# Patient Record
Sex: MALE | Race: WHITE | NOT HISPANIC OR LATINO | Employment: OTHER | URBAN - METROPOLITAN AREA
[De-identification: names, ages, dates, MRNs, and addresses within clinical notes are randomized per-mention and may not be internally consistent; named-entity substitution may affect disease eponyms.]

---

## 2018-06-07 ENCOUNTER — HOSPITAL ENCOUNTER (INPATIENT)
Facility: CLINIC | Age: 68
LOS: 1 days | Discharge: HOME | DRG: 247 | End: 2018-06-09
Attending: EMERGENCY MEDICINE | Admitting: INTERNAL MEDICINE
Payer: MEDICARE

## 2018-06-07 ENCOUNTER — TRANSFERRED RECORDS (OUTPATIENT)
Dept: HEALTH INFORMATION MANAGEMENT | Facility: CLINIC | Age: 68
End: 2018-06-07

## 2018-06-07 ENCOUNTER — APPOINTMENT (OUTPATIENT)
Dept: GENERAL RADIOLOGY | Facility: CLINIC | Age: 68
DRG: 247 | End: 2018-06-07
Attending: EMERGENCY MEDICINE
Payer: MEDICARE

## 2018-06-07 DIAGNOSIS — R07.9 ACUTE CHEST PAIN: ICD-10-CM

## 2018-06-07 DIAGNOSIS — I10 BENIGN ESSENTIAL HYPERTENSION: ICD-10-CM

## 2018-06-07 DIAGNOSIS — I21.4 NSTEMI (NON-ST ELEVATED MYOCARDIAL INFARCTION) (H): Primary | ICD-10-CM

## 2018-06-07 LAB
GLUCOSE BLDC GLUCOMTR-MCNC: 124 MG/DL (ref 70–99)
GLUCOSE BLDC GLUCOMTR-MCNC: 155 MG/DL (ref 70–99)
GLUCOSE BLDC GLUCOMTR-MCNC: 36 MG/DL (ref 70–99)
TROPONIN I BLD-MCNC: 0.02 UG/L (ref 0–0.1)
TROPONIN I SERPL-MCNC: 0.04 UG/L (ref 0–0.04)
TROPONIN I SERPL-MCNC: 0.34 UG/L (ref 0–0.04)
TROPONIN I SERPL-MCNC: 0.56 UG/L (ref 0–0.04)

## 2018-06-07 PROCEDURE — 93005 ELECTROCARDIOGRAM TRACING: CPT

## 2018-06-07 PROCEDURE — 96372 THER/PROPH/DIAG INJ SC/IM: CPT

## 2018-06-07 PROCEDURE — 25800025 ZZH RX 258

## 2018-06-07 PROCEDURE — 85025 COMPLETE CBC W/AUTO DIFF WBC: CPT | Performed by: EMERGENCY MEDICINE

## 2018-06-07 PROCEDURE — 84484 ASSAY OF TROPONIN QUANT: CPT

## 2018-06-07 PROCEDURE — G0378 HOSPITAL OBSERVATION PER HR: HCPCS

## 2018-06-07 PROCEDURE — 00000146 ZZHCL STATISTIC GLUCOSE BY METER IP

## 2018-06-07 PROCEDURE — 84484 ASSAY OF TROPONIN QUANT: CPT | Performed by: EMERGENCY MEDICINE

## 2018-06-07 PROCEDURE — 25000128 H RX IP 250 OP 636: Performed by: PHYSICIAN ASSISTANT

## 2018-06-07 PROCEDURE — A9270 NON-COVERED ITEM OR SERVICE: HCPCS | Mod: GY | Performed by: PHYSICIAN ASSISTANT

## 2018-06-07 PROCEDURE — 99285 EMERGENCY DEPT VISIT HI MDM: CPT | Mod: 25

## 2018-06-07 PROCEDURE — 80048 BASIC METABOLIC PNL TOTAL CA: CPT | Performed by: EMERGENCY MEDICINE

## 2018-06-07 PROCEDURE — 96374 THER/PROPH/DIAG INJ IV PUSH: CPT

## 2018-06-07 PROCEDURE — 99220 ZZC INITIAL OBSERVATION CARE,LEVL III: CPT | Performed by: PHYSICIAN ASSISTANT

## 2018-06-07 PROCEDURE — 84484 ASSAY OF TROPONIN QUANT: CPT | Performed by: PHYSICIAN ASSISTANT

## 2018-06-07 PROCEDURE — 36415 COLL VENOUS BLD VENIPUNCTURE: CPT | Performed by: PHYSICIAN ASSISTANT

## 2018-06-07 PROCEDURE — 25000132 ZZH RX MED GY IP 250 OP 250 PS 637: Mod: GY | Performed by: PHYSICIAN ASSISTANT

## 2018-06-07 PROCEDURE — 71046 X-RAY EXAM CHEST 2 VIEWS: CPT

## 2018-06-07 RX ORDER — NALOXONE HYDROCHLORIDE 0.4 MG/ML
.1-.4 INJECTION, SOLUTION INTRAMUSCULAR; INTRAVENOUS; SUBCUTANEOUS
Status: DISCONTINUED | OUTPATIENT
Start: 2018-06-07 | End: 2018-06-09 | Stop reason: HOSPADM

## 2018-06-07 RX ORDER — DEXTROSE MONOHYDRATE 25 G/50ML
INJECTION, SOLUTION INTRAVENOUS
Status: COMPLETED
Start: 2018-06-07 | End: 2018-06-07

## 2018-06-07 RX ORDER — DEXTROSE MONOHYDRATE 25 G/50ML
25-50 INJECTION, SOLUTION INTRAVENOUS
Status: DISCONTINUED | OUTPATIENT
Start: 2018-06-07 | End: 2018-06-08

## 2018-06-07 RX ORDER — PREGABALIN 50 MG/1
50 CAPSULE ORAL 3 TIMES DAILY
COMMUNITY
End: 2018-09-20

## 2018-06-07 RX ORDER — NITROGLYCERIN 0.4 MG/1
0.4 TABLET SUBLINGUAL EVERY 5 MIN PRN
Status: ON HOLD | COMMUNITY
End: 2018-06-09

## 2018-06-07 RX ORDER — LIDOCAINE 40 MG/G
CREAM TOPICAL
Status: DISCONTINUED | OUTPATIENT
Start: 2018-06-07 | End: 2018-06-09 | Stop reason: HOSPADM

## 2018-06-07 RX ORDER — NICOTINE POLACRILEX 4 MG
15-30 LOZENGE BUCCAL
Status: DISCONTINUED | OUTPATIENT
Start: 2018-06-07 | End: 2018-06-09 | Stop reason: HOSPADM

## 2018-06-07 RX ORDER — ACETAMINOPHEN 325 MG/1
650 TABLET ORAL EVERY 4 HOURS PRN
Status: DISCONTINUED | OUTPATIENT
Start: 2018-06-07 | End: 2018-06-08

## 2018-06-07 RX ORDER — ASPIRIN 81 MG/1
81 TABLET ORAL DAILY
Status: DISCONTINUED | OUTPATIENT
Start: 2018-06-08 | End: 2018-06-08 | Stop reason: DRUGHIGH

## 2018-06-07 RX ORDER — DEXTROSE MONOHYDRATE 25 G/50ML
25-50 INJECTION, SOLUTION INTRAVENOUS
Status: DISCONTINUED | OUTPATIENT
Start: 2018-06-07 | End: 2018-06-09 | Stop reason: HOSPADM

## 2018-06-07 RX ORDER — ALUMINA, MAGNESIA, AND SIMETHICONE 2400; 2400; 240 MG/30ML; MG/30ML; MG/30ML
30 SUSPENSION ORAL EVERY 4 HOURS PRN
Status: DISCONTINUED | OUTPATIENT
Start: 2018-06-07 | End: 2018-06-09 | Stop reason: HOSPADM

## 2018-06-07 RX ORDER — DEXTROSE MONOHYDRATE 25 G/50ML
50 INJECTION, SOLUTION INTRAVENOUS ONCE
Status: COMPLETED | OUTPATIENT
Start: 2018-06-07 | End: 2018-06-07

## 2018-06-07 RX ORDER — NITROGLYCERIN 0.4 MG/1
0.4 TABLET SUBLINGUAL EVERY 5 MIN PRN
Status: DISCONTINUED | OUTPATIENT
Start: 2018-06-07 | End: 2018-06-08

## 2018-06-07 RX ORDER — ACETAMINOPHEN 650 MG/1
650 SUPPOSITORY RECTAL EVERY 4 HOURS PRN
Status: DISCONTINUED | OUTPATIENT
Start: 2018-06-07 | End: 2018-06-09 | Stop reason: HOSPADM

## 2018-06-07 RX ORDER — NICOTINE POLACRILEX 4 MG
15-30 LOZENGE BUCCAL
Status: DISCONTINUED | OUTPATIENT
Start: 2018-06-07 | End: 2018-06-08

## 2018-06-07 RX ADMIN — DEXTROSE MONOHYDRATE 50 ML: 25 INJECTION, SOLUTION INTRAVENOUS at 16:04

## 2018-06-07 RX ADMIN — ENOXAPARIN SODIUM 90 MG: 100 INJECTION SUBCUTANEOUS at 23:12

## 2018-06-07 RX ADMIN — METFORMIN HYDROCHLORIDE 1000 MG: 500 TABLET ORAL at 20:50

## 2018-06-07 ASSESSMENT — ENCOUNTER SYMPTOMS
DIAPHORESIS: 0
FEVER: 0
NAUSEA: 0
VOMITING: 0

## 2018-06-07 NOTE — ED NOTES
[facility]  ED Nurse Handoff Report    Matt Way is a 67 year old female   ED Chief complaint: Chest Pain  . ED Diagnosis:   Final diagnoses:   Acute chest pain     Allergies: No Known Allergies    Code Status: Full Code  Activity level - Baseline/Home:  Stand with Assist. Activity Level - Current:   Stand with Assist. Lift room needed: No. Bariatric: No   Needed: No   Isolation: No. Infection: Not Applicable.     Vital Signs:   Vitals:    06/07/18 1345 06/07/18 1400 06/07/18 1415 06/07/18 1445   BP: 124/77 116/74 122/76 129/71   Pulse:       Resp: 10 19 14 15   Temp:       SpO2: 97% 96% 97% 98%   Weight:       Height:           Cardiac Rhythm:  ,   Cardiac  Cardiac Rhythm: Other (Comment) (inferior infarct)  Pain level:    Patient confused: No. Patient Falls Risk: Yes.   Elimination Status: Pt has not voided in the ED   Patient Report - Initial Complaint: Acute chest pain. Focused Assessment: Pt walking at a job interview and suddenly had dull chest pain that radiated to left arm. Pt rested and reports some relief but sharp pain came back rated at 6/10. Pt then went to urgent care clinic and EKG performed. Clinic administered 324mg of aspirin, clinic then called EMS for pt to be transferred in ED. Administered nitro x2. 18G IV on left arm. Per EMS, pt DM with BG en route of 144. Pt reports 0/10 chest pain upon arrival. A&Ox3, ABCs intact. Somewhat [place].   Tests Performed: labs, EKG, CXR. Abnormal Results: troponin 0.042, EKG inferior infarct  Treatments provided: O2 for comfort  Family Comments: spouse present   OBS brochure/video discussed/provided to patient:  N/A  ED Medications: Medications - No data to display  Drips infusing:  No  For the majority of the shift, the patient's behavior Green. Interventions performed were NA.     Severe Sepsis OR Septic Shock Diagnosis Present: No      ED Nurse Name/Phone Number: Kael Walter,   2:51 PMRECEIVING UNIT ED HANDOFF REVIEW    Above ED  Nurse Handoff Report was reviewed: Yes  Reviewed by: Karen M. Lesch on June 7, 2018 at 3:47 PM

## 2018-06-07 NOTE — LETTER
[facility]   [address]  [facility] [address]  Phone: [phone]      June 9, 2018      RE: Matt [address]  [id number] [facility] [id number]        To whom it may concern:    Matt Way is under my professional care. The employee is ABLE to return to work on 6/11/18.    When the patient returns to work, the following restrictions apply until 6/15/18:  A) Bend: Not at all (0 hours)  B) Squat: Not at all (0 hours)  C) Walk/Stand: Not at all (0 hours)  D) Reach Above Shoulders: Not at all (0 hours)  E) Lift, carry, push, and pull no more than:  0-10 pounds     Sincerely,      Ruth Hardin PA-C

## 2018-06-07 NOTE — ED TRIAGE NOTES
Pt walking at a job interview and suddenly had dull chest pain that radiated to left arm. Pt rested and reports some relief but sharp pain came back rated at 6/10. Pt then went to urgent care clinic and EKG performed. Clinic administered 324mg of aspirin, clinic then called EMS for pt to be transferred in ED. Administered nitro x2. 18G IV on left arm. Per EMS, pt DM with BG en route of 144. Pt reports 0/10 chest pain upon arrival. A&Ox3, ABCs intact. Somewhat [place].

## 2018-06-07 NOTE — ED NOTES
Patient is awake and talking, box lunch and orange juice given to patient.  Will do Q30 minute  Blood glucose checks.

## 2018-06-07 NOTE — IP AVS SNAPSHOT
MRN:[id number]                      After Visit Summary   6/7/2018    Matt [address]    MRN: [id number]           Thank you!     Thank you for choosing [facility] for your care. Our goal is always to provide you with excellent care. Hearing back from our patients is one way we can continue to improve our services. Please take a few minutes to complete the written survey that you may receive in the mail after you visit. If you would like to speak to someone directly about your visit please contact Patient Relations at [phone]. Thank you!          Patient Information     Date Of Birth          1950        Designated Caregiver       Most Recent Value    Caregiver    Will someone help with your care after discharge? yes    Name of designated caregiver Salina    Phone number of caregiver same as patient    Caregiver address same as patient      About your hospital stay     You were admitted on:  June 7, 2018 You last received care in the:  [facility] Observation Department    You were discharged on:  June 9, 2018        Reason for your hospital stay       You were admitted to the observation unit for chest pain. Your heart were initially monitored overnight with no abnormal findings. Your cardiac enzymes were initially detectable and on serial checks became elevated. Due to your increased troponin level cardiology was consulted and felt with your symptoms and troponin elevation were due to a non-ST elevated MI (heart attack). It was recommended you undergo a coronary angiogram and with this you had a stent placed to your proximal LAD (coronary artery). Cardiology started you on multiple cardioprotective medications that will also improve your blood pressure (Metoprolol and Lisinopril) and cholesterol (Atorvastatin). It is recommended you continue Brilinta and Aspirin to keep you stent open. Cardiology recommended you follow up with one of their Nurse  Practitioners within 7-10 days then Dr. Strickland in 4-6 weeks.                  Who to Call     For medical emergencies, please call 911.  For non-urgent questions about your medical care, please call your primary care provider or clinic, None          Attending Provider     Provider Specialty    David Peña MD Emergency Medicine    Branden, Herbert Carcamo MD Internal Medicine    [facility], Shreya Barlow MD Internal Medicine       Primary Care Provider Fax #    Physician No Ref-Primary [phone]      After Care Instructions     Activity       Your activity upon discharge: activity as tolerated and you cannot lift >10 pounds for the next week            Diet       Follow this diet upon discharge: Orders Placed This Encounter      Combination Diet [id number] Calories: High Consistent CHO (4-7 CHO units/meal); Low Saturated Fat Na <2400mg Diet (Mediterranian diet)                  Follow-up Appointments     Follow-up and recommended labs and tests        Follow up with primary care provider within 7 days for hospital follow-up.  No follow up labs or test are needed.   Follow up with cardiology Nurse Practitioner within 7-10 days and Dr. Strickland in 4-6 weeks.                  Additional Services     CARDIAC REHAB REFERRAL       Patient may choose their preference of the site for Cardiac Rehab.            CARDIOLOGY EVAL ADULT REFERRAL       Preferred location:  [facility] [phone]   [email or web address]    Please be aware that coverage of these services is subject to the terms and limitations of your health insurance plan.  Call member services at your health plan with any benefit or coverage questions.      Please bring the following to your appointment:  Any x-rays, CTs or MRIs which have been performed. Contact the facility where they were done to arrange for  prior to your scheduled appointment.    List of current  medications  This referral request   Any documents/labs given to you for this referral                             Further instructions from your care team       Going Home After an Angiogram    For the first 24 hours after your procedure:    Have an adult stay with you.    Relax and take it easy    Drink plenty of fluids.    Do NOT smoke.    Do NOT make any important or legal decisions.    Do NOT drive or operate machines at home or at work.    Do NOT drink alcohol.      Do NOT have sex or do any heavy exercise for 2 days.    Do NOT take a bath, or use a hot tub or pool for at least 3 days. You may shower.      Remove the Band-Aid after 24 hours. If there is minor oozing, apply another Band-Aid and remove it after 12 hours.    Care of the wrist site:  It is normal to have soreness at the puncture site and mild tingling in your hand for up to 3 days.    For 2 days, do not use your hand or arm to support your weight (such as rising from a chair) or bend your wrist (such as lifting a garage door).    For 2 days, do not lift more than 5 pounds or exercise your arm (tennis, golf, bowling etc.)    If you start bleeding from the site in your wrist:    Sit down and press firmly on the site with your thumb against the puncture site and finger against the back of the wrist. Call 911 for immediate help.    If the bleeding stops, sit still and keep your wrist straight for 2 hours.    Medicines:    If you have starting taking Plavix or Effient, DO NOT STOP TAKING IT until you talk to your heart doctor (Cardiologist).    If you are on Metformin (Glucophage), DO NOT RESTART it until you have blood tests (within 2-3 days after discharge). When your doctor tells you it's safe, you may restart the Metformin.    If you have stopped any other medications, check with your nurse or provider about when to restart them.    Call 911 right away if you have bleeding that is heavy or does not stop.    Call your doctor if:    You have a large  "or growing hard lump around the site.    The site is red, swollen, hot or tender.    Blood or fluid is draining from the site.    You have chills or a fever greater than 101 F (38 C)    Your arm feels numb or cool.    You have hives, a rash or unusual itching.    [facility] [place]:  [phone] (7 days a week)      Pending Results     No orders found from 2018 to 2018.            Statement of Approval     Ordered          18 1231  I have reviewed and agree with all the recommendations and orders detailed in this document.  EFFECTIVE NOW     Approved and electronically signed by:  Ruth Hardin PA-C             Admission Information     Date & Time Provider Department Dept. Phone    2018 Shreya Garcia MD [facility] Observation Department [phone]      Your Vitals Were     Blood Pressure Pulse Temperature Respirations Height Weight    120/70 (BP Location: Left arm) 61 96.2  F (35.7  C) (Oral) 18 1.854 m (6' 1\") 91 kg (200 lb 9.6 oz)    Pulse Oximetry BMI (Body Mass Index)                97% 26.47 kg/m2          MyChart Information     [employer] lets you send messages to your doctor, view your test results, renew your prescriptions, schedule appointments and more. To sign up, go to [email or web address] . Click on \"Log in\" on the left side of the screen, which will take you to the Welcome page. Then click on \"Sign up Now\" on the right side of the page.     You will be asked to enter the access code listed below, as well as some personal information. Please follow the directions to create your username and password.     Your access code is: [email or web address]  Expires: 2018  5:36 PM     Your access code will  in 90 days. If you need help or a new code, please call your [place] clinic or [phone].        Care EveryWhere ID     This is your Care EveryWhere ID. This could be used by other organizations to access your " [facility] medical records  [id number]        Equal Access to Services     PAT CORY : Hadii aad ku hadnoasergio Soosman, wahowardda lunievesadaha, qacarsonta kaalmachris bean, quinten strongrociosophia cortes. So [facility] [phone].    ATENCIÓN: Si habla español, tiene a carcamo disposición servicios gratuitos de asistencia lingüística. Llame al [phone].    We comply with applicable federal civil rights laws and [place] laws. We do not discriminate on the basis of race, color, national origin, age, disability, sex, sexual orientation, or gender identity.               Review of your medicines      START taking        Dose / Directions    atorvastatin 40 MG tablet   Commonly known as:  LIPITOR        Dose:  40 mg   Start taking on:  6/10/2018   Take 1 tablet (40 mg) by mouth daily   Quantity:  30 tablet   Refills:  1       lisinopril 2.5 MG tablet   Commonly known as:  PRINIVIL/Zestril   Used for:  Benign essential hypertension        Dose:  2.5 mg   Start taking on:  6/10/2018   Take 1 tablet (2.5 mg) by mouth daily   Quantity:  30 tablet   Refills:  1       metoprolol tartrate 25 MG tablet   Commonly known as:  LOPRESSOR   Used for:  Benign essential hypertension        Dose:  25 mg   Take 1 tablet (25 mg) by mouth 2 times daily   Quantity:  60 tablet   Refills:  1       ticagrelor 90 MG tablet   Commonly known as:  BRILINTA        Dose:  90 mg   Take 1 tablet (90 mg) by mouth every 12 hours   Quantity:  30 tablet   Refills:  1         CONTINUE these medicines which may have CHANGED, or have new prescriptions. If we are uncertain of the size of tablets/capsules you have at home, strength may be listed as something that might have changed.        Dose / Directions    aspirin 81 MG EC tablet   This may have changed:    - how much to take  - when to take this        Dose:  81 mg   Start taking on:  6/10/2018   Take 1 tablet (81 mg) by mouth daily   Refills:  0         CONTINUE these medicines which have NOT CHANGED         Dose / Directions    insulin detemir 100 UNIT/ML injection   Commonly known as:  LEVEMIR        Dose:  22 Units   Inject 22 Units Subcutaneous 2 times daily   Refills:  0       insulin pen needle 29G X 12.7MM        Refills:  0       LYRICA 50 MG capsule   Generic drug:  pregabalin        Dose:  50 mg   Take 50 mg by mouth 3 times daily   Refills:  0       metFORMIN 1000 MG tablet   Commonly known as:  GLUCOPHAGE        Dose:  1000 mg   Take 1,000 mg by mouth 2 times daily (with meals)   Refills:  0       nitroGLYcerin 0.4 MG sublingual tablet   Commonly known as:  NITROSTAT        Dose:  0.4 mg   Place 0.4 mg under the tongue every 5 minutes as needed for chest pain For chest pain place 1 tablet under the tongue every 5 minutes for 3 doses. If symptoms persist 5 minutes after 1st dose call 911.   Refills:  0       * NovoLOG FLEXPEN 100 UNIT/ML injection   Generic drug:  insulin aspart        Dose:  17 Units   Inject 17 Units Subcutaneous 2 times daily (with meals) Breakfast and Dinner   Refills:  0       * NovoLOG FLEXPEN 100 UNIT/ML injection   Generic drug:  insulin aspart        Dose:  15 Units   Inject 15 Units Subcutaneous daily (with lunch)   Refills:  0       * Notice:  This list has 2 medication(s) that are the same as other medications prescribed for you. Read the directions carefully, and ask your doctor or other care provider to review them with you.         Where to get your medicines      These medications were sent to [facility] [address] [place] [id number]     Phone:  [phone]     atorvastatin 40 MG tablet    lisinopril 2.5 MG tablet    metoprolol tartrate 25 MG tablet    ticagrelor 90 MG tablet         Some of these will need a paper prescription and others can be bought over the counter. Ask your nurse if you have questions.     You don't need a prescription for these medications     aspirin  81 MG EC tablet                Protect others around you: Learn how to safely use, store and throw away your medicines at www.disposemymeds.org.             Medication List: This is a list of all your medications and when to take them. Check marks below indicate your daily home schedule. Keep this list as a reference.      Medications           Morning Afternoon Evening Bedtime As Needed    aspirin 81 MG EC tablet   Take 1 tablet (81 mg) by mouth daily   Start taking on:  6/10/2018   Last time this was given:  81 mg on 6/9/2018  8:03 AM                                atorvastatin 40 MG tablet   Commonly known as:  LIPITOR   Take 1 tablet (40 mg) by mouth daily   Start taking on:  6/10/2018   Last time this was given:  40 mg on 6/9/2018  8:03 AM                                insulin detemir 100 UNIT/ML injection   Commonly known as:  LEVEMIR   Inject 22 Units Subcutaneous 2 times daily   Last time this was given:  20 Units on 6/9/2018  8:04 AM                                insulin pen needle 29G X 12.7MM                                lisinopril 2.5 MG tablet   Commonly known as:  PRINIVIL/Zestril   Take 1 tablet (2.5 mg) by mouth daily   Start taking on:  6/10/2018   Last time this was given:  2.5 mg on 6/9/2018  8:03 AM                                LYRICA 50 MG capsule   Take 50 mg by mouth 3 times daily   Generic drug:  pregabalin                                metFORMIN 1000 MG tablet   Commonly known as:  GLUCOPHAGE   Take 1,000 mg by mouth 2 times daily (with meals)   Last time this was given:  1,000 mg on 6/7/2018  8:50 PM                                metoprolol tartrate 25 MG tablet   Commonly known as:  LOPRESSOR   Take 1 tablet (25 mg) by mouth 2 times daily   Last time this was given:  25 mg on 6/9/2018  8:03 AM                                nitroGLYcerin 0.4 MG sublingual tablet   Commonly known as:  NITROSTAT   Place 0.4 mg under the tongue every 5 minutes as needed for chest pain For chest pain  place 1 tablet under the tongue every 5 minutes for 3 doses. If symptoms persist 5 minutes after 1st dose call 911.                                * NovoLOG FLEXPEN 100 UNIT/ML injection   Inject 17 Units Subcutaneous 2 times daily (with meals) Breakfast and Dinner   Last time this was given:  1 Units on 6/8/2018  9:49 PM   Generic drug:  insulin aspart                                * NovoLOG FLEXPEN 100 UNIT/ML injection   Inject 15 Units Subcutaneous daily (with lunch)   Last time this was given:  1 Units on 6/8/2018  9:49 PM   Generic drug:  insulin aspart                                ticagrelor 90 MG tablet   Commonly known as:  BRILINTA   Take 1 tablet (90 mg) by mouth every 12 hours   Last time this was given:  90 mg on 6/9/2018  5:21 AM                                * Notice:  This list has 2 medication(s) that are the same as other medications prescribed for you. Read the directions carefully, and ask your doctor or other care provider to review them with you.              More Information        Ticagrelor Oral tablet  What is this medicine?  TICAGRELOR (ALEJANDRA ka GREL or) helps to prevent blood clots. This medicine is used to prevent heart attack, stroke, or other vascular events in people who have had a recent heart attack or who have severe chest pain.  This medicine may be used for other purposes; ask your health care provider or pharmacist if you have questions.  What should I tell my health care provider before I take this medicine?  They need to know if you have any of these conditions:    bleeding disorder    bleeding in the brain    liver disease    planned surgery    stomach or intestinal ulcers    stroke or transient ischemic attack    an unusual or allergic reaction to ticagrelor, other medicines, foods, dyes, or preservatives    pregnant or trying to get pregnant    breast-feeding  How should I use this medicine?  Take this medicine by mouth with a glass of water. Follow the directions on the  prescription label. You can take it with or without food. If it upsets your stomach, take it with food. Take your medicine at regular intervals. Do not take it more often than directed. Do not stop taking except on your doctor's advice.  Talk to you pediatrician regarding the use of this medicine in children. Special care may be needed.  Overdosage: If you think you've taken too much of this medicine contact a poison control center or emergency room at once.  NOTE: This medicine is only for you. Do not share this medicine with others.  What if I miss a dose?  If you miss a dose, take it as soon as you can. If it is almost time for your next dose, take only that dose. Do not take double or extra doses.  What may interact with this medicine?    certain antibiotics like clarithromycin and telithromycin    certain medicines for fungal infections like itraconazole, ketoconazole, and voriconazole    certain medicines for HIV infection like atazanavir, indinavir, nelfinavir, ritonavir, and saquinavir    certain medicines for seizures like carbamazepine, phenobarbital, and phenytoin    certain medicines that treat or prevent blood clots like warfarin    dexamethasone    digoxin    lovastatin    nefazodone    rifampin    simvastatin  This list may not describe all possible interactions. Give your health care provider a list of all the medicines, herbs, non-prescription drugs, or dietary supplements you use. Also tell them if you smoke, drink alcohol, or use illegal drugs. Some items may interact with your medicine.  What should I watch for while using this medicine?  Visit your doctor or health care professional for regular check ups. Do not stop taking you medicine unless your doctor tells you to.  Notify your doctor or health care professional and seek emergency treatment if you develop breathing problems; changes in vision; chest pain; severe, sudden headache; pain, swelling, warmth in the leg; trouble speaking; sudden  numbness or weakness of the face, arm, or leg. These can be signs that your condition has gotten worse.  If you are going to have surgery or dental work, tell your doctor or health care professional that you are taking this medicine.  You should take aspirin every day with this medicine. Do not take more than 100 mg each day. Talk to your doctor if you have questions.  What side effects may I notice from receiving this medicine?  Side effects that you should report to your doctor or health care professional as soon as possible:    allergic reactions like skin rash, itching or hives, swelling of the face, lips, or tongue    breathing problems    fast or irregular heartbeat    feeling faint or light-headed, falls    signs and symptoms of bleeding such as bloody or black, tarry stools; red or dark-brown urine; spitting up blood or brown material that looks like coffee grounds; red spots on the skin; unusual bruising or bleeding from the eye, gums, or nose  Side effects that usually do not require medical attention (Report these to your doctor or health care professional if they continue or are bothersome.):    breast enlargement in both males and females    diarrhea    dizziness    headache    tiredness    upset stomach  This list may not describe all possible side effects. Call your doctor for medical advice about side effects. You may report side effects to FDA at 0-384-FDA-4126.  Where should I keep my medicine?  Keep out of the reach of children.  Store at room temperature of 59 to 86 degrees F (15 to 30 degrees C). Throw away any unused medicine after the expiration date.  NOTE: This sheet is a summary. It may not cover all possible information. If you have questions about this medicine, talk to your doctor, pharmacist, or health care provider.  NOTE:This sheet is a summary. It may not cover all possible information. If you have questions about this medicine, talk to your doctor, pharmacist, or health care  provider. Copyright  2016 Gold Standard                Lisinopril Oral tablet  What is this medicine?  LISINOPRIL (lyse IN oh pril) is an ACE inhibitor. This medicine is used to treat high blood pressure and heart failure. It is also used to protect the heart immediately after a heart attack.  This medicine may be used for other purposes; ask your health care provider or pharmacist if you have questions.  What should I tell my health care provider before I take this medicine?  They need to know if you have any of these conditions:    diabetes    heart or blood vessel disease    immune system disease like lupus or scleroderma    kidney disease    low blood pressure    previous swelling of the tongue, face, or lips with difficulty breathing, difficulty swallowing, hoarseness, or tightening of the throat    an unusual or allergic reaction to lisinopril, other ACE inhibitors, insect venom, foods, dyes, or preservatives    pregnant or trying to get pregnant    breast-feeding  How should I use this medicine?  Take this medicine by mouth with a glass of water. Follow the directions on your prescription label. You may take this medicine with or without food. Take your medicine at regular intervals. Do not stop taking this medicine except on the advice of your doctor or health care professional.  Talk to your pediatrician regarding the use of this medicine in children. Special care may be needed. While this drug may be prescribed for children as young as 6 years of age for selected conditions, precautions do apply.  Overdosage: If you think you have taken too much of this medicine contact a poison control center or emergency room at once.  NOTE: This medicine is only for you. Do not share this medicine with others.  What if I miss a dose?  If you miss a dose, take it as soon as you can. If it is almost time for your next dose, take only that dose. Do not take double or extra doses.  What may interact with this  medicine?    diuretics    lithium    NSAIDs, medicines for pain and inflammation, like ibuprofen or naproxen    over-the-counter herbal supplements like hawthorn    potassium salts or potassium supplements    salt substitutes  This list may not describe all possible interactions. Give your health care provider a list of all the medicines, herbs, non-prescription drugs, or dietary supplements you use. Also tell them if you smoke, drink alcohol, or use illegal drugs. Some items may interact with your medicine.  What should I watch for while using this medicine?  Visit your doctor or health care professional for regular check ups. Check your blood pressure as directed. Ask your doctor what your blood pressure should be, and when you should contact him or her. Call your doctor or health care professional if you notice an irregular or fast heart beat.  Women should inform their doctor if they wish to become pregnant or think they might be pregnant. There is a potential for serious side effects to an unborn child. Talk to your health care professional or pharmacist for more information.  Check with your doctor or health care professional if you get an attack of severe diarrhea, nausea and vomiting, or if you sweat a lot. The loss of too much body fluid can make it dangerous for you to take this medicine.  You may get drowsy or dizzy. Do not drive, use machinery, or do anything that needs mental alertness until you know how this drug affects you. Do not stand or sit up quickly, especially if you are an older patient. This reduces the risk of dizzy or fainting spells. Alcohol can make you more drowsy and dizzy. Avoid alcoholic drinks.  Avoid salt substitutes unless you are told otherwise by your doctor or health care professional.  Do not treat yourself for coughs, colds, or pain while you are taking this medicine without asking your doctor or health care professional for advice. Some ingredients may increase your blood  pressure.  What side effects may I notice from receiving this medicine?  Side effects that you should report to your doctor or health care professional as soon as possible:    abdominal pain with or without nausea or vomiting    allergic reactions like skin rash or hives, swelling of the hands, feet, face, lips, throat, or tongue    dark urine    difficulty breathing    dizzy, lightheaded or fainting spell    fever or sore throat    irregular heart beat, chest pain    pain or difficulty passing urine    redness, blistering, peeling or loosening of the skin, including inside the mouth    unusually weak    yellowing of the eyes or skin  Side effects that usually do not require medical attention (report to your doctor or health care professional if they continue or are bothersome):    change in taste    cough    decreased sexual function or desire    headache    sun sensitivity    tiredness  This list may not describe all possible side effects. Call your doctor for medical advice about side effects. You may report side effects to FDA at 2-329-FDA-9444.  Where should I keep my medicine?  Keep out of the reach of children.  Store at room temperature between 15 and 30 degrees C (59 and 86 degrees F). Protect from moisture. Keep container tightly closed. Throw away any unused medicine after the expiration date.  NOTE:This sheet is a summary. It may not cover all possible information. If you have questions about this medicine, talk to your doctor, pharmacist, or health care provider. Copyright  2016 Gold Standard                Metoprolol tablets  Brand Name: Lopressor  What is this medicine?  METOPROLOL (me TOE proe lole) is a beta-blocker. Beta-blockers reduce the workload on the heart and help it to beat more regularly. This medicine is used to treat high blood pressure and to prevent chest pain. It is also used to after a heart attack and to prevent an additional heart attack from occurring.  How should I use this  medicine?  Take this medicine by mouth with a drink of water. Follow the directions on the prescription label. Take this medicine immediately after meals. Take your doses at regular intervals. Do not take more medicine than directed. Do not stop taking this medicine suddenly. This could lead to serious heart-related effects.  Talk to your pediatrician regarding the use of this medicine in children. Special care may be needed.  What side effects may I notice from receiving this medicine?  Side effects that you should report to your doctor or health care professional as soon as possible:    allergic reactions like skin rash, itching or hives    cold or numb hands or feet    depression    difficulty breathing    faint    fever with sore throat    irregular heartbeat, chest pain    rapid weight gain    swollen legs or ankles  Side effects that usually do not require medical attention (report to your doctor or health care professional if they continue or are bothersome):    anxiety or nervousness    change in sex drive or performance    dry skin    headache    nightmares or trouble sleeping    short term memory loss    stomach upset or diarrhea    unusually tired  What may interact with this medicine?  This medicine may interact with the following medications:    certain medicines for blood pressure, heart disease, irregular heart beat    certain medicines for depression like monoamine oxidase (MAO) inhibitors, fluoxetine, or paroxetine    clonidine    dobutamine    epinephrine    isoproterenol    reserpine  What if I miss a dose?  If you miss a dose, take it as soon as you can. If it is almost time for your next dose, take only that dose. Do not take double or extra doses.  Where should I keep my medicine?  Keep out of the reach of children.  Store at room temperature between 15 and 30 degrees C (59 and 86 degrees F). Throw away any unused medicine after the expiration date.  What should I tell my health care provider  before I take this medicine?  They need to know if you have any of these conditions:    diabetes    heart or vessel disease like slow heart rate, worsening heart failure, heart block, sick sinus syndrome or Raynaud's disease    kidney disease    liver disease    lung or breathing disease, like asthma or emphysema    pheochromocytoma    thyroid disease    an unusual or allergic reaction to metoprolol, other beta-blockers, medicines, foods, dyes, or preservatives    pregnant or trying to get pregnant    breast-feeding  What should I watch for while using this medicine?  Visit your doctor or health care professional for regular check ups. Contact your doctor right away if your symptoms worsen. Check your blood pressure and pulse rate regularly. Ask your health care professional what your blood pressure and pulse rate should be, and when you should contact them.  You may get drowsy or dizzy. Do not drive, use machinery, or do anything that needs mental alertness until you know how this medicine affects you. Do not sit or stand up quickly, especially if you are an older patient. This reduces the risk of dizzy or fainting spells. Contact your doctor if these symptoms continue. Alcohol may interfere with the effect of this medicine. Avoid alcoholic drinks.  NOTE:This sheet is a summary. It may not cover all possible information. If you have questions about this medicine, talk to your doctor, pharmacist, or health care provider. Copyright  2018 [employer]vier                Atorvastatin tablets  Brand Name: Lipitor  What is this medicine?  ATORVASTATIN (a TORE va sta tin) is known as a HMG-CoA reductase inhibitor or 'statin'. It lowers the level of cholesterol and triglycerides in the blood. This drug may also reduce the risk of heart attack, stroke, or other health problems in patients with risk factors for heart disease. Diet and lifestyle changes are often used with this drug.  How should I use this medicine?  Take this  medicine by mouth with a glass of water. Follow the directions on the prescription label. You can take this medicine with or without food. Take your doses at regular intervals. Do not take your medicine more often than directed.  Talk to your pediatrician regarding the use of this medicine in children. While this drug may be prescribed for children as young as 10 years old for selected conditions, precautions do apply.  What side effects may I notice from receiving this medicine?  Side effects that you should report to your doctor or health care professional as soon as possible:    allergic reactions like skin rash, itching or hives, swelling of the face, lips, or tongue    dark urine    fever    joint pain    muscle cramps, pain    redness, blistering, peeling or loosening of the skin, including inside the mouth    trouble passing urine or change in the amount of urine    unusually weak or tired    yellowing of eyes or skin  Side effects that usually do not require medical attention (report to your doctor or health care professional if they continue or are bothersome):    constipation    heartburn    stomach gas, pain, upset  What may interact with this medicine?  Do not take this medicine with any of the following medications:    red yeast rice    telaprevir    telithromycin    voriconazole  This medicine may also interact with the following medications:    alcohol    antiviral medicines for HIV or AIDS    boceprevir    certain antibiotics like clarithromycin, erythromycin, troleandomycin    certain medicines for cholesterol like fenofibrate or gemfibrozil    cimetidine    clarithromycin    colchicine    cyclosporine    digoxin    female hormones, like estrogens or progestins and birth control pills    grapefruit juice    medicines for fungal infections like fluconazole, itraconazole, ketoconazole    niacin    rifampin    spironolactone  What if I miss a dose?  If you miss a dose, take it as soon as you can. If it  is almost time for your next dose, take only that dose. Do not take double or extra doses.  Where should I keep my medicine?  Keep out of the reach of children.  Store at room temperature between 20 to 25 degrees C (68 to 77 degrees F). Throw away any unused medicine after the expiration date.  What should I tell my health care provider before I take this medicine?  They need to know if you have any of these conditions:    frequently drink alcoholic beverages    history of stroke, TIA    kidney disease    liver disease    muscle aches or weakness    other medical condition    an unusual or allergic reaction to atorvastatin, other medicines, foods, dyes, or preservatives    pregnant or trying to get pregnant    breast-feeding  What should I watch for while using this medicine?  Visit your doctor or health care professional for regular check-ups. You may need regular tests to make sure your liver is working properly.  Tell your doctor or health care professional right away if you get any unexplained muscle pain, tenderness, or weakness, especially if you also have a fever and tiredness. Your doctor or health care professional may tell you to stop taking this medicine if you develop muscle problems. If your muscle problems do not go away after stopping this medicine, contact your health care professional.  This drug is only part of a total heart-health program. Your doctor or a dietician can suggest a low-cholesterol and low-fat diet to help. Avoid alcohol and smoking, and keep a proper exercise schedule.  Do not use this drug if you are pregnant or breast-feeding. Serious side effects to an unborn child or to an infant are possible. Talk to your doctor or pharmacist for more information.  This medicine may affect blood sugar levels. If you have diabetes, check with your doctor or health care professional before you change your diet or the dose of your diabetic medicine.  If you are going to have surgery tell your  health care professional that you are taking this drug.  NOTE:This sheet is a summary. It may not cover all possible information. If you have questions about this medicine, talk to your doctor, pharmacist, or health care provider. Copyright  2018 Elsevier

## 2018-06-07 NOTE — ED PROVIDER NOTES
"  History     Chief Complaint:  Chest Pain    HPI   Matt Way is a 67 year old male with diabetes and a family history of heart problems who presents to the emergency department today for evaluation of chest pain. At around 1100 today while walking, the patient started having a sharp 6/10 in intensity chest pain that extends to his shoulder and back. He called EMS once he got home and was given 324 mg Aspirin PO and 2 nitroglycerin tabs en route. Here in the Emergency Department the patient has no remaining symptoms and feels good. He denies nausea, vomiting, diaphoresis, leg swelling, current pain, and prior history of similar symptoms.     Allergies:  No Known Drug Allergies    Medications:    Levemir  insulin aspart (NOVOLOG FLEXPEN) 100 UNIT/ML injection  insulin detemir (LEVEMIR) 100 UNIT/ML injection  METFORMIN HCL 1000 MG OR TABS  GLIMEPIRIDE 1 MG OR TABS  Insulin Pen Needle 29G X 12.7MM MISC    Past Medical History:    Diabetes  HLD    Past Surgical History:    History reviewed. No pertinent surgical history.    Family History:    Heart history 1st degree relatives, <55 y.o.    Social History:  The patient was accompanied to the ED by his family.  Smoking Status: Never Smoker  Alcohol Use: Negative   Marital Status:       Review of Systems   Constitutional: Negative for diaphoresis and fever.   Cardiovascular: Positive for chest pain. Negative for leg swelling.   Gastrointestinal: Negative for nausea and vomiting.   All other systems reviewed and are negative.    Physical Exam     Patient Vitals for the past 24 hrs:   BP Temp Pulse Heart Rate Resp SpO2 Height Weight   06/07/18 1445 129/71 - - 70 15 98 % - -   06/07/18 1415 122/76 - - 65 14 97 % - -   06/07/18 1400 116/74 - - 68 19 96 % - -   06/07/18 1345 124/77 - - 69 10 97 % - -   06/07/18 1303 132/76 98  F (36.7  C) 87 87 18 93 % 1.854 m (6' 1\") 94.3 kg (208 lb)      Physical Exam  Nursing note and vitals reviewed.  Constitutional: " Cooperative.   HENT:   Mouth/Throat: Moist mucous membranes.   Eyes: EOMI, nonicteric sclera  Cardiovascular: Normal rate, regular rhythm, no murmurs, rubs, or gallops  Pulmonary/Chest: Effort normal and breath sounds normal. No respiratory distress. No wheezes. No rales.   Abdominal: Soft. Nontender, nondistended, no guarding or rigidity. BS present.   Musculoskeletal: Normal range of motion.   Neurological: Alert. Moves all extremities spontaneously.   Skin: Skin is warm and dry. No rash noted.   Psychiatric: Normal mood and affect.       Emergency Department Course     ECG:  ECG taken at 1301, ECG read at 1421  Normal sinus rhythm  Left axis deviation  Inferior infarct, age undetermined  Abnormal ECG  Rate 80 bpm. MN interval 192 ms. QRS duration 76 ms. QT/QTc 382/440 ms. P-R-T axes 53 -35 55.    Imaging:  Radiology findings were communicated with the patient who voiced understanding of the findings.    Chest XR,  PA & LAT  Cardiac silhouette and pulmonary vasculature are within  normal limits. No focal airspace disease, pleural effusion or  Pneumothorax.  Reading per radiology    Laboratory:  Laboratory findings were communicated with the petiant who voiced understanding of the findings.    Troponin POCT: 0.02  CBC: WBC 6.4, HGB 15.5,   BMP: Glucose 112, o/w WNL (Creatinine 0.88)  Troponin I: 0.042    Emergency Department Course:    1303 Nursing notes and vitals reviewed.    1320 I performed an exam of the patient as documented above.     1348 The patient was sent for a XR while in the emergency department, results above.      1401 IV was inserted and blood was drawn for laboratory testing, results above.     1455 I spoke with Dr. Drake of the Hospitalist service regarding patient's presentation, findings, and plan of care.     1500 I spoke with Jessica Velazquez P.A. Of the Hospitalist service regarding patient's presentation, findings, and plan of care. She will be accepting for Dr. Otero.    1500 I  personally reviewed the lab and imaging results with the patient and answered all related questions prior to admission.    Impression & Plan      Medical Decision Making:  Matt Way is a 67 year old female who presents for evaluation of acute onset chest pain. Imaging tests have come back normal, but laboratory tests came back with a detectable troponin. There is no clinical, laboratory, or radiographic evidence of pulmonary embolism, aortic dissection, pneumonia, pneumothorax or cardiac ischemia.  Other etiologies of chest pain considered in this patient included chest wall source, esophageal spasm or GI source, pleuritis, referred pain, etc.  My suspicion of unstable angina at this point is very low and given risk/benefit ratio would not start lovenox or heparin. Given the nature and timing of the patient's symptoms with a detectable troponin, I will admit the patient  to the Observation Unit for further cardiac monitoring, follow up troponin markers, and stress echocardiography should the enzymes remain in the normal range.  Dr. Otero is accepting.    Pt had hypoglycemic episode before admission which was treated with juice, food, and D50. He's doing better now.     Diagnosis:    ICD-10-CM    1. Acute chest pain R07.9      Disposition:   Admission    Scribe Disclosure:  I, Armando James, am serving as a scribe at 1:30 PM on 6/7/2018 to document services personally performed by David Peña MD based on my observations and the provider's statements to me.      [facility] EMERGENCY DEPARTMENT       David Peña MD  06/07/18 9832

## 2018-06-07 NOTE — PHARMACY-ADMISSION MEDICATION HISTORY
Admission medication history interview status for this patient is complete. See [facility] admission navigator for allergy information, prior to admission medications and immunization status.     Medication history interview source(s):Patient and Family  Medication history resources (including written lists, pill bottles, clinic record):None  [facility]    Changes made to PTA medication list:  Added: doses, aspirin once, nitro  Deleted: -  Changed: -    Actions taken by pharmacist (provider contacted, etc):None     Additional medication history information:None    Medication reconciliation/reorder completed by provider prior to medication history? No    Do you take OTC medications (eg tylenol, ibuprofen, fish oil, eye/ear drops, etc)? no(Y/N)    For patients on insulin therapy: yes (Y/N)  Lantus/levemir/NPH/Mix 70/30 dose:   (Y/N) (see Med list for doses) Levemir  Sliding scale Novolog Y/N no  If Yes, do you have a baseline novolog pre-meal dose:  units with meals yes  Patients eat three meals a day:   Y/N  sometimes  How many episodes of hypoglycemia do you have per week: _______unknown  How many missed doses do you have per week: ______unknown  How many times do you check your blood glucose per day: _______unknown   Any Barriers to therapy - Be specific :  cost of medications, comfortable with giving injections (if applicable), comfortable and confident with current diabetes regimen: Y/N ______________Insurance issues      Prior to Admission medications    Medication Sig Last Dose Taking? Auth Provider   ASPIRIN EC PO Take 325 mg by mouth once 6/7/2018 at Unknown time Yes Unknown, Entered By History   insulin aspart (NOVOLOG FLEXPEN) 100 UNIT/ML injection Inject 17 Units Subcutaneous 2 times daily (with meals) Breakfast and Dinner 6/7/2018 at x1 Yes Reported, Patient   insulin aspart (NOVOLOG FLEXPEN) 100 UNIT/ML injection Inject 15 Units Subcutaneous daily (with lunch) 6/6/2018 at Unknown time Yes  Unknown, Entered By History   insulin detemir (LEVEMIR) 100 UNIT/ML injection Inject 22 Units Subcutaneous 2 times daily  6/7/2018 at x1 Yes Reported, Patient   metFORMIN (GLUCOPHAGE) 1000 MG tablet Take 1,000 mg by mouth 2 times daily (with meals) 6/7/2018 at x1 Yes Unknown, Entered By History   nitroGLYcerin (NITROSTAT) 0.4 MG sublingual tablet Place 0.4 mg under the tongue every 5 minutes as needed for chest pain For chest pain place 1 tablet under the tongue every 5 minutes for 3 doses. If symptoms persist 5 minutes after 1st dose call 911. 6/7/2018 at 2x Yes Unknown, Entered By History   Insulin Pen Needle 29G X 12.7MM MISC    Reported, Patient

## 2018-06-07 NOTE — PROGRESS NOTES
ROOM # 208 2    Living Situation Patient lives independently in the community   Facility name:[facility]  : [facility], [phone]    Activity level at baseline: independent  Activity level on admit: independent      Patient registered to observation; given Patient Bill of Rights; given the opportunity to ask questions about observation status and their plan of care.  Patient has been oriented to the observation room, bathroom and call light is in place.    Discussed discharge goals and expectations with patient/family.

## 2018-06-07 NOTE — H&P
[facility] Outpatient / Observation Unit  History and Physical Exam     Matt Way MRN# [id number]   YOB: 1950 Age: 67 year old      Date of Admission:  6/7/2018    Primary care provider: No Ref-Primary, Physician          Assessment:   Matt Way is a 67 year old female with a PMH significant for DM, HTN, GERD, FLAQUITA, and HLP, who presents with chest pain.   Work up in ED reveals: VSS. BMP and CBC are unremarkable. Troponin is detectable but negative at 0.042. CXR clear. EKG SR, possible inferior infarct. He received 324 mg PO ASA and nitro x2 via EMS.  Patient is being registered to observation for further evaluation and to rule out possible ACS.     1. Acute Chest pain: occurred with exertion with radiation to his back, denies SOB, nausea or diaphoresis. He did note reproducible pain as well. Never had chest pain like this before, denies personal hx of CAD but fam hx in brothers. EKG is non ischemic. Tele, serial trops and stress echo in AM  2. Hypoglycemic event - occurred while in ED. Took regular insulin at home this morning but has not eaten like normal today. He was give 1 amp of D50 and glucose improved. Will resume home meds and diet.   3. HTN - not currently on antihypertensives, -130s here. monitor  4. HLP - not on statin  5. GERD    6. FLAQUITA    Of note, pt is starting a new job, has to have some paperwork turned in tomorrow. This is very important to him as he has been laid off for a year. He would like to discharge as soon as possible.          Plan:     1. [place] to Observation  2. Continue telemetry  3. Follow serial troponins  4. Stress testing with Stress Echo  5. Cont Aspirin EC 81 mg po daily  6. Blood pressure control  7. Morphine and nitroglycerine PRN for pain    8. regular diet, No caffeine if Nuclear testing selected  9. DVT prophylaxis: pt at low risk, encourage ambulation  10. Code Status: full code  11. Dispo: anticipate discharge home tomorrow following stress  test                  Chief Complaint:   Chest Pain         History of Present Illness:   Matt Way is a 67 year old female with a PMH significant for DM, HTN, GERD, FLAQUITA, an d HLP, who presents with chest pain. Pt states he was walking home from work around 1100 today (3 blocks) when he developed sharp left sided chest pain with radiation to his left shoulder and back. He does note a reproducible nature to his pain. He went home and laid down and his pain started to improve. He got up to answer the phone and his pain worsened so he called EMS. He was given 324 mg PO ASA and 2 nitro enroute with resolution of his symptoms. He denies fever, chills, SOB, nausea, vomiting, diaphoresis, abd pain, diarrhea or dysuria. He denies hx of tobacco use or asthma. He does note seasonal allergies managed by OTC medications.               Past Medical History:     Past Medical History:   Diagnosis Date     Diabetes (H)                Past Surgical History:     Past Surgical History:   Procedure Laterality Date     SHOULDER SURGERY                 Social History:     Social History     Social History     Marital status:      Spouse name: N/A     Number of children: N/A     Years of education: N/A     Occupational History     Not on file.     Social History Main Topics     Smoking status: Never Smoker     Smokeless tobacco: Not on file     Alcohol use No     Drug use: Not on file     Sexual activity: Not on file     Other Topics Concern     Not on file     Social History Narrative     Denies tobacco use          Family History:   Brother(s) - DM, CAD, CVA  Father - colon ca  Mother - DM         Allergies:    No Known Allergies            Medications:     Prior to Admission medications    Medication Sig Last Dose Taking? Auth Provider   insulin aspart (NOVOLOG FLEXPEN) 100 UNIT/ML injection Inject Subcutaneous 3 times daily (with meals) 6/7/2018 at Unknown time Yes Reported, Patient   insulin detemir (LEVEMIR) 100 UNIT/ML  "injection Inject Subcutaneous At Bedtime 6/7/2018 at Unknown time Yes Reported, Patient   METFORMIN HCL 1000 MG OR TABS pt not sure of dose 6/7/2018 at Unknown time Yes    GLIMEPIRIDE 1 MG OR TABS pt not sure of dose More than a month at Unknown time     Insulin Pen Needle 29G X 12.7MM MISC    Reported, Patient   UNKNOWN MED DOSAGE Pt does not known Insulin on.   Reported, Patient              Review of Systems:   A Comprehensive greater than 10 system review of systems was carried out.  Pertinent positives and negatives are noted above.  Otherwise negative for contributory information.     Constitutional, neuro, ENT, endocrine, pulmonary, cardiac, gastrointestinal, genitourinary, musculoskeletal, integument and psychiatric systems are negative, except as otherwise noted.           Physical Exam:   Blood pressure 129/71, pulse 87, temperature 98  F (36.7  C), resp. rate 15, height 1.854 m (6' 1\"), weight 94.3 kg (208 lb), SpO2 98 %.    GENERAL:  Comfortable.  PSYCH: pleasant, oriented, No acute distress.  HEENT:  Atraumatic, normocephalic. Normal conjunctiva, normal hearing, and oropharynx is normal.  NECK:  Supple, no neck vein distention  HEART:  Normal S1, S2 with no murmur, no pericardial rub, gallops or S3 or S4.  LUNGS:  Diffuse very mild expiratory wheeze, otherwise clear to auscultation, normal Respiratory effort. No rales or ronchi.  GI:  Soft, normal bowel sounds. Non-tender, non distended.   EXTREMITIES:  No pedal edema, +2 pulses bilateral and equal.  SKIN:  Dry to touch, No rash, wound or ulcerations.  NEUROLOGIC:  CN 2-12 intact, BL 5/5 symmetric upper and lower extremity strength, sensation is intact with no focal deficits.              Data:     EKG demonstrates:  Sinus Rhythm, possible inferior infarct      Recent Labs  Lab 06/07/18  1301   WBC 6.4   HGB 15.5   HCT 47.2*   MCV 85          Recent Labs  Lab 06/07/18  1301      POTASSIUM 3.9   CHLORIDE 106   CO2 24   ANIONGAP 9   * "   BUN 17   CR 0.88   GFRESTIMATED 64   GFRESTBLACK 78   CHRISTIE 8.7       Recent Labs  Lab 06/07/18  1319 06/07/18  1301   TROPONIN 0.02  --    TROPI  --  0.042       Recent Results (from the past 48 hour(s))   Chest XR,  PA & LAT    Narrative    XR CHEST 2 VW 6/7/2018 2:30 PM    COMPARISON: None.    HISTORY: Chest pain.      Impression    IMPRESSION: Cardiac silhouette and pulmonary vasculature are within  normal limits. No focal airspace disease, pleural effusion or  pneumothorax.    MD Jessica FIERRO PA-C

## 2018-06-07 NOTE — IP AVS SNAPSHOT
[facility] Observation Department    [address]    [facility] [address]    Phone:  [phone]                                       After Visit Summary   6/7/2018    Matt Way    MRN: [id number]           After Visit Summary Signature Page     I have received my discharge instructions, and my questions have been answered. I have discussed any challenges I see with this plan with the nurse or doctor.    ..........................................................................................................................................  Patient/Patient Representative Signature      ..........................................................................................................................................  Patient Representative Print Name and Relationship to Patient    ..................................................               ................................................  Date                                            Time    ..........................................................................................................................................  Reviewed by Signature/Title    ...................................................              ..............................................  Date                                                            Time

## 2018-06-07 NOTE — LETTER
June 9, 2018      Matt Way  [id number] [facility] [address]      To Whom It May Concern:    Matt Way  was seen on 6/7-6/9/18. Please excuse him until 6/11/18 due to illness.      Sincerely,        Ruth Hardin PA-C

## 2018-06-07 NOTE — ED NOTES
Bed: ED11  Expected date: 6/7/18  Expected time: 12:44 PM  Means of arrival:   Comments:  Meena Pretty

## 2018-06-07 NOTE — ED NOTES
Patient is sleeping, opens eyes to voice and grunts.  Wife stated patient is a diabetic and took his insulin this morning, but has only had oatmeal to eat.  Blood sugar is 36. MD notified and D50W given.

## 2018-06-08 ENCOUNTER — APPOINTMENT (OUTPATIENT)
Dept: CARDIOLOGY | Facility: CLINIC | Age: 68
DRG: 247 | End: 2018-06-08
Attending: INTERNAL MEDICINE
Payer: MEDICARE

## 2018-06-08 PROBLEM — I21.4 NSTEMI (NON-ST ELEVATED MYOCARDIAL INFARCTION) (H): Status: ACTIVE | Noted: 2018-06-08

## 2018-06-08 LAB
ANION GAP SERPL CALCULATED.3IONS-SCNC: 9 MMOL/L (ref 3–14)
BASOPHILS # BLD AUTO: 0.1 10E9/L (ref 0–0.2)
BASOPHILS NFR BLD AUTO: 0.8 %
BUN SERPL-MCNC: 17 MG/DL (ref 7–30)
CALCIUM SERPL-MCNC: 8.7 MG/DL (ref 8.5–10.1)
CHLORIDE SERPL-SCNC: 106 MMOL/L (ref 94–109)
CHOLEST SERPL-MCNC: 176 MG/DL
CHOLEST SERPL-MCNC: 199 MG/DL
CO2 SERPL-SCNC: 24 MMOL/L (ref 20–32)
CREAT SERPL-MCNC: 0.88 MG/DL (ref 0.66–1.25)
DIFFERENTIAL METHOD BLD: NORMAL
EOSINOPHIL # BLD AUTO: 0.1 10E9/L (ref 0–0.7)
EOSINOPHIL NFR BLD AUTO: 2 %
ERYTHROCYTE [DISTWIDTH] IN BLOOD BY AUTOMATED COUNT: 14.3 % (ref 10–15)
ERYTHROCYTE [DISTWIDTH] IN BLOOD BY AUTOMATED COUNT: 14.5 % (ref 10–15)
GFR SERPL CREATININE-BSD FRML MDRD: 64 ML/MIN/1.7M2
GLUCOSE BLDC GLUCOMTR-MCNC: 137 MG/DL (ref 70–99)
GLUCOSE BLDC GLUCOMTR-MCNC: 160 MG/DL (ref 70–99)
GLUCOSE BLDC GLUCOMTR-MCNC: 167 MG/DL (ref 70–99)
GLUCOSE BLDC GLUCOMTR-MCNC: 200 MG/DL (ref 70–99)
GLUCOSE BLDC GLUCOMTR-MCNC: 77 MG/DL (ref 70–99)
GLUCOSE SERPL-MCNC: 112 MG/DL (ref 70–99)
HBA1C MFR BLD: 7.7 % (ref 0–5.6)
HCT VFR BLD AUTO: 47.2 % (ref 40–53)
HCT VFR BLD AUTO: 48.5 % (ref 40–53)
HDLC SERPL-MCNC: 34 MG/DL
HDLC SERPL-MCNC: 34 MG/DL
HGB BLD-MCNC: 15.5 G/DL (ref 13.3–17.7)
HGB BLD-MCNC: 15.8 G/DL (ref 13.3–17.7)
IMM GRANULOCYTES # BLD: 0 10E9/L (ref 0–0.4)
IMM GRANULOCYTES NFR BLD: 0.2 %
INTERPRETATION ECG - MUSE: NORMAL
KCT BLD-ACNC: 306 SEC (ref 75–150)
LDLC SERPL CALC-MCNC: 108 MG/DL
LDLC SERPL CALC-MCNC: 114 MG/DL
LMWH PPP CHRO-ACNC: 1.31 IU/ML
LYMPHOCYTES # BLD AUTO: 2.1 10E9/L (ref 0.8–5.3)
LYMPHOCYTES NFR BLD AUTO: 32.8 %
MCH RBC QN AUTO: 28 PG (ref 26.5–33)
MCH RBC QN AUTO: 28.2 PG (ref 26.5–33)
MCHC RBC AUTO-ENTMCNC: 32.6 G/DL (ref 31.5–36.5)
MCHC RBC AUTO-ENTMCNC: 32.8 G/DL (ref 31.5–36.5)
MCV RBC AUTO: 85 FL (ref 78–100)
MCV RBC AUTO: 87 FL (ref 78–100)
MONOCYTES # BLD AUTO: 0.5 10E9/L (ref 0–1.3)
MONOCYTES NFR BLD AUTO: 7.2 %
NEUTROPHILS # BLD AUTO: 3.7 10E9/L (ref 1.6–8.3)
NEUTROPHILS NFR BLD AUTO: 57 %
NONHDLC SERPL-MCNC: 142 MG/DL
NONHDLC SERPL-MCNC: 165 MG/DL
NRBC # BLD AUTO: 0 10*3/UL
NRBC BLD AUTO-RTO: 0 /100
PLATELET # BLD AUTO: 203 10E9/L (ref 150–450)
PLATELET # BLD AUTO: 228 10E9/L (ref 150–450)
POTASSIUM SERPL-SCNC: 3.9 MMOL/L (ref 3.4–5.3)
RBC # BLD AUTO: 5.54 10E12/L (ref 4.4–5.9)
RBC # BLD AUTO: 5.61 10E12/L (ref 4.4–5.9)
SODIUM SERPL-SCNC: 139 MMOL/L (ref 133–144)
TRIGL SERPL-MCNC: 139 MG/DL
TRIGL SERPL-MCNC: 287 MG/DL
TROPONIN I SERPL-MCNC: 0.83 UG/L (ref 0–0.04)
WBC # BLD AUTO: 6.4 10E9/L (ref 4–11)
WBC # BLD AUTO: 6.4 10E9/L (ref 4–11)

## 2018-06-08 PROCEDURE — 80061 LIPID PANEL: CPT | Performed by: INTERNAL MEDICINE

## 2018-06-08 PROCEDURE — 25000128 H RX IP 250 OP 636: Performed by: INTERNAL MEDICINE

## 2018-06-08 PROCEDURE — 27210946 ZZH KIT HC TOTES DISP CR8

## 2018-06-08 PROCEDURE — 36415 COLL VENOUS BLD VENIPUNCTURE: CPT | Performed by: INTERNAL MEDICINE

## 2018-06-08 PROCEDURE — 25000132 ZZH RX MED GY IP 250 OP 250 PS 637: Mod: GY | Performed by: PHYSICIAN ASSISTANT

## 2018-06-08 PROCEDURE — 93454 CORONARY ARTERY ANGIO S&I: CPT

## 2018-06-08 PROCEDURE — 25000131 ZZH RX MED GY IP 250 OP 636 PS 637: Mod: GY | Performed by: PHYSICIAN ASSISTANT

## 2018-06-08 PROCEDURE — 99223 1ST HOSP IP/OBS HIGH 75: CPT | Performed by: INTERNAL MEDICINE

## 2018-06-08 PROCEDURE — A9270 NON-COVERED ITEM OR SERVICE: HCPCS | Mod: GY | Performed by: INTERNAL MEDICINE

## 2018-06-08 PROCEDURE — 27210742 ZZH CATH CR1

## 2018-06-08 PROCEDURE — C1894 INTRO/SHEATH, NON-LASER: HCPCS

## 2018-06-08 PROCEDURE — A9270 NON-COVERED ITEM OR SERVICE: HCPCS | Mod: GY | Performed by: HOSPITALIST

## 2018-06-08 PROCEDURE — C1769 GUIDE WIRE: HCPCS

## 2018-06-08 PROCEDURE — 92928 PRQ TCAT PLMT NTRAC ST 1 LES: CPT | Mod: LD | Performed by: INTERNAL MEDICINE

## 2018-06-08 PROCEDURE — 85027 COMPLETE CBC AUTOMATED: CPT | Performed by: INTERNAL MEDICINE

## 2018-06-08 PROCEDURE — G0378 HOSPITAL OBSERVATION PER HR: HCPCS

## 2018-06-08 PROCEDURE — C1887 CATHETER, GUIDING: HCPCS

## 2018-06-08 PROCEDURE — 99152 MOD SED SAME PHYS/QHP 5/>YRS: CPT | Performed by: INTERNAL MEDICINE

## 2018-06-08 PROCEDURE — 00000146 ZZHCL STATISTIC GLUCOSE BY METER IP

## 2018-06-08 PROCEDURE — 84484 ASSAY OF TROPONIN QUANT: CPT | Performed by: INTERNAL MEDICINE

## 2018-06-08 PROCEDURE — 83036 HEMOGLOBIN GLYCOSYLATED A1C: CPT | Performed by: INTERNAL MEDICINE

## 2018-06-08 PROCEDURE — C9600 PERC DRUG-EL COR STENT SING: HCPCS

## 2018-06-08 PROCEDURE — 25000125 ZZHC RX 250: Performed by: INTERNAL MEDICINE

## 2018-06-08 PROCEDURE — 27210759 ZZH DEVICE INFLATION CR6

## 2018-06-08 PROCEDURE — B2111ZZ FLUOROSCOPY OF MULTIPLE CORONARY ARTERIES USING LOW OSMOLAR CONTRAST: ICD-10-PCS | Performed by: INTERNAL MEDICINE

## 2018-06-08 PROCEDURE — 85520 HEPARIN ASSAY: CPT | Performed by: INTERNAL MEDICINE

## 2018-06-08 PROCEDURE — C1874 STENT, COATED/COV W/DEL SYS: HCPCS

## 2018-06-08 PROCEDURE — 99152 MOD SED SAME PHYS/QHP 5/>YRS: CPT

## 2018-06-08 PROCEDURE — 25000131 ZZH RX MED GY IP 250 OP 636 PS 637: Performed by: PHYSICIAN ASSISTANT

## 2018-06-08 PROCEDURE — 25000132 ZZH RX MED GY IP 250 OP 250 PS 637: Performed by: INTERNAL MEDICINE

## 2018-06-08 PROCEDURE — 25000132 ZZH RX MED GY IP 250 OP 250 PS 637: Mod: GY | Performed by: INTERNAL MEDICINE

## 2018-06-08 PROCEDURE — 85347 COAGULATION TIME ACTIVATED: CPT

## 2018-06-08 PROCEDURE — 40000264 ECHO COMPLETE WITH OPTISON

## 2018-06-08 PROCEDURE — 25000132 ZZH RX MED GY IP 250 OP 250 PS 637: Mod: GY | Performed by: HOSPITALIST

## 2018-06-08 PROCEDURE — C1725 CATH, TRANSLUMIN NON-LASER: HCPCS

## 2018-06-08 PROCEDURE — 93454 CORONARY ARTERY ANGIO S&I: CPT | Mod: 26 | Performed by: INTERNAL MEDICINE

## 2018-06-08 PROCEDURE — 93306 TTE W/DOPPLER COMPLETE: CPT | Mod: 26 | Performed by: INTERNAL MEDICINE

## 2018-06-08 PROCEDURE — 99232 SBSQ HOSP IP/OBS MODERATE 35: CPT | Performed by: PHYSICIAN ASSISTANT

## 2018-06-08 PROCEDURE — 27210998 ZZH ACCESS HEART CATH CR6

## 2018-06-08 PROCEDURE — 12000000 ZZH R&B MED SURG/OB

## 2018-06-08 PROCEDURE — 99153 MOD SED SAME PHYS/QHP EA: CPT

## 2018-06-08 PROCEDURE — 83036 HEMOGLOBIN GLYCOSYLATED A1C: CPT | Performed by: PHYSICIAN ASSISTANT

## 2018-06-08 PROCEDURE — 27210856 ZZH ACCESS HEART CATH CR2

## 2018-06-08 PROCEDURE — 027034Z DILATION OF CORONARY ARTERY, ONE ARTERY WITH DRUG-ELUTING INTRALUMINAL DEVICE, PERCUTANEOUS APPROACH: ICD-10-PCS | Performed by: INTERNAL MEDICINE

## 2018-06-08 PROCEDURE — 27210827 ZZH KIT ACIST INJECTOR CR6

## 2018-06-08 PROCEDURE — 25500064 ZZH RX 255 OP 636: Performed by: INTERNAL MEDICINE

## 2018-06-08 PROCEDURE — A9270 NON-COVERED ITEM OR SERVICE: HCPCS | Mod: GY | Performed by: PHYSICIAN ASSISTANT

## 2018-06-08 RX ORDER — IOPAMIDOL 755 MG/ML
100 INJECTION, SOLUTION INTRAVASCULAR ONCE
Status: COMPLETED | OUTPATIENT
Start: 2018-06-08 | End: 2018-06-08

## 2018-06-08 RX ORDER — NALOXONE HYDROCHLORIDE 0.4 MG/ML
0.4 INJECTION, SOLUTION INTRAMUSCULAR; INTRAVENOUS; SUBCUTANEOUS EVERY 5 MIN PRN
Status: DISCONTINUED | OUTPATIENT
Start: 2018-06-08 | End: 2018-06-08

## 2018-06-08 RX ORDER — MORPHINE SULFATE 4 MG/ML
1-2 INJECTION, SOLUTION INTRAMUSCULAR; INTRAVENOUS EVERY 5 MIN PRN
Status: DISCONTINUED | OUTPATIENT
Start: 2018-06-08 | End: 2018-06-08

## 2018-06-08 RX ORDER — CLOPIDOGREL BISULFATE 75 MG/1
300-600 TABLET ORAL
Status: DISCONTINUED | OUTPATIENT
Start: 2018-06-08 | End: 2018-06-08

## 2018-06-08 RX ORDER — METOPROLOL TARTRATE 25 MG/1
25 TABLET, FILM COATED ORAL 2 TIMES DAILY
Status: DISCONTINUED | OUTPATIENT
Start: 2018-06-08 | End: 2018-06-09 | Stop reason: HOSPADM

## 2018-06-08 RX ORDER — ASPIRIN 81 MG/1
243 TABLET, CHEWABLE ORAL ONCE
Status: COMPLETED | OUTPATIENT
Start: 2018-06-08 | End: 2018-06-08

## 2018-06-08 RX ORDER — ATROPINE SULFATE 0.1 MG/ML
.5-1 INJECTION INTRAVENOUS
Status: DISCONTINUED | OUTPATIENT
Start: 2018-06-08 | End: 2018-06-08

## 2018-06-08 RX ORDER — ATORVASTATIN CALCIUM 40 MG/1
40 TABLET, FILM COATED ORAL DAILY
Status: DISCONTINUED | OUTPATIENT
Start: 2018-06-08 | End: 2018-06-09 | Stop reason: HOSPADM

## 2018-06-08 RX ORDER — EPINEPHRINE 1 MG/ML
0.3 INJECTION, SOLUTION, CONCENTRATE INTRAVENOUS
Status: DISCONTINUED | OUTPATIENT
Start: 2018-06-08 | End: 2018-06-08

## 2018-06-08 RX ORDER — NALOXONE HYDROCHLORIDE 0.4 MG/ML
.2-.4 INJECTION, SOLUTION INTRAMUSCULAR; INTRAVENOUS; SUBCUTANEOUS
Status: DISCONTINUED | OUTPATIENT
Start: 2018-06-08 | End: 2018-06-08

## 2018-06-08 RX ORDER — FENTANYL CITRATE 50 UG/ML
INJECTION, SOLUTION INTRAMUSCULAR; INTRAVENOUS
Status: DISCONTINUED
Start: 2018-06-08 | End: 2018-06-08 | Stop reason: HOSPADM

## 2018-06-08 RX ORDER — DOBUTAMINE HYDROCHLORIDE 200 MG/100ML
2-20 INJECTION INTRAVENOUS CONTINUOUS PRN
Status: DISCONTINUED | OUTPATIENT
Start: 2018-06-08 | End: 2018-06-08

## 2018-06-08 RX ORDER — DEXTROSE MONOHYDRATE 25 G/50ML
25-50 INJECTION, SOLUTION INTRAVENOUS
Status: DISCONTINUED | OUTPATIENT
Start: 2018-06-08 | End: 2018-06-08

## 2018-06-08 RX ORDER — HEPARIN SODIUM 1000 [USP'U]/ML
INJECTION, SOLUTION INTRAVENOUS; SUBCUTANEOUS
Status: DISCONTINUED
Start: 2018-06-08 | End: 2018-06-08 | Stop reason: HOSPADM

## 2018-06-08 RX ORDER — ASPIRIN 81 MG/1
81 TABLET ORAL DAILY
Status: DISCONTINUED | OUTPATIENT
Start: 2018-06-09 | End: 2018-06-09 | Stop reason: HOSPADM

## 2018-06-08 RX ORDER — HYDROCODONE BITARTRATE AND ACETAMINOPHEN 5; 325 MG/1; MG/1
1-2 TABLET ORAL EVERY 4 HOURS PRN
Status: DISCONTINUED | OUTPATIENT
Start: 2018-06-08 | End: 2018-06-09 | Stop reason: HOSPADM

## 2018-06-08 RX ORDER — NICARDIPINE HYDROCHLORIDE 2.5 MG/ML
100 INJECTION INTRAVENOUS
Status: DISCONTINUED | OUTPATIENT
Start: 2018-06-08 | End: 2018-06-08

## 2018-06-08 RX ORDER — LORAZEPAM 2 MG/ML
.5-2 INJECTION INTRAMUSCULAR EVERY 4 HOURS PRN
Status: DISCONTINUED | OUTPATIENT
Start: 2018-06-08 | End: 2018-06-08

## 2018-06-08 RX ORDER — DOPAMINE HYDROCHLORIDE 160 MG/100ML
2-20 INJECTION, SOLUTION INTRAVENOUS CONTINUOUS PRN
Status: DISCONTINUED | OUTPATIENT
Start: 2018-06-08 | End: 2018-06-08

## 2018-06-08 RX ORDER — SODIUM CHLORIDE 9 MG/ML
INJECTION, SOLUTION INTRAVENOUS CONTINUOUS
Status: ACTIVE | OUTPATIENT
Start: 2018-06-08 | End: 2018-06-08

## 2018-06-08 RX ORDER — PHENYLEPHRINE HCL IN 0.9% NACL 1 MG/10 ML
20-100 SYRINGE (ML) INTRAVENOUS
Status: DISCONTINUED | OUTPATIENT
Start: 2018-06-08 | End: 2018-06-08

## 2018-06-08 RX ORDER — POTASSIUM CHLORIDE 1500 MG/1
20 TABLET, EXTENDED RELEASE ORAL
Status: COMPLETED | OUTPATIENT
Start: 2018-06-08 | End: 2018-06-08

## 2018-06-08 RX ORDER — NIFEDIPINE 10 MG/1
10 CAPSULE ORAL
Status: DISCONTINUED | OUTPATIENT
Start: 2018-06-08 | End: 2018-06-08

## 2018-06-08 RX ORDER — FUROSEMIDE 10 MG/ML
20-100 INJECTION INTRAMUSCULAR; INTRAVENOUS
Status: DISCONTINUED | OUTPATIENT
Start: 2018-06-08 | End: 2018-06-08

## 2018-06-08 RX ORDER — ADENOSINE 3 MG/ML
12-12000 INJECTION, SOLUTION INTRAVENOUS
Status: DISCONTINUED | OUTPATIENT
Start: 2018-06-08 | End: 2018-06-08

## 2018-06-08 RX ORDER — MORPHINE SULFATE 4 MG/ML
1 INJECTION, SOLUTION INTRAMUSCULAR; INTRAVENOUS EVERY 4 HOURS PRN
Status: DISCONTINUED | OUTPATIENT
Start: 2018-06-08 | End: 2018-06-09 | Stop reason: HOSPADM

## 2018-06-08 RX ORDER — METOPROLOL TARTRATE 1 MG/ML
5 INJECTION, SOLUTION INTRAVENOUS EVERY 5 MIN PRN
Status: DISCONTINUED | OUTPATIENT
Start: 2018-06-08 | End: 2018-06-08

## 2018-06-08 RX ORDER — NITROGLYCERIN 5 MG/ML
100-500 VIAL (ML) INTRAVENOUS
Status: COMPLETED | OUTPATIENT
Start: 2018-06-08 | End: 2018-06-08

## 2018-06-08 RX ORDER — FLUMAZENIL 0.1 MG/ML
0.2 INJECTION, SOLUTION INTRAVENOUS
Status: DISCONTINUED | OUTPATIENT
Start: 2018-06-08 | End: 2018-06-08

## 2018-06-08 RX ORDER — LORAZEPAM 0.5 MG/1
0.5 TABLET ORAL
Status: DISCONTINUED | OUTPATIENT
Start: 2018-06-08 | End: 2018-06-08

## 2018-06-08 RX ORDER — LORAZEPAM 2 MG/ML
0.5 INJECTION INTRAMUSCULAR
Status: DISCONTINUED | OUTPATIENT
Start: 2018-06-08 | End: 2018-06-08

## 2018-06-08 RX ORDER — LISINOPRIL 2.5 MG/1
2.5 TABLET ORAL DAILY
Status: DISCONTINUED | OUTPATIENT
Start: 2018-06-08 | End: 2018-06-09 | Stop reason: HOSPADM

## 2018-06-08 RX ORDER — ASPIRIN 81 MG/1
81-324 TABLET, CHEWABLE ORAL
Status: DISCONTINUED | OUTPATIENT
Start: 2018-06-08 | End: 2018-06-08

## 2018-06-08 RX ORDER — NITROGLYCERIN 5 MG/ML
VIAL (ML) INTRAVENOUS
Status: DISCONTINUED
Start: 2018-06-08 | End: 2018-06-08 | Stop reason: HOSPADM

## 2018-06-08 RX ORDER — PRASUGREL 10 MG/1
10-60 TABLET, FILM COATED ORAL
Status: DISCONTINUED | OUTPATIENT
Start: 2018-06-08 | End: 2018-06-08

## 2018-06-08 RX ORDER — DIPHENHYDRAMINE HYDROCHLORIDE 50 MG/ML
25-50 INJECTION INTRAMUSCULAR; INTRAVENOUS
Status: DISCONTINUED | OUTPATIENT
Start: 2018-06-08 | End: 2018-06-08

## 2018-06-08 RX ORDER — BUPIVACAINE HYDROCHLORIDE 2.5 MG/ML
1-10 INJECTION, SOLUTION EPIDURAL; INFILTRATION; INTRACAUDAL
Status: DISCONTINUED | OUTPATIENT
Start: 2018-06-08 | End: 2018-06-08

## 2018-06-08 RX ORDER — FENTANYL CITRATE 50 UG/ML
25-50 INJECTION, SOLUTION INTRAMUSCULAR; INTRAVENOUS
Status: ACTIVE | OUTPATIENT
Start: 2018-06-08 | End: 2018-06-09

## 2018-06-08 RX ORDER — NITROGLYCERIN 20 MG/100ML
.07-2 INJECTION INTRAVENOUS CONTINUOUS PRN
Status: DISCONTINUED | OUTPATIENT
Start: 2018-06-08 | End: 2018-06-08

## 2018-06-08 RX ORDER — FLUMAZENIL 0.1 MG/ML
0.2 INJECTION, SOLUTION INTRAVENOUS
Status: ACTIVE | OUTPATIENT
Start: 2018-06-08 | End: 2018-06-09

## 2018-06-08 RX ORDER — LIDOCAINE HYDROCHLORIDE 10 MG/ML
1-10 INJECTION, SOLUTION EPIDURAL; INFILTRATION; INTRACAUDAL; PERINEURAL
Status: DISCONTINUED | OUTPATIENT
Start: 2018-06-08 | End: 2018-06-08

## 2018-06-08 RX ORDER — METHYLPREDNISOLONE SODIUM SUCCINATE 125 MG/2ML
125 INJECTION, POWDER, LYOPHILIZED, FOR SOLUTION INTRAMUSCULAR; INTRAVENOUS
Status: DISCONTINUED | OUTPATIENT
Start: 2018-06-08 | End: 2018-06-08

## 2018-06-08 RX ORDER — CLOPIDOGREL BISULFATE 75 MG/1
75 TABLET ORAL
Status: DISCONTINUED | OUTPATIENT
Start: 2018-06-08 | End: 2018-06-08

## 2018-06-08 RX ORDER — NITROGLYCERIN 0.4 MG/1
0.4 TABLET SUBLINGUAL EVERY 5 MIN PRN
Status: DISCONTINUED | OUTPATIENT
Start: 2018-06-08 | End: 2018-06-08

## 2018-06-08 RX ORDER — POTASSIUM CHLORIDE 7.45 MG/ML
10 INJECTION INTRAVENOUS
Status: DISCONTINUED | OUTPATIENT
Start: 2018-06-08 | End: 2018-06-08

## 2018-06-08 RX ORDER — HEPARIN SODIUM 1000 [USP'U]/ML
1000-10000 INJECTION, SOLUTION INTRAVENOUS; SUBCUTANEOUS EVERY 5 MIN PRN
Status: DISCONTINUED | OUTPATIENT
Start: 2018-06-08 | End: 2018-06-08

## 2018-06-08 RX ORDER — VERAPAMIL HYDROCHLORIDE 2.5 MG/ML
1-2.5 INJECTION, SOLUTION INTRAVENOUS
Status: COMPLETED | OUTPATIENT
Start: 2018-06-08 | End: 2018-06-08

## 2018-06-08 RX ORDER — HYDRALAZINE HYDROCHLORIDE 20 MG/ML
10-20 INJECTION INTRAMUSCULAR; INTRAVENOUS
Status: DISCONTINUED | OUTPATIENT
Start: 2018-06-08 | End: 2018-06-08

## 2018-06-08 RX ORDER — PROTAMINE SULFATE 10 MG/ML
25-100 INJECTION, SOLUTION INTRAVENOUS EVERY 5 MIN PRN
Status: DISCONTINUED | OUTPATIENT
Start: 2018-06-08 | End: 2018-06-08

## 2018-06-08 RX ORDER — NITROGLYCERIN 0.4 MG/1
0.4 TABLET SUBLINGUAL EVERY 5 MIN PRN
Status: DISCONTINUED | OUTPATIENT
Start: 2018-06-08 | End: 2018-06-09 | Stop reason: HOSPADM

## 2018-06-08 RX ORDER — PROTAMINE SULFATE 10 MG/ML
1-5 INJECTION, SOLUTION INTRAVENOUS
Status: DISCONTINUED | OUTPATIENT
Start: 2018-06-08 | End: 2018-06-08

## 2018-06-08 RX ORDER — NICOTINE POLACRILEX 4 MG
15-30 LOZENGE BUCCAL
Status: DISCONTINUED | OUTPATIENT
Start: 2018-06-08 | End: 2018-06-08

## 2018-06-08 RX ORDER — LIDOCAINE HYDROCHLORIDE 10 MG/ML
INJECTION, SOLUTION EPIDURAL; INFILTRATION; INTRACAUDAL; PERINEURAL
Status: DISCONTINUED
Start: 2018-06-08 | End: 2018-06-08 | Stop reason: HOSPADM

## 2018-06-08 RX ORDER — NALOXONE HYDROCHLORIDE 0.4 MG/ML
.1-.4 INJECTION, SOLUTION INTRAMUSCULAR; INTRAVENOUS; SUBCUTANEOUS
Status: DISCONTINUED | OUTPATIENT
Start: 2018-06-08 | End: 2018-06-08

## 2018-06-08 RX ORDER — ASPIRIN 325 MG
325 TABLET ORAL
Status: DISCONTINUED | OUTPATIENT
Start: 2018-06-08 | End: 2018-06-08

## 2018-06-08 RX ORDER — ACETAMINOPHEN 325 MG/1
325-650 TABLET ORAL EVERY 4 HOURS PRN
Status: DISCONTINUED | OUTPATIENT
Start: 2018-06-08 | End: 2018-06-09 | Stop reason: HOSPADM

## 2018-06-08 RX ORDER — SODIUM CHLORIDE 9 MG/ML
INJECTION, SOLUTION INTRAVENOUS CONTINUOUS
Status: DISCONTINUED | OUTPATIENT
Start: 2018-06-08 | End: 2018-06-08

## 2018-06-08 RX ORDER — NITROGLYCERIN 5 MG/ML
100-200 VIAL (ML) INTRAVENOUS
Status: DISCONTINUED | OUTPATIENT
Start: 2018-06-08 | End: 2018-06-08

## 2018-06-08 RX ORDER — ATROPINE SULFATE 0.1 MG/ML
0.5 INJECTION INTRAVENOUS EVERY 5 MIN PRN
Status: ACTIVE | OUTPATIENT
Start: 2018-06-08 | End: 2018-06-09

## 2018-06-08 RX ORDER — ENALAPRILAT 1.25 MG/ML
1.25-2.5 INJECTION INTRAVENOUS
Status: DISCONTINUED | OUTPATIENT
Start: 2018-06-08 | End: 2018-06-08

## 2018-06-08 RX ORDER — ASPIRIN 81 MG/1
81 TABLET ORAL DAILY
Status: DISCONTINUED | OUTPATIENT
Start: 2018-06-08 | End: 2018-06-08 | Stop reason: CLARIF

## 2018-06-08 RX ORDER — FENTANYL CITRATE 50 UG/ML
25-50 INJECTION, SOLUTION INTRAMUSCULAR; INTRAVENOUS
Status: DISCONTINUED | OUTPATIENT
Start: 2018-06-08 | End: 2018-06-08

## 2018-06-08 RX ORDER — SODIUM NITROPRUSSIDE 25 MG/ML
100-200 INJECTION INTRAVENOUS
Status: DISCONTINUED | OUTPATIENT
Start: 2018-06-08 | End: 2018-06-08

## 2018-06-08 RX ORDER — VERAPAMIL HYDROCHLORIDE 2.5 MG/ML
INJECTION, SOLUTION INTRAVENOUS
Status: DISCONTINUED
Start: 2018-06-08 | End: 2018-06-08 | Stop reason: HOSPADM

## 2018-06-08 RX ORDER — LIDOCAINE 40 MG/G
CREAM TOPICAL
Status: DISCONTINUED | OUTPATIENT
Start: 2018-06-08 | End: 2018-06-08

## 2018-06-08 RX ORDER — DEXTROSE MONOHYDRATE 25 G/50ML
12.5-5 INJECTION, SOLUTION INTRAVENOUS EVERY 30 MIN PRN
Status: DISCONTINUED | OUTPATIENT
Start: 2018-06-08 | End: 2018-06-08

## 2018-06-08 RX ORDER — ONDANSETRON 2 MG/ML
4 INJECTION INTRAMUSCULAR; INTRAVENOUS EVERY 4 HOURS PRN
Status: DISCONTINUED | OUTPATIENT
Start: 2018-06-08 | End: 2018-06-08

## 2018-06-08 RX ORDER — LIDOCAINE HYDROCHLORIDE 10 MG/ML
30 INJECTION, SOLUTION EPIDURAL; INFILTRATION; INTRACAUDAL; PERINEURAL
Status: DISCONTINUED | OUTPATIENT
Start: 2018-06-08 | End: 2018-06-08

## 2018-06-08 RX ORDER — POTASSIUM CHLORIDE 29.8 MG/ML
20 INJECTION INTRAVENOUS
Status: DISCONTINUED | OUTPATIENT
Start: 2018-06-08 | End: 2018-06-08

## 2018-06-08 RX ADMIN — METOPROLOL TARTRATE 25 MG: 25 TABLET ORAL at 19:20

## 2018-06-08 RX ADMIN — NITROGLYCERIN 200 MCG: 5 INJECTION, SOLUTION INTRAVENOUS at 16:40

## 2018-06-08 RX ADMIN — LISINOPRIL 2.5 MG: 2.5 TABLET ORAL at 17:45

## 2018-06-08 RX ADMIN — ASPIRIN 81 MG: 81 TABLET, COATED ORAL at 07:47

## 2018-06-08 RX ADMIN — INSULIN DETEMIR 10 UNITS: 100 INJECTION, SOLUTION SUBCUTANEOUS at 11:13

## 2018-06-08 RX ADMIN — INSULIN ASPART 1 UNITS: 100 INJECTION, SOLUTION INTRAVENOUS; SUBCUTANEOUS at 21:49

## 2018-06-08 RX ADMIN — INSULIN DETEMIR 20 UNITS: 100 INJECTION, SOLUTION SUBCUTANEOUS at 21:48

## 2018-06-08 RX ADMIN — MIDAZOLAM 1 MG: 1 INJECTION INTRAMUSCULAR; INTRAVENOUS at 16:16

## 2018-06-08 RX ADMIN — VERAPAMIL HYDROCHLORIDE 2.5 MG: 2.5 INJECTION, SOLUTION INTRAVENOUS at 16:16

## 2018-06-08 RX ADMIN — NITROGLYCERIN 200 MCG: 5 INJECTION, SOLUTION INTRAVENOUS at 16:16

## 2018-06-08 RX ADMIN — ATORVASTATIN CALCIUM 40 MG: 40 TABLET, FILM COATED ORAL at 11:11

## 2018-06-08 RX ADMIN — NITROGLYCERIN 200 MCG: 5 INJECTION, SOLUTION INTRAVENOUS at 16:26

## 2018-06-08 RX ADMIN — POTASSIUM CHLORIDE 20 MEQ: 1500 TABLET, EXTENDED RELEASE ORAL at 11:11

## 2018-06-08 RX ADMIN — FENTANYL CITRATE 50 MCG: 50 INJECTION INTRAMUSCULAR; INTRAVENOUS at 16:15

## 2018-06-08 RX ADMIN — HEPARIN SODIUM 5000 UNITS: 1000 INJECTION, SOLUTION INTRAVENOUS; SUBCUTANEOUS at 16:17

## 2018-06-08 RX ADMIN — HUMAN ALBUMIN MICROSPHERES AND PERFLUTREN 3 ML: 10; .22 INJECTION, SOLUTION INTRAVENOUS at 10:54

## 2018-06-08 RX ADMIN — METOPROLOL TARTRATE 25 MG: 25 TABLET ORAL at 11:11

## 2018-06-08 RX ADMIN — ASPIRIN 81 MG 243 MG: 81 TABLET ORAL at 11:12

## 2018-06-08 RX ADMIN — SODIUM CHLORIDE: 9 INJECTION, SOLUTION INTRAVENOUS at 11:12

## 2018-06-08 RX ADMIN — IOPAMIDOL 160 ML: 755 INJECTION, SOLUTION INTRAVASCULAR at 15:44

## 2018-06-08 RX ADMIN — HEPARIN SODIUM 950 UNITS/HR: 10000 INJECTION, SOLUTION INTRAVENOUS at 11:14

## 2018-06-08 RX ADMIN — SODIUM CHLORIDE: 9 INJECTION, SOLUTION INTRAVENOUS at 17:11

## 2018-06-08 RX ADMIN — TICAGRELOR 180 MG: 90 TABLET ORAL at 16:23

## 2018-06-08 NOTE — PLAN OF CARE
Problem: Patient Care Overview  Goal: Plan of Care/Patient Progress Review  PRIMARY DIAGNOSIS: TR BAND RECOVERY   OUTPATIENT/OBSERVATION GOALS TO BE MET BEFORE DISCHARGE:  1. TR band status: in place  2. Radial pulse and CMS (circulation, motion, sensation) are WDL: Yes  3. Bleeding or hematoma present at site: No      4. Activity restriction education reviewed with patient: Yes. Pink wrist band applied  5. Stable vital signs:  Yes  6. ADLs back to baseline:  No  7. Activity and level of assistance: has not been out of bed  8. Interpretation of rhythm per telemetry tech: Normal sinus  and Sinus bradycardia HR 54     Discharge Planner Nurse   Safe discharge environment identified: Yes  Barriers to discharge: Yes, not medically cleared.       Entered by: Anna Hobson 06/08/2018 5:50 PM     Please review provider order for any additional goals.   Nurse to notify provider when observation goals have been met and patient is ready for discharge.

## 2018-06-08 NOTE — DISCHARGE INSTRUCTIONS
Going Home After an Angiogram    For the first 24 hours after your procedure:    Have an adult stay with you.    Relax and take it easy    Drink plenty of fluids.    Do NOT smoke.    Do NOT make any important or legal decisions.    Do NOT drive or operate machines at home or at work.    Do NOT drink alcohol.      Do NOT have sex or do any heavy exercise for 2 days.    Do NOT take a bath, or use a hot tub or pool for at least 3 days. You may shower.      Remove the Band-Aid after 24 hours. If there is minor oozing, apply another Band-Aid and remove it after 12 hours.    Care of the wrist site:  It is normal to have soreness at the puncture site and mild tingling in your hand for up to 3 days.    For 2 days, do not use your hand or arm to support your weight (such as rising from a chair) or bend your wrist (such as lifting a garage door).    For 2 days, do not lift more than 5 pounds or exercise your arm (tennis, golf, bowling etc.)    If you start bleeding from the site in your wrist:    Sit down and press firmly on the site with your thumb against the puncture site and finger against the back of the wrist. Call 911 for immediate help.    If the bleeding stops, sit still and keep your wrist straight for 2 hours.    Medicines:    If you have starting taking Plavix or Effient, DO NOT STOP TAKING IT until you talk to your heart doctor (Cardiologist).    If you are on Metformin (Glucophage), DO NOT RESTART it until you have blood tests (within 2-3 days after discharge). When your doctor tells you it's safe, you may restart the Metformin.    If you have stopped any other medications, check with your nurse or provider about when to restart them.    Call 911 right away if you have bleeding that is heavy or does not stop.    Call your doctor if:    You have a large or growing hard lump around the site.    The site is red, swollen, hot or tender.    Blood or fluid is draining from the site.    You have chills or a fever  greater than 101 F (38 C)    Your arm feels numb or cool.    You have hives, a rash or unusual itching.    [facility] [place]:  [phone] (7 days a week)

## 2018-06-08 NOTE — PLAN OF CARE
Problem: Patient Care Overview  Goal: Plan of Care/Patient Progress Review  Problem: Patient Care Overview  Goal: Plan of Care/Patient Progress Review  PRIMARY DIAGNOSIS: CHEST PAIN  OUTPATIENT/OBSERVATION GOALS TO BE MET BEFORE DISCHARGE:  1. Ruled out acute coronary syndrome (negative or stable Troponin): No, continue to trend up, 0.835 at 0600  2. Pain Status: Pain free.  3. Appropriate provocative testing performed: N/A  - Stress Test Procedure: N/A  - Interpretation of cardiac rhythm per telemetry tech: SR HR 65     4. Cleared by Consultants (if applicable):No  5. Return to near baseline physical activity: Yes  Pt A&Ox4, VSS except /87. Pt denies chest pain, nausea, or SOB. Pt reports baseline tingling and soreness in bilateral hands & tingling in feet. Cardiology consulted, awaiting coronary angiogram for this afternoon. Heparin and IVF infusing. Spouse at bedside. Will continue to monitor.  Discharge Planner Nurse   Safe discharge environment identified: Yes  Barriers to discharge: Yes       Entered by: Jessie Carney 06/08/2018 8:04 AM  Please review provider order for any additional goals.   Nurse to notify provider when observation goals have been met and patient is ready for discharge

## 2018-06-08 NOTE — PLAN OF CARE
Problem: Patient Care Overview  Goal: Plan of Care/Patient Progress Review  Outcome: Improving  PRIMARY DIAGNOSIS: CHEST PAIN  OUTPATIENT/OBSERVATION GOALS TO BE MET BEFORE DISCHARGE:  1. Ruled out acute coronary syndrome (negative or stable Troponin):  No, 2nd trop 0.338, 3rd trop 0.556  2. Pain Status: Pain free.  3. Appropriate provocative testing performed: N/A  - Stress Test Procedure: + trop, stress test cancelled, cardiology consulted  - Interpretation of cardiac rhythm per telemetry tech: NSR HR 76      4. Cleared by Consultants (if applicable):No, cardiology consult scheduled for tomorrow  5. Return to near baseline physical activity: Yes  Discharge Planner Nurse   Safe discharge environment identified: Yes  Barriers to discharge: Yes, trops, cardiology consult      Please review provider order for any additional goals.   Nurse to notify provider when observation goals have been met and patient is ready for discharge.  A&O x4. VSS. Independent in room. Denies cp, SOB, diaphoresis, n/t. LS clear. BS audible/normoactive x4, denies N/V, tolerating PO. 2nd trop came back + at 0.338, 3rd trop 0.556, PA updated, stress test cancelled, cardiology consulted. Lovenox given tonight. Family at bedside, supportive. Resting comfortably. Will continue to monitor.

## 2018-06-08 NOTE — PLAN OF CARE
Problem: Patient Care Overview  Goal: Plan of Care/Patient Progress Review  PRIMARY DIAGNOSIS: CHEST PAIN  OUTPATIENT/OBSERVATION GOALS TO BE MET BEFORE DISCHARGE:  1. Ruled out acute coronary syndrome (negative or stable Troponin): No, continue to trend up, 0.835 at 0600  2. Pain Status: Pain free.  3. Appropriate provocative testing performed: N/A  - Stress Test Procedure: N/A  - Interpretation of cardiac rhythm per telemetry tech: SR HR 65    4. Cleared by Consultants (if applicable):No  5. Return to near baseline physical activity: Yes  Pt A&Ox4, VSS except /86. Pt denies pain, nausea, or SOB. Pt reports baseline tingling in bilateral hands & feet. Awaiting cardiology consult and recommendations. Will continue to monitor.  Discharge Planner Nurse   Safe discharge environment identified: Yes  Barriers to discharge: Yes       Entered by: Jessie Carney 06/08/2018 8:04 AM     Please review provider order for any additional goals.   Nurse to notify provider when observation goals have been met and patient is ready for discharge.

## 2018-06-08 NOTE — PLAN OF CARE
Problem: Patient Care Overview  Goal: Plan of Care/Patient Progress Review  Outcome: Improving  PRIMARY DIAGNOSIS: CHEST PAIN  OUTPATIENT/OBSERVATION GOALS TO BE MET BEFORE DISCHARGE:  1. Ruled out acute coronary syndrome (negative or stable Troponin):  No, 2nd trop 0.338  2. Pain Status: Pain free.  3. Appropriate provocative testing performed: N/A  - Stress Test Procedure: + trop, stress test cancelled, cardiology consulted  - Interpretation of cardiac rhythm per telemetry tech: NSR HR 74    4. Cleared by Consultants (if applicable):No, cardiology consult scheduled for tomorrow  5. Return to near baseline physical activity: Yes  Discharge Planner Nurse   Safe discharge environment identified: Yes  Barriers to discharge: Yes, trops, cardiology consult           Please review provider order for any additional goals.   Nurse to notify provider when observation goals have been met and patient is ready for discharge.  A&O x4. VSS. Independent in room. Denies cp, SOB, diaphoresis, n/t. LS clear. BS audible/normoactive x4, denies N/V, tolerating PO. 2nd trop came back + at 0.338, PA updated, stress test cancelled, cardiology consulted, next trop @ 2145. Family at bedside, supportive. Will continue to monitor.

## 2018-06-08 NOTE — PROGRESS NOTES
Second troponin returned elevated at 0.338. Asymptomatic since arrival to ED, EKG is non ischemic. Pt is adamant about leaving tomorrow. Will cover with sq Lovenox for tonight and consult cardiology in AM for further recommendations.     Jessica Velazquez PA-C

## 2018-06-08 NOTE — PROGRESS NOTES
Lab called indicating critical troponin level of 0.338. Pt asymptomatic, VSS, PA upated. Will continue to monitor.

## 2018-06-08 NOTE — CONSULTS
Consult Date:  06/08/2018      CARDIOLOGY CONSULTATION      REQUESTING PHYSICIAN:  KIARRA Flores      REASON FOR CONSULTATION:  Cardiology consult requested for chest pain, elevated troponin.      CHIEF COMPLAINT:  Chest pain.       HISTORY OF PRESENT ILLNESS:  Mr. Way is a very pleasant 67-year-old gentleman with history of diabetes, hypertension, GERD, obstructive sleep apnea, and dyslipidemia who is admitted with acute onset of chest discomfort.  The patient tells me that he was walking and was about 3 blocks away from his home.  He started noticing achy severe-intensity chest pain on the left upper precordium and radiating to the left arm.  He kept walking and this made the pain worse, then he sat down and the pain started subsiding.  He again got up at home, started talking on the phone and walking, and the pain again came back.  Eventually he presented to the ER.  On the way to the ER, he was given nitroglycerin with resolution of pain.  Fortunately, since that time he has not had any recurrence of pain.  He denies any nausea, vomiting or sweating.  He never had this kind of pain before.  He has a strong family history of premature coronary artery disease with 2 brothers having coronary artery disease in their 40s.  He does not use any tobacco.  The patient takes care of his quadriplegic wife.  He is also unfortunately unemployed for last 1 year and is in the process of securing a job.  Upon presentation, EKG was done that showed possible inferior Q-waves, otherwise no acute ST-T changes.  His initial troponin was 0.042 then it jewel up to 0.835.  The patient denies any bleeding issues or any anticipated surgery.      REVIEW OF SYSTEMS:  A complete review of systems was performed and was negative except as in the HPI.      PAST MEDICAL HISTORY:  Diabetes, hypertension, dyslipidemia, obstructive sleep apnea, GERD.      SOCIAL HISTORY:  No tobacco abuse.      FAMILY HISTORY:  Brothers have coronary  artery disease in their 40s.      ALLERGIES:  NO KNOWN DRUG ALLERGIES.       CURRENT MEDICATIONS:   1.  Enoxaparin 90 mg q. 12 subcutaneous.  The last dose was around 10:30 p.m. yesterday.   2.  Aspirin 81 mg q. daily.   3.  Metformin.   4.  Levemir.      PHYSICAL EXAMINATION:   VITAL SIGNS:  Blood pressure 159/86, heart rate 86 and regular, respiratory rate 16, 97% on room air.   GENERAL:  The patient appears pleasant, comfortable.   NECK:  Normal JVP, no bruit.   CARDIOVASCULAR SYSTEM:  S1, S2 normal, no murmur, rub or gallop.   RESPIRATORY SYSTEM:  Clear to auscultation bilaterally.   GASTROINTESTINAL SYSTEM:  Abdomen is soft, nontender.   EXTREMITIES:  No pitting pedal edema.   NEUROLOGIC:  Alert, oriented x3.   PSYCHIATRIC:  Normal affect.   SKIN:  No obvious rash.    HEENT:  No pallor or icterus.      LABORATORY:  EKG as noted above showed possible inferior Q-waves, otherwise no acute ST-T changes.  Serial troponin as noted above, otherwise CBC and BMP normal.  Chest x-ray:  No mediastinal widening.      ASSESSMENT AND PLAN:  A pleasant 67-year-old gentleman with coronary artery disease risk factors of hypertension, dyslipidemia, diabetes, and strong family history of premature coronary artery disease in younger siblings who is now admitted with chest discomfort radiated to the arm responding to nitroglycerin with a rise in troponin consistent with non-ST elevation myocardial infarction.  I had a long discussion with the patient regarding the nature of his symptoms and concern for myocardial infarction.  Fortunately, he is asymptomatic and hemodynamically stable at this time.  We discussed the option of medical management versus medical management plus coronary angiogram.  The risks and benefits of coronary angiogram with the risks including but not limited to risk of stroke, MI, death, need for PRBC transfusion, emergent bypass surgery were discussed with the patient.  The patient understands the rationale of  coronary angiogram, the risks involved and the alternative of only medical therapy and wishes to proceed with it.  If needed, he should be a reasonable candidate for long-term dual antiplatelet therapy.  I also recommend adding Lipitor 40 mg daily, metoprolol tartrate 25 mg b.i.d., and lisinopril 2.5 mg  daily.  For convenience, I recommend replacing enoxaparin with a heparin drip.  I also recommend echocardiogram.  I also offered the patient any assistance he needs or letter he needs in terms of his job application as he was supposed to do a background check today and will be missing that appointment because of his serious illness at this time.   1.  Non-ST elevation myocardial infarction.   2.  Coronary artery disease risk factors of diabetes, hypertension, dyslipidemia and strong family history of premature coronary artery disease.      RECOMMENDATIONS:   1.  Coronary angiogram with possible revascularization.   2.  Echocardiogram.   3.  Continue aspirin.  Recommend replacing enoxaparin with heparin drip.   4.  Lipitor 40 mg daily.   5.  Metoprolol tartrate 25 mg b.i.d.   6.  Lisinopril 2.5 mg daily.   7.  I recommend checking a fasting lipid panel.      Thank you for involving me in the care of Mr. Way.  Forty minutes of total time was spent with more than 50% in counseling and coordination of care.         LEO WEEMS MD             D: 2018   T: 2018   MT: JOSEPHINE      Name:     JUSTYN WAY   MRN:      [id number]        Account:       [id number]   :      1950           Consult Date:  2018      Document: [id number]

## 2018-06-09 ENCOUNTER — APPOINTMENT (OUTPATIENT)
Dept: OCCUPATIONAL THERAPY | Facility: CLINIC | Age: 68
DRG: 247 | End: 2018-06-09
Attending: INTERNAL MEDICINE
Payer: MEDICARE

## 2018-06-09 VITALS
SYSTOLIC BLOOD PRESSURE: 120 MMHG | HEIGHT: 73 IN | DIASTOLIC BLOOD PRESSURE: 70 MMHG | RESPIRATION RATE: 18 BRPM | HEART RATE: 61 BPM | OXYGEN SATURATION: 97 % | BODY MASS INDEX: 26.59 KG/M2 | WEIGHT: 200.6 LBS | TEMPERATURE: 96.2 F

## 2018-06-09 LAB
ANION GAP SERPL CALCULATED.3IONS-SCNC: 7 MMOL/L (ref 3–14)
BUN SERPL-MCNC: 14 MG/DL (ref 7–30)
CALCIUM SERPL-MCNC: 9.1 MG/DL (ref 8.5–10.1)
CHLORIDE SERPL-SCNC: 107 MMOL/L (ref 94–109)
CO2 SERPL-SCNC: 24 MMOL/L (ref 20–32)
CREAT SERPL-MCNC: 0.86 MG/DL (ref 0.66–1.25)
ERYTHROCYTE [DISTWIDTH] IN BLOOD BY AUTOMATED COUNT: 14.6 % (ref 10–15)
GFR SERPL CREATININE-BSD FRML MDRD: 89 ML/MIN/1.7M2
GLUCOSE BLDC GLUCOMTR-MCNC: 108 MG/DL (ref 70–99)
GLUCOSE BLDC GLUCOMTR-MCNC: 111 MG/DL (ref 70–99)
GLUCOSE BLDC GLUCOMTR-MCNC: 165 MG/DL (ref 70–99)
GLUCOSE BLDC GLUCOMTR-MCNC: 81 MG/DL (ref 70–99)
GLUCOSE SERPL-MCNC: 110 MG/DL (ref 70–99)
HCT VFR BLD AUTO: 50.8 % (ref 40–53)
HGB BLD-MCNC: 16.4 G/DL (ref 13.3–17.7)
MCH RBC QN AUTO: 27.8 PG (ref 26.5–33)
MCHC RBC AUTO-ENTMCNC: 32.3 G/DL (ref 31.5–36.5)
MCV RBC AUTO: 86 FL (ref 78–100)
PLATELET # BLD AUTO: 231 10E9/L (ref 150–450)
POTASSIUM SERPL-SCNC: 4.3 MMOL/L (ref 3.4–5.3)
RBC # BLD AUTO: 5.9 10E12/L (ref 4.4–5.9)
SODIUM SERPL-SCNC: 138 MMOL/L (ref 133–144)
WBC # BLD AUTO: 8.1 10E9/L (ref 4–11)

## 2018-06-09 PROCEDURE — 99239 HOSP IP/OBS DSCHRG MGMT >30: CPT | Performed by: PHYSICIAN ASSISTANT

## 2018-06-09 PROCEDURE — 97530 THERAPEUTIC ACTIVITIES: CPT | Mod: GO | Performed by: STUDENT IN AN ORGANIZED HEALTH CARE EDUCATION/TRAINING PROGRAM

## 2018-06-09 PROCEDURE — 80048 BASIC METABOLIC PNL TOTAL CA: CPT | Performed by: INTERNAL MEDICINE

## 2018-06-09 PROCEDURE — A9270 NON-COVERED ITEM OR SERVICE: HCPCS | Mod: GY | Performed by: INTERNAL MEDICINE

## 2018-06-09 PROCEDURE — A9270 NON-COVERED ITEM OR SERVICE: HCPCS | Mod: GY

## 2018-06-09 PROCEDURE — 36415 COLL VENOUS BLD VENIPUNCTURE: CPT | Performed by: INTERNAL MEDICINE

## 2018-06-09 PROCEDURE — 00000146 ZZHCL STATISTIC GLUCOSE BY METER IP

## 2018-06-09 PROCEDURE — 97535 SELF CARE MNGMENT TRAINING: CPT | Mod: GO | Performed by: STUDENT IN AN ORGANIZED HEALTH CARE EDUCATION/TRAINING PROGRAM

## 2018-06-09 PROCEDURE — 85027 COMPLETE CBC AUTOMATED: CPT | Performed by: INTERNAL MEDICINE

## 2018-06-09 PROCEDURE — 25000132 ZZH RX MED GY IP 250 OP 250 PS 637: Mod: GY | Performed by: INTERNAL MEDICINE

## 2018-06-09 PROCEDURE — 97165 OT EVAL LOW COMPLEX 30 MIN: CPT | Mod: GO | Performed by: STUDENT IN AN ORGANIZED HEALTH CARE EDUCATION/TRAINING PROGRAM

## 2018-06-09 PROCEDURE — 40000133 ZZH STATISTIC OT WARD VISIT: Performed by: STUDENT IN AN ORGANIZED HEALTH CARE EDUCATION/TRAINING PROGRAM

## 2018-06-09 PROCEDURE — 25000132 ZZH RX MED GY IP 250 OP 250 PS 637: Mod: GY

## 2018-06-09 RX ORDER — NITROGLYCERIN 0.4 MG/1
0.4 TABLET SUBLINGUAL EVERY 5 MIN PRN
Qty: 10 TABLET | Refills: 0 | Status: SHIPPED | OUTPATIENT
Start: 2018-06-09 | End: 2020-09-11

## 2018-06-09 RX ORDER — LISINOPRIL 2.5 MG/1
2.5 TABLET ORAL DAILY
Qty: 30 TABLET | Refills: 1 | Status: SHIPPED | OUTPATIENT
Start: 2018-06-10 | End: 2018-06-11

## 2018-06-09 RX ORDER — METOPROLOL TARTRATE 25 MG/1
25 TABLET, FILM COATED ORAL 2 TIMES DAILY
Qty: 60 TABLET | Refills: 1 | Status: SHIPPED | OUTPATIENT
Start: 2018-06-09 | End: 2018-10-18

## 2018-06-09 RX ORDER — LORAZEPAM 0.5 MG/1
0.5 TABLET ORAL EVERY 4 HOURS PRN
Status: DISCONTINUED | OUTPATIENT
Start: 2018-06-09 | End: 2018-06-09 | Stop reason: HOSPADM

## 2018-06-09 RX ORDER — ATORVASTATIN CALCIUM 40 MG/1
40 TABLET, FILM COATED ORAL DAILY
Qty: 30 TABLET | Refills: 1 | Status: SHIPPED | OUTPATIENT
Start: 2018-06-10 | End: 2019-07-26

## 2018-06-09 RX ADMIN — ATORVASTATIN CALCIUM 40 MG: 40 TABLET, FILM COATED ORAL at 08:03

## 2018-06-09 RX ADMIN — METOPROLOL TARTRATE 25 MG: 25 TABLET ORAL at 08:03

## 2018-06-09 RX ADMIN — INSULIN DETEMIR 20 UNITS: 100 INJECTION, SOLUTION SUBCUTANEOUS at 08:04

## 2018-06-09 RX ADMIN — LISINOPRIL 2.5 MG: 2.5 TABLET ORAL at 08:03

## 2018-06-09 RX ADMIN — ASPIRIN 81 MG: 81 TABLET, COATED ORAL at 08:03

## 2018-06-09 RX ADMIN — TICAGRELOR 90 MG: 90 TABLET ORAL at 05:21

## 2018-06-09 ASSESSMENT — ACTIVITIES OF DAILY LIVING (ADL): PREVIOUS_RESPONSIBILITIES: MEDICATION MANAGEMENT;FINANCES;DRIVING

## 2018-06-09 NOTE — CONSULTS
"CLINICAL NUTRITION SERVICES  -  ASSESSMENT NOTE      Malnutrition: Patient does not meet malnutrition criteria at this time        REASON FOR ASSESSMENT  Matt Way is a 67 year old male seen by Registered Dietitian for Provider Order - Nutrition Education - heart healthy diet.      NUTRITION HISTORY  - Information obtained from patient.    - Patient with a h/o DMII (on oral agent and insulin PTA), HTN.    - 3-5 meals/day is baseline.   - Foods consumed vary greatly.  Hard to obtain diet recall as many family members/children in room.  - SO describes as a \"meat/potatoes/pasta/dessert nancy\".  Eludes to low fruit/vegetable intake.  - Knows that a heart healthy diet limits \"salt\".  No previous diet education relating to heart health.    - Denies an unintentional decrease in oral intakes PTA.  Does report decrease in portion sizes to induce wt loss.   - NKFA.      CURRENT NUTRITION ORDERS  Diet Order:     Mod CHO    Current Intake/Tolerance:  Good appetite/intake since admission.        PHYSICAL FINDINGS  Observed  No fat or muscle loss appreciated  Obtained from Chart/Interdisciplinary Team  No nutritionally pertinent     ANTHROPOMETRICS  Height: 6' 1\"  Weight: 90.9 kg (200#)  Body mass index is 26.47 kg/(m^2).  Weight Status:  Overweight BMI 25-29.9  IBW: 83.6 kg (184#)  % IBW: 109%  Weight History:  Wt Readings from Last 10 Encounters:   06/08/18 91 kg (200 lb 9.6 oz)   03/30/11 92.5 kg (203 lb 14.4 oz)   01/29/09 97.5 kg (215 lb)   07/09/08 94.8 kg (209 lb)   - Reports intentional wt loss of 240# w/in 1 year.     LABS  Labs reviewed  Lab Results   Component Value Date    A1C 7.7 06/08/2018       MEDICATIONS  Medications reviewed including insulin regimen      ASSESSED NUTRITION NEEDS PER APPROVED PRACTICE GUIDELINES:    Dosing Weight 90.9 kg  Estimated Energy Needs: 8893-9882 kcals (20-25 Kcal/Kg)  Justification: overweight  Estimated Protein Needs:  grams protein (1-1.2 g pro/Kg)  Justification: " "maintenance  Estimated Fluid Needs: 7132-0843  mL (1 mL/Kcal)  Justification: maintenance and per provider pending fluid status    MALNUTRITION:  % Weight Loss:  Weight loss does not meet criteria for malnutrition --> intentional  % Intake:  Decreased intake does not meet criteria for malnutrition --> intentional  Subcutaneous Fat Loss:  None observed  Muscle Loss:  None observed  Fluid Retention:  None noted    Malnutrition Diagnosis: Patient does not meet two of the above criteria necessary for diagnosing malnutrition    NUTRITION DIAGNOSIS:  Food and nutrition-related knowledge deficit related to lack of exposure to nutrition education as evidenced by limited baseline knowledge of heart healthy diet per assessment, denial of previous heart healthy diet education.       NUTRITION INTERVENTIONS  Recommendations / Nutrition Prescription  Change to combination high CHO/heart healthy diet.       Implementation  Nutrition education: Provided education on heart healthy diet:    Assessed learning needs, learning preferences, and willingness to learn    Nutrition Education (Content):  a) Provided handout \"heart healthy nutrition therapy\"  b) Discussed recommendations for increasing fiber intake via fruits, vegetables, and whole grains  c) Discussed common foods which contain saturated fat, appropriate substitutes  d) Discussed common foods which contain trans fat, appropriate substitutes  e) Discussed common foods which are high in sodium, appropriate substitutes  f) Discussed how to read nutrition facts label    Nutrition Education (Application):  a) Discussed eating habits and recommended alternative food choices    Patient verbalizes understanding of diet by stating need to \"eat more fruits and veggies\"    Anticipate good compliance    Diet Education - refer to Education Flowsheet      Nutrition Goals  Patient to verbalize 2 changes he will make to current eating habits.       MONITORING AND EVALUATION:  Progress " towards goals will be monitored and evaluated per protocol and Practice Guidelines        Clau Marcial RD, LD  Clinical Dietitian  3rd floor/ICU: [phone]  All other floors: [phone]  Weekend/holiday: [phone]

## 2018-06-09 NOTE — PLAN OF CARE
Problem: Patient Care Overview  Goal: Plan of Care/Patient Progress Review  PRIMARY DIAGNOSIS: TR BAND RECOVERY   OUTPATIENT/OBSERVATION GOALS TO BE MET BEFORE DISCHARGE:  1. TR band status: removed  2. Radial pulse and CMS (circulation, motion, sensation) are WDL: Yes  3. Bleeding or hematoma present at site: No      4. Activity restriction education reviewed with patient: Yes  5. ADLs back to baseline:  No  6. Activity and level of assistance: Up with standby assistance.  7. Interpretation of rhythm per telemetry tech: Sinus Rhythm with slight ST elevation     Discharge Planner Nurse   Safe discharge environment identified: Yes  Barriers to discharge: Yes, multiple falls recently.        Entered by: Pham Collins 06/09/2018 2:26 AM     Please review provider order for any additional goals.   Nurse to notify provider when observation goals have been met and patient is ready for discharge.

## 2018-06-09 NOTE — PLAN OF CARE
Problem: Patient Care Overview  Goal: Plan of Care/Patient Progress Review  PRIMARY DIAGNOSIS: TR BAND RECOVERY   OUTPATIENT/OBSERVATION GOALS TO BE MET BEFORE DISCHARGE:  1. TR band status: removed  2. Radial pulse and CMS (circulation, motion, sensation) are WDL: Yes  3. Bleeding or hematoma present at site: No      4. Activity restriction education reviewed with patient: Yes  5. ADLs back to baseline:  No  6. Activity and level of assistance: Up with standby assistance.  7. Interpretation of rhythm per telemetry tech: Sinus Rhythm with slight ST elevation     Discharge Planner Nurse   Safe discharge environment identified: Yes  Barriers to discharge: Yes, multiple falls recently.        Entered by: Pham Collins 06/09/2018 5:58 AM     Please review provider order for any additional goals.   Nurse to notify provider when observation goals have been met and patient is ready for discharge.    VSS, BS @ 2am 111, and @ 5:19am 81. Gave orange juice, recheck @ 5:40am 110 . Denies pain. States is anxious about his situation, was mildly confused when woke up, able to answer all orientation questions appropriately, redirected. Social work consult requested. Multiple falls at home.

## 2018-06-09 NOTE — PLAN OF CARE
Problem: Patient Care Overview  Goal: Plan of Care/Patient Progress Review  OT/CR: Order received for cardiac rehab, pt with NSTEMI,  underwent angio with stent placement yesterday; Met with pt, wife, niece, and phone conversation with pt's dtr Danielle.  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 Supportive family in the area  Current status: Education provided for activity post-stent, pt able to tolerate activity of 10 minute mobility in hallway, steady and independent without AD, vitals monitored.  Pre activity: BP: 97/56   O2: 98% Post activity: BP: 120/70    O2: 97%   Discussion re: pt's baseline activity as wife is in a wheelchair, pt states she is independent with her transfers and he assists only with cath'ing pt and IADLs around the house including grocery shopping and laundry.  Barriers to return to prior living situation: none anticipated  Recommendations for discharge: return home with family assist as needed for IADLs (pt typically rides bike to grocery store, wife and niece report not going to let pt drive at this time); further OP CR per cardiologist  Rationale for recommendations: no further IP cardiac rehab needs, pt able to demonstrate ability to tolerate activity and demonstrated understanding of education post-stent       Entered by: Alicia Pace 06/09/2018 12:24 PM

## 2018-06-09 NOTE — PROGRESS NOTES
"Paged/text hospitalist \"Patient is very anxious, at times confused, can we get order for oral Ativan?\"    MD order Ativan PRN, patient refused at this time, states he is feeling more calm after talking to wife on phone.  "

## 2018-06-09 NOTE — CONSULTS
SW notified CM that patient needed resources for new PCP in the [place] area. Also, pt wanted information on what his Medicare plan covers.     CM met with patient, wife & daughter at bedside. He would like information on all 3 clinics in [place] area: [facility], [facility], [facility] & [facility]. Printed info on all 3 clinics.     Also, CM went to www.medicare.gov to get information on what each plan covers. Printed basic information from site. Explained to patient that each plan has additional information available. Pt needs to visit web site to do further research as desired. Pt stated he would look at info on clinics and let CM know his decision. He was provided a business card, per his request.     CM will continue to follow patient until discharge for any additional needs.     Bebe Borden RN, BSN, CTS  [facility]  [phone]

## 2018-06-09 NOTE — PROGRESS NOTES
"[facility] HEALTH SERVICES  SPIRITUAL ASSESSMENT Progress Note  [facility] RH ops    PRIMARY FOCUS:     Goals of care    Emotional/spiritual/Bahai distress    Support for coping    ILLNESS CIRCUMSTANCES:   Reviewed documentation. Reflective conversation shared with pt Matt, his wife Salina and granddaughter Jessica which integrated elements of illness and family narratives. His medical team and  stopped in momentarily during the visit.    Context of Serious Illness/Symptom(s) - Matt reported that he had a heart attack and a stint was put in.    Resources for Support - Matt lives with his wife and has many family members in close proximity; his daughter \"who is a doctor\" lives out of state, but calls him.    DISTRESS:     Emotional/Existential/Relational Distress - None expressed.    Spiritual/Cheondoism Distress - None expressed.      Social/Cultural/Economic Distress - None expressed.  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  He begins a new job, very close to his home, when he is able to go back to work.    SPIRITUAL/Christian COPING:     Confucianism/Ava - Matt is a member of the [employer]. His new job will allow him to attend regularly.    Spiritual Practice(s) - He prays regularly and  is reading the \"Bible in One Year\"..  Pt welcomed reading from scripture and a prayer     Emotional/Existential/Relational Connections - Family, a friend, and his Buddhist.    GOALS OF CARE:    Goals of Care - Discharge later today; returning home with his wife. Informed Matt how he could request a  visit if needed in the future.     Meaning/Sense-Making - Matt stated, \"God loves me; I have Brad in my heart.\"    PLAN: No further plans; pt discharged today.  Sylvia Ayala   Intern  Pager [phone]    "

## 2018-06-09 NOTE — DISCHARGE SUMMARY
[facility]    Discharge Summary  Hospitalist    Date of Admission:  6/7/2018  Date of Discharge:  6/9/2018  Provider:  Jenelle Hardin PA-C  Date of Service (when I last saw the patient): 06/09/18    Discharge Diagnoses   NSTEMI  HTN  HLD  DM2    Other medical issues:  Past Medical History:   Diagnosis Date     Diabetes (H)      History of Present Illness   Matt Way is a 67 year old male with PMH significant for DM2, HTN, GERD, FLAQUITA, and HLP who was admitted on 6/7/2018 for evaluation of chest pain. Please see the admission history and physical for full details.    Hospital Course   Matt Way was admitted on 6/7/2018.  The following problems were addressed during his hospitalization:    Matt Way is a 67 year old male with PMH significant for DM2, HTN, GERD, FLAQUITA, and HLP who was admitted on 6/7/2018 for evaluation of chest pain.      1. NSTEMI: admitted with chest discomfort radiating to the arm responding to nitroglycerin with serial troponins rising up to 0.835 this morning. Findings consistent with NSTEMI. Cardiology consulted and saw the patient with recommendation for coronary angiogram today with possible revascularization. S/p successful PCI with CECILY in proximal LAD. No chest pain the day of discharge. Patient evaluated by cardiac rehab who recommended slowing restarting exercise regimen and if needed should be given a referral by Cardiology at f/u for ongoing cardiac rehab as outpatient.   - Continue ASA and Brilinta (12 months due to stent placement)  - Start PO Lisinopril 2.5 mg daily, Metoprolol 25 mg BID, and Atorvastatin 40 mg   - Prescription for sublingual nitroglycerin provided for recurrence of chest pain   - Lifting restriction of no >10 pounds for the next week   - Outpatient follow with cardiology NP in 7-10 days then Dr. Strickland in 4-6 weeks      2. HTN: stable initiated on Lisinopril 2.5 mg daily and Metoprolol 25 mg BID per cardiology's recommendations which patient is  "tolerating      3. Hypoglycemic event in setting of DM2: occurred in the ED after not eating throughout the day and took his regular insulin dosing in AM. Resolved with 1 amp of D50 in the ED. Home regimen includes Levemir 20 units BID, Novolog 14 units BID, and Metformin 1,000 mg BID. Last HgbA1C of 7.9 on 5/24/18. Resume home regimen upon discharge.      4. HLP: lipid panel this admission shows total of 199, HDL of 134, LDL of 108, triglycerides of 287  - Atorvastatin 40 mg daily      5. FLAQUITA: intolerant to CPAP  - PRN supplemental oxygen while sleeping     Pain plan: # Discharge Pain Plan:  - Patient currently has NO PAIN and is not being prescribed pain medications on discharge.    Pending Results   None    Code Status   Full Code       Primary Care Physician   Physician No Ref-Primary    Exam:    /70 (BP Location: Left arm)  Pulse 61  Temp 96.2  F (35.7  C) (Oral)  Resp 18  Ht 1.854 m (6' 1\")  Wt 91 kg (200 lb 9.6 oz)  SpO2 97%  BMI 26.47 kg/m2  GEN:  Alert, oriented x 3, appears comfortable, NAD.  HEENT:  Normocephalic/atraumatic, no scleral icterus, no nasal discharge, mouth moist.  CV:  Regular rate and rhythm, no murmur or JVD.  S1 + S2 noted, no S3 or S4.  LUNGS:  Clear to auscultation bilaterally without rales/rhonchi/wheezing/retractions. Symmetric chest rise on inhalation noted.  ABD:  Active bowel sounds, soft, non-tender/non-distended.  No rebound/guarding/rigidity.  EXT:  No edema.  No cyanosis.  No acute joint synovitis noted.  SKIN:  Dry to touch, no exanthems noted in the visualized areas.    Discharge Disposition   Discharged to home    Consultations This Hospital Stay   CARDIOLOGY IP CONSULT  PHARMACY TO DOSE HEPARIN  SPIRITUAL HEALTH SERVICES IP CONSULT  CARDIAC REHAB IP CONSULT  NUTRITION SERVICES ADULT IP CONSULT  SOCIAL WORK IP CONSULT  PHARMACY DISCHARGE EDUCATION BY PHARMACIST  CARE COORDINATOR IP CONSULT  SMOKING CESSATION PROGRAM IP CONSULT    Time Spent on this Encounter "   I, uRth Hardin, personally saw the patient today and spent greater than 30 minutes discharging this patient.    Discharge Orders     CARDIAC REHAB REFERRAL     CARDIOLOGY EVAL ADULT REFERRAL     Reason for your hospital stay   You were admitted to the observation unit for chest pain. Your heart were initially monitored overnight with no abnormal findings. Your cardiac enzymes were initially detectable and on serial checks became elevated. Due to your increased troponin level cardiology was consulted and felt with your symptoms and troponin elevation were due to a non-ST elevated MI (heart attack). It was recommended you undergo a coronary angiogram and with this you had a stent placed to your proximal LAD (coronary artery). Cardiology started you on multiple cardioprotective medications that will also improve your blood pressure (Metoprolol and Lisinopril) and cholesterol (Atorvastatin). It is recommended you continue Brilinta and Aspirin to keep you stent open. Cardiology recommended you follow up with one of their Nurse Practitioners within 7-10 days then Dr. Strickland in 4-6 weeks.     Follow-up and recommended labs and tests    Follow up with primary care provider within 7 days for hospital follow-up.  No follow up labs or test are needed.   Follow up with cardiology Nurse Practitioner within 7-10 days and Dr. Strickland in 4-6 weeks.     Activity   Your activity upon discharge: activity as tolerated and you cannot lift >10 pounds for the next week     Full Code     Diet   Follow this diet upon discharge: Orders Placed This Encounter     Combination Diet 0438-8596 Calories: High Consistent CHO (4-7 CHO units/meal); Low Saturated Fat Na <2400mg Diet (Mediterranian diet)       Discharge Medications   Discharge Medication List as of 6/9/2018 12:39 PM      START taking these medications    Details   atorvastatin (LIPITOR) 40 MG tablet Take 1 tablet (40 mg) by mouth daily, Disp-30 tablet, R-1, E-Prescribe       lisinopril (PRINIVIL/ZESTRIL) 2.5 MG tablet Take 1 tablet (2.5 mg) by mouth daily, Disp-30 tablet, R-1, E-Prescribe      metoprolol tartrate (LOPRESSOR) 25 MG tablet Take 1 tablet (25 mg) by mouth 2 times daily, Disp-60 tablet, R-1, E-Prescribe      ticagrelor (BRILINTA) 90 MG tablet Take 1 tablet (90 mg) by mouth every 12 hours, Disp-30 tablet, R-1, E-Prescribe         CONTINUE these medications which have CHANGED    Details   aspirin 81 MG EC tablet Take 1 tablet (81 mg) by mouth daily, R-0, OTC         CONTINUE these medications which have NOT CHANGED    Details   !! insulin aspart (NOVOLOG FLEXPEN) 100 UNIT/ML injection Inject 17 Units Subcutaneous 2 times daily (with meals) Breakfast and Dinner, Historical      !! insulin aspart (NOVOLOG FLEXPEN) 100 UNIT/ML injection Inject 15 Units Subcutaneous daily (with lunch), Historical      insulin detemir (LEVEMIR) 100 UNIT/ML injection Inject 22 Units Subcutaneous 2 times daily , Historical      Insulin Pen Needle 29G X 12.7MM MISC Does not apply, Until Discontinued, Historical      metFORMIN (GLUCOPHAGE) 1000 MG tablet Take 1,000 mg by mouth 2 times daily (with meals), Historical      pregabalin (LYRICA) 50 MG capsule Take 50 mg by mouth 3 times daily, Historical      nitroGLYcerin (NITROSTAT) 0.4 MG sublingual tablet Place 0.4 mg under the tongue every 5 minutes as needed for chest pain For chest pain place 1 tablet under the tongue every 5 minutes for 3 doses. If symptoms persist 5 minutes after 1st dose call 911., Historical       !! - Potential duplicate medications found. Please discuss with provider.        Allergies   No Known Allergies     Data   Results for orders placed or performed during the hospital encounter of 06/07/18   Chest XR,  PA & LAT    Narrative    XR CHEST 2 VW 6/7/2018 2:30 PM    COMPARISON: None.    HISTORY: Chest pain.      Impression    IMPRESSION: Cardiac silhouette and pulmonary vasculature are within  normal limits. No focal airspace  disease, pleural effusion or  pneumothorax.    CLIFF SUE MD   CBC with platelets differential   Result Value Ref Range    WBC 6.4 4.0 - 11.0 10e9/L    RBC Count 5.54 4.4 - 5.9 10e12/L    Hemoglobin 15.5 13.3 - 17.7 g/dL    Hematocrit 47.2 40.0 - 53.0 %    MCV 85 78 - 100 fl    MCH 28.0 26.5 - 33.0 pg    MCHC 32.8 31.5 - 36.5 g/dL    RDW 14.3 10.0 - 15.0 %    Platelet Count 228 150 - 450 10e9/L    Diff Method Automated Method     % Neutrophils 57.0 %    % Lymphocytes 32.8 %    % Monocytes 7.2 %    % Eosinophils 2.0 %    % Basophils 0.8 %    % Immature Granulocytes 0.2 %    Nucleated RBCs 0 0 /100    Absolute Neutrophil 3.7 1.6 - 8.3 10e9/L    Absolute Lymphocytes 2.1 0.8 - 5.3 10e9/L    Absolute Monocytes 0.5 0.0 - 1.3 10e9/L    Absolute Eosinophils 0.1 0.0 - 0.7 10e9/L    Absolute Basophils 0.1 0.0 - 0.2 10e9/L    Abs Immature Granulocytes 0.0 0 - 0.4 10e9/L    Absolute Nucleated RBC 0.0    Basic metabolic panel   Result Value Ref Range    Sodium 139 133 - 144 mmol/L    Potassium 3.9 3.4 - 5.3 mmol/L    Chloride 106 94 - 109 mmol/L    Carbon Dioxide 24 20 - 32 mmol/L    Anion Gap 9 3 - 14 mmol/L    Glucose 112 (H) 70 - 99 mg/dL    Urea Nitrogen 17 7 - 30 mg/dL    Creatinine 0.88 0.66 - 1.25 mg/dL    GFR Estimate 64 >60 mL/min/1.7m2    GFR Estimate If Black 78 >60 mL/min/1.7m2    Calcium 8.7 8.5 - 10.1 mg/dL   Troponin I   Result Value Ref Range    Troponin I ES 0.042 0.000 - 0.045 ug/L   Glucose by meter   Result Value Ref Range    Glucose 36 (LL) 70 - 99 mg/dL   Glucose by meter   Result Value Ref Range    Glucose 124 (H) 70 - 99 mg/dL   Glucose by meter   Result Value Ref Range    Glucose 155 (H) 70 - 99 mg/dL   Troponin I - Now then in 4 hours x 2    Result Value Ref Range    Troponin I ES 0.338 () 0.000 - 0.045 ug/L   Troponin I - Now then in 4 hours x 2    Result Value Ref Range    Troponin I ES 0.556 () 0.000 - 0.045 ug/L   Troponin I   Result Value Ref Range    Troponin I ES 0.835 () 0.000 - 0.045  ug/L   Glucose by meter   Result Value Ref Range    Glucose 160 (H) 70 - 99 mg/dL   CBC with platelets   Result Value Ref Range    WBC 6.4 4.0 - 11.0 10e9/L    RBC Count 5.61 4.4 - 5.9 10e12/L    Hemoglobin 15.8 13.3 - 17.7 g/dL    Hematocrit 48.5 40.0 - 53.0 %    MCV 87 78 - 100 fl    MCH 28.2 26.5 - 33.0 pg    MCHC 32.6 31.5 - 36.5 g/dL    RDW 14.5 10.0 - 15.0 %    Platelet Count 203 150 - 450 10e9/L   Lipid panel reflex to direct LDL   Result Value Ref Range    Cholesterol 199 <200 mg/dL    Triglycerides 287 (H) <150 mg/dL    HDL Cholesterol 34 (L) >39 mg/dL    LDL Cholesterol Calculated 108 (H) <100 mg/dL    Non HDL Cholesterol 165 (H) <130 mg/dL   Glucose by meter   Result Value Ref Range    Glucose 167 (H) 70 - 99 mg/dL   Glucose by meter   Result Value Ref Range    Glucose 137 (H) 70 - 99 mg/dL   Heparin 10a Level   Result Value Ref Range    Heparin 10A Level 1.31 (HH) IU/mL   Lipid Profile   Result Value Ref Range    Cholesterol 176 <200 mg/dL    Triglycerides 139 <150 mg/dL    HDL Cholesterol 34 (L) >39 mg/dL    LDL Cholesterol Calculated 114 (H) <100 mg/dL    Non HDL Cholesterol 142 (H) <130 mg/dL   Glucose by meter   Result Value Ref Range    Glucose 77 70 - 99 mg/dL   Glucose by meter   Result Value Ref Range    Glucose 200 (H) 70 - 99 mg/dL   Hemoglobin A1c   Result Value Ref Range    Hemoglobin A1C 7.7 (H) 0 - 5.6 %   Basic metabolic panel   Result Value Ref Range    Sodium 138 133 - 144 mmol/L    Potassium 4.3 3.4 - 5.3 mmol/L    Chloride 107 94 - 109 mmol/L    Carbon Dioxide 24 20 - 32 mmol/L    Anion Gap 7 3 - 14 mmol/L    Glucose 110 (H) 70 - 99 mg/dL    Urea Nitrogen 14 7 - 30 mg/dL    Creatinine 0.86 0.66 - 1.25 mg/dL    GFR Estimate 89 >60 mL/min/1.7m2    GFR Estimate If Black >90 >60 mL/min/1.7m2    Calcium 9.1 8.5 - 10.1 mg/dL   CBC with platelets   Result Value Ref Range    WBC 8.1 4.0 - 11.0 10e9/L    RBC Count 5.90 4.4 - 5.9 10e12/L    Hemoglobin 16.4 13.3 - 17.7 g/dL    Hematocrit 50.8  "40.0 - 53.0 %    MCV 86 78 - 100 fl    MCH 27.8 26.5 - 33.0 pg    MCHC 32.3 31.5 - 36.5 g/dL    RDW 14.6 10.0 - 15.0 %    Platelet Count 231 150 - 450 10e9/L   Glucose by meter   Result Value Ref Range    Glucose 111 (H) 70 - 99 mg/dL   Glucose by meter   Result Value Ref Range    Glucose 81 70 - 99 mg/dL   Glucose by meter   Result Value Ref Range    Glucose 108 (H) 70 - 99 mg/dL   Glucose by meter   Result Value Ref Range    Glucose 165 (H) 70 - 99 mg/dL   EKG 12 lead   Result Value Ref Range    Interpretation ECG Click View Image link to view waveform and result    Cardiology IP Consult: Patient to be seen: Routine - within 24 hours; chest pain, elevated trop; Consultant may enter orders: Yes    Narrative    Wilber Strickland MD     6/8/2018  8:59 AM  Cardiology consult dictated  (#[id number])   Nutrition Services Adult IP Consult    Narrative    Clau Marcial RD, LD     6/9/2018 12:30 PM  CLINICAL NUTRITION SERVICES  -  ASSESSMENT NOTE      Malnutrition: Patient does not meet malnutrition criteria at this   time        REASON FOR ASSESSMENT  Matt Way is a 67 year old male seen by Registered Dietitian   for Provider Order - Nutrition Education - heart healthy diet.      NUTRITION HISTORY  - Information obtained from patient.    - Patient with a h/o DMII (on oral agent and insulin PTA), HTN.    - 3-5 meals/day is baseline.   - Foods consumed vary greatly.  Hard to obtain diet recall as   many family members/children in room.  - SO describes as a \"meat/potatoes/pasta/dessert nancy\".  Eludes to   low fruit/vegetable intake.  - Knows that a heart healthy diet limits \"salt\".  No previous   diet education relating to heart health.    - Denies an unintentional decrease in oral intakes PTA.  Does   report decrease in portion sizes to induce wt loss.   - NKFA.      CURRENT NUTRITION ORDERS  Diet Order:     Mod CHO    Current Intake/Tolerance:  Good appetite/intake since admission.        PHYSICAL " "FINDINGS  Observed  No fat or muscle loss appreciated  Obtained from Chart/Interdisciplinary Team  No nutritionally pertinent     ANTHROPOMETRICS  Height: 6' 1\"  Weight: 90.9 kg (200#)  Body mass index is 26.47 kg/(m^2).  Weight Status:  Overweight BMI 25-29.9  IBW: 83.6 kg (184#)  % IBW: 109%  Weight History:  Wt Readings from Last 10 Encounters:   06/08/18 91 kg (200 lb 9.6 oz)   03/30/11 92.5 kg (203 lb 14.4 oz)   01/29/09 97.5 kg (215 lb)   07/09/08 94.8 kg (209 lb)   - Reports intentional wt loss of 240# w/in 1 year.     LABS  Labs reviewed  Lab Results   Component Value Date    A1C 7.7 06/08/2018       MEDICATIONS  Medications reviewed including insulin regimen      ASSESSED NUTRITION NEEDS PER APPROVED PRACTICE GUIDELINES:    Dosing Weight 90.9 kg  Estimated Energy Needs: 4847-8971 kcals (20-25 Kcal/Kg)  Justification: overweight  Estimated Protein Needs:  grams protein (1-1.2 g pro/Kg)  Justification: maintenance  Estimated Fluid Needs: 0618-0616  mL (1 mL/Kcal)  Justification: maintenance and per provider pending fluid status    MALNUTRITION:  % Weight Loss:  Weight loss does not meet criteria for   malnutrition --> intentional  % Intake:  Decreased intake does not meet criteria for   malnutrition --> intentional  Subcutaneous Fat Loss:  None observed  Muscle Loss:  None observed  Fluid Retention:  None noted    Malnutrition Diagnosis: Patient does not meet two of the above   criteria necessary for diagnosing malnutrition    NUTRITION DIAGNOSIS:  Food and nutrition-related knowledge deficit related to lack of   exposure to nutrition education as evidenced by limited baseline   knowledge of heart healthy diet per assessment, denial of   previous heart healthy diet education.       NUTRITION INTERVENTIONS  Recommendations / Nutrition Prescription  Change to combination high CHO/heart healthy diet.       Implementation  Nutrition education: Provided education on heart healthy diet:  ? Assessed learning " "needs, learning preferences, and willingness   to learn  ? Nutrition Education (Content):  a) Provided handout \"heart healthy nutrition therapy\"  b) Discussed recommendations for increasing fiber intake via   fruits, vegetables, and whole grains  c) Discussed common foods which contain saturated fat,   appropriate substitutes  d) Discussed common foods which contain trans fat, appropriate   substitutes  e) Discussed common foods which are high in sodium, appropriate   substitutes  f) Discussed how to read nutrition facts label  ? Nutrition Education (Application):  a) Discussed eating habits and recommended alternative food   choices  ? Patient verbalizes understanding of diet by stating need to   \"eat more fruits and veggies\"  ? Anticipate good compliance  ? Diet Education - refer to Education Flowsheet      Nutrition Goals  Patient to verbalize 2 changes he will make to current eating   habits.       MONITORING AND EVALUATION:  Progress towards goals will be monitored and evaluated per   protocol and Practice Guidelines        Clau Marcial RD, LD  Clinical Dietitian  3rd floor/ICU: [phone]  All other floors: [phone]  Weekend/holiday: [phone]   Social Work IP Consult    Narrative    Helen Owen LSW     6/9/2018 10:04 AM  Care Transition Initial Assessment -   Reason For Consult: discharge planning, insurance concerns  Met with: PATIENT,FAMILY  Active Problems:    Acute chest pain    NSTEMI (non-ST elevated myocardial infarction) (H)       DATA  Lives With: spouse  Living Arrangements: house  Description of Support System: Supportive, Involved  Who is your support system?: Children, Other (specify) (maurisio Lopez)  Support Assessment: Adequate family and caregiver support.  Identified issues/concerns regarding health management: Pt and   family have questions about insurance and the coverage for the   hospital stay.        Quality Of Family Relationships: supportive, helpful, " involved  Transportation Available: car, family or friend will provide    ASSESSMENT  Cognitive Status: Pt is a poor historian and defers to his wife   for information about his health care.   Concerns to be addressed: Pt and family have questions about   insurance and the coverage for the hospital stay. Pt's niece   Jessica and wife Deb were present. They insisted that the   pt's daughters were on the phone. SW had to separate   conversations with each daughter Kristen and Danielle.  The   daughters were wondering if any financial paperwork had to be   completed prior to discharge, SW informed them that no additional   paperwork needed to be done.  SW informed the pt and his family   of the inpatient status. Deb thought pt had humana but   didn't have any documentation of that with her. SW explained that   the cost of the hospital stay wouldn't be able to be determined   until after the discharge and  Pt's insurance was billed. SW gave   them the number to the financial counselor to contact in Monday.   Pt does not have primary care and has been going to a clinic in   [facility], they cant remember the name of the clinic at   this time. SW made a referral to care coordination to help   establish primary care.      PLAN  Patient anticipates discharging to:  Home today.    FIGUEROA Valentine  Casual TIBURCIO [phone]           Care Coordinator IP Consult    Narrative    Razia Borden CM     6/9/2018 10:57 AM  TIBURCIO notified CM that patient needed resources for new PCP in the   [facility]. Also, pt wanted information on what his   Medicare plan covers.     CM met with patient, wife & daughter at bedside. He would like   information on all 3 clinics in  area: [facility], [facility]. Printed info on all 3 clinics.     Also, CM went to www.medicare.gov to get information on what each   plan covers. Printed basic information from site. Explained to   patient that each plan has  additional information available. Pt   needs to visit web site to do further research as desired. Pt   stated he would look at info on clinics and let CM know his   decision. He was provided a business card, per his request.     CM will continue to follow patient until discharge for any   additional needs.     Bebe Borden RN, BSN, CTS  [facility]  [phone]      Troponin POCT   Result Value Ref Range    Troponin I 0.02 0.00 - 0.10 ug/L   Activated clotting time POCT   Result Value Ref Range    Activated Clot Time 306 (H) 75 - 150 sec   ECHO COMPLETE WITH OPTISON    Narrative    [id number]  [id number]  [id number]  [id number]^DANK^LEO^           [facility]  Echocardiography Laboratory  [address] [place] [address]        Name: JUSTYN TYLER  MRN: [id number]  : 1950  Study Date: 2018 10:50 AM  Age: 67 yrs  Gender: Male  Patient Location: [facility]  Reason For Study: MI  Ordering Physician: LEO WEEMS  Referring Physician: Val Primary  Performed By: Vinicio Jamil RDCS     BSA: 2.2 m2  Height: 73 in  Weight: 208 lb  HR: 66  BP: 159/86 mmHg  _____________________________________________________________________________  __        Procedure  Complete Portable Echo Adult. Contrast Optison.  _____________________________________________________________________________  __        Interpretation Summary     The visual ejection fraction is estimated at 55-60%.  Normal left ventricular wall motion  Contrast was used without apparent complications.  _____________________________________________________________________________  __        Left Ventricle  The left ventricle is normal in structure, function and size. The visual  ejection fraction is estimated at 55-60%. Left ventricular diastolic function  is normal. Normal left ventricular wall motion.     Right Ventricle  The right ventricle is normal in structure, function and size.     Atria  Normal left atrial size. Right  atrial size is normal.     Mitral Valve  The mitral valve is normal in structure and function.        Tricuspid Valve  Normal tricuspid valve.     Aortic Valve  The aortic valve is normal in structure and function.     Pulmonic Valve  Normal pulmonic valve.     Vessels  The aortic root is normal size.     Pericardium  There is no pericardial effusion.        Rhythm  Sinus rhythm was noted.  _____________________________________________________________________________  __  MMode/2D Measurements & Calculations  IVSd: 1.2 cm     LVIDd: 4.3 cm  LVIDs: 2.5 cm  LVPWd: 1.1 cm  FS: 41.4 %  LV mass(C)d: 164.9 grams  LV mass(C)dI: 75.4 grams/m2  Ao root diam: 3.7 cm  LA dimension: 3.7 cm  asc Aorta Diam: 3.6 cm  LA/Ao: 1.0  LVOT diam: 2.1 cm  LVOT area: 3.5 cm2  LA Volume (BP): 53.0 ml  LA Volume Index (BP): 24.2 ml/m2  RWT: 0.50           Doppler Measurements & Calculations  MV E max yudith: 95.3 cm/sec  MV A max yudith: 109.0 cm/sec  MV E/A: 0.87  MV max P.8 mmHg  MV mean PG: 3.0 mmHg  MV V2 VTI: 34.5 cm  MVA(VTI): 2.3 cm2  MV dec time: 0.28 sec  LV V1 max P.9 mmHg  LV V1 max: 84.6 cm/sec  LV V1 VTI: 22.6 cm  CO(LVOT): 5.0 l/min  CI(LVOT): 2.3 l/min/m2  SV(LVOT): 78.3 ml  SI(LVOT): 35.8 ml/m2  TR max yudith: 210.0 cm/sec  TR max P.6 mmHg  E/E' av.2  Lateral E/e': 12.1  Medial E/e': 16.3              _____________________________________________________________________________  __        Report approved by: Esteban Mayen 2018 01:10 PM      Inpatient Coronary Angiography Adult Order    Narrative    INDICATION: Non-ST elevation MI    PROCEDURES PERFORMED:   1. Coronary angiogram  2. Successful PCI with drug-eluting stent placement in the proximal  LAD    PROCEDURE: After discussing risks, benefits, goals, alternatives and  complications of the procedure, the patient was brought to the  catheterization lab.  I determined this patient to be an appropriate  candidate for the planned sedation and procedure and have  reaccessed  the patient immediately prior to sedation and procedure. Right wrist  was prepped and draped in sterile fashion. Under 1% Lidocaine a 6  Irish sheath was placed in the right radial artery using a through  and through technique. Coronary angiography was performed with a 6  Irish 4 and EBU 3.5 diagnostic catheters. Standard angle and views  were used for the right and left coronaries.     FINDINGS:  LEFT MAIN CORONARY ARTERY: Normal  LEFT ANTERIOR DESCENDING ARTERY: The proximal LAD has eccentric 90%  stenosis. The mid LAD has multiple discrete 20% stenosis. The distal  LAD has 40% stenosis. The first diagonal branch small vessel with 50%  ostial stenosis. The second and third diagonal branches are small  vessels with diffuse disease.  CIRCUMFLEX ARTERY: The proximal circumflex has diffuse 10% stenosis.  Distal circumflex small with mild irregularities. The first obtuse  marginal branch is a moderate size vessel with mild irregularities.  RIGHT CORONARY ARTERY: The RCA is a large dominant vessel. The  proximal RCA has 20% stenosis. The mid and distal RCA segments of mild  irregularities. The right PDA and posterolateral segments of mild  luminal.    PERCUTANEOUS CORONARY INTERVENTION: After reviewing the coronary  angiography findings with the patient, we elected to treat the  proximal LAD lesion. A 6 Irish EBU 3.5 guiding catheter was used to  engage the ostium of the left main. This provided an adequate support  for the procedure. A run through wire was used to successfully cross  the proximal LAD lesion. The lesion was pre-dilated with a 2.5 x 8  compliant balloon. After adequate angioplasty result, a 2.75 x 12  drug-eluting stent was deployed inside the lesion using 11 atmosphere  pressure. The stent was post-dilated with 2.75 non-compliant balloon.  This produced an excellent angiographic result.  The patient received heparin and Aspirin prior to intervention. The  was loaded with Ticagrelor during  the procedure. The radial sheath was  removed and a radial band placed prior to transfer to recovery. There  were no complications.    SEDATION: The patient received conscious sedation by the nursing staff  under my supervision. He was given 1 g of Versed and 50 mcg of  fentanyl. Total sedation time was 43 minutes.      Impression    IMPRESSION:   1. Non-ST elevation MI  2. Successful PCI with drug-eluting stent placement in the proximal  LAD    PLAN: Dual antiplatelet therapy for 12 months. Aggressive risk factor  control    MD Ruth DOAN PA-C

## 2018-06-09 NOTE — PROGRESS NOTES
06/09/18 1106   Quick Adds   Type of Visit (CR)   Living Environment   Lives With spouse   Living Arrangements house  (one level [facility])   Number of Stairs to Enter Home 0   Number of Stairs Within Home 0   Transportation Available car  (handicap van)   Living Environment Comment pt and wife live together   Self-Care   Dominant Hand right   Usual Activity Tolerance excellent   Current Activity Tolerance good   Regular Exercise yes   Activity/Exercise Type biking   Equipment Currently Used at Home none   Activity/Exercise/Self-Care Comment pt is active, normally bikes outside 3x day for 30-45 minutes   Functional Level Prior   Ambulation 0-->independent   Transferring 0-->independent   Toileting 0-->independent   Bathing 0-->independent   Dressing 0-->independent   Fall history within last six months yes  (disoriented in the home at night, trip and falls)   Number of times patient has fallen within last six months 5   Prior Functional Level Comment independent at baseline   General Information   Referring Physician Placido MOYA   Patient/Family Goals Statement return home   Cognitive Status Examination   Orientation orientation to person, place and time   Level of Consciousness alert   Able to Follow Commands WNL/WFL   Visual Perception   Visual Perception Wears glasses   Sensory Examination   Sensory Comments baseline neuropathy in hands and feet   Pain Assessment   Patient Currently in Pain No   Instrumental Activities of Daily Living (IADL)   Previous Responsibilities medication management;finances;driving   Activities of Daily Living Analysis   Impairments Contributing to Impaired Activities of Daily Living post surgical precautions   General Therapy Interventions   Planned Therapy Interventions home program guidelines;progressive activity/exercise;risk factor education   Clinical Impression   Criteria for Skilled Therapeutic Interventions Met yes, treatment indicated   OT Diagnosis post-stent placement  "  Assessment of Occupational Performance 1-3 Performance Deficits   Identified Performance Deficits impaired ability to manage IADLs   Clinical Decision Making (Complexity) Low complexity   Therapy Frequency daily   Predicted Duration of Therapy Intervention (days/wks) one time eval/treat   Anticipated Discharge Disposition Home with Assist   Risks and Benefits of Treatment have been explained. Yes   Patient, Family & other staff in agreement with plan of care Yes   [facility] TM \"6 Clicks\"   2016, Trustees of [facility], under license to [employer].  All rights reserved.   6 Clicks Short Forms Daily Activity Inpatient Short Form   [facility]  \"6 Clicks\" Daily Activity Inpatient Short Form   1. Putting on and taking off regular lower body clothing? 4 - None   2. Bathing (including washing, rinsing, drying)? 4 - None   3. Toileting, which includes using toilet, bedpan or urinal? 4 - None   4. Putting on and taking off regular upper body clothing? 4 - None   5. Taking care of personal grooming such as brushing teeth? 4 - None   6. Eating meals? 4 - None   Daily Activity Raw Score (Score out of 24.Lower scores equate to lower levels of function) 24   Total Evaluation Time   Total Evaluation Time (Minutes) 10     "

## 2018-06-09 NOTE — PLAN OF CARE
"Problem: Patient Care Overview  Goal: Plan of Care/Patient Progress Review  Outcome: Improving  PRIMARY DIAGNOSIS: TR BAND RECOVERY   OUTPATIENT/OBSERVATION GOALS TO BE MET BEFORE DISCHARGE:  1. TR band status: removed  2. Radial pulse and CMS (circulation, motion, sensation) are WDL: Yes  3. Bleeding or hematoma present at site: No      4. Activity restriction education reviewed with patient: Yes. Pink wrist band applied, arm board applied  5. Stable vital signs:  Yes  6. ADLs back to baseline:  Yes  7. Activity and level of assistance: Ambulating independently.  8. Interpretation of rhythm per telemetry tech: Normal sinus HR 62     Discharge Planner Nurse   Safe discharge environment identified: Yes  Barriers to discharge: Yes, not medically cleared.        Entered by: Anna Hobson 06/08/2018 9:35 PM     Please review provider order for any additional goals.   Nurse to notify provider when observation goals have been met and patient is ready for discharge.    Patient is alert and oriented x4. VS WNL and documented. Lung sounds clear in all lobes and patient is on RA. Denies SOB. Active bowel sounds in all 4 quadrants. Denies any pain, urgency, and frequency when voiding. Denies pain. (R) CWM WNL. Patient has some chronic numbness and tingling in UE's. Capillary refill <3 seconds in URE. Right arm has a board on and is wrapped in kerlix. Patient educated on the importance not to use that wrist. IV fluids stopped. Patient tolerating fluids and foods.  tonight and PA added insulins. Patient independent in room and family at bedside.     /77 (BP Location: Left arm)  Pulse 61  Temp 96.4  F (35.8  C) (Oral)  Resp 18  Ht 1.854 m (6' 1\")  Wt 91 kg (200 lb 9.6 oz)  SpO2 98%  BMI 26.47 kg/m2        "

## 2018-06-09 NOTE — PROGRESS NOTES
Internal Medicine Cross Cover:  Notified by nursing patient is a diabetic.  He was previously npo for coronary angiogram today.  His diet has been resumed.  He tolerated a full meal.  Blood sugar is now 200.  Will resume home Levemir 20 units bid per home schedule.  ISS protocol ordered    Tricia Nava MS, PA-C  Hospitalist Service  Pager [phone]

## 2018-06-09 NOTE — PLAN OF CARE
"Problem: Patient Care Overview  Goal: Plan of Care/Patient Progress Review  Outcome: Adequate for Discharge Date Met: 06/09/18  A&Ox4 forgetful.  Anxious when first awakened this morning, states he was having a dream and was a little confused as to where he was, requested to be \"left alone\".  He called his family and was calm and cooperative after speaking with daughter.  VSS.  Denies pain. Did report \" a couple of twinges\" right side of chest while at rest. /68 at that time.  Right radial site c/d/i, patient requested to keep on nono board to remind him to not bend, site assessed and redressed.  CMS intact.  Cardiac rehab consult completed.  Discharge instructions reviewed with patient, spouse and daughter, including new medication information handouts and verbal education, follow up appointments.  Stated good understanding of all information.  PIV removed.  Discharged to home in c/o family.      "

## 2018-06-09 NOTE — CONSULTS
Care Transition Initial Assessment - TIBURCIO  Reason For Consult: discharge planning, insurance concerns  Met with: PATIENT,FAMILY  Active Problems:    Acute chest pain    NSTEMI (non-ST elevated myocardial infarction) (H)       DATA  Lives With: spouse  Living Arrangements: house  Description of Support System: Supportive, Involved  Who is your support system?: Children, Other (specify) (niece Jessica)  Support Assessment: Adequate family and caregiver support.  Identified issues/concerns regarding health management: Pt and family have questions about insurance and the coverage for the hospital stay.        Quality Of Family Relationships: supportive, helpful, involved  Transportation Available: car, family or friend will provide    ASSESSMENT  Cognitive Status: Pt is a poor historian and defers to his wife for information about his health care.   Concerns to be addressed: Pt and family have questions about insurance and the coverage for the hospital stay. Pt's niece Jessica and wife Deb were present. They insisted that the pt's daughters were on the phone. SW had to separate conversations with each daughter Kristen and Danielle.  The daughters were wondering if any financial paperwork had to be completed prior to discharge, SW informed them that no additional paperwork needed to be done.  SW informed the pt and his family of the inpatient status. Deb thought pt had humana but didn't have any documentation of that with her. SW explained that the cost of the hospital stay wouldn't be able to be determined until after the discharge and  Pt's insurance was billed. SW gave them the number to the financial counselor to contact in Monday. Pt does not have primary care and has been going to a clinic in [facility], they cant remember the name of the clinic at this time. SW made a referral to care coordination to help establish primary care.      PLAN  Patient anticipates discharging to:  Home today.    Helen  FIGUEROA Owen  Casual  [phone]

## 2018-06-11 ENCOUNTER — OFFICE VISIT (OUTPATIENT)
Dept: CARDIOLOGY | Facility: CLINIC | Age: 68
End: 2018-06-11
Payer: MEDICARE

## 2018-06-11 ENCOUNTER — TELEPHONE (OUTPATIENT)
Dept: CARDIOLOGY | Facility: CLINIC | Age: 68
End: 2018-06-11

## 2018-06-11 VITALS
HEIGHT: 73 IN | BODY MASS INDEX: 27.54 KG/M2 | WEIGHT: 207.8 LBS | SYSTOLIC BLOOD PRESSURE: 110 MMHG | DIASTOLIC BLOOD PRESSURE: 60 MMHG | HEART RATE: 68 BPM

## 2018-06-11 DIAGNOSIS — E78.5 HYPERLIPIDEMIA LDL GOAL <70: ICD-10-CM

## 2018-06-11 DIAGNOSIS — I21.4 NSTEMI (NON-ST ELEVATED MYOCARDIAL INFARCTION) (H): Primary | ICD-10-CM

## 2018-06-11 DIAGNOSIS — I10 BENIGN ESSENTIAL HYPERTENSION: ICD-10-CM

## 2018-06-11 DIAGNOSIS — R07.9 ACUTE CHEST PAIN: ICD-10-CM

## 2018-06-11 DIAGNOSIS — R21 RASH: ICD-10-CM

## 2018-06-11 PROCEDURE — 99214 OFFICE O/P EST MOD 30 MIN: CPT | Performed by: NURSE PRACTITIONER

## 2018-06-11 NOTE — TELEPHONE ENCOUNTER
Patient's wife called and advised that patient needed a letter stating that patient was seen today in clinic d/t an allergic reaction from medication and needs to be faxed to the number below:    [phone]  ATTN: human resources       RN drafted letter, letter was signed by YO Marcos, and was faxed to [phone]

## 2018-06-11 NOTE — PATIENT INSTRUCTIONS
Today's Recommendations    1. HOLD Lisinopril for 1-2 weeks to determine if rash improves  2. Follow up in [place] for further evaluation of symptoms  3. Continue all other medications without changes.  4. DO NOT STOP aspirin or Brilinta without approval  5. Follow up with Dr. Strickland in 3 months    Please send a [employer]Guard message or call [phone] with questions or concerns.     Scheduling number [phone].

## 2018-06-11 NOTE — MR AVS SNAPSHOT
After Visit Summary   6/11/2018    Matt Way    MRN: [id number]           Patient Information     Date Of Birth          1950        Visit Information        Provider Department      6/11/2018 2:00 PM Rosa Marcos APRN CNP [facility]   [place]        Today's Diagnoses     NSTEMI (non-ST elevated myocardial infarction) (H)    -  1    Acute chest pain          Care Instructions    Today's Recommendations    1. HOLD Lisinopril for 1-2 weeks to determine if rash improves  2. Follow up in [place] for further evaluation of symptoms  3. Continue all other medications without changes.  4. DO NOT STOP aspirin or Brilinta without approval  5. Follow up with Dr. Strickland in 3 months    Please send a [employer] message or call [phone] with questions or concerns.     Scheduling number [phone].            Follow-ups after your visit        Additional Services     Follow-Up with Cardiac Advanced Practice Provider           Follow-Up with Cardiologist                 Future tests that were ordered for you today     Open Future Orders        Priority Expected Expires Ordered    Follow-Up with Cardiologist Routine 9/9/2018 6/11/2019 6/11/2018    Follow-Up with Cardiac Advanced Practice Provider Routine 6/25/2018 6/11/2019 6/11/2018            Who to contact     If you have questions or need follow up information about today's clinic visit or your schedule please contact [facility] directly at [phone].  Normal or non-critical lab and imaging results will be communicated to you by beneSolhart, letter or phone within 4 business days after the clinic has received the results. If you do not hear from us within 7 days, please contact the clinic through [employer] or phone. If you have a critical or abnormal lab result, we will notify you by phone as soon as possible.  Submit refill requests through [employer] or call your pharmacy and they  "will forward the refill request to us. Please allow 3 business days for your refill to be completed.          Additional Information About Your Visit        "[employer]tv Information     FastDue lets you send messages to your doctor, view your test results, renew your prescriptions, schedule appointments and more. To sign up, go to [email or web address] . Click on \"Log in\" on the left side of the screen, which will take you to the Welcome page. Then click on \"Sign up Now\" on the right side of the page.     You will be asked to enter the access code listed below, as well as some personal information. Please follow the directions to create your username and password.     Your access code is: [email or web address]  Expires: 2018  5:36 PM     Your access code will  in 90 days. If you need help or a new code, please call your [place] clinic or [phone].        Care EveryWhere ID     This is your Care EveryWhere ID. This could be used by other organizations to access your [place] medical records  [id number]        Your Vitals Were     Pulse Height BMI (Body Mass Index)             68 1.854 m (6' 1\") 27.42 kg/m2          Blood Pressure from Last 3 Encounters:   18 110/60   18 120/70   11 128/66    Weight from Last 3 Encounters:   18 94.3 kg (207 lb 12.8 oz)   18 91 kg (200 lb 9.6 oz)   11 92.5 kg (203 lb 14.4 oz)                 Today's Medication Changes          These changes are accurate as of 18  2:32 PM.  If you have any questions, ask your nurse or doctor.               Stop taking these medicines if you haven't already. Please contact your care team if you have questions.     lisinopril 2.5 MG tablet   Commonly known as:  PRINIVIL/Zestril   Stopped by:  Rosa Marcos APRN CNP                    Primary Care Provider Fax #    Physician No Ref-Primary [phone]       No address on file        Equal Access to Services     PAT RAY AH: Carmelina Mejia, " wahowardda charitoadaha, qaybta kaalmada vikas, quinten romo taemalik riveraaamarkie ah. So [facility] [place] [phone].    ATENCIÓN: Si lindsay kent, tiene a carcamo disposición servicios gratuitos de asistencia lingüística. Mere al [phone].    We comply with applicable federal civil rights laws and [place] laws. We do not discriminate on the basis of race, color, national origin, age, disability, sex, sexual orientation, or gender identity.            Thank you!     Thank you for choosing [facility]  for your care. Our goal is always to provide you with excellent care. Hearing back from our patients is one way we can continue to improve our services. Please take a few minutes to complete the written survey that you may receive in the mail after your visit with us. Thank you!             Your Updated Medication List - Protect others around you: Learn how to safely use, store and throw away your medicines at www.disposemymeds.org.          This list is accurate as of 6/11/18  2:32 PM.  Always use your most recent med list.                   Brand Name Dispense Instructions for use Diagnosis    aspirin 81 MG EC tablet      Take 1 tablet (81 mg) by mouth daily    NSTEMI (non-ST elevated myocardial infarction) (H)       atorvastatin 40 MG tablet    LIPITOR    30 tablet    Take 1 tablet (40 mg) by mouth daily    NSTEMI (non-ST elevated myocardial infarction) (H)       insulin detemir 100 UNIT/ML injection    LEVEMIR     Inject 22 Units Subcutaneous 2 times daily        insulin pen needle 29G X 12.7MM           LYRICA 50 MG capsule   Generic drug:  pregabalin      Take 50 mg by mouth 3 times daily        metFORMIN 1000 MG tablet    GLUCOPHAGE     Take 1,000 mg by mouth 2 times daily (with meals)        metoprolol tartrate 25 MG tablet    LOPRESSOR    60 tablet    Take 1 tablet (25 mg) by mouth 2 times daily    Benign essential hypertension, NSTEMI (non-ST elevated myocardial infarction) (H)        nitroGLYcerin 0.4 MG sublingual tablet    NITROSTAT    10 tablet    Place 1 tablet (0.4 mg) under the tongue every 5 minutes as needed for chest pain If symptoms persist 5 minutes after 1st dose call 911.    NSTEMI (non-ST elevated myocardial infarction) (H), Acute chest pain       * NovoLOG FLEXPEN 100 UNIT/ML injection   Generic drug:  insulin aspart      Inject 17 Units Subcutaneous 2 times daily (with meals) Breakfast and Dinner        * NovoLOG FLEXPEN 100 UNIT/ML injection   Generic drug:  insulin aspart      Inject 15 Units Subcutaneous daily (with lunch)        ticagrelor 90 MG tablet    BRILINTA    30 tablet    Take 1 tablet (90 mg) by mouth every 12 hours    NSTEMI (non-ST elevated myocardial infarction) (H)       * Notice:  This list has 2 medication(s) that are the same as other medications prescribed for you. Read the directions carefully, and ask your doctor or other care provider to review them with you.

## 2018-06-11 NOTE — LETTER
[facility]  [address]  [place] [address]  Phone: [phone]  Fax: [phone]         To Whom It May Concern:    Matt Way was evaluated by myself today (06/11/218) in our cardiology clinic due to an allergic reaction to medication.    -Sincerely        PHILLIP Mcnulty, CNP    Please call with any questions [phone]

## 2018-06-11 NOTE — LETTER
6/11/2018    Physician No Ref-Primary  No address on file    RE: Matt Way       Dear Colleague,    I had the pleasure of seeing Matt Way in the [facility].    Cardiology Clinic Progress Note  Matt Way MRN# [id number]   YOB: 1950 Age: 67 year old     Reason for visit: Post discharge follow up and rash            Assessment and Plan:     1. Pruritic rash on trunk and arms    He was discharged home on multiple new medications (lisinopril, atorvastatin, metoprolol and Brilinta)    Trial discontinuation of Lisinopril to determine if rash resolves    If does not resolve trial of holding other new medications    Follow up in 1-2 weeks in [place] (per patient request)    2. NSTEM/CAD    Status post drug-eluting stent to the proximal LAD.    Continue aspirin and Brilinta for 1 year uninterrupted and aspirin lifelong.      Consider transition to Plavix in the future if Brilinta is determined to be contributing to the rash.    3. Hypertension    Discontinue Lisinopril as above    4. Hyperlipidemia        Started on Atorvastatin 40 mg         History of Presenting Illness:    Matt Way is a very pleasant 67 year old patient of Dr. Strickland who presents today for a three day history of a pruritic rash on his trunk and arms.  He has a past medical history significant for type 2 diabetes, hypertension, GERD, FLAQUITA (intolerant to CPAP) and hyperlipidemia.    He was recently discharged from [facility] after he was admitted post non-ST elevation MI.  He presented with chest discomfort radiating to his arms responded to nitroglycerin.  His troponin peaked at 0.835.  He underwent a coronary angiogram with a successful PCI and drug-eluting stent to the proximal LAD.  He had an echocardiogram that showed intact LV function with an estimated EF at 55-60% without any wall motion or valvular abnormalities. He was discharged home on dual antiplatelet  "therapy with aspirin and Brilinta.  He was also started on lisinopril 2.5 mg daily, metoprolol 25 mg twice daily and atorvastatin 40 mg daily (total cholesterol 129, , LDL of 108 and triglycerides 287).    Today he presents to clinic with his daughter and his grandson. When he was called for a discharge follow-up, he noted that he had a pruritic rash on his trunk and arms that started the day after discharge.  He is in the process of starting a new job at [employer] and requested to be seen prior to starting his new employment. He is very excited to have this position and they have financial concerns and he cares for his wife who is a quadriplegic.  He denies recurrent anginal symptoms, shortness of breath, PND, orthopnea or lower extremity edema.          This note was completed in part using Dragon voice recognition software. Although reviewed after completion, some word and grammatical errors may occur       Review of Systems:   Review of Systems:  Skin:  Positive for rash (possibly due to medications)   Eyes:  Positive for glasses  ENT:  Negative    Respiratory:  Negative    Cardiovascular:  Negative    Gastroenterology: Negative    Genitourinary:  Negative    Musculoskeletal:  Positive for  (neuropathy in feet and hands)  Neurologic:  Negative    Psychiatric:  Negative    Heme/Lymph/Imm:  Negative    Endocrine:  Positive for diabetes              Physical Exam:     Vitals: /60  Pulse 68  Ht 1.854 m (6' 1\")  Wt 94.3 kg (207 lb 12.8 oz)  BMI 27.42 kg/m2  Constitutional:  cooperative        Skin:  warm and dry to the touch   Diffuse pruritic rash on trunk and arms     Head:  normocephalic        Eyes:  pupils equal and round;conjunctivae and lids unremarkable        ENT:  no pallor or cyanosis        Chest:  clear to auscultation;normal symmetry        Cardiac: regular rhythm;normal S1 and S2;no murmurs, gallops or rubs detected                  Abdomen:  abdomen soft        Vascular:                  "                  right radial site CDI without hematoma or bruit    Extremities and Back:  no edema        Neurological:  affect appropriate               Medications:     Current Outpatient Prescriptions   Medication Sig Dispense Refill     aspirin 81 MG EC tablet Take 1 tablet (81 mg) by mouth daily  0     atorvastatin (LIPITOR) 40 MG tablet Take 1 tablet (40 mg) by mouth daily 30 tablet 1     insulin aspart (NOVOLOG FLEXPEN) 100 UNIT/ML injection Inject 17 Units Subcutaneous 2 times daily (with meals) Breakfast and Dinner       insulin aspart (NOVOLOG FLEXPEN) 100 UNIT/ML injection Inject 15 Units Subcutaneous daily (with lunch)       insulin detemir (LEVEMIR) 100 UNIT/ML injection Inject 22 Units Subcutaneous 2 times daily        Insulin Pen Needle 29G X 12.7MM MISC        metFORMIN (GLUCOPHAGE) 1000 MG tablet Take 1,000 mg by mouth 2 times daily (with meals)       metoprolol tartrate (LOPRESSOR) 25 MG tablet Take 1 tablet (25 mg) by mouth 2 times daily 60 tablet 1     nitroGLYcerin (NITROSTAT) 0.4 MG sublingual tablet Place 1 tablet (0.4 mg) under the tongue every 5 minutes as needed for chest pain If symptoms persist 5 minutes after 1st dose call 911. 10 tablet 0     pregabalin (LYRICA) 50 MG capsule Take 50 mg by mouth 3 times daily       ticagrelor (BRILINTA) 90 MG tablet Take 1 tablet (90 mg) by mouth every 12 hours 30 tablet 1           No family history on file.    Social History     Social History     Marital status:      Spouse name: N/A     Number of children: N/A     Years of education: N/A     Occupational History     Not on file.     Social History Main Topics     Smoking status: Never Smoker     Smokeless tobacco: Never Used     Alcohol use No     Drug use: Not on file     Sexual activity: Not on file     Other Topics Concern     Not on file     Social History Narrative            Past Medical History:     Past Medical History:   Diagnosis Date     Diabetes (H)               Past Surgical  History:     Past Surgical History:   Procedure Laterality Date     SHOULDER SURGERY                Allergies:   Review of patient's allergies indicates no known allergies.       Data:   All laboratory data reviewed:    LAST CHOLESTEROL:  Lab Results   Component Value Date    CHOL 176 06/08/2018     Lab Results   Component Value Date    HDL 34 06/08/2018     Lab Results   Component Value Date     06/08/2018     Lab Results   Component Value Date    TRIG 139 06/08/2018     No results found for: CHOLHDLRATIO    LAST BMP:  Lab Results   Component Value Date     06/09/2018      Lab Results   Component Value Date    POTASSIUM 4.3 06/09/2018     Lab Results   Component Value Date    CHLORIDE 107 06/09/2018     Lab Results   Component Value Date    CHRISTIE 9.1 06/09/2018     Lab Results   Component Value Date    CO2 24 06/09/2018     Lab Results   Component Value Date    BUN 14 06/09/2018     Lab Results   Component Value Date    CR 0.86 06/09/2018     Lab Results   Component Value Date     06/09/2018       LAST CBC:  Lab Results   Component Value Date    WBC 8.1 06/09/2018     Lab Results   Component Value Date    RBC 5.90 06/09/2018     Lab Results   Component Value Date    HGB 16.4 06/09/2018     Lab Results   Component Value Date    HCT 50.8 06/09/2018     Lab Results   Component Value Date    MCV 86 06/09/2018     Lab Results   Component Value Date    MCH 27.8 06/09/2018     Lab Results   Component Value Date    MCHC 32.3 06/09/2018     Lab Results   Component Value Date    RDW 14.6 06/09/2018     Lab Results   Component Value Date     06/09/2018         PHILLIP HARDIN, CNP    Thank you for allowing me to participate in the care of your patient.      Sincerely,     PHILLIP HARDIN CNP     [facility]    cc:   No referring provider defined for this encounter.

## 2018-06-11 NOTE — LETTER
[address]  [facility] [address]  Phone: [phone]  Fax: [phone]               To Whom it May Concern:      Mr. Matt Way,  1950, may start employment with [employer] without medical restriction as of predetermined start date 18.         Sincerely-          PHILLIP Mcnulty, CNP

## 2018-06-11 NOTE — TELEPHONE ENCOUNTER
"Patient was evaluated by cardiology while inpatient for chest pain, elevated troponin. Called patient to discuss any post hospital d/c questions he may have, review medication changes, and confirm f/u appts. RN confirmed with patient that they were d/c with their antiplatelet Brilinta. Patient reports having blotchy and itchy areas under arms and across chest. States he did not take his medications. Denies wheezing or difficulty breathing. Patient denied any SOB or chest pain. Reports some lightheadedness upon getting out of chair. Groin cardiac cath site is without bleeding, swelling, redness or tenderness.    RN spoke with Dr. Strickland regarding \"allergic reaction\"  Per Dr Strickland pt to be seen today. He advised pt to take his Brilinta. Appt with Rosa Marcos scheduled. RN called and spoke with wife regarding Dr. Strickland recommendation. Wife stated pt took all of his medications. Pt is somewhat hesitant to come in due to another appt. RN advised that he needs to be seen today either here or with his PMD. Wife and patient verbalized understanding.     "

## 2018-06-11 NOTE — PROGRESS NOTES
Cardiology Clinic Progress Note  Matt Way MRN# [id number]   YOB: 1950 Age: 67 year old     Reason for visit: Post discharge follow up and rash            Assessment and Plan:     1. Pruritic rash on trunk and arms    He was discharged home on multiple new medications (lisinopril, atorvastatin, metoprolol and Brilinta)    Trial discontinuation of Lisinopril to determine if rash resolves    If does not resolve trial of holding other new medications    Follow up in 1-2 weeks in [place] (per patient request)    2. NSTEM/CAD    Status post drug-eluting stent to the proximal LAD.    Continue aspirin and Brilinta for 1 year uninterrupted and aspirin lifelong.      Consider transition to Plavix in the future if Brilinta is determined to be contributing to the rash.    3. Hypertension    Discontinue Lisinopril as above    4. Hyperlipidemia        Started on Atorvastatin 40 mg         History of Presenting Illness:    Matt Way is a very pleasant 67 year old patient of Dr. Strickland who presents today for a three day history of a pruritic rash on his trunk and arms.  He has a past medical history significant for type 2 diabetes, hypertension, GERD, FLAQUITA (intolerant to CPAP) and hyperlipidemia.    He was recently discharged from [facility] after he was admitted post non-ST elevation MI.  He presented with chest discomfort radiating to his arms responded to nitroglycerin.  His troponin peaked at 0.835.  He underwent a coronary angiogram with a successful PCI and drug-eluting stent to the proximal LAD.  He had an echocardiogram that showed intact LV function with an estimated EF at 55-60% without any wall motion or valvular abnormalities. He was discharged home on dual antiplatelet therapy with aspirin and Brilinta.  He was also started on lisinopril 2.5 mg daily, metoprolol 25 mg twice daily and atorvastatin 40 mg daily (total cholesterol 129, , LDL of 108 and triglycerides  "287).    Today he presents to clinic with his daughter and his grandson. When he was called for a discharge follow-up, he noted that he had a pruritic rash on his trunk and arms that started the day after discharge.  He is in the process of starting a new job at [employer] and requested to be seen prior to starting his new employment. He is very excited to have this position and they have financial concerns and he cares for his wife who is a quadriplegic.  He denies recurrent anginal symptoms, shortness of breath, PND, orthopnea or lower extremity edema.          This note was completed in part using Dragon voice recognition software. Although reviewed after completion, some word and grammatical errors may occur       Review of Systems:   Review of Systems:  Skin:  Positive for rash (possibly due to medications)   Eyes:  Positive for glasses  ENT:  Negative    Respiratory:  Negative    Cardiovascular:  Negative    Gastroenterology: Negative    Genitourinary:  Negative    Musculoskeletal:  Positive for  (neuropathy in feet and hands)  Neurologic:  Negative    Psychiatric:  Negative    Heme/Lymph/Imm:  Negative    Endocrine:  Positive for diabetes              Physical Exam:     Vitals: /60  Pulse 68  Ht 1.854 m (6' 1\")  Wt 94.3 kg (207 lb 12.8 oz)  BMI 27.42 kg/m2  Constitutional:  cooperative        Skin:  warm and dry to the touch   Diffuse pruritic rash on trunk and arms     Head:  normocephalic        Eyes:  pupils equal and round;conjunctivae and lids unremarkable        ENT:  no pallor or cyanosis        Chest:  clear to auscultation;normal symmetry        Cardiac: regular rhythm;normal S1 and S2;no murmurs, gallops or rubs detected                  Abdomen:  abdomen soft        Vascular:                                   right radial site CDI without hematoma or bruit    Extremities and Back:  no edema        Neurological:  affect appropriate               Medications:     Current Outpatient " Prescriptions   Medication Sig Dispense Refill     aspirin 81 MG EC tablet Take 1 tablet (81 mg) by mouth daily  0     atorvastatin (LIPITOR) 40 MG tablet Take 1 tablet (40 mg) by mouth daily 30 tablet 1     insulin aspart (NOVOLOG FLEXPEN) 100 UNIT/ML injection Inject 17 Units Subcutaneous 2 times daily (with meals) Breakfast and Dinner       insulin aspart (NOVOLOG FLEXPEN) 100 UNIT/ML injection Inject 15 Units Subcutaneous daily (with lunch)       insulin detemir (LEVEMIR) 100 UNIT/ML injection Inject 22 Units Subcutaneous 2 times daily        Insulin Pen Needle 29G X 12.7MM MISC        metFORMIN (GLUCOPHAGE) 1000 MG tablet Take 1,000 mg by mouth 2 times daily (with meals)       metoprolol tartrate (LOPRESSOR) 25 MG tablet Take 1 tablet (25 mg) by mouth 2 times daily 60 tablet 1     nitroGLYcerin (NITROSTAT) 0.4 MG sublingual tablet Place 1 tablet (0.4 mg) under the tongue every 5 minutes as needed for chest pain If symptoms persist 5 minutes after 1st dose call 911. 10 tablet 0     pregabalin (LYRICA) 50 MG capsule Take 50 mg by mouth 3 times daily       ticagrelor (BRILINTA) 90 MG tablet Take 1 tablet (90 mg) by mouth every 12 hours 30 tablet 1           No family history on file.    Social History     Social History     Marital status:      Spouse name: N/A     Number of children: N/A     Years of education: N/A     Occupational History     Not on file.     Social History Main Topics     Smoking status: Never Smoker     Smokeless tobacco: Never Used     Alcohol use No     Drug use: Not on file     Sexual activity: Not on file     Other Topics Concern     Not on file     Social History Narrative            Past Medical History:     Past Medical History:   Diagnosis Date     Diabetes (H)               Past Surgical History:     Past Surgical History:   Procedure Laterality Date     SHOULDER SURGERY                Allergies:   Review of patient's allergies indicates no known allergies.       Data:   All  laboratory data reviewed:    LAST CHOLESTEROL:  Lab Results   Component Value Date    CHOL 176 06/08/2018     Lab Results   Component Value Date    HDL 34 06/08/2018     Lab Results   Component Value Date     06/08/2018     Lab Results   Component Value Date    TRIG 139 06/08/2018     No results found for: CHOLHDLRATIO    LAST BMP:  Lab Results   Component Value Date     06/09/2018      Lab Results   Component Value Date    POTASSIUM 4.3 06/09/2018     Lab Results   Component Value Date    CHLORIDE 107 06/09/2018     Lab Results   Component Value Date    CHRISTIE 9.1 06/09/2018     Lab Results   Component Value Date    CO2 24 06/09/2018     Lab Results   Component Value Date    BUN 14 06/09/2018     Lab Results   Component Value Date    CR 0.86 06/09/2018     Lab Results   Component Value Date     06/09/2018       LAST CBC:  Lab Results   Component Value Date    WBC 8.1 06/09/2018     Lab Results   Component Value Date    RBC 5.90 06/09/2018     Lab Results   Component Value Date    HGB 16.4 06/09/2018     Lab Results   Component Value Date    HCT 50.8 06/09/2018     Lab Results   Component Value Date    MCV 86 06/09/2018     Lab Results   Component Value Date    MCH 27.8 06/09/2018     Lab Results   Component Value Date    MCHC 32.3 06/09/2018     Lab Results   Component Value Date    RDW 14.6 06/09/2018     Lab Results   Component Value Date     06/09/2018         FABIO ALFARO, APRN, CNP

## 2018-06-15 ENCOUNTER — TELEPHONE (OUTPATIENT)
Dept: CARDIOLOGY | Facility: CLINIC | Age: 68
End: 2018-06-15

## 2018-06-15 NOTE — TELEPHONE ENCOUNTER
Patient called to report that his rash and itching symptoms are improving, but have not completely resolved. RN advised patient to call us on Monday to update if there are still residual symptoms or if it has completely resolved. Patient acknowledged understanding and agreed with plan.

## 2018-06-26 ENCOUNTER — TELEPHONE (OUTPATIENT)
Dept: CARDIOLOGY | Facility: CLINIC | Age: 68
End: 2018-06-26

## 2018-08-28 ENCOUNTER — TELEPHONE (OUTPATIENT)
Dept: CARDIOLOGY | Facility: CLINIC | Age: 68
End: 2018-08-28

## 2018-08-28 NOTE — TELEPHONE ENCOUNTER
Received VM from Tiffanie at [facility] wanting to review patients medication list. Reviewed medications. Tiffanie has same medication and doses on her list. No further questions or concerns.

## 2018-09-20 ENCOUNTER — OFFICE VISIT (OUTPATIENT)
Dept: CARDIOLOGY | Facility: CLINIC | Age: 68
End: 2018-09-20
Attending: NURSE PRACTITIONER
Payer: MEDICARE

## 2018-09-20 VITALS
SYSTOLIC BLOOD PRESSURE: 111 MMHG | HEART RATE: 66 BPM | HEIGHT: 73 IN | WEIGHT: 215 LBS | BODY MASS INDEX: 28.49 KG/M2 | DIASTOLIC BLOOD PRESSURE: 71 MMHG

## 2018-09-20 DIAGNOSIS — R07.9 ACUTE CHEST PAIN: ICD-10-CM

## 2018-09-20 DIAGNOSIS — I21.4 NSTEMI (NON-ST ELEVATED MYOCARDIAL INFARCTION) (H): ICD-10-CM

## 2018-09-20 PROCEDURE — 99214 OFFICE O/P EST MOD 30 MIN: CPT | Performed by: INTERNAL MEDICINE

## 2018-09-20 RX ORDER — CLOPIDOGREL 300 MG/1
300 TABLET, FILM COATED ORAL ONCE
Qty: 1 TABLET | Refills: 0 | Status: SHIPPED | OUTPATIENT
Start: 2018-09-21 | End: 2019-03-18

## 2018-09-20 RX ORDER — CLOPIDOGREL BISULFATE 75 MG/1
75 TABLET ORAL DAILY
Qty: 90 TABLET | Refills: 3 | Status: SHIPPED | OUTPATIENT
Start: 2018-09-22 | End: 2020-02-17

## 2018-09-20 RX ORDER — LOSARTAN POTASSIUM 50 MG/1
50 TABLET ORAL DAILY
Refills: 2 | COMMUNITY
Start: 2018-08-22 | End: 2018-10-18

## 2018-09-20 RX ORDER — LANOLIN ALCOHOL/MO/W.PET/CERES
12 CREAM (GRAM) TOPICAL
Refills: 3 | COMMUNITY
Start: 2018-08-14 | End: 2019-07-26

## 2018-09-20 NOTE — PATIENT INSTRUCTIONS
Take the remaining dose of brilnta today and then discontinue brilinta.  Take plavix 300 mg tomorrow (09/21/2018)  Start plavix 75 mg every day from day after tomorrow(09/22/2018)

## 2018-09-20 NOTE — LETTER
9/20/2018      [facility]  [address] [place] [id number]      RE: Matt Gileselodia       Dear Colleague,    I had the pleasure of seeing Matt Way in the [facility].    Service Date: 09/20/2018      REASON FOR CARDIOLOGY VISIT:  Followup MI.      HISTORY OF PRESENT ILLNESS:  Mr. Way is a pleasant 67-year-old gentleman who I saw in early 06/2018 when he presented with chest pain, had troponin elevation and was diagnosed with non-ST elevation myocardial infarction.  He underwent coronary angiogram and was found to have a proximal LAD eccentric 90% stenosis which was successfully intervened with 2.75 x 12 mm drug-eluting stent.  He had 40% distal LAD stenosis, 50% diagonal first ostial stenosis, 10% proximal circumflex stenosis and 20% proximal RCA stenosis.  Echocardiogram showed normal LV function.  He has other comorbidities of diabetes.  Today he is coming in routine followup.  He is accompanied by his wife and daughter.  Initially after discharge the patient had some rash, which has resolved now.  He is presently on:   1.  Baby aspirin.   2.  Brilinta 90 mg b.i.d.   3.  Lipitor 40 mg daily.   4.  Losartan 50 mg daily.   5.  Metoprolol tartrate 25 mg b.i.d.        The patient tells me that ever since his MI he has been struggling with shortness of breath.  He can feel as if he is not able to catch his breath, although he is able to exercise biking 5 miles every day.  No chest discomfort.  No dizziness, presyncope or syncope.  His LDL was 114 at the time of MI.  It has not been checked since then.  He is not fasting today.      PHYSICAL EXAMINATION:   VITAL SIGNS:  Blood pressure 111/71, heart rate 66 regular, weight 215 pounds, BMI 28.43.   GENERAL:  External patient appears pleasant, comfortable.   NECK:  Normal JVP, no bruit.   CARDIOVASCULAR SYSTEM:  S1, S2 normal, no murmur, rub or gallop.   RESPIRATORY SYSTEM:  Clear to auscultation  bilaterally.     GASTROINTESTINAL SYSTEM:  Abdomen soft, nontender.   EXTREMITIES:  No pitting pedal edema.   NEUROLOGICAL:  Alert, oriented x3.   PSYCH:  Normal affect.   SKIN:  No obvious rash.   HEENT:  No pallor or icterus.      IMPRESSION AND PLAN:  A pleasant 67-year-old gentleman with recent non-STEMI with proximal LAD PCI with mild coronary disease elsewhere, normal LV function, dyslipidemia, diabetes.  He is now complaining of shortness of breath which I feel clinically is most likely from Brilinta use.  I do not see any evidence of bronchospasm or volume overload.  I recommended the patient's switch Brilinta to Plavix.  He will get loaded with Plavix 300 mg tomorrow and then start 75 mg daily from day after tomorrow.  I am recommending a followup in about 2 weeks' time with a fasting lipid panel.  I am hoping that if this is from Brilinta, he should feel resolution of his symptoms within a week or so.  I also recommend cardiac rehab, and I have given him a referral for same at our [facility].         LEO WEEMS MD             D: 2018   T: 2018   MT: ENE      Name:     JUSTYN TYLER   MRN:      [id number]        Account:      [id number]   :      1950           Service Date: 2018      Document: [id number]         Outpatient Encounter Prescriptions as of 2018   Medication Sig Dispense Refill     aspirin 81 MG EC tablet Take 1 tablet (81 mg) by mouth daily  0     atorvastatin (LIPITOR) 40 MG tablet Take 1 tablet (40 mg) by mouth daily 30 tablet 1     Ca Carbonate-Mag Hydroxide (ROLAIDS PO) Take by mouth as needed       clopidogrel (PLAVIX) 300 MG TABS tablet Take 1 tablet (300 mg) by mouth once for 1 dose 1 tablet 0     clopidogrel (PLAVIX) 75 MG tablet Take 1 tablet (75 mg) by mouth daily 90 tablet 3     insulin aspart (NOVOLOG FLEXPEN) 100 UNIT/ML injection Inject 16 Units Subcutaneous 2 times daily (with meals) Breakfast and Dinner       insulin detemir  (LEVEMIR) 100 UNIT/ML injection Inject 20 Units Subcutaneous 2 times daily        losartan (COZAAR) 50 MG tablet daily  2     melatonin 3 MG tablet as needed  3     metFORMIN (GLUCOPHAGE) 1000 MG tablet Take 1,000 mg by mouth 2 times daily (with meals)       metoprolol tartrate (LOPRESSOR) 25 MG tablet Take 1 tablet (25 mg) by mouth 2 times daily 60 tablet 1     nitroGLYcerin (NITROSTAT) 0.4 MG sublingual tablet Place 1 tablet (0.4 mg) under the tongue every 5 minutes as needed for chest pain If symptoms persist 5 minutes after 1st dose call 911. 10 tablet 0     Insulin Pen Needle 29G X 12.7MM MISC        [DISCONTINUED] insulin aspart (NOVOLOG FLEXPEN) 100 UNIT/ML injection Inject 15 Units Subcutaneous daily (with lunch)       [DISCONTINUED] pregabalin (LYRICA) 50 MG capsule Take 50 mg by mouth 3 times daily       [DISCONTINUED] ticagrelor (BRILINTA) 90 MG tablet Take 1 tablet (90 mg) by mouth every 12 hours 30 tablet 1     No facility-administered encounter medications on file as of 9/20/2018.        Again, thank you for allowing me to participate in the care of your patient.      Sincerely,    Wilber Strickland MD     [facility]

## 2018-09-20 NOTE — PROGRESS NOTES
Service Date: 09/20/2018      REASON FOR CARDIOLOGY VISIT:  Followup MI.      HISTORY OF PRESENT ILLNESS:  Mr. Way is a pleasant 67-year-old gentleman who I saw in early 06/2018 when he presented with chest pain, had troponin elevation and was diagnosed with non-ST elevation myocardial infarction.  He underwent coronary angiogram and was found to have a proximal LAD eccentric 90% stenosis which was successfully intervened with 2.75 x 12 mm drug-eluting stent.  He had 40% distal LAD stenosis, 50% diagonal first ostial stenosis, 10% proximal circumflex stenosis and 20% proximal RCA stenosis.  Echocardiogram showed normal LV function.  He has other comorbidities of diabetes.  Today he is coming in routine followup.  He is accompanied by his wife and daughter.  Initially after discharge the patient had some rash, which has resolved now.  He is presently on:   1.  Baby aspirin.   2.  Brilinta 90 mg b.i.d.   3.  Lipitor 40 mg daily.   4.  Losartan 50 mg daily.   5.  Metoprolol tartrate 25 mg b.i.d.        The patient tells me that ever since his MI he has been struggling with shortness of breath.  He can feel as if he is not able to catch his breath, although he is able to exercise biking 5 miles every day.  No chest discomfort.  No dizziness, presyncope or syncope.  His LDL was 114 at the time of MI.  It has not been checked since then.  He is not fasting today.      PHYSICAL EXAMINATION:   VITAL SIGNS:  Blood pressure 111/71, heart rate 66 regular, weight 215 pounds, BMI 28.43.   GENERAL:  External patient appears pleasant, comfortable.   NECK:  Normal JVP, no bruit.   CARDIOVASCULAR SYSTEM:  S1, S2 normal, no murmur, rub or gallop.   RESPIRATORY SYSTEM:  Clear to auscultation bilaterally.     GASTROINTESTINAL SYSTEM:  Abdomen soft, nontender.   EXTREMITIES:  No pitting pedal edema.   NEUROLOGICAL:  Alert, oriented x3.   PSYCH:  Normal affect.   SKIN:  No obvious rash.   HEENT:  No pallor or icterus.      IMPRESSION  AND PLAN:  A pleasant 67-year-old gentleman with recent non-STEMI with proximal LAD PCI with mild coronary disease elsewhere, normal LV function, dyslipidemia, diabetes.  He is now complaining of shortness of breath which I feel clinically is most likely from Brilinta use.  I do not see any evidence of bronchospasm or volume overload.  I recommended the patient's switch Brilinta to Plavix.  He will get loaded with Plavix 300 mg tomorrow and then start 75 mg daily from day after tomorrow.  I am recommending a followup in about 2 weeks' time with a fasting lipid panel.  I am hoping that if this is from Brilinta, he should feel resolution of his symptoms within a week or so.  I also recommend cardiac rehab, and I have given him a referral for same at our [place] [facility] re[facility].     Also discussed with patient's daughter  Pa on phone.        LEO WEEMS MD             D: 2018   T: 2018   MT: ENE      Name:     JUSTYN TYLER   MRN:      [id number]        Account:      [id number]   :      1950           Service Date: 2018      Document: [id number]

## 2018-09-20 NOTE — MR AVS SNAPSHOT
After Visit Summary   9/20/2018    Matt Way    MRN: [id number]           Patient Information     Date Of Birth          1950        Visit Information        Provider Department      9/20/2018 11:15 AM Wilber Strickland MD [facility]        Today's Diagnoses     NSTEMI (non-ST elevated myocardial infarction) (H)        Acute chest pain          Care Instructions    Take the remaining dose of brilnta today and then discontinue brilinta.  Take plavix 300 mg tomorrow (09/21/2018)  Start plavix 75 mg every day from day after tomorrow(09/22/2018)            Follow-ups after your visit        Additional Services     Follow-Up with Cardiac Advanced Practice Provider                 Future tests that were ordered for you today     Open Future Orders        Priority Expected Expires Ordered    Lipid Profile Routine 10/4/2018 9/20/2019 9/20/2018    ALT Routine 9/27/2018 9/20/2019 9/20/2018    Follow-Up with Cardiac Advanced Practice Provider Routine 10/4/2018 9/20/2019 9/20/2018            Who to contact     If you have questions or need follow up information about today's clinic visit or your schedule please contact [facility] directly at [phone].  Normal or non-critical lab and imaging results will be communicated to you by MyChart, letter or phone within 4 business days after the clinic has received the results. If you do not hear from us within 7 days, please contact the clinic through [facility] or phone. If you have a critical or abnormal lab result, we will notify you by phone as soon as possible.  Submit refill requests through [employer] or call your pharmacy and they will forward the refill request to us. Please allow 3 business days for your refill to be completed.          Additional Information About Your Visit        MyChart Information     [employer] lets you send messages to your doctor, view your test results,  "renew your prescriptions, schedule appointments and more. To sign up, go to [email or web address] . Click on \"Log in\" on the left side of the screen, which will take you to the Welcome page. Then click on \"Sign up Now\" on the right side of the page.     You will be asked to enter the access code listed below, as well as some personal information. Please follow the directions to create your username and password.     Your access code is: [email or web address]  Expires: 2018 11:46 AM     Your access code will  in 90 days. If you need help or a new code, please call your [place] clinic or [phone].        Care EveryWhere ID     This is your Care EveryWhere ID. This could be used by other organizations to access your [place] medical records  [id number]        Your Vitals Were     Pulse Height BMI (Body Mass Index)             66 1.854 m (6' 1\") 28.37 kg/m2          Blood Pressure from Last 3 Encounters:   18 111/71   18 110/60   18 120/70    Weight from Last 3 Encounters:   18 97.5 kg (215 lb)   18 94.3 kg (207 lb 12.8 oz)   18 91 kg (200 lb 9.6 oz)              We Performed the Following     Follow-Up with Cardiologist          Today's Medication Changes          These changes are accurate as of 18 11:46 AM.  If you have any questions, ask your nurse or doctor.               Start taking these medicines.        Dose/Directions    * clopidogrel 300 MG Tabs tablet   Commonly known as:  PLAVIX   Used for:  NSTEMI (non-ST elevated myocardial infarction) (H)   Started by:  Wilber Strickland MD        Dose:  300 mg   Start taking on:  2018   Take 1 tablet (300 mg) by mouth once for 1 dose   Quantity:  1 tablet   Refills:  0       * clopidogrel 75 MG tablet   Commonly known as:  PLAVIX   Used for:  NSTEMI (non-ST elevated myocardial infarction) (H)   Started by:  Wilber Strickland MD        Dose:  75 mg   Start taking on:  2018   Take 1 tablet (75 mg) by mouth daily "   Quantity:  90 tablet   Refills:  3       * Notice:  This list has 2 medication(s) that are the same as other medications prescribed for you. Read the directions carefully, and ask your doctor or other care provider to review them with you.      These medicines have changed or have updated prescriptions.        Dose/Directions    NovoLOG FLEXPEN 100 UNIT/ML injection   This may have changed:  Another medication with the same name was removed. Continue taking this medication, and follow the directions you see here.   Generic drug:  insulin aspart   Changed by:  Wilber Strickland MD        Dose:  16 Units   Inject 16 Units Subcutaneous 2 times daily (with meals) Breakfast and Dinner   Refills:  0         Stop taking these medicines if you haven't already. Please contact your care team if you have questions.     ticagrelor 90 MG tablet   Commonly known as:  BRILINTA   Stopped by:  Wilber Strickland MD                Where to get your medicines      These medications were sent to [facility] [address] [place] [facility] [address]  [address] [facility] [address]     Phone:  [phone]     clopidogrel 300 MG Tabs tablet    clopidogrel 75 MG tablet                Primary Care Provider Office Phone # Fax #    [facility] [phone]       [address] [place] [address]        Equal Access to Services     PAT RAY AH: Hadii thomas herediao Soosman, waaxda luqadaha, qaybta kaalmada adeegyada, quinten cortes. So [facility] [phone].    ATENCIÓN: Si habla español, tiene a carcamo disposición servicios gratuitos de asistencia lingüística. Llame al [phone].    We comply with applicable federal civil rights laws and [place] laws. We do not discriminate on the basis of race, color, national origin, age, disability, sex, sexual orientation, or gender identity.            Thank you!     Thank you for choosing [facility]  HEART [facility]   [place]  for your care. Our goal is always to provide you with excellent care. Hearing back from our patients is one way we can continue to improve our services. Please take a few minutes to complete the written survey that you may receive in the mail after your visit with us. Thank you!             Your Updated Medication List - Protect others around you: Learn how to safely use, store and throw away your medicines at www.disposemymeds.org.          This list is accurate as of 9/20/18 11:46 AM.  Always use your most recent med list.                   Brand Name Dispense Instructions for use Diagnosis    aspirin 81 MG EC tablet      Take 1 tablet (81 mg) by mouth daily    NSTEMI (non-ST elevated myocardial infarction) (H)       atorvastatin 40 MG tablet    LIPITOR    30 tablet    Take 1 tablet (40 mg) by mouth daily    NSTEMI (non-ST elevated myocardial infarction) (H)       * clopidogrel 300 MG Tabs tablet   Start taking on:  9/21/2018    PLAVIX    1 tablet    Take 1 tablet (300 mg) by mouth once for 1 dose    NSTEMI (non-ST elevated myocardial infarction) (H)       * clopidogrel 75 MG tablet   Start taking on:  9/22/2018    PLAVIX    90 tablet    Take 1 tablet (75 mg) by mouth daily    NSTEMI (non-ST elevated myocardial infarction) (H)       insulin detemir 100 UNIT/ML injection    LEVEMIR     Inject 20 Units Subcutaneous 2 times daily        insulin pen needle 29G X 12.7MM           losartan 50 MG tablet    COZAAR     daily        melatonin 3 MG tablet      as needed        metFORMIN 1000 MG tablet    GLUCOPHAGE     Take 1,000 mg by mouth 2 times daily (with meals)        metoprolol tartrate 25 MG tablet    LOPRESSOR    60 tablet    Take 1 tablet (25 mg) by mouth 2 times daily    Benign essential hypertension, NSTEMI (non-ST elevated myocardial infarction) (H)       nitroGLYcerin 0.4 MG sublingual tablet    NITROSTAT    10 tablet    Place 1 tablet (0.4 mg) under the tongue every 5 minutes as needed for  chest pain If symptoms persist 5 minutes after 1st dose call 911.    NSTEMI (non-ST elevated myocardial infarction) (H), Acute chest pain       NovoLOG FLEXPEN 100 UNIT/ML injection   Generic drug:  insulin aspart      Inject 16 Units Subcutaneous 2 times daily (with meals) Breakfast and Dinner        ROLAIDS PO      Take by mouth as needed        * Notice:  This list has 2 medication(s) that are the same as other medications prescribed for you. Read the directions carefully, and ask your doctor or other care provider to review them with you.

## 2018-09-20 NOTE — PROGRESS NOTES
HPI and Plan:   See dictation(#[id number])    Orders Placed This Encounter   Procedures     Lipid Profile     ALT     Follow-Up with Cardiac Advanced Practice Provider     CARDIAC REHAB REFERRAL       Orders Placed This Encounter   Medications     losartan (COZAAR) 50 MG tablet     Sig: daily     Refill:  2     melatonin 3 MG tablet     Sig: as needed     Refill:  3     Ca Carbonate-Mag Hydroxide (ROLAIDS PO)     Sig: Take by mouth as needed     clopidogrel (PLAVIX) 300 MG TABS tablet     Sig: Take 1 tablet (300 mg) by mouth once for 1 dose     Dispense:  1 tablet     Refill:  0     clopidogrel (PLAVIX) 75 MG tablet     Sig: Take 1 tablet (75 mg) by mouth daily     Dispense:  90 tablet     Refill:  3       Medications Discontinued During This Encounter   Medication Reason     insulin aspart (NOVOLOG FLEXPEN) 100 UNIT/ML injection Dose adjustment     pregabalin (LYRICA) 50 MG capsule Discontinued by another Health Care Provider     ticagrelor (BRILINTA) 90 MG tablet          Encounter Diagnoses   Name Primary?     NSTEMI (non-ST elevated myocardial infarction) (H)      Acute chest pain        CURRENT MEDICATIONS:  Current Outpatient Prescriptions   Medication Sig Dispense Refill     aspirin 81 MG EC tablet Take 1 tablet (81 mg) by mouth daily  0     atorvastatin (LIPITOR) 40 MG tablet Take 1 tablet (40 mg) by mouth daily 30 tablet 1     Ca Carbonate-Mag Hydroxide (ROLAIDS PO) Take by mouth as needed       [START ON 9/21/2018] clopidogrel (PLAVIX) 300 MG TABS tablet Take 1 tablet (300 mg) by mouth once for 1 dose 1 tablet 0     [START ON 9/22/2018] clopidogrel (PLAVIX) 75 MG tablet Take 1 tablet (75 mg) by mouth daily 90 tablet 3     insulin aspart (NOVOLOG FLEXPEN) 100 UNIT/ML injection Inject 16 Units Subcutaneous 2 times daily (with meals) Breakfast and Dinner       insulin detemir (LEVEMIR) 100 UNIT/ML injection Inject 20 Units Subcutaneous 2 times daily        losartan (COZAAR) 50 MG tablet daily  2     melatonin 3  "MG tablet as needed  3     metFORMIN (GLUCOPHAGE) 1000 MG tablet Take 1,000 mg by mouth 2 times daily (with meals)       metoprolol tartrate (LOPRESSOR) 25 MG tablet Take 1 tablet (25 mg) by mouth 2 times daily 60 tablet 1     nitroGLYcerin (NITROSTAT) 0.4 MG sublingual tablet Place 1 tablet (0.4 mg) under the tongue every 5 minutes as needed for chest pain If symptoms persist 5 minutes after 1st dose call 911. 10 tablet 0     Insulin Pen Needle 29G X 12.7MM MISC        [DISCONTINUED] insulin aspart (NOVOLOG FLEXPEN) 100 UNIT/ML injection Inject 15 Units Subcutaneous daily (with lunch)         ALLERGIES     Allergies   Allergen Reactions     Lisinopril Rash       PAST MEDICAL HISTORY:  Past Medical History:   Diagnosis Date     Diabetes (H)        PAST SURGICAL HISTORY:  Past Surgical History:   Procedure Laterality Date     SHOULDER SURGERY         FAMILY HISTORY:  No family history on file.    SOCIAL HISTORY:  Social History     Social History     Marital status:      Spouse name: N/A     Number of children: N/A     Years of education: N/A     Social History Main Topics     Smoking status: Never Smoker     Smokeless tobacco: Never Used     Alcohol use No     Drug use: None     Sexual activity: Not Asked     Other Topics Concern     None     Social History Narrative       Review of Systems:  Skin:  Negative       Eyes:  Positive for glasses    ENT:  Negative      Respiratory:  Positive for dyspnea on exertion     Cardiovascular:    Positive for;lightheadedness (occasional chest tightness )    Gastroenterology: Positive for heartburn    Genitourinary:  not assessed      Musculoskeletal:  Negative      Neurologic:  Positive for  (off balance)    Psychiatric:  Negative      Heme/Lymph/Imm:  Negative      Endocrine:  Positive for diabetes      Physical Exam:  Vitals: /71  Pulse 66  Ht 1.854 m (6' 1\")  Wt 97.5 kg (215 lb)  BMI 28.37 kg/m2    Constitutional:           Skin:             Head:       "     Eyes:           Lymph:      ENT:           Neck:           Respiratory:            Cardiac:                                                           GI:           Extremities and Muscular Skeletal:                 Neurological:           Psych:             CC  Rosa Marcos, APRN CNP  [facility]  [address]  [place] [id number]

## 2018-09-21 ENCOUNTER — TELEPHONE (OUTPATIENT)
Dept: CARDIOLOGY | Facility: CLINIC | Age: 68
End: 2018-09-21

## 2018-09-21 NOTE — TELEPHONE ENCOUNTER
Patient's daughter Danielle Mtz is a physician and is requesting a call from Dr. Strickland to discuss the OV on 9-20-18

## 2018-09-21 NOTE — TELEPHONE ENCOUNTER
Called Dr Winn, patient's daughter and updated her about the recent clinic visit. Several questions answered.

## 2018-10-03 ENCOUNTER — HOSPITAL ENCOUNTER (EMERGENCY)
Facility: CLINIC | Age: 68
Discharge: HOME | End: 2018-10-03
Attending: EMERGENCY MEDICINE | Admitting: EMERGENCY MEDICINE
Payer: MEDICARE

## 2018-10-03 ENCOUNTER — HOSPITAL ENCOUNTER (OUTPATIENT)
Dept: CARDIAC REHAB | Facility: CLINIC | Age: 68
End: 2018-10-03
Attending: INTERNAL MEDICINE
Payer: MEDICARE

## 2018-10-03 VITALS
OXYGEN SATURATION: 96 % | DIASTOLIC BLOOD PRESSURE: 91 MMHG | TEMPERATURE: 98.1 F | RESPIRATION RATE: 18 BRPM | SYSTOLIC BLOOD PRESSURE: 156 MMHG | HEART RATE: 73 BPM

## 2018-10-03 DIAGNOSIS — Z91.199 NON COMPLIANCE WITH MEDICAL TREATMENT: ICD-10-CM

## 2018-10-03 DIAGNOSIS — I21.4 NSTEMI (NON-ST ELEVATED MYOCARDIAL INFARCTION) (H): ICD-10-CM

## 2018-10-03 DIAGNOSIS — Z79.4 TYPE 2 DIABETES MELLITUS WITH HYPERGLYCEMIA, WITH LONG-TERM CURRENT USE OF INSULIN (H): ICD-10-CM

## 2018-10-03 DIAGNOSIS — E11.65 TYPE 2 DIABETES MELLITUS WITH HYPERGLYCEMIA, WITH LONG-TERM CURRENT USE OF INSULIN (H): ICD-10-CM

## 2018-10-03 LAB
ANION GAP SERPL CALCULATED.3IONS-SCNC: 8 MMOL/L (ref 3–14)
BASOPHILS # BLD AUTO: 0.1 10E9/L (ref 0–0.2)
BASOPHILS NFR BLD AUTO: 0.9 %
BUN SERPL-MCNC: 24 MG/DL (ref 7–30)
CALCIUM SERPL-MCNC: 8.8 MG/DL (ref 8.5–10.1)
CHLORIDE SERPL-SCNC: 97 MMOL/L (ref 94–109)
CO2 SERPL-SCNC: 24 MMOL/L (ref 20–32)
CREAT SERPL-MCNC: 0.98 MG/DL (ref 0.66–1.25)
DIFFERENTIAL METHOD BLD: NORMAL
EOSINOPHIL # BLD AUTO: 0.1 10E9/L (ref 0–0.7)
EOSINOPHIL NFR BLD AUTO: 2.2 %
ERYTHROCYTE [DISTWIDTH] IN BLOOD BY AUTOMATED COUNT: 14.6 % (ref 10–15)
GFR SERPL CREATININE-BSD FRML MDRD: 77 ML/MIN/1.7M2
GLUCOSE BLDC GLUCOMTR-MCNC: 398 MG/DL (ref 70–99)
GLUCOSE BLDC GLUCOMTR-MCNC: 507 MG/DL (ref 70–99)
GLUCOSE BLDC GLUCOMTR-MCNC: >600 MG/DL (ref 70–99)
GLUCOSE SERPL-MCNC: 711 MG/DL (ref 70–99)
HBA1C MFR BLD: 9.9 % (ref 0–5.6)
HCT VFR BLD AUTO: 46.9 % (ref 40–53)
HGB BLD-MCNC: 15.7 G/DL (ref 13.3–17.7)
IMM GRANULOCYTES # BLD: 0 10E9/L (ref 0–0.4)
IMM GRANULOCYTES NFR BLD: 0.3 %
KETONES BLD-SCNC: 0.1 MMOL/L (ref 0–0.6)
LYMPHOCYTES # BLD AUTO: 2 10E9/L (ref 0.8–5.3)
LYMPHOCYTES NFR BLD AUTO: 31.4 %
MAGNESIUM SERPL-MCNC: 2.2 MG/DL (ref 1.6–2.3)
MCH RBC QN AUTO: 29.1 PG (ref 26.5–33)
MCHC RBC AUTO-ENTMCNC: 33.5 G/DL (ref 31.5–36.5)
MCV RBC AUTO: 87 FL (ref 78–100)
MONOCYTES # BLD AUTO: 0.4 10E9/L (ref 0–1.3)
MONOCYTES NFR BLD AUTO: 6.3 %
NEUTROPHILS # BLD AUTO: 3.8 10E9/L (ref 1.6–8.3)
NEUTROPHILS NFR BLD AUTO: 58.9 %
NRBC # BLD AUTO: 0 10*3/UL
NRBC BLD AUTO-RTO: 0 /100
OSMOLALITY SERPL: 317 MMOL/KG (ref 280–301)
PHOSPHATE SERPL-MCNC: 3.4 MG/DL (ref 2.5–4.5)
PLATELET # BLD AUTO: 175 10E9/L (ref 150–450)
POTASSIUM SERPL-SCNC: 4.4 MMOL/L (ref 3.4–5.3)
RBC # BLD AUTO: 5.39 10E12/L (ref 4.4–5.9)
SODIUM SERPL-SCNC: 129 MMOL/L (ref 133–144)
WBC # BLD AUTO: 6.4 10E9/L (ref 4–11)

## 2018-10-03 PROCEDURE — 83036 HEMOGLOBIN GLYCOSYLATED A1C: CPT | Performed by: EMERGENCY MEDICINE

## 2018-10-03 PROCEDURE — 85025 COMPLETE CBC W/AUTO DIFF WBC: CPT | Performed by: EMERGENCY MEDICINE

## 2018-10-03 PROCEDURE — 40000116 ZZH STATISTIC OP CR VISIT

## 2018-10-03 PROCEDURE — 25000131 ZZH RX MED GY IP 250 OP 636 PS 637: Mod: GY | Performed by: EMERGENCY MEDICINE

## 2018-10-03 PROCEDURE — 80048 BASIC METABOLIC PNL TOTAL CA: CPT | Performed by: EMERGENCY MEDICINE

## 2018-10-03 PROCEDURE — 25000128 H RX IP 250 OP 636: Performed by: EMERGENCY MEDICINE

## 2018-10-03 PROCEDURE — 00000146 ZZHCL STATISTIC GLUCOSE BY METER IP

## 2018-10-03 PROCEDURE — 82010 KETONE BODYS QUAN: CPT | Performed by: EMERGENCY MEDICINE

## 2018-10-03 PROCEDURE — 83735 ASSAY OF MAGNESIUM: CPT | Performed by: EMERGENCY MEDICINE

## 2018-10-03 PROCEDURE — 83930 ASSAY OF BLOOD OSMOLALITY: CPT | Performed by: EMERGENCY MEDICINE

## 2018-10-03 PROCEDURE — G0463 HOSPITAL OUTPT CLINIC VISIT: HCPCS | Mod: 25,27

## 2018-10-03 PROCEDURE — 96361 HYDRATE IV INFUSION ADD-ON: CPT

## 2018-10-03 PROCEDURE — 84100 ASSAY OF PHOSPHORUS: CPT | Performed by: EMERGENCY MEDICINE

## 2018-10-03 PROCEDURE — 96365 THER/PROPH/DIAG IV INF INIT: CPT

## 2018-10-03 PROCEDURE — 99284 EMERGENCY DEPT VISIT MOD MDM: CPT | Mod: 25

## 2018-10-03 RX ORDER — SODIUM CHLORIDE 450 MG/100ML
1000 INJECTION, SOLUTION INTRAVENOUS CONTINUOUS
Status: DISCONTINUED | OUTPATIENT
Start: 2018-10-03 | End: 2018-10-03 | Stop reason: HOSPADM

## 2018-10-03 RX ORDER — DEXTROSE MONOHYDRATE 25 G/50ML
25-50 INJECTION, SOLUTION INTRAVENOUS
Status: DISCONTINUED | OUTPATIENT
Start: 2018-10-03 | End: 2018-10-03 | Stop reason: HOSPADM

## 2018-10-03 RX ADMIN — SODIUM CHLORIDE 1000 ML: 9 INJECTION, SOLUTION INTRAVENOUS at 11:25

## 2018-10-03 RX ADMIN — SODIUM CHLORIDE 5.5 UNITS/HR: 9 INJECTION, SOLUTION INTRAVENOUS at 12:13

## 2018-10-03 ASSESSMENT — ENCOUNTER SYMPTOMS
FEVER: 0
VOMITING: 0
CONFUSION: 1
MYALGIAS: 1
NAUSEA: 0
POLYDIPSIA: 1

## 2018-10-03 NOTE — ED NOTES
"[facility]  ED Nurse Handoff Report    Matt Way is a 67 year old male   ED Chief complaint: Altered Mental Status and Hyperglycemia  . ED Diagnosis:   Final diagnoses:   Type 2 diabetes mellitus with hyperglycemia, with long-term current use of insulin (H)   Non compliance with medical treatment     Allergies:   Allergies   Allergen Reactions     Brilinta [Ticagrelor]      Lisinopril Rash       Code Status: Full Code  Activity level - Baseline/Home:  Independent. Activity Level - Current:   Stand with Assist. Lift room needed: No. Bariatric: No   Needed: No   Isolation: No. Infection: Not Applicable.     Vital Signs:   Vitals:    10/03/18 1230 10/03/18 1245 10/03/18 1300 10/03/18 1311   BP: 149/85 150/87 134/82    Pulse:   72    Resp:       Temp:       TempSrc:       SpO2:    96%       Cardiac Rhythm:  ,      Pain level:    Patient confused: No. Patient Falls Risk: Yes.   Elimination Status: Has voided   Patient Report - Initial Complaint: Confusion. Focused Assessment: A&O, per cardiac rehab, pt was \"forgetting things that he had been doing during appt and were concerned that he wasn't tracking correctly.\" Pt reports due to financial concerns he has not been taking his prescribed medications. BG at rehab >600. Pt reports increased urination and thirst and overall feeling \"bad.\"   Tests Performed: labs. Abnormal Results:   No orders to display     Labs Ordered and Resulted from Time of ED Arrival Up to the Time of Departure from the ED   HEMOGLOBIN A1C - Abnormal; Notable for the following:        Result Value    Hemoglobin A1C 9.9 (*)     All other components within normal limits   BASIC METABOLIC PANEL - Abnormal; Notable for the following:     Sodium 129 (*)     Glucose 711 (*)     All other components within normal limits   GLUCOSE BY METER - Abnormal; Notable for the following:     Glucose >600 (*)     All other components within normal limits   GLUCOSE MONITOR NURSING POCT   CBC " WITH PLATELETS DIFFERENTIAL   PHOSPHORUS   MAGNESIUM   OSMOLALITY   KETONE BETA-HYDROXYBUTYRATE QUANTITATIVE   CARDIAC CONTINUOUS MONITORING   NOTIFY PHYSICIAN   PERIPHERAL IV CATHETER   GLUCOSE BY METER POCT   GLUCOSE BY METER POCT   GLUCOSE BY METER POCT     .   Treatments provided: Insulin drip, IVF,   Family Comments: Wife and daughter at bedside.   OBS brochure/video discussed/provided to patient:  N/A  ED Medications:   Medications   dextrose 5% and 0.45% NaCl infusion (not administered)   dextrose 50 % injection 25-50 mL (not administered)   0.9% sodium chloride BOLUS (1,000 mLs Intravenous New Bag 10/3/18 1125)     Followed by   0.45% sodium chloride infusion (not administered)   insulin 1 unit/1 mL in NS (NovoLIN, HumuLIN Regular) drip -ED DKA algorithm (5.5 Units/hr Intravenous New Bag 10/3/18 1213)     Drips infusing:  No  For the majority of the shift, the patient's behavior Green. Interventions performed were NA.     Severe Sepsis OR Septic Shock Diagnosis Present: No      ED Nurse Name/Phone Number: Bertha Ramirez,   1:19 PM

## 2018-10-03 NOTE — ED PROVIDER NOTES
History     Chief Complaint:  Altered Mental Status and Hyperglycemia      HPI   Matt Way is a 67 year old male with a history of diabetes who presents with altered mental status and hyperglycemia. The patient drove himself to cardiac rehab today and there was noted to be confused. They checked his blood sugar which was in the 600s and he was sent here to the ED for further evaluation. He notes that he has not been taking any of his medications or insulin for the last two weeks. The patient does note increased thirst and urination. He also reports diffuse myalgias. The patient denies any vomiting, diarrhea, or fevers.         Allergies:  Lisinopril     Medications:    Aspirin   Lipitor  Rolaids  Plavix  Novolog Flexpen  Levemir  Cozaar  Melatonin   Metformin   Lopressor  Nitrostat      Past Medical History:    Diabetes  NSTEMI    Past Surgical History:    Shoulder Surgery     Family History:    History reviewed. No pertinent family history.     Social History:  Marital Status:   Presents to the ED with wife  Tobacco Use: Never Used  Alcohol Use: No  PCP: [facility]     Review of Systems   Constitutional: Negative for fever.   Gastrointestinal: Negative for nausea and vomiting.   Endocrine: Positive for polydipsia and polyuria.   Musculoskeletal: Positive for myalgias.   Psychiatric/Behavioral: Positive for confusion.   All other systems reviewed and are negative.      Physical Exam   First Vitals:  BP: 160/85  Pulse: 82  Heart Rate: 82  Temp: 98.1  F (36.7  C)  Resp: 18  SpO2: 98 %      Physical Exam  General: Patient is alert and cooperative.  HENT:  Normal nose, oropharynx. Moist oral mucosa.  Eyes: EOMI. Normal conjunctiva.  Neck:  Normal range of motion and appearance.   Cardiovascular:  Normal rate.  Pulmonary/Chest:  Effort normal.  Abdominal: Soft. No distension or tenderness.     Musculoskeletal: Normal range of motion. No edema or tenderness.   Neurological: oriented,  normal strength, sensation, and coordination.   Skin: Warm and dry. No rash or bruising.   Psychiatric: Normal mood and affect. Normal behavior and judgement.      Emergency Department Course   Laboratory:  (1103) Glucose by meter: >600 (HH),   Ketones: 0.1  Magnesium; 2.2  Phosphorus; 3.4  BMP:  (L), Glucose 711 (HH), otherwise WNL (Creatinine 0.98)   CBC:  WBC 6.4, HGB 15.7, , otherwise WNL   Hemoglobin A1c: 9.9 (H)   (1219) Glucose by meter: 507 (HH)  (1322) Glucose by meter: 398 (H)     Interventions:  (1125) Normal Saline, 1 liter, IV bolus   (1213) Insulin drip started     Emergency Department Course:  Nursing notes and vitals reviewed.  (1102) I performed an exam of the patient as documented above.    A peripheral IV was established. Blood was drawn from the patient. This was sent for laboratory testing, findings above.    (1312) I consulted with Rajni Castellanos of the hospitalist service regarding the patient. She agreed to accept the patient.   (1337) I rechecked on the patient and he would like to go home.   Findings and plan explained to the patient. Patient discharged home with instructions regarding supportive care, medications, and reasons to return. The importance of close follow-up was reviewed.   I personally reviewed the laboratory results with the patient and answered all related questions prior to discharge.      Impression & Plan    Medical Decision Making:  This is a 67-year-old male with a history of insulin requiring type 2 diabetes who has been referred to the emergency department by cardiac rehab after he presented there for an exercise appointment and noted to be mildly confused.  It was learned that he has been noncompliant with his medications and a blood sugar was performed revealing severe hyperglycemia with a blood sugar over 600.  He concedes to medical noncompliance for over 2 weeks now citing financial reasons.  He admits to polydipsia and polyuria but has not been ill  with a fever, vomiting or diarrhea, and denies chest or abdominal pain or any additional specific symptoms.  He has a normal physical examination.  Emergency department testing is notable only for severe hyperglycemia.  His serum glucose is 711.  He has no associated significant electrolyte abnormalities.  He has a normal serum ketone level.  These findings were discussed with Matt and his wife.  I recommended admission for medical optimization.  An insulin drip and IV fluids were initiated and arrangements made.  Actually spoke with the admitting service before he declined admission.  He was quite resistant from the beginning citing concerns for cost and need to care for his wife.  As it turns out he has prescriptions for his insulin and metformin waiting for him at a pharmacy and in light of his emergency department visit and findings plans to pick them up pronto.  I recommended that he restart them as directed and follow-up with his clinic for recheck of his symptoms and labs next available.  Clinically he is presented with severe hyperglycemia secondary to medical noncompliance and there is no evidence of an infectious trigger or for a complication such as diabetic ketoacidosis or hyperglycemic hyperosmolar coma.      Diagnosis:    ICD-10-CM    1. Type 2 diabetes mellitus with hyperglycemia, with long-term current use of insulin (H) E11.65 Hemoglobin A1c    Z79.4 Glucose by meter     Glucose by meter     Glucose by meter     Glucose by meter   2. Non compliance with medical treatment Z91.19        Disposition:  discharged to home    Discharge Medications:  Discharge Medication List as of 10/3/2018  2:04 PM            Emmanuelle GUZMAN, am serving as a scribe on 10/3/2018 at 11:02 AM to personally document services performed by Dr. Avendano based on my observations and the provider's statements to me.    10/3/2018   [facility] EMERGENCY DEPARTMENT       Antony Avendano MD  10/03/18 2017

## 2018-10-03 NOTE — PHARMACY-ADMISSION MEDICATION HISTORY
Admission medication history interview status for this patient is complete. See [facility] admission navigator for allergy information, prior to admission medications and immunization status.     Medication history interview source(s):Patient and Family  Medication history resources (including written lists, pill bottles, clinic record):None  Primary pharmacy:[facility] (transferred from [facility] in [place])    Changes made to PTA medication list:  Added: none  Deleted: none  Changed: melatonin from 3 mg prn to 12 mg at bedtime prn    Actions taken by pharmacist (provider contacted, etc):Called [facility] and [facility] for med strengths     Additional medication history information:Pt has not taken medications for a couple of weeks due to insurance issues and the cost of the insulin.     Medication reconciliation/reorder completed by provider prior to medication history? No    Do you take OTC medications (eg tylenol, ibuprofen, fish oil, eye/ear drops, etc)? Y(Y/N)    For patients on insulin therapy: Y (Y/N)  Lantus/levemir/NPH/Mix 70/30 dose:   (Y) (see Med list for doses)   Sliding scale Novolog N  If Yes, do you have a baseline novolog pre-meal dose:  16 units with meals  How many episodes of hypoglycemia do you have per week: __unknown_____  How many missed doses do you have per week: __multiple____  How many times do you check your blood glucose per day: ___none____   Any Barriers to therapy - Be specific :  cost of medications, comfortable with giving injections (if applicable), comfortable and confident with current diabetes regimen: Y/N ________Cost of insulin______    Time spent in this activity: 15 min    Prior to Admission medications    Medication Sig Last Dose Taking? Auth Provider   aspirin 81 MG EC tablet Take 1 tablet (81 mg) by mouth daily Past Month at Unknown time Yes Ruth Hardin PA-C   atorvastatin (LIPITOR) 40 MG tablet Take 1 tablet (40 mg) by mouth daily Past Month at Unknown  time Yes Ruth Hardin PA-C   clopidogrel (PLAVIX) 75 MG tablet Take 1 tablet (75 mg) by mouth daily Past Month at Unknown time Yes Wilber Strickland MD   insulin aspart (NOVOLOG FLEXPEN) 100 UNIT/ML injection Inject 16 Units Subcutaneous 2 times daily (with meals) Breakfast and Dinner Past Month at Unknown time Yes Reported, Patient   insulin detemir (LEVEMIR) 100 UNIT/ML injection Inject 20 Units Subcutaneous 2 times daily  Past Month at Unknown time Yes Reported, Patient   losartan (COZAAR) 50 MG tablet Take 50 mg by mouth daily  Past Month at Unknown time Yes Reported, Patient   melatonin 3 MG tablet Take 12 mg by mouth nightly as needed  Past Month at Unknown time Yes Reported, Patient   metFORMIN (GLUCOPHAGE) 1000 MG tablet Take 1,000 mg by mouth 2 times daily (with meals) Past Month at Unknown time Yes Unknown, Entered By History   metoprolol tartrate (LOPRESSOR) 25 MG tablet Take 1 tablet (25 mg) by mouth 2 times daily Past Month at Unknown time Yes Ruth Hardin PA-C   nitroGLYcerin (NITROSTAT) 0.4 MG sublingual tablet Place 1 tablet (0.4 mg) under the tongue every 5 minutes as needed for chest pain If symptoms persist 5 minutes after 1st dose call 911.  at prn Yes Ruth Hardin PA-C   Insulin Pen Needle 29G X 12.7MM MISC    Reported, Patient

## 2018-10-03 NOTE — Clinical Note
Admitting Physician: [facility] [[id number]]   [id number]   Bed Type: Step Down ([facility]) [8]   Special needs: Fall Risk [8]   Bed request comments: PT ON INSULIN DRIP, REQUIRES 1 PERSON TF. SPOKE WITH RON MARSH. BED REQUESTED @ 1052.

## 2018-10-03 NOTE — ED AVS SNAPSHOT
" [facility]    [address]    [facility] [address]    Phone:  [phone]    Fax:  [phone]                                       Matt [address]   MRN: [id number]    Department:  [facility]   Date of Visit:  10/3/2018           Patient Information     Date Of Birth          1950        Your diagnoses for this visit were:     Type 2 diabetes mellitus with hyperglycemia, with long-term current use of insulin (H)     Non compliance with medical treatment        You were seen by Antony Avendano MD.      Follow-up Information     Follow up with Clinic, [facility].    Why:  for re-evaluation of your symptoms next available    Contact information:    [address]  [facility] [address] [phone]          Discharge Instructions         Diabetes with High Blood Sugar  You have been treated for high blood sugar (hyperglycemia). This may be because of an infection or other illness, eating too many sweets or starches, or not taking enough insulin.  Home care  Monitor and write down your blood sugar level at least twice a day. Do this before breakfast and before dinner. If you take insulin, record your routine insulin dose as well. Also record any additional doses required based on your sliding scale. Do this for the next 3 to 5 days.  High blood sugar may cause symptoms that you can learn to recognize, such as:    Frequent urination    Thirst    Dizziness    Headache    Shortness of breath    Breath that smells fruity    Nausea or vomiting    Abdominal pain    Drowsiness or loss of consciousness  If you have high-blood-sugar symptoms, use a blood or urine test to find out what your blood sugar level is. If it is above your usual range, use the \"sliding scale\" regular insulin dose prescribed by your healthcare provider. If you were not given a range for your insulin dose, contact your healthcare provider for " more advice.  Follow-up care  Follow up with your healthcare provider, or as advised. You may need to meet with your healthcare provider in the next week. You will likely review your blood sugar records together. You may also talk to your provider about adjusting your dose of insulin or other medicine for blood sugar.  When to seek medical advice  Call your healthcare provider right away if these occur:    High blood sugar symptoms (Symptoms are described above.)    Blood sugar over 300 mg/dl If you can t reach your healthcare provider, go to a hospital emergency room or urgent care center  Date Last Reviewed: 6/1/2016 2000-2017 [employer]. [address] [place] [address]. All rights reserved. This information is not intended as a substitute for professional medical care. Always follow your healthcare professional's instructions.        High Blood Sugar (Hyperglycemia)     When you have hyperglycemia, drink plenty of water or other sugar-free, caffeine-free liquids.   Too much glucose (sugar) in your blood is called hyperglycemia or high blood sugar. High blood sugar can lead to a dangerous condition called ketoacidosis. In severe cases, it can lead to dehydration and coma.  Possible causes of hyperglycemia    Inadequate treatment plan for diabetes     Being sick    Being under stress    Taking certain medicines, such as steroids    Eating too much food, especially carbohydrates    Being less active than usual    Not taking enough diabetes medicine  Symptoms of hyperglycemia  Hyperglycemia may not cause symptoms. If you do have symptoms, they may include:    Thirst    Frequent need to urinate    Feeling tired    Nausea and vomiting    Itchy, dry skin    Blurry vision    Fast breathing and breath that smells fruity     Weakness    Dizziness    Wounds or skin infections that don t heal    Unexplained weight loss if hyperglycemia lasts for more than a few days   What you should do  Make sure  you do the following:    Check your blood sugar.    Drink plenty of sugar-free, caffeine-free liquids such as water. Don t drink fruit juice.    Check your blood sugar again every 4 hours. If you take insulin or diabetes medicines, follow your sick-day plan for taking medicine. Call your healthcare provider if you are not able to eat.    Check your blood or urine for ketones as directed.    Call your healthcare provider if your blood sugar and ketones do not return to your target range.  Preventing high blood sugar  To help keep your blood sugar from getting too high:    Control stress.    When you're ill, follow your sick-day plan.     Follow your meal plan. Eat only the amount of food on your meal plan    Follow your exercise plan.    Take your insulin or diabetes medicines as directed by your healthcare team. Also test your blood sugar as directed. If the plan is not working for you, discuss it with your healthcare provider.  Other things to do    Carry a medical ID card, a compact USB drive, or wear a medical alert bracelet or necklace. It should say that you have diabetes. It should also say what to do in case you pass out or go into a coma.    Make sure family, friends, and coworkers know the signs of high blood sugar. Tell them what to do if your blood sugar gets very high and you can t help yourself.    Talk to your healthcare team about other things you can do to prevent high blood sugar.   Special note: Drink plenty of sugar-free and caffeine-free liquids when you feel symptoms of hyperglycemia. Call your healthcare provider if you keep having episodes of hyperglycemia.   Date Last Reviewed: 5/1/2016 2000-2017 The [employer]. [address] [place] [address], [place] [address]. All rights reserved. This information is not intended as a substitute for professional medical care. Always follow your healthcare professional's instructions.          Your next 10 appointments already scheduled     Oct 11,  2018  9:30 AM CDT   LAB with RU LAB   [facility] [place] ([facility])    [address]  [facility] [id number] [phone]           Please do not eat 10-12 hours before your appointment if you are coming in fasting for labs on lipids, cholesterol, or glucose (sugar). This does not apply to pregnant women. Water, hot tea and black coffee (with nothing added) are okay. Do not drink other fluids, diet soda or chew gum.            Oct 11, 2018 10:30 AM CDT   Return Visit with Chante Givens PA-C   [facility] ([facility])    [address]  [facility] [address] [phone]              24 Hour Appointment Hotline       To make an appointment at any [facility], call [phone] ([phone]). If you don't have a family doctor or clinic, we will help you find one. [facility] are conveniently located to serve the needs of you and your family.             Review of your medicines      Our records show that you are taking the medicines listed below. If these are incorrect, please call your family doctor or clinic.        Dose / Directions Last dose taken    aspirin 81 MG EC tablet   Dose:  81 mg        Take 1 tablet (81 mg) by mouth daily   Refills:  0        atorvastatin 40 MG tablet   Commonly known as:  LIPITOR   Dose:  40 mg   Quantity:  30 tablet        Take 1 tablet (40 mg) by mouth daily   Refills:  1        clopidogrel 75 MG tablet   Commonly known as:  PLAVIX   Dose:  75 mg   Quantity:  90 tablet        Take 1 tablet (75 mg) by mouth daily   Refills:  3        insulin detemir 100 UNIT/ML injection   Commonly known as:  LEVEMIR   Dose:  20 Units        Inject 20 Units Subcutaneous 2 times daily   Refills:  0        insulin pen needle 29G X 12.7MM        Refills:  0        losartan 50 MG tablet   Commonly known as:  COZAAR   Dose:  50 mg        Take 50 mg by mouth daily    Refills:  2        melatonin 3 MG tablet   Dose:  12 mg        Take 12 mg by mouth nightly as needed   Refills:  3        metFORMIN 1000 MG tablet   Commonly known as:  GLUCOPHAGE   Dose:  1000 mg        Take 1,000 mg by mouth 2 times daily (with meals)   Refills:  0        metoprolol tartrate 25 MG tablet   Commonly known as:  LOPRESSOR   Dose:  25 mg   Quantity:  60 tablet        Take 1 tablet (25 mg) by mouth 2 times daily   Refills:  1        nitroGLYcerin 0.4 MG sublingual tablet   Commonly known as:  NITROSTAT   Dose:  0.4 mg   Quantity:  10 tablet        Place 1 tablet (0.4 mg) under the tongue every 5 minutes as needed for chest pain If symptoms persist 5 minutes after 1st dose call 911.   Refills:  0        NovoLOG FLEXPEN 100 UNIT/ML injection   Dose:  16 Units   Generic drug:  insulin aspart        Inject 16 Units Subcutaneous 2 times daily (with meals) Breakfast and Dinner   Refills:  0                Procedures and tests performed during your visit     Procedure/Test Number of Times Performed    Basic metabolic panel 1    CBC with platelets differential 1    Cardiac Continuous Monitoring  1    Glucose by meter 3    Glucose monitor nursing POCT 1    Hemoglobin A1c 1    Ketone Beta-Hydroxybutyrate Quantitative 1    Magnesium 1    Notify Provider 1    Osmolality 1    Peripheral IV: Standard 1    Phosphorus 1      Orders Needing Specimen Collection     None      Pending Results     Date and Time Order Name Status Description    10/3/2018 1113 Osmolality In process             Pending Culture Results     No orders found from 10/1/2018 to 10/4/2018.            Pending Results Instructions     If you had any lab results that were not finalized at the time of your Discharge, you can call the ED Lab Result RN at [phone]. You will be contacted by this team for any positive Lab results or changes in treatment. The nurses are available 7 days a week from 10A to 6:30P.  You can leave a message 24 hours per  day and they will return your call.        Test Results From Your Hospital Stay        10/3/2018 12:22 PM      Component Results     Component Value Ref Range & Units Status    Hemoglobin A1C 9.9 (H) 0 - 5.6 % Final    Normal <5.7% Prediabetes 5.7-6.4%  Diabetes 6.5% or higher - adopted from ADA   consensus guidelines.           10/3/2018 11:30 AM      Component Results     Component Value Ref Range & Units Status    WBC 6.4 4.0 - 11.0 10e9/L Final    RBC Count 5.39 4.4 - 5.9 10e12/L Final    Hemoglobin 15.7 13.3 - 17.7 g/dL Final    Hematocrit 46.9 40.0 - 53.0 % Final    MCV 87 78 - 100 fl Final    MCH 29.1 26.5 - 33.0 pg Final    MCHC 33.5 31.5 - 36.5 g/dL Final    RDW 14.6 10.0 - 15.0 % Final    Platelet Count 175 150 - 450 10e9/L Final    Diff Method Automated Method  Final    % Neutrophils 58.9 % Final    % Lymphocytes 31.4 % Final    % Monocytes 6.3 % Final    % Eosinophils 2.2 % Final    % Basophils 0.9 % Final    % Immature Granulocytes 0.3 % Final    Nucleated RBCs 0 0 /100 Final    Absolute Neutrophil 3.8 1.6 - 8.3 10e9/L Final    Absolute Lymphocytes 2.0 0.8 - 5.3 10e9/L Final    Absolute Monocytes 0.4 0.0 - 1.3 10e9/L Final    Absolute Eosinophils 0.1 0.0 - 0.7 10e9/L Final    Absolute Basophils 0.1 0.0 - 0.2 10e9/L Final    Abs Immature Granulocytes 0.0 0 - 0.4 10e9/L Final    Absolute Nucleated RBC 0.0  Final         10/3/2018 12:04 PM      Component Results     Component Value Ref Range & Units Status    Sodium 129 (L) 133 - 144 mmol/L Final    Potassium 4.4 3.4 - 5.3 mmol/L Final    Chloride 97 94 - 109 mmol/L Final    Carbon Dioxide 24 20 - 32 mmol/L Final    Anion Gap 8 3 - 14 mmol/L Final    Glucose 711 (HH) 70 - 99 mg/dL Final    Critical Value called to and read back by  LOUIS FLORES IN ERA ON 10/3 AT 1159 BY JGLORIA      Urea Nitrogen 24 7 - 30 mg/dL Final    Creatinine 0.98 0.66 - 1.25 mg/dL Final    GFR Estimate 77 >60 mL/min/1.7m2 Final    Non  GFR Calc    GFR Estimate If Black >90  >60 mL/min/1.7m2 Final    African American GFR Calc    Calcium 8.8 8.5 - 10.1 mg/dL Final         10/3/2018 12:04 PM      Component Results     Component Value Ref Range & Units Status    Phosphorus 3.4 2.5 - 4.5 mg/dL Final         10/3/2018 12:04 PM      Component Results     Component Value Ref Range & Units Status    Magnesium 2.2 1.6 - 2.3 mg/dL Final         10/3/2018 11:24 AM         10/3/2018 11:30 AM      Component Results     Component Value Ref Range & Units Status    Ketone Quantitative 0.1 0.0 - 0.6 mmol/L Final         10/3/2018 11:18 AM      Component Results     Component Value Ref Range & Units Status    Glucose >600 (HH) 70 - 99 mg/dL Final    Dr/RN Notified         10/3/2018  1:35 PM      Component Results     Component Value Ref Range & Units Status    Glucose 507 (HH) 70 - 99 mg/dL Final    Dr/RN Notified         10/3/2018  1:35 PM      Component Results     Component Value Ref Range & Units Status    Glucose 398 (H) 70 - 99 mg/dL Final                Clinical Quality Measure: Blood Pressure Screening     Your blood pressure was checked while you were in the emergency department today. The last reading we obtained was  BP: 134/82 . Please read the guidelines below about what these numbers mean and what you should do about them.  If your systolic blood pressure (the top number) is less than 120 and your diastolic blood pressure (the bottom number) is less than 80, then your blood pressure is normal. There is nothing more that you need to do about it.  If your systolic blood pressure (the top number) is 120-139 or your diastolic blood pressure (the bottom number) is 80-89, your blood pressure may be higher than it should be. You should have your blood pressure rechecked within a year by a primary care provider.  If your systolic blood pressure (the top number) is 140 or greater or your diastolic blood pressure (the bottom number) is 90 or greater, you may have high blood pressure. High blood  "pressure is treatable, but if left untreated over time it can put you at risk for heart attack, stroke, or kidney failure. You should have your blood pressure rechecked by a primary care provider within the next 4 weeks.  If your provider in the emergency department today gave you specific instructions to follow-up with your doctor or provider even sooner than that, you should follow that instruction and not wait for up to 4 weeks for your follow-up visit.        Thank you for choosing [facility]       Thank you for choosing [facility] for your care. Our goal is always to provide you with excellent care. Hearing back from our patients is one way we can continue to improve our services. Please take a few minutes to complete the written survey that you may receive in the mail after you visit with us. Thank you!        [facility] Auditing Information     [facility] lets you send messages to your doctor, view your test results, renew your prescriptions, schedule appointments and more. To sign up, go to [email or web address] . Click on \"Log in\" on the left side of the screen, which will take you to the Welcome page. Then click on \"Sign up Now\" on the right side of the page.     You will be asked to enter the access code listed below, as well as some personal information. Please follow the directions to create your username and password.     Your access code is: [email or web address]  Expires: 2018 11:46 AM     Your access code will  in 90 days. If you need help or a new code, please call your [facility] clinic or [phone].        Care EveryWhere ID     This is your Care EveryWhere ID. This could be used by other organizations to access your [facility] medical records  [id number]        Equal Access to Services     PAT RAY : Carmelina Mejia, apple escamilla, quinten lyn. So [facility] [phone].    ATENCIÓN: Si habla español, tiene a carcamo disposición servicios " bethel de asistencia lingüística. Mere collins [phone].    We comply with applicable federal civil rights laws and [place] laws. We do not discriminate on the basis of race, color, national origin, age, disability, sex, sexual orientation, or gender identity.            After Visit Summary       This is your record. Keep this with you and show to your community pharmacist(s) and doctor(s) at your next visit.

## 2018-10-03 NOTE — DISCHARGE INSTRUCTIONS
"  Diabetes with High Blood Sugar  You have been treated for high blood sugar (hyperglycemia). This may be because of an infection or other illness, eating too many sweets or starches, or not taking enough insulin.  Home care  Monitor and write down your blood sugar level at least twice a day. Do this before breakfast and before dinner. If you take insulin, record your routine insulin dose as well. Also record any additional doses required based on your sliding scale. Do this for the next 3 to 5 days.  High blood sugar may cause symptoms that you can learn to recognize, such as:    Frequent urination    Thirst    Dizziness    Headache    Shortness of breath    Breath that smells fruity    Nausea or vomiting    Abdominal pain    Drowsiness or loss of consciousness  If you have high-blood-sugar symptoms, use a blood or urine test to find out what your blood sugar level is. If it is above your usual range, use the \"sliding scale\" regular insulin dose prescribed by your healthcare provider. If you were not given a range for your insulin dose, contact your healthcare provider for more advice.  Follow-up care  Follow up with your healthcare provider, or as advised. You may need to meet with your healthcare provider in the next week. You will likely review your blood sugar records together. You may also talk to your provider about adjusting your dose of insulin or other medicine for blood sugar.  When to seek medical advice  Call your healthcare provider right away if these occur:    High blood sugar symptoms (Symptoms are described above.)    Blood sugar over 300 mg/dl If you can t reach your healthcare provider, go to a hospital emergency room or urgent care center  Date Last Reviewed: 6/1/2016 2000-2017 [employer]. [address] [place] [address], [place] [address]. All rights reserved. This information is not intended as a substitute for professional medical care. Always follow your healthcare professional's " instructions.        High Blood Sugar (Hyperglycemia)     When you have hyperglycemia, drink plenty of water or other sugar-free, caffeine-free liquids.   Too much glucose (sugar) in your blood is called hyperglycemia or high blood sugar. High blood sugar can lead to a dangerous condition called ketoacidosis. In severe cases, it can lead to dehydration and coma.  Possible causes of hyperglycemia    Inadequate treatment plan for diabetes     Being sick    Being under stress    Taking certain medicines, such as steroids    Eating too much food, especially carbohydrates    Being less active than usual    Not taking enough diabetes medicine  Symptoms of hyperglycemia  Hyperglycemia may not cause symptoms. If you do have symptoms, they may include:    Thirst    Frequent need to urinate    Feeling tired    Nausea and vomiting    Itchy, dry skin    Blurry vision    Fast breathing and breath that smells fruity     Weakness    Dizziness    Wounds or skin infections that don t heal    Unexplained weight loss if hyperglycemia lasts for more than a few days   What you should do  Make sure you do the following:    Check your blood sugar.    Drink plenty of sugar-free, caffeine-free liquids such as water. Don t drink fruit juice.    Check your blood sugar again every 4 hours. If you take insulin or diabetes medicines, follow your sick-day plan for taking medicine. Call your healthcare provider if you are not able to eat.    Check your blood or urine for ketones as directed.    Call your healthcare provider if your blood sugar and ketones do not return to your target range.  Preventing high blood sugar  To help keep your blood sugar from getting too high:    Control stress.    When you're ill, follow your sick-day plan.     Follow your meal plan. Eat only the amount of food on your meal plan    Follow your exercise plan.    Take your insulin or diabetes medicines as directed by your healthcare team. Also test your blood sugar as  directed. If the plan is not working for you, discuss it with your healthcare provider.  Other things to do    Carry a medical ID card, a compact USB drive, or wear a medical alert bracelet or necklace. It should say that you have diabetes. It should also say what to do in case you pass out or go into a coma.    Make sure family, friends, and coworkers know the signs of high blood sugar. Tell them what to do if your blood sugar gets very high and you can t help yourself.    Talk to your healthcare team about other things you can do to prevent high blood sugar.   Special note: Drink plenty of sugar-free and caffeine-free liquids when you feel symptoms of hyperglycemia. Call your healthcare provider if you keep having episodes of hyperglycemia.   Date Last Reviewed: 5/1/2016 2000-2017 [employer]. [address] [place] [address], [place] [address]. All rights reserved. This information is not intended as a substitute for professional medical care. Always follow your healthcare professional's instructions.

## 2018-10-03 NOTE — ED TRIAGE NOTES
Pt presents from Cardiac rehab with concerns for confusion and high blood sugar. Pt admits to not taking his normal medications due to inability to pay.

## 2018-10-03 NOTE — ED AVS SNAPSHOT
[facility] Emergency Department    [address]    [facility] [address]    Phone:  [phone]    Fax:  [phone]                                       [address] Way   MRN: [id number]    Department:  [facility] Emergency Department   Date of Visit:  10/3/2018           After Visit Summary Signature Page     I have received my discharge instructions, and my questions have been answered. I have discussed any challenges I see with this plan with the nurse or doctor.    ..........................................................................................................................................  Patient/Patient Representative Signature      ..........................................................................................................................................  Patient Representative Print Name and Relationship to Patient    ..................................................               ................................................  Date                                   Time    ..........................................................................................................................................  Reviewed by Signature/Title    ...................................................              ..............................................  Date                                               Time          22EPIC Rev 08/18

## 2018-10-18 ENCOUNTER — TELEPHONE (OUTPATIENT)
Dept: CARDIOLOGY | Facility: CLINIC | Age: 68
End: 2018-10-18

## 2018-10-18 ENCOUNTER — OFFICE VISIT (OUTPATIENT)
Dept: CARDIOLOGY | Facility: CLINIC | Age: 68
End: 2018-10-18
Attending: INTERNAL MEDICINE
Payer: MEDICARE

## 2018-10-18 VITALS
DIASTOLIC BLOOD PRESSURE: 50 MMHG | BODY MASS INDEX: 28.15 KG/M2 | HEIGHT: 73 IN | SYSTOLIC BLOOD PRESSURE: 98 MMHG | WEIGHT: 212.4 LBS | HEART RATE: 72 BPM

## 2018-10-18 DIAGNOSIS — I10 BENIGN ESSENTIAL HYPERTENSION: ICD-10-CM

## 2018-10-18 DIAGNOSIS — I21.4 NSTEMI (NON-ST ELEVATED MYOCARDIAL INFARCTION) (H): ICD-10-CM

## 2018-10-18 LAB
ALT SERPL W P-5'-P-CCNC: 48 U/L (ref 0–70)
CHOLEST SERPL-MCNC: 167 MG/DL
HDLC SERPL-MCNC: 39 MG/DL
LDLC SERPL CALC-MCNC: 85 MG/DL
NONHDLC SERPL-MCNC: 128 MG/DL
TRIGL SERPL-MCNC: 215 MG/DL

## 2018-10-18 PROCEDURE — 36415 COLL VENOUS BLD VENIPUNCTURE: CPT | Performed by: INTERNAL MEDICINE

## 2018-10-18 PROCEDURE — 99214 OFFICE O/P EST MOD 30 MIN: CPT | Performed by: PHYSICIAN ASSISTANT

## 2018-10-18 PROCEDURE — 84460 ALANINE AMINO (ALT) (SGPT): CPT | Performed by: INTERNAL MEDICINE

## 2018-10-18 PROCEDURE — 80061 LIPID PANEL: CPT | Performed by: INTERNAL MEDICINE

## 2018-10-18 RX ORDER — LOSARTAN POTASSIUM 25 MG/1
25 TABLET ORAL DAILY
Qty: 30 TABLET | Refills: 11 | Status: SHIPPED | OUTPATIENT
Start: 2018-10-18 | End: 2020-09-16

## 2018-10-18 RX ORDER — METOPROLOL TARTRATE 25 MG/1
12.5 TABLET, FILM COATED ORAL 2 TIMES DAILY
Qty: 30 TABLET | Refills: 11 | Status: SHIPPED | OUTPATIENT
Start: 2018-10-18 | End: 2019-07-26

## 2018-10-18 NOTE — MR AVS SNAPSHOT
After Visit Summary   10/18/2018    Matt Way    MRN: [id number]           Patient Information     Date Of Birth          1950        Visit Information        Provider Department      10/18/2018 1:50 PM Chante Givens PA-C [facility]        Today's Diagnoses     NSTEMI (non-ST elevated myocardial infarction) (H)        Benign essential hypertension          Care Instructions    Decrease the metoprolol to 12.5 mg (1/2 tab) twice a day.   Decrease losartan to 25 mg once daily.     Hopefully these changes can give you some more energy.     You should also talk to your primary doctor about the fatigue and poor sleep.     I will discuss the chest pains with Dr. Strickland - see if he wants any further testing.     I recommend going back to cardiac rehab.      See me in 1 month to follow up.           Follow-ups after your visit        Additional Services     Follow-Up with Cardiac Advanced Practice Provider           Follow-Up with Cardiologist                 Your next 10 appointments already scheduled     Nov 15, 2018  1:50 PM CST   Return Visit with Chante Givens PA-C   [facility] ([facility])    [address] [place] [id number] [phone]              Future tests that were ordered for you today     Open Future Orders        Priority Expected Expires Ordered    Follow-Up with Cardiac Advanced Practice Provider Routine 11/17/2018 10/18/2019 10/18/2018    Follow-Up with Cardiologist Routine 6/15/2019 10/18/2019 10/18/2018            Who to contact     If you have questions or need follow up information about today's clinic visit or your schedule please contact [facility] directly at [phone].  Normal or non-critical lab and imaging results will be communicated to you by MyChart, letter or phone within 4 business days after the  "clinic has received the results. If you do not hear from us within 7 days, please contact the clinic through [employer] or phone. If you have a critical or abnormal lab result, we will notify you by phone as soon as possible.  Submit refill requests through [employer] or call your pharmacy and they will forward the refill request to us. Please allow 3 business days for your refill to be completed.          Additional Information About Your Visit        [employer] Information     [employer] lets you send messages to your doctor, view your test results, renew your prescriptions, schedule appointments and more. To sign up, go to [email or web address] . Click on \"Log in\" on the left side of the screen, which will take you to the Welcome page. Then click on \"Sign up Now\" on the right side of the page.     You will be asked to enter the access code listed below, as well as some personal information. Please follow the directions to create your username and password.     Your access code is: [email or web address]  Expires: 2018 11:46 AM     Your access code will  in 90 days. If you need help or a new code, please call your [facility] clinic or [phone].        Care EveryWhere ID     This is your Care EveryWhere ID. This could be used by other organizations to access your [facility] medical records  [id number]        Your Vitals Were     Pulse Height BMI (Body Mass Index)             72 1.854 m (6' 1\") 28.02 kg/m2          Blood Pressure from Last 3 Encounters:   10/18/18 98/50   10/03/18 (!) 156/91   18 111/71    Weight from Last 3 Encounters:   10/18/18 96.3 kg (212 lb 6.4 oz)   18 97.5 kg (215 lb)   18 94.3 kg (207 lb 12.8 oz)              We Performed the Following     Follow-Up with Cardiac Advanced Practice Provider          Today's Medication Changes          These changes are accurate as of 10/18/18  2:50 PM.  If you have any questions, ask your nurse or doctor.               These medicines have changed " or have updated prescriptions.        Dose/Directions    losartan 25 MG tablet   Commonly known as:  COZAAR   This may have changed:    - medication strength  - how much to take   Used for:  NSTEMI (non-ST elevated myocardial infarction) (H), Benign essential hypertension   Changed by:  Chante iGvens PA-C        Dose:  25 mg   Take 1 tablet (25 mg) by mouth daily   Quantity:  30 tablet   Refills:  11       metoprolol tartrate 25 MG tablet   Commonly known as:  LOPRESSOR   This may have changed:  how much to take   Used for:  Benign essential hypertension, NSTEMI (non-ST elevated myocardial infarction) (H)   Changed by:  Chante Givens PA-C        Dose:  12.5 mg   Take 0.5 tablets (12.5 mg) by mouth 2 times daily   Quantity:  30 tablet   Refills:  11            Where to get your medicines      These medications were sent to [facility] [address] [facility] - [facility] [address]  [address], [facility] [address]     Phone:  [phone]     losartan 25 MG tablet    metoprolol tartrate 25 MG tablet                Primary Care Provider Office Phone # Fax #    [facility] [phone]       [address] [place] [id number]        Equal Access to Services     PAT RAY AH: Carmelina cuenca Soosman, wahowardda luharmony, qaybta kaalmada adeling, quinten cortes. So [facility] [phone].    ATENCIÓN: Si habla español, tiene a carcamo disposición servicios gratuitos de asistencia lingüística. Mere al [phone].    We comply with applicable federal civil rights laws and [place] laws. We do not discriminate on the basis of race, color, national origin, age, disability, sex, sexual orientation, or gender identity.            Thank you!     Thank you for choosing [facility]  for your care. Our goal is always to provide you with excellent care. Hearing back from our patients is one way we can  continue to improve our services. Please take a few minutes to complete the written survey that you may receive in the mail after your visit with us. Thank you!             Your Updated Medication List - Protect others around you: Learn how to safely use, store and throw away your medicines at www.disposemymeds.org.          This list is accurate as of 10/18/18  2:50 PM.  Always use your most recent med list.                   Brand Name Dispense Instructions for use Diagnosis    aspirin 81 MG EC tablet      Take 1 tablet (81 mg) by mouth daily    NSTEMI (non-ST elevated myocardial infarction) (H)       atorvastatin 40 MG tablet    LIPITOR    30 tablet    Take 1 tablet (40 mg) by mouth daily    NSTEMI (non-ST elevated myocardial infarction) (H)       clopidogrel 75 MG tablet    PLAVIX    90 tablet    Take 1 tablet (75 mg) by mouth daily    NSTEMI (non-ST elevated myocardial infarction) (H)       insulin detemir 100 UNIT/ML injection    LEVEMIR     Inject 20 Units Subcutaneous 2 times daily        insulin pen needle 29G X 12.7MM           losartan 25 MG tablet    COZAAR    30 tablet    Take 1 tablet (25 mg) by mouth daily    NSTEMI (non-ST elevated myocardial infarction) (H), Benign essential hypertension       melatonin 3 MG tablet      Take 12 mg by mouth nightly as needed        metFORMIN 1000 MG tablet    GLUCOPHAGE     Take 1,000 mg by mouth 2 times daily (with meals)        metoprolol tartrate 25 MG tablet    LOPRESSOR    30 tablet    Take 0.5 tablets (12.5 mg) by mouth 2 times daily    Benign essential hypertension, NSTEMI (non-ST elevated myocardial infarction) (H)       nitroGLYcerin 0.4 MG sublingual tablet    NITROSTAT    10 tablet    Place 1 tablet (0.4 mg) under the tongue every 5 minutes as needed for chest pain If symptoms persist 5 minutes after 1st dose call 911.    NSTEMI (non-ST elevated myocardial infarction) (H), Acute chest pain       NovoLOG FLEXPEN 100 UNIT/ML injection   Generic drug:   insulin aspart      Inject 16 Units Subcutaneous 2 times daily (with meals) Breakfast and Dinner

## 2018-10-18 NOTE — PATIENT INSTRUCTIONS
Decrease the metoprolol to 12.5 mg (1/2 tab) twice a day.   Decrease losartan to 25 mg once daily.     Hopefully these changes can give you some more energy.     You should also talk to your primary doctor about the fatigue and poor sleep.     I will discuss the chest pains with Dr. Strickland - see if he wants any further testing.     I recommend going back to cardiac rehab.      See me in 1 month to follow up.

## 2018-10-18 NOTE — LETTER
"10/18/2018    [facility]  [address] [facility] [address]    RE: Matt Way       Dear Colleague,    I had the pleasure of seeing Matt Way in the [facility].    CARDIOLOGY CLINIC PROGRESS NOTE    DOS: 10/18/2018      Matt Way  : 1950, 67 year old  MRN: [id number]      History: I met Matt Way today in the cardiology clinic for follow up.  He was accompanied by his wife, Salina.  He recently was seen by Dr. Strickland.    Matt Way is a 67-year-old gentleman who presented in early 2018 with chest pain, had troponin elevation and was diagnosed with non-ST elevation myocardial infarction.  He underwent coronary angiogram and was found to have a proximal LAD eccentric 90% stenosis which was successfully intervened with 2.75 x 12 mm drug-eluting stent.  He had 40% distal LAD stenosis, 50% diagonal first ostial stenosis, 10% proximal circumflex stenosis and 20% proximal RCA stenosis.  Echocardiogram showed normal LV function.  He has other comorbidities of diabetes.      He saw Dr. Strickland 18. At that time, he was having shortness of breath.  Dr. Strickland switched Brilinta to Plavix.     He was at cardiac rehab 10/3/18. He was having altered mental status changes. Glucose level was 600s. He was sent to the ED. There he noted he had stopped ALL his medications 2 1/2 weeks prior, in part due to not liking them, but mostly because they were expensive and he has financial concerns.     Interval History:   He presents today for follow up.    He is back on all his medications.   He notes poor sleep, ongoing for at least 5 years per his wife.  Also fatigue.  Generally, he seems like he has not \"perked\" up after his heart attack.   Breathing better after change to Plavix.  But still some mild KO.   He has occasional chest pain the past couple days - he describes them as sharp, very quick.  Before his heart attack he had chest " pressure and left arm pain.  He does not have these sxs.   No LH, no dizziness. No syncope.    No bleeding issues.       ROS:  Skin:  Negative     Eyes:  Positive for glasses  ENT:  Negative    Respiratory:  Positive for dyspnea on exertion  Cardiovascular:    palpitations;Positive for;chest pain;fatigue  Gastroenterology: Negative    Genitourinary:       Musculoskeletal:  Positive for neck pain  Neurologic:  Negative    Psychiatric:  Positive for sleep disturbances  Heme/Lymph/Imm:  Negative    Endocrine:  Positive for diabetes    PAST MEDICAL HISTORY:  Past Medical History:   Diagnosis Date     Coronary artery disease 06/2018    s/p CECILY to LAD      Diabetes (H)      NSTEMI (non-ST elevated myocardial infarction) (H) 06/2018    s/p CECILY to LAD        PAST SURGICAL HISTORY:  Past Surgical History:   Procedure Laterality Date     SHOULDER SURGERY         SOCIAL HISTORY:  Social History     Social History     Marital status:      Spouse name: N/A     Number of children: N/A     Years of education: N/A     Social History Main Topics     Smoking status: Never Smoker     Smokeless tobacco: Never Used     Alcohol use No     Drug use: No     Sexual activity: Not Asked     Other Topics Concern     None     Social History Narrative       FAMILY HISTORY:  History reviewed. No pertinent family history.    MEDS:   Current Outpatient Prescriptions on File Prior to Visit:  aspirin 81 MG EC tablet Take 1 tablet (81 mg) by mouth daily   atorvastatin (LIPITOR) 40 MG tablet Take 1 tablet (40 mg) by mouth daily   clopidogrel (PLAVIX) 75 MG tablet Take 1 tablet (75 mg) by mouth daily   insulin aspart (NOVOLOG FLEXPEN) 100 UNIT/ML injection Inject 16 Units Subcutaneous 2 times daily (with meals) Breakfast and Dinner   insulin detemir (LEVEMIR) 100 UNIT/ML injection Inject 20 Units Subcutaneous 2 times daily    Insulin Pen Needle 29G X 12.7MM MISC    melatonin 3 MG tablet Take 12 mg by mouth nightly as needed    metFORMIN  "(GLUCOPHAGE) 1000 MG tablet Take 1,000 mg by mouth 2 times daily (with meals)   nitroGLYcerin (NITROSTAT) 0.4 MG sublingual tablet Place 1 tablet (0.4 mg) under the tongue every 5 minutes as needed for chest pain If symptoms persist 5 minutes after 1st dose call 911.   [DISCONTINUED] losartan (COZAAR) 50 MG tablet Take 50 mg by mouth daily    [DISCONTINUED] metoprolol tartrate (LOPRESSOR) 25 MG tablet Take 1 tablet (25 mg) by mouth 2 times daily     No current facility-administered medications on file prior to visit.     ALLERGIES:   Allergies   Allergen Reactions     Brilinta [Ticagrelor]      Duloxetine      Other reaction(s): Confusion  Per daughter who is MD     Gabapentin      Other reaction(s): Edema     Lisinopril Rash       PHYSICAL EXAM:  Vitals: BP 98/50 (BP Location: Right arm, Patient Position: Sitting, Cuff Size: Adult Large)  Pulse 72  Ht 1.854 m (6' 1\")  Wt 96.3 kg (212 lb 6.4 oz)  BMI 28.02 kg/m2  Constitutional:  cooperative;well developed;well nourished;in no acute distress        Skin:  warm and dry to the touch        Head:  normocephalic;no masses or lesions        Eyes:  pupils equal and round;conjunctivae and lids unremarkable        ENT:  no pallor or cyanosis        Neck:  JVP normal        Respiratory:  clear to auscultation;normal symmetry        Cardiac: regular rhythm;normal S1 and S2;no murmurs, gallops or rubs detected                  GI:  abdomen soft;BS normoactive        Vascular:                                        Extremities and Musculoskeletal:  no edema;no deformities, clubbing, cyanosis, erythema observed;no spinal abnormalities noted        Neurological:  no gross motor deficits          LABS/DATA:  I reviewed the following:  Component      Latest Ref Rng & Units 10/18/2018   Cholesterol      <200 mg/dL 167   Triglycerides      <150 mg/dL 215 (H)   HDL Cholesterol      >39 mg/dL 39 (L)   LDL Cholesterol Calculated      <100 mg/dL 85   Non HDL Cholesterol      <130 " mg/dL 128   ALT      0 - 70 U/L 48         ASSESSMENT/PLAN:  CAD  - NSTEMI 6/2018 s/p CECILY to proximal LAD, mild CAD elsewhere.  - Preserved LVEF  - ASA 81 mg lifelong, Plavix x 1 year min, BB, ARB, statin  - LDL is improved today after restarting statin 10/4/18  - He has some lower BPs and fatigue. I will decrease metoprolol to 12.5 mg BID and decrease losartan to 25 mg daily.  In addition, I asked that he discuss possible depression with his PCP  - He has some atypical chest pains. I will review this with Dr. Strickland. Typically I would not pursue any further evauation given how atypical his sxs are, but given he was off Plavix x 2.5 weeks and has an LAD stent, I will discuss further with Dr. Strickland  - Mediterranean style diet  - I did recommend that he resume cardiac rehab. He has bad feelings toward it because last time he was there, he ended up in the ER. I explained, that it had nothing to do with cardiac rehab, and was due to being off his DM meds    Dyslipidemia  -  LDL is improved today (114-> 85) after restarting atorvastatin 40 mg 10/4/18    DM  - was recently off DM meds  - Last A1C 10/3/18 was 9.9 indicating poor control  - Follow up with PCP      Follow up:  Follow up with me in about 1 month to evaluate any response to decrease in meds  Cardiac rehab  PCP  Dr. Strickland 6/2019      Thank you for allowing me to participate in the care of your patient.    Sincerely,     Chante Givens PA-C     [facility]

## 2018-10-18 NOTE — LETTER
"10/18/2018    [facility]  [address] [facility] [address]    RE: Matt Way       Dear Colleague,    I had the pleasure of seeing Matt Way in the [facility].    CARDIOLOGY CLINIC PROGRESS NOTE    DOS: 10/18/2018      Matt Way  : 1950, 67 year old  MRN: [id number]      History: I met Matt Way today in the cardiology clinic for follow up.  He was accompanied by his wife, Salina.  He recently was seen by Dr. Strickland.    Matt Way is a 67-year-old gentleman who presented in early 2018 with chest pain, had troponin elevation and was diagnosed with non-ST elevation myocardial infarction.  He underwent coronary angiogram and was found to have a proximal LAD eccentric 90% stenosis which was successfully intervened with 2.75 x 12 mm drug-eluting stent.  He had 40% distal LAD stenosis, 50% diagonal first ostial stenosis, 10% proximal circumflex stenosis and 20% proximal RCA stenosis.  Echocardiogram showed normal LV function.  He has other comorbidities of diabetes.      He saw Dr. Strickland 18. At that time, he was having shortness of breath.  Dr. Strickland switched Brilinta to Plavix.     He was at cardiac rehab 10/3/18. He was having altered mental status changes. Glucose level was 600s. He was sent to the ED. There he noted he had stopped ALL his medications 2 1/2 weeks prior, in part due to not liking them, but mostly because they were expensive and he has financial concerns.     Interval History:   He presents today for follow up.    He is back on all his medications.   He notes poor sleep, ongoing for at least 5 years per his wife.  Also fatigue.  Generally, he seems like he has not \"perked\" up after his heart attack.   Breathing better after change to Plavix.  But still some mild KO.   He has occasional chest pain the past couple days - he describes them as sharp, very quick.  Before his heart attack he had chest " pressure and left arm pain.  He does not have these sxs.   No LH, no dizziness. No syncope.    No bleeding issues.       ROS:  Skin:  Negative     Eyes:  Positive for glasses  ENT:  Negative    Respiratory:  Positive for dyspnea on exertion  Cardiovascular:    palpitations;Positive for;chest pain;fatigue  Gastroenterology: Negative    Genitourinary:       Musculoskeletal:  Positive for neck pain  Neurologic:  Negative    Psychiatric:  Positive for sleep disturbances  Heme/Lymph/Imm:  Negative    Endocrine:  Positive for diabetes    PAST MEDICAL HISTORY:  Past Medical History:   Diagnosis Date     Coronary artery disease 06/2018    s/p CECILY to LAD      Diabetes (H)      NSTEMI (non-ST elevated myocardial infarction) (H) 06/2018    s/p CECILY to LAD        PAST SURGICAL HISTORY:  Past Surgical History:   Procedure Laterality Date     SHOULDER SURGERY         SOCIAL HISTORY:  Social History     Social History     Marital status:      Spouse name: N/A     Number of children: N/A     Years of education: N/A     Social History Main Topics     Smoking status: Never Smoker     Smokeless tobacco: Never Used     Alcohol use No     Drug use: No     Sexual activity: Not Asked     Other Topics Concern     None     Social History Narrative       FAMILY HISTORY:  History reviewed. No pertinent family history.    MEDS:   Current Outpatient Prescriptions on File Prior to Visit:  aspirin 81 MG EC tablet Take 1 tablet (81 mg) by mouth daily   atorvastatin (LIPITOR) 40 MG tablet Take 1 tablet (40 mg) by mouth daily   clopidogrel (PLAVIX) 75 MG tablet Take 1 tablet (75 mg) by mouth daily   insulin aspart (NOVOLOG FLEXPEN) 100 UNIT/ML injection Inject 16 Units Subcutaneous 2 times daily (with meals) Breakfast and Dinner   insulin detemir (LEVEMIR) 100 UNIT/ML injection Inject 20 Units Subcutaneous 2 times daily    Insulin Pen Needle 29G X 12.7MM MISC    melatonin 3 MG tablet Take 12 mg by mouth nightly as needed    metFORMIN  "(GLUCOPHAGE) 1000 MG tablet Take 1,000 mg by mouth 2 times daily (with meals)   nitroGLYcerin (NITROSTAT) 0.4 MG sublingual tablet Place 1 tablet (0.4 mg) under the tongue every 5 minutes as needed for chest pain If symptoms persist 5 minutes after 1st dose call 911.   [DISCONTINUED] losartan (COZAAR) 50 MG tablet Take 50 mg by mouth daily    [DISCONTINUED] metoprolol tartrate (LOPRESSOR) 25 MG tablet Take 1 tablet (25 mg) by mouth 2 times daily     No current facility-administered medications on file prior to visit.     ALLERGIES:   Allergies   Allergen Reactions     Brilinta [Ticagrelor]      Duloxetine      Other reaction(s): Confusion  Per daughter who is MD     Gabapentin      Other reaction(s): Edema     Lisinopril Rash       PHYSICAL EXAM:  Vitals: BP 98/50 (BP Location: Right arm, Patient Position: Sitting, Cuff Size: Adult Large)  Pulse 72  Ht 1.854 m (6' 1\")  Wt 96.3 kg (212 lb 6.4 oz)  BMI 28.02 kg/m2  Constitutional:  cooperative;well developed;well nourished;in no acute distress        Skin:  warm and dry to the touch        Head:  normocephalic;no masses or lesions        Eyes:  pupils equal and round;conjunctivae and lids unremarkable        ENT:  no pallor or cyanosis        Neck:  JVP normal        Respiratory:  clear to auscultation;normal symmetry        Cardiac: regular rhythm;normal S1 and S2;no murmurs, gallops or rubs detected                  GI:  abdomen soft;BS normoactive        Vascular:                                        Extremities and Musculoskeletal:  no edema;no deformities, clubbing, cyanosis, erythema observed;no spinal abnormalities noted        Neurological:  no gross motor deficits          LABS/DATA:  I reviewed the following:  Component      Latest Ref Rng & Units 10/18/2018   Cholesterol      <200 mg/dL 167   Triglycerides      <150 mg/dL 215 (H)   HDL Cholesterol      >39 mg/dL 39 (L)   LDL Cholesterol Calculated      <100 mg/dL 85   Non HDL Cholesterol      <130 " mg/dL 128   ALT      0 - 70 U/L 48         ASSESSMENT/PLAN:  CAD  - NSTEMI 6/2018 s/p CECILY to proximal LAD, mild CAD elsewhere.  - Preserved LVEF  - ASA 81 mg lifelong, Plavix x 1 year min, BB, ARB, statin  - LDL is improved today after restarting statin 10/4/18  - He has some lower BPs and fatigue. I will decrease metoprolol to 12.5 mg BID and decrease losartan to 25 mg daily.  In addition, I asked that he discuss possible depression with his PCP  - He has some atypical chest pains. I will review this with Dr. Strickland. Typically I would not pursue any further evauation given how atypical his sxs are, but given he was off Plavix x 2.5 weeks and has an LAD stent, I will discuss further with Dr. Strickland  - Mediterranean style diet  - I did recommend that he resume cardiac rehab. He has bad feelings toward it because last time he was there, he ended up in the ER. I explained, that it had nothing to do with cardiac rehab, and was due to being off his DM meds    Dyslipidemia  -  LDL is improved today (114-> 85) after restarting atorvastatin 40 mg 10/4/18    DM  - was recently off DM meds  - Last A1C 10/3/18 was 9.9 indicating poor control  - Follow up with PCP      Follow up:  Follow up with me in about 1 month to evaluate any response to decrease in meds  Cardiac rehab  PCP  Dr. Strickland 6/2019    Chante Givens PA-C    Thank you for allowing me to participate in the care of your patient.      Sincerely,     Chante Givens PA-C     [facility]    cc:   Wilber Strickland MD  [address]  [place] [address]

## 2018-10-18 NOTE — PROGRESS NOTES
"CARDIOLOGY CLINIC PROGRESS NOTE    DOS: 10/18/2018      Matt Way  : 1950, 67 year old  MRN: [id number]      History: I met Matt Way today in the cardiology clinic for follow up.  He was accompanied by his wife, Salina.  He recently was seen by Dr. Strickland.    Matt Way is a 67-year-old gentleman who presented in early 2018 with chest pain, had troponin elevation and was diagnosed with non-ST elevation myocardial infarction.  He underwent coronary angiogram and was found to have a proximal LAD eccentric 90% stenosis which was successfully intervened with 2.75 x 12 mm drug-eluting stent.  He had 40% distal LAD stenosis, 50% diagonal first ostial stenosis, 10% proximal circumflex stenosis and 20% proximal RCA stenosis.  Echocardiogram showed normal LV function.  He has other comorbidities of diabetes.      He saw Dr. Strickland 18. At that time, he was having shortness of breath.  Dr. Strickland switched Brilinta to Plavix.     He was at cardiac rehab 10/3/18. He was having altered mental status changes. Glucose level was 600s. He was sent to the ED. There he noted he had stopped ALL his medications 2 1/2 weeks prior, in part due to not liking them, but mostly because they were expensive and he has financial concerns.     Interval History:   He presents today for follow up.    He is back on all his medications.   He notes poor sleep, ongoing for at least 5 years per his wife.  Also fatigue.  Generally, he seems like he has not \"perked\" up after his heart attack.   Breathing better after change to Plavix.  But still some mild KO.   He has occasional chest pain the past couple days - he describes them as sharp, very quick.  Before his heart attack he had chest pressure and left arm pain.  He does not have these sxs.   No LH, no dizziness. No syncope.    No bleeding issues.       ROS:  Skin:  Negative     Eyes:  Positive for glasses  ENT:  Negative    Respiratory:  Positive for dyspnea on " exertion  Cardiovascular:    palpitations;Positive for;chest pain;fatigue  Gastroenterology: Negative    Genitourinary:       Musculoskeletal:  Positive for neck pain  Neurologic:  Negative    Psychiatric:  Positive for sleep disturbances  Heme/Lymph/Imm:  Negative    Endocrine:  Positive for diabetes    PAST MEDICAL HISTORY:  Past Medical History:   Diagnosis Date     Coronary artery disease 06/2018    s/p CECILY to LAD      Diabetes (H)      NSTEMI (non-ST elevated myocardial infarction) (H) 06/2018    s/p CECILY to LAD        PAST SURGICAL HISTORY:  Past Surgical History:   Procedure Laterality Date     SHOULDER SURGERY         SOCIAL HISTORY:  Social History     Social History     Marital status:      Spouse name: N/A     Number of children: N/A     Years of education: N/A     Social History Main Topics     Smoking status: Never Smoker     Smokeless tobacco: Never Used     Alcohol use No     Drug use: No     Sexual activity: Not Asked     Other Topics Concern     None     Social History Narrative       FAMILY HISTORY:  History reviewed. No pertinent family history.    MEDS:   Current Outpatient Prescriptions on File Prior to Visit:  aspirin 81 MG EC tablet Take 1 tablet (81 mg) by mouth daily   atorvastatin (LIPITOR) 40 MG tablet Take 1 tablet (40 mg) by mouth daily   clopidogrel (PLAVIX) 75 MG tablet Take 1 tablet (75 mg) by mouth daily   insulin aspart (NOVOLOG FLEXPEN) 100 UNIT/ML injection Inject 16 Units Subcutaneous 2 times daily (with meals) Breakfast and Dinner   insulin detemir (LEVEMIR) 100 UNIT/ML injection Inject 20 Units Subcutaneous 2 times daily    Insulin Pen Needle 29G X 12.7MM MISC    melatonin 3 MG tablet Take 12 mg by mouth nightly as needed    metFORMIN (GLUCOPHAGE) 1000 MG tablet Take 1,000 mg by mouth 2 times daily (with meals)   nitroGLYcerin (NITROSTAT) 0.4 MG sublingual tablet Place 1 tablet (0.4 mg) under the tongue every 5 minutes as needed for chest pain If symptoms persist 5  "minutes after 1st dose call 911.   [DISCONTINUED] losartan (COZAAR) 50 MG tablet Take 50 mg by mouth daily    [DISCONTINUED] metoprolol tartrate (LOPRESSOR) 25 MG tablet Take 1 tablet (25 mg) by mouth 2 times daily     No current facility-administered medications on file prior to visit.     ALLERGIES:   Allergies   Allergen Reactions     Brilinta [Ticagrelor]      Duloxetine      Other reaction(s): Confusion  Per daughter who is MD     Gabapentin      Other reaction(s): Edema     Lisinopril Rash       PHYSICAL EXAM:  Vitals: BP 98/50 (BP Location: Right arm, Patient Position: Sitting, Cuff Size: Adult Large)  Pulse 72  Ht 1.854 m (6' 1\")  Wt 96.3 kg (212 lb 6.4 oz)  BMI 28.02 kg/m2  Constitutional:  cooperative;well developed;well nourished;in no acute distress        Skin:  warm and dry to the touch        Head:  normocephalic;no masses or lesions        Eyes:  pupils equal and round;conjunctivae and lids unremarkable        ENT:  no pallor or cyanosis        Neck:  JVP normal        Respiratory:  clear to auscultation;normal symmetry        Cardiac: regular rhythm;normal S1 and S2;no murmurs, gallops or rubs detected                  GI:  abdomen soft;BS normoactive        Vascular:                                        Extremities and Musculoskeletal:  no edema;no deformities, clubbing, cyanosis, erythema observed;no spinal abnormalities noted        Neurological:  no gross motor deficits          LABS/DATA:  I reviewed the following:  Component      Latest Ref Rng & Units 10/18/2018   Cholesterol      <200 mg/dL 167   Triglycerides      <150 mg/dL 215 (H)   HDL Cholesterol      >39 mg/dL 39 (L)   LDL Cholesterol Calculated      <100 mg/dL 85   Non HDL Cholesterol      <130 mg/dL 128   ALT      0 - 70 U/L 48         ASSESSMENT/PLAN:  CAD  - NSTEMI 6/2018 s/p CECILY to proximal LAD, mild CAD elsewhere.  - Preserved LVEF  - ASA 81 mg lifelong, Plavix x 1 year min, BB, ARB, statin  - LDL is improved today after " restarting statin 10/4/18  - He has some lower BPs and fatigue. I will decrease metoprolol to 12.5 mg BID and decrease losartan to 25 mg daily.  In addition, I asked that he discuss possible depression with his PCP  - He has some atypical chest pains. I will review this with Dr. Strickland. Typically I would not pursue any further evauation given how atypical his sxs are, but given he was off Plavix x 2.5 weeks and has an LAD stent, I will discuss further with Dr. Strickland  - Mediterranean style diet  - I did recommend that he resume cardiac rehab. He has bad feelings toward it because last time he was there, he ended up in the ER. I explained, that it had nothing to do with cardiac rehab, and was due to being off his DM meds    Dyslipidemia  -  LDL is improved today (114-> 85) after restarting atorvastatin 40 mg 10/4/18    DM  - was recently off DM meds  - Last A1C 10/3/18 was 9.9 indicating poor control  - Follow up with PCP      Follow up:  Follow up with me in about 1 month to evaluate any response to decrease in meds  Cardiac rehab  PCP  Dr. Strickland 6/2019    Chante Givens PA-C

## 2018-10-18 NOTE — TELEPHONE ENCOUNTER
Dr. Strickland,     I met Matt today.   Breathing is better after changing Brilinta to Plavix.   After he saw you, he stopped all hsi medications for about 2.5 weeks.   He is seeing me today. He reports that the past couple days he had some epsiodes of sharp, very short duration chest pains. These are NOT similar to his prior anginal pain which was chest pressure and left arm pain.   I just wanted to run by you since he had stopped his DAPT for the 2.5 weeks...    As FYI, I lowered BB and ARB for some fatigue and lower BPs.     Thanks,   Chante Givens PA-C 10/18/2018 3:50 PM

## 2018-10-22 NOTE — TELEPHONE ENCOUNTER
Premature discontinuation of DAPT will increase risk of stent thrombosis. He should be on uninterrupted DAPT for one year at least since PCI. In case he has exertional related symptoms like chest pain I would then recommend stress test.    Thanks  Wilber

## 2018-10-24 NOTE — TELEPHONE ENCOUNTER
Angel,     I did discuss the importance of staying on ASA and Plavix, but please reiterate this again to him.     His chest discomfort did not sound like typical angina.  No further evaluation at this time (see Dr. Strickland' note).  However, he needs to let us know if he develops pain like he had before, or exertional discomfort.     Thanks,   Chante Givens PA-C 10/24/2018 5:09 PM

## 2018-11-15 ENCOUNTER — APPOINTMENT (OUTPATIENT)
Dept: CT IMAGING | Facility: CLINIC | Age: 68
End: 2018-11-15
Attending: EMERGENCY MEDICINE
Payer: MEDICARE

## 2018-11-15 ENCOUNTER — APPOINTMENT (OUTPATIENT)
Dept: ULTRASOUND IMAGING | Facility: CLINIC | Age: 68
End: 2018-11-15
Attending: INTERNAL MEDICINE
Payer: MEDICARE

## 2018-11-15 ENCOUNTER — HOSPITAL ENCOUNTER (OUTPATIENT)
Facility: CLINIC | Age: 68
Setting detail: OBSERVATION
Discharge: HOME | End: 2018-11-15
Attending: EMERGENCY MEDICINE | Admitting: INTERNAL MEDICINE
Payer: MEDICARE

## 2018-11-15 VITALS
OXYGEN SATURATION: 97 % | RESPIRATION RATE: 16 BRPM | HEART RATE: 75 BPM | BODY MASS INDEX: 28.1 KG/M2 | SYSTOLIC BLOOD PRESSURE: 131 MMHG | DIASTOLIC BLOOD PRESSURE: 81 MMHG | TEMPERATURE: 97.1 F | HEIGHT: 73 IN | WEIGHT: 212 LBS

## 2018-11-15 DIAGNOSIS — I26.99 OTHER ACUTE PULMONARY EMBOLISM WITHOUT ACUTE COR PULMONALE (H): ICD-10-CM

## 2018-11-15 DIAGNOSIS — R07.9 ACUTE CHEST PAIN: ICD-10-CM

## 2018-11-15 LAB
ANION GAP SERPL CALCULATED.3IONS-SCNC: 6 MMOL/L (ref 3–14)
BASOPHILS # BLD AUTO: 0.1 10E9/L (ref 0–0.2)
BASOPHILS NFR BLD AUTO: 1 %
BUN SERPL-MCNC: 26 MG/DL (ref 7–30)
CALCIUM SERPL-MCNC: 9 MG/DL (ref 8.5–10.1)
CHLORIDE SERPL-SCNC: 103 MMOL/L (ref 94–109)
CO2 SERPL-SCNC: 26 MMOL/L (ref 20–32)
CREAT SERPL-MCNC: 0.97 MG/DL (ref 0.66–1.25)
D DIMER PPP FEU-MCNC: 1.2 UG/ML FEU (ref 0–0.5)
DIFFERENTIAL METHOD BLD: NORMAL
EOSINOPHIL # BLD AUTO: 0.3 10E9/L (ref 0–0.7)
EOSINOPHIL NFR BLD AUTO: 3.6 %
ERYTHROCYTE [DISTWIDTH] IN BLOOD BY AUTOMATED COUNT: 13.5 % (ref 10–15)
GFR SERPL CREATININE-BSD FRML MDRD: 78 ML/MIN/1.7M2
GLUCOSE BLDC GLUCOMTR-MCNC: 275 MG/DL (ref 70–99)
GLUCOSE SERPL-MCNC: 103 MG/DL (ref 70–99)
HCT VFR BLD AUTO: 47.9 % (ref 40–53)
HGB BLD-MCNC: 15.7 G/DL (ref 13.3–17.7)
IMM GRANULOCYTES # BLD: 0 10E9/L (ref 0–0.4)
IMM GRANULOCYTES NFR BLD: 0.3 %
INTERPRETATION ECG - MUSE: NORMAL
LYMPHOCYTES # BLD AUTO: 3 10E9/L (ref 0.8–5.3)
LYMPHOCYTES NFR BLD AUTO: 38.3 %
MCH RBC QN AUTO: 29.3 PG (ref 26.5–33)
MCHC RBC AUTO-ENTMCNC: 32.8 G/DL (ref 31.5–36.5)
MCV RBC AUTO: 90 FL (ref 78–100)
MONOCYTES # BLD AUTO: 0.6 10E9/L (ref 0–1.3)
MONOCYTES NFR BLD AUTO: 7.4 %
NEUTROPHILS # BLD AUTO: 3.9 10E9/L (ref 1.6–8.3)
NEUTROPHILS NFR BLD AUTO: 49.4 %
NRBC # BLD AUTO: 0 10*3/UL
NRBC BLD AUTO-RTO: 0 /100
NT-PROBNP SERPL-MCNC: 84 PG/ML (ref 0–900)
PLATELET # BLD AUTO: 236 10E9/L (ref 150–450)
POTASSIUM SERPL-SCNC: 3.9 MMOL/L (ref 3.4–5.3)
RADIOLOGIST FLAGS: ABNORMAL
RBC # BLD AUTO: 5.35 10E12/L (ref 4.4–5.9)
SODIUM SERPL-SCNC: 135 MMOL/L (ref 133–144)
TROPONIN I BLD-MCNC: 0 UG/L (ref 0–0.08)
TROPONIN I SERPL-MCNC: <0.015 UG/L (ref 0–0.04)
WBC # BLD AUTO: 7.8 10E9/L (ref 4–11)

## 2018-11-15 PROCEDURE — 96372 THER/PROPH/DIAG INJ SC/IM: CPT | Mod: 59

## 2018-11-15 PROCEDURE — 83880 ASSAY OF NATRIURETIC PEPTIDE: CPT | Performed by: EMERGENCY MEDICINE

## 2018-11-15 PROCEDURE — 99207 ZZC CDG-CODE CATEGORY CHANGED: CPT | Performed by: INTERNAL MEDICINE

## 2018-11-15 PROCEDURE — 93970 EXTREMITY STUDY: CPT

## 2018-11-15 PROCEDURE — A9270 NON-COVERED ITEM OR SERVICE: HCPCS | Mod: GY | Performed by: INTERNAL MEDICINE

## 2018-11-15 PROCEDURE — G0378 HOSPITAL OBSERVATION PER HR: HCPCS

## 2018-11-15 PROCEDURE — 99207 ZZC APP CREDIT; MD BILLING SHARED VISIT: CPT | Performed by: PHYSICIAN ASSISTANT

## 2018-11-15 PROCEDURE — 99214 OFFICE O/P EST MOD 30 MIN: CPT | Performed by: INTERNAL MEDICINE

## 2018-11-15 PROCEDURE — 00000146 ZZHCL STATISTIC GLUCOSE BY METER IP

## 2018-11-15 PROCEDURE — 71260 CT THORAX DX C+: CPT

## 2018-11-15 PROCEDURE — 25000132 ZZH RX MED GY IP 250 OP 250 PS 637: Performed by: INTERNAL MEDICINE

## 2018-11-15 PROCEDURE — 25000132 ZZH RX MED GY IP 250 OP 250 PS 637: Mod: GY | Performed by: PHYSICIAN ASSISTANT

## 2018-11-15 PROCEDURE — 25000132 ZZH RX MED GY IP 250 OP 250 PS 637: Mod: GY | Performed by: EMERGENCY MEDICINE

## 2018-11-15 PROCEDURE — 84484 ASSAY OF TROPONIN QUANT: CPT | Mod: 91 | Performed by: EMERGENCY MEDICINE

## 2018-11-15 PROCEDURE — A9270 NON-COVERED ITEM OR SERVICE: HCPCS | Mod: GY | Performed by: EMERGENCY MEDICINE

## 2018-11-15 PROCEDURE — 85379 FIBRIN DEGRADATION QUANT: CPT | Performed by: EMERGENCY MEDICINE

## 2018-11-15 PROCEDURE — 99235 HOSP IP/OBS SAME DATE MOD 70: CPT | Performed by: INTERNAL MEDICINE

## 2018-11-15 PROCEDURE — 25000128 H RX IP 250 OP 636: Performed by: EMERGENCY MEDICINE

## 2018-11-15 PROCEDURE — A9270 NON-COVERED ITEM OR SERVICE: HCPCS | Mod: GY | Performed by: PHYSICIAN ASSISTANT

## 2018-11-15 PROCEDURE — 93005 ELECTROCARDIOGRAM TRACING: CPT

## 2018-11-15 PROCEDURE — 99285 EMERGENCY DEPT VISIT HI MDM: CPT | Mod: 25

## 2018-11-15 PROCEDURE — 85025 COMPLETE CBC W/AUTO DIFF WBC: CPT | Performed by: EMERGENCY MEDICINE

## 2018-11-15 PROCEDURE — 84484 ASSAY OF TROPONIN QUANT: CPT

## 2018-11-15 PROCEDURE — 80048 BASIC METABOLIC PNL TOTAL CA: CPT | Performed by: EMERGENCY MEDICINE

## 2018-11-15 RX ORDER — IOPAMIDOL 755 MG/ML
500 INJECTION, SOLUTION INTRAVASCULAR ONCE
Status: COMPLETED | OUTPATIENT
Start: 2018-11-15 | End: 2018-11-15

## 2018-11-15 RX ORDER — LANOLIN ALCOHOL/MO/W.PET/CERES
12 CREAM (GRAM) TOPICAL
Status: DISCONTINUED | OUTPATIENT
Start: 2018-11-15 | End: 2018-11-15 | Stop reason: HOSPADM

## 2018-11-15 RX ORDER — ASPIRIN 81 MG/1
324 TABLET, CHEWABLE ORAL ONCE
Status: COMPLETED | OUTPATIENT
Start: 2018-11-15 | End: 2018-11-15

## 2018-11-15 RX ORDER — ACETAMINOPHEN 650 MG/1
650 SUPPOSITORY RECTAL EVERY 4 HOURS PRN
Status: DISCONTINUED | OUTPATIENT
Start: 2018-11-15 | End: 2018-11-15 | Stop reason: HOSPADM

## 2018-11-15 RX ORDER — CALCIUM CARBONATE 500 MG/1
1000 TABLET, CHEWABLE ORAL 3 TIMES DAILY PRN
Status: DISCONTINUED | OUTPATIENT
Start: 2018-11-15 | End: 2018-11-15 | Stop reason: HOSPADM

## 2018-11-15 RX ORDER — LOSARTAN POTASSIUM 25 MG/1
25 TABLET ORAL DAILY
Status: DISCONTINUED | OUTPATIENT
Start: 2018-11-15 | End: 2018-11-15 | Stop reason: HOSPADM

## 2018-11-15 RX ORDER — ACETAMINOPHEN 325 MG/1
650 TABLET ORAL EVERY 6 HOURS PRN
Status: DISCONTINUED | OUTPATIENT
Start: 2018-11-15 | End: 2018-11-15 | Stop reason: HOSPADM

## 2018-11-15 RX ORDER — NALOXONE HYDROCHLORIDE 0.4 MG/ML
.1-.4 INJECTION, SOLUTION INTRAMUSCULAR; INTRAVENOUS; SUBCUTANEOUS
Status: DISCONTINUED | OUTPATIENT
Start: 2018-11-15 | End: 2018-11-15 | Stop reason: HOSPADM

## 2018-11-15 RX ORDER — ONDANSETRON 4 MG/1
4 TABLET, ORALLY DISINTEGRATING ORAL EVERY 6 HOURS PRN
Status: DISCONTINUED | OUTPATIENT
Start: 2018-11-15 | End: 2018-11-15 | Stop reason: HOSPADM

## 2018-11-15 RX ORDER — ONDANSETRON 2 MG/ML
4 INJECTION INTRAMUSCULAR; INTRAVENOUS EVERY 6 HOURS PRN
Status: DISCONTINUED | OUTPATIENT
Start: 2018-11-15 | End: 2018-11-15 | Stop reason: HOSPADM

## 2018-11-15 RX ORDER — LIDOCAINE 40 MG/G
CREAM TOPICAL
Status: DISCONTINUED | OUTPATIENT
Start: 2018-11-15 | End: 2018-11-15 | Stop reason: HOSPADM

## 2018-11-15 RX ORDER — ASPIRIN 81 MG/1
81 TABLET ORAL DAILY
Status: DISCONTINUED | OUTPATIENT
Start: 2018-11-15 | End: 2018-11-15 | Stop reason: HOSPADM

## 2018-11-15 RX ORDER — ATORVASTATIN CALCIUM 40 MG/1
40 TABLET, FILM COATED ORAL DAILY
Status: DISCONTINUED | OUTPATIENT
Start: 2018-11-15 | End: 2018-11-15 | Stop reason: HOSPADM

## 2018-11-15 RX ORDER — CLOPIDOGREL BISULFATE 75 MG/1
75 TABLET ORAL DAILY
Status: DISCONTINUED | OUTPATIENT
Start: 2018-11-15 | End: 2018-11-15 | Stop reason: HOSPADM

## 2018-11-15 RX ADMIN — ASPIRIN 81 MG: 81 TABLET, COATED ORAL at 09:34

## 2018-11-15 RX ADMIN — ENOXAPARIN SODIUM 100 MG: 100 INJECTION SUBCUTANEOUS at 02:27

## 2018-11-15 RX ADMIN — CALCIUM CARBONATE (ANTACID) CHEW TAB 500 MG 1000 MG: 500 CHEW TAB at 04:31

## 2018-11-15 RX ADMIN — CLOPIDOGREL 75 MG: 75 TABLET, FILM COATED ORAL at 09:33

## 2018-11-15 RX ADMIN — METFORMIN HYDROCHLORIDE 1000 MG: 500 TABLET ORAL at 09:33

## 2018-11-15 RX ADMIN — RIVAROXABAN 15 MG: 15 TABLET, FILM COATED ORAL at 16:20

## 2018-11-15 RX ADMIN — ATORVASTATIN CALCIUM 40 MG: 40 TABLET, FILM COATED ORAL at 09:34

## 2018-11-15 RX ADMIN — IOPAMIDOL 75 ML: 755 INJECTION, SOLUTION INTRAVENOUS at 01:44

## 2018-11-15 RX ADMIN — SODIUM CHLORIDE 12.5 MG: 9 INJECTION, SOLUTION INTRAVENOUS at 09:34

## 2018-11-15 RX ADMIN — SODIUM CHLORIDE 88 ML: 9 INJECTION, SOLUTION INTRAVENOUS at 01:44

## 2018-11-15 RX ADMIN — LOSARTAN POTASSIUM 25 MG: 25 TABLET ORAL at 09:34

## 2018-11-15 RX ADMIN — ASPIRIN 81 MG 324 MG: 81 TABLET ORAL at 01:03

## 2018-11-15 ASSESSMENT — ENCOUNTER SYMPTOMS
COUGH: 0
BLOOD IN STOOL: 0
MYALGIAS: 1
DIAPHORESIS: 0
NUMBNESS: 0
ABDOMINAL PAIN: 0
FEVER: 0
NAUSEA: 0
CONSTIPATION: 1
SHORTNESS OF BREATH: 0

## 2018-11-15 NOTE — IP AVS SNAPSHOT
MRN:[id number]                      After Visit Summary   11/15/2018    Matt Way    MRN: [id number]           Thank you!     Thank you for choosing [facility] for your care. Our goal is always to provide you with excellent care. Hearing back from our patients is one way we can continue to improve our services. Please take a few minutes to complete the written survey that you may receive in the mail after you visit. If you would like to speak to someone directly about your visit please contact Patient Relations at [phone]. Thank you!          Patient Information     Date Of Birth          1950        About your hospital stay     You were admitted on:  November 15, 2018 You last received care in the:  [facility] Observation Department    You were discharged on:  November 15, 2018        Reason for your hospital stay       You were admitted for concerns of chest pain and we found a small pulmonary embolism. It is unclear if this is an incidental finding or the cause of your symptoms. We had you see Cardiology who does not believe your chest pain is related to your known coronary artery disease. They recommend stopping your Aspirin, continuing Plavix and starting Xarelto for the blood clot. Xarelto will make you more at risk for bleeding so you will need to be seen if you have a traumatic fall or car accident.     You will need to take Xarelto 15 mg twice daily for 21 days which I have provided a prescription for. You will need to continue Xarelto after this but your primary doctor can provide you with that prescription.     We are unsure what caused the blood clot. We recommend you have a colonoscopy and also see a Hematologist. We have provided you with a referral. You should also schedule an appointment with your Cardiologist in 1-2 months.                  Who to Call     For medical emergencies, please call 911.  For non-urgent questions about your  medical care, please call your primary care provider or clinic, [phone]          Attending Provider     Provider Specialty    Yessenia Choi MD Emergency Medicine    [facility], John FU MD Internal Medicine       Primary Care Provider Office Phone # Fax #    [facility] [phone]      After Care Instructions     Activity       Your activity upon discharge: activity as tolerated            Diet       Follow this diet upon discharge: Regular                  Follow-up Appointments     Follow-up and recommended labs and tests        Follow up with primary care provide within 7-14 days to evaluate medication change and for hospital follow- up.  No follow up labs or test are needed.    Hematology referral placed                  Additional Services     ONC/HEME ADULT REFERRAL       Your provider has referred you to: [facility]: Cancer Care/Hematology (All Cancer Related Services) [facility] [phone]   [email or web address]    Please be aware that coverage of these services is subject to the terms and limitations of your health insurance plan.  Call member services at your health plan with any benefit or coverage questions.      Please bring the following with you to your appointment:    (1) Any X-Rays, CTs or MRIs which have been performed.  Contact the facility where they were done to arrange for  prior to your scheduled appointment.   (2) List of current medications  (3) This referral request   (4) Any documents/labs given to you for this referral                             Pending Results     Date and Time Order Name Status Description    11/15/2018 0406 US Lower Extremity Venous Duplex Bilateral Preliminary             Statement of Approval     Ordered          11/15/18 1519  I have reviewed and agree with all the recommendations and orders detailed in this document.  EFFECTIVE NOW     Approved and electronically  "signed by:  Su Ramirez PA-C             Admission Information     Date & Time Provider Department Dept. Phone    11/15/2018 John Franklin MD [facility] Observation Department [phone]      Your Vitals Were     Blood Pressure Pulse Temperature Respirations Height Weight    131/81 (BP Location: Left arm) 75 97.1  F (36.2  C) (Oral) 16 1.854 m (6' 1\") 96.2 kg (212 lb)    Pulse Oximetry BMI (Body Mass Index)                97% 27.97 kg/m2          [employer] Information     [employer] lets you send messages to your doctor, view your test results, renew your prescriptions, schedule appointments and more. To sign up, go to [email or web address] . Click on \"Log in\" on the left side of the screen, which will take you to the Welcome page. Then click on \"Sign up Now\" on the right side of the page.     You will be asked to enter the access code listed below, as well as some personal information. Please follow the directions to create your username and password.     Your access code is: [email or web address]  Expires: 2018 10:46 AM     Your access code will  in 90 days. If you need help or a new code, please call your [facility] clinic or [phone].        Care EveryWhere ID     This is your Care EveryWhere ID. This could be used by other organizations to access your [facility] medical records  [id number]        Equal Access to Services     [facility] CORY : Hadii thomas vazquez hadasho Socarlosali, waaxda luqadaha, qaybta kaalmada clarkegyachris, quinten frazier . So [facility] [phone].    ATENCIÓN: Si habla español, tiene a carcamo disposición servicios gratuitos de asistencia lingüística. Llame al [phone].    We comply with applicable federal civil rights laws and [place] laws. We do not discriminate on the basis of race, color, national origin, age, disability, sex, sexual orientation, or gender identity.               Review of your medicines      START taking        Dose / " Directions    rivaroxaban ANTICOAGULANT 15 MG Tabs tablet   Commonly known as:  XARELTO   Used for:  Other acute pulmonary embolism without acute cor pulmonale (H)        Dose:  15 mg   Start taking on:  11/16/2018   Take 1 tablet (15 mg) by mouth 2 times daily (with meals) for 21 days   Quantity:  42 tablet   Refills:  0         CONTINUE these medicines which have NOT CHANGED        Dose / Directions    atorvastatin 40 MG tablet   Commonly known as:  LIPITOR   Used for:  NSTEMI (non-ST elevated myocardial infarction) (H)        Dose:  40 mg   Take 1 tablet (40 mg) by mouth daily   Quantity:  30 tablet   Refills:  1       clopidogrel 75 MG tablet   Commonly known as:  PLAVIX   Used for:  NSTEMI (non-ST elevated myocardial infarction) (H)        Dose:  75 mg   Take 1 tablet (75 mg) by mouth daily   Quantity:  90 tablet   Refills:  3       insulin detemir 100 UNIT/ML injection   Commonly known as:  LEVEMIR        Dose:  20 Units   Inject 20 Units Subcutaneous 2 times daily   Refills:  0       insulin pen needle 29G X 12.7MM        Refills:  0       losartan 25 MG tablet   Commonly known as:  COZAAR   Used for:  NSTEMI (non-ST elevated myocardial infarction) (H), Benign essential hypertension        Dose:  25 mg   Take 1 tablet (25 mg) by mouth daily   Quantity:  30 tablet   Refills:  11       melatonin 3 MG tablet        Dose:  12 mg   Take 12 mg by mouth nightly as needed   Refills:  3       metFORMIN 1000 MG tablet   Commonly known as:  GLUCOPHAGE        Dose:  1000 mg   Take 1,000 mg by mouth 2 times daily (with meals)   Refills:  0       metoprolol tartrate 25 MG tablet   Commonly known as:  LOPRESSOR   Used for:  Benign essential hypertension, NSTEMI (non-ST elevated myocardial infarction) (H)        Dose:  12.5 mg   Take 0.5 tablets (12.5 mg) by mouth 2 times daily   Quantity:  30 tablet   Refills:  11       nitroGLYcerin 0.4 MG sublingual tablet   Commonly known as:  NITROSTAT   Used for:  NSTEMI (non-ST  elevated myocardial infarction) (H), Acute chest pain        Dose:  0.4 mg   Place 1 tablet (0.4 mg) under the tongue every 5 minutes as needed for chest pain If symptoms persist 5 minutes after 1st dose call 911.   Quantity:  10 tablet   Refills:  0       NovoLOG FLEXPEN 100 UNIT/ML injection   Generic drug:  insulin aspart        Dose:  16 Units   Inject 16 Units Subcutaneous 2 times daily (with meals) Breakfast and Dinner   Refills:  0         STOP taking     aspirin 81 MG EC tablet                Where to get your medicines      These medications were sent to [place] [address] [place] [facility] [address]  [address] [facility] [address]     Phone:  [phone]     rivaroxaban ANTICOAGULANT 15 MG Tabs tablet                Protect others around you: Learn how to safely use, store and throw away your medicines at www.disposemymeds.org.             Medication List: This is a list of all your medications and when to take them. Check marks below indicate your daily home schedule. Keep this list as a reference.      Medications           Morning Afternoon Evening Bedtime As Needed    atorvastatin 40 MG tablet   Commonly known as:  LIPITOR   Take 1 tablet (40 mg) by mouth daily   Last time this was given:  40 mg on 11/15/2018  9:34 AM                                clopidogrel 75 MG tablet   Commonly known as:  PLAVIX   Take 1 tablet (75 mg) by mouth daily   Last time this was given:  75 mg on 11/15/2018  9:33 AM                                insulin detemir 100 UNIT/ML injection   Commonly known as:  LEVEMIR   Inject 20 Units Subcutaneous 2 times daily                                insulin pen needle 29G X 12.7MM                                losartan 25 MG tablet   Commonly known as:  COZAAR   Take 1 tablet (25 mg) by mouth daily   Last time this was given:  25 mg on 11/15/2018  9:34 AM                                melatonin 3 MG tablet   Take 12 mg by mouth nightly as needed                                 metFORMIN 1000 MG tablet   Commonly known as:  GLUCOPHAGE   Take 1,000 mg by mouth 2 times daily (with meals)   Last time this was given:  1,000 mg on 11/15/2018  9:33 AM                                metoprolol tartrate 25 MG tablet   Commonly known as:  LOPRESSOR   Take 0.5 tablets (12.5 mg) by mouth 2 times daily   Last time this was given:  12.5 mg on 11/15/2018  9:34 AM                                nitroGLYcerin 0.4 MG sublingual tablet   Commonly known as:  NITROSTAT   Place 1 tablet (0.4 mg) under the tongue every 5 minutes as needed for chest pain If symptoms persist 5 minutes after 1st dose call 911.                                NovoLOG FLEXPEN 100 UNIT/ML injection   Inject 16 Units Subcutaneous 2 times daily (with meals) Breakfast and Dinner   Generic drug:  insulin aspart                                rivaroxaban ANTICOAGULANT 15 MG Tabs tablet   Commonly known as:  XARELTO   Take 1 tablet (15 mg) by mouth 2 times daily (with meals) for 21 days   Start taking on:  11/16/2018

## 2018-11-15 NOTE — DISCHARGE SUMMARY
Discharge Summary  Hospitalist Service    Matt Way MRN# [id number]   YOB: 1950 Age: 67 year old     Date of Admission:  11/15/2018  Date of Discharge:  11/15/2018  Admitting Physician: John Franklin MD  Discharge Physician: Su Ramirez PA-C  Discharging Service: Hospitalist Service     Primary Provider: [facility]  Primary Care Physician Phone Number: [phone]         Discharge Diagnoses/Problem Oriented Hospital Course (Providers):    Matt Way was admitted on 11/15/2018 by John Franklin MD and I would refer you to their history and physical.  The following problems were addressed during his hospitalization:      1. PE- Patient presented with day long history of left sided chest pain and arm pain. ECG was non ischemic and troponin was negative. D dimer was elevated at 1.2.  CT of the chest with PE protocol was performed showing a small left upper lobe PE.  He was placed on Lovenox and discussion was had with cardiology about appropriate anticoagulation given his concurrent use of aspirin and Plavix for recent MI.  Cardiology agreed that the aspirin could be stopped and he could continue on Plavix with Xarelto.  He was discharged with Xarelto prescription and hematology referral for coagulopathy workup.    2.  Chest discomfort-patient presented with left-sided chest discomfort with radiation into his left arm.  Clinically I did not think that a small pulmonary embolism would likely cause this.  He does have a history of coronary disease with stent placement in June of this year.  Therefore cardiology was consulted to assess him.  They do not believe his chest discomfort is cardiac given a normal ECG and troponin and I recommended he discontinue his medications as prescribed.  He should follow-up with cardiology.    3.  Lewy body dementia  Patient is known to have Lewy body dementia and does have memory issues.  He does take care of his wife who is  quadriplegic so he was very anxious to return home.  His wife does help him with his medications.    Prior to discharge his daughter was updated on the plan as well as his wife.             Code Status:      Full Code        Brief Hospital Stay Summary Sent Home With Patient in AVS:        Reason for your hospital stay       You were admitted for concerns of chest pain and we found a small   pulmonary embolism. It is unclear if this is an incidental finding or the   cause of your symptoms. We had you see Cardiology who does not believe   your chest pain is related to your known coronary artery disease. They   recommend stopping your Aspirin, continuing Plavix and starting Xarelto   for the blood clot. Xarelto will make you more at risk for bleeding so you   will need to be seen if you have a traumatic fall or car accident.     You will need to take Xarelto 15 mg twice daily for 21 days which I have   provided a prescription for. You will need to continue Xarelto after this   but your primary doctor can provide you with that prescription.     We are unsure what caused the blood clot. We recommend you have a   colonoscopy and also see a Hematologist. We have provided you with a   referral. You should also schedule an appointment with your Cardiologist   in 1-2 months.                                  Pending Results:        Unresulted Labs Ordered in the Past 30 Days of this Admission     No orders found from 9/16/2018 to 11/16/2018.            Discharge Instructions and Follow-Up:      Follow-up Appointments     Follow-up and recommended labs and tests        Follow up with primary care provide within 7-14 days to evaluate   medication change and for hospital follow- up.  No follow up labs or test   are needed.    Hematology referral placed                      Discharge Disposition:      Discharged to home         Discharge Medications:        Current Discharge Medication List      START taking these medications     Details   rivaroxaban ANTICOAGULANT (XARELTO) 15 MG TABS tablet Take 1 tablet (15 mg) by mouth 2 times daily (with meals) for 21 days  Qty: 42 tablet, Refills: 0    Associated Diagnoses: Other acute pulmonary embolism without acute cor pulmonale (H)         CONTINUE these medications which have NOT CHANGED    Details   atorvastatin (LIPITOR) 40 MG tablet Take 1 tablet (40 mg) by mouth daily  Qty: 30 tablet, Refills: 1    Associated Diagnoses: NSTEMI (non-ST elevated myocardial infarction) (H)      clopidogrel (PLAVIX) 75 MG tablet Take 1 tablet (75 mg) by mouth daily  Qty: 90 tablet, Refills: 3    Associated Diagnoses: NSTEMI (non-ST elevated myocardial infarction) (H)      insulin aspart (NOVOLOG FLEXPEN) 100 UNIT/ML injection Inject 16 Units Subcutaneous 2 times daily (with meals) Breakfast and Dinner      insulin detemir (LEVEMIR) 100 UNIT/ML injection Inject 20 Units Subcutaneous 2 times daily       losartan (COZAAR) 25 MG tablet Take 1 tablet (25 mg) by mouth daily  Qty: 30 tablet, Refills: 11    Associated Diagnoses: NSTEMI (non-ST elevated myocardial infarction) (H); Benign essential hypertension      melatonin 3 MG tablet Take 12 mg by mouth nightly as needed   Refills: 3      metFORMIN (GLUCOPHAGE) 1000 MG tablet Take 1,000 mg by mouth 2 times daily (with meals)      metoprolol tartrate (LOPRESSOR) 25 MG tablet Take 0.5 tablets (12.5 mg) by mouth 2 times daily  Qty: 30 tablet, Refills: 11    Associated Diagnoses: Benign essential hypertension; NSTEMI (non-ST elevated myocardial infarction) (H)      nitroGLYcerin (NITROSTAT) 0.4 MG sublingual tablet Place 1 tablet (0.4 mg) under the tongue every 5 minutes as needed for chest pain If symptoms persist 5 minutes after 1st dose call 911.  Qty: 10 tablet, Refills: 0    Associated Diagnoses: NSTEMI (non-ST elevated myocardial infarction) (H); Acute chest pain      Insulin Pen Needle 29G X 12.7MM MISC          STOP taking these medications       aspirin 81 MG EC  "tablet Comments:   Reason for Stopping:                 Allergies:         Allergies   Allergen Reactions     Brilinta [Ticagrelor]      Duloxetine      Other reaction(s): Confusion  Per daughter who is MD     Gabapentin      Other reaction(s): Edema     Lisinopril Rash           Consultations This Hospital Stay:      Consultation during this admission received from cardiology         Condition and Physical on Discharge:      Discharge condition: Stable   Vitals: Blood pressure 131/81, pulse 75, temperature 97.1  F (36.2  C), temperature source Oral, resp. rate 16, height 1.854 m (6' 1\"), weight 96.2 kg (212 lb), SpO2 97 %.     Constitutional:  Alert and oriented to person place and time but does ask questions repeatedly and is anxious about returning home to be with his wife.   Lungs:  Clear to auscultation bilaterally   Cardiovascular:  Regular rate and rhythm with no murmurs heard on auscultation   Abdomen:  Bowel sounds are present with no tenderness to palpation   Skin:  No rash or open sores are noted   Other:          Discharge Time:      Less than 30 minutes.        Image Results From This Hospital Stay (For Non-EPIC Providers):        Results for orders placed or performed during the hospital encounter of 11/15/18   Chest CT, IV contrast only - PE protocol     Value    Radiologist flags Pulmonary embolism (AA)    Narrative    CT CHEST PULMONARY EMBOLISM W CONTRAST  11/15/2018 1:50 AM    HISTORY: Chest pain.    TECHNIQUE: Scans obtained from the apices through the diaphragm with  IV contrast. 75 mL Isovue-370 injected. Radiation dose for this scan  was reduced using automated exposure control, adjustment of the mA  and/or kV according to patient size, or iterative reconstruction  technique.    COMPARISON: None.    FINDINGS: Evaluation of the pulmonary arterial system is remarkable  for a small embolus in a branch of the left upper lobe pulmonary  artery. No large central pulmonary embolus. No evidence of " right heart  strain. The heart is at the upper limits of normal in size. There is  no aortic aneurysm or dissection. No mediastinal, hilar or axillary  lymph node enlargement. There is mild dependent atelectasis  bilaterally. No pneumothorax or pleural effusion. Images through the  upper abdomen show no acute abnormality. There is mild degenerative  disease in the spine.      Impression    IMPRESSION: Small left upper lobe pulmonary embolus.     [Critical Result: Pulmonary embolism]    Finding was identified on 11/15/2018 1:51 AM.     Dr. Choi was contacted by me on 11/15/2018 1:54 AM and verbalized  understanding of the critical result.    CARIN CASSIDY MD   US Lower Extremity Venous Duplex Bilateral    Narrative    ULTRASOUND BILATERAL LOWER EXTREMITY VENOUS DUPLEX  11/15/2018 5:38 AM      HISTORY:  Left lower extremity swelling. PE noted on chest CT.      FINDINGS: The deep veins in the right and left lower extremity are  compressible throughout. The deep veins demonstrate normal venous  augmentation, waveforms and color Doppler flow. No evidence of  superficial thrombophlebitis.      Impression    IMPRESSION: No evidence of DVT.             Most Recent Lab Results In EPIC (For Non-EPIC Providers):    Most Recent 3 CBC's:  Recent Labs   Lab Test  11/15/18   0102  10/03/18   1113  06/09/18   0540   WBC  7.8  6.4  8.1   HGB  15.7  15.7  16.4   MCV  90  87  86   PLT  236  175  231      Most Recent 3 BMP's:  Recent Labs   Lab Test  11/15/18   0102  10/03/18   1113  06/09/18   0540   NA  135  129*  138   POTASSIUM  3.9  4.4  4.3   CHLORIDE  103  97  107   CO2  26  24  24   BUN  26  24  14   CR  0.97  0.98  0.86   ANIONGAP  6  8  7   CHRISTIE  9.0  8.8  9.1   GLC  103*  711*  110*     Most Recent 3 Troponin's:No lab results found.  Most Recent 3 INR's:No lab results found.  Most Recent 2 LFT's:  Recent Labs   Lab Test  10/18/18   1255   ALT  48     Most Recent Cholesterol Panel:  Recent Labs   Lab Test  10/18/18   1255    CHOL  167   LDL  85   HDL  39*   TRIG  215*     Most Recent 6 Bacteria Isolates From Any Culture (See EPIC Reports for Culture Details):No lab results found.  Most Recent TSH, T4 and HgbA1c:   Recent Labs   Lab Test  10/03/18   1113   A1C  9.9*

## 2018-11-15 NOTE — PLAN OF CARE
Problem: Patient Care Overview  Goal: Plan of Care/Patient Progress Review  Outcome: Adequate for Discharge Date Met: 11/15/18  Patient's After Visit Summary was reviewed with patient.   Patient verbalized understanding of After Visit Summary, recommended follow up and was given an opportunity to ask questions.   Discharge medications sent home with patient/family: No, Prescription sent to [facility] in [place]   Discharged with other:Family         OBSERVATION patient END time: 4:28 PM

## 2018-11-15 NOTE — ED NOTES
"[facility]  ED Nurse Handoff Report    Matt Way is a 67 year old male   ED Chief complaint: Chest Pain  . ED Diagnosis:   Final diagnoses:   Other acute pulmonary embolism without acute cor pulmonale (H)   Acute chest pain     Allergies:   Allergies   Allergen Reactions     Brilinta [Ticagrelor]      Duloxetine      Other reaction(s): Confusion  Per daughter who is MD     Gabapentin      Other reaction(s): Edema     Lisinopril Rash       Code Status: Full Code  Activity level - Baseline/Home:  Independent. Activity Level - Current:   Independent. Lift room needed: No. Bariatric: No   Needed: No   Isolation: No. Infection: Not Applicable.     Vital Signs:   Vitals:    11/15/18 0055 11/15/18 0130 11/15/18 0214 11/15/18 0230   BP: (!) 153/101 137/78 148/75 127/80   Pulse: 83      Resp: 20      Temp: 97.9  F (36.6  C)      TempSrc: Oral      SpO2: 98% 95% 97% 95%   Weight: 96.2 kg (212 lb)      Height: 1.854 m (6' 1\")          Cardiac Rhythm:  ,   Cardiac  Cardiac Rhythm: Normal sinus rhythm  Pain level: 0-10 Pain Scale: 0  Patient confused: No. Patient Falls Risk: Yes.   Elimination Status: Has voided   Patient Report - Initial Complaint: Chest pain. Focused Assessment: Pt A&Ox4 C/O epigastric pain and L arm pain onset yesterday Pt in NSR, denies SOB ABCD's intact. Pt dx with PE and treated with Lovenox.  Tests Performed:Labs, CT, EKG. Abnormal Results:   Labs Ordered and Resulted from Time of ED Arrival Up to the Time of Departure from the ED   BASIC METABOLIC PANEL - Abnormal; Notable for the following:        Result Value    Glucose 103 (*)     All other components within normal limits   D DIMER QUANTITATIVE - Abnormal; Notable for the following:     D Dimer 1.2 (*)     All other components within normal limits   CBC WITH PLATELETS DIFFERENTIAL   TROPONIN I   NT PROBNP INPATIENT   PULSE OXIMETRY NURSING   CARDIAC CONTINUOUS MONITORING   PERIPHERAL IV CATHETER   ISTAT TROPONIN NURSING " POCT   PATIENT CARE ORDER   TROPONIN POCT     Chest CT, IV contrast only - PE protocol   Preliminary Result   Abnormal   IMPRESSION: Small left upper lobe pulmonary embolus.       [Critical Result: Pulmonary embolism]      Finding was identified on 11/15/2018 1:51 AM.       Dr. Choi was contacted by me on 11/15/2018 1:54 AM and verbalized   understanding of the critical result.        Treatments provided: Lovenox, ASA Family Comments: n/a   OBS brochure/video discussed/provided to patient:  N/A  ED Medications:   Medications   aspirin chewable tablet 324 mg (324 mg Oral Given 11/15/18 0103)   0.9% sodium chloride BOLUS (0 mLs Intravenous Stopped 11/15/18 0146)   iopamidol (ISOVUE-370) solution 500 mL (75 mLs Intravenous Given 11/15/18 0144)   enoxaparin (LOVENOX) injection 100 mg (100 mg Subcutaneous Given 11/15/18 0227)     Drips infusing:  No  For the majority of the shift, the patient's behavior Green. Interventions performed were n/a.     Severe Sepsis OR Septic Shock Diagnosis Present: No      ED Nurse Name/Phone Number: Elmo Cook,   3:06 AM      RECEIVING UNIT ED HANDOFF REVIEW    Above ED Nurse Handoff Report was reviewed: Yes  Reviewed by: Kayce Wylie on November 15, 2018 at 3:36 AM

## 2018-11-15 NOTE — PLAN OF CARE
Problem: Patient Care Overview  Goal: Plan of Care/Patient Progress Review  Outcome: No Change  PRIMARY DIAGNOSIS: CHEST PAIN  OUTPATIENT/OBSERVATION GOALS TO BE MET BEFORE DISCHARGE:  1. Ruled out acute coronary syndrome (negative or stable Troponin):  Yes  2. Pain Status: Pain free.  3. Appropriate provocative testing performed: Yes  - Stress Test Procedure: N/A  - Interpretation of cardiac rhythm per telemetry tech: SR HR in 70's    4. Cleared by Consultants (if applicable):No  5. Return to near baseline physical activity: Yes  Discharge Planner Nurse   Safe discharge environment identified: Yes  Barriers to discharge: No       Entered by: Olivia Ricks 11/15/2018 2:59 PM      Patient alert and oriented  Peripheral IV saline locked  Up assist standby/independent  Tolerating diet  Not reporting chest pain  Possible discharge later today, continue to monitor  Please review provider order for any additional goals.   Nurse to notify provider when observation goals have been met and patient is ready for discharge.

## 2018-11-15 NOTE — IP AVS SNAPSHOT
[facility] Observation Department    [address]    [facility] [address]    Phone:  [phone]                                       After Visit Summary   11/15/2018    Matt Way    MRN: [id number]           After Visit Summary Signature Page     I have received my discharge instructions, and my questions have been answered. I have discussed any challenges I see with this plan with the nurse or doctor.    ..........................................................................................................................................  Patient/Patient Representative Signature      ..........................................................................................................................................  Patient Representative Print Name and Relationship to Patient    ..................................................               ................................................  Date                                   Time    ..........................................................................................................................................  Reviewed by Signature/Title    ...................................................              ..............................................  Date                                               Time          22EPIC Rev 08/18

## 2018-11-15 NOTE — ED TRIAGE NOTES
Pt to ER with c/o left arm pain which began yest a.m. Pt also states that he has had bad heatrburn, pt has hx of end stemi back in [place]

## 2018-11-15 NOTE — PROGRESS NOTES
Discharge Planner   Discharge Plans in progress: Yes  Barriers to discharge plan: Patient admitted from home, where he takes care of wife. Patient states no concerns discharging home.   Follow up plan: CM will continue to follow for discharge needs and planning.        Entered by: Alicia Renee 11/15/2018 1:33 PM

## 2018-11-15 NOTE — PHARMACY-ADMISSION MEDICATION HISTORY
Admission medication history interview status for this patient is complete. See [facility] admission navigator for allergy information, prior to admission medications and immunization status.     Medication history interview source(s):Patient  Medication history resources (including written lists, pill bottles, clinic record):None  Primary pharmacy: [facility] in [facility]    Changes made to PTA medication list:  Added: None  Deleted: None  Changed: None    Actions taken by pharmacist (provider contacted, etc):None     Additional medication history information: Pt reports he has trouble with his memory (has been prescribed donepezil for dementia, however he does not remember this medication), however he takes care of his medications on his own, as his wife is quadriplegic. He was able to recall his insulin types and number of units/injection without prompting, and the remaining medications he knew doses and names with prompting.    Medication reconciliation/reorder completed by provider prior to medication history? Yes    Do you take OTC medications (eg tylenol, ibuprofen, fish oil, eye/ear drops, etc)? Y (Y/N)    For patients on insulin therapy: Y (Y/N)  Levemirdose:   Y (see Med list for doses)   Sliding scale Novolog N  If Yes, do you have a baseline novolog pre-meal dose:  units with meals  Patients eat three meals a day:  N, pt eats about 5 equal sized meals per day    How many episodes of hypoglycemia do you have per week: 0-1/wk  How many missed doses do you have per week: 0-1/wk, possibly more  How many times do you check your blood glucose per day: 2/day  Do you have a Continuous glucose monitor (CGM)  N (remind pt that not approved for hospital use)   Any Barriers to therapy - Be specific :  cost of medications, comfortable with giving injections (if applicable), comfortable and confident with current diabetes regimen: Pt states he has issues remembering even though he has had a routine for many  years.      Prior to Admission medications    Medication Sig Last Dose Taking? Auth Provider   aspirin 81 MG EC tablet Take 1 tablet (81 mg) by mouth daily 11/14/2018 at AM Yes Ruth Hardin PA-C   atorvastatin (LIPITOR) 40 MG tablet Take 1 tablet (40 mg) by mouth daily 11/14/2018 at AM Yes Ruth Hardin PA-C   clopidogrel (PLAVIX) 75 MG tablet Take 1 tablet (75 mg) by mouth daily 11/14/2018 at AM Yes Wilber Strickland MD   insulin aspart (NOVOLOG FLEXPEN) 100 UNIT/ML injection Inject 16 Units Subcutaneous 2 times daily (with meals) Breakfast and Dinner 11/14/2018 at AM Yes Reported, Patient   insulin detemir (LEVEMIR) 100 UNIT/ML injection Inject 20 Units Subcutaneous 2 times daily  11/14/2018 at AM Yes Reported, Patient   losartan (COZAAR) 25 MG tablet Take 1 tablet (25 mg) by mouth daily 11/14/2018 at AM Yes Chante Givens PA-C   melatonin 3 MG tablet Take 12 mg by mouth nightly as needed  Past Week at Unknown time Yes Reported, Patient   metFORMIN (GLUCOPHAGE) 1000 MG tablet Take 1,000 mg by mouth 2 times daily (with meals) 11/14/2018 at AM Yes Unknown, Entered By History   metoprolol tartrate (LOPRESSOR) 25 MG tablet Take 0.5 tablets (12.5 mg) by mouth 2 times daily 11/14/2018 at AM Yes Chante Givens PA-C   nitroGLYcerin (NITROSTAT) 0.4 MG sublingual tablet Place 1 tablet (0.4 mg) under the tongue every 5 minutes as needed for chest pain If symptoms persist 5 minutes after 1st dose call 911. 11/14/2018 at Unknown time Yes Ruth Hardin PA-C   Insulin Pen Needle 29G X 12.7MM MISC    Reported, Patient

## 2018-11-15 NOTE — PHARMACY
Anticoagulation coverage check.  Patient has Medicare D through [employer], as well as Medical Assistance.    Xarelto/Eliquis  $3.70/mo    Enoxaparin mg x 10 syringes:  $1.25/mo    Warfarin  $1.25/mo      -TROY Pantoja, Pharmacy Technician/Liaison, Discharge Pharmacy [phone]

## 2018-11-15 NOTE — CONSULTS
[facility]    Cardiology Consultation     Date of Admission:  11/15/2018    Primary Care Physician   [facility]  Consult Date:  11/15/2018         REASON FOR CONSULTATION:  Chest pain.      REFERRING PHYSICIAN:  Hospitalist Service.      IMPRESSION:   1.  Noncardiac chest pains, likely musculoskeletal.   2.  Recent acute coronary syndrome with drug-eluting stent placement in the proximal left anterior descending artery.  Normal left ventricular systolic function.   3.  Small left upper lobe pulmonary embolism, hemodynamically insignificant.   4.  Lewy body dementia.   5.  Diabetes mellitus of greater than 15 years' duration.   6.  Hypertension.   7.  History of medication noncompliance.      I am happy to see that this gentleman's troponin is negative and he had an unremarkable EKG.  In addition to that, his presenting symptom of recurrent squeezing left arm pain may be more likely due to osteoarthritis of the cervical spine.  He does describe 2 seconds worth of twinges in his left side of the chest which is clearly not cardiac in origin.      I think the small PE is likely an incidental finding.  Nonetheless, we should probably treat it with oral anticoagulation.  It is almost 6 months since his acute coronary syndrome event and I think we can put him on a combination of Plavix NOAC such as Xarelto.  I think this would offer the best balance between the risk of bleeding and risk of recurrent thromboembolic disease as well as risk of stent thrombosis.      I think the patient is stable enough for discharge.  I spoke with the Hospitalist Service as well as the patient's daughter,  Smith.      HISTORY OF PRESENT ILLNESS:  A very pleasant 67-year-old gentleman with Lewy body dementia who also has to look after his wife who is a quadriplegic.  He is a longstanding diabetic with hypertension.  He had acute coronary syndrome in June of this year.  He presented with atypical chest  pains.  He had significant proximal LAD disease and this was revascularized with insertion of a drug-eluting stent.  There was mild nonobstructive disease elsewhere.  Left ventricular systolic function is normal.  Since that event, he had a brief period during which he stopped all his medications, but he has been taking all his medications for the past few weeks, including his dual antiplatelet therapy.      Yesterday he presented with recurrent squeezing sensation in his left upper arm which was not associated with exertion and not relieved by rest.  In addition, he has had several twinges of chest discomfort in the left side of his chest lasting at most 2 seconds.  His wife was concerned that he may have a recurrent coronary artery disease and he came to the emergency room from where he was admitted for further evaluation.  Review of his medical records do also show that he has had chest pain since the revascularization.      Since admission, he has been completely asymptomatic.  A chest CT does show the presence of a small pulmonary embolism on the left side.        Past Medical History   I have reviewed this patient's medical history and updated it with pertinent information if needed.   Past Medical History:   Diagnosis Date     Coronary artery disease 06/2018    s/p CECILY to LAD      Diabetes (H)      NSTEMI (non-ST elevated myocardial infarction) (H) 06/2018    s/p CECILY to LAD        Past Surgical History   I have reviewed this patient's surgical history and updated it with pertinent information if needed.  Past Surgical History:   Procedure Laterality Date     SHOULDER SURGERY         Prior to Admission Medications   Prior to Admission Medications   Prescriptions Last Dose Informant Patient Reported? Taking?   Insulin Pen Needle 29G X 12.7MM MISC   Yes No   aspirin 81 MG EC tablet 11/14/2018 at AM  No No   Sig: Take 1 tablet (81 mg) by mouth daily   atorvastatin (LIPITOR) 40 MG tablet 11/14/2018 at AM  No Yes    Sig: Take 1 tablet (40 mg) by mouth daily   clopidogrel (PLAVIX) 75 MG tablet 11/14/2018 at AM  No Yes   Sig: Take 1 tablet (75 mg) by mouth daily   insulin aspart (NOVOLOG FLEXPEN) 100 UNIT/ML injection 11/14/2018 at AM  Yes Yes   Sig: Inject 16 Units Subcutaneous 2 times daily (with meals) Breakfast and Dinner   insulin detemir (LEVEMIR) 100 UNIT/ML injection 11/14/2018 at AM  Yes Yes   Sig: Inject 20 Units Subcutaneous 2 times daily    losartan (COZAAR) 25 MG tablet 11/14/2018 at AM  No Yes   Sig: Take 1 tablet (25 mg) by mouth daily   melatonin 3 MG tablet Past Week at Unknown time  Yes Yes   Sig: Take 12 mg by mouth nightly as needed    metFORMIN (GLUCOPHAGE) 1000 MG tablet 11/14/2018 at AM  Yes Yes   Sig: Take 1,000 mg by mouth 2 times daily (with meals)   metoprolol tartrate (LOPRESSOR) 25 MG tablet 11/14/2018 at AM  No Yes   Sig: Take 0.5 tablets (12.5 mg) by mouth 2 times daily   nitroGLYcerin (NITROSTAT) 0.4 MG sublingual tablet 11/14/2018 at Unknown time  No Yes   Sig: Place 1 tablet (0.4 mg) under the tongue every 5 minutes as needed for chest pain If symptoms persist 5 minutes after 1st dose call 911.      Facility-Administered Medications: None     Allergies   Allergies   Allergen Reactions     Brilinta [Ticagrelor]      Duloxetine      Other reaction(s): Confusion  Per daughter who is MD     Gabapentin      Other reaction(s): Edema     Lisinopril Rash       Social History   I have reviewed this patient's social history and updated it with pertinent information if needed. Matt Way  reports that he has never smoked. He has never used smokeless tobacco. He reports that he does not drink alcohol or use illicit drugs.    Family History   I have reviewed this patient's family history and updated it with pertinent information if needed.   No family history on file.    Review of Systems   The 10 point Review of Systems is negative other than noted in the HPI or here.     Physical Exam                       Vital Signs with Ranges     212 lbs 0 oz    Data   No results found for this or any previous visit (from the past 24 hour(s)).        PHYSICAL EXAMINATION:   GENERAL:  A very pleasant gentleman who is in no acute distress.   VITAL SIGNS:  Blood pressure 131/81.  He is afebrile.  Heart rate is in the 70s, normal sinus rhythm.   HEENT:  Examination is unremarkable.  Mucous membranes appear normal.  He has no clubbing, no peripheral or central cyanosis.   NECK:  No raised JVP and no thyromegaly.   CARDIAC:  Cardiac apex is not palpable.  Heart sounds are normal.   CHEST:  Symmetrical expansion without the use of accessory muscles.  Breath sounds are normal.   ABDOMEN:  Soft and nontender.  No hepatosplenomegaly.  The abdominal aorta is not palpable.   EXTREMITIES:  Foot pulses are diminished.  No significant peripheral edema.   CENTRAL NERVOUS SYSTEM:  Appears alert.      LABORATORY INVESTIGATIONS:  Troponins negative.  EKG has showed no acute changes.  CBC and basic metabolic panel within normal limits.         THANH WILSON MD, FACC             D: 11/15/2018   T: 11/15/2018   MT: ASHER      Name:     JUSTYN TYLER   MRN:      [id number]        Account:       [id number]   :      1950           Consult Date:  11/15/2018      Document: [id number]

## 2018-11-15 NOTE — H&P
[facility]    History and Physical  Hospitalist       Date of Admission:  11/15/2018    Assessment & Plan   Matt Way is a 67 year old male who presents with chest pain. He was found to have a small PE and although he is hemodynamically stable and not markedly hypoxic or in pain, a period of observation is considered appropriate due to his baseline CAD. It is also bothersome that he has no apparent trigger for abnormal clot formation.    Other issues with Mr. Way are social, including the recent move to [facility] from [place] after loss of a job (and medical insurance) along with the fact that he is the main caregiver for his wife who is paraplegic. He is diabetic with recent dx CAD.     Dx:  1.  Pulmonary embolism.  This is a first clot, without obvious trigger or source.   2.  IDDM with peripheral neuropathy. Pt does not have anyone who can bring his insulin to the hospital and he is being admitted to Observation. At this point, in order to avoid the bill for insulin, he opts not to have his glucoses checked while he is here.   3.  CAD with NSTEMI diagnosed in 6/2018 and treated here with CECILY to the LAD. He is now on ASA along with Clopidogrel.   4.  Social issues with lack of insurance, recent job loss and wife who is paraplegic.     DVT Prophylaxis: Enoxaparin (Lovenox) SQ  Code Status: Full Code  Expected discharge: Today, recommended to prior living arrangement once anticoagulant plan is established.    John Franklin MD    Primary Care Physician   [facility]    Chief Complaint   Chest pain    History is obtained from the patient, EMR and ED physician.     History of Present Illness   Matt Way came to attention today with chest discomfort that had begun early in the day on the day of admission.  He is active caring for his wife who is fully dependent.  He found that when he bent over for example he was more uncomfortable.  Some of the discomfort he  had felt like terrible heartburn but was not similar to pain he experienced when he had heart attack about 6 months ago.    The patient tells me that he moved from [place] earlier this year because his job was terminated.  With that, he lost his insurance coverage.  He therefore does not have a primary provider.  Apparently he has been keeping up with his medications however.  We had a fairly extensive conversation about observation status however and he would prefer not to incur costs related to receiving insulin in the hospital.    As noted, in the emergency department the patient was evaluated with CT scan of the chest, PE protocol and was found to have a small left-sided pulmonary embolism which is felt to account for his chest pain.  He was given Lovenox 1 mg/kg and I been asked to admit him.    Patient denies fevers, sweats and chills.  He has had a mild cough with this discomfort.  Otherwise though not remarkably short of breath.  Denies nausea, vomiting.  No diarrhea noting that he has some underlying constipation that is chronic.  He is unable to tell me that he has lower extremity swelling but indicates that he was told by 1 of the emergency room providers that he may have more swelling in the left than the right lower extremity.  I do not appreciate that difference.  Patient denies urinary change.    Past Medical History    I have reviewed this patient's medical history and updated it with pertinent information if needed.   Past Medical History:   Diagnosis Date     Coronary artery disease 06/2018    s/p CECILY to LAD      Diabetes (H)      NSTEMI (non-ST elevated myocardial infarction) (H) 06/2018    s/p CECILY to LAD        Past Surgical History   I have reviewed this patient's surgical history and updated it with pertinent information if needed.  Past Surgical History:   Procedure Laterality Date     SHOULDER SURGERY         Prior to Admission Medications   Prior to Admission Medications   Prescriptions  Last Dose Informant Patient Reported? Taking?   Insulin Pen Needle 29G X 12.7MM MISC   Yes No   aspirin 81 MG EC tablet   No No   Sig: Take 1 tablet (81 mg) by mouth daily   atorvastatin (LIPITOR) 40 MG tablet   No No   Sig: Take 1 tablet (40 mg) by mouth daily   clopidogrel (PLAVIX) 75 MG tablet   No No   Sig: Take 1 tablet (75 mg) by mouth daily   insulin aspart (NOVOLOG FLEXPEN) 100 UNIT/ML injection   Yes No   Sig: Inject 16 Units Subcutaneous 2 times daily (with meals) Breakfast and Dinner   insulin detemir (LEVEMIR) 100 UNIT/ML injection   Yes No   Sig: Inject 20 Units Subcutaneous 2 times daily    losartan (COZAAR) 25 MG tablet   No No   Sig: Take 1 tablet (25 mg) by mouth daily   melatonin 3 MG tablet   Yes No   Sig: Take 12 mg by mouth nightly as needed    metFORMIN (GLUCOPHAGE) 1000 MG tablet   Yes No   Sig: Take 1,000 mg by mouth 2 times daily (with meals)   metoprolol tartrate (LOPRESSOR) 25 MG tablet   No No   Sig: Take 0.5 tablets (12.5 mg) by mouth 2 times daily   nitroGLYcerin (NITROSTAT) 0.4 MG sublingual tablet   No No   Sig: Place 1 tablet (0.4 mg) under the tongue every 5 minutes as needed for chest pain If symptoms persist 5 minutes after 1st dose call 911.      Facility-Administered Medications: None     Allergies   Allergies   Allergen Reactions     Brilinta [Ticagrelor]      Duloxetine      Other reaction(s): Confusion  Per daughter who is MD     Gabapentin      Other reaction(s): Edema     Lisinopril Rash       Social History   I have reviewed this patient's social history and updated it with pertinent information if needed. Matt Way  reports that he has never smoked. He has never used smokeless tobacco. He reports that he does not drink alcohol or use illicit drugs.    Family History   No family history of coagulation problems    Review of Systems   The 10 point Review of Systems is negative other than noted in the HPI or here.     Physical Exam   Temp: 97.2  F (36.2  C) Temp src:  Oral BP: 148/79 Pulse: 83 Heart Rate: 82 Resp: 16 SpO2: 96 % O2 Device: None (Room air)    Vital Signs with Ranges  Temp:  [97.2  F (36.2  C)-97.9  F (36.6  C)] 97.2  F (36.2  C)  Pulse:  [83] 83  Heart Rate:  [76-82] 82  Resp:  [16-20] 16  BP: (127-153)/() 148/79  SpO2:  [95 %-98 %] 96 %  212 lbs 0 oz    Constitutional: Alert, coherent and in no apparent distress.  Eyes: Pupils equal round reactive.  Extraocular motions conjugate.  No scleral icterus.  No conjunctival injection.  HEENT: Oropharynx free of obvious lesions.  Posterior crowding noted.  Respiratory: Chest is clear to auscultation without rales wheezes or egophony.  Minimal if any pleuritic discomfort noted.  Cardiovascular: Regular rate and rhythm without rubs, murmurs or gallops.  GI: Abdomen is soft, nontender distended.  Bowel sounds are normal and active.  No hepatosplenomegaly or other masses are noted.  Lymph/Hematologic: Neck is without remarkable cervical or supraclavicular lymphadenopathy with family.  Genitourinary: Deferred  Skin: No remarkable lesions noted over the exposed areas of the distal extremities abdomen back head and neck.  Musculoskeletal: No remarkable pitting edema noted of the ankles bilaterally.  No obviously swollen joints.  Neurologic: Alert, coherent and appropriate.  Alert and oriented x3.  No obvious facial muscular asymmetry.  Cranial nerves II through XII are grossly intact.  No lateralizing weakness.  Psychiatric: Fully appropriate.  Calm.    Data   Data reviewed today:  I personally reviewed the EKG tracing showing Normal sinus rhythm, normal axis and intervals.  No remarkable ischemic change. and the chest CT image(s) showing Small left upper lobe pulmonary embolism.    Recent Labs  Lab 11/15/18  0102 11/15/18  0059   WBC 7.8  --    HGB 15.7  --    MCV 90  --      --      --    POTASSIUM 3.9  --    CHLORIDE 103  --    CO2 26  --    BUN 26  --    CR 0.97  --    ANIONGAP 6  --    CHRISTIE 9.0  --    GLC  103*  --    TROPI <0.015  --    TROPONIN  --  0.00       Recent Results (from the past 24 hour(s))   Chest CT, IV contrast only - PE protocol   Result Value    Radiologist flags Pulmonary embolism (AA)    Narrative    CT CHEST PULMONARY EMBOLISM W CONTRAST  11/15/2018 1:50 AM    HISTORY: Chest pain.    TECHNIQUE: Scans obtained from the apices through the diaphragm with  IV contrast. 75 mL Isovue-370 injected. Radiation dose for this scan  was reduced using automated exposure control, adjustment of the mA  and/or kV according to patient size, or iterative reconstruction  technique.    COMPARISON: None.    FINDINGS: Evaluation of the pulmonary arterial system is remarkable  for a small embolus in a branch of the left upper lobe pulmonary  artery. No large central pulmonary embolus. No evidence of right heart  strain. The heart is at the upper limits of normal in size. There is  no aortic aneurysm or dissection. No mediastinal, hilar or axillary  lymph node enlargement. There is mild dependent atelectasis  bilaterally. No pneumothorax or pleural effusion. Images through the  upper abdomen show no acute abnormality. There is mild degenerative  disease in the spine.      Impression    IMPRESSION: Small left upper lobe pulmonary embolus.     [Critical Result: Pulmonary embolism]    Finding was identified on 11/15/2018 1:51 AM.     Dr. Choi was contacted by me on 11/15/2018 1:54 AM and verbalized  understanding of the critical result.    CARIN CASSIDY MD

## 2018-11-15 NOTE — PROGRESS NOTES
Patient's demographics does show insurance. Per rounds and chart review patient reports recent job loss and loss of insurance. CM left  for Financial Counselor's office [phone]) to request follow up with patient re: insurance.     Alicia Renee MA-RN  Care Transition Services  [phone]

## 2018-11-15 NOTE — ED NOTES
Observation Brochure and Video   Patient informed of observation status based on provider's order. Observation Brochure was given and video watched.  Elmo Cook RN

## 2018-11-15 NOTE — PROGRESS NOTES
ROOM # 213-2    Living Situation (if not independent, order SW consult): with wife at home (is his wife's primary caregiver)  Facility name:  : [facility]    Activity level at baseline: Ind  Activity level on admit: SBA      Patient registered to observation; given Patient Bill of Rights; given the opportunity to ask questions about observation status and their plan of care.  Patient has been oriented to the observation room, bathroom and call light is in place.    Discussed discharge goals and expectations with patient/family.

## 2018-11-15 NOTE — ED PROVIDER NOTES
"  History     Chief Complaint:  Chest Pain    The history is provided by the patient.      Matt Way is a 67 year old male nonsmoker, on Plavix and Aspirin 81 mg, with a history of NSTEMI in June of 2018 who presents for evaluation of chest pain and left arm pain. The patient reports onset of both of these symptoms when he woke up yesterday (11/15/2018) and states that they have been constantly increasing in severity since onset. Patient notes that his left arm pain is exacerbated by bending over and that his chest pain is described as the \"worst heartburn he has ever had\". On presentation, the patient states that this pain has resolved. He has not had his pain since he entered the emergency department. Patient adds that he has chronic constipation and chronic mild leg swelling. Patient denies shortness of breath, nausea, diaphoresis, abnormal leg swelling, abdominal pain, black/bloody stools, fever, recent respiratory infection, recent strain or abnormal movement of the left arm, numbness, personal history of blood clot or clotting disorder, cough, or other complaint.      CARDIAC RISK FACTORS:  Sex:    Male  Tobacco:   Never smoker   Hypertension:   Negative   Hyperlipidemia:  Negative   Diabetes:   Negative  Personal History:   Family History:  Negative    PE/DVT RISK FACTORS:  Sex:    Male  Hormones:   Negative   Tobacco:   Never smoker   Cancer:   Negative   Travel:   Negative   Surgery:   Negative   Other immobilization: Negative   Personal history:  Negative   Family history:  Negative      Allergies:  Brilinta [Ticagrelor]  Duloxetine  Gabapentin  Lisinopril     Medications:    Aspirin 81 mg  Lipitor  Plavix  Novolog flexpen  Levemir  Cozaar  Melatonin  Metformin  Metoprolol  Nitroglycerin    Past Medical History:    NSTEMI  Type II diabetes   CAD    Past Surgical History:    Shoulder surgery    Family History:    History reviewed. No pertinent family history.      Social History:  Presents with " "caregiver   Tobacco use: Never smoker   Alcohol use: No  PCP: [facility]    Marital Status:        Review of Systems   Constitutional: Negative for diaphoresis and fever.   Respiratory: Negative for cough and shortness of breath.    Cardiovascular: Positive for chest pain and leg swelling (chronic).   Gastrointestinal: Positive for constipation (chronic). Negative for abdominal pain, blood in stool and nausea.   Musculoskeletal: Positive for myalgias.   Neurological: Negative for numbness.   All other systems reviewed and are negative.    Physical Exam     Patient Vitals for the past 24 hrs:   BP Temp Temp src Pulse Heart Rate Resp SpO2 Height Weight   11/15/18 0407 148/79 97.2  F (36.2  C) Oral - 82 16 96 % - -   11/15/18 0230 127/80 - - - 79 - 95 % - -   11/15/18 0214 148/75 - - - 76 - 97 % - -   11/15/18 0130 137/78 - - - 77 - 95 % - -   11/15/18 0055 (!) 153/101 97.9  F (36.6  C) Oral 83 - 20 98 % 1.854 m (6' 1\") 96.2 kg (212 lb)        Physical Exam  General: Adult male sitting upright  Eyes: PERRL, Conjunctive within normal limits  ENT: Moist mucous membranes, oropharynx clear.   CV: Normal S1S2, no murmur, rub or gallop. Regular rate and rhythm. Radial pulses equal bilaterally.  Resp: Clear to auscultation bilaterally, no wheezes, rales or rhonchi. Normal respiratory effort.  GI: Abdomen is soft, nontender and nondistended. No palpable masses. No rebound or guarding.  MSK: Bilateral lower leg nonpitting edema. Nontender. No arm tenderness to palpation. Normal active range of motion.  Skin: Warm and dry. No rashes or lesions or ecchymoses on visible skin.  Neuro: Alert and oriented. Responds appropriately to all questions and commands. No focal findings appreciated. Normal muscle tone.  Psych: Normal mood and affect. Pleasant.    Emergency Department Course   ECG (00:56:46):  Rate 81 bpm. MI interval 180. QRS duration 78. QT/QTc 368/427. P-R-T axes 51 -17 35. Normal sinus rhythm. " "Inferior infarct, age undetermined. Abnormal ECG. Agree with computer interpretation. No significant change when compared to EKG dated 06/07/2018.  Interpreted at 0059 by Yessenia Choi MD.     Imaging:  Radiographic findings were communicated with the patient and family who voiced understanding of the findings.    CT Chest, IV contrast only:  IMPRESSION: Small left upper lobe pulmonary embolus.     Imaging independently reviewed and agree with radiologist interpretation.      Laboratory:  CBC: AWNL (WBC 7.8, HGB 15.7, )   BMP:  (H) o/w WNL (Creatinine 0.97)   0130: Troponin: <0.015   D-dimer: 1.2 (H)  Nt probnp inpatient (BNP): 84  0112: Troponin POCT: 0.00      Interventions:  0103: Aspirin 324 mg PO    0227: Lovenox injection 100 mg subcutaneous    Emergency Department Course:  Past medical records, nursing notes, and vitals reviewed.  0050: I performed an exam of the patient and obtained history, as documented above. EKG was taken here in the ED, results as above.     Above interventions provided.  Blood drawn. This was sent to the lab for further testing, results above.  The patient was sent for a chest CT while in the emergency department, findings above.     0154: I discussed the patient with radiology regarding the patient's PE on chest CT.    Findings and plan explained to the Patient who consents to admission.     0215: Discussed the patient with Dr. Franklin, who will admit the patient to an observation bed for further monitoring, evaluation, and treatment.      Impression & Plan      Medical Decision Making:  Matt Way is a 67 year old male who presents for evaluation of chest pain described as the \"worst heartburn he's ever had\" and associated left arm pain that seemed worse with movement. The workup in the Emergency Department indicates his chest pain is likely due to pulmonary embolism based on CT. His chest pain was resolved on arrival to the ED and he was asymptomatic at " rest while in the ED. He had no indication on ECG or by labs of ACS or cardiac strain. The findings and plan were discussed with the patient.  Lovenox was initiated after discussion with the patient after clarification of any contraindications to this therapy.  There are none but patient understands the risk/benefit ratio to this therapy and the possibility of serious and/or life-threatening hemorrhage.  I will admit the patient to Dr. Franklin for further workup and evaluation.  There is no indication at this point for thrombolysis. All questions answered prior to admission.    Diagnosis:    ICD-10-CM   1. Other acute pulmonary embolism without acute cor pulmonale (H) I26.99   2. Acute chest pain R07.9       Disposition:  Admitted to hospitalist service.    Scribe Disclosure:  I, Janak Lieberman, am serving as a scribe at 12:50 AM on 11/15/2018 to document services personally performed by Yessenia Choi MD based on my observations and the provider's statements to me.   11/15/2018   [facility] EMERGENCY DEPARTMENT     Yessenia Choi MD  11/15/18 0612

## 2018-11-15 NOTE — CONSULTS
Care Transition Initial Assessment - RN    Reason For Consult: care coordination/care conference, discharge planning   Met with: Patient and Family.  DATA   Active Problems:    Pulmonary embolism (H)       Cognitive Status: awake and alert.  [facility]     Contact information and PCP information verified: No, patient unsure of information  Lives With: spouse  Living Arrangements: house  Quality Of Family Relationships: supportive, helpful, involved  Description of Support System: Supportive, Involved   Who is your support system?: Wife, Children   Support Assessment: Adequate family and caregiver support   Insurance concerns: No Insurance issues identified  ASSESSMENT  Patient currently receives the following services:  None        Identified issues/concerns regarding health management: Patient admitted with PE. Patient lives with wife in a townhouse. Patient cares for wife who has quadriplegia for 31 years. Patient and wife have been  for 40+ years and have 4 children, with 3 children living in [place]. Daughter often drives patient to appointments.   Patient states that wife assists with managing schedules and finances. Patient states getting let go from his job earlier this year and have been struggling with loss of income.   PLAN  Financial costs for the patient not discussed .  Patient given options and choices for discharge Yes .  Patient/family is agreeable to the plan?  Yes.   Patient anticipates discharging to Home .        Patient anticipates needs for home equipment: No  Plan/Disposition: Home   Appointments: None made at this time. Patient will follow up with wife and schedule own appointment.       Care  (CTS) will continue to follow as needed.    Alicia LO  Care Transition Services  [phone]

## 2018-11-15 NOTE — PLAN OF CARE
"Problem: Patient Care Overview  Goal: Plan of Care/Patient Progress Review  PRIMARY DIAGNOSIS: CHEST PAIN  OUTPATIENT/OBSERVATION GOALS TO BE MET BEFORE DISCHARGE:  1. Ruled out acute coronary syndrome (negative or stable Troponin):  Yes, trop neg x 1  2. Pain Status: Pain free.  3. Appropriate provocative testing performed: Yes  - Stress Test Procedure: None ordered  - Interpretation of cardiac rhythm per telemetry tech: NSR HR 67    4. Cleared by Consultants (if applicable):No  5. Return to near baseline physical activity: Yes, SBA for activity  Discharge Planner Nurse   Safe discharge environment identified: Yes  Barriers to discharge: Yes     Please review provider order for any additional goals.   Nurse to notify provider when observation goals have been met and patient is ready for discharge.    /79  Pulse 83  Temp 97.2  F (36.2  C) (Oral)  Resp 16  Ht 1.854 m (6' 1\")  Wt 96.2 kg (212 lb)  SpO2 96%  BMI 27.97 kg/m2   Pt A&Ox4, VSS, denies pain. Pt reported mild epigastric \"heartburn\" discomfort, requesting for an antacid, writer laura MOYA and received TUMS order. Denies numbness/tingling, radiating pain, chest pain, or headache. Plan: PIV SL, tele monitoring, continue to monitor for pain.      "

## 2018-11-15 NOTE — PLAN OF CARE
Problem: Patient Care Overview  Goal: Plan of Care/Patient Progress Review  Outcome: No Change  PRIMARY DIAGNOSIS: CHEST PAIN  OUTPATIENT/OBSERVATION GOALS TO BE MET BEFORE DISCHARGE:  1. Ruled out acute coronary syndrome (negative or stable Troponin):  Yes  2. Pain Status: Pain free.  3. Appropriate provocative testing performed: Chest CT for PE, Bilat LE Ultrasound  - Stress Test Procedure: N/A  - Interpretation of cardiac rhythm per telemetry tech: Sinus Rhythm with HR in 70s, per tele tech    4. Cleared by Consultants (if applicable): N/A  5. Return to near baseline physical activity: Yes  Discharge Planner Nurse   Safe discharge environment identified: Yes  Barriers to discharge: No       Entered by: Olivia Ricks 11/15/2018 11:19 AM     Please review provider order for any additional goals.   Nurse to notify provider when observation goals have been met and patient is ready for discharge.  Patient alert and oriented  Peripheral IV saline locked  VSS, on room air  Tolerating normal diet  Up assist standby  Possible discharge later today, continue with plan of care

## 2018-11-21 NOTE — TELEPHONE ENCOUNTER
RECORDS STATUS - ALL OTHER DIAGNOSIS      RECORDS RECEIVED FROM: in [facility]   DATE RECEIVED: 11/21/18   NOTES STATUS DETAILS   OFFICE NOTE from referring provider Complete In [facility]   OFFICE NOTE from medical oncologist     DISCHARGE SUMMARY from hospital     DISCHARGE REPORT from the ER     OPERATIVE REPORT     MEDICATION LIST     CLINICAL TRIAL TREATMENTS TO DATE     LABS     PATHOLOGY REPORTS     ANYTHING RELATED TO DIAGNOSIS     GENONOMIC TESTING     TYPE:     IMAGING (NEED IMAGES & REPORT)     CT SCANS Complete In Epic   MRI     MAMMO     ULTRASOUND     PET

## 2018-11-26 ENCOUNTER — ONCOLOGY VISIT (OUTPATIENT)
Dept: ONCOLOGY | Facility: CLINIC | Age: 68
End: 2018-11-26
Attending: INTERNAL MEDICINE
Payer: MEDICARE

## 2018-11-26 ENCOUNTER — PRE VISIT (OUTPATIENT)
Dept: ONCOLOGY | Facility: CLINIC | Age: 68
End: 2018-11-26

## 2018-11-26 ENCOUNTER — TELEPHONE (OUTPATIENT)
Dept: ONCOLOGY | Facility: CLINIC | Age: 68
End: 2018-11-26

## 2018-11-26 VITALS
HEART RATE: 94 BPM | TEMPERATURE: 97 F | DIASTOLIC BLOOD PRESSURE: 76 MMHG | SYSTOLIC BLOOD PRESSURE: 138 MMHG | RESPIRATION RATE: 16 BRPM | OXYGEN SATURATION: 97 %

## 2018-11-26 DIAGNOSIS — I26.99 OTHER ACUTE PULMONARY EMBOLISM WITHOUT ACUTE COR PULMONALE (H): Primary | ICD-10-CM

## 2018-11-26 PROCEDURE — G0463 HOSPITAL OUTPT CLINIC VISIT: HCPCS

## 2018-11-26 ASSESSMENT — PAIN SCALES - GENERAL: PAINLEVEL: MODERATE PAIN (5)

## 2018-11-26 NOTE — TELEPHONE ENCOUNTER
Called and spoke with patient ~ Xarelto dosing. Patient will complete his first 3 weeks and then needs to initiate his dose that should be taken indefinitely. Rx sent to pharmacy. Patient verbalized understanding of instructions.  Will contact writer with any additional questions or concerns.  Roel Owen RN, BSN, OCN  [facility]  Patient Care Coordinator

## 2018-11-26 NOTE — NURSING NOTE
"Oncology Rooming Note    November 26, 2018 11:44 AM   Matt Way is a 68 year old male who presents for:    Chief Complaint   Patient presents with     Oncology Clinic Visit     New Patient consult     Initial Vitals: /76  Pulse 94  Temp 97  F (36.1  C) (Tympanic)  Resp 16  SpO2 97% Estimated body mass index is 27.97 kg/(m^2) as calculated from the following:    Height as of 11/15/18: 1.854 m (6' 1\").    Weight as of 11/15/18: 96.2 kg (212 lb). There is no height or weight on file to calculate BSA.  Moderate Pain (5) Comment: shoulders   No LMP for male patient.  Allergies reviewed: Yes  Medications reviewed: Yes    Medications: Medication refills not needed today.  Pharmacy name entered into [employer]: [facility] [id number] [facility] [id number] [address]    Clinical concerns: follow up     8 minutes for nursing intake (face to face time)     Ava Neumann CMA     DISCHARGE PLAN:  Next appointments: See patient instruction section  Departure Mode: Ambulatory  Accompanied by: self  0 minutes for nursing discharge (face to face time)   Ava Neumann CMA                  "

## 2018-11-26 NOTE — MR AVS SNAPSHOT
"              After Visit Summary   11/26/2018    Matt Way    MRN: [id number]           Patient Information     Date Of Birth          1950        Visit Information        Provider Department      11/26/2018 11:30 AM Karina Tovar MD [facility]        Care Instructions    I have no reason to think you have cancer now but I recommend getting a screening colonoscopy, probably want to wait until 3 months after blood clot (February) after that I want you to come back and see one of my colleagues (a hematologist, or blood clotting specialist) to see if you need additional clotting tests and how long you should be on blood thinners.     You have an \"unprovoked blood clot\" so the question will be how long to be on treatment. Continue Xarelto as you are taking it now.     Needs follow up in 4-6 months with hematologist.-Scheduled for 3/18/19. Yessica SIMMONS  AVS printed and mailed to pt home address. Yessica SIMMONS          Follow-ups after your visit        Your next 10 appointments already scheduled     Mar 18, 2019  2:00 PM CDT   New Visit with Bianca Jorge MD   [facility] ([facility])    [facility]  [address]  [facility] [address] [phone]              Who to contact     If you have questions or need follow up information about today's clinic visit or your schedule please contact [facility] directly at [phone].  Normal or non-critical lab and imaging results will be communicated to you by MyChart, letter or phone within 4 business days after the clinic has received the results. If you do not hear from us within 7 days, please contact the clinic through MyChart or phone. If you have a critical or abnormal lab result, we will notify you by phone as soon as possible.  Submit refill requests through [employer] or call your pharmacy and they will forward the refill request to us. Please " "allow 3 business days for your refill to be completed.          Additional Information About Your Visit        MyChart Information     [employer] lets you send messages to your doctor, view your test results, renew your prescriptions, schedule appointments and more. To sign up, go to [email or web address] . Click on \"Log in\" on the left side of the screen, which will take you to the Welcome page. Then click on \"Sign up Now\" on the right side of the page.     You will be asked to enter the access code listed below, as well as some personal information. Please follow the directions to create your username and password.     Your access code is: [email or web address]  Expires: 2018 10:46 AM     Your access code will  in 90 days. If you need help or a new code, please call your [facility] clinic or [phone].        Care EveryWhere ID     This is your Care EveryWhere ID. This could be used by other organizations to access your [facility] medical records  [id number]        Your Vitals Were     Pulse Temperature Respirations Pulse Oximetry          94 97  F (36.1  C) (Tympanic) 16 97%         Blood Pressure from Last 3 Encounters:   18 138/76   11/15/18 131/81   10/18/18 98/50    Weight from Last 3 Encounters:   11/15/18 96.2 kg (212 lb)   10/18/18 96.3 kg (212 lb 6.4 oz)   18 97.5 kg (215 lb)              Today, you had the following     No orders found for display       Primary Care Provider Office Phone # Fax #    [facility] [phone]       [address] [place] [id number]        Equal Access to Services     [facility] : Hadii thomas Mejia, apple escamilla, quinten lyn . So [facility] [phone].    ATENCIÓN: Si habla español, tiene a carcamo disposición servicios gratuitos de asistencia lingüística. Llame al [phone].    We comply with applicable federal civil rights laws and [place] laws. " We do not discriminate on the basis of race, color, national origin, age, disability, sex, sexual orientation, or gender identity.            Thank you!     Thank you for choosing [facility]  for your care. Our goal is always to provide you with excellent care. Hearing back from our patients is one way we can continue to improve our services. Please take a few minutes to complete the written survey that you may receive in the mail after your visit with us. Thank you!             Your Updated Medication List - Protect others around you: Learn how to safely use, store and throw away your medicines at www.disposemymeds.org.          This list is accurate as of 11/26/18  2:18 PM.  Always use your most recent med list.                   Brand Name Dispense Instructions for use Diagnosis    atorvastatin 40 MG tablet    LIPITOR    30 tablet    Take 1 tablet (40 mg) by mouth daily    NSTEMI (non-ST elevated myocardial infarction) (H)       clopidogrel 75 MG tablet    PLAVIX    90 tablet    Take 1 tablet (75 mg) by mouth daily    NSTEMI (non-ST elevated myocardial infarction) (H)       insulin detemir 100 UNIT/ML vial    LEVEMIR VIAL     Inject 20 Units Subcutaneous 2 times daily        insulin pen needle 29G X 12.7MM miscellaneous    29G X 12.7MM          losartan 25 MG tablet    COZAAR    30 tablet    Take 1 tablet (25 mg) by mouth daily    NSTEMI (non-ST elevated myocardial infarction) (H), Benign essential hypertension       melatonin 3 MG tablet      Take 12 mg by mouth nightly as needed        metFORMIN 1000 MG tablet    GLUCOPHAGE     Take 1,000 mg by mouth 2 times daily (with meals)        metoprolol tartrate 25 MG tablet    LOPRESSOR    30 tablet    Take 0.5 tablets (12.5 mg) by mouth 2 times daily    Benign essential hypertension, NSTEMI (non-ST elevated myocardial infarction) (H)       nitroGLYcerin 0.4 MG sublingual tablet    NITROSTAT    10 tablet    Place 1 tablet (0.4 mg) under the  tongue every 5 minutes as needed for chest pain If symptoms persist 5 minutes after 1st dose call 911.    NSTEMI (non-ST elevated myocardial infarction) (H), Acute chest pain       NovoLOG FLEXPEN 100 UNIT/ML pen   Generic drug:  insulin aspart      Inject 16 Units Subcutaneous 2 times daily (with meals) Breakfast and Dinner        rivaroxaban ANTICOAGULANT 15 MG Tabs tablet    XARELTO    42 tablet    Take 1 tablet (15 mg) by mouth 2 times daily (with meals) for 21 days    Other acute pulmonary embolism without acute cor pulmonale (H)

## 2018-11-26 NOTE — LETTER
11/26/2018         RE: Matt Way  [id number] [facility] [address]        Dear Colleague,    Thank you for referring your patient, Matt Way, to the [facility] CANCER [facility]. Please see a copy of my visit note below.    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  He presented to the emergency department in November with chest pain that radiated to his left arm, was diagnosed with an acute pulmonary embolism.  He has had no periods of inactivity, he is the primary caregiver for his wife who is quadriplegic.  Never had a blood clots before this.  His mom had clots, Lupus, phlebitis, she was a smoker.   Had an MI in June, on Plavix had PCI.   He had bleeding from cutting toenails since starting Xarelto. No other bleeding.     ROS Pulmonary  Dyspnea: No, Cough: No, Chest pain: No, Wheezing: No, Sputum Production: No, Hemoptysis: No  A complete ROS was otherwise negative except as noted in the HPI.  The patient was seen and examined by Karina Tovar MD   Current Outpatient Prescriptions   Medication     atorvastatin (LIPITOR) 40 MG tablet     clopidogrel (PLAVIX) 75 MG tablet     insulin aspart (NOVOLOG FLEXPEN) 100 UNIT/ML injection     insulin detemir (LEVEMIR) 100 UNIT/ML injection     Insulin Pen Needle 29G X 12.7MM MISC     losartan (COZAAR) 25 MG tablet     melatonin 3 MG tablet     metFORMIN (GLUCOPHAGE) 1000 MG tablet     nitroGLYcerin (NITROSTAT) 0.4 MG sublingual tablet     rivaroxaban ANTICOAGULANT (XARELTO) 15 MG TABS tablet     metoprolol tartrate (LOPRESSOR) 25 MG tablet     No current facility-administered medications for this visit.      Allergies   Allergen Reactions     Brilinta [Ticagrelor]      Duloxetine       Other reaction(s): Confusion  Per daughter who is MD     Gabapentin      Other reaction(s): Edema     Lisinopril Rash     Social History     Social History     Marital status:      Spouse name: N/A     Number of children: N/A     Years of education: N/A     Occupational History     Not on file.     Social History Main Topics     Smoking status: Never Smoker     Smokeless tobacco: Never Used     Alcohol use No     Drug use: No     Sexual activity: Not on file     Other Topics Concern     Not on file     Social History Narrative     Past Medical History:   Diagnosis Date     Coronary artery disease 06/2018    s/p CECILY to LAD      Diabetes (H)      NSTEMI (non-ST elevated myocardial infarction) (H) 06/2018    s/p CECILY to LAD      Past Surgical History:   Procedure Laterality Date     SHOULDER SURGERY       Family History   Problem Relation Age of Onset     Lupus Mother      Exam:   /76  Pulse 94  Temp 97  F (36.1  C) (Tympanic)  Resp 16  SpO2 97%  GENERAL APPEARANCE: Well developed, well nourished, alert, and in no apparent distress.  EYES: PERRL, EOMI  HENT: Nasal mucosa with no edema and no hyperemia. No nasal polyps.  EARS: Canals clear, TMs normal  MOUTH: Oral mucosa is moist, without any lesions, no tonsillar enlargement, no oropharyngeal exudate.  NECK: supple, no masses, no thyromegaly.  LYMPHATICS: No significant axillary, cervical, or supraclavicular nodes.  RESP: normal percussion, good air flow throughout.  No crackles. No rhonchi. No wheezes.  CV: Normal S1, S2, regular rhythm, normal rate. No murmur.  No rub. No gallop. No LE edema.   ABDOMEN:  Bowel sounds normal, soft, nontender, no HSM or masses.   MS: extremities normal. No clubbing. No cyanosis.  SKIN: no rash on limited exam  NEURO: Mentation intact, speech normal, normal strength and tone, normal gait and stance  PSYCH: mentation appears normal. and affect normal/bright  Results:  - My interpretation of the images relevant for  this visit includes: CT chest 11/15/18 shows small ALEJANDRO PE, no lung abnormalities or suspicious findings for malignancy    Assessment and plan: Matt is a 68 year old male with unprovoked pulmonary embolism in November 2018.  He has no known history of cancer, but is not up-to-date on his colonoscopy.  His mother also had lupus and some kind of clotting disorder he believes.  He is currently on rivaroxaban 15 mg BID  Plan:  Continue Rivaroxaban 15mg BID for 21 days total, then increase to 20mg BID.   Arrange screening colonoscopy in February, okay to hold for 48 hours prior to colonoscopy. (resume when indicated by GI depending on whether biopsies done)  Follow up in this clinic with hematologist for unprovoked PE after colonoscopy (~4 months from now)    Again, thank you for allowing me to participate in the care of your patient.        Sincerely,        Karina Tovar MD

## 2018-11-26 NOTE — PATIENT INSTRUCTIONS
"I have no reason to think you have cancer now but I recommend getting a screening colonoscopy, probably want to wait until 3 months after blood clot (February) after that I want you to come back and see one of my colleagues (a hematologist, or blood clotting specialist) to see if you need additional clotting tests and how long you should be on blood thinners.     You have an \"unprovoked blood clot\" so the question will be how long to be on treatment. Continue Xarelto znd when first 3 weeks are completed, please begin new dose. Rx sent to pharmacy Called patient with Xaralto instructions   Needs follow up in 4-6 months with hematologist.-Scheduled for 3/18/19. Yessica HESTER printed and mailed to pt home address. Yessica Owen, RN, BSN, OCN  [facility]  Patient Care Coordinator    "

## 2018-11-26 NOTE — PROGRESS NOTES
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  He presented to the emergency department in November with chest pain that radiated to his left arm, was diagnosed with an acute pulmonary embolism.  He has had no periods of inactivity, he is the primary caregiver for his wife who is quadriplegic.  Never had a blood clots before this.  His mom had clots, Lupus, phlebitis, she was a smoker.   Had an MI in June, on Plavix had PCI.   He had bleeding from cutting toenails since starting Xarelto. No other bleeding.     ROS Pulmonary  Dyspnea: No, Cough: No, Chest pain: No, Wheezing: No, Sputum Production: No, Hemoptysis: No  A complete ROS was otherwise negative except as noted in the HPI.  The patient was seen and examined by Karina Tovar MD   Current Outpatient Prescriptions   Medication     atorvastatin (LIPITOR) 40 MG tablet     clopidogrel (PLAVIX) 75 MG tablet     insulin aspart (NOVOLOG FLEXPEN) 100 UNIT/ML injection     insulin detemir (LEVEMIR) 100 UNIT/ML injection     Insulin Pen Needle 29G X 12.7MM MISC     losartan (COZAAR) 25 MG tablet     melatonin 3 MG tablet     metFORMIN (GLUCOPHAGE) 1000 MG tablet     nitroGLYcerin (NITROSTAT) 0.4 MG sublingual tablet     rivaroxaban ANTICOAGULANT (XARELTO) 15 MG TABS tablet     metoprolol tartrate (LOPRESSOR) 25 MG tablet     No current facility-administered medications for this visit.      Allergies   Allergen Reactions     Brilinta [Ticagrelor]      Duloxetine      Other reaction(s): Confusion  Per daughter who is MD     Gabapentin      Other reaction(s): Edema     Lisinopril Rash     Social History     Social History     Marital status:      Spouse name: N/A     Number of children: N/A     Years of  education: N/A     Occupational History     Not on file.     Social History Main Topics     Smoking status: Never Smoker     Smokeless tobacco: Never Used     Alcohol use No     Drug use: No     Sexual activity: Not on file     Other Topics Concern     Not on file     Social History Narrative     Past Medical History:   Diagnosis Date     Coronary artery disease 06/2018    s/p CECILY to LAD      Diabetes (H)      NSTEMI (non-ST elevated myocardial infarction) (H) 06/2018    s/p CECILY to LAD      Past Surgical History:   Procedure Laterality Date     SHOULDER SURGERY       Family History   Problem Relation Age of Onset     Lupus Mother      Exam:   /76  Pulse 94  Temp 97  F (36.1  C) (Tympanic)  Resp 16  SpO2 97%  GENERAL APPEARANCE: Well developed, well nourished, alert, and in no apparent distress.  EYES: PERRL, EOMI  HENT: Nasal mucosa with no edema and no hyperemia. No nasal polyps.  EARS: Canals clear, TMs normal  MOUTH: Oral mucosa is moist, without any lesions, no tonsillar enlargement, no oropharyngeal exudate.  NECK: supple, no masses, no thyromegaly.  LYMPHATICS: No significant axillary, cervical, or supraclavicular nodes.  RESP: normal percussion, good air flow throughout.  No crackles. No rhonchi. No wheezes.  CV: Normal S1, S2, regular rhythm, normal rate. No murmur.  No rub. No gallop. No LE edema.   ABDOMEN:  Bowel sounds normal, soft, nontender, no HSM or masses.   MS: extremities normal. No clubbing. No cyanosis.  SKIN: no rash on limited exam  NEURO: Mentation intact, speech normal, normal strength and tone, normal gait and stance  PSYCH: mentation appears normal. and affect normal/bright  Results:  - My interpretation of the images relevant for this visit includes: CT chest 11/15/18 shows small ALEJANDRO PE, no lung abnormalities or suspicious findings for malignancy    Assessment and plan: Matt is a 68 year old male with unprovoked pulmonary embolism in November 2018.  He has no known history of  cancer, but is not up-to-date on his colonoscopy.  His mother also had lupus and some kind of clotting disorder he believes.  He is currently on rivaroxaban 15 mg BID  Plan:  Continue Rivaroxaban 15mg BID for 21 days total, then increase to 20mg BID.   Arrange screening colonoscopy in February, okay to hold for 48 hours prior to colonoscopy. (resume when indicated by GI depending on whether biopsies done)  Follow up in this clinic with hematologist for unprovoked PE after colonoscopy (~4 months from now)

## 2018-12-17 DIAGNOSIS — I26.99 OTHER ACUTE PULMONARY EMBOLISM WITHOUT ACUTE COR PULMONALE (H): ICD-10-CM

## 2018-12-17 NOTE — PROGRESS NOTES
Faxed refill for Xaralto 20mg BID to [facility] pharmacy at [phone].  Roel Owen, RN, BSN, OCN  [facility] [place]  Patient Care Coordinator

## 2019-03-15 ENCOUNTER — TELEPHONE (OUTPATIENT)
Dept: CARDIOLOGY | Facility: CLINIC | Age: 69
End: 2019-03-15

## 2019-03-15 NOTE — TELEPHONE ENCOUNTER
Received a call from Mr. Way's wife on his behalf. Consent to communicate on file. She says that Matt is planning to have a dental filling next week so she would like to know if he could hold his plavix prior to the procedure. Reviewed that dual antiplatelet therapy with plavix is typically recommended for 1 year uninterrupted after having a stent placed. He had PCI with CECILY placement to the proximal LAD on 6/8/18. He's been having a lot of issues with this tooth and they are concerned that it could lead to an abscess if it is not addressed. Will route to Dr. Strickland for review. MERA Cooper RN - 03/15/19, 11:48 AM

## 2019-03-15 NOTE — TELEPHONE ENCOUNTER
I would prefer if the dental filling can be done on plavix. I would be happy to discuss with his dentist. If they cannot do on plavix is it possible that they can do on low dose of aspirin 81 mg q daily. The least preferred option is to discontinue antiplatelet therapy completely.  There is risk of stent thrombosis with interruption of antiplatelet therapy within one year after drug eluting stent PCI after MI, though risk goes down significantly after 6 months kendrick with latest gen stents like he had.    They should call the dental clinic and see if they can do the procedure while he is on plavix, 2nd option aspirin?.    Thanks  Wilber

## 2019-03-15 NOTE — TELEPHONE ENCOUNTER
Called and spoke with Mrs. Way with the update from Dr. Strickland below that it would be preferred to have the filling done with plavix uninterrupted. She planned to check in with the Dentist office to check in on this and she will call back with any further questions or concerns. MERA Cooper RN - 03/15/19, 3:23 PM

## 2019-03-18 ENCOUNTER — HOSPITAL ENCOUNTER (OUTPATIENT)
Facility: CLINIC | Age: 69
Setting detail: SPECIMEN
Discharge: HOME | End: 2019-03-18
Attending: INTERNAL MEDICINE | Admitting: INTERNAL MEDICINE
Payer: MEDICARE

## 2019-03-18 ENCOUNTER — HOSPITAL ENCOUNTER (OUTPATIENT)
Facility: CLINIC | Age: 69
End: 2019-03-18
Attending: INTERNAL MEDICINE | Admitting: INTERNAL MEDICINE
Payer: MEDICARE

## 2019-03-18 ENCOUNTER — ONCOLOGY VISIT (OUTPATIENT)
Dept: ONCOLOGY | Facility: CLINIC | Age: 69
End: 2019-03-18
Attending: INTERNAL MEDICINE
Payer: MEDICARE

## 2019-03-18 VITALS
HEART RATE: 77 BPM | BODY MASS INDEX: 28.18 KG/M2 | DIASTOLIC BLOOD PRESSURE: 63 MMHG | HEIGHT: 73 IN | RESPIRATION RATE: 18 BRPM | TEMPERATURE: 98.1 F | SYSTOLIC BLOOD PRESSURE: 105 MMHG | OXYGEN SATURATION: 96 % | WEIGHT: 212.6 LBS

## 2019-03-18 DIAGNOSIS — R35.0 FREQUENCY OF MICTURITION: ICD-10-CM

## 2019-03-18 DIAGNOSIS — Z13.29 SCREENING FOR HYPOTHYROIDISM: ICD-10-CM

## 2019-03-18 DIAGNOSIS — Z12.5 SCREENING FOR PROSTATE CANCER: ICD-10-CM

## 2019-03-18 DIAGNOSIS — Z12.11 SPECIAL SCREENING FOR MALIGNANT NEOPLASMS, COLON: ICD-10-CM

## 2019-03-18 DIAGNOSIS — I26.99 OTHER ACUTE PULMONARY EMBOLISM WITHOUT ACUTE COR PULMONALE (H): Primary | ICD-10-CM

## 2019-03-18 LAB
ALBUMIN SERPL-MCNC: 3.6 G/DL (ref 3.4–5)
ALP SERPL-CCNC: 125 U/L (ref 40–150)
ALT SERPL W P-5'-P-CCNC: 53 U/L (ref 0–70)
ANION GAP SERPL CALCULATED.3IONS-SCNC: 5 MMOL/L (ref 3–14)
AST SERPL W P-5'-P-CCNC: 25 U/L (ref 0–45)
BASOPHILS # BLD AUTO: 0.1 10E9/L (ref 0–0.2)
BASOPHILS NFR BLD AUTO: 1.4 %
BILIRUB SERPL-MCNC: 0.4 MG/DL (ref 0.2–1.3)
BUN SERPL-MCNC: 27 MG/DL (ref 7–30)
CALCIUM SERPL-MCNC: 8.8 MG/DL (ref 8.5–10.1)
CHLORIDE SERPL-SCNC: 106 MMOL/L (ref 94–109)
CO2 SERPL-SCNC: 25 MMOL/L (ref 20–32)
CREAT SERPL-MCNC: 1.11 MG/DL (ref 0.66–1.25)
D DIMER PPP FEU-MCNC: 1.3 UG/ML FEU (ref 0–0.5)
DIFFERENTIAL METHOD BLD: NORMAL
EOSINOPHIL # BLD AUTO: 0.2 10E9/L (ref 0–0.7)
EOSINOPHIL NFR BLD AUTO: 3.1 %
ERYTHROCYTE [DISTWIDTH] IN BLOOD BY AUTOMATED COUNT: 14.5 % (ref 10–15)
GFR SERPL CREATININE-BSD FRML MDRD: 69 ML/MIN/{1.73_M2}
GLUCOSE SERPL-MCNC: 205 MG/DL (ref 70–99)
HCT VFR BLD AUTO: 46.6 % (ref 40–53)
HGB BLD-MCNC: 15.5 G/DL (ref 13.3–17.7)
IMM GRANULOCYTES # BLD: 0 10E9/L (ref 0–0.4)
IMM GRANULOCYTES NFR BLD: 0.3 %
LYMPHOCYTES # BLD AUTO: 2.5 10E9/L (ref 0.8–5.3)
LYMPHOCYTES NFR BLD AUTO: 38.3 %
MCH RBC QN AUTO: 28.8 PG (ref 26.5–33)
MCHC RBC AUTO-ENTMCNC: 33.3 G/DL (ref 31.5–36.5)
MCV RBC AUTO: 87 FL (ref 78–100)
MONOCYTES # BLD AUTO: 0.4 10E9/L (ref 0–1.3)
MONOCYTES NFR BLD AUTO: 6.4 %
NEUTROPHILS # BLD AUTO: 3.3 10E9/L (ref 1.6–8.3)
NEUTROPHILS NFR BLD AUTO: 50.5 %
NRBC # BLD AUTO: 0 10*3/UL
NRBC BLD AUTO-RTO: 0 /100
PLATELET # BLD AUTO: 211 10E9/L (ref 150–450)
POTASSIUM SERPL-SCNC: 4.4 MMOL/L (ref 3.4–5.3)
PROT SERPL-MCNC: 7.4 G/DL (ref 6.8–8.8)
PSA SERPL-MCNC: 2.16 UG/L (ref 0–4)
RBC # BLD AUTO: 5.38 10E12/L (ref 4.4–5.9)
SODIUM SERPL-SCNC: 136 MMOL/L (ref 133–144)
TSH SERPL DL<=0.005 MIU/L-ACNC: 1.56 MU/L (ref 0.4–4)
WBC # BLD AUTO: 6.5 10E9/L (ref 4–11)

## 2019-03-18 PROCEDURE — G0463 HOSPITAL OUTPT CLINIC VISIT: HCPCS

## 2019-03-18 PROCEDURE — 84443 ASSAY THYROID STIM HORMONE: CPT | Performed by: INTERNAL MEDICINE

## 2019-03-18 PROCEDURE — 80053 COMPREHEN METABOLIC PANEL: CPT | Performed by: INTERNAL MEDICINE

## 2019-03-18 PROCEDURE — 84153 ASSAY OF PSA TOTAL: CPT | Performed by: INTERNAL MEDICINE

## 2019-03-18 PROCEDURE — 85379 FIBRIN DEGRADATION QUANT: CPT | Performed by: INTERNAL MEDICINE

## 2019-03-18 PROCEDURE — 85025 COMPLETE CBC W/AUTO DIFF WBC: CPT | Performed by: INTERNAL MEDICINE

## 2019-03-18 PROCEDURE — 99204 OFFICE O/P NEW MOD 45 MIN: CPT | Performed by: INTERNAL MEDICINE

## 2019-03-18 ASSESSMENT — MIFFLIN-ST. JEOR: SCORE: 1798.23

## 2019-03-18 ASSESSMENT — PAIN SCALES - GENERAL: PAINLEVEL: MODERATE PAIN (5)

## 2019-03-18 NOTE — LETTER
3/18/2019         RE: Matt Way  [id number] [facility] [address]        Dear Colleague,    Thank you for referring your patient, Matt Way, to the [facility] CANCER CARE. Please see a copy of my visit note below.    This clinic visit note has been dictated [id number]                [facility] PHYSICIANS  HEMATOLOGY ONCOLOGY    Visit Date:   03/18/2019      REASON FOR CONSULTATION:  Unprovoked pulmonary embolism.      REFERRING PROVIDER:  Karina Tovar MD.      HISTORY OF PRESENT ILLNESS:  The patient is a 68-year-old gentleman who had an MI in 06/2018.  On 11/15/2018, he was seen in the Emergency Room with chest pain.  At that time, he was thinking that he had other heart attack.  A CT with PE protocol 11/15/2018 showed a small embolus in the branch of the left upper lobe pulmonary artery.  He was started on anticoagulation and continues to be on Xarelto.  He is tolerating anticoagulation well without any obvious bleeding or bruising, except for bleeding from his gums when he brushes his teeth.  He states that he does not have any previous history of blood clots.      PAST MEDICAL HISTORY:  High cholesterol, coronary artery disease, diabetes.      MEDICATIONS:  Reviewed.      ALLERGIES:  REVIEWED.      FAMILY HISTORY:  Father had colon cancer in his 60s.      SOCIAL HISTORY:  Nonsmoker, retired, used to work in a warehouse.      REVIEW OF SYSTEMS:  A complete review of systems is performed and found to be negative, other than pertinent positives mentioned in the history of present illness.      PHYSICAL EXAMINATION:   VITAL SIGNS:  Blood pressure is 105/63, pulse 77, respiration 18, temperature 98.1.    CONSTITUTIONAL: Sitting comfortably.   HEENT: Pupils are equal. Oropharynx is clear.   NECK: No cervical or supraclavicular lymphadenopathy.   RESPIRATORY: Clear bilaterally.   CARD/VASC: S1, S2, regular.   GI: Soft, nontender, nondistended, no hepatosplenomegaly.    MUSKULOSKELETAL: Warm, well perfused.   NEUROLOGIC: Alert, awake.   INTEGUMENT: No rash.   LYMPHATICS: No edema.   PSYCH: He is quite anxious.      LABORATORY DATA AND IMAGING REVIEWED DURING THIS VISIT:  Recent Labs   Lab Test 03/18/19  1444 11/15/18  0102 10/03/18  1113    135 129*   POTASSIUM 4.4 3.9 4.4   CHLORIDE 106 103 97   CO2 25 26 24   ANIONGAP 5 6 8   BUN 27 26 24   CR 1.11 0.97 0.98   * 103* 711*   CHRISTIE 8.8 9.0 8.8   MAG  --   --  2.2   PHOS  --   --  3.4     Recent Labs   Lab Test 03/18/19  1444 11/15/18  0102 10/03/18  1113   WBC 6.5 7.8 6.4   HGB 15.5 15.7 15.7    236 175   MCV 87 90 87   NEUTROPHIL 50.5 49.4 58.9     Recent Labs   Lab Test 03/18/19  1444 10/18/18  1255   BILITOTAL 0.4  --    ALKPHOS 125  --    ALT 53 48   AST 25  --    ALBUMIN 3.6  --          Results for orders placed or performed during the hospital encounter of 11/15/18   Chest CT, IV contrast only - PE protocol     Value    Radiologist flags Pulmonary embolism (AA)    Narrative    CT CHEST PULMONARY EMBOLISM W CONTRAST  11/15/2018 1:50 AM    HISTORY: Chest pain.    TECHNIQUE: Scans obtained from the apices through the diaphragm with  IV contrast. 75 mL Isovue-370 injected. Radiation dose for this scan  was reduced using automated exposure control, adjustment of the mA  and/or kV according to patient size, or iterative reconstruction  technique.    COMPARISON: None.    FINDINGS: Evaluation of the pulmonary arterial system is remarkable  for a small embolus in a branch of the left upper lobe pulmonary  artery. No large central pulmonary embolus. No evidence of right heart  strain. The heart is at the upper limits of normal in size. There is  no aortic aneurysm or dissection. No mediastinal, hilar or axillary  lymph node enlargement. There is mild dependent atelectasis  bilaterally. No pneumothorax or pleural effusion. Images through the  upper abdomen show no acute abnormality. There is mild  degenerative  disease in the spine.      Impression    IMPRESSION: Small left upper lobe pulmonary embolus.     [Critical Result: Pulmonary embolism]    Finding was identified on 11/15/2018 1:51 AM.     Dr. Choi was contacted by me on 11/15/2018 1:54 AM and verbalized  understanding of the critical result.    CARIN CASSIDY MD   US Lower Extremity Venous Duplex Bilateral    Narrative    ULTRASOUND BILATERAL LOWER EXTREMITY VENOUS DUPLEX  11/15/2018 5:38 AM      HISTORY:  Left lower extremity swelling. PE noted on chest CT.      FINDINGS: The deep veins in the right and left lower extremity are  compressible throughout. The deep veins demonstrate normal venous  augmentation, waveforms and color Doppler flow. No evidence of  superficial thrombophlebitis.      Impression    IMPRESSION: No evidence of DVT.    ANISA STALEY MD        ASSESSMENT AND RECOMMENDATIONS:  This is a 68-year-old gentleman with unprovoked pulmonary embolism last year.  For that, he is on anticoagulation with Xarelto.  This was an unprovoked episode and long-term anticoagulation is recommended as long as he is able to tolerate it.  He needs age-appropriate cancer screening, especially a colonoscopy given a family history of colon cancer in his father when he was in his 60s.  He is currently on Plavix.  He will talk to his cardiologist to get a colonoscopy in the next few months.  I have placed an order for a colonoscopy.  We will also proceed with a PSA for prostate cancer screening.  Other than age-appropriate screening for cancer, I think that additional thrombophilia testing is not indicated.  He is quite fatigued and I will also order TSH levels.  I also advised him to take Xarelto 20 mg once daily and call us if there are any bleeding or bruising issues.         ALEXANDER GANNON MD             D: 2019   T: 2019   MT: YEE      Name:     JUSTYN TYLER   MRN:      [id number]        Account:      [id number]   :      1950            Visit Date:   03/18/2019      Document: [id number]       cc: Karina Tovar MD       Again, thank you for allowing me to participate in the care of your patient.        Sincerely,        Bianca Jorge MD

## 2019-03-18 NOTE — PATIENT INSTRUCTIONS
1- Change Xarelto 20 mg daily today  2- Colonoscopy in next few months-Scheduled for 4/15/19. Yessica SIMMONS  3- Labs today  4- RTC MD 3-4 months with labs before- CBC diff, CMP, d-dimer 7/15/19 and labs are in EMR  Roel Owen, RN, BSN, OCN  [facility]  Patient Care Coordinator

## 2019-03-18 NOTE — NURSING NOTE
"Oncology Rooming Note    March 18, 2019 2:15 PM   Matt Way is a 68 year old male who presents for:    Chief Complaint   Patient presents with     Oncology Clinic Visit     Other acute pulmonary embolism without acute cor pulmonale     Initial Vitals: /63   Pulse 77   Temp 98.1  F (36.7  C) (Oral)   Resp 18   Ht 1.854 m (6' 1\")   Wt 96.4 kg (212 lb 9.6 oz)   SpO2 96%   BMI 28.05 kg/m   Estimated body mass index is 28.05 kg/m  as calculated from the following:    Height as of this encounter: 1.854 m (6' 1\").    Weight as of this encounter: 96.4 kg (212 lb 9.6 oz). Body surface area is 2.23 meters squared.  Moderate Pain (5) Comment: Data Unavailable   No LMP for male patient.  Allergies reviewed: Yes  Medications reviewed: Yes    Medications: Medication refills not needed today.  Pharmacy name entered into [employer]: [facility] [id number] [facility] [id number] [address]    Clinical concerns: new patient consult       Sandra Romo CMA              "

## 2019-03-19 NOTE — PROGRESS NOTES
[facility] PHYSICIANS  HEMATOLOGY ONCOLOGY    Visit Date:   03/18/2019      REASON FOR CONSULTATION:  Unprovoked pulmonary embolism.      REFERRING PROVIDER:  Karina Tovar MD.      HISTORY OF PRESENT ILLNESS:  The patient is a 68-year-old gentleman who had an MI in 06/2018.  On 11/15/2018, he was seen in the Emergency Room with chest pain.  At that time, he was thinking that he had other heart attack.  A CT with PE protocol 11/15/2018 showed a small embolus in the branch of the left upper lobe pulmonary artery.  He was started on anticoagulation and continues to be on Xarelto.  He is tolerating anticoagulation well without any obvious bleeding or bruising, except for bleeding from his gums when he brushes his teeth.  He states that he does not have any previous history of blood clots.      PAST MEDICAL HISTORY:  High cholesterol, coronary artery disease, diabetes.      MEDICATIONS:  Reviewed.      ALLERGIES:  REVIEWED.      FAMILY HISTORY:  Father had colon cancer in his 60s.      SOCIAL HISTORY:  Nonsmoker, retired, used to work in a warehouse.      REVIEW OF SYSTEMS:  A complete review of systems is performed and found to be negative, other than pertinent positives mentioned in the history of present illness.      PHYSICAL EXAMINATION:   VITAL SIGNS:  Blood pressure is 105/63, pulse 77, respiration 18, temperature 98.1.    CONSTITUTIONAL: Sitting comfortably.   HEENT: Pupils are equal. Oropharynx is clear.   NECK: No cervical or supraclavicular lymphadenopathy.   RESPIRATORY: Clear bilaterally.   CARD/VASC: S1, S2, regular.   GI: Soft, nontender, nondistended, no hepatosplenomegaly.   MUSKULOSKELETAL: Warm, well perfused.   NEUROLOGIC: Alert, awake.   INTEGUMENT: No rash.   LYMPHATICS: No edema.   PSYCH: He is quite anxious.      LABORATORY DATA AND IMAGING REVIEWED DURING THIS VISIT:  Recent Labs   Lab Test 03/18/19  1444 11/15/18  0102 10/03/18  1113    135 129*   POTASSIUM 4.4 3.9 4.4    CHLORIDE 106 103 97   CO2 25 26 24   ANIONGAP 5 6 8   BUN 27 26 24   CR 1.11 0.97 0.98   * 103* 711*   CHRISTIE 8.8 9.0 8.8   MAG  --   --  2.2   PHOS  --   --  3.4     Recent Labs   Lab Test 03/18/19  1444 11/15/18  0102 10/03/18  1113   WBC 6.5 7.8 6.4   HGB 15.5 15.7 15.7    236 175   MCV 87 90 87   NEUTROPHIL 50.5 49.4 58.9     Recent Labs   Lab Test 03/18/19  1444 10/18/18  1255   BILITOTAL 0.4  --    ALKPHOS 125  --    ALT 53 48   AST 25  --    ALBUMIN 3.6  --          Results for orders placed or performed during the hospital encounter of 11/15/18   Chest CT, IV contrast only - PE protocol     Value    Radiologist flags Pulmonary embolism (AA)    Narrative    CT CHEST PULMONARY EMBOLISM W CONTRAST  11/15/2018 1:50 AM    HISTORY: Chest pain.    TECHNIQUE: Scans obtained from the apices through the diaphragm with  IV contrast. 75 mL Isovue-370 injected. Radiation dose for this scan  was reduced using automated exposure control, adjustment of the mA  and/or kV according to patient size, or iterative reconstruction  technique.    COMPARISON: None.    FINDINGS: Evaluation of the pulmonary arterial system is remarkable  for a small embolus in a branch of the left upper lobe pulmonary  artery. No large central pulmonary embolus. No evidence of right heart  strain. The heart is at the upper limits of normal in size. There is  no aortic aneurysm or dissection. No mediastinal, hilar or axillary  lymph node enlargement. There is mild dependent atelectasis  bilaterally. No pneumothorax or pleural effusion. Images through the  upper abdomen show no acute abnormality. There is mild degenerative  disease in the spine.      Impression    IMPRESSION: Small left upper lobe pulmonary embolus.     [Critical Result: Pulmonary embolism]    Finding was identified on 11/15/2018 1:51 AM.     Dr. Choi was contacted by me on 11/15/2018 1:54 AM and verbalized  understanding of the critical result.    CARIN CASSIDY MD    US Lower Extremity Venous Duplex Bilateral    Narrative    ULTRASOUND BILATERAL LOWER EXTREMITY VENOUS DUPLEX  11/15/2018 5:38 AM      HISTORY:  Left lower extremity swelling. PE noted on chest CT.      FINDINGS: The deep veins in the right and left lower extremity are  compressible throughout. The deep veins demonstrate normal venous  augmentation, waveforms and color Doppler flow. No evidence of  superficial thrombophlebitis.      Impression    IMPRESSION: No evidence of DVT.    ANISA STALEY MD        ASSESSMENT AND RECOMMENDATIONS:  This is a 68-year-old gentleman with unprovoked pulmonary embolism last year.  For that, he is on anticoagulation with Xarelto.  This was an unprovoked episode and long-term anticoagulation is recommended as long as he is able to tolerate it.  He needs age-appropriate cancer screening, especially a colonoscopy given a family history of colon cancer in his father when he was in his 60s.  He is currently on Plavix.  He will talk to his cardiologist to get a colonoscopy in the next few months.  I have placed an order for a colonoscopy.  We will also proceed with a PSA for prostate cancer screening.  Other than age-appropriate screening for cancer, I think that additional thrombophilia testing is not indicated.  He is quite fatigued and I will also order TSH levels.  I also advised him to take Xarelto 20 mg once daily and call us if there are any bleeding or bruising issues.         ALEXANDER GANNON MD             D: 2019   T: 2019   MT: VH      Name:     JUSTYN TYLER   MRN:      [id number]        Account:      [id number]   :      1950           Visit Date:   2019      Document: [id number]       cc: Karina Tovar MD

## 2019-04-08 ENCOUNTER — HOSPITAL ENCOUNTER (EMERGENCY)
Facility: CLINIC | Age: 69
Discharge: HOME | End: 2019-04-08
Attending: NURSE PRACTITIONER | Admitting: NURSE PRACTITIONER
Payer: MEDICARE

## 2019-04-08 ENCOUNTER — APPOINTMENT (OUTPATIENT)
Dept: GENERAL RADIOLOGY | Facility: CLINIC | Age: 69
End: 2019-04-08
Attending: NURSE PRACTITIONER
Payer: MEDICARE

## 2019-04-08 VITALS
RESPIRATION RATE: 20 BRPM | DIASTOLIC BLOOD PRESSURE: 76 MMHG | SYSTOLIC BLOOD PRESSURE: 117 MMHG | TEMPERATURE: 98.5 F | OXYGEN SATURATION: 96 %

## 2019-04-08 DIAGNOSIS — R07.9 ACUTE CHEST PAIN: ICD-10-CM

## 2019-04-08 LAB
ANION GAP SERPL CALCULATED.3IONS-SCNC: 7 MMOL/L (ref 3–14)
BUN SERPL-MCNC: 25 MG/DL (ref 7–30)
CALCIUM SERPL-MCNC: 8.6 MG/DL (ref 8.5–10.1)
CHLORIDE SERPL-SCNC: 106 MMOL/L (ref 94–109)
CO2 SERPL-SCNC: 24 MMOL/L (ref 20–32)
CREAT SERPL-MCNC: 0.94 MG/DL (ref 0.66–1.25)
ERYTHROCYTE [DISTWIDTH] IN BLOOD BY AUTOMATED COUNT: 14.4 % (ref 10–15)
GFR SERPL CREATININE-BSD FRML MDRD: 84 ML/MIN/{1.73_M2}
GLUCOSE SERPL-MCNC: 242 MG/DL (ref 70–99)
HCT VFR BLD AUTO: 42.8 % (ref 40–53)
HGB BLD-MCNC: 14.2 G/DL (ref 13.3–17.7)
MCH RBC QN AUTO: 28.5 PG (ref 26.5–33)
MCHC RBC AUTO-ENTMCNC: 33.2 G/DL (ref 31.5–36.5)
MCV RBC AUTO: 86 FL (ref 78–100)
PLATELET # BLD AUTO: 197 10E9/L (ref 150–450)
POTASSIUM SERPL-SCNC: 4.2 MMOL/L (ref 3.4–5.3)
RBC # BLD AUTO: 4.98 10E12/L (ref 4.4–5.9)
SODIUM SERPL-SCNC: 137 MMOL/L (ref 133–144)
TROPONIN I SERPL-MCNC: <0.015 UG/L (ref 0–0.04)
TROPONIN I SERPL-MCNC: <0.015 UG/L (ref 0–0.04)
WBC # BLD AUTO: 6.3 10E9/L (ref 4–11)

## 2019-04-08 PROCEDURE — 99285 EMERGENCY DEPT VISIT HI MDM: CPT | Mod: 25

## 2019-04-08 PROCEDURE — 85027 COMPLETE CBC AUTOMATED: CPT | Performed by: NURSE PRACTITIONER

## 2019-04-08 PROCEDURE — 36415 COLL VENOUS BLD VENIPUNCTURE: CPT | Performed by: NURSE PRACTITIONER

## 2019-04-08 PROCEDURE — 71046 X-RAY EXAM CHEST 2 VIEWS: CPT

## 2019-04-08 PROCEDURE — 93005 ELECTROCARDIOGRAM TRACING: CPT

## 2019-04-08 PROCEDURE — 84484 ASSAY OF TROPONIN QUANT: CPT | Performed by: NURSE PRACTITIONER

## 2019-04-08 PROCEDURE — 80048 BASIC METABOLIC PNL TOTAL CA: CPT | Performed by: NURSE PRACTITIONER

## 2019-04-08 ASSESSMENT — ENCOUNTER SYMPTOMS
DIARRHEA: 0
FEVER: 0
NAUSEA: 0
DIAPHORESIS: 1
VOMITING: 0
HEADACHES: 0
DIZZINESS: 0
SHORTNESS OF BREATH: 1
LIGHT-HEADEDNESS: 0
COUGH: 0

## 2019-04-08 NOTE — ED NOTES
Bed: ED31  Expected date: 4/8/19  Expected time: 5:45 PM  Means of arrival: Ambulance  Comments:  Menea Chadwick
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
## 2019-04-08 NOTE — ED AVS SNAPSHOT
[facility] Emergency Department  [address]  [facility] [address]  Phone:  [phone]  Fax:  [phone]                                    Matt Way   MRN: [id number]    Department:  [facility] Emergency Department   Date of Visit:  4/8/2019           After Visit Summary Signature Page    I have received my discharge instructions, and my questions have been answered. I have discussed any challenges I see with this plan with the nurse or doctor.    ..........................................................................................................................................  Patient/Patient Representative Signature      ..........................................................................................................................................  Patient Representative Print Name and Relationship to Patient    ..................................................               ................................................  Date                                   Time    ..........................................................................................................................................  Reviewed by Signature/Title    ...................................................              ..............................................  Date                                               Time          22EPIC Rev 08/18

## 2019-04-09 LAB — INTERPRETATION ECG - MUSE: NORMAL

## 2019-04-12 ENCOUNTER — HOSPITAL ENCOUNTER (OUTPATIENT)
Dept: CARDIOLOGY | Facility: CLINIC | Age: 69
Discharge: HOME | End: 2019-04-12
Attending: NURSE PRACTITIONER | Admitting: NURSE PRACTITIONER
Payer: MEDICARE

## 2019-04-12 PROCEDURE — 93350 STRESS TTE ONLY: CPT | Mod: 26 | Performed by: INTERNAL MEDICINE

## 2019-04-12 PROCEDURE — 40000264 ECHO STRESS ECHOCARDIOGRAM

## 2019-04-12 PROCEDURE — 93325 DOPPLER ECHO COLOR FLOW MAPG: CPT | Mod: 26 | Performed by: INTERNAL MEDICINE

## 2019-04-12 PROCEDURE — 25500064 ZZH RX 255 OP 636: Performed by: NURSE PRACTITIONER

## 2019-04-12 PROCEDURE — 93321 DOPPLER ECHO F-UP/LMTD STD: CPT | Mod: 26 | Performed by: INTERNAL MEDICINE

## 2019-04-12 PROCEDURE — 93018 CV STRESS TEST I&R ONLY: CPT | Performed by: INTERNAL MEDICINE

## 2019-04-12 PROCEDURE — 93016 CV STRESS TEST SUPVJ ONLY: CPT | Performed by: INTERNAL MEDICINE

## 2019-04-12 RX ADMIN — HUMAN ALBUMIN MICROSPHERES AND PERFLUTREN 3 ML: 10; .22 INJECTION, SOLUTION INTRAVENOUS at 10:41

## 2019-04-15 ENCOUNTER — TELEPHONE (OUTPATIENT)
Dept: CARDIOLOGY | Facility: CLINIC | Age: 69
End: 2019-04-15

## 2019-04-15 NOTE — TELEPHONE ENCOUNTER
Patient's wife Salina called and was calling if it was ok for patient to have a colonoscopy he is currently  on Plavix and Xarelto.  It is a routine colonoscopy and patient is not having any problems.  Angiogram CECILY to the proximal LAD on 6-7-18 and would prefer he continue dual antiplatelet until follow up in June 2019. Patient's wife agreed with plan of care and will have a F/U in June for further recommendations of antiplatelet therapy Hx of a PE. On Xarelto.

## 2019-05-07 ENCOUNTER — TELEPHONE (OUTPATIENT)
Dept: CARDIOLOGY | Facility: CLINIC | Age: 69
End: 2019-05-07

## 2019-05-07 NOTE — TELEPHONE ENCOUNTER
I am ok with withholding the plavix as it is 11 months now since PCI-almost one year and looks like dental issues cannot wait, I would recommend that he instead goes on asa 81 mg q daily if it is ok with dental surgeon for the time he is off plavix. Of course if even asa is not acceptable to surgeon then we do not have a choice- but as I noted above I would prefer if he can be on asa 81 mg q daily alen operatively.    Thanks  Wilber

## 2019-05-07 NOTE — TELEPHONE ENCOUNTER
Requesting hold of plavix for dental extraction of 3 teeth that have been painful and causing issues for several months. Dental contact is Lia at [phone], [facility].   Had PCI with CECILY to proximal LAD on 6/8/19. Is on Plavix and Xarelto. States has gotten the okay from PMD to hold the Xarelto for 3 days. Not on ASA.     States did have some chest pain on 4/8/19. Was seen in the ED with negative troponin. Had Echo stress test done on 4/12/19 which was negative for inducible ischemia, with normal resting wall motion and no stress induced wall motion abnormality.     Will message Dr. Strickland for recommendations.

## 2019-05-08 NOTE — TELEPHONE ENCOUNTER
Update with Dr. Strickland recommendations of nataly to hold Plavixfor upcoming dental extraction. Should take ASA 81 mg while off Plavix and to restart Plavix after dental procedure, when recommended by Surgeon.   Verbalized understanding.   No further questions.

## 2019-05-08 NOTE — TELEPHONE ENCOUNTER
Called to Dental contact is Lia at [phone], [facility], left voice message with recommendations of Dr. Strickland. Call back phone number left.    Called patient. Verbalized understanding after reviewal of recommendations of Dr. Strickland concerning hold of plavix for dental extraction needed. After explaining the recommendations, patient asked that I call back and update his wife. She handles all my affairs, as I have early dementia.     Will call back.

## 2019-06-12 ENCOUNTER — HOSPITAL ENCOUNTER (OUTPATIENT)
Facility: CLINIC | Age: 69
End: 2019-06-12
Attending: INTERNAL MEDICINE | Admitting: INTERNAL MEDICINE
Payer: MEDICARE

## 2019-06-17 ENCOUNTER — TELEPHONE (OUTPATIENT)
Dept: CARDIOLOGY | Facility: CLINIC | Age: 69
End: 2019-06-17

## 2019-06-17 NOTE — TELEPHONE ENCOUNTER
Received fax from Racheal Lewis's dental office of [facility], [facility] for patient. States patient needs multiple extractions and fillings. Wants OK from cardiology to proceed with treatment. Called to inquire about recommendations/questions. No callback received as of 6/17/19.    Shelia Juarez, [facility] Care Coordinator    Addendum: Team 3 never received a callback from [place]. Received another identical fax on 7/9/19. Called at that time, left VM, still no callback. Called and spoke with patient and wife, informed them of the communication issues we are experiencing. Patient's wife states she will call [place] and advise them to call Team 3 nurses.   Shelia Juarez RN on 7/12/2019 at 3:48 PM

## 2019-07-11 ENCOUNTER — HOSPITAL ENCOUNTER (OUTPATIENT)
Facility: CLINIC | Age: 69
Setting detail: SPECIMEN
Discharge: HOME | End: 2019-07-11
Attending: INTERNAL MEDICINE | Admitting: INTERNAL MEDICINE
Payer: MEDICARE

## 2019-07-11 ENCOUNTER — ONCOLOGY VISIT (OUTPATIENT)
Dept: ONCOLOGY | Facility: CLINIC | Age: 69
End: 2019-07-11
Attending: INTERNAL MEDICINE
Payer: MEDICARE

## 2019-07-11 DIAGNOSIS — I26.99 OTHER ACUTE PULMONARY EMBOLISM WITHOUT ACUTE COR PULMONALE (H): ICD-10-CM

## 2019-07-11 LAB
ALBUMIN SERPL-MCNC: 3.4 G/DL (ref 3.4–5)
ALP SERPL-CCNC: 134 U/L (ref 40–150)
ALT SERPL W P-5'-P-CCNC: 43 U/L (ref 0–70)
ANION GAP SERPL CALCULATED.3IONS-SCNC: 8 MMOL/L (ref 3–14)
AST SERPL W P-5'-P-CCNC: 18 U/L (ref 0–45)
BASOPHILS # BLD AUTO: 0.1 10E9/L (ref 0–0.2)
BASOPHILS NFR BLD AUTO: 1.5 %
BILIRUB SERPL-MCNC: 0.3 MG/DL (ref 0.2–1.3)
BUN SERPL-MCNC: 31 MG/DL (ref 7–30)
CALCIUM SERPL-MCNC: 8.8 MG/DL (ref 8.5–10.1)
CHLORIDE SERPL-SCNC: 106 MMOL/L (ref 94–109)
CO2 SERPL-SCNC: 23 MMOL/L (ref 20–32)
CREAT SERPL-MCNC: 1.07 MG/DL (ref 0.66–1.25)
D DIMER PPP FEU-MCNC: 1.4 UG/ML FEU (ref 0–0.5)
DIFFERENTIAL METHOD BLD: NORMAL
EOSINOPHIL # BLD AUTO: 0.2 10E9/L (ref 0–0.7)
EOSINOPHIL NFR BLD AUTO: 3.1 %
ERYTHROCYTE [DISTWIDTH] IN BLOOD BY AUTOMATED COUNT: 13.3 % (ref 10–15)
GFR SERPL CREATININE-BSD FRML MDRD: 72 ML/MIN/{1.73_M2}
GLUCOSE SERPL-MCNC: 292 MG/DL (ref 70–99)
HCT VFR BLD AUTO: 43.9 % (ref 40–53)
HGB BLD-MCNC: 14.1 G/DL (ref 13.3–17.7)
IMM GRANULOCYTES # BLD: 0 10E9/L (ref 0–0.4)
IMM GRANULOCYTES NFR BLD: 0.5 %
LYMPHOCYTES # BLD AUTO: 2.2 10E9/L (ref 0.8–5.3)
LYMPHOCYTES NFR BLD AUTO: 35.7 %
MCH RBC QN AUTO: 28.6 PG (ref 26.5–33)
MCHC RBC AUTO-ENTMCNC: 32.1 G/DL (ref 31.5–36.5)
MCV RBC AUTO: 89 FL (ref 78–100)
MONOCYTES # BLD AUTO: 0.5 10E9/L (ref 0–1.3)
MONOCYTES NFR BLD AUTO: 8.3 %
NEUTROPHILS # BLD AUTO: 3.1 10E9/L (ref 1.6–8.3)
NEUTROPHILS NFR BLD AUTO: 50.9 %
NRBC # BLD AUTO: 0 10*3/UL
NRBC BLD AUTO-RTO: 0 /100
PLATELET # BLD AUTO: 186 10E9/L (ref 150–450)
POTASSIUM SERPL-SCNC: 4.3 MMOL/L (ref 3.4–5.3)
PROT SERPL-MCNC: 7.1 G/DL (ref 6.8–8.8)
RBC # BLD AUTO: 4.93 10E12/L (ref 4.4–5.9)
SODIUM SERPL-SCNC: 137 MMOL/L (ref 133–144)
WBC # BLD AUTO: 6.1 10E9/L (ref 4–11)

## 2019-07-11 PROCEDURE — 85025 COMPLETE CBC W/AUTO DIFF WBC: CPT | Performed by: INTERNAL MEDICINE

## 2019-07-11 PROCEDURE — 85379 FIBRIN DEGRADATION QUANT: CPT | Performed by: INTERNAL MEDICINE

## 2019-07-11 PROCEDURE — 36415 COLL VENOUS BLD VENIPUNCTURE: CPT

## 2019-07-11 PROCEDURE — 80053 COMPREHEN METABOLIC PANEL: CPT | Performed by: INTERNAL MEDICINE

## 2019-07-11 NOTE — LETTER
7/11/2019         RE: Matt Way  [address] [facility] [address]        Dear Colleague,    Thank you for referring your patient, Matt Way, to the [facility] CANCER [facility]ARE. Please see a copy of my visit note below.    Medical Assistant Note:  Matt Way presents today for blood draw.    Patient seen by provider today: No.   present during visit today: Not Applicable.    Concerns: No Concerns.    Procedure:  Lab draw site: right antecub, Needle type: butterfly, Gauge: 23.    Post Assessment:  Labs drawn without difficulty: Yes.    Discharge Plan:  Departure Mode: Ambulatory.    Face to Face Time: 10.    Sandra Romo CMA              Again, thank you for allowing me to participate in the care of your patient.        Sincerely,        [facility] Oncology Nurse

## 2019-07-11 NOTE — PROGRESS NOTES
Medical Assistant Note:  Matt Way presents today for blood draw.    Patient seen by provider today: No.   present during visit today: Not Applicable.    Concerns: No Concerns.    Procedure:  Lab draw site: right antecub, Needle type: butterfly, Gauge: 23.    Post Assessment:  Labs drawn without difficulty: Yes.    Discharge Plan:  Departure Mode: Ambulatory.    Face to Face Time: 10.    Sandra Romo, CMA

## 2019-07-15 ENCOUNTER — ONCOLOGY VISIT (OUTPATIENT)
Dept: ONCOLOGY | Facility: CLINIC | Age: 69
End: 2019-07-15
Attending: INTERNAL MEDICINE
Payer: MEDICARE

## 2019-07-15 VITALS
WEIGHT: 206.4 LBS | TEMPERATURE: 97.4 F | HEIGHT: 73 IN | DIASTOLIC BLOOD PRESSURE: 63 MMHG | BODY MASS INDEX: 27.35 KG/M2 | OXYGEN SATURATION: 96 % | RESPIRATION RATE: 16 BRPM | HEART RATE: 75 BPM | SYSTOLIC BLOOD PRESSURE: 101 MMHG

## 2019-07-15 DIAGNOSIS — I26.99 OTHER ACUTE PULMONARY EMBOLISM WITHOUT ACUTE COR PULMONALE (H): Primary | ICD-10-CM

## 2019-07-15 PROCEDURE — G0463 HOSPITAL OUTPT CLINIC VISIT: HCPCS

## 2019-07-15 PROCEDURE — 99213 OFFICE O/P EST LOW 20 MIN: CPT | Performed by: INTERNAL MEDICINE

## 2019-07-15 ASSESSMENT — PAIN SCALES - GENERAL: PAINLEVEL: NO PAIN (0)

## 2019-07-15 ASSESSMENT — MIFFLIN-ST. JEOR: SCORE: 1770.1

## 2019-07-15 NOTE — PROGRESS NOTES
[facility] PHYSICIANS  HEMATOLOGY ONCOLOGY    Visit Date:   07/15/2019      HEMATOLOGY FOLLOWUP NOTE      DIAGNOSIS:  Unprovoked pulmonary embolism, which was diagnosed on a CT scan 11/15/2018.      TREATMENT:  Xarelto 20 mg daily.      SUBJECTIVE:  The patient was seen as a followup today.  He has been taking his Xarelto regularly.  On his last visit, he was advised to get a colonoscopy, which he canceled.  He continues to tolerate anticoagulation well without any significant issues with bleeding or bruising.     REVIEW OF SYSTEMS:  A complete review of systems was performed and found to be negative other than pertinent positives mentioned in history of present illness.      Past medical, social histories reviewed.     Meds- Reviewed.     PHYSICAL EXAMINATION:   VITAL SIGNS:  Blood pressure is 101/63, pulse 75, respirations 16, temperature 97.4.   CONSTITUTIONAL:  Sitting comfortably.   NEUROLOGIC:  Alert, awake.   PSYCHIATRIC:  Appears anxious.      LABORATORY DATA AND IMAGING REVIEWED DURING THIS VISIT:  Recent Labs   Lab Test 07/11/19 0959 04/08/19  1823  10/03/18  1113    137   < > 129*   POTASSIUM 4.3 4.2   < > 4.4   CHLORIDE 106 106   < > 97   CO2 23 24   < > 24   ANIONGAP 8 7   < > 8   BUN 31* 25   < > 24   CR 1.07 0.94   < > 0.98   * 242*   < > 711*   CHRISTIE 8.8 8.6   < > 8.8   MAG  --   --   --  2.2   PHOS  --   --   --  3.4    < > = values in this interval not displayed.     Recent Labs   Lab Test 07/11/19 0959 04/08/19  1823 03/18/19  1444 11/15/18  0102   WBC 6.1 6.3 6.5 7.8   HGB 14.1 14.2 15.5 15.7    197 211 236   MCV 89 86 87 90   NEUTROPHIL 50.9  --  50.5 49.4     Recent Labs   Lab Test 07/11/19  0959 03/18/19  1444 10/18/18  1255   BILITOTAL 0.3 0.4  --    ALKPHOS 134 125  --    ALT 43 53 48   AST 18 25  --    ALBUMIN 3.4 3.6  --      ASSESSMENT:  This is a 68-year-old gentleman who was initially seen by me 03/18/2019 for pulmonary embolism.  He was on Xarelto.   Long-term anticoagulation is recommended due to unprovoked nature of pulmonary embolism.  He was recommended to get age-appropriate cancer screening, especially given colonoscopy with a family history of colon cancer in his father when he was 60.  He was asked to schedule it, which he scheduled initially, but later canceled.      I had a detailed discussion with this gentleman about the importance of getting colonoscopy.  He agrees to reschedule it.        He should continue long-term anticoagulation and follow up with primary care:     PLAN:  1.  Schedule a colonoscopy.   2.  Continue Xarelto 20 mg daily.   3.  Follow up with primary care.         ALEXANDER GANNON MD             D: 07/15/2019   T: 07/15/2019   MT: SHASHANK      Name:     JUSTYN TYLER   MRN:      [id number]        Account:      [id number]   :      1950           Visit Date:   07/15/2019      Document: [id number]

## 2019-07-15 NOTE — LETTER
7/15/2019         RE: Matt [address]  [address] [facility] [place] [address]        Dear Colleague,    Thank you for referring your patient, Matt Way, to the [facility] CANCER CARE. Please see a copy of my visit note below.    This clinic visit note has been dictated [id number]      [facility] PHYSICIANS  HEMATOLOGY ONCOLOGY    Visit Date:   07/15/2019      HEMATOLOGY FOLLOWUP NOTE      DIAGNOSIS:  Unprovoked pulmonary embolism, which was diagnosed on a CT scan 11/15/2018.      TREATMENT:  Xarelto 20 mg daily.      SUBJECTIVE:  The patient was seen as a followup today.  He has been taking his Xarelto regularly.  On his last visit, he was advised to get a colonoscopy, which he canceled.  He continues to tolerate anticoagulation well without any significant issues with bleeding or bruising.     REVIEW OF SYSTEMS:  A complete review of systems was performed and found to be negative other than pertinent positives mentioned in history of present illness.      Past medical, social histories reviewed.     Meds- Reviewed.     PHYSICAL EXAMINATION:   VITAL SIGNS:  Blood pressure is 101/63, pulse 75, respirations 16, temperature 97.4.   CONSTITUTIONAL:  Sitting comfortably.   NEUROLOGIC:  Alert, awake.   PSYCHIATRIC:  Appears anxious.      LABORATORY DATA AND IMAGING REVIEWED DURING THIS VISIT:  Recent Labs   Lab Test 07/11/19 0959 04/08/19  1823  10/03/18  1113    137   < > 129*   POTASSIUM 4.3 4.2   < > 4.4   CHLORIDE 106 106   < > 97   CO2 23 24   < > 24   ANIONGAP 8 7   < > 8   BUN 31* 25   < > 24   CR 1.07 0.94   < > 0.98   * 242*   < > 711*   CHRISTIE 8.8 8.6   < > 8.8   MAG  --   --   --  2.2   PHOS  --   --   --  3.4    < > = values in this interval not displayed.     Recent Labs   Lab Test 07/11/19  0959 04/08/19  1823 03/18/19  1444 11/15/18  0102   WBC 6.1 6.3 6.5 7.8   HGB 14.1 14.2 15.5 15.7    197 211 236   MCV 89 86 87 90   NEUTROPHIL 50.9  --  50.5 49.4      Recent Labs   Lab Test 19  0959 19  1444 10/18/18  1255   BILITOTAL 0.3 0.4  --    ALKPHOS 134 125  --    ALT 43 53 48   AST 18 25  --    ALBUMIN 3.4 3.6  --      ASSESSMENT:  This is a 68-year-old gentleman who was initially seen by me 2019 for pulmonary embolism.  He was on Xarelto.  Long-term anticoagulation is recommended due to unprovoked nature of pulmonary embolism.  He was recommended to get age-appropriate cancer screening, especially given colonoscopy with a family history of colon cancer in his father when he was 60.  He was asked to schedule it, which he scheduled initially, but later canceled.      I had a detailed discussion with this gentleman about the importance of getting colonoscopy.  He agrees to reschedule it.        He should continue long-term anticoagulation and follow up with primary care:     PLAN:  1.  Schedule a colonoscopy.   2.  Continue Xarelto 20 mg daily.   3.  Follow up with primary care.         BIANCA JORGE MD             D: 07/15/2019   T: 07/15/2019   MT: SHASHANK      Name:     JUSTYN TYLER   MRN:      [id number]        Account:      [id number]   :      1950           Visit Date:   07/15/2019      Document: [id number]        Again, thank you for allowing me to participate in the care of your patient.        Sincerely,        Bianca Jorge MD

## 2019-07-15 NOTE — NURSING NOTE
"Oncology Rooming Note    July 15, 2019 2:15 PM   Matt Way is a 68 year old male who presents for:    Chief Complaint   Patient presents with     Oncology Clinic Visit     Other acute pulmonary embolism without acute cor pulmonale     Initial Vitals: /63   Pulse 75   Temp 97.4  F (36.3  C) (Oral)   Resp 16   Ht 1.854 m (6' 1\")   Wt 93.6 kg (206 lb 6.4 oz)   SpO2 96%   BMI 27.23 kg/m   Estimated body mass index is 27.23 kg/m  as calculated from the following:    Height as of this encounter: 1.854 m (6' 1\").    Weight as of this encounter: 93.6 kg (206 lb 6.4 oz). Body surface area is 2.2 meters squared.  No Pain (0) Comment: Data Unavailable   No LMP for male patient.  Allergies reviewed: Yes  Medications reviewed: Yes    Medications: Medication refills not needed today.  Pharmacy name entered into [employer]: [facility] [id number] [place] [facility] [id number]    Clinical concerns: Follow Up       Gracie Gomes CMA              "

## 2019-07-15 NOTE — PATIENT INSTRUCTIONS
1- Schedule a colonoscopy  2- Continue Xarelto 20 mg daily  3- Follow up with primary care physician

## 2019-07-18 ENCOUNTER — PATIENT OUTREACH (OUTPATIENT)
Dept: ONCOLOGY | Facility: CLINIC | Age: 69
End: 2019-07-18

## 2019-07-18 ENCOUNTER — TELEPHONE (OUTPATIENT)
Dept: ONCOLOGY | Facility: CLINIC | Age: 69
End: 2019-07-18

## 2019-07-18 NOTE — PROGRESS NOTES
I received a call earlier today regarding orders for a Xeralto hold order prior to Colonoscopy.    I spoke with Dr. Jorge and he advised and approved a 3 day hold prior to the Colonoscopy procedure.     Caterina Salgado

## 2019-07-26 ENCOUNTER — OFFICE VISIT (OUTPATIENT)
Dept: CARDIOLOGY | Facility: CLINIC | Age: 69
End: 2019-07-26
Payer: MEDICARE

## 2019-07-26 VITALS
BODY MASS INDEX: 28 KG/M2 | HEIGHT: 73 IN | HEART RATE: 67 BPM | WEIGHT: 211.3 LBS | SYSTOLIC BLOOD PRESSURE: 131 MMHG | DIASTOLIC BLOOD PRESSURE: 78 MMHG

## 2019-07-26 DIAGNOSIS — I21.4 NSTEMI (NON-ST ELEVATED MYOCARDIAL INFARCTION) (H): ICD-10-CM

## 2019-07-26 DIAGNOSIS — E78.5 HYPERLIPIDEMIA LDL GOAL <70: ICD-10-CM

## 2019-07-26 DIAGNOSIS — I10 BENIGN ESSENTIAL HYPERTENSION: Primary | ICD-10-CM

## 2019-07-26 PROCEDURE — 99213 OFFICE O/P EST LOW 20 MIN: CPT | Performed by: INTERNAL MEDICINE

## 2019-07-26 RX ORDER — OMEPRAZOLE 20 MG/1
20 TABLET, DELAYED RELEASE ORAL DAILY
Status: ON HOLD | COMMUNITY
End: 2020-09-02

## 2019-07-26 RX ORDER — METOPROLOL TARTRATE 25 MG/1
25 TABLET, FILM COATED ORAL 2 TIMES DAILY
COMMUNITY
End: 2020-02-17

## 2019-07-26 RX ORDER — ATORVASTATIN CALCIUM 80 MG/1
80 TABLET, FILM COATED ORAL DAILY
Qty: 90 TABLET | Refills: 3 | Status: SHIPPED | OUTPATIENT
Start: 2019-07-26 | End: 2020-07-27

## 2019-07-26 ASSESSMENT — MIFFLIN-ST. JEOR: SCORE: 1792.33

## 2019-07-26 NOTE — LETTER
7/26/2019    Kingston Leal MD  [facility] [address] [facility] [address]    RE: Matt Way       Dear Colleague,    I had the pleasure of seeing Matt Way in the [facility].    HPI and Plan:   See dictation(#[id number])    Orders Placed This Encounter   Procedures     Lipid Profile     ALT     Follow-Up with Cardiologist       Orders Placed This Encounter   Medications     metoprolol tartrate (LOPRESSOR) 25 MG tablet     Sig: Take 25 mg by mouth 2 times daily     omeprazole (PRILOSEC OTC) 20 MG EC tablet     Sig: Take 20 mg by mouth daily     atorvastatin (LIPITOR) 80 MG tablet     Sig: Take 1 tablet (80 mg) by mouth daily     Dispense:  90 tablet     Refill:  3       Medications Discontinued During This Encounter   Medication Reason     metoprolol tartrate (LOPRESSOR) 25 MG tablet Medication Reconciliation Clean Up     melatonin 3 MG tablet Stopped by Patient     atorvastatin (LIPITOR) 40 MG tablet          Encounter Diagnoses   Name Primary?     Benign essential hypertension Yes     Hyperlipidemia LDL goal <70      NSTEMI (non-ST elevated myocardial infarction) (H)        CURRENT MEDICATIONS:  Current Outpatient Medications   Medication Sig Dispense Refill     atorvastatin (LIPITOR) 80 MG tablet Take 1 tablet (80 mg) by mouth daily 90 tablet 3     clopidogrel (PLAVIX) 75 MG tablet Take 1 tablet (75 mg) by mouth daily 90 tablet 3     insulin aspart (NOVOLOG FLEXPEN) 100 UNIT/ML injection Inject 16 Units Subcutaneous 2 times daily (with meals) Breakfast and Dinner       insulin detemir (LEVEMIR) 100 UNIT/ML injection Inject 20 Units Subcutaneous 2 times daily        losartan (COZAAR) 25 MG tablet Take 1 tablet (25 mg) by mouth daily 30 tablet 11     metFORMIN (GLUCOPHAGE) 1000 MG tablet Take 1,000 mg by mouth 2 times daily (with meals)       metoprolol tartrate (LOPRESSOR) 25 MG tablet Take 25 mg by mouth 2 times daily       nitroGLYcerin (NITROSTAT)  0.4 MG sublingual tablet Place 1 tablet (0.4 mg) under the tongue every 5 minutes as needed for chest pain If symptoms persist 5 minutes after 1st dose call 911. 10 tablet 0     omeprazole (PRILOSEC OTC) 20 MG EC tablet Take 20 mg by mouth daily       rivaroxaban ANTICOAGULANT (XARELTO) 20 MG TABS tablet Take 1 tablet (20 mg) by mouth daily (with dinner) 60 tablet 3     Insulin Pen Needle 29G X 12.7MM MISC          ALLERGIES     Allergies   Allergen Reactions     Brilinta [Ticagrelor]      Duloxetine      Other reaction(s): Confusion  Per daughter who is MD     Gabapentin      Other reaction(s): Edema     Lisinopril Rash       PAST MEDICAL HISTORY:  Past Medical History:   Diagnosis Date     Coronary artery disease 06/2018    s/p CECILY to LAD      Diabetes (H)      NSTEMI (non-ST elevated myocardial infarction) (H) 06/2018    s/p CECILY to LAD      Pulmonary embolism (H) 11/15/2018       PAST SURGICAL HISTORY:  Past Surgical History:   Procedure Laterality Date     SHOULDER SURGERY         FAMILY HISTORY:  Family History   Problem Relation Age of Onset     Lupus Mother        SOCIAL HISTORY:  Social History     Socioeconomic History     Marital status:      Spouse name: None     Number of children: None     Years of education: None     Highest education level: None   Occupational History     None   Social Needs     Financial resource strain: None     Food insecurity:     Worry: None     Inability: None     Transportation needs:     Medical: None     Non-medical: None   Tobacco Use     Smoking status: Never Smoker     Smokeless tobacco: Never Used   Substance and Sexual Activity     Alcohol use: No     Drug use: No     Sexual activity: None   Lifestyle     Physical activity:     Days per week: None     Minutes per session: None     Stress: None   Relationships     Social connections:     Talks on phone: None     Gets together: None     Attends Rastafarian service: None     Active member of club or organization: None  "    Attends meetings of clubs or organizations: None     Relationship status: None     Intimate partner violence:     Fear of current or ex partner: None     Emotionally abused: None     Physically abused: None     Forced sexual activity: None   Other Topics Concern     None   Social History Narrative     None       Review of Systems:  Skin:  Negative       Eyes:  Positive for glasses    ENT:  Negative      Respiratory:  Negative       Cardiovascular:    Positive for;chest pain;edema    Gastroenterology: Negative      Genitourinary:         Musculoskeletal:  Negative      Neurologic:  Negative      Psychiatric:  Positive for sleep disturbances    Heme/Lymph/Imm:  Positive for allergies    Endocrine:  Positive for diabetes      Physical Exam:  Vitals: /78   Pulse 67   Ht 1.854 m (6' 1\")   Wt 95.8 kg (211 lb 4.8 oz)   BMI 27.88 kg/m       Constitutional:           Skin:             Head:           Eyes:           Lymph:      ENT:           Neck:           Respiratory:            Cardiac:                                                           GI:           Extremities and Muscular Skeletal:                 Neurological:           Psych:             CC  No referring provider defined for this encounter.                    Thank you for allowing me to participate in the care of your patient.      Sincerely,     Wilber Strickland MD     [facility]    cc:   No referring provider defined for this encounter.        "

## 2019-07-26 NOTE — PROGRESS NOTES
Service Date: 07/26/2019      REASON FOR CARDIOLOGY VISIT:  Followup CAD.      HISTORY OF PRESENT ILLNESS:  Mr. Way is a pleasant 68-year-old gentleman who I saw in 06/2018 when he presented with non-ST elevation myocardial infarction.  Coronary angiogram showed proximal LAD eccentric 90% stenosis which was successfully intervened with 2.75 x 12 mm drug-eluting stent.  He had 40% distal LAD stenosis, 50% diagonal, 10% proximal circumflex and 20% proximal RCA stenosis.  He has other comorbidities of diabetes, dyslipidemia and history of medical noncompliance.  The patient was also diagnosed with unprovoked pulmonary embolism late last year and has been on Xarelto and recently saw Dr. Jorge from Hematology, and the plan is to continue it indefinitely at this time.  Today he is coming for routine followup accompanied by his daughter, who is a gastroenterologist in [place], [place].  The patient tells me that he feels quite well.  He is fairly active.  He walks a lot.  He exercises a lot.  No chest discomfort or shortness of breath at rest or with physical activity.  In fact, about 3 months ago, patient had an exercise stress echocardiogram where he ran for 11 minutes without any objective evidence of inducible ischemia.  LV function was normal at rest.  He is presently on Plavix, on Xarelto for PE, on Lipitor 40 mg daily, losartan 25 mg daily, metoprolol tartrate 25 mg b.i.d.  No tobacco exposure.  LDL late last year was 85.  No bleeding issues.      PHYSICAL EXAMINATION:   VITAL SIGNS:  Blood pressure 131/78, heart rate 67 and regular, weight 211 pounds, BMI 27.88.   GENERAL:  The patient appears pleasant, comfortable.   NECK:  Normal JVP, no bruit.   CARDIOVASCULAR SYSTEM:  S1, S2 normal, no murmur, rub or gallop.   RESPIRATORY SYSTEM:  Clear to auscultation bilaterally.   GASTROINTESTINAL SYSTEM:  Abdomen soft, nontender.   EXTREMITIES:  No pitting pedal edema.   NEUROLOGICAL:  Alert, oriented x3.   PSYCH:   Normal affect.   SKIN:  No obvious rash.   HEENT:  No pallor or icterus.      IMPRESSION AND PLAN:  A very pleasant 68-year-old gentleman with history of CAD as noted above with other comorbidities of diabetes, history of unprovoked PE and dyslipidemia.  Overall, cardiac status-wise I think is doing quite well without any symptoms of angina or congestive heart failure.  He is on appropriate CAD medication therapy of high-intensity statin, beta blocker, angiotensin receptor blocker.  He is on Plavix.  His LDL was 85.  I had a long discussion with the patient, and we agreed to increase Lipitor to 80 mg daily.  I recommend repeating a lipid panel with ALT in about 6 weeks' time.  At this time, I recommend continuing Plavix for antiplatelet therapy.  He is not having any adverse effect from combination of Plavix and Xarelto.  In case he notices any future episode of bleeding issues, one option would be to replace Plavix with aspirin at that time.  For now, we will continue this combination.  If he continues to feel stable cardiac status-wise, we can see him back in 6 months, sooner if he notes any change in clinical status, especially if any exertion-related symptoms.         LEO WEEMS MD             D: 2019   T: 2019   MT: ENE      Name:     JUSTYN TYLER   MRN:      [id number]        Account:      [id number]   :      1950           Service Date: 2019      Document: [id number]

## 2019-07-26 NOTE — LETTER
7/26/2019      Kingston Leal MD  [facility] [address] [facility] [address]      RE: Matt Way       Dear Colleague,    I had the pleasure of seeing Matt Way in the [facility].    Service Date: 07/26/2019      REASON FOR CARDIOLOGY VISIT:  Followup CAD.      HISTORY OF PRESENT ILLNESS:  Mr. Way is a pleasant 68-year-old gentleman who I saw in 06/2018 when he presented with non-ST elevation myocardial infarction.  Coronary angiogram showed proximal LAD eccentric 90% stenosis which was successfully intervened with 2.75 x 12 mm drug-eluting stent.  He had 40% distal LAD stenosis, 50% diagonal, 10% proximal circumflex and 20% proximal RCA stenosis.  He has other comorbidities of diabetes, dyslipidemia and history of medical noncompliance.  The patient was also diagnosed with unprovoked pulmonary embolism late last year and has been on Xarelto and recently saw Dr. Jorge from Hematology, and the plan is to continue it indefinitely at this time.  Today he is coming for routine followup accompanied by his daughter, who is a gastroenterologist in [place], [place].  The patient tells me that he feels quite well.  He is fairly active.  He walks a lot.  He exercises a lot.  No chest discomfort or shortness of breath at rest or with physical activity.  In fact, about 3 months ago, patient had an exercise stress echocardiogram where he ran for 11 minutes without any objective evidence of inducible ischemia.  LV function was normal at rest.  He is presently on Plavix, on Xarelto for PE, on Lipitor 40 mg daily, losartan 25 mg daily, metoprolol tartrate 25 mg b.i.d.  No tobacco exposure.  LDL late last year was 85.  No bleeding issues.      PHYSICAL EXAMINATION:   VITAL SIGNS:  Blood pressure 131/78, heart rate 67 and regular, weight 211 pounds, BMI 27.88.   GENERAL:  The patient appears pleasant, comfortable.   NECK:  Normal JVP, no bruit.   CARDIOVASCULAR  SYSTEM:  S1, S2 normal, no murmur, rub or gallop.   RESPIRATORY SYSTEM:  Clear to auscultation bilaterally.   GASTROINTESTINAL SYSTEM:  Abdomen soft, nontender.   EXTREMITIES:  No pitting pedal edema.   NEUROLOGICAL:  Alert, oriented x3.   PSYCH:  Normal affect.   SKIN:  No obvious rash.   HEENT:  No pallor or icterus.      IMPRESSION AND PLAN:  A very pleasant 68-year-old gentleman with history of CAD as noted above with other comorbidities of diabetes, history of unprovoked PE and dyslipidemia.  Overall, cardiac status-wise I think is doing quite well without any symptoms of angina or congestive heart failure.  He is on appropriate CAD medication therapy of high-intensity statin, beta blocker, angiotensin receptor blocker.  He is on Plavix.  His LDL was 85.  I had a long discussion with the patient, and we agreed to increase Lipitor to 80 mg daily.  I recommend repeating a lipid panel with ALT in about 6 weeks' time.  At this time, I recommend continuing Plavix for antiplatelet therapy.  He is not having any adverse effect from combination of Plavix and Xarelto.  In case he notices any future episode of bleeding issues, one option would be to replace Plavix with aspirin at that time.  For now, we will continue this combination.  If he continues to feel stable cardiac status-wise, we can see him back in 6 months, sooner if he notes any change in clinical status, especially if any exertion-related symptoms.         LEO WEEMS MD             D: 2019   T: 2019   MT: ENE      Name:     JUSTYN TYLER   MRN:      [id number]        Account:      [id number]   :      1950           Service Date: 2019      Document: [id number]         Outpatient Encounter Medications as of 2019   Medication Sig Dispense Refill     atorvastatin (LIPITOR) 80 MG tablet Take 1 tablet (80 mg) by mouth daily 90 tablet 3     clopidogrel (PLAVIX) 75 MG tablet Take 1 tablet (75 mg) by mouth daily 90 tablet 3      insulin aspart (NOVOLOG FLEXPEN) 100 UNIT/ML injection Inject 16 Units Subcutaneous 2 times daily (with meals) Breakfast and Dinner       insulin detemir (LEVEMIR) 100 UNIT/ML injection Inject 20 Units Subcutaneous 2 times daily        losartan (COZAAR) 25 MG tablet Take 1 tablet (25 mg) by mouth daily 30 tablet 11     metFORMIN (GLUCOPHAGE) 1000 MG tablet Take 1,000 mg by mouth 2 times daily (with meals)       metoprolol tartrate (LOPRESSOR) 25 MG tablet Take 25 mg by mouth 2 times daily       nitroGLYcerin (NITROSTAT) 0.4 MG sublingual tablet Place 1 tablet (0.4 mg) under the tongue every 5 minutes as needed for chest pain If symptoms persist 5 minutes after 1st dose call 911. 10 tablet 0     omeprazole (PRILOSEC OTC) 20 MG EC tablet Take 20 mg by mouth daily       rivaroxaban ANTICOAGULANT (XARELTO) 20 MG TABS tablet Take 1 tablet (20 mg) by mouth daily (with dinner) 60 tablet 3     Insulin Pen Needle 29G X 12.7MM MISC        [DISCONTINUED] atorvastatin (LIPITOR) 40 MG tablet Take 1 tablet (40 mg) by mouth daily 30 tablet 1     [DISCONTINUED] melatonin 3 MG tablet Take 12 mg by mouth nightly as needed   3     [DISCONTINUED] metoprolol tartrate (LOPRESSOR) 25 MG tablet Take 0.5 tablets (12.5 mg) by mouth 2 times daily 30 tablet 11     No facility-administered encounter medications on file as of 7/26/2019.        Again, thank you for allowing me to participate in the care of your patient.      Sincerely,    Wilber Strickland MD     [facility]

## 2019-07-26 NOTE — PROGRESS NOTES
HPI and Plan:   See dictation(#[id number])    Orders Placed This Encounter   Procedures     Lipid Profile     ALT     Follow-Up with Cardiologist       Orders Placed This Encounter   Medications     metoprolol tartrate (LOPRESSOR) 25 MG tablet     Sig: Take 25 mg by mouth 2 times daily     omeprazole (PRILOSEC OTC) 20 MG EC tablet     Sig: Take 20 mg by mouth daily     atorvastatin (LIPITOR) 80 MG tablet     Sig: Take 1 tablet (80 mg) by mouth daily     Dispense:  90 tablet     Refill:  3       Medications Discontinued During This Encounter   Medication Reason     metoprolol tartrate (LOPRESSOR) 25 MG tablet Medication Reconciliation Clean Up     melatonin 3 MG tablet Stopped by Patient     atorvastatin (LIPITOR) 40 MG tablet          Encounter Diagnoses   Name Primary?     Benign essential hypertension Yes     Hyperlipidemia LDL goal <70      NSTEMI (non-ST elevated myocardial infarction) (H)        CURRENT MEDICATIONS:  Current Outpatient Medications   Medication Sig Dispense Refill     atorvastatin (LIPITOR) 80 MG tablet Take 1 tablet (80 mg) by mouth daily 90 tablet 3     clopidogrel (PLAVIX) 75 MG tablet Take 1 tablet (75 mg) by mouth daily 90 tablet 3     insulin aspart (NOVOLOG FLEXPEN) 100 UNIT/ML injection Inject 16 Units Subcutaneous 2 times daily (with meals) Breakfast and Dinner       insulin detemir (LEVEMIR) 100 UNIT/ML injection Inject 20 Units Subcutaneous 2 times daily        losartan (COZAAR) 25 MG tablet Take 1 tablet (25 mg) by mouth daily 30 tablet 11     metFORMIN (GLUCOPHAGE) 1000 MG tablet Take 1,000 mg by mouth 2 times daily (with meals)       metoprolol tartrate (LOPRESSOR) 25 MG tablet Take 25 mg by mouth 2 times daily       nitroGLYcerin (NITROSTAT) 0.4 MG sublingual tablet Place 1 tablet (0.4 mg) under the tongue every 5 minutes as needed for chest pain If symptoms persist 5 minutes after 1st dose call 911. 10 tablet 0     omeprazole (PRILOSEC OTC) 20 MG EC tablet Take 20 mg by mouth daily        rivaroxaban ANTICOAGULANT (XARELTO) 20 MG TABS tablet Take 1 tablet (20 mg) by mouth daily (with dinner) 60 tablet 3     Insulin Pen Needle 29G X 12.7MM MISC          ALLERGIES     Allergies   Allergen Reactions     Brilinta [Ticagrelor]      Duloxetine      Other reaction(s): Confusion  Per daughter who is MD     Gabapentin      Other reaction(s): Edema     Lisinopril Rash       PAST MEDICAL HISTORY:  Past Medical History:   Diagnosis Date     Coronary artery disease 06/2018    s/p CECILY to LAD      Diabetes (H)      NSTEMI (non-ST elevated myocardial infarction) (H) 06/2018    s/p CECILY to LAD      Pulmonary embolism (H) 11/15/2018       PAST SURGICAL HISTORY:  Past Surgical History:   Procedure Laterality Date     SHOULDER SURGERY         FAMILY HISTORY:  Family History   Problem Relation Age of Onset     Lupus Mother        SOCIAL HISTORY:  Social History     Socioeconomic History     Marital status:      Spouse name: None     Number of children: None     Years of education: None     Highest education level: None   Occupational History     None   Social Needs     Financial resource strain: None     Food insecurity:     Worry: None     Inability: None     Transportation needs:     Medical: None     Non-medical: None   Tobacco Use     Smoking status: Never Smoker     Smokeless tobacco: Never Used   Substance and Sexual Activity     Alcohol use: No     Drug use: No     Sexual activity: None   Lifestyle     Physical activity:     Days per week: None     Minutes per session: None     Stress: None   Relationships     Social connections:     Talks on phone: None     Gets together: None     Attends Taoist service: None     Active member of club or organization: None     Attends meetings of clubs or organizations: None     Relationship status: None     Intimate partner violence:     Fear of current or ex partner: None     Emotionally abused: None     Physically abused: None     Forced sexual activity: None  "  Other Topics Concern     None   Social History Narrative     None       Review of Systems:  Skin:  Negative       Eyes:  Positive for glasses    ENT:  Negative      Respiratory:  Negative       Cardiovascular:    Positive for;chest pain;edema    Gastroenterology: Negative      Genitourinary:         Musculoskeletal:  Negative      Neurologic:  Negative      Psychiatric:  Positive for sleep disturbances    Heme/Lymph/Imm:  Positive for allergies    Endocrine:  Positive for diabetes      Physical Exam:  Vitals: /78   Pulse 67   Ht 1.854 m (6' 1\")   Wt 95.8 kg (211 lb 4.8 oz)   BMI 27.88 kg/m      Constitutional:           Skin:             Head:           Eyes:           Lymph:      ENT:           Neck:           Respiratory:            Cardiac:                                                           GI:           Extremities and Muscular Skeletal:                 Neurological:           Psych:             CC  No referring provider defined for this encounter.                  "

## 2019-09-19 ENCOUNTER — TELEPHONE (OUTPATIENT)
Dept: CARDIOLOGY | Facility: CLINIC | Age: 69
End: 2019-09-19

## 2019-09-19 NOTE — TELEPHONE ENCOUNTER
He is not on asa and plavix both- he is on plavix and xarelto- latter due to h/o PE. I recommend review with Dr Jorge or primary regarding xarelto.  I am not sure if dental wants to hold antiplatelet therapy . If it is ok per them, I would recommend continue plavix (he is on just one antiplatelet agent, that is plavix), however if dental strongly feels about holding plavix then it can be held for 5 days and then resumed post procedure asap.    Thanks  Wilber

## 2019-09-19 NOTE — TELEPHONE ENCOUNTER
Patient saw Dr. Strickland 7/26/19 for CAD s/p CECILY to prox-LAD 06/2018. On Plavix indefinitely.    Received call from dental office asking for Plavix hold orders for single tooth extraction next week.     Per notes on 5/7/19 with r/t previous extraction, Dr. Strickland OK with 3 day Plavix hold, remain on ASA 81 mg.     Will route to Dr. Strickland for review and advisement.

## 2019-09-19 NOTE — TELEPHONE ENCOUNTER
Spoke with dental office. Stated that per Dr. Strickland OK to hold Plavix for 3 days as long as he is taking ASA 81 mg in the interim. Also informed staff that dental offices typically do not hold Plavix and that they may want to follow-up on patient's Xarelto, which is managed through PCP.    Staff verbalized understanding, will reach out to PCP's office.

## 2019-10-14 ENCOUNTER — TELEPHONE (OUTPATIENT)
Dept: CARDIOLOGY | Facility: CLINIC | Age: 69
End: 2019-10-14

## 2019-10-14 NOTE — TELEPHONE ENCOUNTER
Spoke with Dr. Alfaro today and needed clarification of what was reviewed at 9- discussion.  Patient had seen several dentists since 9-19-19 and is the plan the same.  [id number] with dental office. Stated that per Dr. Strickland OK to hold Plavix for 3 days as long as he is taking ASA 81 mg in the interim. Also informed staff that dental offices typically do not hold Plavix and that they may want to follow-up on patient's Xarelto, which is managed through PCP.     Staff verbalized understanding, will reach out to PCP's office.           V.M message from Dr. Alfaro requesting a call back for above patient needing clarification.  Unable to leave message Voice box was full and unable to leave message.

## 2019-10-25 ENCOUNTER — NURSE TRIAGE (OUTPATIENT)
Dept: CARDIOLOGY | Facility: CLINIC | Age: 69
End: 2019-10-25

## 2019-10-25 ENCOUNTER — PATIENT OUTREACH (OUTPATIENT)
Dept: ONCOLOGY | Facility: CLINIC | Age: 69
End: 2019-10-25

## 2019-10-25 NOTE — PROGRESS NOTES
"S-(situation): Yulia is a nurse at Matt's PCP clinic in [place] and is inquiring about his last visit with Dr. Jorge. She is wondering if Dr. Jorge recommended Matt be on Xarelto and Plavix.    B-(background): Matt was followed by Dr. Jorge for \"unprovoked pulmonary embolism\" per office visit notes.     A-(assessment): Per chart review, Matt last saw Dr. Jorge on 07/15/19. From the 07/15 office visit notes, Dr. Jorge recommended he continue Xarelto 20mg daily, schedule a colonoscopy, and follow up with primary care.     Per chart review, Plavix was prescribed by a Dr. Wilber Strickland on 09/20/18.     R-(recommendations): Writer explained this to Yulia and she reported she would follow up on this with Matt's PCP.     Jessica Antunez, RN, BSN  Patient Care Coordinator  [facility]  [phone]        "

## 2019-10-25 NOTE — TELEPHONE ENCOUNTER
Received a call from Yulia at [facility] wanting to speak to Dr. Strickland nurse for clarification of what medications were discontinued at last office visit 7/26/19 and also current medications patient should be taking. Yulia states the progress note was not clear to her. Patient is planning to establish care with a [facility] cardiologist soon for convenience reasons. Requesting a return call after 1 p.m. today at [phone]. Routing to team 3 nurse team for follow up.

## 2020-02-14 ENCOUNTER — TELEPHONE (OUTPATIENT)
Dept: CARDIOLOGY | Facility: CLINIC | Age: 70
End: 2020-02-14

## 2020-02-14 NOTE — TELEPHONE ENCOUNTER
Patient's spouse called in with the question of whether her  can come off either Xarelto or Plavix. He last saw Dr. Strickland in July 2019 for coronary artery disease and history of unprovoked PE. They were due to see Dr. Strickland last December for a six month return but were not able to do so for other medical issues at the time. I told her that we can have Dr. Strickland see patient this Monday the 17th at 8:15 and she agreed to coming in.

## 2020-02-17 ENCOUNTER — OFFICE VISIT (OUTPATIENT)
Dept: CARDIOLOGY | Facility: CLINIC | Age: 70
End: 2020-02-17
Payer: MEDICARE

## 2020-02-17 VITALS
HEART RATE: 76 BPM | WEIGHT: 182 LBS | HEIGHT: 73 IN | DIASTOLIC BLOOD PRESSURE: 68 MMHG | BODY MASS INDEX: 24.12 KG/M2 | SYSTOLIC BLOOD PRESSURE: 116 MMHG

## 2020-02-17 DIAGNOSIS — I10 BENIGN ESSENTIAL HYPERTENSION: Primary | ICD-10-CM

## 2020-02-17 DIAGNOSIS — I25.10 CORONARY ARTERY DISEASE INVOLVING NATIVE CORONARY ARTERY OF NATIVE HEART WITHOUT ANGINA PECTORIS: ICD-10-CM

## 2020-02-17 PROCEDURE — 99214 OFFICE O/P EST MOD 30 MIN: CPT | Performed by: INTERNAL MEDICINE

## 2020-02-17 RX ORDER — DONEPEZIL HYDROCHLORIDE 5 MG/1
5 TABLET, FILM COATED ORAL AT BEDTIME
Status: ON HOLD | COMMUNITY
End: 2020-09-02

## 2020-02-17 ASSESSMENT — MIFFLIN-ST. JEOR: SCORE: 1654.43

## 2020-02-17 NOTE — PROGRESS NOTES
HPI and Plan:   See dictation(#[id number])    Orders Placed This Encounter   Procedures     Follow-Up with Cardiologist       Orders Placed This Encounter   Medications     donepezil (ARICEPT) 5 MG tablet     Sig: Take 5 mg by mouth At Bedtime       Medications Discontinued During This Encounter   Medication Reason     clopidogrel (PLAVIX) 75 MG tablet      metoprolol tartrate (LOPRESSOR) 25 MG tablet          Encounter Diagnoses   Name Primary?     Benign essential hypertension Yes     Coronary artery disease involving native coronary artery of native heart without angina pectoris        CURRENT MEDICATIONS:  Current Outpatient Medications   Medication Sig Dispense Refill     atorvastatin (LIPITOR) 80 MG tablet Take 1 tablet (80 mg) by mouth daily 90 tablet 3     donepezil (ARICEPT) 5 MG tablet Take 5 mg by mouth At Bedtime       insulin aspart (NOVOLOG FLEXPEN) 100 UNIT/ML injection Inject 16 Units Subcutaneous 2 times daily (with meals) Breakfast and Dinner       insulin detemir (LEVEMIR) 100 UNIT/ML injection Inject 20 Units Subcutaneous 2 times daily        Insulin Pen Needle 29G X 12.7MM MISC        losartan (COZAAR) 25 MG tablet Take 1 tablet (25 mg) by mouth daily 30 tablet 11     metFORMIN (GLUCOPHAGE) 1000 MG tablet Take 1,000 mg by mouth 2 times daily (with meals)       nitroGLYcerin (NITROSTAT) 0.4 MG sublingual tablet Place 1 tablet (0.4 mg) under the tongue every 5 minutes as needed for chest pain If symptoms persist 5 minutes after 1st dose call 911. 10 tablet 0     rivaroxaban ANTICOAGULANT (XARELTO) 20 MG TABS tablet Take 1 tablet (20 mg) by mouth daily (with dinner) 60 tablet 3     omeprazole (PRILOSEC OTC) 20 MG EC tablet Take 20 mg by mouth daily         ALLERGIES     Allergies   Allergen Reactions     Brilinta [Ticagrelor]      Duloxetine      Other reaction(s): Confusion  Per daughter who is MD     Gabapentin      Other reaction(s): Edema     Lisinopril Rash       PAST MEDICAL HISTORY:  Past  Medical History:   Diagnosis Date     Coronary artery disease 06/2018    s/p CECILY to LAD      Diabetes (H)      NSTEMI (non-ST elevated myocardial infarction) (H) 06/2018    s/p CECILY to LAD      Pulmonary embolism (H) 11/15/2018       PAST SURGICAL HISTORY:  Past Surgical History:   Procedure Laterality Date     SHOULDER SURGERY         FAMILY HISTORY:  Family History   Problem Relation Age of Onset     Lupus Mother        SOCIAL HISTORY:  Social History     Socioeconomic History     Marital status:      Spouse name: None     Number of children: None     Years of education: None     Highest education level: None   Occupational History     None   Social Needs     Financial resource strain: None     Food insecurity:     Worry: None     Inability: None     Transportation needs:     Medical: None     Non-medical: None   Tobacco Use     Smoking status: Never Smoker     Smokeless tobacco: Never Used   Substance and Sexual Activity     Alcohol use: No     Drug use: No     Sexual activity: None   Lifestyle     Physical activity:     Days per week: None     Minutes per session: None     Stress: None   Relationships     Social connections:     Talks on phone: None     Gets together: None     Attends Anglican service: None     Active member of club or organization: None     Attends meetings of clubs or organizations: None     Relationship status: None     Intimate partner violence:     Fear of current or ex partner: None     Emotionally abused: None     Physically abused: None     Forced sexual activity: None   Other Topics Concern     None   Social History Narrative     None       Review of Systems:  Skin:  Negative       Eyes:  Positive for glasses    ENT:  Negative      Respiratory:  Positive for(PE)       Cardiovascular:  Negative      Gastroenterology: Negative      Genitourinary:  Negative      Musculoskeletal:  Negative      Neurologic:  Negative      Psychiatric:  Positive for sleep disturbances   "  Heme/Lymph/Imm:  Positive for allergies    Endocrine:  Positive for diabetes      Physical Exam:  Vitals: /68   Pulse 76   Ht 1.854 m (6' 1\")   Wt 82.6 kg (182 lb)   BMI 24.01 kg/m      Constitutional:           Skin:             Head:           Eyes:           Lymph:      ENT:           Neck:           Respiratory:            Cardiac:                                                           GI:           Extremities and Muscular Skeletal:                 Neurological:           Psych:             CC  Kingston Leal MD  [address] [address]                  "

## 2020-02-17 NOTE — LETTER
2/17/2020      Kingston Leal MD  [facility] [address] [facility] [address]      RE: Matt Gileselodia       Dear Colleague,    I had the pleasure of seeing Matt Way in the [facility].    Service Date: 02/17/2020      REASON FOR CLINIC VISIT:  Followup CAD.      HISTORY OF PRESENT ILLNESS:  Mr. Way is a very pleasant 69-year-old gentleman who I saw in 06/2018 when he presented with non-ST elevation myocardial infarction.  Coronary angiogram showed proximal LAD eccentric 90% stenosis which was successfully intervened with 2.75 x 12 mm drug-eluting stent.  He had 40% distal LAD stenosis, 50% diagonal, 10% proximal circumflex and 20% proximal RCA stenosis.  He has other comorbidities of diabetes, dyslipidemia and history of some medical noncompliance.  To be noted, the patient was also diagnosed with unprovoked pulmonary embolism in 2018 and has been on Xarelto through Dr. Jorge's Hematology office and it looks like the plan is to continue it indefinitely.  Today patient is coming for routine followup.  To be noted, the patient had a negative exercise stress echocardiogram about a year ago when he exercised for 11 minutes without any evidence of inducible ischemia and LV function was normal at rest.  Recently, the patient has been diagnosed with dementia and has been followed at [facility] has been started on donepezil.  Today he is coming to clinic accompanied by one of his daughters.  His main issue is that he gets confused.  He sometimes gets foggy.  He does not remember and there has been some concern about medical compliance.  The patient tells me that he is not taking Plavix for some time.  He has also stopped taking metoprolol.  He is on Xarelto.  He is on losartan.  He is on Lipitor 80 mg daily.  LDL is 85 last year.  He does not use any tobacco or alcohol.  No dizziness, presyncope or syncope.  No chest discomfort or  shortness of breath.  The patient tells me that he does not want to be on 2 blood thinners, although he does not recall any particular side effect while he was on 2 blood thinners.  His initial blood pressure was recorded as 98/68.  Recheck was 116/68.      PHYSICAL EXAMINATION:   VITAL SIGNS:  Blood pressure 116/68, heart rate 76 and regular, weight 182 pounds, BMI 24.01, the patient has lost about 25 pounds of weight because of dental issues.  He had several teeth pulled out and had trouble eating food.   NECK:  Normal JVP.   CARDIOVASCULAR SYSTEM:  S1, S2 normal, no murmur, rub or gallop.   RESPIRATORY SYSTEM:  Clear to auscultation bilaterally.   GASTROINTESTINAL SYSTEM:  Abdomen soft, nontender.   EXTREMITIES:  No pitting pedal edema.   NEUROLOGICAL:  Alert, oriented x3.   PSYCHIATRIC:  Normal affect.   SKIN:  No obvious rash.   HEENT:  No pallor or icterus.      IMPRESSION AND PLAN:  A pleasant 69-year-old gentleman with history of CAD with history of non-STEMI in 06/2018, other comorbidities of dyslipidemia, diabetes, pulmonary embolism.  Overall, cardiac status wise, he is doing well.  The biggest issue is that it looks like he is having progressive cognitive decline.  There is concern about whether he is taking medication at times, whether he is missing the doses.  We spent a considerable amount of time discussing it is best to have somebody help him with medication.  He lives with his wife, and according to his daughter, his wife is ready to help him.  We discussed about option of continuing 2 blood thinners versus 1 blood thinner.  Given stable coronary artery disease and patient preference, after discussing risks and benefits, the patient would like to continue his Xarelto, which, to be noted, he is taking mainly for pulmonary embolism history but should also provide CAD protection.  He is off beta blocker in the setting of stable coronary artery disease more than a year since the MI.  I think it is  reasonable to stay off it.  He will continue current dose of losartan.  The patient's daughter expressed that due to ease of transportation and convenience, there is a cardiology clinic just down the road where he lives.  They are thinking about whether to shift care to that clinic.  My office will be readily available for any help they need in case they felt that they wanted to shift care.  Otherwise, we can see him back in 6 months, sooner if he notes any change in clinical status, especially any exertion-related symptoms.  I also recommend that it is important to be compliant with medications, especially Xarelto, which he is being followed through Dr. Jorge's office.        cc:   Kingston Leal MD   [facility]   [address] [place]  [address]      Bianca Jorge MD   [facility]   [address], [address]  [address]         LEO WEEMS MD             D: 2020   T: 2020   MT: elgin      Name:     JUSTYN TYLER   MRN:      [id number]        Account:      [id number]   :      1950           Service Date: 2020      Document: [id number]      Outpatient Encounter Medications as of 2020   Medication Sig Dispense Refill     atorvastatin (LIPITOR) 80 MG tablet Take 1 tablet (80 mg) by mouth daily 90 tablet 3     donepezil (ARICEPT) 5 MG tablet Take 5 mg by mouth At Bedtime       insulin aspart (NOVOLOG FLEXPEN) 100 UNIT/ML injection Inject 16 Units Subcutaneous 2 times daily (with meals) Breakfast and Dinner       insulin detemir (LEVEMIR) 100 UNIT/ML injection Inject 20 Units Subcutaneous 2 times daily        Insulin Pen Needle 29G X 12.7MM MISC        losartan (COZAAR) 25 MG tablet Take 1 tablet (25 mg) by mouth daily 30 tablet 11     metFORMIN (GLUCOPHAGE) 1000 MG tablet Take 1,000 mg by mouth 2 times daily (with meals)       nitroGLYcerin (NITROSTAT) 0.4 MG sublingual tablet Place 1 tablet (0.4 mg) under the tongue every 5  minutes as needed for chest pain If symptoms persist 5 minutes after 1st dose call 911. 10 tablet 0     rivaroxaban ANTICOAGULANT (XARELTO) 20 MG TABS tablet Take 1 tablet (20 mg) by mouth daily (with dinner) 60 tablet 3     omeprazole (PRILOSEC OTC) 20 MG EC tablet Take 20 mg by mouth daily       [DISCONTINUED] clopidogrel (PLAVIX) 75 MG tablet Take 1 tablet (75 mg) by mouth daily 90 tablet 3     [DISCONTINUED] metoprolol tartrate (LOPRESSOR) 25 MG tablet Take 25 mg by mouth 2 times daily       No facility-administered encounter medications on file as of 2/17/2020.      Again, thank you for allowing me to participate in the care of your patient.      Sincerely,    Wilber Strickland MD     [facility]

## 2020-02-17 NOTE — LETTER
2/17/2020      Kingston Leal MD  [facility] [address] [facility] [address]      RE: Matt Gileselodia       Dear Colleague,    I had the pleasure of seeing Matt Way in the [facility].    Service Date: 02/17/2020      REASON FOR CLINIC VISIT:  Followup CAD.      HISTORY OF PRESENT ILLNESS:  Mr. Way is a very pleasant 69-year-old gentleman who I saw in 06/2018 when he presented with non-ST elevation myocardial infarction.  Coronary angiogram showed proximal LAD eccentric 90% stenosis which was successfully intervened with 2.75 x 12 mm drug-eluting stent.  He had 40% distal LAD stenosis, 50% diagonal, 10% proximal circumflex and 20% proximal RCA stenosis.  He has other comorbidities of diabetes, dyslipidemia and history of some medical noncompliance.  To be noted, the patient was also diagnosed with unprovoked pulmonary embolism in 2018 and has been on Xarelto through Dr. Jorge's Hematology office and it looks like the plan is to continue it indefinitely.  Today patient is coming for routine followup.  To be noted, the patient had a negative exercise stress echocardiogram about a year ago when he exercised for 11 minutes without any evidence of inducible ischemia and LV function was normal at rest.  Recently, the patient has been diagnosed with dementia and has been followed at [facility] has been started on donepezil.  Today he is coming to clinic accompanied by one of his daughters.  His main issue is that he gets confused.  He sometimes gets foggy.  He does not remember and there has been some concern about medical compliance.  The patient tells me that he is not taking Plavix for some time.  He has also stopped taking metoprolol.  He is on Xarelto.  He is on losartan.  He is on Lipitor 80 mg daily.  LDL is 85 last year.  He does not use any tobacco or alcohol.  No dizziness, presyncope or syncope.  No chest discomfort or  shortness of breath.  The patient tells me that he does not want to be on 2 blood thinners, although he does not recall any particular side effect while he was on 2 blood thinners.  His initial blood pressure was recorded as 98/68.  Recheck was 116/68.      PHYSICAL EXAMINATION:   VITAL SIGNS:  Blood pressure 116/68, heart rate 76 and regular, weight 182 pounds, BMI 24.01, the patient has lost about 25 pounds of weight because of dental issues.  He had several teeth pulled out and had trouble eating food.   NECK:  Normal JVP.   CARDIOVASCULAR SYSTEM:  S1, S2 normal, no murmur, rub or gallop.   RESPIRATORY SYSTEM:  Clear to auscultation bilaterally.   GASTROINTESTINAL SYSTEM:  Abdomen soft, nontender.   EXTREMITIES:  No pitting pedal edema.   NEUROLOGICAL:  Alert, oriented x3.   PSYCHIATRIC:  Normal affect.   SKIN:  No obvious rash.   HEENT:  No pallor or icterus.      IMPRESSION AND PLAN:  A pleasant 69-year-old gentleman with history of CAD with history of non-STEMI in 06/2018, other comorbidities of dyslipidemia, diabetes, pulmonary embolism.  Overall, cardiac status wise, he is doing well.  The biggest issue is that it looks like he is having progressive cognitive decline.  There is concern about whether he is taking medication at times, whether he is missing the doses.  We spent a considerable amount of time discussing it is best to have somebody help him with medication.  He lives with his wife, and according to  ***, his wife is ready to help him.  We discussed about option of continuing 2 blood thinners versus 1 blood thinner.  Given stable coronary artery disease and patient preference, after discussing risks and benefits, the patient would like to continue his Xarelto, which, to be noted, he is taking mainly for pulmonary embolism history but should also provide CAD protection.  He is off beta blocker in the setting of stable coronary artery disease more than a year since the MI.  I think it is  reasonable to stay off it.  He will continue current dose of losartan.  The patient's daughter expressed that due to ease of transportation and convenience, there is a cardiology clinic just down the road where he lives.  They are thinking about whether to shift care to that clinic.  My office will be readily available for any help they need in case they felt that they wanted to shift care.  Otherwise, we can see him back in 6 months, sooner if he notes any change in clinical status, especially any exertion-related symptoms.  I also recommend that it is important to be compliant with medications, especially Xarelto, which he is being followed through Dr. Jorge's office.        cc:   Kingston Leal MD   [facility]   [address] [place]  [address]      Bianca Jorge MD   [facility]   [address], [address]  [address]         LEO WEEMS MD             D: 2020   T: 2020   MT: elgin      Name:     JUSTYN TYLER   MRN:      [id number]        Account:      [id number]   :      1950           Service Date: 2020      Document: [id number]         Outpatient Encounter Medications as of 2020   Medication Sig Dispense Refill     atorvastatin (LIPITOR) 80 MG tablet Take 1 tablet (80 mg) by mouth daily 90 tablet 3     donepezil (ARICEPT) 5 MG tablet Take 5 mg by mouth At Bedtime       insulin aspart (NOVOLOG FLEXPEN) 100 UNIT/ML injection Inject 16 Units Subcutaneous 2 times daily (with meals) Breakfast and Dinner       insulin detemir (LEVEMIR) 100 UNIT/ML injection Inject 20 Units Subcutaneous 2 times daily        Insulin Pen Needle 29G X 12.7MM MISC        losartan (COZAAR) 25 MG tablet Take 1 tablet (25 mg) by mouth daily 30 tablet 11     metFORMIN (GLUCOPHAGE) 1000 MG tablet Take 1,000 mg by mouth 2 times daily (with meals)       nitroGLYcerin (NITROSTAT) 0.4 MG sublingual tablet Place 1 tablet (0.4 mg) under the tongue every 5  minutes as needed for chest pain If symptoms persist 5 minutes after 1st dose call 911. 10 tablet 0     rivaroxaban ANTICOAGULANT (XARELTO) 20 MG TABS tablet Take 1 tablet (20 mg) by mouth daily (with dinner) 60 tablet 3     omeprazole (PRILOSEC OTC) 20 MG EC tablet Take 20 mg by mouth daily       [DISCONTINUED] clopidogrel (PLAVIX) 75 MG tablet Take 1 tablet (75 mg) by mouth daily 90 tablet 3     [DISCONTINUED] metoprolol tartrate (LOPRESSOR) 25 MG tablet Take 25 mg by mouth 2 times daily       No facility-administered encounter medications on file as of 2/17/2020.      Again, thank you for allowing me to participate in the care of your patient.      Sincerely,    Wilber Strickland MD     [facility]    cc:   Kingston Leal MD  [facility]  [address] [place] [address]

## 2020-02-17 NOTE — LETTER
2/17/2020    Kingston Leal MD  [facility] [address] [facility] [address]    RE: Matt Way       Dear Colleague,    I had the pleasure of seeing Matt Way in the [facility].    HPI and Plan:   See dictation(#[id number])    Orders Placed This Encounter   Procedures     Follow-Up with Cardiologist       Orders Placed This Encounter   Medications     donepezil (ARICEPT) 5 MG tablet     Sig: Take 5 mg by mouth At Bedtime       Medications Discontinued During This Encounter   Medication Reason     clopidogrel (PLAVIX) 75 MG tablet      metoprolol tartrate (LOPRESSOR) 25 MG tablet          Encounter Diagnoses   Name Primary?     Benign essential hypertension Yes     Coronary artery disease involving native coronary artery of native heart without angina pectoris        CURRENT MEDICATIONS:  Current Outpatient Medications   Medication Sig Dispense Refill     atorvastatin (LIPITOR) 80 MG tablet Take 1 tablet (80 mg) by mouth daily 90 tablet 3     donepezil (ARICEPT) 5 MG tablet Take 5 mg by mouth At Bedtime       insulin aspart (NOVOLOG FLEXPEN) 100 UNIT/ML injection Inject 16 Units Subcutaneous 2 times daily (with meals) Breakfast and Dinner       insulin detemir (LEVEMIR) 100 UNIT/ML injection Inject 20 Units Subcutaneous 2 times daily        Insulin Pen Needle 29G X 12.7MM MISC        losartan (COZAAR) 25 MG tablet Take 1 tablet (25 mg) by mouth daily 30 tablet 11     metFORMIN (GLUCOPHAGE) 1000 MG tablet Take 1,000 mg by mouth 2 times daily (with meals)       nitroGLYcerin (NITROSTAT) 0.4 MG sublingual tablet Place 1 tablet (0.4 mg) under the tongue every 5 minutes as needed for chest pain If symptoms persist 5 minutes after 1st dose call 911. 10 tablet 0     rivaroxaban ANTICOAGULANT (XARELTO) 20 MG TABS tablet Take 1 tablet (20 mg) by mouth daily (with dinner) 60 tablet 3     omeprazole (PRILOSEC OTC) 20 MG EC tablet Take 20 mg by mouth daily          ALLERGIES     Allergies   Allergen Reactions     Brilinta [Ticagrelor]      Duloxetine      Other reaction(s): Confusion  Per daughter who is MD     Gabapentin      Other reaction(s): Edema     Lisinopril Rash       PAST MEDICAL HISTORY:  Past Medical History:   Diagnosis Date     Coronary artery disease 06/2018    s/p CECILY to LAD      Diabetes (H)      NSTEMI (non-ST elevated myocardial infarction) (H) 06/2018    s/p CECILY to LAD      Pulmonary embolism (H) 11/15/2018       PAST SURGICAL HISTORY:  Past Surgical History:   Procedure Laterality Date     SHOULDER SURGERY         FAMILY HISTORY:  Family History   Problem Relation Age of Onset     Lupus Mother        SOCIAL HISTORY:  Social History     Socioeconomic History     Marital status:      Spouse name: None     Number of children: None     Years of education: None     Highest education level: None   Occupational History     None   Social Needs     Financial resource strain: None     Food insecurity:     Worry: None     Inability: None     Transportation needs:     Medical: None     Non-medical: None   Tobacco Use     Smoking status: Never Smoker     Smokeless tobacco: Never Used   Substance and Sexual Activity     Alcohol use: No     Drug use: No     Sexual activity: None   Lifestyle     Physical activity:     Days per week: None     Minutes per session: None     Stress: None   Relationships     Social connections:     Talks on phone: None     Gets together: None     Attends Latter day service: None     Active member of club or organization: None     Attends meetings of clubs or organizations: None     Relationship status: None     Intimate partner violence:     Fear of current or ex partner: None     Emotionally abused: None     Physically abused: None     Forced sexual activity: None   Other Topics Concern     None   Social History Narrative     None       Review of Systems:  Skin:  Negative       Eyes:  Positive for glasses    ENT:  Negative     "  Respiratory:  Positive for(PE)       Cardiovascular:  Negative      Gastroenterology: Negative      Genitourinary:  Negative      Musculoskeletal:  Negative      Neurologic:  Negative      Psychiatric:  Positive for sleep disturbances    Heme/Lymph/Imm:  Positive for allergies    Endocrine:  Positive for diabetes      Physical Exam:  Vitals: /68   Pulse 76   Ht 1.854 m (6' 1\")   Wt 82.6 kg (182 lb)   BMI 24.01 kg/m       Constitutional:           Skin:             Head:           Eyes:           Lymph:      ENT:           Neck:           Respiratory:            Cardiac:                                                           GI:           Extremities and Muscular Skeletal:                 Neurological:           Psych:             CC  [place] [address]                    Thank you for allowing me to participate in the care of your patient.      Sincerely,     Wilber Strickland MD     [facility]    cc:   [place] [address]        "

## 2020-02-17 NOTE — PROGRESS NOTES
Service Date: 02/17/2020      REASON FOR CLINIC VISIT:  Followup CAD.      HISTORY OF PRESENT ILLNESS:  Mr. Way is a very pleasant 69-year-old gentleman who I saw in 06/2018 when he presented with non-ST elevation myocardial infarction.  Coronary angiogram showed proximal LAD eccentric 90% stenosis which was successfully intervened with 2.75 x 12 mm drug-eluting stent.  He had 40% distal LAD stenosis, 50% diagonal, 10% proximal circumflex and 20% proximal RCA stenosis.  He has other comorbidities of diabetes, dyslipidemia and history of some medical noncompliance.  To be noted, the patient was also diagnosed with unprovoked pulmonary embolism in 2018 and has been on Xarelto through Dr. Jorge's Hematology office and it looks like the plan is to continue it indefinitely.  Today patient is coming for routine followup.  To be noted, the patient had a negative exercise stress echocardiogram about a year ago when he exercised for 11 minutes without any evidence of inducible ischemia and LV function was normal at rest.  Recently, the patient has been diagnosed with dementia and has been followed at [facility] has been started on donepezil.  Today he is coming to clinic accompanied by one of his daughters.  His main issue is that he gets confused.  He sometimes gets foggy.  He does not remember and there has been some concern about medical compliance.  The patient tells me that he is not taking Plavix for some time.  He has also stopped taking metoprolol.  He is on Xarelto.  He is on losartan.  He is on Lipitor 80 mg daily.  LDL is 85 last year.  He does not use any tobacco or alcohol.  No dizziness, presyncope or syncope.  No chest discomfort or shortness of breath.  The patient tells me that he does not want to be on 2 blood thinners, although he does not recall any particular side effect while he was on 2 blood thinners.  His initial blood pressure was recorded as 98/68.  Recheck was 116/68.       PHYSICAL EXAMINATION:   VITAL SIGNS:  Blood pressure 116/68, heart rate 76 and regular, weight 182 pounds, BMI 24.01, the patient has lost about 25 pounds of weight because of dental issues.  He had several teeth pulled out and had trouble eating food.   NECK:  Normal JVP.   CARDIOVASCULAR SYSTEM:  S1, S2 normal, no murmur, rub or gallop.   RESPIRATORY SYSTEM:  Clear to auscultation bilaterally.   GASTROINTESTINAL SYSTEM:  Abdomen soft, nontender.   EXTREMITIES:  No pitting pedal edema.   NEUROLOGICAL:  Alert, oriented x3.   PSYCHIATRIC:  Normal affect.   SKIN:  No obvious rash.   HEENT:  No pallor or icterus.      IMPRESSION AND PLAN:  A pleasant 69-year-old gentleman with history of CAD with history of non-STEMI in 06/2018, other comorbidities of dyslipidemia, diabetes, pulmonary embolism.  Overall, cardiac status wise, he is doing well.  The biggest issue is that it looks like he is having progressive cognitive decline.  There is concern about whether he is taking medication at times, whether he is missing the doses.  We spent a considerable amount of time discussing it is best to have somebody help him with medication.  He lives with his wife, and according to his daughter, his wife is ready to help him.  We discussed about option of continuing 2 blood thinners versus 1 blood thinner.  Given stable coronary artery disease and patient preference, after discussing risks and benefits, the patient would like to continue his Xarelto, which, to be noted, he is taking mainly for pulmonary embolism history but should also provide CAD protection.  He is off beta blocker in the setting of stable coronary artery disease more than a year since the MI.  I think it is reasonable to stay off it.  He will continue current dose of losartan.  The patient's daughter expressed that due to ease of transportation and convenience, there is a cardiology clinic just down the road where he lives.  They are thinking about whether to  shift care to that clinic.  My office will be readily available for any help they need in case they felt that they wanted to shift care.  Otherwise, we can see him back in 6 months, sooner if he notes any change in clinical status, especially any exertion-related symptoms.  I also recommend that it is important to be compliant with medications, especially Xarelto, which he is being followed through Dr. Jorge's office.        cc:   Kingston Leal MD   [facility]   [address] [place]  [address]      Bianca Jorge MD   [facility]   [address], [facility] [address]   [place]  [address]         LEO WEEMS MD             D: 2020   T: 2020   MT: elgin      Name:     JUSTYN TYLER   MRN:      [id number]        Account:      [id number]   :      1950           Service Date: 2020      Document: [id number]

## 2020-07-27 DIAGNOSIS — I21.4 NSTEMI (NON-ST ELEVATED MYOCARDIAL INFARCTION) (H): ICD-10-CM

## 2020-07-27 DIAGNOSIS — I21.4 NSTEMI (NON-ST ELEVATED MYOCARDIAL INFARCTION) (H): Primary | ICD-10-CM

## 2020-07-27 RX ORDER — ATORVASTATIN CALCIUM 80 MG/1
80 TABLET, FILM COATED ORAL DAILY
Qty: 90 TABLET | Refills: 0 | Status: SHIPPED | OUTPATIENT
Start: 2020-07-27 | End: 2020-10-26

## 2020-07-27 NOTE — TELEPHONE ENCOUNTER
Call to [facility] pharmacy, voice mail left regarding no longer following Dr Landeros in endocrinology, advised to send refill request to PCP, Dr Kingston Leal

## 2020-08-21 ENCOUNTER — TELEPHONE (OUTPATIENT)
Dept: CARDIOLOGY | Facility: CLINIC | Age: 70
End: 2020-08-21

## 2020-08-21 NOTE — TELEPHONE ENCOUNTER
Patient states he has notiec for the last 2 weeks every time he walks or rides his bike he has midsternal chest tightness and radiates down his left arm.  For the last 2 days including today he had chest pain that concerned enough to call his daughter who is a Doctor in [facility] [place]  She advised him to take nitroglycerin.  He states he was resting and did not take any yet.  He is unable to drive and his wife is paralyzed from the neck down.  Reviewed how to take nitroglycerin and if pain is not relieved and has to take the 3 nitroglycerin to call 911.  Advised patient to call 911.  HX of NSTEMI and PE 10-3-18 Patient verbalized understanding and agreed to plan of care.

## 2020-08-28 ENCOUNTER — HOSPITAL ENCOUNTER (EMERGENCY)
Facility: CLINIC | Age: 70
Discharge: LEFT BEFORE DISCHARGE | End: 2020-08-28
Attending: EMERGENCY MEDICINE | Admitting: EMERGENCY MEDICINE
Payer: MEDICARE

## 2020-08-28 VITALS
BODY MASS INDEX: 27.57 KG/M2 | HEART RATE: 79 BPM | DIASTOLIC BLOOD PRESSURE: 68 MMHG | OXYGEN SATURATION: 98 % | RESPIRATION RATE: 16 BRPM | SYSTOLIC BLOOD PRESSURE: 131 MMHG | TEMPERATURE: 98.5 F | WEIGHT: 209 LBS

## 2020-08-28 DIAGNOSIS — I20.0 UNSTABLE ANGINA (H): ICD-10-CM

## 2020-08-28 DIAGNOSIS — Z53.29 LEFT AGAINST MEDICAL ADVICE: ICD-10-CM

## 2020-08-28 LAB
ALBUMIN SERPL-MCNC: 3.3 G/DL (ref 3.4–5)
ALP SERPL-CCNC: 124 U/L (ref 40–150)
ALT SERPL W P-5'-P-CCNC: 52 U/L (ref 0–70)
ANION GAP SERPL CALCULATED.3IONS-SCNC: 8 MMOL/L (ref 3–14)
AST SERPL W P-5'-P-CCNC: 23 U/L (ref 0–45)
BASOPHILS # BLD AUTO: 0.1 10E9/L (ref 0–0.2)
BASOPHILS NFR BLD AUTO: 1 %
BILIRUB SERPL-MCNC: 0.5 MG/DL (ref 0.2–1.3)
BUN SERPL-MCNC: 22 MG/DL (ref 7–30)
CALCIUM SERPL-MCNC: 8.7 MG/DL (ref 8.5–10.1)
CHLORIDE SERPL-SCNC: 101 MMOL/L (ref 94–109)
CO2 SERPL-SCNC: 25 MMOL/L (ref 20–32)
CREAT SERPL-MCNC: 1.02 MG/DL (ref 0.66–1.25)
DIFFERENTIAL METHOD BLD: NORMAL
EOSINOPHIL # BLD AUTO: 0.2 10E9/L (ref 0–0.7)
EOSINOPHIL NFR BLD AUTO: 2.5 %
ERYTHROCYTE [DISTWIDTH] IN BLOOD BY AUTOMATED COUNT: 14 % (ref 10–15)
GFR SERPL CREATININE-BSD FRML MDRD: 75 ML/MIN/{1.73_M2}
GLUCOSE SERPL-MCNC: 414 MG/DL (ref 70–99)
HCT VFR BLD AUTO: 45 % (ref 40–53)
HGB BLD-MCNC: 14.6 G/DL (ref 13.3–17.7)
IMM GRANULOCYTES # BLD: 0 10E9/L (ref 0–0.4)
IMM GRANULOCYTES NFR BLD: 0.1 %
LYMPHOCYTES # BLD AUTO: 2.2 10E9/L (ref 0.8–5.3)
LYMPHOCYTES NFR BLD AUTO: 30.2 %
MCH RBC QN AUTO: 28 PG (ref 26.5–33)
MCHC RBC AUTO-ENTMCNC: 32.4 G/DL (ref 31.5–36.5)
MCV RBC AUTO: 86 FL (ref 78–100)
MONOCYTES # BLD AUTO: 0.5 10E9/L (ref 0–1.3)
MONOCYTES NFR BLD AUTO: 6.5 %
NEUTROPHILS # BLD AUTO: 4.4 10E9/L (ref 1.6–8.3)
NEUTROPHILS NFR BLD AUTO: 59.7 %
NRBC # BLD AUTO: 0 10*3/UL
NRBC BLD AUTO-RTO: 0 /100
PLATELET # BLD AUTO: 203 10E9/L (ref 150–450)
POTASSIUM SERPL-SCNC: 4.2 MMOL/L (ref 3.4–5.3)
PROT SERPL-MCNC: 6.6 G/DL (ref 6.8–8.8)
RBC # BLD AUTO: 5.21 10E12/L (ref 4.4–5.9)
SODIUM SERPL-SCNC: 134 MMOL/L (ref 133–144)
TROPONIN I SERPL-MCNC: <0.015 UG/L (ref 0–0.04)
WBC # BLD AUTO: 7.3 10E9/L (ref 4–11)

## 2020-08-28 PROCEDURE — 80053 COMPREHEN METABOLIC PANEL: CPT | Performed by: EMERGENCY MEDICINE

## 2020-08-28 PROCEDURE — 99284 EMERGENCY DEPT VISIT MOD MDM: CPT

## 2020-08-28 PROCEDURE — 84484 ASSAY OF TROPONIN QUANT: CPT | Performed by: EMERGENCY MEDICINE

## 2020-08-28 PROCEDURE — 85025 COMPLETE CBC W/AUTO DIFF WBC: CPT | Performed by: EMERGENCY MEDICINE

## 2020-08-28 PROCEDURE — 93005 ELECTROCARDIOGRAM TRACING: CPT

## 2020-08-28 ASSESSMENT — ENCOUNTER SYMPTOMS
DIAPHORESIS: 1
ARTHRALGIAS: 1

## 2020-08-28 NOTE — ED AVS SNAPSHOT
[facility] Emergency Department  [address]  [facility] [address]  Phone:  [phone]  Fax:  [phone]                                    Matt Way   MRN: [id number]    Department:  [facility] Emergency Department   Date of Visit:  8/28/2020           After Visit Summary Signature Page    I have received my discharge instructions, and my questions have been answered. I have discussed any challenges I see with this plan with the nurse or doctor.    ..........................................................................................................................................  Patient/Patient Representative Signature      ..........................................................................................................................................  Patient Representative Print Name and Relationship to Patient    ..................................................               ................................................  Date                                   Time    ..........................................................................................................................................  Reviewed by Signature/Title    ...................................................              ..............................................  Date                                               Time          22EPIC Rev 08/18

## 2020-08-29 NOTE — ED TRIAGE NOTES
Pt arrives via EMS. C/o sudden chest pain with SOB that felt different than any other. Pt has h/o of 1 MI.  Pt took 2 nitroglycerin tabs and is now pain free and no more sob.  Pt has no symptoms and expressed that he wants to go home.

## 2020-08-29 NOTE — ED PROVIDER NOTES
History     Chief Complaint:  Chest Pain     HPI  Matt Way is a 69 year old year old male with a history of type II diabetes, NSTEMI s/p LAD stent, coronary artery disease, and pulmonary embolism who presents for evaluation of chest pain. The patient has a stent placed two years ago and over the past two weeks has had worsening chest pain associated with his nightly walks. Today he presents after his chest pain had worsened, and the pain radiated to his jaw. He also notes diaphoresis. When he got home from his walk he sat down and took two nitroglycerin and the pain went away. His wife is quadriplegic and he would like to go home to take care of her.     Allergies:  Brilinta  Duloxetine  Gabapentin  Lisinopril     Medications:   Lipitor  Aricept  Novolog flexpen  Levemir  Cozaar  Metformin  Nitrostat  Prilosec  Xarelto    Medical History:   Coronary artery disease  Diabetes type II  Pulmonary embolism  Obstructive sleep apnea  Obesity  Hyperlipidemia    Surgical History   COLONOSCOPY    Coronary artery angiogram with stent (CECILY) to LAD 06/2018   SHOULDER SURGERY    VASECTOMY      Family History:   Lupus  Diabetes  Heart disease  Colon cancer    Social History:  Smoking Status: Negative   Smokeless Tobacco: Negative   Alcohol Use: Negative   Drug Use: Negative   Primary Physician: Kingston Leal     Review of Systems   Constitutional: Positive for diaphoresis.   Cardiovascular: Positive for chest pain.   Musculoskeletal: Positive for arthralgias (jaw).   All other systems reviewed and are negative.      Physical Exam     Patient Vitals for the past 24 hrs:   BP Temp Temp src Pulse Resp SpO2 Weight   08/28/20 2221 -- -- -- 79 -- 98 % --   08/28/20 2220 -- -- -- 77 -- 98 % --   08/28/20 2219 -- -- -- 79 -- 97 % --   08/28/20 2218 -- -- -- 76 -- 98 % --   08/28/20 2217 -- -- -- 79 -- 98 % --   08/28/20 2212 131/68 98.5  F (36.9  C) Oral 79 16 97 % 94.8 kg (209 lb)      Physical Exam  Nursing note and vitals  reviewed.  Constitutional: Cooperative.   HENT:   Mouth/Throat: Mucous membranes are normal.   Cardiovascular: Normal rate, regular rhythm and normal heart sounds.  No murmur.  Pulmonary/Chest: Effort normal and breath sounds normal. No respiratory distress. No wheezes. No rales.   Abdominal: Soft. Normal appearance. No distension. There is no tenderness. There is no rigidity and no guarding.   Musculoskeletal: No LE edema  Neurological: Alert. Oriented x4  Skin: Skin is warm and dry.   Psychiatric: Normal mood and affect.     Emergency Department Course     ECG:  ECG taken at 2205  Sinus rhythm  Left axis deviation  Interior infarct, age undetermined  Abnormal ECG  Rate 77 bpm. NJ interval 180 ms. QRS duration 76 ms. QT/QTc 376/425 ms. P-R-T axes 58 -36 50.    Laboratory:  Laboratory findings were communicated with the patient who voiced understanding of the findings.    Troponin: (Collected 2215) <0.015    CBC: WBC 7.3, HGB 14.6,   CMP: Glucose 414 (H), Albumin 3.3 (L), Protein total 6.6 (L) (Creatinine 1.02) o/w WNL     Emergency Department Course:    2205 EKG obtained as noted above.    2215 IV was inserted and blood was drawn for laboratory testing, results above.    2222 Nursing notes and vitals reviewed.    2232 I performed an exam of the patient as documented above.     2246 Findings and plan explained to the Patient. Patient discharged home with instructions regarding supportive care, medications, and reasons to return. The importance of close follow-up was reviewed. The patient was prescribed as below.    Impression & Plan     Medical Decision Making:  Matt Way is a 69 year old male with known coronary artery disease who presents with a story consistent with unstable angina. He has had crescendoing chest pain associated with his nightly walks with severe chest pain and association with exertion tonight. He was relieved by rest and nitroglycerin. He is currently chest pain free. EKG shows no  ischemic changes at this time and his initial troponin is negative. It is important to note that this was only two hours after the onset of his pain and he understands that this does not rule out NSTEMI or significant ongoing coronary damage. He is insistent on leaving so that he can care for his wife who has quadriplegia. He knows he is making a decision against medical advice and that he is risking severe mortality and morbidity that could be ongoing from cardiac causes such as ischemia with resulting heart failure and/or death. He knows he is welcome to return to the emergency department at any time should he changes his mind. He has capacity to make this decision even thought it is not the one I am recommending and will be discharged home with appropriate return precautions and encouragement to follow up very closely with his regular physician.    The patient is leaving Against Medical Advice:    At this point, the patient refused my recommended care and insisted upon discharge. I discussed with the patient my diagnostic impression, recommended treatment, and alternatives to treatment. I discussed with them what I anticipated could happen if the patient were discharged. The patient was able to understand this explanation and ask appropriate questions. The patient indicates he wants to leave because he has a quadriplegic wife at home that he needs to take care of and does not want to stay for further workup.  I believe this patient does have capacity to decide to leave Against Medical Advice, and I have asked him/her to sign a form indicating that decision. I have given the patient thorough discharge instructions and have prescribed all appropriate treatment, given the stage of the work up conducted.  I have reminded the patient to return here at any time if he decides to have further evaluation or treatment.      Diagnosis:     ICD-10-CM    1. Unstable angina  I20.0    2. Left against medical advice  Z53.29          Disposition:  Discharged to home.    Scribe Disclosure:  I, Gracie Marie, am serving as a scribe at 10:23 PM on 8/28/2020 to document services personally performed by Vishal Godinez MD based on my observations and the provider's statements to me.      Vishal Godinez MD  08/29/20 0001

## 2020-08-29 NOTE — ED NOTES
Bed: ED13  Expected date: 8/28/20  Expected time: 9:55 PM  Means of arrival:   Comments:  All 597, 69 M CP, improved w/ nitro

## 2020-08-31 ENCOUNTER — TELEPHONE (OUTPATIENT)
Dept: CARDIOLOGY | Facility: CLINIC | Age: 70
End: 2020-08-31

## 2020-08-31 LAB — INTERPRETATION ECG - MUSE: NORMAL

## 2020-08-31 NOTE — TELEPHONE ENCOUNTER
Wife pratima phoned in. Patient was seen in the ED on 8/28/20 for chest pain. Initial troponin and ekg were negative. Patient left against medical advice as he was concerned for his wife who is in a wheel chair and he cares for her at home.     Again called 911 last night, 8/30/20, however EMT stated he had normal vitals and did not insist that he go to the ED.     Pain is usually on the left upper chest area, however last evening it was more sternal. He has been taking 2-3 NGT for the chest pain when it occurs., usually after activity. She stated he describes it as sharp, but does not ease right away with rest and NGT, did have some diaphoresis with episode on 8/28. Even after vacuuming, he needed to sit down and was rubbing his chest.     Was unable to speak directly with patient as he was out 'taking as walk. Wife stated he likes to stay active and either walks or bikes twice per day.     Concerned as at last visit,stated he was taken off both plavix and Xarelto. Hx of an unprevoked PE in 2018 and a non STEMI. In review of Epic, looks like he was to remain on the Xarelto.     Called back to wife pratima. She stated she sets up his medications. She does not think he has Xarelto in his pill box. She will check it out and let us know.   Again reinforced that he needs to be worked up. If he continues to have chest pains that are not relieved with rest and Ngt, he should see medical attention. He does not drive, so he will need to call 911. Verbalized understanding.    Will message Dr. Strickland to review and advise as he does not have any openings this week in [place].

## 2020-09-01 ENCOUNTER — HOSPITAL ENCOUNTER (INPATIENT)
Facility: CLINIC | Age: 70
LOS: 3 days | Discharge: HOME | DRG: 246 | End: 2020-09-04
Attending: EMERGENCY MEDICINE | Admitting: INTERNAL MEDICINE
Payer: MEDICARE

## 2020-09-01 ENCOUNTER — APPOINTMENT (OUTPATIENT)
Dept: GENERAL RADIOLOGY | Facility: CLINIC | Age: 70
DRG: 246 | End: 2020-09-01
Attending: EMERGENCY MEDICINE
Payer: MEDICARE

## 2020-09-01 DIAGNOSIS — E11.65 TYPE 2 DIABETES MELLITUS WITH HYPERGLYCEMIA, WITH LONG-TERM CURRENT USE OF INSULIN (H): ICD-10-CM

## 2020-09-01 DIAGNOSIS — Z79.4 TYPE 2 DIABETES MELLITUS WITH HYPERGLYCEMIA, WITH LONG-TERM CURRENT USE OF INSULIN (H): ICD-10-CM

## 2020-09-01 DIAGNOSIS — R07.9 ACUTE CHEST PAIN: Primary | ICD-10-CM

## 2020-09-01 DIAGNOSIS — I21.4 NSTEMI (NON-ST ELEVATED MYOCARDIAL INFARCTION) (H): ICD-10-CM

## 2020-09-01 LAB
ANION GAP SERPL CALCULATED.3IONS-SCNC: 7 MMOL/L (ref 3–14)
BASOPHILS # BLD AUTO: 0.1 10E9/L (ref 0–0.2)
BASOPHILS NFR BLD AUTO: 1.1 %
BUN SERPL-MCNC: 21 MG/DL (ref 7–30)
CALCIUM SERPL-MCNC: 9 MG/DL (ref 8.5–10.1)
CHLORIDE SERPL-SCNC: 101 MMOL/L (ref 94–109)
CO2 SERPL-SCNC: 24 MMOL/L (ref 20–32)
CREAT SERPL-MCNC: 0.91 MG/DL (ref 0.66–1.25)
DIFFERENTIAL METHOD BLD: NORMAL
EOSINOPHIL # BLD AUTO: 0.2 10E9/L (ref 0–0.7)
EOSINOPHIL NFR BLD AUTO: 2.5 %
ERYTHROCYTE [DISTWIDTH] IN BLOOD BY AUTOMATED COUNT: 14.1 % (ref 10–15)
GFR SERPL CREATININE-BSD FRML MDRD: 87 ML/MIN/{1.73_M2}
GLUCOSE SERPL-MCNC: 459 MG/DL (ref 70–99)
HCT VFR BLD AUTO: 48.1 % (ref 40–53)
HGB BLD-MCNC: 15.3 G/DL (ref 13.3–17.7)
IMM GRANULOCYTES # BLD: 0 10E9/L (ref 0–0.4)
IMM GRANULOCYTES NFR BLD: 0.2 %
LYMPHOCYTES # BLD AUTO: 1.8 10E9/L (ref 0.8–5.3)
LYMPHOCYTES NFR BLD AUTO: 22.5 %
MCH RBC QN AUTO: 27.6 PG (ref 26.5–33)
MCHC RBC AUTO-ENTMCNC: 31.8 G/DL (ref 31.5–36.5)
MCV RBC AUTO: 87 FL (ref 78–100)
MONOCYTES # BLD AUTO: 0.5 10E9/L (ref 0–1.3)
MONOCYTES NFR BLD AUTO: 6.1 %
NEUTROPHILS # BLD AUTO: 5.4 10E9/L (ref 1.6–8.3)
NEUTROPHILS NFR BLD AUTO: 67.6 %
NRBC # BLD AUTO: 0 10*3/UL
NRBC BLD AUTO-RTO: 0 /100
PLATELET # BLD AUTO: 210 10E9/L (ref 150–450)
POTASSIUM SERPL-SCNC: 4.7 MMOL/L (ref 3.4–5.3)
RBC # BLD AUTO: 5.55 10E12/L (ref 4.4–5.9)
SODIUM SERPL-SCNC: 132 MMOL/L (ref 133–144)
TROPONIN I SERPL-MCNC: 0.31 UG/L (ref 0–0.04)
WBC # BLD AUTO: 8 10E9/L (ref 4–11)

## 2020-09-01 PROCEDURE — 99285 EMERGENCY DEPT VISIT HI MDM: CPT | Mod: 25

## 2020-09-01 PROCEDURE — 25000131 ZZH RX MED GY IP 250 OP 636 PS 637: Mod: GY | Performed by: INTERNAL MEDICINE

## 2020-09-01 PROCEDURE — 96372 THER/PROPH/DIAG INJ SC/IM: CPT

## 2020-09-01 PROCEDURE — C9803 HOPD COVID-19 SPEC COLLECT: HCPCS

## 2020-09-01 PROCEDURE — 36415 COLL VENOUS BLD VENIPUNCTURE: CPT | Performed by: INTERNAL MEDICINE

## 2020-09-01 PROCEDURE — 99223 1ST HOSP IP/OBS HIGH 75: CPT | Mod: AI | Performed by: INTERNAL MEDICINE

## 2020-09-01 PROCEDURE — 25000132 ZZH RX MED GY IP 250 OP 250 PS 637: Mod: GY | Performed by: EMERGENCY MEDICINE

## 2020-09-01 PROCEDURE — 84484 ASSAY OF TROPONIN QUANT: CPT | Performed by: INTERNAL MEDICINE

## 2020-09-01 PROCEDURE — 84484 ASSAY OF TROPONIN QUANT: CPT | Performed by: EMERGENCY MEDICINE

## 2020-09-01 PROCEDURE — 80048 BASIC METABOLIC PNL TOTAL CA: CPT | Performed by: EMERGENCY MEDICINE

## 2020-09-01 PROCEDURE — 85025 COMPLETE CBC W/AUTO DIFF WBC: CPT | Performed by: EMERGENCY MEDICINE

## 2020-09-01 PROCEDURE — 93005 ELECTROCARDIOGRAM TRACING: CPT

## 2020-09-01 PROCEDURE — U0003 INFECTIOUS AGENT DETECTION BY NUCLEIC ACID (DNA OR RNA); SEVERE ACUTE RESPIRATORY SYNDROME CORONAVIRUS 2 (SARS-COV-2) (CORONAVIRUS DISEASE [COVID-19]), AMPLIFIED PROBE TECHNIQUE, MAKING USE OF HIGH THROUGHPUT TECHNOLOGIES AS DESCRIBED BY CMS-2020-01-R: HCPCS | Performed by: EMERGENCY MEDICINE

## 2020-09-01 PROCEDURE — 96365 THER/PROPH/DIAG IV INF INIT: CPT

## 2020-09-01 PROCEDURE — 12000000 ZZH R&B MED SURG/OB

## 2020-09-01 PROCEDURE — 25000128 H RX IP 250 OP 636: Performed by: EMERGENCY MEDICINE

## 2020-09-01 PROCEDURE — 71046 X-RAY EXAM CHEST 2 VIEWS: CPT

## 2020-09-01 RX ORDER — AMOXICILLIN 250 MG
2 CAPSULE ORAL 2 TIMES DAILY PRN
Status: DISCONTINUED | OUTPATIENT
Start: 2020-09-01 | End: 2020-09-04 | Stop reason: HOSPADM

## 2020-09-01 RX ORDER — NITROGLYCERIN 0.4 MG/1
0.4 TABLET SUBLINGUAL EVERY 5 MIN PRN
Status: DISCONTINUED | OUTPATIENT
Start: 2020-09-01 | End: 2020-09-04 | Stop reason: HOSPADM

## 2020-09-01 RX ORDER — NICOTINE POLACRILEX 4 MG
15-30 LOZENGE BUCCAL
Status: DISCONTINUED | OUTPATIENT
Start: 2020-09-01 | End: 2020-09-04 | Stop reason: HOSPADM

## 2020-09-01 RX ORDER — ASPIRIN 81 MG/1
81 TABLET ORAL DAILY
Qty: 1 TABLET | Refills: 0 | COMMUNITY
Start: 2020-09-01 | End: 2021-02-09

## 2020-09-01 RX ORDER — POTASSIUM CHLORIDE 1.5 G/1.58G
20-40 POWDER, FOR SOLUTION ORAL
Status: DISCONTINUED | OUTPATIENT
Start: 2020-09-01 | End: 2020-09-03

## 2020-09-01 RX ORDER — DONEPEZIL HYDROCHLORIDE 5 MG/1
5 TABLET, FILM COATED ORAL AT BEDTIME
Status: DISCONTINUED | OUTPATIENT
Start: 2020-09-02 | End: 2020-09-04 | Stop reason: HOSPADM

## 2020-09-01 RX ORDER — NALOXONE HYDROCHLORIDE 0.4 MG/ML
.1-.4 INJECTION, SOLUTION INTRAMUSCULAR; INTRAVENOUS; SUBCUTANEOUS
Status: DISCONTINUED | OUTPATIENT
Start: 2020-09-01 | End: 2020-09-04 | Stop reason: HOSPADM

## 2020-09-01 RX ORDER — HEPARIN SODIUM 10000 [USP'U]/100ML
12 INJECTION, SOLUTION INTRAVENOUS CONTINUOUS
Status: DISCONTINUED | OUTPATIENT
Start: 2020-09-01 | End: 2020-09-02

## 2020-09-01 RX ORDER — ONDANSETRON 2 MG/ML
4 INJECTION INTRAMUSCULAR; INTRAVENOUS EVERY 6 HOURS PRN
Status: DISCONTINUED | OUTPATIENT
Start: 2020-09-01 | End: 2020-09-04 | Stop reason: HOSPADM

## 2020-09-01 RX ORDER — POTASSIUM CHLORIDE 7.45 MG/ML
10 INJECTION INTRAVENOUS
Status: DISCONTINUED | OUTPATIENT
Start: 2020-09-01 | End: 2020-09-03

## 2020-09-01 RX ORDER — LIDOCAINE 40 MG/G
CREAM TOPICAL
Status: DISCONTINUED | OUTPATIENT
Start: 2020-09-01 | End: 2020-09-04 | Stop reason: HOSPADM

## 2020-09-01 RX ORDER — SODIUM CHLORIDE 9 MG/ML
INJECTION, SOLUTION INTRAVENOUS CONTINUOUS
Status: DISCONTINUED | OUTPATIENT
Start: 2020-09-02 | End: 2020-09-02

## 2020-09-01 RX ORDER — POTASSIUM CHLORIDE 1500 MG/1
20-40 TABLET, EXTENDED RELEASE ORAL
Status: DISCONTINUED | OUTPATIENT
Start: 2020-09-01 | End: 2020-09-03

## 2020-09-01 RX ORDER — POTASSIUM CHLORIDE 29.8 MG/ML
20 INJECTION INTRAVENOUS
Status: DISCONTINUED | OUTPATIENT
Start: 2020-09-01 | End: 2020-09-03

## 2020-09-01 RX ORDER — HYDROMORPHONE HYDROCHLORIDE 1 MG/ML
0.2 INJECTION, SOLUTION INTRAMUSCULAR; INTRAVENOUS; SUBCUTANEOUS
Status: DISCONTINUED | OUTPATIENT
Start: 2020-09-01 | End: 2020-09-04 | Stop reason: HOSPADM

## 2020-09-01 RX ORDER — MAGNESIUM SULFATE HEPTAHYDRATE 40 MG/ML
4 INJECTION, SOLUTION INTRAVENOUS EVERY 4 HOURS PRN
Status: DISCONTINUED | OUTPATIENT
Start: 2020-09-01 | End: 2020-09-04 | Stop reason: HOSPADM

## 2020-09-01 RX ORDER — AMOXICILLIN 250 MG
1 CAPSULE ORAL 2 TIMES DAILY PRN
Status: DISCONTINUED | OUTPATIENT
Start: 2020-09-01 | End: 2020-09-04 | Stop reason: HOSPADM

## 2020-09-01 RX ORDER — ONDANSETRON 4 MG/1
4 TABLET, ORALLY DISINTEGRATING ORAL EVERY 6 HOURS PRN
Status: DISCONTINUED | OUTPATIENT
Start: 2020-09-01 | End: 2020-09-04 | Stop reason: HOSPADM

## 2020-09-01 RX ORDER — POTASSIUM CL/LIDO/0.9 % NACL 10MEQ/0.1L
10 INTRAVENOUS SOLUTION, PIGGYBACK (ML) INTRAVENOUS
Status: DISCONTINUED | OUTPATIENT
Start: 2020-09-01 | End: 2020-09-03

## 2020-09-01 RX ORDER — ASPIRIN 81 MG/1
81 TABLET ORAL DAILY
Status: DISCONTINUED | OUTPATIENT
Start: 2020-09-02 | End: 2020-09-03

## 2020-09-01 RX ORDER — LOSARTAN POTASSIUM 25 MG/1
25 TABLET ORAL DAILY
Status: DISCONTINUED | OUTPATIENT
Start: 2020-09-02 | End: 2020-09-04 | Stop reason: HOSPADM

## 2020-09-01 RX ORDER — DEXTROSE MONOHYDRATE 25 G/50ML
25-50 INJECTION, SOLUTION INTRAVENOUS
Status: DISCONTINUED | OUTPATIENT
Start: 2020-09-01 | End: 2020-09-04 | Stop reason: HOSPADM

## 2020-09-01 RX ORDER — ACETAMINOPHEN 325 MG/1
650 TABLET ORAL EVERY 4 HOURS PRN
Status: DISCONTINUED | OUTPATIENT
Start: 2020-09-01 | End: 2020-09-04 | Stop reason: HOSPADM

## 2020-09-01 RX ORDER — ASPIRIN 81 MG/1
324 TABLET, CHEWABLE ORAL ONCE
Status: COMPLETED | OUTPATIENT
Start: 2020-09-01 | End: 2020-09-01

## 2020-09-01 RX ORDER — ATORVASTATIN CALCIUM 40 MG/1
80 TABLET, FILM COATED ORAL DAILY
Status: DISCONTINUED | OUTPATIENT
Start: 2020-09-02 | End: 2020-09-04 | Stop reason: HOSPADM

## 2020-09-01 RX ADMIN — ASPIRIN 81 MG 324 MG: 81 TABLET ORAL at 20:45

## 2020-09-01 RX ADMIN — HEPARIN SODIUM 12 UNITS/KG/HR: 10000 INJECTION, SOLUTION INTRAVENOUS at 22:01

## 2020-09-01 RX ADMIN — INSULIN ASPART 10 UNITS: 100 INJECTION, SOLUTION INTRAVENOUS; SUBCUTANEOUS at 22:02

## 2020-09-01 ASSESSMENT — MIFFLIN-ST. JEOR: SCORE: 1767.83

## 2020-09-01 ASSESSMENT — ENCOUNTER SYMPTOMS
NAUSEA: 0
ARTHRALGIAS: 0
FEVER: 0
SHORTNESS OF BREATH: 1
COUGH: 0
VOMITING: 0

## 2020-09-01 NOTE — TELEPHONE ENCOUNTER
Recommendations of Dr. Strickland were reviewed with wife pratima. She has a call into both primary MD and Dr. Jorge's office concerning the Xarelto.   States she did restart the 81 mg ASA daily about 3 weeks ago. Placed on medication list now.   Again reinforced that this could be unstable angina and that Dr. Strickland is recommending that he go to the ER if he has any more symptoms including recurrent chest pain.     Patient is due for a follow up with Dr. Strickland. Have messaged scheduling to call her to set up an appointment.    No further questions.

## 2020-09-01 NOTE — Clinical Note
The first balloon was inserted into the left anterior descending and ostium left anterior descending.Max pressure = 14 martha. Total duration = 20 seconds.      Balloon reinflated a second time:

## 2020-09-01 NOTE — Clinical Note
Stent deployed in the ostium left anterior descending. Max pressure = 12 martha. Total duration = 30 seconds.

## 2020-09-01 NOTE — TELEPHONE ENCOUNTER
He had unprovoked pulmonary embolism 2018 and was on Xarelto for that reason. I recommend that patient follow-up with Dr. Jorge's office or primary care physician whether he needs to be back on Xarelto.  In the meantime I think it is reasonable for patient to start aspirin 81 mg daily for secondary coronary disease prophylaxis.  It is unfortunate that patient left against medical advice from ER as it was felt this could be unstable angina.  Agree that patient should go to ER if he has any more symptoms including recurrence of chest pain.    Thanks  Wilber

## 2020-09-01 NOTE — Clinical Note
left ventricle Cine(s)  injected and visualized utilizing power injector system. Pressures measured

## 2020-09-01 NOTE — TELEPHONE ENCOUNTER
"Voice message was received from wife, Salina, stating she had looked throughout his medication bottles and he is not taking Xarelto at this time. The last order in Epic for Xarelto was 3/18/19 per Dr. Jorge.     Per Dr. Strickland note from 2/17/20, he noted \" I also recommend that it is important to be compliant with medications, especially Xarelto, which he is being followed through Dr. Jorge's office. \"    Wife states he has not seen Dr. Jorge for quite some time.   Recommended calling Dr. Jorge's office for refill. Or primary's office.   Will have scheduling get him an appointment to be seen in clinic. Needs 2 day notice due to transportation.     Again aware to go back to the ED if chest pains continue.      "

## 2020-09-02 ENCOUNTER — APPOINTMENT (OUTPATIENT)
Dept: CARDIOLOGY | Facility: CLINIC | Age: 70
DRG: 246 | End: 2020-09-02
Attending: INTERNAL MEDICINE
Payer: MEDICARE

## 2020-09-02 LAB
ANION GAP SERPL CALCULATED.3IONS-SCNC: 5 MMOL/L (ref 3–14)
APTT PPP: 101 SEC (ref 22–37)
APTT PPP: 50 SEC (ref 22–37)
BUN SERPL-MCNC: 22 MG/DL (ref 7–30)
CALCIUM SERPL-MCNC: 8.7 MG/DL (ref 8.5–10.1)
CHLORIDE SERPL-SCNC: 106 MMOL/L (ref 94–109)
CO2 SERPL-SCNC: 27 MMOL/L (ref 20–32)
CREAT SERPL-MCNC: 0.84 MG/DL (ref 0.66–1.25)
ERYTHROCYTE [DISTWIDTH] IN BLOOD BY AUTOMATED COUNT: 14 % (ref 10–15)
GFR SERPL CREATININE-BSD FRML MDRD: >90 ML/MIN/{1.73_M2}
GLUCOSE BLDC GLUCOMTR-MCNC: 107 MG/DL (ref 70–99)
GLUCOSE BLDC GLUCOMTR-MCNC: 152 MG/DL (ref 70–99)
GLUCOSE BLDC GLUCOMTR-MCNC: 181 MG/DL (ref 70–99)
GLUCOSE BLDC GLUCOMTR-MCNC: 188 MG/DL (ref 70–99)
GLUCOSE BLDC GLUCOMTR-MCNC: 268 MG/DL (ref 70–99)
GLUCOSE BLDC GLUCOMTR-MCNC: 333 MG/DL (ref 70–99)
GLUCOSE BLDC GLUCOMTR-MCNC: 374 MG/DL (ref 70–99)
GLUCOSE SERPL-MCNC: 196 MG/DL (ref 70–99)
HCT VFR BLD AUTO: 45.7 % (ref 40–53)
HGB BLD-MCNC: 14.7 G/DL (ref 13.3–17.7)
INTERPRETATION ECG - MUSE: NORMAL
MAGNESIUM SERPL-MCNC: 2 MG/DL (ref 1.6–2.3)
MCH RBC QN AUTO: 27.5 PG (ref 26.5–33)
MCHC RBC AUTO-ENTMCNC: 32.2 G/DL (ref 31.5–36.5)
MCV RBC AUTO: 86 FL (ref 78–100)
PLATELET # BLD AUTO: 195 10E9/L (ref 150–450)
POTASSIUM SERPL-SCNC: 3.8 MMOL/L (ref 3.4–5.3)
RBC # BLD AUTO: 5.34 10E12/L (ref 4.4–5.9)
SARS-COV-2 RNA SPEC QL NAA+PROBE: NOT DETECTED
SODIUM SERPL-SCNC: 138 MMOL/L (ref 133–144)
SPECIMEN SOURCE: NORMAL
TROPONIN I SERPL-MCNC: 0.91 UG/L (ref 0–0.04)
TROPONIN I SERPL-MCNC: 2.31 UG/L (ref 0–0.04)
TROPONIN I SERPL-MCNC: 4.21 UG/L (ref 0–0.04)
WBC # BLD AUTO: 7.4 10E9/L (ref 4–11)

## 2020-09-02 PROCEDURE — 25000128 H RX IP 250 OP 636: Performed by: INTERNAL MEDICINE

## 2020-09-02 PROCEDURE — 85027 COMPLETE CBC AUTOMATED: CPT | Performed by: INTERNAL MEDICINE

## 2020-09-02 PROCEDURE — 12000000 ZZH R&B MED SURG/OB

## 2020-09-02 PROCEDURE — 25800030 ZZH RX IP 258 OP 636: Performed by: INTERNAL MEDICINE

## 2020-09-02 PROCEDURE — 25000132 ZZH RX MED GY IP 250 OP 250 PS 637: Mod: GY | Performed by: INTERNAL MEDICINE

## 2020-09-02 PROCEDURE — 99223 1ST HOSP IP/OBS HIGH 75: CPT | Mod: 25 | Performed by: INTERNAL MEDICINE

## 2020-09-02 PROCEDURE — 25000131 ZZH RX MED GY IP 250 OP 636 PS 637: Mod: GY | Performed by: INTERNAL MEDICINE

## 2020-09-02 PROCEDURE — 40000264 ECHOCARDIOGRAM COMPLETE

## 2020-09-02 PROCEDURE — 93306 TTE W/DOPPLER COMPLETE: CPT | Mod: 26 | Performed by: INTERNAL MEDICINE

## 2020-09-02 PROCEDURE — 85730 THROMBOPLASTIN TIME PARTIAL: CPT | Performed by: INTERNAL MEDICINE

## 2020-09-02 PROCEDURE — 00000146 ZZHCL STATISTIC GLUCOSE BY METER IP

## 2020-09-02 PROCEDURE — 99232 SBSQ HOSP IP/OBS MODERATE 35: CPT | Performed by: INTERNAL MEDICINE

## 2020-09-02 PROCEDURE — 36415 COLL VENOUS BLD VENIPUNCTURE: CPT | Performed by: INTERNAL MEDICINE

## 2020-09-02 PROCEDURE — 25500064 ZZH RX 255 OP 636: Performed by: INTERNAL MEDICINE

## 2020-09-02 PROCEDURE — 80048 BASIC METABOLIC PNL TOTAL CA: CPT | Performed by: INTERNAL MEDICINE

## 2020-09-02 PROCEDURE — 83735 ASSAY OF MAGNESIUM: CPT | Performed by: INTERNAL MEDICINE

## 2020-09-02 PROCEDURE — 84484 ASSAY OF TROPONIN QUANT: CPT | Performed by: INTERNAL MEDICINE

## 2020-09-02 RX ORDER — SODIUM CHLORIDE 9 MG/ML
INJECTION, SOLUTION INTRAVENOUS CONTINUOUS
Status: DISCONTINUED | OUTPATIENT
Start: 2020-09-02 | End: 2020-09-03 | Stop reason: HOSPADM

## 2020-09-02 RX ORDER — ACETAMINOPHEN 325 MG/1
650 TABLET ORAL EVERY 4 HOURS PRN
COMMUNITY

## 2020-09-02 RX ORDER — LORAZEPAM 2 MG/ML
0.5 INJECTION INTRAMUSCULAR
Status: DISCONTINUED | OUTPATIENT
Start: 2020-09-02 | End: 2020-09-03 | Stop reason: HOSPADM

## 2020-09-02 RX ORDER — OMEPRAZOLE 20 MG/1
20 TABLET, DELAYED RELEASE ORAL DAILY PRN
COMMUNITY
End: 2021-01-01

## 2020-09-02 RX ORDER — NITROGLYCERIN 5 MG/ML
VIAL (ML) INTRAVENOUS
Status: DISCONTINUED
Start: 2020-09-02 | End: 2020-09-03 | Stop reason: HOSPADM

## 2020-09-02 RX ORDER — POTASSIUM CHLORIDE 1500 MG/1
20 TABLET, EXTENDED RELEASE ORAL
Status: COMPLETED | OUTPATIENT
Start: 2020-09-02 | End: 2020-09-02

## 2020-09-02 RX ORDER — DEXTROSE MONOHYDRATE, SODIUM CHLORIDE, AND POTASSIUM CHLORIDE 50; 1.49; 4.5 G/1000ML; G/1000ML; G/1000ML
INJECTION, SOLUTION INTRAVENOUS CONTINUOUS
Status: DISCONTINUED | OUTPATIENT
Start: 2020-09-02 | End: 2020-09-02

## 2020-09-02 RX ORDER — IBUPROFEN 200 MG
600 TABLET ORAL EVERY 8 HOURS PRN
Status: ON HOLD | COMMUNITY
End: 2020-09-04

## 2020-09-02 RX ORDER — MULTIVIT-MIN/IRON/FOLIC ACID/K 18-600-40
1 CAPSULE ORAL DAILY
COMMUNITY

## 2020-09-02 RX ORDER — LIDOCAINE HYDROCHLORIDE 10 MG/ML
INJECTION, SOLUTION EPIDURAL; INFILTRATION; INTRACAUDAL; PERINEURAL
Status: DISCONTINUED
Start: 2020-09-02 | End: 2020-09-03 | Stop reason: HOSPADM

## 2020-09-02 RX ORDER — HEPARIN SODIUM 10000 [USP'U]/100ML
650 INJECTION, SOLUTION INTRAVENOUS CONTINUOUS
Status: DISCONTINUED | OUTPATIENT
Start: 2020-09-02 | End: 2020-09-03

## 2020-09-02 RX ORDER — LORAZEPAM 0.5 MG/1
0.5 TABLET ORAL
Status: DISCONTINUED | OUTPATIENT
Start: 2020-09-02 | End: 2020-09-03 | Stop reason: HOSPADM

## 2020-09-02 RX ORDER — HEPARIN SODIUM 1000 [USP'U]/ML
INJECTION, SOLUTION INTRAVENOUS; SUBCUTANEOUS
Status: DISCONTINUED
Start: 2020-09-02 | End: 2020-09-03 | Stop reason: HOSPADM

## 2020-09-02 RX ORDER — LIDOCAINE 40 MG/G
CREAM TOPICAL
Status: DISCONTINUED | OUTPATIENT
Start: 2020-09-02 | End: 2020-09-03 | Stop reason: HOSPADM

## 2020-09-02 RX ORDER — DONEPEZIL HYDROCHLORIDE 5 MG/1
5 TABLET, FILM COATED ORAL EVERY MORNING
COMMUNITY
End: 2021-01-01

## 2020-09-02 RX ADMIN — POTASSIUM CHLORIDE 20 MEQ: 1500 TABLET, EXTENDED RELEASE ORAL at 11:23

## 2020-09-02 RX ADMIN — SODIUM CHLORIDE: 9 INJECTION, SOLUTION INTRAVENOUS at 11:27

## 2020-09-02 RX ADMIN — INSULIN DETEMIR 16 UNITS: 100 INJECTION, SOLUTION SUBCUTANEOUS at 22:25

## 2020-09-02 RX ADMIN — INSULIN ASPART 3 UNITS: 100 INJECTION, SOLUTION INTRAVENOUS; SUBCUTANEOUS at 22:25

## 2020-09-02 RX ADMIN — POTASSIUM CHLORIDE, DEXTROSE MONOHYDRATE AND SODIUM CHLORIDE: 150; 5; 450 INJECTION, SOLUTION INTRAVENOUS at 08:40

## 2020-09-02 RX ADMIN — ATORVASTATIN CALCIUM 80 MG: 40 TABLET, FILM COATED ORAL at 20:31

## 2020-09-02 RX ADMIN — HEPARIN SODIUM 650 UNITS/HR: 10000 INJECTION, SOLUTION INTRAVENOUS at 05:47

## 2020-09-02 RX ADMIN — ASPIRIN 325 MG: 325 TABLET, COATED ORAL at 11:24

## 2020-09-02 RX ADMIN — Medication 12.5 MG: at 00:33

## 2020-09-02 RX ADMIN — INSULIN DETEMIR 16 UNITS: 100 INJECTION, SOLUTION SUBCUTANEOUS at 01:08

## 2020-09-02 RX ADMIN — HUMAN ALBUMIN MICROSPHERES AND PERFLUTREN 3 ML: 10; .22 INJECTION, SOLUTION INTRAVENOUS at 09:00

## 2020-09-02 RX ADMIN — SODIUM CHLORIDE: 9 INJECTION, SOLUTION INTRAVENOUS at 00:32

## 2020-09-02 RX ADMIN — DONEPEZIL HYDROCHLORIDE 5 MG: 5 TABLET ORAL at 01:41

## 2020-09-02 RX ADMIN — DONEPEZIL HYDROCHLORIDE 5 MG: 5 TABLET ORAL at 20:31

## 2020-09-02 RX ADMIN — LOSARTAN POTASSIUM 25 MG: 25 TABLET ORAL at 11:23

## 2020-09-02 RX ADMIN — Medication 12.5 MG: at 20:31

## 2020-09-02 RX ADMIN — Medication 12.5 MG: at 08:40

## 2020-09-02 RX ADMIN — INSULIN ASPART 4 UNITS: 100 INJECTION, SOLUTION INTRAVENOUS; SUBCUTANEOUS at 01:08

## 2020-09-02 ASSESSMENT — ACTIVITIES OF DAILY LIVING (ADL)
ADLS_ACUITY_SCORE: 11
ADLS_ACUITY_SCORE: 12
ADLS_ACUITY_SCORE: 11
ADLS_ACUITY_SCORE: 15
ADLS_ACUITY_SCORE: 11
ADLS_ACUITY_SCORE: 11

## 2020-09-02 ASSESSMENT — MIFFLIN-ST. JEOR: SCORE: 1759.21

## 2020-09-02 NOTE — PLAN OF CARE
VSS on RA. A/O, forgetful. Tele: SR. DYE. Hep gtt@6.5mL/hr. Tolerating mod carb diet. Plan for Angio tomorrow. Will continue to monitor and POC.

## 2020-09-02 NOTE — ED NOTES
Bed: ED19  Expected date: 9/1/20  Expected time: 7:11 PM  Means of arrival:   Comments:  Meena [phone]

## 2020-09-02 NOTE — PROVIDER NOTIFICATION
MD notified:   0115AM: Pt. Troponin level 0.913. VSS, No C/O CP or SOB. Tele: SR, Heparin infusing 10ml/hr.     0505AM: Pt. Troponin 2.310, No C/O CP, VSS, Tele: SR. Heparin infusion stopped for 1hr and restart at 0600am per pharmacist. .

## 2020-09-02 NOTE — ED NOTES
Patient has given permission for us to release health care information to wife and daughters by phone or in person. Information is in demographics.

## 2020-09-02 NOTE — PHARMACY-ADMISSION MEDICATION HISTORY
"Admission medication history interview status for this patient is complete. See [facility] admission navigator for allergy information, prior to admission medications and immunization status.     Medication history interview done via telephone during Covid-19 pandemic, indicate source(s): Patient  Medication history resources (including written lists, pill bottles, clinic record): [employer]s fill history, spoke with spouse at home  Pharmacy: [facility]   Changes made to PTA medication list:  Added: ibuprofen, acetaminophen, vitamin D   Deleted: N/A  Changed: donepezil from bedtime to AM dosing (see additional info), omeprazole from daily to daily PRN    Actions taken by pharmacist (provider contacted, etc):None     Additional medication history information: Patient admits to having dementia and not knowing medications; however, was able to verify insulin regimen without being prompted. Patient states spouse sets up medications for him \"sometimes\" - was able to speak with spouse at patient's home, but spouse was unable to read from med list or verify Rx labels due to being bedbound from recent neck injury. Patient stated last doses of any medication were on 8/31, spouse did verify this by saying yesterday's pills in box remained. Spouse did not know strength of acetaminophen or vitamin D patient takes.     Of note, spouse states patient was instructed to take donepezil in the morning - spouse also mentioned patient has fallen recently. AVS from ED 8/28 did not state whether to take donepezil in AM or at bedtime.    Spouse states Xarelto was filled 9/1, and one dose was taken from most recent prescription.      Medication reconciliation/reorder completed by provider prior to medication history?  Y     For patients on insulin therapy: Y  Sliding scale Novolog: N/A  Do you have a baseline novolog pre-meal dose:  16  units with meals (AM and PM)  Do you eat three meals a day:  Yes  How many times do you check " your blood glucose per day:  Does not check   How many episodes of hypoglycemia do you have per week: reports <1/week  Do you have a Continuous glucose monitor (CGM) : No (remind pt that not approved for hospital use)  Any specific barriers to therapy? Patient admits to dementia; compliance does not appear to be current issue  (cost, comfortable with injections, confident with current diabetes regimen?)      Prior to Admission medications    Medication Sig Last Dose Taking? Auth Provider   acetaminophen (TYLENOL) 325 MG tablet Take 325-650 mg by mouth every 6 hours as needed for mild pain 9/1/2020 at Unknown time Yes Unknown, Entered By History   aspirin 81 MG EC tablet Take 1 tablet (81 mg) by mouth daily 8/31/2020 at AM Yes Wilber Strickland MD   atorvastatin (LIPITOR) 80 MG tablet Take 1 tablet (80 mg) by mouth daily 8/31/2020 at PM Yes Wilber Strickland MD   donepezil (ARICEPT) 5 MG tablet Take 5 mg by mouth every morning 8/31/2020 at AM Yes Unknown, Entered By History   ibuprofen (ADVIL/MOTRIN) 200 MG tablet Take 600 mg by mouth every 8 hours as needed for moderate pain 8/27/2020 Yes Unknown, Entered By History   insulin aspart (NOVOLOG FLEXPEN) 100 UNIT/ML injection Inject 16 Units Subcutaneous 2 times daily (with meals) Breakfast and Dinner 8/31/2020 Yes Reported, Patient   insulin detemir (LEVEMIR) 100 UNIT/ML injection Inject 20 Units Subcutaneous 2 times daily  8/31/2020 Yes Reported, Patient   losartan (COZAAR) 25 MG tablet Take 1 tablet (25 mg) by mouth daily 8/31/2020 Yes Chante Givens PA-C   metFORMIN (GLUCOPHAGE) 1000 MG tablet Take 1,000 mg by mouth 2 times daily (with meals) 8/31/2020 at PM Yes Unknown, Entered By History   nitroGLYcerin (NITROSTAT) 0.4 MG sublingual tablet Place 1 tablet (0.4 mg) under the tongue every 5 minutes as needed for chest pain If symptoms persist 5 minutes after 1st dose call 911. 9/1/2020 at AM Yes Ruth Hardin PA-C   omeprazole (PRILOSEC OTC) 20 MG EC tablet Take  20 mg by mouth daily as needed  at Unknown time Yes Unknown, Entered By History   rivaroxaban ANTICOAGULANT (XARELTO) 20 MG TABS tablet Take 1 tablet (20 mg) by mouth daily (with dinner) 9/1/2020 at PM Yes Bianca Jorge MD   VITAMIN D, CHOLECALCIFEROL, PO Take 1 tablet by mouth daily 8/31/2020 Yes Unknown, Entered By History       Nitza Caraballo  PGY-1 Pharmacy Practice Resident

## 2020-09-02 NOTE — ED TRIAGE NOTES
Pt aox4, abc's intact. Pt arrives via EMS for evaluation of Chest Pain, 8/10 that has been going on since this morning and intermittently since Friday. Took 3 nitroglycerin at home.

## 2020-09-02 NOTE — CONSULTS
"Cardiology Consultation      Matt Way MRN# [id number]   YOB: 1950 Age: 69 year old   Date of Admission: 2020     Reason for consult: NSTEMI           Assessment and Plan:     1. Likely ACS, probable LAD territory    Previous PCI to the LAD , no ischemia on stress testing , evidence of LAD territory hypoperfusion on resting echocardiogram consistent with probable ACS today.    Plan cardiac catheterization, patient does not have any questions regarding the procedure.      2. History of unprovoked pulmonary embolism    Normally maintained on Xarelto, held on admission for anticipation of cardiac catheterization.    Likely will be maintained on anticoagulation for single antiplatelet therapy.  Defer to proceduralist on specifics of potential triple therapy in the short-term.               Chief Complaint:   Chest Pain           History of Present Illness:   This patient is a 69 year old male with history of coronary artery disease status post PCI of the LAD in .  Previously had stress testing in 2019 that was negative for ischemia or regional wall motion abnormalities.  He currently presents with chest discomfort and found to have LAD territory wall motion abnormalities on echocardiogram.    He has had some variable compliance with his medications at home, he did miss his medications on the day he presented to the emergency department.    At this point, he is comfortable.  He is normally on Xarelto for unprovoked pulmonary embolism, this has been held for anticipation of cardiac catheterization.         Physical Exam:   Vitals were reviewed  Blood pressure (!) 142/95, pulse 72, temperature 97.8  F (36.6  C), temperature source Oral, resp. rate 18, height 1.854 m (6' 1\"), weight 93 kg (205 lb 1.6 oz), SpO2 96 %.  Temperatures:  Current - Temp: 97.8  F (36.6  C); Max - Temp  Av.9  F (36.6  C)  Min: 97.4  F (36.3  C)  Max: 98.4  F (36.9  C)  Respiration range: Resp  Avg: " 20.4  Min: 16  Max: 25  Pulse range: Pulse  Av  Min: 72  Max: 102  Blood pressure range: Systolic (24hrs), Av , Min:105 , Max:144   ; Diastolic (24hrs), Av, Min:70, Max:95    Pulse oximetry range: SpO2  Av.1 %  Min: 94 %  Max: 97 %    Intake/Output Summary (Last 24 hours) at 2020 1047  Last data filed at 2020 0823  Gross per 24 hour   Intake 62.33 ml   Output 1550 ml   Net -1487.67 ml     Constitutional:   awake, alert, cooperative, no apparent distress, and appears stated age     Eyes:   Lids and lashes normal, pupils equal, round and reactive to light, extra ocular muscles intact, sclera clear, conjunctiva normal     Neck:   supple, symmetrical, trachea midline,      Back:   symmetric     Lungs:   Clear, symmetric     Cardiovascular:   Regular, no significant murmurs     Abdomen:   Benign     Musculoskeletal:   motor strength is 5 out of 5 all extremities bilaterally     Neurologic:   Grossly nonfocal     Skin:   normal skin color, texture, turgor     Additional findings:                  Past Medical History:   I have reviewed this patient's past medical history  Past Medical History:   Diagnosis Date     Coronary artery disease 2018    s/p CECILY to LAD      Diabetes mellitus      GERD (gastroesophageal reflux disease)      Hyperlipidemia      Obesity      FLAQUITA (obstructive sleep apnea)      Pulmonary embolism 11/15/2018             Past Surgical History:   I have reviewed this patient's past surgical history  Past Surgical History:   Procedure Laterality Date     COLONOSCOPY       Coronary artery angiogram with stent (CECILY) to LAD  2018     SHOULDER SURGERY       VASECTOMY                 Social History:   I have reviewed this patient's social history  Social History     Tobacco Use     Smoking status: Never Smoker     Smokeless tobacco: Never Used   Substance Use Topics     Alcohol use: No             Family History:   I have reviewed this patient's family history  Family History    Problem Relation Age of Onset     Lupus Mother              Allergies:     Allergies   Allergen Reactions     Brilinta [Ticagrelor]      Duloxetine      Other reaction(s): Confusion  Per daughter who is MD     Gabapentin      Other reaction(s): Edema     Lisinopril Rash             Medications:   I have reviewed this patient's current medications  Medications Prior to Admission   Medication Sig Dispense Refill Last Dose     acetaminophen (TYLENOL) 325 MG tablet Take 325-650 mg by mouth every 6 hours as needed for mild pain   9/1/2020 at Unknown time     aspirin 81 MG EC tablet Take 1 tablet (81 mg) by mouth daily 1 tablet 0 8/31/2020 at AM     atorvastatin (LIPITOR) 80 MG tablet Take 1 tablet (80 mg) by mouth daily 90 tablet 0 8/31/2020 at PM     donepezil (ARICEPT) 5 MG tablet Take 5 mg by mouth every morning   8/31/2020 at AM     ibuprofen (ADVIL/MOTRIN) 200 MG tablet Take 600 mg by mouth every 8 hours as needed for moderate pain   8/27/2020     insulin aspart (NOVOLOG FLEXPEN) 100 UNIT/ML injection Inject 16 Units Subcutaneous 2 times daily (with meals) Breakfast and Dinner   8/31/2020     insulin detemir (LEVEMIR) 100 UNIT/ML injection Inject 20 Units Subcutaneous 2 times daily    8/31/2020     losartan (COZAAR) 25 MG tablet Take 1 tablet (25 mg) by mouth daily 30 tablet 11 8/31/2020     metFORMIN (GLUCOPHAGE) 1000 MG tablet Take 1,000 mg by mouth 2 times daily (with meals)   8/31/2020 at PM     nitroGLYcerin (NITROSTAT) 0.4 MG sublingual tablet Place 1 tablet (0.4 mg) under the tongue every 5 minutes as needed for chest pain If symptoms persist 5 minutes after 1st dose call 911. 10 tablet 0 9/1/2020 at AM     omeprazole (PRILOSEC OTC) 20 MG EC tablet Take 20 mg by mouth daily as needed    at Unknown time     rivaroxaban ANTICOAGULANT (XARELTO) 20 MG TABS tablet Take 1 tablet (20 mg) by mouth daily (with dinner) 60 tablet 3 9/1/2020 at PM     VITAMIN D, CHOLECALCIFEROL, PO Take 1 tablet by mouth daily    8/31/2020     Current Facility-Administered Medications Ordered in Epic   Medication Dose Route Frequency Last Rate Last Dose     acetaminophen (TYLENOL) tablet 650 mg  650 mg Oral Q4H PRN         aspirin EC tablet 81 mg  81 mg Oral Daily         atorvastatin (LIPITOR) tablet 80 mg  80 mg Oral Daily         dextrose 5% and 0.45% NaCl + KCl 20 mEq/L infusion   Intravenous Continuous 75 mL/hr at 09/02/20 0840       glucose gel 15-30 g  15-30 g Oral Q15 Min PRN        Or     dextrose 50 % injection 25-50 mL  25-50 mL Intravenous Q15 Min PRN        Or     glucagon injection 1 mg  1 mg Subcutaneous Q15 Min PRN         donepezil (ARICEPT) tablet 5 mg  5 mg Oral At Bedtime   5 mg at 09/02/20 0141     heparin 25,000 units in 0.45% NaCl 250 mL ANTICOAGULANT  infusion  650 Units/hr Intravenous Continuous 6.5 mL/hr at 09/02/20 0733 650 Units/hr at 09/02/20 0733     HYDROmorphone (PF) (DILAUDID) injection 0.2 mg  0.2 mg Intravenous Q2H PRN         insulin aspart (NovoLOG) injection (RAPID ACTING)  1-7 Units Subcutaneous TID AC         insulin aspart (NovoLOG) injection (RAPID ACTING)  1-5 Units Subcutaneous At Bedtime   4 Units at 09/02/20 0108     insulin detemir (LEVEMIR PEN) injection 16 Units  16 Units Subcutaneous BID   Stopped at 09/02/20 0913     lidocaine (LMX4) cream   Topical Q1H PRN         lidocaine 1 % 0.1-1 mL  0.1-1 mL Other Q1H PRN         losartan (COZAAR) tablet 25 mg  25 mg Oral Daily         magnesium sulfate 4 g in 100 mL sterile water (premade)  4 g Intravenous Q4H PRN         melatonin tablet 1 mg  1 mg Oral At Bedtime PRN         metoprolol tartrate (LOPRESSOR) half-tab 12.5 mg  12.5 mg Oral BID   12.5 mg at 09/02/20 0840     naloxone (NARCAN) injection 0.1-0.4 mg  0.1-0.4 mg Intravenous Q2 Min PRN         nitroGLYcerin (NITROSTAT) sublingual tablet 0.4 mg  0.4 mg Sublingual Q5 Min PRN         omeprazole (priLOSEC) CR capsule 20 mg  20 mg Oral Daily PRN         ondansetron (ZOFRAN-ODT) ODT tab 4 mg  4 mg  Oral Q6H PRN        Or     ondansetron (ZOFRAN) injection 4 mg  4 mg Intravenous Q6H PRN         Patient is already receiving anticoagulation with heparin, enoxaparin (LOVENOX), warfarin (COUMADIN)  or other anticoagulant medication   Does not apply Continuous PRN         potassium chloride (KLOR-CON) Packet 20-40 mEq  20-40 mEq Oral or Feeding Tube Q2H PRN         potassium chloride 10 mEq in 100 mL intermittent infusion with 10 mg lidocaine  10 mEq Intravenous Q1H PRN         potassium chloride 10 mEq in 100 mL sterile water intermittent infusion (premix)  10 mEq Intravenous Q1H PRN         potassium chloride 20 mEq in 50 mL intermittent infusion  20 mEq Intravenous Q1H PRN         potassium chloride ER (KLOR-CON M) CR tablet 20-40 mEq  20-40 mEq Oral Q2H PRN         senna-docusate (SENOKOT-S/PERICOLACE) 8.6-50 MG per tablet 1 tablet  1 tablet Oral BID PRN        Or     senna-docusate (SENOKOT-S/PERICOLACE) 8.6-50 MG per tablet 2 tablet  2 tablet Oral BID PRN         sodium chloride (PF) 0.9% PF flush 3 mL  3 mL Intracatheter q1 min prn         sodium chloride (PF) 0.9% PF flush 3 mL  3 mL Intracatheter Q8H   3 mL at 09/02/20 0041     No current [facility]-ordered outpatient medications on file.             Review of Systems:   The 10 point Review of Systems is negative other than noted in the HPI            Data:   All laboratory data reviewed  Results for orders placed or performed during the hospital encounter of 09/01/20 (from the past 24 hour(s))   CBC with platelets differential   Result Value Ref Range    WBC 8.0 4.0 - 11.0 10e9/L    RBC Count 5.55 4.4 - 5.9 10e12/L    Hemoglobin 15.3 13.3 - 17.7 g/dL    Hematocrit 48.1 40.0 - 53.0 %    MCV 87 78 - 100 fl    MCH 27.6 26.5 - 33.0 pg    MCHC 31.8 31.5 - 36.5 g/dL    RDW 14.1 10.0 - 15.0 %    Platelet Count 210 150 - 450 10e9/L    Diff Method Automated Method     % Neutrophils 67.6 %    % Lymphocytes 22.5 %    % Monocytes 6.1 %    % Eosinophils 2.5 %    % Basophils  1.1 %    % Immature Granulocytes 0.2 %    Nucleated RBCs 0 0 /100    Absolute Neutrophil 5.4 1.6 - 8.3 10e9/L    Absolute Lymphocytes 1.8 0.8 - 5.3 10e9/L    Absolute Monocytes 0.5 0.0 - 1.3 10e9/L    Absolute Eosinophils 0.2 0.0 - 0.7 10e9/L    Absolute Basophils 0.1 0.0 - 0.2 10e9/L    Abs Immature Granulocytes 0.0 0 - 0.4 10e9/L    Absolute Nucleated RBC 0.0    Basic metabolic panel   Result Value Ref Range    Sodium 132 (L) 133 - 144 mmol/L    Potassium 4.7 3.4 - 5.3 mmol/L    Chloride 101 94 - 109 mmol/L    Carbon Dioxide 24 20 - 32 mmol/L    Anion Gap 7 3 - 14 mmol/L    Glucose 459 (H) 70 - 99 mg/dL    Urea Nitrogen 21 7 - 30 mg/dL    Creatinine 0.91 0.66 - 1.25 mg/dL    GFR Estimate 87 >60 mL/min/[1.73_m2]    GFR Estimate If Black >90 >60 mL/min/[1.73_m2]    Calcium 9.0 8.5 - 10.1 mg/dL   Troponin I   Result Value Ref Range    Troponin I ES 0.307 (HH) 0.000 - 0.045 ug/L   EKG 12 lead   Result Value Ref Range    Interpretation ECG Click View Image link to view waveform and result    XR Chest 2 Views    Narrative    EXAM: XR CHEST 2 VW  LOCATION: [facility]  DATE/TIME: 9/1/2020 8:08 PM    INDICATION: Chest pain with exertion  COMPARISON: 04/08/2019      Impression    IMPRESSION: No acute change. Heart and central vessels unremarkable. Lungs clear.   Troponin I   Result Value Ref Range    Troponin I ES 0.913 (HH) 0.000 - 0.045 ug/L   Glucose by meter   Result Value Ref Range    Glucose 374 (H) 70 - 99 mg/dL   Partial thromboplastin time   Result Value Ref Range     (H) 22 - 37 sec   Basic metabolic panel   Result Value Ref Range    Sodium 138 133 - 144 mmol/L    Potassium 3.8 3.4 - 5.3 mmol/L    Chloride 106 94 - 109 mmol/L    Carbon Dioxide 27 20 - 32 mmol/L    Anion Gap 5 3 - 14 mmol/L    Glucose 196 (H) 70 - 99 mg/dL    Urea Nitrogen 22 7 - 30 mg/dL    Creatinine 0.84 0.66 - 1.25 mg/dL    GFR Estimate >90 >60 mL/min/[1.73_m2]    GFR Estimate If Black >90 >60 mL/min/[1.73_m2]    Calcium  8.7 8.5 - 10.1 mg/dL   CBC with platelets   Result Value Ref Range    WBC 7.4 4.0 - 11.0 10e9/L    RBC Count 5.34 4.4 - 5.9 10e12/L    Hemoglobin 14.7 13.3 - 17.7 g/dL    Hematocrit 45.7 40.0 - 53.0 %    MCV 86 78 - 100 fl    MCH 27.5 26.5 - 33.0 pg    MCHC 32.2 31.5 - 36.5 g/dL    RDW 14.0 10.0 - 15.0 %    Platelet Count 195 150 - 450 10e9/L   Troponin I   Result Value Ref Range    Troponin I ES 2.310 (HH) 0.000 - 0.045 ug/L   Magnesium   Result Value Ref Range    Magnesium 2.0 1.6 - 2.3 mg/dL   Glucose by meter   Result Value Ref Range    Glucose 107 (H) 70 - 99 mg/dL   Echocardiogram Complete    Narrative    [id number]  [id number]  [id number]  [id number]^DAVID^GUILLE^[facility]  Echocardiography Laboratory  [address] [place] [address]        Name: JUSTYN TYLER  MRN: [id number]  : 1950  Study Date: 2020 07:58 AM  Age: 69 yrs  Gender: Male  Patient Location: [facility]  Reason For Study: Chest Pain  Ordering Physician: GUILLE HERNANDEZ  Referring Physician: Kingston Leal MD  Performed By: Jumana Vogel RDCS     BSA: 2.2 m2  Height: 73 in  Weight: 205 lb  HR: 71  BP: 144/82 mmHg  _____________________________________________________________________________  __        Procedure  Complete Portable Echo Adult. Optison (NDC #[id number]) given intravenously.  _____________________________________________________________________________  __        Interpretation Summary     The visual ejection fraction is estimated at 40-45%.  There is mid and distal anteroseptal/septal akinesis.  There is severe apical wall hypokinesis.  Above findings new compared with previous studies.  _____________________________________________________________________________  __        Left Ventricle  The left ventricle is normal in size. There is normal left ventricular wall  thickness. The visual ejection fraction is estimated at 40-45%. Left  ventricular diastolic function is  indeterminate. There is septal akinesis.  There is severe apical wall hypokinesis.     Right Ventricle  The right ventricle is normal in structure, function and size.     Atria  Normal left atrial size. Right atrial size is normal.     Mitral Valve  The mitral valve leaflets appear normal. There is no evidence of stenosis,  fluttering, or prolapse.        Tricuspid Valve  Normal tricuspid valve.     Aortic Valve  There is trivial trileaflet aortic sclerosis.     Pulmonic Valve  The pulmonic valve is not well seen, but is grossly normal.     Vessels  Borderline aortic root dilatation. The ascending aorta is Borderline dilated.  The inferior vena cava is normal.     Pericardium  There is no pericardial effusion.        Rhythm  Sinus rhythm was noted.  _____________________________________________________________________________  __  MMode/2D Measurements & Calculations  IVSd: 1.2 cm     LVIDd: 4.9 cm  LVIDs: 3.0 cm  LVPWd: 1.0 cm  FS: 39.5 %  LV mass(C)d: 201.6 grams  LV mass(C)dI: 92.7 grams/m2  Ao root diam: 3.8 cm  LA dimension: 3.4 cm  asc Aorta Diam: 3.8 cm  LA/Ao: 0.89  LVOT diam: 2.4 cm  LVOT area: 4.4 cm2  LA Volume (BP): 45.0 ml  LA Volume Index (BP): 20.7 ml/m2  RWT: 0.41           Doppler Measurements & Calculations  MV E max yudith: 99.2 cm/sec  MV A max yudith: 107.6 cm/sec  MV E/A: 0.92  MV dec time: 0.15 sec  Ao V2 max: 88.8 cm/sec  Ao max PG: 3.0 mmHg  PA acc time: 0.16 sec  E/E' av.3  Lateral E/e': 17.0  Medial E/e': 21.7           _____________________________________________________________________________  __           Report approved by: Esteban Mayen 2020 09:31 AM      Glucose by meter   Result Value Ref Range    Glucose 152 (H) 70 - 99 mg/dL       Lab Results   Component Value Date    CHOL 167 10/18/2018     Lab Results   Component Value Date    HDL 39 10/18/2018     Lab Results   Component Value Date    LDL 85 10/18/2018     Lab Results   Component Value Date    TRIG 215 10/18/2018     No  results found for: CHOLHDLRATIO  TSH   Date Value Ref Range Status   03/18/2019 1.56 0.40 - 4.00 mU/L Final         EKG results:    Echocardiology:    Cardiac stress testing:    Cardiac angiography:

## 2020-09-02 NOTE — PLAN OF CARE
"A/O. Denies pain. Reports chest \"tightness\" that has not gone away. Denies shortness of breath. Heparin gtt infusing at 6.5 ml/hr. D5 + 1/2NS + K+ infusing at 75 ml/hr. Tele: SR. NPO. , 152, 188. ECHO done this am, awaiting results. Cardiology consulted. Up with SBA. Will continue with POC.    Per MD hold morning dose of Levemi.r OK to resume scheduled evening dose.     Angio scheduled for later today.   NS infusing at 150 ml/hr  "

## 2020-09-02 NOTE — ED NOTES
[facility]  ED Nurse Handoff Report    Matt Way is a 69 year old male   ED Chief complaint: Chest Pain  . ED Diagnosis:   Final diagnoses:   None     Allergies:   Allergies   Allergen Reactions     Brilinta [Ticagrelor]      Duloxetine      Other reaction(s): Confusion  Per daughter who is MD     Gabapentin      Other reaction(s): Edema     Lisinopril Rash       Code Status: Full Code  Activity level - Baseline/Home:  Independent. Activity Level - Current:   Stand by Assist. Lift room needed: No. Bariatric: No   Needed: No   Isolation: No. Infection: Not Applicable.     Vital Signs:   Vitals:    09/01/20 2115 09/01/20 2130 09/01/20 2145 09/01/20 2200   BP:  122/70  129/76   Pulse: 81 81 82 84   Resp: 22 23 25 25   Temp:       TempSrc:       SpO2: 97% 96% 97% 97%   Weight:           Cardiac Rhythm:  ,      Pain level: 0-10 Pain Scale: 1  Patient confused: Yes. Patient Falls Risk: Yes.   Elimination Status: Has voided   Patient Report - Initial Complaint: chest pain. Focused Assessment:Pt arrives via EMS for evaluation of Chest Pain, 8/10 that has been going on since this morning and intermittently since Friday. Took 3 nitroglycerin at home. Was seen previously this week in ED. Patient is now comfortable.   Tests Performed: labs, xray. Abnormal Results:   Labs Ordered and Resulted from Time of ED Arrival Up to the Time of Departure from the ED   BASIC METABOLIC PANEL - Abnormal; Notable for the following components:       Result Value    Sodium 132 (*)     Glucose 459 (*)     All other components within normal limits   TROPONIN I - Abnormal; Notable for the following components:    Troponin I ES 0.307 (*)     All other components within normal limits   CBC WITH PLATELETS DIFFERENTIAL   COVID-19 VIRUS (CORONAVIRUS) BY PCR   PERIPHERAL IV CATHETER   CARDIAC CONTINUOUS MONITORING   PULSE OXIMETRY NURSING   MEASURE WEIGHT   NOTIFY PHYSICIAN   NOTIFY PHYSICIAN     XR Chest 2 Views   Final  Result   IMPRESSION: No acute change. Heart and central vessels unremarkable. Lungs clear.        .   Treatments provided: see MAR  Family Comments: daughter at bedside, wants to be called with any issues, states patient has a history of declining and wanting to leave hospital in previous admissions, family is able to redirect by phone. Patient gave permission to release information to all daughters and wife.   OBS brochure/video discussed/provided to patient:  N/A  ED Medications:   Medications   heparin 25,000 units in 0.45% NaCl 250 mL ANTICOAGULANT  infusion (12 Units/kg/hr × 85.1 kg (Adjusted) Intravenous New Bag 9/1/20 2201)   aspirin (ASA) chewable tablet 324 mg (324 mg Oral Given 9/1/20 2045)   insulin aspart (NovoLOG) injection (RAPID ACTING) (10 Units Subcutaneous Given 9/1/20 2202)     Drips infusing:  No  For the majority of the shift, the patient's behavior Green. Interventions performed were standard.    Sepsis treatment initiated: No     Patient tested for COVID 19 prior to admission: YES    ED Nurse Name/Phone Number: Carli PEARL RN,   10:07 PM  RECEIVING UNIT ED HANDOFF REVIEW    Above ED Nurse Handoff Report was reviewed: Yes  Reviewed by: Rory Esparza RN on September 1, 2020 at 10:36 PM

## 2020-09-02 NOTE — PROGRESS NOTES
[facility]    Medicine Progress Note - Hospitalist Service       Date of Admission:  9/1/2020  Date of Service: 09/02/2020    Assessment & Plan     69 year old male with PMH including coronary artery disease with prior drug-eluting stent to his LAD, insulin-dependent diabetes, GERD, FLAQUITA and remote history of PE in 2018 who presents with chest pain.  Notably, he was actually seen in the ER 3 days ago for anginal symptoms with a negative troponin.  It was recommended that he be admitted due to concern for unstable angina but he declined and ultimately discharged home.  He now presents due to intermittent left-sided chest pain which has been ongoing off and on since that ER visit.  He has some associated dyspnea whenever he exerts himself and symptoms generally resolve with rest.       Here in the ER he was hemodynamically stable.  EKG showed nonspecific ST changes.  He remains chest free at rest.  Lab work-up was notable for troponin of 0.307.  He was given a full dose aspirin and started on a heparin drip for suspected non-ST elevation MI     Assessment and Plan:   NSTEMI: As above.  Classic exertional anginal symptoms relieved by rest, elevated troponin and non-specific ST changes.  Currently pain free and stable.  --Admit under inpatient status  --Continue heparin drip for now, hold Xarelto  --Continue aspirin 81 mg daily  --Start metoprolol 12.5 mg twice daily  --Continue home losartan as well as atorvastatin 80 mg daily  --PRN nitroglycerin  --Check echocardiogram  --N.p.o.   --Cardiology consult, coronary angiogram planned today  --Check echocardiogram       2.   History of coronary artery disease with CECILY to LAD: In 2018.  Chronically on Xarelto, aspirin, atorvastatin, losartan.  I do not believe he is on a beta-blocker chronically.     3.   History of insulin-dependent diabetes: with severe hyperglycemia to the 400s on admission. Gave 10 units aspart in the ER  --Sugars quickly decreased to 100  range this morning.  Will add small dose of D5 with IV fluids while he is n.p.o.  Hold dose of Levemir this morning.  Resume at 16 unit twice daily from tonight.  Discussed with nursing staff and pharmacy  --May need additional titration of insulin this evening if blood sugars do not improve as expected.    --We will hold metformin given the possibility of an angiogram.     4.  Memory loss: He endorses mild dementia and is chronically on Aricept.       5.  GERD: Continue omeprazole.    Diet: NPO for Medical/Clinical Reasons Except for: Meds, Ice Chips  NPO for Medical/Clinical Reasons Except for: Meds    DVT Prophylaxis: Heparin   Schulte Catheter: not present  Code Status: Full Code      Disposition Plan   Expected discharge: Tomorrow, recommended to prior living arrangement pending cath  Entered: Martin Najera MD 09/02/2020, 12:35 PM       The patient's care was discussed with the Bedside Nurse and Patient.    Martin Najera MD  Hospitalist Service  [facility]    ______________________________________________________________________    Interval History   Chart reviewed and patient seen. Case discussed with nursing staff.     Patient feels well, Denies any chest pain, shortness of breath. No reports of abdominal pain or vomiting.  Waiting for the procedure.  No new complaints voiced otherwise.       Data reviewed today: I reviewed all medications, new labs and imaging results over the last 24 hours. I personally reviewed    Physical Exam   Vital Signs: Temp: 97.8  F (36.6  C) Temp src: Oral BP: 120/76 Pulse: 73   Resp: 18 SpO2: 96 % O2 Device: None (Room air)    Weight: 205 lbs 1.6 oz    GENERAL:  Awake and alert, No acute distress.  PSYCH: appropriate affect, no acute agitation   HEENT:  Neck is Supple, trachea is midline, EOMI, conjunctiva clear  CARDIOVASCULAR: Regular rate and rhythm, Normal S1, S2, no loud murmurs, no rubs or gallops.   PULMONARY:  Clear to auscultation bilaterally. Good  air entry on both sides  GI: Abdomen is soft, non tender, non-distended, bowel sounds present. No rebound or guarding   SKIN:  No cyanosis or clubbing, no obvious exanthems on exposed areas   MSK: Extremities are warm and well perfused. No pitting edema   Neuro: Awake and oriented x 3. Moving all extremities with good strength     Data   Recent Labs   Lab 20  0417 20  2344 20  1926 20  2215   WBC 7.4  --  8.0 7.3   HGB 14.7  --  15.3 14.6   MCV 86  --  87 86     --  210 203     --  132* 134   POTASSIUM 3.8  --  4.7 4.2   CHLORIDE 106  --  101 101   CO2 27  --  24 25   BUN 22  --  21 22   CR 0.84  --  0.91 1.02   ANIONGAP 5  --  7 8   CHRISTIE 8.7  --  9.0 8.7   *  --  459* 414*   ALBUMIN  --   --   --  3.3*   PROTTOTAL  --   --   --  6.6*   BILITOTAL  --   --   --  0.5   ALKPHOS  --   --   --  124   ALT  --   --   --  52   AST  --   --   --  23   TROPI 2.310* 0.913* 0.307* <0.015       Recent Results (from the past 24 hour(s))   XR Chest 2 Views    Narrative    EXAM: XR CHEST 2 VW  LOCATION: [facility]  DATE/TIME: 2020 8:08 PM    INDICATION: Chest pain with exertion  COMPARISON: 2019      Impression    IMPRESSION: No acute change. Heart and central vessels unremarkable. Lungs clear.   Echocardiogram Complete    Narrative    [id number]  [id number]  [id number]  [id number]^DAVID^GUILLE^[facility]  Echocardiography Laboratory  [address] [place] [address]        Name: JUSTYN TYLER  MRN: [id number]  : 1950  Study Date: 2020 07:58 AM  Age: 69 yrs  Gender: Male  Patient Location: [facility]  Reason For Study: Chest Pain  Ordering Physician: GUILLE HERNANDEZ  Referring Physician: Kingston Leal MD  Performed By: Jumana Vogel RDCS     BSA: 2.2 m2  Height: 73 in  Weight: 205 lb  HR: 71  BP: 144/82 mmHg  _____________________________________________________________________________  __         Procedure  Complete Portable Echo Adult. Optison (NDC #6033-4162) given intravenously.  _____________________________________________________________________________  __        Interpretation Summary     The visual ejection fraction is estimated at 40-45%.  There is mid and distal anteroseptal/septal akinesis.  There is severe apical wall hypokinesis.  Above findings new compared with previous studies.  _____________________________________________________________________________  __        Left Ventricle  The left ventricle is normal in size. There is normal left ventricular wall  thickness. The visual ejection fraction is estimated at 40-45%. Left  ventricular diastolic function is indeterminate. There is septal akinesis.  There is severe apical wall hypokinesis.     Right Ventricle  The right ventricle is normal in structure, function and size.     Atria  Normal left atrial size. Right atrial size is normal.     Mitral Valve  The mitral valve leaflets appear normal. There is no evidence of stenosis,  fluttering, or prolapse.        Tricuspid Valve  Normal tricuspid valve.     Aortic Valve  There is trivial trileaflet aortic sclerosis.     Pulmonic Valve  The pulmonic valve is not well seen, but is grossly normal.     Vessels  Borderline aortic root dilatation. The ascending aorta is Borderline dilated.  The inferior vena cava is normal.     Pericardium  There is no pericardial effusion.        Rhythm  Sinus rhythm was noted.  _____________________________________________________________________________  __  MMode/2D Measurements & Calculations  IVSd: 1.2 cm     LVIDd: 4.9 cm  LVIDs: 3.0 cm  LVPWd: 1.0 cm  FS: 39.5 %  LV mass(C)d: 201.6 grams  LV mass(C)dI: 92.7 grams/m2  Ao root diam: 3.8 cm  LA dimension: 3.4 cm  asc Aorta Diam: 3.8 cm  LA/Ao: 0.89  LVOT diam: 2.4 cm  LVOT area: 4.4 cm2  LA Volume (BP): 45.0 ml  LA Volume Index (BP): 20.7 ml/m2  RWT: 0.41           Doppler Measurements & Calculations  MV E max  yudith: 99.2 cm/sec  MV A max yudith: 107.6 cm/sec  MV E/A: 0.92  MV dec time: 0.15 sec  Ao V2 max: 88.8 cm/sec  Ao max PG: 3.0 mmHg  PA acc time: 0.16 sec  E/E' av.3  Lateral E/e': 17.0  Medial E/e': 21.7           _____________________________________________________________________________  __           Report approved by: Esteban Mayen 2020 09:31 AM        Medications     dextrose 5% and 0.45% NaCl + KCl 20 mEq/L Stopped (20 1128)     HEParin 650 Units/hr (20 1218)     - MEDICATION INSTRUCTIONS -       - MEDICATION INSTRUCTIONS -       sodium chloride 150 mL/hr at 20 1127       heparin (porcine)         lidocaine (PF)         nitroGLYcerin in D5W         aspirin  81 mg Oral Daily     atorvastatin  80 mg Oral Daily     donepezil  5 mg Oral At Bedtime     insulin aspart  1-7 Units Subcutaneous TID AC     insulin aspart  1-5 Units Subcutaneous At Bedtime     insulin detemir  16 Units Subcutaneous BID     losartan  25 mg Oral Daily     metoprolol tartrate  12.5 mg Oral BID     sodium chloride (PF)  3 mL Intracatheter Q8H     sodium chloride (PF)  3 mL Intracatheter Q8H

## 2020-09-02 NOTE — H&P
[facility]  Hospitalist Admission Note  Name: Matt Way    MRN: [id number]  YOB: 1950    Age: 69 year old  Date of admission: 9/1/2020  Primary care provider: Kingston Leal    Chief Complaint:  NSTEMI    Matt Way is a 69 year old male with PMH including coronary artery disease with prior drug-eluting stent to his LAD, insulin-dependent diabetes, GERD, FLAQUITA and remote history of PE in 2018 who presents with chest pain.  Notably, he was actually seen in the ER 3 days ago for anginal symptoms with a negative troponin.  It was recommended that he be admitted due to concern for unstable angina but he declined and ultimately discharged home.  He now presents due to intermittent left-sided chest pain which has been ongoing off and on since that ER visit.  He has some associated dyspnea whenever he exerts himself and symptoms generally resolve with rest.      Here in the ER he was hemodynamically stable.  EKG showed nonspecific ST changes but no STEMI.  He remains chest free at rest.  Lab work-up was notable for troponin of 0.307.  He was given a full dose aspirin and is being started on a heparin drip for suspected non-ST elevation MI and I am being asked to admit him for further care.    Assessment and Plan:   NSTEMI: As above.  Classic exertional anginal symptoms relieved by rest, elevated troponin and non-specific ST changes.  Currently pain free and stable.  --Admit under inpatient status  --Continue heparin drip for now, hold Xarelto  --Continue aspirin 81 mg daily  --Start metoprolol 12.5 mg twice daily  --Continue home losartan as well as atorvastatin 80 mg daily  --PRN nitroglycerin  --Check echocardiogram  --N.p.o. after 2 AM  --Cardiology consult, consider coronary angiogram  --Check echocardiogram  --Stat recheck EKG if chest pain returns in which case we would also give nitroglycerin and/or Dilaudid    2.   History of coronary artery disease with CECILY to LAD: In 2018.   Chronically on Xarelto, aspirin, atorvastatin, losartan.  I do not believe he is on a beta-blocker chronically.    3.   History of insulin-dependent diabetes: Currently with severe hyperglycemia to the 400s.    --Gave 10 units aspart in the ER  -- continue Levemir at a dose of 16 units twice daily and sliding scale.    --May need additional titration of insulin this evening if blood sugars do not improve as expected.    --We will hold metformin given the possibility of an angiogram.  --Check A1c.    4.  Memory loss: He endorses mild dementia and is chronically on Aricept.  His daughter was present during my interview so that she could help convey information to the rest of their family.    5.  GERD: Continue omeprazole.        DVT Prophylaxis: Currently on heparin drip  Code Status: Full Code  Discharge Dispo: Admit inpatient status      History of Present Illness:  Matt Way is a 69 year old male with PMH including coronary artery disease with prior drug-eluting stent to his LAD, insulin-dependent diabetes, GERD, FLAQUITA and remote history of PE in 2018 who presents with chest pain.  Notably, he was actually seen in the ER 3 days ago for anginal symptoms with a negative troponin.  It was recommended that he be admitted due to concern for unstable angina but he declined and ultimately discharged home.  He now presents due to intermittent left-sided chest pain which has been ongoing off and on since that ER visit.  He has some associated dyspnea whenever he exerts himself and symptoms generally resolve with rest.  No diaphoresis, fever, cough, shoulder or arm pain.    Here in the ER he was hemodynamically stable.  EKG showed nonspecific ST changes but no STEMI.  He remains chest free at rest.  Lab work-up was notable for troponin of 0.307.  He was given a full dose aspirin and is being started on a heparin drip for suspected non-ST elevation MI and I am being asked to admit him for further care.     Past Medical  History:  Past Medical History:   Diagnosis Date     Coronary artery disease 06/2018    s/p CECILY to LAD      Diabetes mellitus      GERD (gastroesophageal reflux disease)      Hyperlipidemia      Obesity      FLAQUITA (obstructive sleep apnea)      Pulmonary embolism 11/15/2018     Past Surgical History:  Past Surgical History:   Procedure Laterality Date     COLONOSCOPY       Coronary artery angiogram with stent (CECILY) to LAD  06/2018     SHOULDER SURGERY       VASECTOMY       Social History:  Social History     Tobacco Use     Smoking status: Never Smoker     Smokeless tobacco: Never Used   Substance Use Topics     Alcohol use: No     Social History     Social History Narrative     Not on file     Family History:  Family History   Problem Relation Age of Onset     Lupus Mother      Allergies:  Allergies   Allergen Reactions     Brilinta [Ticagrelor]      Duloxetine      Other reaction(s): Confusion  Per daughter who is MD     Gabapentin      Other reaction(s): Edema     Lisinopril Rash     Medications:  No current facility-administered medications on file prior to encounter.   aspirin 81 MG EC tablet, Take 1 tablet (81 mg) by mouth daily  atorvastatin (LIPITOR) 80 MG tablet, Take 1 tablet (80 mg) by mouth daily  donepezil (ARICEPT) 5 MG tablet, Take 5 mg by mouth At Bedtime  insulin aspart (NOVOLOG FLEXPEN) 100 UNIT/ML injection, Inject 16 Units Subcutaneous 2 times daily (with meals) Breakfast and Dinner  insulin detemir (LEVEMIR) 100 UNIT/ML injection, Inject 20 Units Subcutaneous 2 times daily   losartan (COZAAR) 25 MG tablet, Take 1 tablet (25 mg) by mouth daily  metFORMIN (GLUCOPHAGE) 1000 MG tablet, Take 1,000 mg by mouth 2 times daily (with meals)  nitroGLYcerin (NITROSTAT) 0.4 MG sublingual tablet, Place 1 tablet (0.4 mg) under the tongue every 5 minutes as needed for chest pain If symptoms persist 5 minutes after 1st dose call 911.  omeprazole (PRILOSEC OTC) 20 MG EC tablet, Take 20 mg by mouth  daily  rivaroxaban ANTICOAGULANT (XARELTO) 20 MG TABS tablet, Take 1 tablet (20 mg) by mouth daily (with dinner)        Review of Systems:  A Comprehensive greater than 10 system review of systems was carried out.  Pertinent positives and negatives are noted above.  Otherwise negative for contributory information.     Physical Exam:  Blood pressure 105/88, pulse 102, temperature 97.4  F (36.3  C), temperature source Temporal, resp. rate 16, weight 93 kg (205 lb 0.4 oz), SpO2 97 %.  Wt Readings from Last 1 Encounters:   09/01/20 93 kg (205 lb 0.4 oz)     Exam:  General: Alert, awake, no acute distress.  HEENT: NC/AT, eyes anicteric, external occular movements intact, face symmetric.    Cardiac: RRR, S1, S2.  No murmurs appreciated.  Pulmonary: Normal chest rise, normal work of breathing.  Lungs CTA BL  Abdomen: soft, non-tender, non-distended.  Bowel Sounds Present.  No guarding.  Extremities: no deformities.  Warm, well perfused.  Skin: no rashes or lesions noted.  Warm and Dry.  Neuro: No focal deficits noted.  Speech clear.  Coordination and strength grossly normal.  Psych: Appropriate affect.    Data:  EKG: Sinus rhythm, no significant ST elevations or depressions.  Imaging:  Recent Results (from the past 48 hour(s))   XR Chest 2 Views    Narrative    EXAM: XR CHEST 2 VW  LOCATION: [facility]  DATE/TIME: 9/1/2020 8:08 PM    INDICATION: Chest pain with exertion  COMPARISON: 04/08/2019      Impression    IMPRESSION: No acute change. Heart and central vessels unremarkable. Lungs clear.     Labs:  Recent Labs   Lab 09/01/20  1926 08/28/20  2215   WBC 8.0 7.3   HGB 15.3 14.6   HCT 48.1 45.0   MCV 87 86    203          Lab Results   Component Value Date     09/01/2020     08/28/2020     07/11/2019    Lab Results   Component Value Date    CHLORIDE 101 09/01/2020    CHLORIDE 101 08/28/2020    CHLORIDE 106 07/11/2019    Lab Results   Component Value Date    BUN 21 09/01/2020    BUN  22 08/28/2020    BUN 31 07/11/2019      Lab Results   Component Value Date    POTASSIUM 4.7 09/01/2020    POTASSIUM 4.2 08/28/2020    POTASSIUM 4.3 07/11/2019    Lab Results   Component Value Date    CO2 24 09/01/2020    CO2 25 08/28/2020    CO2 23 07/11/2019    Lab Results   Component Value Date    CR 0.91 09/01/2020    CR 1.02 08/28/2020    CR 1.07 07/11/2019        Recent Labs   Lab 09/01/20  1926 08/28/20  2215   TROPI 0.307* <0.015         Keith Ceballos MD  Hospitalist  [facility]

## 2020-09-02 NOTE — ED PROVIDER NOTES
History     Chief Complaint:  Chest Pain       The history is provided by the patient.     Matt Way is a 69 year old year old male with a history of dementia, PE on Xarelto, IDDM, and HTN who presents for evaluation of chest pain. He was seen in this ER 08/28/20 for chest pain. It was recommended he be admitted for unstable angina but he instead left AMA. Since that visit he has had continued sharp left sided chest pain with exertion, even walking to his kitchen. The pain resolves with a couple minutes of rest. When the pain is present he also feels short of breath but neither of these are present currently at rest. He denies radiation of pain when present. He denies recent illness such as fever, cough, nausea, or emesis. He is tearful about who will care for his wife while he is in the hospital, but agrees to stay this time.     Lab Results from 08/28:  Troponin: (Collected 2215) <0.015  CBC: WBC 7.3, HGB 14.6,   CMP: Glucose 414 (H), Albumin 3.3 (L), Protein total 6.6 (L) (Creatinine 1.02) o/w WNL     Allergies:  Brilinta  Duloxetine  Gabapentin  Lisinopril     Medications:   Aspirin 81 mg  Lipitor  Aricept  Novolog  Levemir  Cozaar  Metformin  Nitrostat  Prilosec  Xarelto    Medical History:   Coronary artery disease  Diabetes mellitus  GERD  Hyperlipidemia  Obesity  Obstructive sleep apnea  Pulmonary embolism  NSTEMI  Lewy body dementia    Surgical History   Colonoscopy  Coronary artery angiogram with stent (CECILY) to LAD  Shoulder surgery  Vasectomy    Family History:   Lupus  Diabetes  Colon cancer  Heart disease    Social History:  Smoking Status: Negative   Smokeless Tobacco: Negative   Alcohol Use: Negative   Drug Use: Negative   Primary Physician: Kingston Leal  Presents alone with daughter later at bedside.     Review of Systems   Constitutional: Negative for fever.   Respiratory: Positive for shortness of breath (with exertion). Negative for cough.    Cardiovascular: Positive for chest  pain (with exertion).   Gastrointestinal: Negative for nausea and vomiting.   Musculoskeletal: Negative for arthralgias.   All other systems reviewed and are negative.    Physical Exam     Patient Vitals for the past 24 hrs:   BP Temp Temp src Pulse Resp SpO2 Weight   09/01/20 2200 129/76 -- -- 84 25 97 % --   09/01/20 2145 -- -- -- 82 25 97 % --   09/01/20 2130 122/70 -- -- 81 23 96 % --   09/01/20 2115 -- -- -- 81 22 97 % --   09/01/20 2100 125/75 -- -- 82 19 97 % --   09/01/20 2047 -- 97.4  F (36.3  C) Temporal -- -- -- --   09/01/20 2045 -- -- -- -- -- -- 93 kg (205 lb 0.4 oz)   09/01/20 1930 -- 98  F (36.7  C) Oral -- -- -- --   09/01/20 1926 105/88 -- Oral 102 16 97 % --        Physical Exam  General: Well-developed and well-nourished. Well appearing elderly  man. Cooperative.  Head:  Atraumatic.  Eyes:  Conjunctivae, lids, and sclerae are normal.  Neck:  Supple. Normal range of motion.  CV:  Regular rate and rhythm. Normal heart sounds with no murmurs, rubs, or gallops detected.  Resp:  No respiratory distress. Clear to auscultation bilaterally without decreased breath sounds, wheezing, rales, or rhonchi.  GI:  Soft. Non-distended. Non-tender.    MS:  Normal ROM. No bilateral lower extremity edema.  Skin:  Warm. Non-diaphoretic. No pallor.  Neuro:  Awake and alert. Normal strength.  Psych: Normal mood and affect. Normal speech.  Vitals reviewed.    Emergency Department Course     EKG  Indication: Chest Pain  Time: 1938  Rate 99 bpm. NJ interval 170. QRS duration 76. QT/QTc 344/441.   Normal sinus rhythm  Left axis deviation  Inferior infarct, age undetermined  Anterolateral infarct, age undetermined  Abnormal ECG   No acute ST changes.  No significant change as compared to prior, dated 08/28/20.    Imaging:  Radiology results were communicated with the patient who voiced understanding of the findings.    XR Chest 2 Views  No acute change. Heart and central vessels unremarkable. Lungs  clear.    Reading per radiology     Laboratory:  Laboratory findings were communicated with the patient who voiced understanding of the findings.    COVID-19 Virus (Coronavirus), PCR NP Swab: pending      Troponin (Collected 1926): 0.307    CBC: WBC 8.0, HGB 15.3,   BMP:  (L), Glucose 459 (H) (Creatinine 0.91) o/w WNL     Interventions:   2045 Aspirin 324 mg PO  2201 Heparin 12 units/kg/hr IV  2202 Novolog 10 units Subcutaneous    Emergency Department Course:    1925 Nursing notes and vitals reviewed.    1928 I performed an exam of the patient as documented above.     1938 EKG obtained as noted above.    1926 IV was inserted and blood was drawn for laboratory testing, results above.    2008 The patient was sent for XR while in the emergency department, results above.     2039 I updated Matt and his daughter.    2051 A nares sample was obtained for laboratory testing as documented above.    2115 I consulted with Dr. Ceballos of the hospitalist service. He is in agreement to accept the patient for admission. Findings and plan explained to Mtat who consents to admission. Discussed the patient with Dr. Ceballos who will admit the patient to a telemetry bed for further monitoring, evaluation, and treatment.    Impression & Plan   Medical Decision Making:  Matt is a 69-year-old man who was seen in this emergency department 4 days ago for chest pain.  It was recommended he be admitted but he left AGAINST MEDICAL ADVICE.  He is returning because he continues to have chest pain.  He describes this pain as left-sided sharp pain that occurs anytime he exerts himself including just walking into his kitchen.  The pain will resolve after a few minutes when he rests.  He has shortness of breath when he has chest pain but not at rest.  He is on Xarelto for history of pulmonary embolism.  This essentially rules out VTE as the source for his pain.  EKG is reassuring without acute ST changes or arrhythmias with  possible mild elevations anteriorly without reciprocal changes and not meeting criteria for STEMI.  However, troponin is elevated at 0.307.  Matt was pain-free while under my care but he was given aspirin.  I am very concerned about unstable angina and ACS as the cause for his troponin elevation and have initiated heparin.  The remainder of his laboratory studies are significant for hyperglycemia to 459 with associated pseudohyponatremia.  Chest x-ray reveals no alternate cause for his pain such as pneumonia or pneumothorax.  I discussed with Matt the need for admission for NSTEMI for further cardiac evaluation and he is agreeable to admission this time.  All of his questions were answered and he verbalized understanding.  I discussed the patient's case with Dr. Ceballos, hospitalist, who accepts admission and has no further orders.    Covid-19  Matt Way was evaluated during a global COVID-19 pandemic, which necessitated consideration that the patient might be at risk for infection with the SARS-CoV-2 virus that causes COVID-19.   Applicable protocols for evaluation were followed during the patient's care.   COVID-19 was considered as part of the patient's evaluation. The plan for testing is:  a test was obtained during this visit.    Diagnosis:     ICD-10-CM    1. NSTEMI (non-ST elevated myocardial infarction) (H)  I21.4    2. Type 2 diabetes mellitus with hyperglycemia, with long-term current use of insulin (H)  E11.65     Z79.4         Disposition:  Admitted to Dr. Ceballos.    Scribe Disclosure:  I, Gracie Marie, am serving as a scribe at 7:27 PM on 9/1/2020 to document services personally performed by Rosalie Chan MD based on my observations and the provider's statements to me.      Rosalie Chan MD  09/01/20 2669

## 2020-09-02 NOTE — PROGRESS NOTES
Pt pain free today   covid test pend  Per policy cath on hold'  Will reschedule tomorrow after results  Pt aware I spoke to him and dr enriquez and RN

## 2020-09-02 NOTE — PLAN OF CARE
A&OX4, forgetfull, Pt. stated that he has mild dementia and taking Aricept. Up with SBA. VSS except BP elevated, scheduled BP meds given. 94% RA. Lungs sounds clear. No c/o CP or SOB. Tele: SR. Pt. Is NPO since 0200AM, , Novolog 4units and Levemir 16units given @ 0100am. Skin intact. GI/ WNL. IVF infusing 75ml/hr to R. forearm PIV and Heparin infusing 6.5ml/hr to L. forearm PIV. Last troponin 2.310.  Pt is on K and Mg protocol.     Treatmant plan: Continue heparin drip, hold Xarelto, PRN nitroglycerin, N.p.o. after 2 AM, continue Levemir at a dose of 16 units twice daily and sliding scale per MD note. Cardiology consult, consider coronary angiogram.  Plan to have a Echo today.

## 2020-09-03 ENCOUNTER — SURGERY (OUTPATIENT)
Age: 70
End: 2020-09-03
Payer: MEDICARE

## 2020-09-03 LAB
APTT PPP: 44 SEC (ref 22–37)
CATH EF QUANTITATIVE: 40 %
CHOLEST SERPL-MCNC: 137 MG/DL
GLUCOSE BLDC GLUCOMTR-MCNC: 182 MG/DL (ref 70–99)
GLUCOSE BLDC GLUCOMTR-MCNC: 194 MG/DL (ref 70–99)
GLUCOSE BLDC GLUCOMTR-MCNC: 225 MG/DL (ref 70–99)
GLUCOSE BLDC GLUCOMTR-MCNC: 237 MG/DL (ref 70–99)
HDLC SERPL-MCNC: 32 MG/DL
KCT BLD-ACNC: 297 SEC (ref 75–150)
LDLC SERPL CALC-MCNC: 56 MG/DL
NONHDLC SERPL-MCNC: 105 MG/DL
POTASSIUM SERPL-SCNC: 3.8 MMOL/L (ref 3.4–5.3)
TRIGL SERPL-MCNC: 246 MG/DL

## 2020-09-03 PROCEDURE — 93458 L HRT ARTERY/VENTRICLE ANGIO: CPT | Mod: 26 | Performed by: INTERNAL MEDICINE

## 2020-09-03 PROCEDURE — 85730 THROMBOPLASTIN TIME PARTIAL: CPT | Performed by: INTERNAL MEDICINE

## 2020-09-03 PROCEDURE — 36415 COLL VENOUS BLD VENIPUNCTURE: CPT | Performed by: INTERNAL MEDICINE

## 2020-09-03 PROCEDURE — 25000132 ZZH RX MED GY IP 250 OP 250 PS 637: Mod: GY | Performed by: INTERNAL MEDICINE

## 2020-09-03 PROCEDURE — C1725 CATH, TRANSLUMIN NON-LASER: HCPCS | Performed by: INTERNAL MEDICINE

## 2020-09-03 PROCEDURE — 27210794 ZZH OR GENERAL SUPPLY STERILE: Performed by: INTERNAL MEDICINE

## 2020-09-03 PROCEDURE — 99232 SBSQ HOSP IP/OBS MODERATE 35: CPT | Mod: 25 | Performed by: INTERNAL MEDICINE

## 2020-09-03 PROCEDURE — 93005 ELECTROCARDIOGRAM TRACING: CPT

## 2020-09-03 PROCEDURE — 25000128 H RX IP 250 OP 636: Performed by: INTERNAL MEDICINE

## 2020-09-03 PROCEDURE — 93458 L HRT ARTERY/VENTRICLE ANGIO: CPT | Performed by: INTERNAL MEDICINE

## 2020-09-03 PROCEDURE — 25800030 ZZH RX IP 258 OP 636: Performed by: INTERNAL MEDICINE

## 2020-09-03 PROCEDURE — 25000125 ZZHC RX 250: Performed by: INTERNAL MEDICINE

## 2020-09-03 PROCEDURE — C1887 CATHETER, GUIDING: HCPCS | Performed by: INTERNAL MEDICINE

## 2020-09-03 PROCEDURE — 92941 PRQ TRLML REVSC TOT OCCL AMI: CPT | Mod: LD | Performed by: INTERNAL MEDICINE

## 2020-09-03 PROCEDURE — C1894 INTRO/SHEATH, NON-LASER: HCPCS | Performed by: INTERNAL MEDICINE

## 2020-09-03 PROCEDURE — B2111ZZ FLUOROSCOPY OF MULTIPLE CORONARY ARTERIES USING LOW OSMOLAR CONTRAST: ICD-10-PCS | Performed by: INTERNAL MEDICINE

## 2020-09-03 PROCEDURE — 93571 IV DOP VEL&/PRESS C FLO 1ST: CPT | Mod: 52 | Performed by: INTERNAL MEDICINE

## 2020-09-03 PROCEDURE — 80061 LIPID PANEL: CPT | Performed by: INTERNAL MEDICINE

## 2020-09-03 PROCEDURE — 99233 SBSQ HOSP IP/OBS HIGH 50: CPT | Performed by: INTERNAL MEDICINE

## 2020-09-03 PROCEDURE — 4A023N7 MEASUREMENT OF CARDIAC SAMPLING AND PRESSURE, LEFT HEART, PERCUTANEOUS APPROACH: ICD-10-PCS | Performed by: INTERNAL MEDICINE

## 2020-09-03 PROCEDURE — 85347 COAGULATION TIME ACTIVATED: CPT

## 2020-09-03 PROCEDURE — 00000146 ZZHCL STATISTIC GLUCOSE BY METER IP

## 2020-09-03 PROCEDURE — 99152 MOD SED SAME PHYS/QHP 5/>YRS: CPT | Performed by: INTERNAL MEDICINE

## 2020-09-03 PROCEDURE — 4A0335C MEASUREMENT OF ARTERIAL FLOW, CORONARY, PERCUTANEOUS APPROACH: ICD-10-PCS | Performed by: INTERNAL MEDICINE

## 2020-09-03 PROCEDURE — C1874 STENT, COATED/COV W/DEL SYS: HCPCS | Performed by: INTERNAL MEDICINE

## 2020-09-03 PROCEDURE — 99153 MOD SED SAME PHYS/QHP EA: CPT | Performed by: INTERNAL MEDICINE

## 2020-09-03 PROCEDURE — 84132 ASSAY OF SERUM POTASSIUM: CPT | Performed by: INTERNAL MEDICINE

## 2020-09-03 PROCEDURE — 12000000 ZZH R&B MED SURG/OB

## 2020-09-03 PROCEDURE — 93571 IV DOP VEL&/PRESS C FLO 1ST: CPT | Mod: 26 | Performed by: INTERNAL MEDICINE

## 2020-09-03 PROCEDURE — C1769 GUIDE WIRE: HCPCS | Performed by: INTERNAL MEDICINE

## 2020-09-03 PROCEDURE — 99291 CRITICAL CARE FIRST HOUR: CPT | Performed by: INTERNAL MEDICINE

## 2020-09-03 PROCEDURE — 99152 MOD SED SAME PHYS/QHP 5/>YRS: CPT | Mod: 59 | Performed by: INTERNAL MEDICINE

## 2020-09-03 PROCEDURE — C9600 PERC DRUG-EL COR STENT SING: HCPCS | Mod: LD | Performed by: INTERNAL MEDICINE

## 2020-09-03 PROCEDURE — 93010 ELECTROCARDIOGRAM REPORT: CPT | Performed by: INTERNAL MEDICINE

## 2020-09-03 PROCEDURE — B2151ZZ FLUOROSCOPY OF LEFT HEART USING LOW OSMOLAR CONTRAST: ICD-10-PCS | Performed by: INTERNAL MEDICINE

## 2020-09-03 PROCEDURE — 027034Z DILATION OF CORONARY ARTERY, ONE ARTERY WITH DRUG-ELUTING INTRALUMINAL DEVICE, PERCUTANEOUS APPROACH: ICD-10-PCS | Performed by: INTERNAL MEDICINE

## 2020-09-03 DEVICE — STENT RESOLUTE ONYX DE 2.7FR 3.00X15MM RONYX DE30015UX: Type: IMPLANTABLE DEVICE | Status: FUNCTIONAL

## 2020-09-03 RX ORDER — HEPARIN SODIUM 1000 [USP'U]/ML
INJECTION, SOLUTION INTRAVENOUS; SUBCUTANEOUS
Status: DISCONTINUED | OUTPATIENT
Start: 2020-09-03 | End: 2020-09-03 | Stop reason: HOSPADM

## 2020-09-03 RX ORDER — FENTANYL CITRATE 50 UG/ML
INJECTION, SOLUTION INTRAMUSCULAR; INTRAVENOUS
Status: DISCONTINUED | OUTPATIENT
Start: 2020-09-03 | End: 2020-09-03 | Stop reason: HOSPADM

## 2020-09-03 RX ORDER — NITROGLYCERIN 5 MG/ML
VIAL (ML) INTRAVENOUS
Status: DISCONTINUED | OUTPATIENT
Start: 2020-09-03 | End: 2020-09-03 | Stop reason: HOSPADM

## 2020-09-03 RX ORDER — CLOPIDOGREL 300 MG/1
TABLET, FILM COATED ORAL
Status: DISCONTINUED
Start: 2020-09-03 | End: 2020-09-03 | Stop reason: HOSPADM

## 2020-09-03 RX ORDER — VERAPAMIL HYDROCHLORIDE 2.5 MG/ML
INJECTION, SOLUTION INTRAVENOUS
Status: DISCONTINUED
Start: 2020-09-03 | End: 2020-09-03 | Stop reason: HOSPADM

## 2020-09-03 RX ORDER — POTASSIUM CL/LIDO/0.9 % NACL 10MEQ/0.1L
10 INTRAVENOUS SOLUTION, PIGGYBACK (ML) INTRAVENOUS
Status: DISCONTINUED | OUTPATIENT
Start: 2020-09-03 | End: 2020-09-04 | Stop reason: HOSPADM

## 2020-09-03 RX ORDER — VERAPAMIL HYDROCHLORIDE 2.5 MG/ML
INJECTION, SOLUTION INTRAVENOUS
Status: DISCONTINUED | OUTPATIENT
Start: 2020-09-03 | End: 2020-09-03 | Stop reason: HOSPADM

## 2020-09-03 RX ORDER — POTASSIUM CHLORIDE 1500 MG/1
20-40 TABLET, EXTENDED RELEASE ORAL
Status: DISCONTINUED | OUTPATIENT
Start: 2020-09-03 | End: 2020-09-04 | Stop reason: HOSPADM

## 2020-09-03 RX ORDER — ASPIRIN 81 MG/1
81 TABLET, CHEWABLE ORAL ONCE
Status: DISCONTINUED | OUTPATIENT
Start: 2020-09-03 | End: 2020-09-03 | Stop reason: CLARIF

## 2020-09-03 RX ORDER — CLOPIDOGREL BISULFATE 75 MG/1
75 TABLET ORAL DAILY
Status: DISCONTINUED | OUTPATIENT
Start: 2020-09-04 | End: 2020-09-04 | Stop reason: HOSPADM

## 2020-09-03 RX ORDER — SODIUM CHLORIDE 9 MG/ML
INJECTION, SOLUTION INTRAVENOUS CONTINUOUS
Status: ACTIVE | OUTPATIENT
Start: 2020-09-03 | End: 2020-09-03

## 2020-09-03 RX ORDER — NITROGLYCERIN 0.4 MG/1
0.4 TABLET SUBLINGUAL EVERY 5 MIN PRN
Status: DISCONTINUED | OUTPATIENT
Start: 2020-09-03 | End: 2020-09-04 | Stop reason: HOSPADM

## 2020-09-03 RX ORDER — NITROGLYCERIN 5 MG/ML
VIAL (ML) INTRAVENOUS
Status: DISCONTINUED
Start: 2020-09-03 | End: 2020-09-03 | Stop reason: HOSPADM

## 2020-09-03 RX ORDER — CLOPIDOGREL BISULFATE 75 MG/1
TABLET ORAL
Status: DISCONTINUED | OUTPATIENT
Start: 2020-09-03 | End: 2020-09-03 | Stop reason: HOSPADM

## 2020-09-03 RX ORDER — HEPARIN SODIUM 1000 [USP'U]/ML
INJECTION, SOLUTION INTRAVENOUS; SUBCUTANEOUS
Status: DISCONTINUED
Start: 2020-09-03 | End: 2020-09-03 | Stop reason: HOSPADM

## 2020-09-03 RX ORDER — POTASSIUM CHLORIDE 1.5 G/1.58G
20-40 POWDER, FOR SOLUTION ORAL
Status: DISCONTINUED | OUTPATIENT
Start: 2020-09-03 | End: 2020-09-04 | Stop reason: HOSPADM

## 2020-09-03 RX ORDER — ONDANSETRON 4 MG/1
4 TABLET, ORALLY DISINTEGRATING ORAL EVERY 6 HOURS PRN
Status: DISCONTINUED | OUTPATIENT
Start: 2020-09-03 | End: 2020-09-03

## 2020-09-03 RX ORDER — METOPROLOL TARTRATE 1 MG/ML
5-10 INJECTION, SOLUTION INTRAVENOUS
Status: DISCONTINUED | OUTPATIENT
Start: 2020-09-03 | End: 2020-09-04 | Stop reason: HOSPADM

## 2020-09-03 RX ORDER — FLUMAZENIL 0.1 MG/ML
0.2 INJECTION, SOLUTION INTRAVENOUS
Status: ACTIVE | OUTPATIENT
Start: 2020-09-03 | End: 2020-09-03

## 2020-09-03 RX ORDER — NALOXONE HYDROCHLORIDE 0.4 MG/ML
.2-.4 INJECTION, SOLUTION INTRAMUSCULAR; INTRAVENOUS; SUBCUTANEOUS
Status: ACTIVE | OUTPATIENT
Start: 2020-09-03 | End: 2020-09-03

## 2020-09-03 RX ORDER — ATROPINE SULFATE 0.1 MG/ML
0.5 INJECTION INTRAVENOUS
Status: ACTIVE | OUTPATIENT
Start: 2020-09-03 | End: 2020-09-03

## 2020-09-03 RX ORDER — OXYCODONE HYDROCHLORIDE 5 MG/1
5 TABLET ORAL EVERY 4 HOURS PRN
Status: DISCONTINUED | OUTPATIENT
Start: 2020-09-03 | End: 2020-09-04 | Stop reason: HOSPADM

## 2020-09-03 RX ORDER — HEPARIN SODIUM 1000 [USP'U]/ML
INJECTION, SOLUTION INTRAVENOUS; SUBCUTANEOUS
Status: DISCONTINUED
Start: 2020-09-03 | End: 2020-09-03 | Stop reason: WASHOUT

## 2020-09-03 RX ORDER — POTASSIUM CHLORIDE 29.8 MG/ML
20 INJECTION INTRAVENOUS
Status: DISCONTINUED | OUTPATIENT
Start: 2020-09-03 | End: 2020-09-04 | Stop reason: HOSPADM

## 2020-09-03 RX ORDER — LORAZEPAM 2 MG/ML
1 INJECTION INTRAMUSCULAR ONCE
Status: DISCONTINUED | OUTPATIENT
Start: 2020-09-04 | End: 2020-09-03

## 2020-09-03 RX ORDER — FENTANYL CITRATE 50 UG/ML
INJECTION, SOLUTION INTRAMUSCULAR; INTRAVENOUS
Status: DISCONTINUED
Start: 2020-09-03 | End: 2020-09-03 | Stop reason: HOSPADM

## 2020-09-03 RX ORDER — HEPARIN SODIUM 10000 [USP'U]/100ML
800 INJECTION, SOLUTION INTRAVENOUS CONTINUOUS
Status: DISCONTINUED | OUTPATIENT
Start: 2020-09-03 | End: 2020-09-03

## 2020-09-03 RX ORDER — ASPIRIN 81 MG/1
81 TABLET ORAL DAILY
Status: DISCONTINUED | OUTPATIENT
Start: 2020-09-04 | End: 2020-09-04 | Stop reason: HOSPADM

## 2020-09-03 RX ORDER — HALOPERIDOL 5 MG/ML
2 INJECTION INTRAMUSCULAR EVERY 6 HOURS PRN
Status: DISCONTINUED | OUTPATIENT
Start: 2020-09-03 | End: 2020-09-04 | Stop reason: HOSPADM

## 2020-09-03 RX ORDER — LORAZEPAM 2 MG/ML
0.5 INJECTION INTRAMUSCULAR ONCE
Status: COMPLETED | OUTPATIENT
Start: 2020-09-04 | End: 2020-09-04

## 2020-09-03 RX ORDER — POTASSIUM CHLORIDE 7.45 MG/ML
10 INJECTION INTRAVENOUS
Status: DISCONTINUED | OUTPATIENT
Start: 2020-09-03 | End: 2020-09-04 | Stop reason: HOSPADM

## 2020-09-03 RX ORDER — HYDRALAZINE HYDROCHLORIDE 20 MG/ML
10 INJECTION INTRAMUSCULAR; INTRAVENOUS EVERY 4 HOURS PRN
Status: DISCONTINUED | OUTPATIENT
Start: 2020-09-03 | End: 2020-09-04 | Stop reason: HOSPADM

## 2020-09-03 RX ORDER — ACETAMINOPHEN 325 MG/1
650 TABLET ORAL EVERY 4 HOURS PRN
Status: DISCONTINUED | OUTPATIENT
Start: 2020-09-03 | End: 2020-09-04 | Stop reason: HOSPADM

## 2020-09-03 RX ORDER — ONDANSETRON 2 MG/ML
4 INJECTION INTRAMUSCULAR; INTRAVENOUS EVERY 6 HOURS PRN
Status: DISCONTINUED | OUTPATIENT
Start: 2020-09-03 | End: 2020-09-03

## 2020-09-03 RX ORDER — IOPAMIDOL 755 MG/ML
INJECTION, SOLUTION INTRAVASCULAR
Status: DISCONTINUED | OUTPATIENT
Start: 2020-09-03 | End: 2020-09-03 | Stop reason: HOSPADM

## 2020-09-03 RX ORDER — FENTANYL CITRATE 50 UG/ML
25-50 INJECTION, SOLUTION INTRAMUSCULAR; INTRAVENOUS
Status: ACTIVE | OUTPATIENT
Start: 2020-09-03 | End: 2020-09-03

## 2020-09-03 RX ORDER — HALOPERIDOL 5 MG/ML
2 INJECTION INTRAMUSCULAR ONCE
Status: COMPLETED | OUTPATIENT
Start: 2020-09-04 | End: 2020-09-04

## 2020-09-03 RX ORDER — DONEPEZIL HYDROCHLORIDE 5 MG/1
5 TABLET, FILM COATED ORAL EVERY MORNING
Status: DISCONTINUED | OUTPATIENT
Start: 2020-09-03 | End: 2020-09-03

## 2020-09-03 RX ORDER — OXYCODONE HYDROCHLORIDE 5 MG/1
10 TABLET ORAL EVERY 4 HOURS PRN
Status: DISCONTINUED | OUTPATIENT
Start: 2020-09-03 | End: 2020-09-04 | Stop reason: HOSPADM

## 2020-09-03 RX ORDER — LIDOCAINE HYDROCHLORIDE 10 MG/ML
INJECTION, SOLUTION EPIDURAL; INFILTRATION; INTRACAUDAL; PERINEURAL
Status: DISCONTINUED
Start: 2020-09-03 | End: 2020-09-03 | Stop reason: HOSPADM

## 2020-09-03 RX ADMIN — NITROGLYCERIN 200 MCG: 5 INJECTION, SOLUTION INTRAVENOUS at 12:55

## 2020-09-03 RX ADMIN — Medication 12.5 MG: at 20:31

## 2020-09-03 RX ADMIN — IOPAMIDOL 175 ML: 755 INJECTION, SOLUTION INTRAVENOUS at 13:30

## 2020-09-03 RX ADMIN — Medication 12.5 MG: at 09:46

## 2020-09-03 RX ADMIN — LIDOCAINE HYDROCHLORIDE 1 ML: 10 INJECTION, SOLUTION INFILTRATION; PERINEURAL at 12:42

## 2020-09-03 RX ADMIN — FENTANYL CITRATE 50 MCG: 50 INJECTION, SOLUTION INTRAMUSCULAR; INTRAVENOUS at 12:42

## 2020-09-03 RX ADMIN — HEPARIN SODIUM 650 UNITS/HR: 10000 INJECTION, SOLUTION INTRAVENOUS at 02:35

## 2020-09-03 RX ADMIN — VERAPAMIL HYDROCHLORIDE 2.5 MG: 2.5 INJECTION, SOLUTION INTRAVENOUS at 12:42

## 2020-09-03 RX ADMIN — ATORVASTATIN CALCIUM 80 MG: 40 TABLET, FILM COATED ORAL at 20:31

## 2020-09-03 RX ADMIN — LORAZEPAM 0.5 MG: 0.5 TABLET ORAL at 09:45

## 2020-09-03 RX ADMIN — MIDAZOLAM 1 MG: 1 INJECTION INTRAMUSCULAR; INTRAVENOUS at 12:42

## 2020-09-03 RX ADMIN — Medication 2500 UNITS: at 08:13

## 2020-09-03 RX ADMIN — LOSARTAN POTASSIUM 25 MG: 25 TABLET ORAL at 09:46

## 2020-09-03 RX ADMIN — NITROGLYCERIN 200 MCG: 5 INJECTION, SOLUTION INTRAVENOUS at 12:43

## 2020-09-03 RX ADMIN — SODIUM CHLORIDE: 9 INJECTION, SOLUTION INTRAVENOUS at 09:34

## 2020-09-03 RX ADMIN — CLOPIDOGREL BISULFATE 600 MG: 75 TABLET ORAL at 12:55

## 2020-09-03 RX ADMIN — POTASSIUM CHLORIDE 20 MEQ: 1500 TABLET, EXTENDED RELEASE ORAL at 09:45

## 2020-09-03 RX ADMIN — HEPARIN SODIUM 2000 UNITS: 1000 INJECTION INTRAVENOUS; SUBCUTANEOUS at 12:55

## 2020-09-03 RX ADMIN — SODIUM CHLORIDE: 9 INJECTION, SOLUTION INTRAVENOUS at 14:10

## 2020-09-03 RX ADMIN — HEPARIN SODIUM 5000 UNITS: 1000 INJECTION INTRAVENOUS; SUBCUTANEOUS at 12:46

## 2020-09-03 RX ADMIN — ASPIRIN 81 MG: 81 TABLET, COATED ORAL at 09:45

## 2020-09-03 RX ADMIN — HEPARIN SODIUM 800 UNITS/HR: 10000 INJECTION, SOLUTION INTRAVENOUS at 08:14

## 2020-09-03 RX ADMIN — INSULIN DETEMIR 16 UNITS: 100 INJECTION, SOLUTION SUBCUTANEOUS at 20:32

## 2020-09-03 RX ADMIN — NITROGLYCERIN 150 MCG: 5 INJECTION, SOLUTION INTRAVENOUS at 13:10

## 2020-09-03 ASSESSMENT — ACTIVITIES OF DAILY LIVING (ADL)
ADLS_ACUITY_SCORE: 13
ADLS_ACUITY_SCORE: 15
ADLS_ACUITY_SCORE: 11

## 2020-09-03 ASSESSMENT — MIFFLIN-ST. JEOR: SCORE: 1754.22

## 2020-09-03 NOTE — PLAN OF CARE
End of Shift Note:  For VS and complete assessments, please see documentation flowsheets.     Pertinent shift assessments: VSS. Denies pain. Pt disoriented to place and situation early in shift. Pt has mild dementia. Pt talked with wife on phone and confusion resolved. NPO at midnight. SBA. Tele: SR. Heparin gtt at 6.5 ml/hr. BG at 0200 194.    Treatment Plan: Plan for angiogram today. Will continue with plan of care.

## 2020-09-03 NOTE — PROGRESS NOTES
[facility]    Medicine Progress Note - Hospitalist Service       Date of Admission:  9/1/2020  Date of Service: 09/03/2020    Assessment & Plan     69 year old male with PMH including coronary artery disease with prior drug-eluting stent to his LAD, insulin-dependent diabetes, GERD, FLAQUITA and remote history of PE in 2018 who presents with chest pain.  Notably, he was actually seen in the ER 3 days ago for anginal symptoms with a negative troponin.  It was recommended that he be admitted due to concern for unstable angina but he declined and ultimately discharged home.  He now presents due to intermittent left-sided chest pain which has been ongoing off and on since that ER visit.  He has some associated dyspnea whenever he exerts himself and symptoms generally resolve with rest.       Here in the ER he was hemodynamically stable.  EKG showed nonspecific ST changes.  He remains chest free at rest.  Lab work-up was notable for troponin of 0.307.  He was given a full dose aspirin and started on a heparin drip for suspected non-ST elevation MI     Assessment and Plan:   Acute anterior NSTEMI: As above.  Classic exertional anginal symptoms relieved by rest, elevated troponin and non-specific ST changes.  Troponin up to 4.2. Currently pain free and stable.  --Inpatient status  --Resume Xarelto tomorrow  --Continue aspirin 81 mg daily  --Plavix 75 mg daily  --Start metoprolol 12.5 mg twice daily  --Continue home losartan as well as atorvastatin 80 mg daily  --PRN nitroglycerin  --Echocardiogram showing EF of 40 to 45% with new wall motion normalities in the anterior and apical territory  --N.p.o.   --Cardiology consult, coronary angiogram planned today, could not be done yesterday due to pending covid-19 test, returned negative.  Will defer decision regarding triple anticoagulation to cardiology    Update: underwent cath with stenting of a subtotal restenotic ostial and proximal LAD stenosis which was culprit.  "Plan for staged RCA intervention. Per cards \"Restart Xarelto tomorrow.  Plavix 75 mg daily for at least 1 year.  Aspirin 81 mg daily discontinue after staged intervention of the right coronary artery.\"       2.   History of coronary artery disease with CECILY to LAD: In 2018.  Chronically on Xarelto, aspirin, atorvastatin, losartan.  I do not believe he is on a beta-blocker chronically.     3.   History of insulin-dependent diabetes: with severe hyperglycemia to the 400s on admission. Gave 10 units aspart in the ER  -- Levemir.  Resume at 16 unit twice daily  --May need additional titration of insulin this evening if blood sugars do not improve as expected.    --We will hold metformin given angiogram.     4.  Memory loss: He endorses mild dementia and is chronically on Aricept.  he does help with cares of his wife who has quadriplegia     5.  GERD: Continue omeprazole.    6.  History of pulmonary embolism.  Was on heparin drip for NSTEMI.  Xarelto to be restarted tomorrow.    Diet: Low Saturated Fat Na <2400 mg    DVT Prophylaxis: Heparin   Schulte Catheter: not present  Code Status: Full Code      Disposition Plan   Expected discharge: Tomorrow, recommended to prior living arrangement pending recovery from cath  Entered: Martin Najera MD 09/03/2020, 3:35 PM       The patient's care was discussed with the Bedside Nurse, Patient and Patient's Family.    Martin Najera MD  Hospitalist Service  [facility]    ______________________________________________________________________    Interval History   Chart reviewed and patient seen. Case discussed with nursing staff.       Asked to see patient urgently this morning as he was very agitated and wanting to leave the hospital.  I interviewed the patient and met at bedside.  He has underlying dementia and was slightly forgetful but was also very agitated that he did not have the angiogram yesterday.  He knew the rationale that his COVID was pending but was " very upset nevertheless. After some discussion, he was able to calm down.  We spoke with the Cath Lab who were able to adjust him earlier in the day.  I also spoke with his wife who is a quadriplegic and unable to visit him in the hospital but was trying to get in touch with other family who could visit him.  When I met with the patient later on he was a lot more calmer and actually apologized for his outburst this morning.  Denies any chest pain.      Data reviewed today: I reviewed all medications, new labs and imaging results over the last 24 hours. I personally reviewed    Physical Exam   Vital Signs: Temp: 97.4  F (36.3  C) Temp src: Oral BP: 116/69 Pulse: 79   Resp: 16 SpO2: 98 % O2 Device: None (Room air) Oxygen Delivery: 3 LPM  Weight: 204 lbs 0 oz    GENERAL:  Awake and alert, No acute distress.  PSYCH: Agitated,  Mildly forgetful but oriented to the situation and place.  Unable to do a more detailed exam this morning given his agitation.      Data   Recent Labs   Lab 09/03/20  0557 09/02/20  1146 09/02/20  0417 09/01/20  2344 09/01/20  1926 08/28/20  2215   WBC  --   --  7.4  --  8.0 7.3   HGB  --   --  14.7  --  15.3 14.6   MCV  --   --  86  --  87 86   PLT  --   --  195  --  210 203   NA  --   --  138  --  132* 134   POTASSIUM 3.8  --  3.8  --  4.7 4.2   CHLORIDE  --   --  106  --  101 101   CO2  --   --  27  --  24 25   BUN  --   --  22  --  21 22   CR  --   --  0.84  --  0.91 1.02   ANIONGAP  --   --  5  --  7 8   CHRISTIE  --   --  8.7  --  9.0 8.7   GLC  --   --  196*  --  459* 414*   ALBUMIN  --   --   --   --   --  3.3*   PROTTOTAL  --   --   --   --   --  6.6*   BILITOTAL  --   --   --   --   --  0.5   ALKPHOS  --   --   --   --   --  124   ALT  --   --   --   --   --  52   AST  --   --   --   --   --  23   TROPI  --  4.209* 2.310* 0.913* 0.307* <0.015       Recent Results (from the past 24 hour(s))   Cardiac Catheterization   Result Value    Cath EF Quantitative 40    Narrative      Left ventricular  filling pressures are severely elevated .    The ejection fraction is calculated to be 40%.    Dist RCA lesion is 80% stenosed.    Prox Cx lesion is 45% stenosed.    Dist LAD lesion is 40% stenosed.    Ost LAD to Mid LAD lesion is 99% stenosed.    Pressure wire/FFR was performed on the lesion. FFR: 0.92.     1.  Vessel angioplasty and stenting of a subtotal restenotic ostial and   proximal LAD stenosis with KODY grade II flow using a 3.0 x 15 mm   [employer]tronic Resolute [place] drug-eluting stent leaving a 0% residual stenosis   with KODY grade III flow.  2.  Hypodense 40 to 50% proximal circumflex stenosis with an IFR of 0.92.  3.  Long smooth 80% distal RCA stenosis.  4.  Left ventricular end-diastolic pressure of 25 mmHg.  Large   anteroapical and distal inferior wall motion abnormality.  Ejection   fraction of 40%.  No significant mitral regurgitation.  No aortic stenosis   upon pullback.       Medications     Continuing ACE inhibitor/ARB/ARNI from home medication list OR ACE inhibitor/ARB order already placed during this visit       - MEDICATION INSTRUCTIONS -       - MEDICATION INSTRUCTIONS -       - MEDICATION INSTRUCTIONS -       Percutaneous Coronary Intervention orders placed (this is information for BPA alerting)       sodium chloride 75 mL/hr at 09/03/20 1410       [START ON 9/4/2020] aspirin  81 mg Oral Daily     atorvastatin  80 mg Oral Daily     [START ON 9/4/2020] clopidogrel  75 mg Oral Daily     donepezil  5 mg Oral At Bedtime     insulin aspart  1-7 Units Subcutaneous TID AC     insulin aspart  1-5 Units Subcutaneous At Bedtime     insulin detemir  16 Units Subcutaneous BID     losartan  25 mg Oral Daily     metoprolol tartrate  12.5 mg Oral BID     sodium chloride (PF)  3 mL Intracatheter Q8H

## 2020-09-03 NOTE — PRE-PROCEDURE
GENERAL PRE-PROCEDURE:   Procedure:  Coronary angiogram,left heart cath, left ventriculogram, possible intervention  Date/Time:  9/3/2020 12:17 PM    Written consent obtained?: Yes    Risks and benefits: Risks, benefits and alternatives were discussed    Consent given by:  Patient  Patient states understanding of procedure being performed: Yes    Patient's understanding of procedure matches consent: Yes    Procedure consent matches procedure scheduled: Yes    Expected level of sedation:  Moderate  Appropriately NPO:  Yes  ASA Class:  Class 3- Severe systemic disease, definite functional limitations  Mallampati  :  Grade 2- soft palate, base of uvula, tonsillar pillars, and portion of posterior pharyngeal wall visible  Lungs:  Lungs clear with good breath sounds bilaterally  Heart:  Normal heart sounds and rate  History & Physical reviewed:  History and physical reviewed and no updates needed  Statement of review:  I have reviewed the lab findings, diagnostic data, medications, and the plan for sedation

## 2020-09-03 NOTE — PROGRESS NOTES
[facility]  Cardiology Progress Note    Date of Service (when I saw the patient): 09/03/2020    Summary: Matt Way is a 69 year old male with history of coronary artery disease status post PCI of the LAD in 2018, history of unprovoked pulmonary embolism on chronic Xarelto, DM, dyslipidemia, FLAQUITA, who was admitted on 9/1/2020.   Stress testing in April 2019 was negative for ischemia or regional wall motion abnormalities.    He currently presents with chest discomfort, found to have LAD territory wall motion abnormalities on echocardiogram.     He has had some variable compliance with his medications at home, he did miss his medications on the day he presented to the emergency department.    Interval History   Tele: SR    Denies chest pain.   Says he lost his temper this AM, and regrets it.   Notes indicate some mild dementia       Assessment & Plan   1. Likely ACS, probable LAD territory with known history of CAD    Previous PCI to the LAD 2018, no ischemia on stress testing 2019    Now presents with chest discomfort    Trops consistent with ACS  0.307, 0.913, 2.310, 4.209    Echo 9/2/20: 40-45%, mid and distal anteroseptal/septal akinesis, severe apical wall hypokinesis.    Plan cardiac catheterization possible intervention today    No apparent CI to CECILY.  Is on chronic anticoagulation, held since admission.  No ulcers, no upcoming surgeries    No known contrast dye allergy    COVID negative    NPO     Continue heparin, ASA, BB (new), ARB, statin    Further recs pending results       2. Mild ischemic cardiomyopathy    Echo 9/2/20: 40-45%, mid and distal anteroseptal/septal akinesis, severe apical wall hypokinesis.    No CHF on exam    Continue BB, ARB      3. History of unprovoked pulmonary embolism    Chronically maintained on Xarelto, held on admission for anticipation of cardiac catheterization.    Likely will be maintained on anticoagulation and single antiplatelet therapy.  Defer to  proceduralist on specifics of potential triple therapy in the short-term.       4.  Dyslipidemia    Continue home atorvastatin 80 mg        Chante Givens PA-C      Patient Active Problem List   Diagnosis     Diabetes mellitus, type 2 (H)     Acute chest pain     NSTEMI (non-ST elevated myocardial infarction) (H)     Pulmonary embolism (H)     Type 2 diabetes mellitus with hyperglycemia, with long-term current use of insulin (H)       Physical Exam   Temp: 97.8  F (36.6  C) Temp src: Oral BP: 131/79 Pulse: 72   Resp: 16 SpO2: 98 % O2 Device: None (Room air)    Vitals:    09/01/20 2304 09/02/20 0508 09/03/20 0650   Weight: 93.9 kg (207 lb) 93 kg (205 lb 1.6 oz) 92.5 kg (204 lb)     Vital Signs with Ranges  Temp:  [97.8  F (36.6  C)-98  F (36.7  C)] 97.8  F (36.6  C)  Pulse:  [72-79] 72  Resp:  [16-20] 16  BP: ()/(55-82) 131/79  SpO2:  [95 %-98 %] 98 %  I/O last 3 completed shifts:  In: 776 [P.O.:240; I.V.:536]  Out: 700 [Urine:700]    Constitutional: NAD.   Respiratory: CTAB.   Cardiovascular: RRR, s1s2, no sig murmur, no edema  GI: soft, BS+  Skin: warm, no rashes  Musculoskeletal: Moving all extremities  Neurologic: Alert  Neuropsychiatric: Normal affect       Data   Recent Labs   Lab 09/03/20  0557 09/02/20  1146 09/02/20  0417 09/01/20  2344 09/01/20  1926 08/28/20  2215   WBC  --   --  7.4  --  8.0 7.3   HGB  --   --  14.7  --  15.3 14.6   MCV  --   --  86  --  87 86   PLT  --   --  195  --  210 203   NA  --   --  138  --  132* 134   POTASSIUM 3.8  --  3.8  --  4.7 4.2   CHLORIDE  --   --  106  --  101 101   CO2  --   --  27  --  24 25   BUN  --   --  22  --  21 22   CR  --   --  0.84  --  0.91 1.02   ANIONGAP  --   --  5  --  7 8   CHRISTIE  --   --  8.7  --  9.0 8.7   GLC  --   --  196*  --  459* 414*   ALBUMIN  --   --   --   --   --  3.3*   PROTTOTAL  --   --   --   --   --  6.6*   BILITOTAL  --   --   --   --   --  0.5   ALKPHOS  --   --   --   --   --  124   ALT  --   --   --   --   --  52   AST  --    --   --   --   --  23   TROPI  --  4.209* 2.310* 0.913* 0.307* <0.015     No results found for this or any previous visit (from the past 24 hour(s)).    Echo 9/2/20:  Interpretation Summary  The visual ejection fraction is estimated at 40-45%.  There is mid and distal anteroseptal/septal akinesis.  There is severe apical wall hypokinesis.  Above findings new compared with previous studies.        Medications     HEParin 800 Units/hr (09/03/20 0814)     - MEDICATION INSTRUCTIONS -       - MEDICATION INSTRUCTIONS -       sodium chloride 75 mL/hr at 09/02/20 1424       aspirin  81 mg Oral Daily     atorvastatin  80 mg Oral Daily     donepezil  5 mg Oral At Bedtime     heparin (porcine)         insulin aspart  1-7 Units Subcutaneous TID AC     insulin aspart  1-5 Units Subcutaneous At Bedtime     insulin detemir  16 Units Subcutaneous BID     lidocaine (PF)         losartan  25 mg Oral Daily     metoprolol tartrate  12.5 mg Oral BID     nitroGLYcerin in D5W         sodium chloride (PF)  3 mL Intracatheter Q8H     sodium chloride (PF)  3 mL Intracatheter Q8H

## 2020-09-03 NOTE — PLAN OF CARE
Presentation/Diagnosis: Pt admitted on 9/1 for evaluation of chest pain   History: CAD, Lewy body dementia, DM, See chart for complete list   Labs/Protocols: Potassium/Mag Protocol: K-3.8-Replaced this shift, Mag-2.0(9/1)  Vitals: Temp: 97.8  F (36.6  C) Temp src: Oral BP: 127/68 Pulse: 67   Resp: 12 SpO2: 97 % O2 Device: None (Room air)   Cardiac: Regular rate and rhythm-Denies chest pain-Cards following Angio today with one stent placed-TR band intact-Will start plavix and continue with ASA daily   Telemetry: SR  Respiratory: Lungs are clear throughout   Neuro: Aler to self, Disorientated to situation, time, place-Has Hx of dementia and wandering-Alarms in place   GI/: Continent of B/B  Skin: Intact   LDAs: PIV-to right-Running NS 75ml/hr  Diet: Low fat/Na <2400mg   Activity: SBA refusing gait belt-Alarms active   Teaching: Pt and wife educated on current POC-Angio today, Redirected on what an angio is and the need to stay for procedure   Plan: Discharge possibly tomorrow to home

## 2020-09-04 ENCOUNTER — APPOINTMENT (OUTPATIENT)
Dept: OCCUPATIONAL THERAPY | Facility: CLINIC | Age: 70
DRG: 246 | End: 2020-09-04
Attending: INTERNAL MEDICINE
Payer: MEDICARE

## 2020-09-04 VITALS
OXYGEN SATURATION: 97 % | WEIGHT: 204 LBS | SYSTOLIC BLOOD PRESSURE: 118 MMHG | DIASTOLIC BLOOD PRESSURE: 74 MMHG | RESPIRATION RATE: 16 BRPM | HEART RATE: 77 BPM | HEIGHT: 73 IN | TEMPERATURE: 97.9 F | BODY MASS INDEX: 27.04 KG/M2

## 2020-09-04 LAB
ANION GAP SERPL CALCULATED.3IONS-SCNC: 2 MMOL/L (ref 3–14)
BUN SERPL-MCNC: 13 MG/DL (ref 7–30)
CALCIUM SERPL-MCNC: 8.2 MG/DL (ref 8.5–10.1)
CHLORIDE SERPL-SCNC: 109 MMOL/L (ref 94–109)
CO2 SERPL-SCNC: 28 MMOL/L (ref 20–32)
CREAT SERPL-MCNC: 0.88 MG/DL (ref 0.66–1.25)
GFR SERPL CREATININE-BSD FRML MDRD: 88 ML/MIN/{1.73_M2}
GLUCOSE BLDC GLUCOMTR-MCNC: 123 MG/DL (ref 70–99)
GLUCOSE BLDC GLUCOMTR-MCNC: 316 MG/DL (ref 70–99)
GLUCOSE BLDC GLUCOMTR-MCNC: 318 MG/DL (ref 70–99)
GLUCOSE SERPL-MCNC: 143 MG/DL (ref 70–99)
INTERPRETATION ECG - MUSE: NORMAL
INTERPRETATION ECG - MUSE: NORMAL
POTASSIUM SERPL-SCNC: 3.7 MMOL/L (ref 3.4–5.3)
SODIUM SERPL-SCNC: 139 MMOL/L (ref 133–144)

## 2020-09-04 PROCEDURE — 93005 ELECTROCARDIOGRAM TRACING: CPT

## 2020-09-04 PROCEDURE — 40000275 ZZH STATISTIC RCP TIME EA 10 MIN

## 2020-09-04 PROCEDURE — 25000128 H RX IP 250 OP 636: Performed by: INTERNAL MEDICINE

## 2020-09-04 PROCEDURE — 25000132 ZZH RX MED GY IP 250 OP 250 PS 637: Mod: GY | Performed by: INTERNAL MEDICINE

## 2020-09-04 PROCEDURE — 97165 OT EVAL LOW COMPLEX 30 MIN: CPT | Mod: GO

## 2020-09-04 PROCEDURE — 25000131 ZZH RX MED GY IP 250 OP 636 PS 637: Mod: GY | Performed by: INTERNAL MEDICINE

## 2020-09-04 PROCEDURE — 93010 ELECTROCARDIOGRAM REPORT: CPT | Performed by: INTERNAL MEDICINE

## 2020-09-04 PROCEDURE — 99239 HOSP IP/OBS DSCHRG MGMT >30: CPT | Performed by: INTERNAL MEDICINE

## 2020-09-04 PROCEDURE — 36415 COLL VENOUS BLD VENIPUNCTURE: CPT | Performed by: INTERNAL MEDICINE

## 2020-09-04 PROCEDURE — 80048 BASIC METABOLIC PNL TOTAL CA: CPT | Performed by: INTERNAL MEDICINE

## 2020-09-04 PROCEDURE — 97110 THERAPEUTIC EXERCISES: CPT | Mod: GO

## 2020-09-04 PROCEDURE — 99232 SBSQ HOSP IP/OBS MODERATE 35: CPT | Performed by: INTERNAL MEDICINE

## 2020-09-04 PROCEDURE — 00000146 ZZHCL STATISTIC GLUCOSE BY METER IP

## 2020-09-04 PROCEDURE — 97535 SELF CARE MNGMENT TRAINING: CPT | Mod: GO

## 2020-09-04 RX ORDER — METOPROLOL TARTRATE 25 MG/1
12.5 TABLET, FILM COATED ORAL 2 TIMES DAILY
Qty: 60 TABLET | Refills: 0 | Status: SHIPPED | OUTPATIENT
Start: 2020-09-04 | End: 2021-02-09

## 2020-09-04 RX ORDER — CLOPIDOGREL BISULFATE 75 MG/1
75 TABLET ORAL DAILY
Qty: 30 TABLET | Refills: 0 | Status: SHIPPED | OUTPATIENT
Start: 2020-09-05 | End: 2022-01-01

## 2020-09-04 RX ADMIN — HALOPERIDOL LACTATE 2 MG: 5 INJECTION, SOLUTION INTRAMUSCULAR at 05:37

## 2020-09-04 RX ADMIN — INSULIN DETEMIR 16 UNITS: 100 INJECTION, SOLUTION SUBCUTANEOUS at 08:46

## 2020-09-04 RX ADMIN — HALOPERIDOL LACTATE 2 MG: 5 INJECTION, SOLUTION INTRAMUSCULAR at 00:12

## 2020-09-04 RX ADMIN — CLOPIDOGREL BISULFATE 75 MG: 75 TABLET ORAL at 08:46

## 2020-09-04 RX ADMIN — Medication 12.5 MG: at 08:46

## 2020-09-04 RX ADMIN — DOCUSATE SODIUM AND SENNOSIDES 1 TABLET: 8.6; 5 TABLET ORAL at 12:09

## 2020-09-04 RX ADMIN — LOSARTAN POTASSIUM 25 MG: 25 TABLET ORAL at 08:46

## 2020-09-04 RX ADMIN — LORAZEPAM 0.5 MG: 2 INJECTION INTRAMUSCULAR; INTRAVENOUS at 00:09

## 2020-09-04 RX ADMIN — ASPIRIN 81 MG: 81 TABLET, COATED ORAL at 08:46

## 2020-09-04 RX ADMIN — INSULIN ASPART 6 UNITS: 100 INJECTION, SOLUTION INTRAVENOUS; SUBCUTANEOUS at 12:59

## 2020-09-04 ASSESSMENT — ACTIVITIES OF DAILY LIVING (ADL)
IADL_COMMENTS: SHARES HOUSEHOLD TASKS WITH SPOUSE
ADLS_ACUITY_SCORE: 15

## 2020-09-04 NOTE — PROGRESS NOTES
"    Brief Hospitalist Cross cover note    Reason for the call : I was called by nursing that patient wants to leave AGAINST MEDICAL ADVICE.    Reason for admission : Acute MI    CurrentSignificant findings or change:       Vital signs : /78 (BP Location: Right arm)   Pulse 77   Temp 97.7  F (36.5  C) (Oral)   Resp 20   Ht 1.854 m (6' 1\")   Wt 92.5 kg (204 lb)   SpO2 97%   BMI 26.91 kg/m       Labs and Diagnostic Studies : Reviewed      Actions taken:    EMR reviewed     Cares discussed with bedside nurse and charge nurse    Patient was seen and examined.  He was about to leave AGAINST MEDICAL ADVICE and reached him at elevator.  I spoke with him regarding the risk of going home given his acute myocardial infarction and PCI this afternoon.  He appears to have confusion and lacks insight to what he is doing.  He did not want to speak with his wife or daughter.  I spoke with his wife by the name of Salina over the phone and described the situation .  She stated that he has diagnosis of Lewy body dementia and may not be decisional.     Code 21 was called and patient was returned to his room in presence of security officers.    72-hour hold order was placed and patient was informed about the decision.    I spoke with his wife Salina again regarding the decision to keep him in the hospital since he is not safe to discharge and may be danger to himself.    Assessment:    Acute encephalopathy, suspect delirium in the setting of underlying Lewy body dementia    Non-ST segment elevation MI status post PCI this afternoon    Recommendation/Plan:    72-hour hold order placed    0.5 mg IV Ativan x1 and PRN Haldol for agitation    Five-point restraint    Close monitoring of patient on the floor.  May need to be transferred to ICU if he requires IV infusion of Precedex.    I tried to call patient daughter Danielle who is a doctor twice for updates.    Psych consult placed     Nursing and security team input is " appreciated.    Critical Care time: was 40 minutes for this patient excluding procedures.

## 2020-09-04 NOTE — PLAN OF CARE
OT/CR: Order received, eval completed and treatment initiated. Pt is a 69 year old male admitted with NSTEMI, now s/p PCI w/ CECILY. Pt lives with spouse in single level house, ramp to enter, roll in shower w/ shower chair, RTS. Pt reports spouse is paralyzed from the chest down, therefore house is completely w/c accessible. Pt reports indep in all ADLs, IADLs and mobility tasks with no AD at baseline. Pt is primary caregiver for spouse (assists with ADLs, transfers, IADLs).    Discharge Planner OT   Patient plan for discharge: Anxious to return home today to care for spouse  Current status: Pt performed transfers/mobility in room with supervision, able to perform ADL tasks including LB dressing and toileting with supervision. Pt ambulated in hallway ~150 ft with SBA/supervision, slow pace. No c/o pain/discomfort. /69, HR 71 at rest; /70, HR 76 post activity. Pt educated on signs/symptoms of MI/activity intolerance, progressive exercise/activity, OMNI effort scale and safe activities post MI, verbalized understanding. Education/discussion had regarding safe techniques to assist spouse with mobility and heavier ADL tasks.   Barriers to return to prior living situation: None anticipated  Recommendations for discharge: Home w/ OP CR  Rationale for recommendations: Recommend OP CR for ongoing CV strengthening through monitored exercise post NSTEMI w/ PCI/CECILY.        Entered by: Mandie Ortiz 09/04/2020 11:38 AM

## 2020-09-04 NOTE — PROGRESS NOTES
09/04/20 1103   Quick Adds   Type of Visit Initial Occupational Therapy Evaluation   Living Environment   Lives With spouse   Living Arrangements house   Home Accessibility no concerns;wheelchair accessible   Transportation Anticipated family or friend will provide   Living Environment Comment Pt lives with spouse in single level house, ramp to enter, roll in shower w/ shower chair, RTS. Pt reports spouse is paralyzed from the chest down, therefore house is completely w/c accessible   Self-Care   Usual Activity Tolerance good   Regular Exercise Yes   Activity/Exercise Type walking;biking   Exercise Amount/Frequency daily   Activity/Exercise/Self-Care Comment Walks or bikes 5-6 miles/day   Functional Level   Ambulation 0-->independent   Transferring 0-->independent   Toileting 0-->independent   Bathing 0-->independent   Dressing 0-->independent   Eating 0-->independent   Communication 0-->understands/communicates without difficulty   Swallowing 0-->swallows foods/liquids without difficulty   Cognition 0 - no cognition issues reported   Fall history within last six months no   Prior Functional Level Comment Pt reports indep in all ADLs, IADLs and mobility tasks with no AD at baseline. Pt is primary caregiver for spouse (ADLs, transfers, IADLs)   General Information   Onset of Illness/Injury or Date of Surgery - Date 09/01/20   Referring Physician Herb Tran MD    Patient/Family Goals Statement Pt's goal is to d/c home   Additional Occupational Profile Info/Pertinent History of Current Problem Per chart: Pt is a 69 year old male admitted with NSTEMI, now s/p PCI w/ CECILY   Precautions/Limitations fall precautions  (cardiac precautions)   Cognitive Status Examination   Cognitive Comment Baseline lewy body dementia   Sensory Examination   Sensory Comments Pt denies numbness/tingling in BUEs   Pain Assessment   Patient Currently in Pain No   Transfer Skills   Transfer Comments Independent transfers/mobility   "  Lower Body Dressing   Physical Assist/Nonphysical Assist: Dress Lower Body set-up required;1 person assist   Toileting   Level of [place]: Toilet stand-by assist   Physical Assist/Nonphysical Assist: Toilet 1 person assist   Grooming   Level of [place]: Grooming stand-by assist   Physical Assist/Nonphysical Assist: Grooming 1 person assist   Instrumental Activities of Daily Living (IADL)   IADL Comments Shares household tasks with spouse   General Therapy Interventions   Planned Therapy Interventions ADL retraining;transfer training;home program guidelines;progressive activity/exercise;risk factor education   Clinical Impression   Criteria for Skilled Therapeutic Interventions Met yes, treatment indicated   OT Diagnosis Impaired IADLs, mobility    Influenced by the following impairments Decreased functional activity tolerance   Assessment of Occupational Performance 1-3 Performance Deficits   Identified Performance Deficits IADLs, mobility    Clinical Decision Making (Complexity) Low complexity   Therapy Frequency Daily   Predicted Duration of Therapy Intervention (days/wks) eval and 1x treat   Anticipated Discharge Disposition Home with Outpatient Therapy   Risks and Benefits of Treatment have been explained. Yes   Patient, Family & other staff in agreement with plan of care Yes   [facility] AM-PAC  \"6 Clicks\" Daily Activity Inpatient Short Form   1. Putting on and taking off regular lower body clothing? 4 - None   2. Bathing (including washing, rinsing, drying)? 4 - None   3. Toileting, which includes using toilet, bedpan or urinal? 4 - None   4. Putting on and taking off regular upper body clothing? 4 - None   5. Taking care of personal grooming such as brushing teeth? 4 - None   6. Eating meals? 4 - None   Daily Activity Raw Score (Score out of 24.Lower scores equate to lower levels of function) 24   Total Evaluation Time   Total Evaluation Time (Minutes) 8     "

## 2020-09-04 NOTE — PROGRESS NOTES
An EKG was completed on the pt, with no complications noted during the procedure.    Ba Swift, RT on 9/4/2020 at 6:25 AM

## 2020-09-04 NOTE — PLAN OF CARE
VSS. Lung sounds clear. Bowel sounds active. L radial site WNL. LUE CMS WNL. Placed on 72 hr hold overnight dt attempting to leave AMA (pt has lewy body dementia). Restless overnight with sitter at bedside. Difficult to settle. New PIV placed. Possible discharge today after psych consult.

## 2020-09-04 NOTE — PROGRESS NOTES
Brief sw note:    Guillermo was updated by cardiology, Chante Givens, that the pt would likely benefit from a HC RN/SW to assist him with his medications in the home.  Sw told her that they could potentially have HC services if he will qualify (if pt is homebound and does not do OP CR).    Sw reviewed the pt's chart and saw that OP CR is recommended.     The bedside nurse attempted to get in touch with Chante to determine if they wanted the OP CR or if the pt should consider HC.  Guillermo did not get an update that [facility] responded to the nurse.    Dr. Dsouza, psych, discussed with sw that the pt lives with his wife who is a quadriplegic and he has some dementia.  Dr. Dsouza asked sw if the pt would qualify for HC services to have someone do a home safety eval.  Guillermo told her that they can look into it, but OP CR is recommended and the pt cannot have both OP and HC services.  The pt must be homebound for HC services.  The pt's wife is currently handling his medications.    Guillermo spoke with OT who said that they do not believe the pt qualifies for HC services because he is ambulating independently and he walks to the store.  Ot said that the pt is not homebound and would likely not qualify for HC services.    Sw updated the evening bedside nurse on the discharge plan.    The pt can follow-up with his PCP if HC is needed.    FIGUEROA Vicente, [facility]  Inpatient Care Coordination  [facility]  [phone]

## 2020-09-04 NOTE — PROGRESS NOTES
A&OX3. Disoriented to time and situation. Up assist of 1 with gait belt. Angio completed. Air removed completely. Site is intact. CMS intact.  At 2230 patient refused cares, refused medication and blood glucose checks. Wants to leave AMA. Md notified. Continue POC.

## 2020-09-04 NOTE — PROGRESS NOTES
Calm this am and cooperative-->allowed vs, took am meds.    Denies pain.     at bedside.       was removed this am 2/2 patient current behavior.  At 12 noon patient stated he was being discharged.    Daughter Uma here to p/u patient.     Per conversation with Dr. Knox, patient can go home.  I will need to check with KIARRA Polanco cardiology if she has ordered home services.  I will call Uma with update.      I called daughter Uma with update.  bs 318 gave 4 units followed by additional 6 units.  Blood sugar to be rechecked at 2 hours post-->3 pm.  Next RN will follow up.

## 2020-09-04 NOTE — PLAN OF CARE
Patient placed on 72 hour hold due to confusion and wanting to leave AMA.  Patient resting currently after receiving ativan and haldol.  Sitter at bedside.  Care passed on to Bertha FARAH RN.  Continue with POC.

## 2020-09-04 NOTE — PLAN OF CARE
Plan of care reviewed with patient and daughter. IV/ TELE removed from patient. Patients new medications sent. Patient adequate for discharge.     Paty erickson RN on 9/4/2020 at 6:24 PM

## 2020-09-04 NOTE — DISCHARGE SUMMARY
Discharge Summary  Hospitalist Service    Matt Way MRN# [id number]   YOB: 1950 Age: 69 year old     Date of Admission:  9/1/2020  Date of Discharge:  9/4/2020  Admitting Physician:  Brijesh Ceballos MD  Discharge Physician: Lauren Knox MD  Discharging Service: Hospitalist Service     Primary Provider: Kingston Leal  Primary Care Physician Phone Number: [phone]         Discharge Diagnoses/Problem Oriented Hospital Course (Providers):    Matt Way was admitted on 9/1/2020 by Brijesh Ceballos MD and I would refer you to their history and physical.  The following problems were addressed during his hospitalization:    Acute anterior NSTEMI  Prior hx of CAD  Lewy BodyDementia  Hx of PE, unprovoked and small by imaging 11/18  GERD           Code Status:      Full         Brief Hospital Stay Summary Sent Home With Patient in AVS:       69 year old male with PMH including coronary artery disease with prior drug-eluting stent to his LAD, insulin-dependent diabetes, GERD, FLAQUITA and remote history of PE in 2018 who presents with chest pain.  Notably, he was actually seen in the ER 3 days ago for anginal symptoms with a negative troponin.  It was recommended that he be admitted due to concern for unstable angina but he declined and ultimately discharged home.  He now presents due to intermittent left-sided chest pain which has been ongoing off and on since that ER visit.  He has some associated dyspnea whenever he exerts himself and symptoms generally resolve with rest.       Here in the ER he was hemodynamically stable.  EKG showed nonspecific ST changes.  He remains chest free at rest.  Lab work-up was notable for troponin of 0.307.  He was given a full dose aspirin and started on a heparin drip for suspected non-ST elevation MI    He underwent coronary angio with stent to the LAD, he is feeling well and wishing for discharge home.      Assessment and Plan:   Acute  anterior NSTEMI: As above.  Classic exertional anginal symptoms relieved by rest, elevated troponin and non-specific ST changes. Echo with septal akinesis and severe apical wall hypokinesis consistent with anterior MI. Troponin up to 4.2.  Underwent coronary angio with CECILY to the mid LAD, also noted to have 80 % stenosis to the distal RCA that will require staged intervention  -DAPT for one year  -Metop 12.5 mg bid started during this hospitalization  -F/u with cards in one week to arrange for staged intervention for RCA lesion and to arrange for repeat Echo          2.   History of coronary artery disease with CECILY to LAD: In 2018.  Chronically on aspirin, atorvastatin, losartan.  He hadn't been on a beta-blocker chronically.     3.   History of insulin-dependent diabetes: with severe hyperglycemia to the 400s on admission. Gave 10 units aspart in the ER  -Levemir.  Resumed at 16 unit twice daily-will increase back to 20 unit(s) at discharge  -metformin resumed at discharge  -blood sugars high at discharge , will give additional aspart prior to discharge.   -even though blood sugars not well controlled I still think ok to discharge home, especially in light of delirium overnight.  He needs to get back to his more stable living environment        4.  Memory loss: He endorses mild dementia and is chronically on Aricept.  he does help with cares of his wife who has quadriplegia.  Overnight into 9/4 had delirium with significant behavioral issues necessitating 72 hour hold and haloperidol and quetiapine. -completely resolved at discharge  -discussed with dtr and offered quetiapine for home use prn but she prefers to follow up with his neurologist     5.  GERD: Continue omeprazole.     6.  History of pulmonary embolism.  Was on heparin drip for NSTEMI. Review of this hx is that he had a small upper lung clot in November 2018, and per discussion with dtr it was never really clear if he had any sx in association with it.   He had actually stopped his rivaroxaban a few months ago as is a high fall risk (falls at home, falls off his bike etc).  It seems quite reasonable to me to stop full AC with this hx.      Diet: Low Saturated Fat Na <2400 mg    DVT Prophylaxis: Heparin   Schulte Catheter: not present  Code Status: Full Code       Discussed with dtr at discharge           Important Results:      As noted below         Pending Results:        Unresulted Labs Ordered in the Past 30 Days of this Admission     No orders found from 8/2/2020 to 9/2/2020.            Discharge Instructions and Follow-Up:      Follow-up Appointments     Follow-up and recommended labs and tests       F/u with cardiac rehab per their directions  RN referral was made to ensure accurate medication administration at home.                 Discharge Disposition:      Discharged to home         Discharge Medications:        Current Discharge Medication List      START taking these medications    Details   clopidogrel (PLAVIX) 75 MG tablet Take 1 tablet (75 mg) by mouth daily  Qty: 30 tablet, Refills: 0    Associated Diagnoses: NSTEMI (non-ST elevated myocardial infarction) (H)      metoprolol tartrate (LOPRESSOR) 25 MG tablet Take 0.5 tablets (12.5 mg) by mouth 2 times daily  Qty: 60 tablet, Refills: 0    Associated Diagnoses: NSTEMI (non-ST elevated myocardial infarction) (H)         CONTINUE these medications which have NOT CHANGED    Details   acetaminophen (TYLENOL) 325 MG tablet Take 325-650 mg by mouth every 6 hours as needed for mild pain      aspirin 81 MG EC tablet Take 1 tablet (81 mg) by mouth daily  Qty: 1 tablet, Refills: 0      atorvastatin (LIPITOR) 80 MG tablet Take 1 tablet (80 mg) by mouth daily  Qty: 90 tablet, Refills: 0    Associated Diagnoses: NSTEMI (non-ST elevated myocardial infarction) (H)      donepezil (ARICEPT) 5 MG tablet Take 5 mg by mouth every morning      insulin aspart (NOVOLOG FLEXPEN) 100 UNIT/ML injection Inject 16 Units  "Subcutaneous 2 times daily (with meals) Breakfast and Dinner      insulin detemir (LEVEMIR) 100 UNIT/ML injection Inject 20 Units Subcutaneous 2 times daily       losartan (COZAAR) 25 MG tablet Take 1 tablet (25 mg) by mouth daily  Qty: 30 tablet, Refills: 11    Associated Diagnoses: NSTEMI (non-ST elevated myocardial infarction) (H); Benign essential hypertension      metFORMIN (GLUCOPHAGE) 1000 MG tablet Take 1,000 mg by mouth 2 times daily (with meals)      nitroGLYcerin (NITROSTAT) 0.4 MG sublingual tablet Place 1 tablet (0.4 mg) under the tongue every 5 minutes as needed for chest pain If symptoms persist 5 minutes after 1st dose call 911.  Qty: 10 tablet, Refills: 0    Associated Diagnoses: NSTEMI (non-ST elevated myocardial infarction) (H); Acute chest pain      omeprazole (PRILOSEC OTC) 20 MG EC tablet Take 20 mg by mouth daily as needed      VITAMIN D, CHOLECALCIFEROL, PO Take 1 tablet by mouth daily         STOP taking these medications       ibuprofen (ADVIL/MOTRIN) 200 MG tablet Comments:   Reason for Stopping:         rivaroxaban ANTICOAGULANT (XARELTO) 20 MG TABS tablet Comments:   Reason for Stopping:                 Allergies:         Allergies   Allergen Reactions     Brilinta [Ticagrelor]      Duloxetine      Other reaction(s): Confusion  Per daughter who is MD     Gabapentin      Other reaction(s): Edema     Lisinopril Rash           Consultations This Hospital Stay:      Consultation during this admission received from cardiology         Condition and Physical on Discharge:      Discharge condition: Stable   Vitals: Blood pressure 107/55, pulse 69, temperature 97.6  F (36.4  C), temperature source Oral, resp. rate 18, height 1.854 m (6' 1\"), weight 92.5 kg (204 lb), SpO2 96 %.     Constitutional: Pleasant nad looks stated age head nc/at sclera clear    Lungs: ctab nl effort    Cardiovascular: rrr no mrg no le edema    Abdomen: S/nt/nd   Skin: W/d no c/c   Other: Alert and oriented affect " appropriate ambulating without difficulty          Discharge Time:      Greater than 30 minutes.        Image Results From This Hospital Stay (For Non-EPIC Providers):        Results for orders placed or performed during the hospital encounter of 20   XR Chest 2 Views    Narrative    EXAM: XR CHEST 2 VW  LOCATION: [facility]  DATE/TIME: 2020 8:08 PM    INDICATION: Chest pain with exertion  COMPARISON: 2019      Impression    IMPRESSION: No acute change. Heart and central vessels unremarkable. Lungs clear.   Echocardiogram Complete    Narrative    [id number]  [id number]  [id number]  [id number]^DAVID^GUILLE^[facility]  Echocardiography Laboratory  [address] [place] [address]        Name: JUSTYN TYLER  MRN: [id number]  : 1950  Study Date: 2020 07:58 AM  Age: 69 yrs  Gender: Male  Patient Location: [facility]  Reason For Study: Chest Pain  Ordering Physician: GUILLE HERNANDEZ  Referring Physician: Kingston Leal MD  Performed By: Jumana Vogel RDCS     BSA: 2.2 m2  Height: 73 in  Weight: 205 lb  HR: 71  BP: 144/82 mmHg  _____________________________________________________________________________  __        Procedure  Complete Portable Echo Adult. Optison (NDC #1822-5317) given intravenously.  _____________________________________________________________________________  __        Interpretation Summary     The visual ejection fraction is estimated at 40-45%.  There is mid and distal anteroseptal/septal akinesis.  There is severe apical wall hypokinesis.  Above findings new compared with previous studies.  _____________________________________________________________________________  __        Left Ventricle  The left ventricle is normal in size. There is normal left ventricular wall  thickness. The visual ejection fraction is estimated at 40-45%. Left  ventricular diastolic function is indeterminate. There is septal  akinesis.  There is severe apical wall hypokinesis.     Right Ventricle  The right ventricle is normal in structure, function and size.     Atria  Normal left atrial size. Right atrial size is normal.     Mitral Valve  The mitral valve leaflets appear normal. There is no evidence of stenosis,  fluttering, or prolapse.        Tricuspid Valve  Normal tricuspid valve.     Aortic Valve  There is trivial trileaflet aortic sclerosis.     Pulmonic Valve  The pulmonic valve is not well seen, but is grossly normal.     Vessels  Borderline aortic root dilatation. The ascending aorta is Borderline dilated.  The inferior vena cava is normal.     Pericardium  There is no pericardial effusion.        Rhythm  Sinus rhythm was noted.  _____________________________________________________________________________  __  MMode/2D Measurements & Calculations  IVSd: 1.2 cm     LVIDd: 4.9 cm  LVIDs: 3.0 cm  LVPWd: 1.0 cm  FS: 39.5 %  LV mass(C)d: 201.6 grams  LV mass(C)dI: 92.7 grams/m2  Ao root diam: 3.8 cm  LA dimension: 3.4 cm  asc Aorta Diam: 3.8 cm  LA/Ao: 0.89  LVOT diam: 2.4 cm  LVOT area: 4.4 cm2  LA Volume (BP): 45.0 ml  LA Volume Index (BP): 20.7 ml/m2  RWT: 0.41           Doppler Measurements & Calculations  MV E max yudith: 99.2 cm/sec  MV A max yudith: 107.6 cm/sec  MV E/A: 0.92  MV dec time: 0.15 sec  Ao V2 max: 88.8 cm/sec  Ao max PG: 3.0 mmHg  PA acc time: 0.16 sec  E/E' av.3  Lateral E/e': 17.0  Medial E/e': 21.7           _____________________________________________________________________________  __           Report approved by: Esteban Mayen 2020 09:31 AM      Cardiac Catheterization     Value    Cath EF Quantitative 40    Narrative      Left ventricular filling pressures are severely elevated .    The ejection fraction is calculated to be 40%.    Dist RCA lesion is 80% stenosed.    Prox Cx lesion is 45% stenosed.    Dist LAD lesion is 40% stenosed.    Ost LAD to Mid LAD lesion is 99% stenosed.    Pressure  wire/FFR was performed on the lesion. FFR: 0.92.     1.  Vessel angioplasty and stenting of a subtotal restenotic ostial and   proximal LAD stenosis with KODY grade II flow using a 3.0 x 15 mm   Medtronic Resolute Ryley drug-eluting stent leaving a 0% residual stenosis   with KODY grade III flow.  2.  Hypodense 40 to 50% proximal circumflex stenosis with an IFR of 0.92.  3.  Long smooth 80% distal RCA stenosis.  4.  Left ventricular end-diastolic pressure of 25 mmHg.  Large   anteroapical and distal inferior wall motion abnormality.  Ejection   fraction of 40%.  No significant mitral regurgitation.  No aortic stenosis   upon pullback.             Most Recent Lab Results In EPIC (For Non-EPIC Providers):    Most Recent 3 CBC's:  Recent Labs   Lab Test 09/02/20 0417 09/01/20 1926 08/28/20 2215   WBC 7.4 8.0 7.3   HGB 14.7 15.3 14.6   MCV 86 87 86    210 203      Most Recent 3 BMP's:  Recent Labs   Lab Test 09/04/20  0701 09/03/20  0557 09/02/20 0417 09/01/20 1926     --  138 132*   POTASSIUM 3.7 3.8 3.8 4.7   CHLORIDE 109  --  106 101   CO2 28  --  27 24   BUN 13  --  22 21   CR 0.88  --  0.84 0.91   ANIONGAP 2*  --  5 7   CHRISTIE 8.2*  --  8.7 9.0   *  --  196* 459*     Most Recent 2 LFT's:  Recent Labs   Lab Test 08/28/20 2215 07/11/19  0959   AST 23 18   ALT 52 43   ALKPHOS 124 134   BILITOTAL 0.5 0.3     Most Recent Cholesterol Panel:  Recent Labs   Lab Test 09/03/20  1413   CHOL 137   LDL 56   HDL 32*   TRIG 246*

## 2020-09-04 NOTE — PROGRESS NOTES
[facility]  Cardiology Progress Note    Date of Service (when I saw the patient): 09/04/2020    Summary: Matt Way is a 69 year old male with history of coronary artery disease status post PCI of the LAD in 2018, history of unprovoked pulmonary embolism on chronic Xarelto, DM, dyslipidemia, FLAQUITA, who was admitted on 9/1/2020.   Stress testing in April 2019 was negative for ischemia or regional wall motion abnormalities.    He currently presents with chest discomfort, found to have LAD territory wall motion abnormalities on echocardiogram.     He has had some variable compliance with his medications at home, he did miss his medications on the day he presented to the emergency department.      Interval History   Tele: SR    Overnight placed on 72 hour hold.   This AM he seems more oriented. He does say he has dementia and his wife is quadriplegic. He is supposed to be assisting her/helping with her cares.    He does not always remember to take his medications, and she does not help with his medications.        Assessment & Plan   1. NSTEMI, known history of CAD    Previous PCI to the LAD 2018, no ischemia on stress testing 2019    Now presents with chest discomfort    Trops consistent with ACS  0.307, 0.913, 2.310, 4.209    Echo 9/2/20: 40-45%, mid and distal anteroseptal/septal akinesis, severe apical wall hypokinesis.    9/3/20 cardiac catheterization    Ostial to mid LAD 99% restenosis s/p CECILY. Has 80% distal RCA that will require staged PCI as outpatient    Xarelto chronically (can resume today), Plavix for at least a year, ASA 81 mg until staged intervention of the RCA. BB (new), ARB, statin       2. Mild ischemic cardiomyopathy    Echo 9/2/20: 40-45%, mid and distal anteroseptal/septal akinesis, severe apical wall hypokinesis.    No CHF on exam    Continue BB, ARB    Repeat echo in a few months (after intervention)      3. History of unprovoked pulmonary embolism    Chronically maintained on  Xarelto, held on admission for anticipation of cardiac catheterization. Resume today       4.  Dyslipidemia    Continue home atorvastatin 80 mg      5.  Social situation    Discussed with  and he will need home care with nursing to help give daily medications as it is imperative that he takes his Plavix (new LAD stent) and Xarelto (prior unprovoked PE) daily      Follow up:  1 week virtual visit is ok. Arranged for staged PCI of RCA at that time      Follow up:  9/11/20 10:00 virtual visit with GINNA Polanco PA-C      Patient Active Problem List   Diagnosis     Diabetes mellitus, type 2 (H)     Acute chest pain     NSTEMI (non-ST elevated myocardial infarction) (H)     Pulmonary embolism (H)     Type 2 diabetes mellitus with hyperglycemia, with long-term current use of insulin (H)       Physical Exam   Temp: 97.6  F (36.4  C) Temp src: Oral BP: 107/55 Pulse: 69   Resp: 18 SpO2: 96 % O2 Device: None (Room air) Oxygen Delivery: 3 LPM  Vitals:    09/01/20 2304 09/02/20 0508 09/03/20 0650   Weight: 93.9 kg (207 lb) 93 kg (205 lb 1.6 oz) 92.5 kg (204 lb)     Vital Signs with Ranges  Temp:  [97.4  F (36.3  C)-98  F (36.7  C)] 97.6  F (36.4  C)  Pulse:  [66-87] 69  Resp:  [12-20] 18  BP: (107-147)/(47-99) 107/55  SpO2:  [96 %-100 %] 96 %  I/O last 3 completed shifts:  In: 243 [P.O.:240; I.V.:3]  Out: 400 [Urine:400]    Constitutional: NAD.   Respiratory: CTAB.   Cardiovascular: RRR, s1s2, no sig murmur, no edema  GI: soft, BS+  Skin: warm, no rashes  Musculoskeletal: Moving all extremities.  LRA site: 2+ radial pulse, kye test ok  Neurologic: Alert  Neuropsychiatric: Normal affect       Data   Recent Labs   Lab 09/04/20  0701 09/03/20  0557 09/02/20  1146 09/02/20  0417 09/01/20  2344 09/01/20 1926 08/28/20 2215   WBC  --   --   --  7.4  --  8.0 7.3   HGB  --   --   --  14.7  --  15.3 14.6   MCV  --   --   --  86  --  87 86   PLT  --   --   --  195  --  210 203     --    --  138  --  132* 134   POTASSIUM 3.7 3.8  --  3.8  --  4.7 4.2   CHLORIDE 109  --   --  106  --  101 101   CO2 28  --   --  27  --  24 25   BUN 13  --   --  22  --  21 22   CR 0.88  --   --  0.84  --  0.91 1.02   ANIONGAP 2*  --   --  5  --  7 8   CHRISTIE 8.2*  --   --  8.7  --  9.0 8.7   *  --   --  196*  --  459* 414*   ALBUMIN  --   --   --   --   --   --  3.3*   PROTTOTAL  --   --   --   --   --   --  6.6*   BILITOTAL  --   --   --   --   --   --  0.5   ALKPHOS  --   --   --   --   --   --  124   ALT  --   --   --   --   --   --  52   AST  --   --   --   --   --   --  23   TROPI  --   --  4.209* 2.310* 0.913* 0.307* <0.015     Recent Results (from the past 24 hour(s))   Cardiac Catheterization   Result Value    Cath EF Quantitative 40    Narrative      Left ventricular filling pressures are severely elevated .    The ejection fraction is calculated to be 40%.    Dist RCA lesion is 80% stenosed.    Prox Cx lesion is 45% stenosed.    Dist LAD lesion is 40% stenosed.    Ost LAD to Mid LAD lesion is 99% stenosed.    Pressure wire/FFR was performed on the lesion. FFR: 0.92.     1.  Vessel angioplasty and stenting of a subtotal restenotic ostial and   proximal LAD stenosis with KODY grade II flow using a 3.0 x 15 mm   [employer] Resolute [place] drug-eluting stent leaving a 0% residual stenosis   with KODY grade III flow.  2.  Hypodense 40 to 50% proximal circumflex stenosis with an IFR of 0.92.  3.  Long smooth 80% distal RCA stenosis.  4.  Left ventricular end-diastolic pressure of 25 mmHg.  Large   anteroapical and distal inferior wall motion abnormality.  Ejection   fraction of 40%.  No significant mitral regurgitation.  No aortic stenosis   upon pullback.         Echo 9/2/20:  Interpretation Summary  The visual ejection fraction is estimated at 40-45%.  There is mid and distal anteroseptal/septal akinesis.  There is severe apical wall hypokinesis.  Above findings new compared with previous  studies.        Medications     Continuing ACE inhibitor/ARB/ARNI from home medication list OR ACE inhibitor/ARB order already placed during this visit       - MEDICATION INSTRUCTIONS -       - MEDICATION INSTRUCTIONS -       - MEDICATION INSTRUCTIONS -       Percutaneous Coronary Intervention orders placed (this is information for BPA alerting)         aspirin  81 mg Oral Daily     atorvastatin  80 mg Oral Daily     clopidogrel  75 mg Oral Daily     donepezil  5 mg Oral At Bedtime     insulin aspart  1-7 Units Subcutaneous TID AC     insulin aspart  1-5 Units Subcutaneous At Bedtime     insulin detemir  16 Units Subcutaneous BID     losartan  25 mg Oral Daily     metoprolol tartrate  12.5 mg Oral BID     sodium chloride (PF)  3 mL Intracatheter Q8H

## 2020-09-04 NOTE — CONSULTS
"Psychiatry  Met with patient this am who was previously on consultation list. He is doing well, likely at baseline, complaining of poor sleep at home and conflict with his handicapped wife about their bedtime but otherwise no problems at home. He does forget to take his meds and reports his wife does this \"because of my lewybody dementia\" but that she does not like to do it. He is her caretaker but she does have services, Spoke with socialworker who is setting up in home services for him also. He had tried to leave yesterday, hold placed. He reported frustration about his constipation and no access to laxatives, did have some bowel movement yesterday that he recalls. He does have his daughter who is a physician following his care, and another daughter locally who is helping also. Cardiology has recommended cardiac therapy outpatient and he will start this after 1-2 weeks of general inhome therapy. Lewy body dementia patients should be on seroquel preferably if a neuroleptic is necessary for agitation/sleeplessness or increased confusion and I have left a low dose for bedtime use until he is reevaluated by his neurologist.Continue aricept.  Call prn until release.       Kathy Dsouaz MD  9/4/2020      "

## 2020-09-05 NOTE — PLAN OF CARE
Occupational Therapy Discharge Summary    Reason for therapy discharge:    Discharged to home with outpatient therapy.    Progress towards therapy goal(s). See goals on Care Plan in [facility] electronic health record for goal details.  Goals partially met.  Barriers to achieving goals:   discharge from facility.    Therapy recommendation(s):    Continued therapy is recommended.  Rationale/Recommendations:  Recommend OP CR for progressive monitored exercise program.    **Pt not seen by discharging therapist on this date, note written based on previous treating therapist's notes and recommendations.

## 2020-09-10 ENCOUNTER — HOSPITAL ENCOUNTER (OUTPATIENT)
Dept: CARDIAC REHAB | Facility: CLINIC | Age: 70
End: 2020-09-10
Attending: INTERNAL MEDICINE
Payer: MEDICARE

## 2020-09-10 ENCOUNTER — TELEPHONE (OUTPATIENT)
Dept: CARDIOLOGY | Facility: CLINIC | Age: 70
End: 2020-09-10

## 2020-09-10 ENCOUNTER — TELEPHONE (OUTPATIENT)
Dept: CARDIAC REHAB | Facility: CLINIC | Age: 70
End: 2020-09-10

## 2020-09-10 DIAGNOSIS — I21.4 NSTEMI (NON-ST ELEVATED MYOCARDIAL INFARCTION) (H): ICD-10-CM

## 2020-09-10 DIAGNOSIS — R07.9 ACUTE CHEST PAIN: ICD-10-CM

## 2020-09-10 PROCEDURE — 93798 PHYS/QHP OP CAR RHAB W/ECG: CPT

## 2020-09-10 PROCEDURE — 40000116 ZZH STATISTIC OP CR VISIT

## 2020-09-10 NOTE — TELEPHONE ENCOUNTER
Pt had his evaluation today for Cardiac Rehab. Pt came in with some SOB and dizziness. Resting BP 94/54 came up with exercise 124/56. Resting HR: 71 Exercise:116. Concerned with inverted T waves was not seen with EKG when in the hospital.    Thank you,    Sandra Davidson    [phone]

## 2020-09-10 NOTE — TELEPHONE ENCOUNTER
Patient was evaluated by cardiology while inpatient for SOB, KO, chest pain with elevated troponin and echo that showed WMA. PMH: Intermittent medication noncompliance, dementia yet primary care giver of wife with paraplegia, DM2, CAD with CECILY to OM and pLAD 6/2018, unprovoked PE-Xarelto, but pt stopped a few months ago secondary to falls. 9/3/20: Coronary angiogram via LRA showed ostial to mid LAD 99% restenosis s/p CECILY. Has 80% distal RCA that will require staged PCI as outpatient. Echo 9/2/20: 40-45%, mid and distal anteroseptal/septal akinesis, severe apical wall hypokinesis. Pt was started on Plavix and Metoprolol. Xarelto discontinued. Pt has an Rx for PRN SL Nitroglycerin. Writer attempted to call patient to discuss any post hospital d/c questions, review medication changes, and confirm f/u appts, but not available, so spoke to wife [place]. Wife denies any questions regarding pt's medications, and states he has been taking all medications as prescribed, including Plavix. Wife states pt used up all of his PTA PRN SL NTG with no refills. Will route [facility] to [facility] refill as original RX was prescribed in ED in 2018. Wife states pt has had no chest pain, increased SOB or fever. Did experience episode of lightheadedness when getting off of the bus yesterday en route to cardiac rehab. Had to sit down until symptoms passed. Encouraged wife to remind pt to make position changes slowly and fluids encouraged. Do not have a home BP monitor. RN confirmed with wife that patient has an OV appt scheduled on 9/16/20 at 0915 with Dr. Strickland. [facility] referral made to assist with medication compliance, especially Plavix and post CECILY. WILFRIDO Byrne RN.

## 2020-09-10 NOTE — TELEPHONE ENCOUNTER
HIMS to scan strips from cardiac Rehab inverted Twave for Dr. Strickland to review has followu up on 9-.      Requested copy of EKG strip faxed to team.

## 2020-09-11 ENCOUNTER — TELEPHONE (OUTPATIENT)
Dept: CARDIOLOGY | Facility: CLINIC | Age: 70
End: 2020-09-11

## 2020-09-11 RX ORDER — NITROGLYCERIN 0.4 MG/1
0.4 TABLET SUBLINGUAL EVERY 5 MIN PRN
Qty: 30 TABLET | Refills: 1 | Status: SHIPPED | OUTPATIENT
Start: 2020-09-11

## 2020-09-11 NOTE — TELEPHONE ENCOUNTER
Called back after receiving a message. States she had also called the hospital and spoke with [facility] and got an order for the chest discomfort his  has been experiencing. Questioning when the follow up staged heart cath would be scheduled. The heart cath plan note of Dr. De Los Santos notes: Staged intervention of the distal right coronary artery.  Given large anterior wall motion abnormality, elevated LVEDP and low blood pressure today I decided it would be safer to stage his right coronary artery when LV has recovered.    Informed to have the discussion with Dr Strickland at the follow OV on 9/16/20.     Again reviewed if he has chest pain which is not relieved by the NGT, that he is to return to the ED. Verbalized understanding.

## 2020-09-15 ENCOUNTER — TELEPHONE (OUTPATIENT)
Dept: CARDIOLOGY | Facility: CLINIC | Age: 70
End: 2020-09-15

## 2020-09-15 NOTE — TELEPHONE ENCOUNTER
Wellness Screening Tool    Symptom Screening:    Do you have one of the following NEW symptoms:      Fever (subjective or >100.0)?  No    New cough? No    Shortness of breath? No    Chills? No    New loss of taste or smell? No    Generalized body aches? No    New persistent headache? No    New sore throat? No    Nausea, vomiting or diarrhea? No    Within the past 2 weeks, have you been exposed to someone with a known positive illness below?      COVID - 19 (known or suspected) No    Chicken pox?  No    Measles? No    Pertussis? No    Have you had a positive COVID-19 diagnostic test (nasal swab test) in the last 14 days or are you currently   on self-quarantine restrictions (i.e.travel restriction, exposure, etc?) Yes  Negative COVID-19 Test      Patient notified of visitor restriction: Yes  Patient informed to wear a mask: Yes    Patient's appointment status: Patient will be seen in clinic as scheduled on 09/16/20    Reviewed:  DEL Cannon  09/15/20

## 2020-09-16 ENCOUNTER — OFFICE VISIT (OUTPATIENT)
Dept: CARDIOLOGY | Facility: CLINIC | Age: 70
End: 2020-09-16
Payer: MEDICARE

## 2020-09-16 VITALS
HEIGHT: 73 IN | WEIGHT: 210 LBS | BODY MASS INDEX: 27.83 KG/M2 | SYSTOLIC BLOOD PRESSURE: 104 MMHG | DIASTOLIC BLOOD PRESSURE: 60 MMHG | HEART RATE: 62 BPM

## 2020-09-16 DIAGNOSIS — I10 BENIGN ESSENTIAL HYPERTENSION: ICD-10-CM

## 2020-09-16 DIAGNOSIS — I25.10 CORONARY ARTERY DISEASE INVOLVING NATIVE CORONARY ARTERY OF NATIVE HEART WITHOUT ANGINA PECTORIS: Primary | ICD-10-CM

## 2020-09-16 DIAGNOSIS — I21.4 NSTEMI (NON-ST ELEVATED MYOCARDIAL INFARCTION) (H): ICD-10-CM

## 2020-09-16 DIAGNOSIS — Z11.59 ENCOUNTER FOR SCREENING FOR OTHER VIRAL DISEASES: Primary | ICD-10-CM

## 2020-09-16 PROCEDURE — 99214 OFFICE O/P EST MOD 30 MIN: CPT | Performed by: INTERNAL MEDICINE

## 2020-09-16 RX ORDER — LOSARTAN POTASSIUM 25 MG/1
12.5 TABLET ORAL DAILY
Qty: 30 TABLET | Refills: 11 | Status: SHIPPED | OUTPATIENT
Start: 2020-09-16 | End: 2021-02-09

## 2020-09-16 ASSESSMENT — MIFFLIN-ST. JEOR: SCORE: 1781.3

## 2020-09-16 NOTE — LETTER
9/16/2020    Kingston Leal MD  [facility] [address] [facility] [address]    RE: Matt Adrianbertin       Dear Colleague,    I had the pleasure of seeing Matt Way in the [facility].    HPI and Plan:   Mr. Way is a very pleasant 69-year-old gentleman who I saw in 06/2018 when he presented with non-ST elevation myocardial infarction.  Coronary angiogram showed proximal LAD eccentric 90% stenosis which was successfully intervened with 2.75 x 12 mm drug-eluting stent.  He had 40% distal LAD stenosis, 50% diagonal, 10% proximal circumflex and 20% proximal RCA stenosis.  He has other comorbidities of diabetes, dyslipidemia and history of some medical noncompliance.  He was also diagnosed with unprovoked small perm embolism last year and was on Xarelto.  He also has underlying cognitive impairment/some dementia.  Last month patient took him to the ER because of concern chest discomfort and was recommend admission but unfortunately left against medical advice.  Post discharge she continued to struggle with chest discomfort and was eventually admitted for reasons hospital with non-ST elevation myocardial infarction.  Coronary angiogram showed ostial to mid LAD 99% stenosis involving the previous stent which was successfully intervened drug-eluting stent.  Additionally was found to have 80% long smooth stenosis of distal RCA.  Letter was not intervened at that time and plan was staged procedure.  Patient also underwent echocardiogram that showed LVEF decreased to 40 to 45% with wall motion abnormalities involving the LAD territory  Also Xarelto was stopped because patient was having recurrent falls and the PE was small.  Patient was discharged on dual antiplatelet therapy, low-dose beta-blocker, low-dose losartan and statin therapy.  Patient had some sessions of cardiac rehab he had some dizziness with blood pressure on the lower side systolic in upper 90s.  It  was some also concerned about some chest discomfort.  Today patient is coming to cardiology clinic accompanied by his wife.  Patient tells me that he no longer feels chest discomfort.  He is able to walk without any issues.  He still sometimes can get dizzy especially if he gets up quickly.  No presyncope or syncope or no recent falls.  Presently is on 12.5 mg twice daily metoprolol tartrate, 25 mg daily of losartan, aspirin, Plavix, Lipitor 80 mg daily.  LDL is well controlled at 56.  He also has diabetes and on insulin and metformin.  Patient is wife do have some challenges with transportation.    Impression plan  A pleasant 69-year gentleman with history of CAD, diabetes, dyslipidemia, some cognitive impairment, history of medical noncompliance who recently had non-ST was microinfarction with culprit vessel being LAD stenosis which also involve the previous stent which was successfully intervened with drug-eluting stent percutaneous intervention.  He has residual distal RCA stenosis.  I had a long discussion patient and his wife we also reviewed the angiogram films.  I recommend staged PCI of the RCA which was originally planned at the time of discharge.  Rational of staged PCI, the risk involved and the alternative were discussed with patient and he is willing to proceed.  I will request this with Dr. Garcia who did the previous PCI.  Regarding dizziness this certainly could be from the borderline low blood pressure patient has.  I recommend decreasing losartan to 12.5 mg daily.  If this does not resolve dizziness I recommend complete discontinuation of losartan and even beta-blocker therapy.  For now I think is reasonable to wait for the staged PCI of the RCA before restarting cardiac rehab.  I recommend follow-up with [facility] a week after the staged PCI.  I am recommending follow-up with me in about 3 months with a repeat echocardiogram.  Importance of medical compliance especially  dual antiplatelet therapy at  least for a year since the PCI was again reemphasized..    35 minutes of total time was spent with more than 50% counseling and coordination of care.            Orders Placed This Encounter   Procedures     Follow-Up with Cardiac Advanced Practice Provider     Follow-Up with Cardiologist     Echocardiogram Complete       Orders Placed This Encounter   Medications     losartan (COZAAR) 25 MG tablet     Sig: Take 0.5 tablets (12.5 mg) by mouth daily     Dispense:  30 tablet     Refill:  11       Medications Discontinued During This Encounter   Medication Reason     losartan (COZAAR) 25 MG tablet          Encounter Diagnoses   Name Primary?     NSTEMI (non-ST elevated myocardial infarction) (H)      Benign essential hypertension      Coronary artery disease involving native coronary artery of native heart without angina pectoris Yes       CURRENT MEDICATIONS:  Current Outpatient Medications   Medication Sig Dispense Refill     acetaminophen (TYLENOL) 325 MG tablet Take 325-650 mg by mouth every 6 hours as needed for mild pain       aspirin 81 MG EC tablet Take 1 tablet (81 mg) by mouth daily 1 tablet 0     atorvastatin (LIPITOR) 80 MG tablet Take 1 tablet (80 mg) by mouth daily 90 tablet 0     clopidogrel (PLAVIX) 75 MG tablet Take 1 tablet (75 mg) by mouth daily 30 tablet 0     donepezil (ARICEPT) 5 MG tablet Take 5 mg by mouth every morning       insulin aspart (NOVOLOG FLEXPEN) 100 UNIT/ML injection Inject 16 Units Subcutaneous 2 times daily (with meals) Breakfast and Dinner       insulin detemir (LEVEMIR) 100 UNIT/ML injection Inject 20 Units Subcutaneous 2 times daily        losartan (COZAAR) 25 MG tablet Take 0.5 tablets (12.5 mg) by mouth daily 30 tablet 11     metFORMIN (GLUCOPHAGE) 1000 MG tablet Take 1,000 mg by mouth 2 times daily (with meals)       metoprolol tartrate (LOPRESSOR) 25 MG tablet Take 0.5 tablets (12.5 mg) by mouth 2 times daily 60 tablet 0     omeprazole (PRILOSEC OTC) 20 MG EC tablet Take 20  mg by mouth daily as needed       VITAMIN D, CHOLECALCIFEROL, PO Take 1 tablet by mouth daily       nitroGLYcerin (NITROSTAT) 0.4 MG sublingual tablet Place 1 tablet (0.4 mg) under the tongue every 5 minutes as needed for chest pain If symptoms persist 5 minutes after 2nd dose call 911. 30 tablet 1       ALLERGIES     Allergies   Allergen Reactions     Brilinta [Ticagrelor]      Duloxetine      Other reaction(s): Confusion  Per daughter who is MD     Gabapentin      Other reaction(s): Edema     Lisinopril Rash       PAST MEDICAL HISTORY:  Past Medical History:   Diagnosis Date     Coronary artery disease 06/2018    s/p CECILY to LAD      Diabetes mellitus      GERD (gastroesophageal reflux disease)      Hyperlipidemia      Obesity      FLAQUITA (obstructive sleep apnea)      Pulmonary embolism 11/15/2018       PAST SURGICAL HISTORY:  Past Surgical History:   Procedure Laterality Date     COLONOSCOPY       Coronary artery angiogram with stent (CECILY) to LAD  06/2018     CV CORONARY ANGIOGRAM N/A 9/3/2020    Procedure: Coronary Angiogram;  Surgeon: Herb Tran MD;  Location:  HEART CARDIAC CATH LAB     CV INSTANTANEOUS WAVE-FREE RATIO N/A 9/3/2020    Procedure: Instantaneous Wave-Free Ratio;  Surgeon: Herb Tran MD;  Location:  HEART CARDIAC CATH LAB     CV LEFT HEART CATH N/A 9/3/2020    Procedure: Left Heart Cath;  Surgeon: Herb Tran MD;  Location:  HEART CARDI[facility] CATH LAB     CV LEFT VENTRICULOGRAM N/A 9/3/2020    Procedure: Left Ventriculogram;  Surgeon: Herb Tran MD;  Location:  HEART CARDIAC CATH LAB     CV PCI STENT DRUG ELUTING N/A 9/3/2020    Procedure: Percutaneous Coronary Intervention Stent Drug Eluting;  Surgeon: Herb Tran MD;  Location: [facility]     SHOULDER SURGERY       VASECTOMY         FAMILY HISTORY:  Family History   Problem Relation Age of Onset     Lupus Mother        SOCIAL HISTORY:  Social History     Socioeconomic History      "Marital status:      Spouse name: None     Number of children: None     Years of education: None     Highest education level: None   Occupational History     None   Social Needs     Financial resource strain: None     Food insecurity     Worry: None     Inability: None     Transportation needs     Medical: None     Non-medical: None   Tobacco Use     Smoking status: Never Smoker     Smokeless tobacco: Never Used   Substance and Sexual Activity     Alcohol use: No     Drug use: No     Sexual activity: None   Lifestyle     Physical activity     Days per week: None     Minutes per session: None     Stress: None   Relationships     Social connections     Talks on phone: None     Gets together: None     Attends Muslim service: None     Active member of club or organization: None     Attends meetings of clubs or organizations: None     Relationship status: None     Intimate partner violence     Fear of current or ex partner: None     Emotionally abused: None     Physically abused: None     Forced sexual activity: None   Other Topics Concern     None   Social History Narrative     None       Review of Systems:  Skin:  Negative       Eyes:  Positive for glasses    ENT:  Negative      Respiratory:  Positive for(PE) dyspnea on exertion     Cardiovascular:  Negative for;chest pain lightheadedness;dizziness;Positive for    Gastroenterology: Negative      Genitourinary:  Negative      Musculoskeletal:  Negative      Neurologic:  Negative      Psychiatric:  Positive for sleep disturbances    Heme/Lymph/Imm:  Positive for allergies    Endocrine:  Positive for diabetes      Physical Exam:  Vitals: /60   Pulse 62   Ht 1.854 m (6' 0.99\")   Wt 95.3 kg (210 lb)   BMI 27.71 kg/m      General patient appears pleasant, comfortable  Neck normal JVP  Cardiovascular system S1-S2 normal no murmur rub or gallop next respiration clear to auscultation bilaterally  GI system abdomen soft nontender  Extremities no pitting " edema, left radial cath site looks well-healed, 2+ pulse, distal perfusion normal  Neurological alert, oriented  Psych flat affect  Skin no obvious rash  HEENT no pallor      Thank you for allowing me to participate in the care of your patient.    Sincerely,     Wilber Strickland MD     [facility]

## 2020-09-16 NOTE — LETTER
9/16/2020    Kingston Leal MD  [facility] [address] [facility] [address]    RE: [place]       Dear Colleague,    I had the pleasure of seeing Matt Way in the [facility].    HPI and Plan:   Mr. Way is a very pleasant 69-year-old gentleman who I saw in 06/2018 when he presented with non-ST elevation myocardial infarction.  Coronary angiogram showed proximal LAD eccentric 90% stenosis which was successfully intervened with 2.75 x 12 mm drug-eluting stent.  He had 40% distal LAD stenosis, 50% diagonal, 10% proximal circumflex and 20% proximal RCA stenosis.  He has other comorbidities of diabetes, dyslipidemia and history of some medical noncompliance.  He was also diagnosed with unprovoked small perm embolism last year and was on Xarelto.  He also has underlying cognitive impairment/some dementia.  Last month patient took him to the ER because of concern chest discomfort and was recommend admission but unfortunately left against medical advice.  Post discharge she continued to struggle with chest discomfort and was eventually admitted for reasons hospital with non-ST elevation myocardial infarction.  Coronary angiogram showed ostial to mid LAD 99% stenosis involving the previous stent which was successfully intervened drug-eluting stent.  Additionally was found to have 80% long smooth stenosis of distal RCA.  Letter was not intervened at that time and plan was staged procedure.  Patient also underwent echocardiogram that showed LVEF decreased to 40 to 45% with wall motion abnormalities involving the LAD territory  Also Xarelto was stopped because patient was having recurrent falls and the PE was small.  Patient was discharged on dual antiplatelet therapy, low-dose beta-blocker, low-dose losartan and statin therapy.  Patient had some sessions of cardiac rehab he had some dizziness with blood pressure on the lower side systolic in upper 90s.  It  was some also concerned about some chest discomfort.  Today patient is coming to cardiology clinic accompanied by his wife.  Patient tells me that he no longer feels chest discomfort.  He is able to walk without any issues.  He still sometimes can get dizzy especially if he gets up quickly.  No presyncope or syncope or no recent falls.  Presently is on 12.5 mg twice daily metoprolol tartrate, 25 mg daily of losartan, aspirin, Plavix, Lipitor 80 mg daily.  LDL is well controlled at 56.  He also has diabetes and on insulin and metformin.  Patient is wife do have some challenges with transportation.    Impression plan  A pleasant 69-year gentleman with history of CAD, diabetes, dyslipidemia, some cognitive impairment, history of medical noncompliance who recently had non-ST was microinfarction with culprit vessel being LAD stenosis which also involve the previous stent which was successfully intervened with drug-eluting stent percutaneous intervention.  He has residual distal RCA stenosis.  I had a long discussion patient and his wife we also reviewed the angiogram films.  I recommend staged PCI of the RCA which was originally planned at the time of discharge.  Rational of staged PCI, the risk involved and the alternative were discussed with patient and he is willing to proceed.  I will request this with Dr. Garcia who did the previous PCI.  Regarding dizziness this certainly could be from the borderline low blood pressure patient has.  I recommend decreasing losartan to 12.5 mg daily.  If this does not resolve dizziness I recommend complete discontinuation of losartan and even beta-blocker therapy.  For now I think is reasonable to wait for the staged PCI of the RCA before restarting cardiac rehab.  I recommend follow-up with YO a week after the staged PCI.  I am recommending follow-up with me in about 3 months with a repeat echocardiogram.  Importance of medical compliance especially  dual antiplatelet therapy at  least for a year since the PCI was again reemphasized..    35 minutes of total time was spent with more than 50% counseling and coordination of care.            Orders Placed This Encounter   Procedures     Follow-Up with Cardiac Advanced Practice Provider     Follow-Up with Cardiologist     Echocardiogram Complete       Orders Placed This Encounter   Medications     losartan (COZAAR) 25 MG tablet     Sig: Take 0.5 tablets (12.5 mg) by mouth daily     Dispense:  30 tablet     Refill:  11       Medications Discontinued During This Encounter   Medication Reason     losartan (COZAAR) 25 MG tablet          Encounter Diagnoses   Name Primary?     NSTEMI (non-ST elevated myocardial infarction) (H)      Benign essential hypertension      Coronary artery disease involving native coronary artery of native heart without angina pectoris Yes       CURRENT MEDICATIONS:  Current Outpatient Medications   Medication Sig Dispense Refill     acetaminophen (TYLENOL) 325 MG tablet Take 325-650 mg by mouth every 6 hours as needed for mild pain       aspirin 81 MG EC tablet Take 1 tablet (81 mg) by mouth daily 1 tablet 0     atorvastatin (LIPITOR) 80 MG tablet Take 1 tablet (80 mg) by mouth daily 90 tablet 0     clopidogrel (PLAVIX) 75 MG tablet Take 1 tablet (75 mg) by mouth daily 30 tablet 0     donepezil (ARICEPT) 5 MG tablet Take 5 mg by mouth every morning       insulin aspart (NOVOLOG FLEXPEN) 100 UNIT/ML injection Inject 16 Units Subcutaneous 2 times daily (with meals) Breakfast and Dinner       insulin detemir (LEVEMIR) 100 UNIT/ML injection Inject 20 Units Subcutaneous 2 times daily        losartan (COZAAR) 25 MG tablet Take 0.5 tablets (12.5 mg) by mouth daily 30 tablet 11     metFORMIN (GLUCOPHAGE) 1000 MG tablet Take 1,000 mg by mouth 2 times daily (with meals)       metoprolol tartrate (LOPRESSOR) 25 MG tablet Take 0.5 tablets (12.5 mg) by mouth 2 times daily 60 tablet 0     omeprazole (PRILOSEC OTC) 20 MG EC tablet Take 20  mg by mouth daily as needed       VITAMIN D, CHOLECALCIFEROL, PO Take 1 tablet by mouth daily       nitroGLYcerin (NITROSTAT) 0.4 MG sublingual tablet Place 1 tablet (0.4 mg) under the tongue every 5 minutes as needed for chest pain If symptoms persist 5 minutes after 2nd dose call 911. 30 tablet 1       ALLERGIES     Allergies   Allergen Reactions     Brilinta [Ticagrelor]      Duloxetine      Other reaction(s): Confusion  Per daughter who is MD     Gabapentin      Other reaction(s): Edema     Lisinopril Rash       PAST MEDICAL HISTORY:  Past Medical History:   Diagnosis Date     Coronary artery disease 06/2018    s/p CECILY to LAD      Diabetes mellitus      GERD (gastroesophageal reflux disease)      Hyperlipidemia      Obesity      FLAQUITA (obstructive sleep apnea)      Pulmonary embolism 11/15/2018       PAST SURGICAL HISTORY:  Past Surgical History:   Procedure Laterality Date     COLONOSCOPY       Coronary artery angiogram with stent (CECILY) to LAD  06/2018     CV CORONARY ANGIOGRAM N/A 9/3/2020    Procedure: Coronary Angiogram;  Surgeon: Herb Tran MD;  Location:  HEART CARDIAC CATH LAB     CV INSTANTANEOUS WAVE-FREE RATIO N/A 9/3/2020    Procedure: Instantaneous Wave-Free Ratio;  Surgeon: Herb Tran MD;  Location:  HEART CARDIAC CATH LAB     CV LEFT HEART CATH N/A 9/3/2020    Procedure: Left Heart Cath;  Surgeon: Herb Tran MD;  Location:  HEART CARDIAC CATH LAB     CV LEFT VENTRICULOGRAM N/A 9/3/2020    Procedure: Left Ventriculogram;  Surgeon: Herb Tran MD;  Location:  HEART CARDIAC CATH LAB     CV PCI STENT DRUG ELUTING N/A 9/3/2020    Procedure: Percutaneous Coronary Intervention Stent Drug Eluting;  Surgeon: Herb Tran MD;  Location: [facility]     SHOULDER SURGERY       VASECTOMY         FAMILY HISTORY:  Family History   Problem Relation Age of Onset     Lupus Mother        SOCIAL HISTORY:  Social History     Socioeconomic History      "Marital status:      Spouse name: None     Number of children: None     Years of education: None     Highest education level: None   Occupational History     None   Social Needs     Financial resource strain: None     Food insecurity     Worry: None     Inability: None     Transportation needs     Medical: None     Non-medical: None   Tobacco Use     Smoking status: Never Smoker     Smokeless tobacco: Never Used   Substance and Sexual Activity     Alcohol use: No     Drug use: No     Sexual activity: None   Lifestyle     Physical activity     Days per week: None     Minutes per session: None     Stress: None   Relationships     Social connections     Talks on phone: None     Gets together: None     Attends Yarsani service: None     Active member of club or organization: None     Attends meetings of clubs or organizations: None     Relationship status: None     Intimate partner violence     Fear of current or ex partner: None     Emotionally abused: None     Physically abused: None     Forced sexual activity: None   Other Topics Concern     None   Social History Narrative     None       Review of Systems:  Skin:  Negative       Eyes:  Positive for glasses    ENT:  Negative      Respiratory:  Positive for(PE) dyspnea on exertion     Cardiovascular:  Negative for;chest pain lightheadedness;dizziness;Positive for    Gastroenterology: Negative      Genitourinary:  Negative      Musculoskeletal:  Negative      Neurologic:  Negative      Psychiatric:  Positive for sleep disturbances    Heme/Lymph/Imm:  Positive for allergies    Endocrine:  Positive for diabetes      Physical Exam:  Vitals: /60   Pulse 62   Ht 1.854 m (6' 0.99\")   Wt 95.3 kg (210 lb)   BMI 27.71 kg/m      General patient appears pleasant, comfortable  Neck normal JVP  Cardiovascular system S1-S2 normal no murmur rub or gallop next respiration clear to auscultation bilaterally  GI system abdomen soft nontender  Extremities no pitting " edema, left radial cath site looks well-healed, 2+ pulse, distal perfusion normal  Neurological alert, oriented  Psych flat affect  Skin no obvious rash  HEENT no pallor      CC  Wilber Strickland MD  [address]  [place] [id number]                    Thank you for allowing me to participate in the care of your patient.      Sincerely,     Wilber Strickland MD     [facility]    cc:   Wilber Strickland MD  [address]  [place] [id number]

## 2020-09-19 DIAGNOSIS — Z11.59 ENCOUNTER FOR SCREENING FOR OTHER VIRAL DISEASES: ICD-10-CM

## 2020-09-19 PROCEDURE — U0003 INFECTIOUS AGENT DETECTION BY NUCLEIC ACID (DNA OR RNA); SEVERE ACUTE RESPIRATORY SYNDROME CORONAVIRUS 2 (SARS-COV-2) (CORONAVIRUS DISEASE [COVID-19]), AMPLIFIED PROBE TECHNIQUE, MAKING USE OF HIGH THROUGHPUT TECHNOLOGIES AS DESCRIBED BY CMS-2020-01-R: HCPCS | Performed by: INTERNAL MEDICINE

## 2020-09-20 LAB
SARS-COV-2 RNA SPEC QL NAA+PROBE: NOT DETECTED
SPECIMEN SOURCE: NORMAL

## 2020-09-22 ENCOUNTER — DOCUMENTATION ONLY (OUTPATIENT)
Dept: CARDIOLOGY | Facility: CLINIC | Age: 70
End: 2020-09-22

## 2020-09-22 DIAGNOSIS — E11.9 DIABETES MELLITUS, TYPE 2 (H): Primary | ICD-10-CM

## 2020-09-22 RX ORDER — SODIUM CHLORIDE 9 MG/ML
INJECTION, SOLUTION INTRAVENOUS CONTINUOUS
Status: CANCELLED | OUTPATIENT
Start: 2020-09-22

## 2020-09-22 RX ORDER — LIDOCAINE 40 MG/G
CREAM TOPICAL
Status: CANCELLED | OUTPATIENT
Start: 2020-09-22

## 2020-09-22 RX ORDER — POTASSIUM CHLORIDE 1500 MG/1
20 TABLET, EXTENDED RELEASE ORAL
Status: CANCELLED | OUTPATIENT
Start: 2020-09-22

## 2020-09-22 NOTE — PROGRESS NOTES
Wellness Screening Tool    Symptom Screening:    Do you have one of the following NEW symptoms:      Fever (subjective or >100.0)?  No    New cough? No    Shortness of breath?no    Chills? no    New loss of taste or smell? no    Generalized body aches? no    New persistent headache?no     New sore throat? no    Nausea, vomiting or diarrhea? no    Within the past 2 weeks, have you been exposed to someone with a known positive illness below?      COVID - 19 (known or suspected) no    Chicken pox?  no    Measles? no    Pertussis? no    Have you had a positive COVID-19 diagnostic test (nasal swab test) in the last 14 days or are you currently   on self-quarantine restrictions (i.e.travel restriction, exposure, etc?) yes        Patient notified of visitor restriction: yes  Patient informed to wear a mask: yes    Patient's appointment status: Patient will be seen in clinic as scheduled on 10/6      Spoke to both patient and spouse about prep for tomorrow's PCI of RCA.  Patient test NEGATIVE for COVID  Will be NPO after mid night  Will hold metformin day of procedure and resume on Friday after BMP  IS taking aspirin  Will talk to PCP regarding Insulin directions  To hold PDE-5 inhibitors  Will continue Plavix  Patient's sister will  patient-Spouse will be there for procedure.  All questions and conerns addressed.

## 2020-09-23 ENCOUNTER — SURGERY (OUTPATIENT)
Age: 70
End: 2020-09-23
Payer: MEDICARE

## 2020-09-23 ENCOUNTER — TELEPHONE (OUTPATIENT)
Dept: CARDIOLOGY | Facility: CLINIC | Age: 70
End: 2020-09-23

## 2020-09-23 ENCOUNTER — HOSPITAL ENCOUNTER (OUTPATIENT)
Facility: CLINIC | Age: 70
Discharge: HOME | End: 2020-09-23
Admitting: INTERNAL MEDICINE
Payer: MEDICARE

## 2020-09-23 VITALS
TEMPERATURE: 96 F | WEIGHT: 208 LBS | DIASTOLIC BLOOD PRESSURE: 73 MMHG | HEART RATE: 59 BPM | BODY MASS INDEX: 27.57 KG/M2 | HEIGHT: 73 IN | SYSTOLIC BLOOD PRESSURE: 125 MMHG | OXYGEN SATURATION: 96 % | RESPIRATION RATE: 16 BRPM

## 2020-09-23 DIAGNOSIS — E11.9 DIABETES MELLITUS, TYPE 2 (H): ICD-10-CM

## 2020-09-23 DIAGNOSIS — I25.10 CORONARY ARTERY DISEASE INVOLVING NATIVE CORONARY ARTERY OF NATIVE HEART WITHOUT ANGINA PECTORIS: ICD-10-CM

## 2020-09-23 LAB
ANION GAP SERPL CALCULATED.3IONS-SCNC: 5 MMOL/L (ref 3–14)
APTT PPP: 37 SEC (ref 22–37)
BUN SERPL-MCNC: 21 MG/DL (ref 7–30)
CALCIUM SERPL-MCNC: 8.3 MG/DL (ref 8.5–10.1)
CHLORIDE SERPL-SCNC: 107 MMOL/L (ref 94–109)
CHOLEST SERPL-MCNC: 150 MG/DL
CO2 SERPL-SCNC: 28 MMOL/L (ref 20–32)
CREAT SERPL-MCNC: 0.86 MG/DL (ref 0.66–1.25)
ERYTHROCYTE [DISTWIDTH] IN BLOOD BY AUTOMATED COUNT: 14.8 % (ref 10–15)
GFR SERPL CREATININE-BSD FRML MDRD: 89 ML/MIN/{1.73_M2}
GLUCOSE SERPL-MCNC: 100 MG/DL (ref 70–99)
HCT VFR BLD AUTO: 43 % (ref 40–53)
HDLC SERPL-MCNC: 37 MG/DL
HGB BLD-MCNC: 14.4 G/DL (ref 13.3–17.7)
INR PPP: 1.06 (ref 0.86–1.14)
KCT BLD-ACNC: 306 SEC (ref 75–150)
LDLC SERPL CALC-MCNC: 72 MG/DL
MCH RBC QN AUTO: 28.7 PG (ref 26.5–33)
MCHC RBC AUTO-ENTMCNC: 33.5 G/DL (ref 31.5–36.5)
MCV RBC AUTO: 86 FL (ref 78–100)
NONHDLC SERPL-MCNC: 113 MG/DL
PLATELET # BLD AUTO: 219 10E9/L (ref 150–450)
POTASSIUM SERPL-SCNC: 3.5 MMOL/L (ref 3.4–5.3)
RBC # BLD AUTO: 5.01 10E12/L (ref 4.4–5.9)
SODIUM SERPL-SCNC: 140 MMOL/L (ref 133–144)
TRIGL SERPL-MCNC: 206 MG/DL
WBC # BLD AUTO: 7.2 10E9/L (ref 4–11)

## 2020-09-23 PROCEDURE — 25800030 ZZH RX IP 258 OP 636: Performed by: INTERNAL MEDICINE

## 2020-09-23 PROCEDURE — 93010 ELECTROCARDIOGRAM REPORT: CPT | Mod: 76 | Performed by: INTERNAL MEDICINE

## 2020-09-23 PROCEDURE — 40000065 ZZH STATISTIC EKG NON-CHARGEABLE

## 2020-09-23 PROCEDURE — 99152 MOD SED SAME PHYS/QHP 5/>YRS: CPT | Mod: 59 | Performed by: INTERNAL MEDICINE

## 2020-09-23 PROCEDURE — 85610 PROTHROMBIN TIME: CPT | Performed by: INTERNAL MEDICINE

## 2020-09-23 PROCEDURE — 99152 MOD SED SAME PHYS/QHP 5/>YRS: CPT | Performed by: INTERNAL MEDICINE

## 2020-09-23 PROCEDURE — 99153 MOD SED SAME PHYS/QHP EA: CPT | Performed by: INTERNAL MEDICINE

## 2020-09-23 PROCEDURE — 93005 ELECTROCARDIOGRAM TRACING: CPT

## 2020-09-23 PROCEDURE — 40000235 ZZH STATISTIC TELEMETRY

## 2020-09-23 PROCEDURE — 92928 PRQ TCAT PLMT NTRAC ST 1 LES: CPT | Mod: RC | Performed by: INTERNAL MEDICINE

## 2020-09-23 PROCEDURE — C1725 CATH, TRANSLUMIN NON-LASER: HCPCS | Performed by: INTERNAL MEDICINE

## 2020-09-23 PROCEDURE — 40000852 ZZH STATISTIC HEART CATH LAB OR EP LAB

## 2020-09-23 PROCEDURE — 27210794 ZZH OR GENERAL SUPPLY STERILE: Performed by: INTERNAL MEDICINE

## 2020-09-23 PROCEDURE — 25000128 H RX IP 250 OP 636: Performed by: INTERNAL MEDICINE

## 2020-09-23 PROCEDURE — C1769 GUIDE WIRE: HCPCS | Performed by: INTERNAL MEDICINE

## 2020-09-23 PROCEDURE — C1894 INTRO/SHEATH, NON-LASER: HCPCS | Performed by: INTERNAL MEDICINE

## 2020-09-23 PROCEDURE — 36415 COLL VENOUS BLD VENIPUNCTURE: CPT

## 2020-09-23 PROCEDURE — 25000132 ZZH RX MED GY IP 250 OP 250 PS 637: Mod: GY | Performed by: INTERNAL MEDICINE

## 2020-09-23 PROCEDURE — C1887 CATHETER, GUIDING: HCPCS | Performed by: INTERNAL MEDICINE

## 2020-09-23 PROCEDURE — 80048 BASIC METABOLIC PNL TOTAL CA: CPT | Performed by: INTERNAL MEDICINE

## 2020-09-23 PROCEDURE — C1874 STENT, COATED/COV W/DEL SYS: HCPCS | Performed by: INTERNAL MEDICINE

## 2020-09-23 PROCEDURE — 85347 COAGULATION TIME ACTIVATED: CPT

## 2020-09-23 PROCEDURE — 80061 LIPID PANEL: CPT | Performed by: INTERNAL MEDICINE

## 2020-09-23 PROCEDURE — C9600 PERC DRUG-EL COR STENT SING: HCPCS | Performed by: INTERNAL MEDICINE

## 2020-09-23 PROCEDURE — 85027 COMPLETE CBC AUTOMATED: CPT | Performed by: INTERNAL MEDICINE

## 2020-09-23 PROCEDURE — 25000125 ZZHC RX 250: Performed by: INTERNAL MEDICINE

## 2020-09-23 PROCEDURE — 85730 THROMBOPLASTIN TIME PARTIAL: CPT | Performed by: INTERNAL MEDICINE

## 2020-09-23 DEVICE — STENT SYNERGY DRUG ELUTING 3.00X32MM  H7493926032300: Type: IMPLANTABLE DEVICE | Status: FUNCTIONAL

## 2020-09-23 RX ORDER — HEPARIN SODIUM 1000 [USP'U]/ML
INJECTION, SOLUTION INTRAVENOUS; SUBCUTANEOUS
Status: DISCONTINUED | OUTPATIENT
Start: 2020-09-23 | End: 2020-09-23 | Stop reason: HOSPADM

## 2020-09-23 RX ORDER — VERAPAMIL HYDROCHLORIDE 2.5 MG/ML
INJECTION, SOLUTION INTRAVENOUS
Status: DISCONTINUED | OUTPATIENT
Start: 2020-09-23 | End: 2020-09-23 | Stop reason: HOSPADM

## 2020-09-23 RX ORDER — IOPAMIDOL 755 MG/ML
INJECTION, SOLUTION INTRAVASCULAR
Status: DISCONTINUED | OUTPATIENT
Start: 2020-09-23 | End: 2020-09-23 | Stop reason: HOSPADM

## 2020-09-23 RX ORDER — NITROGLYCERIN 0.4 MG/1
0.4 TABLET SUBLINGUAL EVERY 5 MIN PRN
Status: DISCONTINUED | OUTPATIENT
Start: 2020-09-23 | End: 2020-09-23 | Stop reason: HOSPADM

## 2020-09-23 RX ORDER — ONDANSETRON 2 MG/ML
4 INJECTION INTRAMUSCULAR; INTRAVENOUS EVERY 6 HOURS PRN
Status: DISCONTINUED | OUTPATIENT
Start: 2020-09-23 | End: 2020-09-23 | Stop reason: HOSPADM

## 2020-09-23 RX ORDER — ATROPINE SULFATE 0.1 MG/ML
0.5 INJECTION INTRAVENOUS
Status: DISCONTINUED | OUTPATIENT
Start: 2020-09-23 | End: 2020-09-23 | Stop reason: HOSPADM

## 2020-09-23 RX ORDER — OXYCODONE HYDROCHLORIDE 5 MG/1
5 TABLET ORAL EVERY 4 HOURS PRN
Status: DISCONTINUED | OUTPATIENT
Start: 2020-09-23 | End: 2020-09-23 | Stop reason: HOSPADM

## 2020-09-23 RX ORDER — ASPIRIN 81 MG/1
81 TABLET, CHEWABLE ORAL ONCE
Status: DISCONTINUED | OUTPATIENT
Start: 2020-09-23 | End: 2020-09-23 | Stop reason: HOSPADM

## 2020-09-23 RX ORDER — ONDANSETRON 4 MG/1
4 TABLET, ORALLY DISINTEGRATING ORAL EVERY 6 HOURS PRN
Status: DISCONTINUED | OUTPATIENT
Start: 2020-09-23 | End: 2020-09-23 | Stop reason: HOSPADM

## 2020-09-23 RX ORDER — METOPROLOL TARTRATE 1 MG/ML
5-10 INJECTION, SOLUTION INTRAVENOUS
Status: DISCONTINUED | OUTPATIENT
Start: 2020-09-23 | End: 2020-09-23 | Stop reason: HOSPADM

## 2020-09-23 RX ORDER — ACETAMINOPHEN 325 MG/1
650 TABLET ORAL EVERY 4 HOURS PRN
Status: DISCONTINUED | OUTPATIENT
Start: 2020-09-23 | End: 2020-09-23 | Stop reason: HOSPADM

## 2020-09-23 RX ORDER — SODIUM CHLORIDE 9 MG/ML
INJECTION, SOLUTION INTRAVENOUS CONTINUOUS
Status: DISCONTINUED | OUTPATIENT
Start: 2020-09-23 | End: 2020-09-23 | Stop reason: HOSPADM

## 2020-09-23 RX ORDER — HYDRALAZINE HYDROCHLORIDE 20 MG/ML
10 INJECTION INTRAMUSCULAR; INTRAVENOUS EVERY 4 HOURS PRN
Status: DISCONTINUED | OUTPATIENT
Start: 2020-09-23 | End: 2020-09-23 | Stop reason: HOSPADM

## 2020-09-23 RX ORDER — FENTANYL CITRATE 50 UG/ML
INJECTION, SOLUTION INTRAMUSCULAR; INTRAVENOUS
Status: DISCONTINUED | OUTPATIENT
Start: 2020-09-23 | End: 2020-09-23 | Stop reason: HOSPADM

## 2020-09-23 RX ORDER — NALOXONE HYDROCHLORIDE 0.4 MG/ML
.1-.4 INJECTION, SOLUTION INTRAMUSCULAR; INTRAVENOUS; SUBCUTANEOUS
Status: DISCONTINUED | OUTPATIENT
Start: 2020-09-23 | End: 2020-09-23 | Stop reason: HOSPADM

## 2020-09-23 RX ORDER — FLUMAZENIL 0.1 MG/ML
0.2 INJECTION, SOLUTION INTRAVENOUS
Status: DISCONTINUED | OUTPATIENT
Start: 2020-09-23 | End: 2020-09-23 | Stop reason: HOSPADM

## 2020-09-23 RX ORDER — FENTANYL CITRATE 50 UG/ML
25-50 INJECTION, SOLUTION INTRAMUSCULAR; INTRAVENOUS
Status: ACTIVE | OUTPATIENT
Start: 2020-09-23 | End: 2020-09-23

## 2020-09-23 RX ORDER — ASPIRIN 81 MG/1
81 TABLET ORAL DAILY
Status: DISCONTINUED | OUTPATIENT
Start: 2020-09-24 | End: 2020-09-23 | Stop reason: HOSPADM

## 2020-09-23 RX ORDER — LIDOCAINE 40 MG/G
CREAM TOPICAL
Status: DISCONTINUED | OUTPATIENT
Start: 2020-09-23 | End: 2020-09-23 | Stop reason: HOSPADM

## 2020-09-23 RX ORDER — NITROGLYCERIN 5 MG/ML
VIAL (ML) INTRAVENOUS
Status: DISCONTINUED | OUTPATIENT
Start: 2020-09-23 | End: 2020-09-23 | Stop reason: HOSPADM

## 2020-09-23 RX ORDER — POTASSIUM CHLORIDE 1500 MG/1
20 TABLET, EXTENDED RELEASE ORAL
Status: COMPLETED | OUTPATIENT
Start: 2020-09-23 | End: 2020-09-23

## 2020-09-23 RX ORDER — NALOXONE HYDROCHLORIDE 0.4 MG/ML
.2-.4 INJECTION, SOLUTION INTRAMUSCULAR; INTRAVENOUS; SUBCUTANEOUS
Status: DISCONTINUED | OUTPATIENT
Start: 2020-09-23 | End: 2020-09-23 | Stop reason: HOSPADM

## 2020-09-23 RX ORDER — OXYCODONE HYDROCHLORIDE 5 MG/1
10 TABLET ORAL EVERY 4 HOURS PRN
Status: DISCONTINUED | OUTPATIENT
Start: 2020-09-23 | End: 2020-09-23 | Stop reason: HOSPADM

## 2020-09-23 RX ADMIN — MIDAZOLAM 1 MG: 1 INJECTION INTRAMUSCULAR; INTRAVENOUS at 10:37

## 2020-09-23 RX ADMIN — IOPAMIDOL 75 ML: 755 INJECTION, SOLUTION INTRAVENOUS at 11:17

## 2020-09-23 RX ADMIN — MIDAZOLAM 1 MG: 1 INJECTION INTRAMUSCULAR; INTRAVENOUS at 10:32

## 2020-09-23 RX ADMIN — VERAPAMIL HYDROCHLORIDE 2.5 MG: 2.5 INJECTION, SOLUTION INTRAVENOUS at 10:39

## 2020-09-23 RX ADMIN — FENTANYL CITRATE 25 MCG: 50 INJECTION, SOLUTION INTRAMUSCULAR; INTRAVENOUS at 11:10

## 2020-09-23 RX ADMIN — LIDOCAINE HYDROCHLORIDE 3 ML: 10 INJECTION, SOLUTION EPIDURAL; INFILTRATION; INTRACAUDAL; PERINEURAL at 10:37

## 2020-09-23 RX ADMIN — NITROGLYCERIN 200 MCG: 5 INJECTION, SOLUTION INTRAVENOUS at 11:17

## 2020-09-23 RX ADMIN — SODIUM CHLORIDE: 9 INJECTION, SOLUTION INTRAVENOUS at 09:02

## 2020-09-23 RX ADMIN — NITROGLYCERIN 200 MCG: 5 INJECTION, SOLUTION INTRAVENOUS at 10:39

## 2020-09-23 RX ADMIN — POTASSIUM CHLORIDE 20 MEQ: 1500 TABLET, EXTENDED RELEASE ORAL at 10:01

## 2020-09-23 RX ADMIN — FENTANYL CITRATE 50 MCG: 50 INJECTION, SOLUTION INTRAMUSCULAR; INTRAVENOUS at 10:32

## 2020-09-23 RX ADMIN — FENTANYL CITRATE 25 MCG: 50 INJECTION, SOLUTION INTRAMUSCULAR; INTRAVENOUS at 10:37

## 2020-09-23 RX ADMIN — HEPARIN SODIUM 9000 UNITS: 1000 INJECTION INTRAVENOUS; SUBCUTANEOUS at 10:41

## 2020-09-23 ASSESSMENT — MIFFLIN-ST. JEOR: SCORE: 1772.36

## 2020-09-23 NOTE — PROGRESS NOTES
1213 Report received from Justyn Kaba RN.  1215 Pt A/O. TR band intact to left wrist.  No oozing or hematoma noted. Area soft & flat. Armboard intact. Left arm elevated on pillows. Pt denies pain. Pt instructed on activity restrictions with left wrist/arm. Verbal understanding received from pt. No family present at this time.  1235 Pt taking diet & flds well. No complaints.  1325 Report given to Justyn Kaba RN.

## 2020-09-23 NOTE — PROGRESS NOTES
Care Suites Admission Nursing Note    Patient Information  Name: Matt Way  Age: 69 year old  Reason for admission: left heart cath and stent  Care Suites arrival time: 0800    Visitor Information  Name: [place]  Informed of visitor restrictions: Yes  1 visitor allowed per patient   Visitor must screen negative for COVID symptoms   Visitor must wear a mask  Waiting rooms closed to visitors    Patient Admission/Assessment   Pre-procedure assessment complete: Yes  If abnormal assessment/labs, provider notified: N/A  NPO: Yes  Medications held per instructions/orders: Yes  Consent: obtained  If applicable, pregnancy test status: deferred  Patient oriented to room: Yes  Education/questions answered: Yes  Plan/other: Review labs, obtain consent and proceed with scheduled treatment or intervention      Discharge Planning  Discharge name/phone number: Kristen [phone] - daughter  Overnight post sedation caregiver: [place] - spouse  Discharge location: [place]    Carlos Kaba RN

## 2020-09-23 NOTE — PROGRESS NOTES
Care Suites Post Procedure Note    Patient Information  Name: Matt Way  Age: 69 year old    Post Procedure  Time patient returned to Care Suites: 1130  Concerns/abnormal assessment: no  If abnormal assessment, provider notified: N/A  Plan/Other: monitor until discharge    Carlos Kaba RN

## 2020-09-23 NOTE — Clinical Note
The first balloon was inserted into the right coronary artery and distal right coronary artery.Max pressure = 6 martha. Total duration = 15 seconds.     Max pressure = 10 martha. Total duration = 20 seconds.    Balloon reinflated a second time: Max pressure = 10 martha. Total duration = 20 seconds.  Balloon reinflated a third time: Max pressure = 5 martha. Total duration = 7 seconds.

## 2020-09-23 NOTE — Clinical Note
The first balloon was inserted into the right coronary artery and distal right coronary artery.Max pressure = 16 martha. Total duration = 20 seconds.     Max pressure = 16 martha. Total duration = 20 seconds.    Balloon reinflated a second time: Max pressure = 16 martha. Total duration = 20 seconds.  Balloon reinflated a third time: Max pressure = 16 martha. Total duration = 25 seconds.

## 2020-09-23 NOTE — DISCHARGE INSTRUCTIONS
Cardiac Angioplasty/Stent Discharge Instructions - Radial    After you go home:      Have an adult stay with you until tomorrow.    Drink extra fluids for 2 days.    You may resume your normal diet.    No smoking       For 24 hours - due to the sedation you received:    Relax and take it easy.    Do NOT make any important or legal decisions.    Do NOT drive or operate machines at home or at work.    Do NOT drink alcohol.    Care of Wrist Puncture Site:      For the first 24 hrs - check the puncture site every 1-2 hours while awake.    It is normal to have soreness at the puncture site and mild tingling in your hand for up to 3 days.    Remove the bandaid after 24 hours. If there is minor oozing, apply another bandaid and remove it after 12 hours.    You may shower tomorrow.  Do NOT take a bath, or use a hot tub or pool for at least 3 days. Do NOT scrub the site. Do not use lotion or powder near the puncture site.           Activity:          For 2 days:     do not use your hand or arm to support your weight (such as rising from a chair)     do not bend your wrist (such as lifting a garage door).    do not lift more than 5 pounds or exercise your arm (such as tennis, golf or bowling).    Do NOT do any heavy activity such as exercise, lifting, or straining.     Bleeding:      If you start bleeding from the site in your wrist, sit down and press firmly on/above the site for 10 minutes.     Once bleeding stops, keep arm still for 2 hours.     Call [facility] as soon as you can.       Call 911 right away if you have heavy bleeding or bleeding that does not stop.      Medicines:      If you are taking an antiplatelet medication such as Plavix, Brilinta or Effient, do not stop taking it until you talk to your cardiologist.        If you are on Metformin (Glucophage), do not restart it until you have blood tests (scheduled for tomorrow).  After you have your blood drawn, you may restart the Metformin on Saturday.     Take  your medications, including blood thinners, unless your provider tells you not to.      If you have stopped any medicines, check with your provider about when to restart them.    Follow Up Appointments:      Follow up with [facility] Nurse Practitioner at [facility] of patient preference in 7-10 days.    Cardiac Rehab will contact you for follow up care.    Call the clinic if:      You have a large or growing hard lump around the site.    The site is red, swollen, hot or tender.    Blood or fluid is draining from the site.    You have chills or a fever greater than 101 F (38 C).    Your arm feels numb, cool or changes color.    You have hives, a rash or unusual itching.    Any questions or concerns.    Other Instructions:      If you received a stent - carry your stent card with you at all times.      [facility] at [place]:    [phone] [facility] (7 days a week)

## 2020-09-23 NOTE — Clinical Note
Stent deployed in the distal right coronary artery. Max pressure = 11 martha. Total duration = 15 seconds.

## 2020-09-23 NOTE — DISCHARGE SUMMARY
Care Suites Discharge Nursing Note    Patient Information  Name: Matt Way  Age: 69 year old    Discharge Education:  Discharge instructions reviewed: Yes  Additional education/resources provided: no  Patient/patient representative verbalizes understanding: Yes  Patient discharging on new medications: No  Medication education completed: N/A    Discharge Plans:   Discharge location: home  Discharge ride contacted: Yes  Approximate discharge time: 1500    Discharge Criteria:  Discharge criteria met and vital signs stable: Yes    Patient Belongs:  Patient belongings returned to patient: Yes    Carlos Kaba RN

## 2020-09-24 ENCOUNTER — TELEPHONE (OUTPATIENT)
Dept: CARDIOLOGY | Facility: CLINIC | Age: 70
End: 2020-09-24

## 2020-09-24 DIAGNOSIS — E11.9 DIABETES MELLITUS, TYPE 2 (H): ICD-10-CM

## 2020-09-24 LAB
ANION GAP SERPL CALCULATED.3IONS-SCNC: 12.5 MMOL/L (ref 6–17)
BUN SERPL-MCNC: 18 MG/DL (ref 7–30)
CALCIUM SERPL-MCNC: 9.7 MG/DL (ref 8.5–10.5)
CHLORIDE SERPL-SCNC: 100 MMOL/L (ref 98–107)
CO2 SERPL-SCNC: 27 MMOL/L (ref 23–29)
CREAT SERPL-MCNC: 1.07 MG/DL (ref 0.7–1.3)
GFR SERPL CREATININE-BSD FRML MDRD: 69 ML/MIN/{1.73_M2}
GLUCOSE SERPL-MCNC: 328 MG/DL (ref 70–105)
POTASSIUM SERPL-SCNC: 4.5 MMOL/L (ref 3.5–5.1)
SODIUM SERPL-SCNC: 135 MMOL/L (ref 136–145)

## 2020-09-24 PROCEDURE — 80048 BASIC METABOLIC PNL TOTAL CA: CPT | Performed by: INTERNAL MEDICINE

## 2020-09-24 PROCEDURE — 36415 COLL VENOUS BLD VENIPUNCTURE: CPT | Performed by: INTERNAL MEDICINE

## 2020-09-24 NOTE — TELEPHONE ENCOUNTER
Component      Latest Ref Rng & Units 9/24/2020   Sodium      136 - 145 mmol/L 135 (L)   Potassium      3.5 - 5.1 mmol/L 4.5   Chloride      98 - 107 mmol/L 100   Carbon Dioxide      23 - 29 mmol/L 27   Anion Gap      6 - 17 mmol/L 12.5   Glucose      70 - 105 mg/dL 328 (H)   Urea Nitrogen      7 - 30 mg/dL 18   Creatinine      0.70 - 1.30 mg/dL 1.07   GFR Estimate      >60 mL/min/1.73:m2 69   GFR Estimate If Black      >60 mL/min/1.73:m2 83   Calcium      8.5 - 10.5 mg/dL 9.7       BMP post angiogram Creatinine normal called patient ok to restart metformin Dr. Strickland to review Na+

## 2020-09-24 NOTE — TELEPHONE ENCOUNTER
Spoke with patient post discharge from care suites after an angioplasty and stenting to the RCA with a CECILY . Access was obtained via the left wrist. He notes he is doing well this morning without symptoms. His radial access site is stable. He has follow up with PHILLIP Burton on 10/6/20. I asked him contact us sooner with any issues.     JIMY Mathur PA-C 8:52 AM 9/24/2020

## 2020-09-24 NOTE — TELEPHONE ENCOUNTER
Yes, ok to resume metformin, I recommend repeat BMP when patient sees Alvaro in clinic.    Thanks  Wilber

## 2020-09-28 DIAGNOSIS — I21.4 NSTEMI (NON-ST ELEVATED MYOCARDIAL INFARCTION) (H): Primary | ICD-10-CM

## 2020-09-28 NOTE — PROGRESS NOTES
Order placed for BMP to be done prior to OV with Aeziggy Cooper, YO on 10/6/20 per Dr. Strickland.  Message sent to scheduling.

## 2020-09-28 NOTE — TELEPHONE ENCOUNTER
Patient's wife was requesting clarification had a BMP last week.  Due to the fact his Na+ was low recommend he have a repeat before seeing Alvaro next week.  Wife prefers to go to  in [place] order faxed with Alvaro's order for BMP.  Rehana Dumont RN on 9/28/2020 at 4:56 PM

## 2020-10-02 LAB
INTERPRETATION ECG - MUSE: NORMAL
INTERPRETATION ECG - MUSE: NORMAL

## 2020-10-26 DIAGNOSIS — I21.4 NSTEMI (NON-ST ELEVATED MYOCARDIAL INFARCTION) (H): ICD-10-CM

## 2020-10-26 RX ORDER — ATORVASTATIN CALCIUM 80 MG/1
80 TABLET, FILM COATED ORAL DAILY
Qty: 90 TABLET | Refills: 1 | Status: SHIPPED | OUTPATIENT
Start: 2020-10-26 | End: 2021-06-08

## 2020-12-07 ENCOUNTER — HOSPITAL ENCOUNTER (OUTPATIENT)
Dept: CARDIOLOGY | Facility: CLINIC | Age: 70
Discharge: HOME | End: 2020-12-07
Attending: INTERNAL MEDICINE | Admitting: INTERNAL MEDICINE
Payer: MEDICARE

## 2020-12-07 DIAGNOSIS — I25.10 CORONARY ARTERY DISEASE INVOLVING NATIVE CORONARY ARTERY OF NATIVE HEART WITHOUT ANGINA PECTORIS: ICD-10-CM

## 2020-12-07 PROCEDURE — 255N000002 HC RX 255 OP 636: Performed by: INTERNAL MEDICINE

## 2020-12-07 PROCEDURE — 93308 TTE F-UP OR LMTD: CPT | Mod: 26 | Performed by: INTERNAL MEDICINE

## 2020-12-07 PROCEDURE — 93321 DOPPLER ECHO F-UP/LMTD STD: CPT | Mod: 26 | Performed by: INTERNAL MEDICINE

## 2020-12-07 PROCEDURE — 93308 TTE F-UP OR LMTD: CPT

## 2020-12-07 PROCEDURE — 93325 DOPPLER ECHO COLOR FLOW MAPG: CPT | Mod: 26 | Performed by: INTERNAL MEDICINE

## 2020-12-07 RX ADMIN — HUMAN ALBUMIN MICROSPHERES AND PERFLUTREN 3 ML: 10; .22 INJECTION, SOLUTION INTRAVENOUS at 12:39

## 2020-12-08 ENCOUNTER — DOCUMENTATION ONLY (OUTPATIENT)
Dept: CARDIOLOGY | Facility: CLINIC | Age: 70
End: 2020-12-08

## 2020-12-08 NOTE — PROGRESS NOTES
Echo shows normal LV systolic function.   Ideal was to follow up as recommended. If he is agreeable can f/u with YO in next month or so, I can see him in 6 months, sooner if any change in clinical situation.    Thanks  Wilber

## 2020-12-08 NOTE — PROGRESS NOTES
Echo ready for review. Done 3 months post heart cath with stenting.     Due for 3 month follow up, no yet scheduled.     Will message Dr. Strickland to review.     Echo: limited  Interpretation Summary     Left ventricular systolic function is normal.  The visual ejection fraction is estimated at 55-60%.  No regional wall motion abnormalities noted.  EF and wall motion now normal compared to prior study from 20. The study  was technically adequate.  _____________________________________________________________________________  __        Left Ventricle  The left ventricle is normal in size. There is normal left ventricular wall  thickness. Left ventricular systolic function is normal. The visual ejection  fraction is estimated at 55-60%. No regional wall motion abnormalities noted.     Right Ventricle  The right ventricle is normal size. The right ventricular systolic function is  normal.     Mitral Valve  The mitral valve leaflets are mildly thickened. There is trace mitral  regurgitation.     Tricuspid Valve  There is trace tricuspid regurgitation. The right ventricular systolic  pressure is approximated at 0.07 mmHg plus the right atrial pressure.        Aortic Valve  There is mild trileaflet aortic sclerosis. No aortic regurgitation is present.  No aortic stenosis is present.     Pulmonic Valve  The pulmonic valve is not well visualized.     Vessels  The ascending aorta is Borderline dilated.     Pericardium  There is no pericardial effusion.     Rhythm  Sinus rhythm was noted.     _____________________________________________________________________________  __  MMode/2D Measurements & Calculations  asc Aorta Diam: 3.7 cm        Doppler Measurements & Calculations  MV E max ashvin: 92.8 cm/sec  MV A max ashvin: 121.4 cm/sec  MV E/A: 0.76     MV dec slope: 352.7 cm/sec2  MV dec time: 0.26 sec  TR max ashvin: 13.5 cm/sec  TR max P.07 mmHg  E/E': 16.6  Peak E' Ashvin: 5.6  cm/sec  _____________________________________________________________________________  __  Report approved by: Esteban Willis 12/07/2020 04:10 PM

## 2020-12-08 NOTE — PROGRESS NOTES
Patient called. Spoke with both wife and patient. Discussed the need for a follow up in the clinic with either Dr. Strickland or an YO. They were requesting it with Dr. Strickland. Willing to come to [place] for follow up. Just need to get transportation and request it not be too early in the morning. Discussed the importance of follow up after having a stent placed.   Reviewed that the Echo shows normal LV function.     Message sent to scheduling to call them back to schedule a follow up.

## 2020-12-18 ENCOUNTER — OFFICE VISIT (OUTPATIENT)
Dept: CARDIOLOGY | Facility: CLINIC | Age: 70
End: 2020-12-18
Payer: MEDICARE

## 2020-12-18 VITALS
DIASTOLIC BLOOD PRESSURE: 72 MMHG | BODY MASS INDEX: 27.97 KG/M2 | HEART RATE: 73 BPM | WEIGHT: 212 LBS | OXYGEN SATURATION: 98 % | SYSTOLIC BLOOD PRESSURE: 115 MMHG

## 2020-12-18 DIAGNOSIS — I25.10 CORONARY ARTERY DISEASE INVOLVING NATIVE CORONARY ARTERY OF NATIVE HEART WITHOUT ANGINA PECTORIS: ICD-10-CM

## 2020-12-18 PROCEDURE — 99213 OFFICE O/P EST LOW 20 MIN: CPT | Performed by: INTERNAL MEDICINE

## 2020-12-18 NOTE — LETTER
12/18/2020    Kingston Leal MD  [facility] [address] [facility] [address]    RE: Matt Gileselodia       Dear Colleague,    I had the pleasure of seeing Matt Way in the [facility].    HPI and Plan:   Mr. Way is a very pleasant 70-year-old gentleman who I saw in 06/2018 when he presented with non-ST elevation myocardial infarction.  Coronary angiogram showed proximal LAD eccentric 90% stenosis which was successfully intervened with 2.75 x 12 mm drug-eluting stent.  He had 40% distal LAD stenosis, 50% diagonal, 10% proximal circumflex and 20% proximal RCA stenosis.  He has other comorbidities of diabetes, dyslipidemia and history of some medical noncompliance.  He was also diagnosed with unprovoked small pulmonary embolism last year and was on Xarelto-Xarelto was eventually discontinued after completing the treatment.  He also has underlying cognitive impairment/some dementia.    Earlier this summer patient went to the ER ER because of concern chest discomfort and was recommend admission but unfortunately left against medical advice.  Post discharge he continued to struggle with chest discomfort and was eventually admitted to [facility] with non-ST elevation myocardial infarction.  Coronary angiogram showed ostial to mid LAD 99% stenosis involving the previous stent which was successfully intervened drug-eluting stent.  Additionally was found to have 80% long smooth stenosis of distal RCA.  Subsequently underwent staged successful PCI of the distal RCA.  Today is coming for routine follow-up.  He had an echocardiogram done about 10 days ago that showed LVEF normal with resolution of wall motion abnormalities which was seen at the time of myocardial infarction in September 2020.  He is accompanied by his wife.  Patient tells me healthwise he feels quite well.  No exertional symptoms like chest discomfort or shortness of breath.  He is tolerating  dual antiplatelet therapy quite well.  Additionally is on Lipitor 80 mg daily, LDL well controlled at 72.  Additionally he is on low-dose metoprolol and losartan.  Blood pressure is well controlled.  Patient has been compliant with medications.    Assessment plan  A very pleasant 70-year gentleman with history of CAD with recent non-STEMI as noted above status post PCI of the LAD as well as distal RCA with normalization of LV function.  Overall cardiac status wise I think he is doing quite well without any symptoms concerning for angina congestive heart failure.  I again emphasized importance of medical compliance especially dual antiplatelet therapy at least till the end of September 2021 and if he is doing quite well on that my inclination would be to extend the duration of dual antiplatelet if is tolerated quite well.  We can see him back in 6 months, sooner if he notes any change in clinical status especially exertion related symptoms.    Orders Placed This Encounter   Procedures     Follow-Up with Cardiologist       Orders Placed This Encounter   Medications     rivaroxaban ANTICOAGULANT (XARELTO) 20 MG TABS tablet     Sig: Take 20 mg by mouth daily       There are no discontinued medications.      Encounter Diagnosis   Name Primary?     Coronary artery disease involving native coronary artery of native heart without angina pectoris        CURRENT MEDICATIONS:  Current Outpatient Medications   Medication Sig Dispense Refill     acetaminophen (TYLENOL) 325 MG tablet Take 325-650 mg by mouth every 6 hours as needed for mild pain       aspirin 81 MG EC tablet Take 1 tablet (81 mg) by mouth daily 1 tablet 0     atorvastatin (LIPITOR) 80 MG tablet Take 1 tablet (80 mg) by mouth daily 90 tablet 1     clopidogrel (PLAVIX) 75 MG tablet Take 1 tablet (75 mg) by mouth daily 30 tablet 0     donepezil (ARICEPT) 5 MG tablet Take 5 mg by mouth every morning       insulin aspart (NOVOLOG FLEXPEN) 100 UNIT/ML injection Inject  16 Units Subcutaneous 2 times daily (with meals) Breakfast and Dinner       insulin detemir (LEVEMIR) 100 UNIT/ML injection Inject 20 Units Subcutaneous 2 times daily        losartan (COZAAR) 25 MG tablet Take 0.5 tablets (12.5 mg) by mouth daily (Patient taking differently: Take 25 mg by mouth daily ) 30 tablet 11     metFORMIN (GLUCOPHAGE) 1000 MG tablet Take 1,000 mg by mouth 2 times daily (with meals)       metoprolol tartrate (LOPRESSOR) 25 MG tablet Take 0.5 tablets (12.5 mg) by mouth 2 times daily 60 tablet 0     nitroGLYcerin (NITROSTAT) 0.4 MG sublingual tablet Place 1 tablet (0.4 mg) under the tongue every 5 minutes as needed for chest pain If symptoms persist 5 minutes after 2nd dose call 911. 30 tablet 1     omeprazole (PRILOSEC OTC) 20 MG EC tablet Take 20 mg by mouth daily as needed       rivaroxaban ANTICOAGULANT (XARELTO) 20 MG TABS tablet Take 20 mg by mouth daily       VITAMIN D, CHOLECALCIFEROL, PO Take 1 tablet by mouth daily         ALLERGIES     Allergies   Allergen Reactions     Brilinta [Ticagrelor]      Duloxetine      Other reaction(s): Confusion  Per daughter who is MD     Gabapentin      Other reaction(s): Edema     Lisinopril Rash       PAST MEDICAL HISTORY:  Past Medical History:   Diagnosis Date     Coronary artery disease 06/2018    s/p CECILY to LAD      Diabetes mellitus      GERD (gastroesophageal reflux disease)      Hyperlipidemia      Obesity      FLAQUITA (obstructive sleep apnea)      Pulmonary embolism 11/15/2018       PAST SURGICAL HISTORY:  Past Surgical History:   Procedure Laterality Date     COLONOSCOPY       Coronary artery angiogram with stent (CECILY) to LAD  06/2018     CV CORONARY ANGIOGRAM N/A 9/3/2020    Procedure: Coronary Angiogram;  Surgeon: Herb Tran MD;  Location: [facility] CARDIAC CATH LAB     CV CORONARY ANGIOGRAM N/A 9/23/2020    Procedure: Coronary Angiogram;  Surgeon: Ernie Urbina MD;  Location: [facility] CARDIAC CATH LAB     CV INSTANTANEOUS  WAVE-FREE RATIO N/A 9/3/2020    Procedure: Instantaneous Wave-Free Ratio;  Surgeon: Herb Tran MD;  Location:  HEART CARDIAC CATH LAB     CV LEFT HEART CATH N/A 9/3/2020    Procedure: Left Heart Cath;  Surgeon: Herb Tran MD;  Location:  HEART CARDIAC CATH LAB     CV LEFT VENTRICULOGRAM N/A 9/3/2020    Procedure: Left Ventriculogram;  Surgeon: Herb Tran MD;  Location:  HEART CARDIAC CATH LAB     CV PCI STENT DRUG ELUTING N/A 9/3/2020    Procedure: Percutaneous Coronary Intervention Stent Drug Eluting;  Surgeon: Herb Tran MD;  Location:  HEART CARDIAC CATH LAB     CV PCI STENT DRUG ELUTING N/A 9/23/2020    Procedure: Percutaneous Coronary Intervention Stent Drug Eluting;  Surgeon: Ernie Urbina MD;  Location:  HEART CARDIAC CATH LAB     SHOULDER SURGERY       VASECTOMY         FAMILY HISTORY:  Family History   Problem Relation Age of Onset     Lupus Mother        SOCIAL HISTORY:  Social History     Socioeconomic History     Marital status:      Spouse name: None     Number of children: None     Years of education: None     Highest education level: None   Occupational History     None   Social Needs     Financial resource strain: None     Food insecurity     Worry: None     Inability: None     Transportation needs     Medical: None     Non-medical: None   Tobacco Use     Smoking status: Never Smoker     Smokeless tobacco: Never Used   Substance and Sexual Activity     Alcohol use: No     Drug use: No     Sexual activity: None   Lifestyle     Physical activity     Days per week: None     Minutes per session: None     Stress: None   Relationships     Social connections     Talks on phone: None     Gets together: None     Attends Alevism service: None     Active member of club or organization: None     Attends meetings of clubs or organizations: None     Relationship status: None     Intimate partner violence     Fear of current or ex partner: None      Emotionally abused: None     Physically abused: None     Forced sexual activity: None   Other Topics Concern     None   Social History Narrative     None       Review of Systems:  Skin:  Negative rash(possibly due to medications)     Eyes:  Positive for glasses    ENT:  Negative      Respiratory:  Positive for(PE) dyspnea on exertion sob with even slight exertion   Cardiovascular:    lightheadedness;dizziness;Positive for    Gastroenterology: Negative heartburn    Genitourinary:  Negative      Musculoskeletal:  Negative neck pain    Neurologic:  Positive for memory problems(off balance)    Psychiatric:  Positive for sleep disturbances    Heme/Lymph/Imm:  Positive for allergies    Endocrine:  Positive for diabetes      Physical Exam:  Vitals: /72   Pulse 73   Wt 96.2 kg (212 lb)   SpO2 98%   BMI 27.97 kg/m      General patient appears pleasant, comfortable  Neck normal JVP  Cardiovascular system S1-S2 normal no murmur rub or gallop  Respiration clear to auscultation bilaterally  GI system abdomen soft nontender  Extremities no pitting pedal edema  Neurological alert, oriented  Psych normal affect  Skin no obvious rash  HEENT no pallor          Thank you for allowing me to participate in the care of your patient.    Sincerely,     Wilber Strickland MD     [facility]

## 2020-12-18 NOTE — LETTER
12/18/2020    Kingston Leal MD  [facility] [address] [facility] [address] [facility] [address]    RE: Matt Gileselodia       Dear Colleague,    I had the pleasure of seeing Matt Way in the [facility].    HPI and Plan:   Mr. Way is a very pleasant 70-year-old gentleman who I saw in 06/2018 when he presented with non-ST elevation myocardial infarction.  Coronary angiogram showed proximal LAD eccentric 90% stenosis which was successfully intervened with 2.75 x 12 mm drug-eluting stent.  He had 40% distal LAD stenosis, 50% diagonal, 10% proximal circumflex and 20% proximal RCA stenosis.  He has other comorbidities of diabetes, dyslipidemia and history of some medical noncompliance.  He was also diagnosed with unprovoked small pulmonary embolism last year and was on Xarelto-Xarelto was eventually discontinued after completing the treatment.  He also has underlying cognitive impairment/some dementia.    Earlier this summer patient went to the ER ER because of concern chest discomfort and was recommend admission but unfortunately left against medical advice.  Post discharge he continued to struggle with chest discomfort and was eventually admitted to [facility] with non-ST elevation myocardial infarction.  Coronary angiogram showed ostial to mid LAD 99% stenosis involving the previous stent which was successfully intervened drug-eluting stent.  Additionally was found to have 80% long smooth stenosis of distal RCA.  Subsequently underwent staged successful PCI of the distal RCA.  Today is coming for routine follow-up.  He had an echocardiogram done about 10 days ago that showed LVEF normal with resolution of wall motion abnormalities which was seen at the time of myocardial infarction in September 2020.  He is accompanied by his wife.  Patient tells me healthwise he feels quite well.  No exertional symptoms like chest discomfort or shortness of breath.  He is tolerating  dual antiplatelet therapy quite well.  Additionally is on Lipitor 80 mg daily, LDL well controlled at 72.  Additionally he is on low-dose metoprolol and losartan.  Blood pressure is well controlled.  Patient has been compliant with medications.    Assessment plan  A very pleasant 70-year gentleman with history of CAD with recent non-STEMI as noted above status post PCI of the LAD as well as distal RCA with normalization of LV function.  Overall cardiac status wise I think he is doing quite well without any symptoms concerning for angina congestive heart failure.  I again emphasized importance of medical compliance especially dual antiplatelet therapy at least till the end of September 2021 and if he is doing quite well on that my inclination would be to extend the duration of dual antiplatelet if is tolerated quite well.  We can see him back in 6 months, sooner if he notes any change in clinical status especially exertion related symptoms.    Orders Placed This Encounter   Procedures     Follow-Up with Cardiologist       Orders Placed This Encounter   Medications     rivaroxaban ANTICOAGULANT (XARELTO) 20 MG TABS tablet     Sig: Take 20 mg by mouth daily       There are no discontinued medications.      Encounter Diagnosis   Name Primary?     Coronary artery disease involving native coronary artery of native heart without angina pectoris        CURRENT MEDICATIONS:  Current Outpatient Medications   Medication Sig Dispense Refill     acetaminophen (TYLENOL) 325 MG tablet Take 325-650 mg by mouth every 6 hours as needed for mild pain       aspirin 81 MG EC tablet Take 1 tablet (81 mg) by mouth daily 1 tablet 0     atorvastatin (LIPITOR) 80 MG tablet Take 1 tablet (80 mg) by mouth daily 90 tablet 1     clopidogrel (PLAVIX) 75 MG tablet Take 1 tablet (75 mg) by mouth daily 30 tablet 0     donepezil (ARICEPT) 5 MG tablet Take 5 mg by mouth every morning       insulin aspart (NOVOLOG FLEXPEN) 100 UNIT/ML injection Inject  16 Units Subcutaneous 2 times daily (with meals) Breakfast and Dinner       insulin detemir (LEVEMIR) 100 UNIT/ML injection Inject 20 Units Subcutaneous 2 times daily        losartan (COZAAR) 25 MG tablet Take 0.5 tablets (12.5 mg) by mouth daily (Patient taking differently: Take 25 mg by mouth daily ) 30 tablet 11     metFORMIN (GLUCOPHAGE) 1000 MG tablet Take 1,000 mg by mouth 2 times daily (with meals)       metoprolol tartrate (LOPRESSOR) 25 MG tablet Take 0.5 tablets (12.5 mg) by mouth 2 times daily 60 tablet 0     nitroGLYcerin (NITROSTAT) 0.4 MG sublingual tablet Place 1 tablet (0.4 mg) under the tongue every 5 minutes as needed for chest pain If symptoms persist 5 minutes after 2nd dose call 911. 30 tablet 1     omeprazole (PRILOSEC OTC) 20 MG EC tablet Take 20 mg by mouth daily as needed       rivaroxaban ANTICOAGULANT (XARELTO) 20 MG TABS tablet Take 20 mg by mouth daily       VITAMIN D, CHOLECALCIFEROL, PO Take 1 tablet by mouth daily         ALLERGIES     Allergies   Allergen Reactions     Brilinta [Ticagrelor]      Duloxetine      Other reaction(s): Confusion  Per daughter who is MD     Gabapentin      Other reaction(s): Edema     Lisinopril Rash       PAST MEDICAL HISTORY:  Past Medical History:   Diagnosis Date     Coronary artery disease 06/2018    s/p CECILY to LAD      Diabetes mellitus      GERD (gastroesophageal reflux disease)      Hyperlipidemia      Obesity      FLAQUITA (obstructive sleep apnea)      Pulmonary embolism 11/15/2018       PAST SURGICAL HISTORY:  Past Surgical History:   Procedure Laterality Date     COLONOSCOPY       Coronary artery angiogram with stent (CECILY) to LAD  06/2018     CV CORONARY ANGIOGRAM N/A 9/3/2020    Procedure: Coronary Angiogram;  Surgeon: Herb Tran MD;  Location: [facility] CARDIAC CATH LAB     CV CORONARY ANGIOGRAM N/A 9/23/2020    Procedure: Coronary Angiogram;  Surgeon: Ernie Urbina MD;  Location: [facility] CARDIAC CATH LAB     CV INSTANTANEOUS  WAVE-FREE RATIO N/A 9/3/2020    Procedure: Instantaneous Wave-Free Ratio;  Surgeon: Herb Tran MD;  Location:  HEART CARDIAC CATH LAB     CV LEFT HEART CATH N/A 9/3/2020    Procedure: Left Heart Cath;  Surgeon: Herb Tran MD;  Location:  HEART CARDIAC CATH LAB     CV LEFT VENTRICULOGRAM N/A 9/3/2020    Procedure: Left Ventriculogram;  Surgeon: Herb Tran MD;  Location:  HEART CARDIAC CATH LAB     CV PCI STENT DRUG ELUTING N/A 9/3/2020    Procedure: Percutaneous Coronary Intervention Stent Drug Eluting;  Surgeon: Herb Tran MD;  Location:  HEART CARDIAC CATH LAB     CV PCI STENT DRUG ELUTING N/A 9/23/2020    Procedure: Percutaneous Coronary Intervention Stent Drug Eluting;  Surgeon: Ernie Urbina MD;  Location:  HEART CARDIAC CATH LAB     SHOULDER SURGERY       VASECTOMY         FAMILY HISTORY:  Family History   Problem Relation Age of Onset     Lupus Mother        SOCIAL HISTORY:  Social History     Socioeconomic History     Marital status:      Spouse name: None     Number of children: None     Years of education: None     Highest education level: None   Occupational History     None   Social Needs     Financial resource strain: None     Food insecurity     Worry: None     Inability: None     Transportation needs     Medical: None     Non-medical: None   Tobacco Use     Smoking status: Never Smoker     Smokeless tobacco: Never Used   Substance and Sexual Activity     Alcohol use: No     Drug use: No     Sexual activity: None   Lifestyle     Physical activity     Days per week: None     Minutes per session: None     Stress: None   Relationships     Social connections     Talks on phone: None     Gets together: None     Attends Synagogue service: None     Active member of club or organization: None     Attends meetings of clubs or organizations: None     Relationship status: None     Intimate partner violence     Fear of current or ex partner: None      Emotionally abused: None     Physically abused: None     Forced sexual activity: None   Other Topics Concern     None   Social History Narrative     None       Review of Systems:  Skin:  Negative rash(possibly due to medications)     Eyes:  Positive for glasses    ENT:  Negative      Respiratory:  Positive for(PE) dyspnea on exertion sob with even slight exertion   Cardiovascular:    lightheadedness;dizziness;Positive for    Gastroenterology: Negative heartburn    Genitourinary:  Negative      Musculoskeletal:  Negative neck pain    Neurologic:  Positive for memory problems(off balance)    Psychiatric:  Positive for sleep disturbances    Heme/Lymph/Imm:  Positive for allergies    Endocrine:  Positive for diabetes      Physical Exam:  Vitals: /72   Pulse 73   Wt 96.2 kg (212 lb)   SpO2 98%   BMI 27.97 kg/m      General patient appears pleasant, comfortable  Neck normal JVP  Cardiovascular system S1-S2 normal no murmur rub or gallop  Respiration clear to auscultation bilaterally  GI system abdomen soft nontender  Extremities no pitting pedal edema  Neurological alert, oriented  Psych normal affect  Skin no obvious rash  HEENT no pallor      CC  Wilber Strickland MD  [address]  [place] [id number]                      Thank you for allowing me to participate in the care of your patient.      Sincerely,     Wilber Strickland MD     [facility]    cc:   Wilber Strickland MD  [address]  [place] [id number]

## 2020-12-18 NOTE — PROGRESS NOTES
HPI and Plan:   Mr. Way is a very pleasant 70-year-old gentleman who I saw in 06/2018 when he presented with non-ST elevation myocardial infarction.  Coronary angiogram showed proximal LAD eccentric 90% stenosis which was successfully intervened with 2.75 x 12 mm drug-eluting stent.  He had 40% distal LAD stenosis, 50% diagonal, 10% proximal circumflex and 20% proximal RCA stenosis.  He has other comorbidities of diabetes, dyslipidemia and history of some medical noncompliance.  He was also diagnosed with unprovoked small pulmonary embolism last year and was on Xarelto-Xarelto was eventually discontinued after completing the treatment.  He also has underlying cognitive impairment/some dementia.    Earlier this summer patient went to the ER ER because of concern chest discomfort and was recommend admission but unfortunately left against medical advice.  Post discharge he continued to struggle with chest discomfort and was eventually admitted to [facility] with non-ST elevation myocardial infarction.  Coronary angiogram showed ostial to mid LAD 99% stenosis involving the previous stent which was successfully intervened drug-eluting stent.  Additionally was found to have 80% long smooth stenosis of distal RCA.  Subsequently underwent staged successful PCI of the distal RCA.  Today is coming for routine follow-up.  He had an echocardiogram done about 10 days ago that showed LVEF normal with resolution of wall motion abnormalities which was seen at the time of myocardial infarction in September 2020.  He is accompanied by his wife.  Patient tells me healthwise he feels quite well.  No exertional symptoms like chest discomfort or shortness of breath.  He is tolerating dual antiplatelet therapy quite well.  Additionally is on Lipitor 80 mg daily, LDL well controlled at 72.  Additionally he is on low-dose metoprolol and losartan.  Blood pressure is well controlled.  Patient has been compliant with  medications.    Assessment plan  A very pleasant 70-year gentleman with history of CAD with recent non-STEMI as noted above status post PCI of the LAD as well as distal RCA with normalization of LV function.  Overall cardiac status wise I think he is doing quite well without any symptoms concerning for angina congestive heart failure.  I again emphasized importance of medical compliance especially dual antiplatelet therapy at least till the end of September 2021 and if he is doing quite well on that my inclination would be to extend the duration of dual antiplatelet if is tolerated quite well.  We can see him back in 6 months, sooner if he notes any change in clinical status especially exertion related symptoms.    Orders Placed This Encounter   Procedures     Follow-Up with Cardiologist       Orders Placed This Encounter   Medications     rivaroxaban ANTICOAGULANT (XARELTO) 20 MG TABS tablet     Sig: Take 20 mg by mouth daily       There are no discontinued medications.      Encounter Diagnosis   Name Primary?     Coronary artery disease involving native coronary artery of native heart without angina pectoris        CURRENT MEDICATIONS:  Current Outpatient Medications   Medication Sig Dispense Refill     acetaminophen (TYLENOL) 325 MG tablet Take 325-650 mg by mouth every 6 hours as needed for mild pain       aspirin 81 MG EC tablet Take 1 tablet (81 mg) by mouth daily 1 tablet 0     atorvastatin (LIPITOR) 80 MG tablet Take 1 tablet (80 mg) by mouth daily 90 tablet 1     clopidogrel (PLAVIX) 75 MG tablet Take 1 tablet (75 mg) by mouth daily 30 tablet 0     donepezil (ARICEPT) 5 MG tablet Take 5 mg by mouth every morning       insulin aspart (NOVOLOG FLEXPEN) 100 UNIT/ML injection Inject 16 Units Subcutaneous 2 times daily (with meals) Breakfast and Dinner       insulin detemir (LEVEMIR) 100 UNIT/ML injection Inject 20 Units Subcutaneous 2 times daily        losartan (COZAAR) 25 MG tablet Take 0.5 tablets (12.5 mg)  by mouth daily (Patient taking differently: Take 25 mg by mouth daily ) 30 tablet 11     metFORMIN (GLUCOPHAGE) 1000 MG tablet Take 1,000 mg by mouth 2 times daily (with meals)       metoprolol tartrate (LOPRESSOR) 25 MG tablet Take 0.5 tablets (12.5 mg) by mouth 2 times daily 60 tablet 0     nitroGLYcerin (NITROSTAT) 0.4 MG sublingual tablet Place 1 tablet (0.4 mg) under the tongue every 5 minutes as needed for chest pain If symptoms persist 5 minutes after 2nd dose call 911. 30 tablet 1     omeprazole (PRILOSEC OTC) 20 MG EC tablet Take 20 mg by mouth daily as needed       rivaroxaban ANTICOAGULANT (XARELTO) 20 MG TABS tablet Take 20 mg by mouth daily       VITAMIN D, CHOLECALCIFEROL, PO Take 1 tablet by mouth daily         ALLERGIES     Allergies   Allergen Reactions     Brilinta [Ticagrelor]      Duloxetine      Other reaction(s): Confusion  Per daughter who is MD     Gabapentin      Other reaction(s): Edema     Lisinopril Rash       PAST MEDICAL HISTORY:  Past Medical History:   Diagnosis Date     Coronary artery disease 06/2018    s/p CECILY to LAD      Diabetes mellitus      GERD (gastroesophageal reflux disease)      Hyperlipidemia      Obesity      FLAQUITA (obstructive sleep apnea)      Pulmonary embolism 11/15/2018       PAST SURGICAL HISTORY:  Past Surgical History:   Procedure Laterality Date     COLONOSCOPY       Coronary artery angiogram with stent (CECILY) to LAD  06/2018     CV CORONARY ANGIOGRAM N/A 9/3/2020    Procedure: Coronary Angiogram;  Surgeon: Herb Tran MD;  Location:  HEART CARDIAC CATH LAB     CV CORONARY ANGIOGRAM N/A 9/23/2020    Procedure: Coronary Angiogram;  Surgeon: Ernie Urbina MD;  Location: [facility] CARDIAC CATH LAB     CV INSTANTANEOUS WAVE-FREE RATIO N/A 9/3/2020    Procedure: Instantaneous Wave-Free Ratio;  Surgeon: Herb Tran MD;  Location:  HEART CARDIAC CATH LAB     CV LEFT HEART CATH N/A 9/3/2020    Procedure: Left Heart Cath;  Surgeon: Chelsea  Herb CONDON MD;  Location:  HEART CARDIAC CATH LAB     CV LEFT VENTRICULOGRAM N/A 9/3/2020    Procedure: Left Ventriculogram;  Surgeon: Herb Tran MD;  [facility]     CV PCI STENT DRUG ELUTING N/A 9/3/2020    Procedure: Percutaneous Coronary Intervention Stent Drug Eluting;  Surgeon: Herb Tran MD;  [facility]     CV PCI STENT DRUG ELUTING N/A 9/23/2020    Procedure: Percutaneous Coronary Intervention Stent Drug Eluting;  Surgeon: Ernie Urbina MD;  Location: [facility] CARDIAC CATH LAB     SHOULDER SURGERY       VASECTOMY         FAMILY HISTORY:  Family History   Problem Relation Age of Onset     Lupus Mother        SOCIAL HISTORY:  Social History     Socioeconomic History     Marital status:      Spouse name: None     Number of children: None     Years of education: None     Highest education level: None   Occupational History     None   Social Needs     Financial resource strain: None     Food insecurity     Worry: None     Inability: None     Transportation needs     Medical: None     Non-medical: None   Tobacco Use     Smoking status: Never Smoker     Smokeless tobacco: Never Used   Substance and Sexual Activity     Alcohol use: No     Drug use: No     Sexual activity: None   Lifestyle     Physical activity     Days per week: None     Minutes per session: None     Stress: None   Relationships     Social connections     Talks on phone: None     Gets together: None     Attends Rastafari service: None     Active member of club or organization: None     Attends meetings of clubs or organizations: None     Relationship status: None     Intimate partner violence     Fear of current or ex partner: None     Emotionally abused: None     Physically abused: None     Forced sexual activity: None   Other Topics Concern     None   Social History Narrative     None       Review of Systems:  Skin:  Negative rash(possibly due to medications)      Eyes:  Positive for glasses    ENT:  Negative      Respiratory:  Positive for(PE) dyspnea on exertion sob with even slight exertion   Cardiovascular:    lightheadedness;dizziness;Positive for    Gastroenterology: Negative heartburn    Genitourinary:  Negative      Musculoskeletal:  Negative neck pain    Neurologic:  Positive for memory problems(off balance)    Psychiatric:  Positive for sleep disturbances    Heme/Lymph/Imm:  Positive for allergies    Endocrine:  Positive for diabetes      Physical Exam:  Vitals: /72   Pulse 73   Wt 96.2 kg (212 lb)   SpO2 98%   BMI 27.97 kg/m      General patient appears pleasant, comfortable  Neck normal JVP  Cardiovascular system S1-S2 normal no murmur rub or gallop  Respiration clear to auscultation bilaterally  GI system abdomen soft nontender  Extremities no pitting pedal edema  Neurological alert, oriented  Psych normal affect  Skin no obvious rash  HEENT no pallor      ALBERTO Strickland MD  [address]  [place] [id number]

## 2021-01-01 ENCOUNTER — LAB REQUISITION (OUTPATIENT)
Dept: LAB | Facility: CLINIC | Age: 71
End: 2021-01-01
Payer: MEDICARE

## 2021-01-01 ENCOUNTER — HOSPITAL ENCOUNTER (INPATIENT)
Facility: CLINIC | Age: 71
LOS: 10 days | Discharge: HOME HEALTH | DRG: 637 | End: 2021-07-08
Attending: EMERGENCY MEDICINE | Admitting: INTERNAL MEDICINE
Payer: MEDICARE

## 2021-01-01 ENCOUNTER — APPOINTMENT (OUTPATIENT)
Dept: PHYSICAL THERAPY | Facility: CLINIC | Age: 71
DRG: 637 | End: 2021-01-01
Attending: INTERNAL MEDICINE
Payer: MEDICARE

## 2021-01-01 ENCOUNTER — APPOINTMENT (OUTPATIENT)
Dept: OCCUPATIONAL THERAPY | Facility: CLINIC | Age: 71
DRG: 637 | End: 2021-01-01
Payer: MEDICARE

## 2021-01-01 ENCOUNTER — APPOINTMENT (OUTPATIENT)
Dept: PHYSICAL THERAPY | Facility: CLINIC | Age: 71
DRG: 637 | End: 2021-01-01
Payer: MEDICARE

## 2021-01-01 ENCOUNTER — APPOINTMENT (OUTPATIENT)
Dept: OCCUPATIONAL THERAPY | Facility: CLINIC | Age: 71
DRG: 637 | End: 2021-01-01
Attending: INTERNAL MEDICINE
Payer: MEDICARE

## 2021-01-01 ENCOUNTER — APPOINTMENT (OUTPATIENT)
Dept: CT IMAGING | Facility: CLINIC | Age: 71
DRG: 637 | End: 2021-01-01
Attending: EMERGENCY MEDICINE
Payer: MEDICARE

## 2021-01-01 ENCOUNTER — HEALTH MAINTENANCE LETTER (OUTPATIENT)
Age: 71
End: 2021-01-01

## 2021-01-01 ENCOUNTER — HOSPITAL ENCOUNTER (EMERGENCY)
Facility: CLINIC | Age: 71
Discharge: NURSING, REHAB OR LONG-TERM CARE | End: 2021-12-13
Attending: EMERGENCY MEDICINE | Admitting: EMERGENCY MEDICINE
Payer: MEDICARE

## 2021-01-01 VITALS
SYSTOLIC BLOOD PRESSURE: 143 MMHG | WEIGHT: 184 LBS | BODY MASS INDEX: 24.39 KG/M2 | TEMPERATURE: 97.4 F | HEIGHT: 73 IN | DIASTOLIC BLOOD PRESSURE: 84 MMHG | OXYGEN SATURATION: 98 % | HEART RATE: 63 BPM | RESPIRATION RATE: 18 BRPM

## 2021-01-01 VITALS
DIASTOLIC BLOOD PRESSURE: 75 MMHG | SYSTOLIC BLOOD PRESSURE: 129 MMHG | RESPIRATION RATE: 18 BRPM | HEART RATE: 74 BPM | OXYGEN SATURATION: 97 % | TEMPERATURE: 97.4 F

## 2021-01-01 DIAGNOSIS — Z11.59 ENCOUNTER FOR SCREENING FOR OTHER VIRAL DISEASES: ICD-10-CM

## 2021-01-01 DIAGNOSIS — R73.9 HYPERGLYCEMIA: ICD-10-CM

## 2021-01-01 DIAGNOSIS — F02.818 LEWY BODY DEMENTIA WITH BEHAVIORAL DISTURBANCE (H): ICD-10-CM

## 2021-01-01 DIAGNOSIS — K59.01 SLOW TRANSIT CONSTIPATION: Primary | ICD-10-CM

## 2021-01-01 DIAGNOSIS — E11.65 TYPE 2 DIABETES MELLITUS WITH HYPERGLYCEMIA, WITH LONG-TERM CURRENT USE OF INSULIN (H): ICD-10-CM

## 2021-01-01 DIAGNOSIS — G31.83 LEWY BODY DEMENTIA WITH BEHAVIORAL DISTURBANCE (H): ICD-10-CM

## 2021-01-01 DIAGNOSIS — Z79.4 TYPE 2 DIABETES MELLITUS WITH HYPERGLYCEMIA, WITH LONG-TERM CURRENT USE OF INSULIN (H): ICD-10-CM

## 2021-01-01 DIAGNOSIS — R41.0 CONFUSION: ICD-10-CM

## 2021-01-01 DIAGNOSIS — R19.7 DIARRHEA, UNSPECIFIED: ICD-10-CM

## 2021-01-01 DIAGNOSIS — I25.10 CORONARY ARTERY DISEASE INVOLVING NATIVE CORONARY ARTERY OF NATIVE HEART WITHOUT ANGINA PECTORIS: ICD-10-CM

## 2021-01-01 DIAGNOSIS — R46.89 EPISODE OF BEHAVIOR CHANGE: ICD-10-CM

## 2021-01-01 LAB
ALBUMIN SERPL-MCNC: 3.7 G/DL (ref 3.4–5)
ALBUMIN UR-MCNC: NEGATIVE MG/DL
ALP SERPL-CCNC: 162 U/L (ref 40–150)
ALT SERPL W P-5'-P-CCNC: 32 U/L (ref 0–70)
ANION GAP SERPL CALCULATED.3IONS-SCNC: 10 MMOL/L (ref 3–14)
ANION GAP SERPL CALCULATED.3IONS-SCNC: 3 MMOL/L (ref 3–14)
ANION GAP SERPL CALCULATED.3IONS-SCNC: 4 MMOL/L (ref 3–14)
APPEARANCE UR: CLEAR
AST SERPL W P-5'-P-CCNC: 12 U/L (ref 0–45)
BASE DEFICIT BLDV-SCNC: 0.5 MMOL/L
BASOPHILS # BLD AUTO: 0.1 10E9/L (ref 0–0.2)
BASOPHILS NFR BLD AUTO: 0.9 %
BILIRUB SERPL-MCNC: 0.6 MG/DL (ref 0.2–1.3)
BILIRUB UR QL STRIP: NEGATIVE
BUN SERPL-MCNC: 14 MG/DL (ref 7–30)
BUN SERPL-MCNC: 28 MG/DL (ref 7–30)
BUN SERPL-MCNC: 34 MG/DL (ref 7–30)
C COLI+JEJUNI+LARI FUSA STL QL NAA+PROBE: NOT DETECTED
C DIFF TOX B STL QL: NEGATIVE
CALCIUM SERPL-MCNC: 9 MG/DL (ref 8.5–10.1)
CALCIUM SERPL-MCNC: 9.1 MG/DL (ref 8.5–10.1)
CALCIUM SERPL-MCNC: 9.5 MG/DL (ref 8.5–10.1)
CHLORIDE BLD-SCNC: 105 MMOL/L (ref 94–109)
CHLORIDE SERPL-SCNC: 108 MMOL/L (ref 94–109)
CHLORIDE SERPL-SCNC: 97 MMOL/L (ref 94–109)
CO2 SERPL-SCNC: 24 MMOL/L (ref 20–32)
CO2 SERPL-SCNC: 28 MMOL/L (ref 20–32)
CO2 SERPL-SCNC: 31 MMOL/L (ref 20–32)
COLOR UR AUTO: ABNORMAL
CREAT SERPL-MCNC: 0.84 MG/DL (ref 0.66–1.25)
CREAT SERPL-MCNC: 0.9 MG/DL (ref 0.66–1.25)
CREAT SERPL-MCNC: 0.91 MG/DL (ref 0.66–1.25)
CREAT SERPL-MCNC: 1.05 MG/DL (ref 0.66–1.25)
DIFFERENTIAL METHOD BLD: NORMAL
EC STX1 GENE STL QL NAA+PROBE: NOT DETECTED
EC STX2 GENE STL QL NAA+PROBE: NOT DETECTED
EOSINOPHIL # BLD AUTO: 0.1 10E9/L (ref 0–0.7)
EOSINOPHIL NFR BLD AUTO: 1.3 %
ERYTHROCYTE [DISTWIDTH] IN BLOOD BY AUTOMATED COUNT: 13.8 % (ref 10–15)
GFR SERPL CREATININE-BSD FRML MDRD: 72 ML/MIN/{1.73_M2}
GFR SERPL CREATININE-BSD FRML MDRD: 86 ML/MIN/1.73M2
GFR SERPL CREATININE-BSD FRML MDRD: 87 ML/MIN/{1.73_M2}
GFR SERPL CREATININE-BSD FRML MDRD: 90 ML/MIN/{1.73_M2}
GLUCOSE BLD-MCNC: 115 MG/DL (ref 70–99)
GLUCOSE BLDC GLUCOMTR-MCNC: 102 MG/DL (ref 70–99)
GLUCOSE BLDC GLUCOMTR-MCNC: 105 MG/DL (ref 70–99)
GLUCOSE BLDC GLUCOMTR-MCNC: 106 MG/DL (ref 70–99)
GLUCOSE BLDC GLUCOMTR-MCNC: 110 MG/DL (ref 70–99)
GLUCOSE BLDC GLUCOMTR-MCNC: 115 MG/DL (ref 70–99)
GLUCOSE BLDC GLUCOMTR-MCNC: 119 MG/DL (ref 70–99)
GLUCOSE BLDC GLUCOMTR-MCNC: 121 MG/DL (ref 70–99)
GLUCOSE BLDC GLUCOMTR-MCNC: 124 MG/DL (ref 70–99)
GLUCOSE BLDC GLUCOMTR-MCNC: 124 MG/DL (ref 70–99)
GLUCOSE BLDC GLUCOMTR-MCNC: 128 MG/DL (ref 70–99)
GLUCOSE BLDC GLUCOMTR-MCNC: 135 MG/DL (ref 70–99)
GLUCOSE BLDC GLUCOMTR-MCNC: 136 MG/DL (ref 70–99)
GLUCOSE BLDC GLUCOMTR-MCNC: 138 MG/DL (ref 70–99)
GLUCOSE BLDC GLUCOMTR-MCNC: 146 MG/DL (ref 70–99)
GLUCOSE BLDC GLUCOMTR-MCNC: 150 MG/DL (ref 70–99)
GLUCOSE BLDC GLUCOMTR-MCNC: 151 MG/DL (ref 70–99)
GLUCOSE BLDC GLUCOMTR-MCNC: 154 MG/DL (ref 70–99)
GLUCOSE BLDC GLUCOMTR-MCNC: 162 MG/DL (ref 70–99)
GLUCOSE BLDC GLUCOMTR-MCNC: 166 MG/DL (ref 70–99)
GLUCOSE BLDC GLUCOMTR-MCNC: 173 MG/DL (ref 70–99)
GLUCOSE BLDC GLUCOMTR-MCNC: 175 MG/DL (ref 70–99)
GLUCOSE BLDC GLUCOMTR-MCNC: 179 MG/DL (ref 70–99)
GLUCOSE BLDC GLUCOMTR-MCNC: 181 MG/DL (ref 70–99)
GLUCOSE BLDC GLUCOMTR-MCNC: 183 MG/DL (ref 70–99)
GLUCOSE BLDC GLUCOMTR-MCNC: 186 MG/DL (ref 70–99)
GLUCOSE BLDC GLUCOMTR-MCNC: 186 MG/DL (ref 70–99)
GLUCOSE BLDC GLUCOMTR-MCNC: 198 MG/DL (ref 70–99)
GLUCOSE BLDC GLUCOMTR-MCNC: 201 MG/DL (ref 70–99)
GLUCOSE BLDC GLUCOMTR-MCNC: 204 MG/DL (ref 70–99)
GLUCOSE BLDC GLUCOMTR-MCNC: 208 MG/DL (ref 70–99)
GLUCOSE BLDC GLUCOMTR-MCNC: 208 MG/DL (ref 70–99)
GLUCOSE BLDC GLUCOMTR-MCNC: 210 MG/DL (ref 70–99)
GLUCOSE BLDC GLUCOMTR-MCNC: 220 MG/DL (ref 70–99)
GLUCOSE BLDC GLUCOMTR-MCNC: 223 MG/DL (ref 70–99)
GLUCOSE BLDC GLUCOMTR-MCNC: 224 MG/DL (ref 70–99)
GLUCOSE BLDC GLUCOMTR-MCNC: 230 MG/DL (ref 70–99)
GLUCOSE BLDC GLUCOMTR-MCNC: 232 MG/DL (ref 70–99)
GLUCOSE BLDC GLUCOMTR-MCNC: 235 MG/DL (ref 70–99)
GLUCOSE BLDC GLUCOMTR-MCNC: 242 MG/DL (ref 70–99)
GLUCOSE BLDC GLUCOMTR-MCNC: 246 MG/DL (ref 70–99)
GLUCOSE BLDC GLUCOMTR-MCNC: 247 MG/DL (ref 70–99)
GLUCOSE BLDC GLUCOMTR-MCNC: 249 MG/DL (ref 70–99)
GLUCOSE BLDC GLUCOMTR-MCNC: 250 MG/DL (ref 70–99)
GLUCOSE BLDC GLUCOMTR-MCNC: 253 MG/DL (ref 70–99)
GLUCOSE BLDC GLUCOMTR-MCNC: 253 MG/DL (ref 70–99)
GLUCOSE BLDC GLUCOMTR-MCNC: 284 MG/DL (ref 70–99)
GLUCOSE BLDC GLUCOMTR-MCNC: 284 MG/DL (ref 70–99)
GLUCOSE BLDC GLUCOMTR-MCNC: 286 MG/DL (ref 70–99)
GLUCOSE BLDC GLUCOMTR-MCNC: 293 MG/DL (ref 70–99)
GLUCOSE BLDC GLUCOMTR-MCNC: 308 MG/DL (ref 70–99)
GLUCOSE BLDC GLUCOMTR-MCNC: 314 MG/DL (ref 70–99)
GLUCOSE BLDC GLUCOMTR-MCNC: 318 MG/DL (ref 70–99)
GLUCOSE BLDC GLUCOMTR-MCNC: 331 MG/DL (ref 70–99)
GLUCOSE BLDC GLUCOMTR-MCNC: 338 MG/DL (ref 70–99)
GLUCOSE BLDC GLUCOMTR-MCNC: 364 MG/DL (ref 70–99)
GLUCOSE BLDC GLUCOMTR-MCNC: 377 MG/DL (ref 70–99)
GLUCOSE BLDC GLUCOMTR-MCNC: 378 MG/DL (ref 70–99)
GLUCOSE BLDC GLUCOMTR-MCNC: 391 MG/DL (ref 70–99)
GLUCOSE BLDC GLUCOMTR-MCNC: 401 MG/DL (ref 70–99)
GLUCOSE BLDC GLUCOMTR-MCNC: 419 MG/DL (ref 70–99)
GLUCOSE BLDC GLUCOMTR-MCNC: 437 MG/DL (ref 70–99)
GLUCOSE BLDC GLUCOMTR-MCNC: 467 MG/DL (ref 70–99)
GLUCOSE BLDC GLUCOMTR-MCNC: 468 MG/DL (ref 70–99)
GLUCOSE BLDC GLUCOMTR-MCNC: 64 MG/DL (ref 70–99)
GLUCOSE BLDC GLUCOMTR-MCNC: 70 MG/DL (ref 70–99)
GLUCOSE BLDC GLUCOMTR-MCNC: 83 MG/DL (ref 70–99)
GLUCOSE BLDC GLUCOMTR-MCNC: 98 MG/DL (ref 70–99)
GLUCOSE SERPL-MCNC: 105 MG/DL (ref 70–99)
GLUCOSE SERPL-MCNC: 551 MG/DL (ref 70–99)
GLUCOSE UR STRIP-MCNC: >1000 MG/DL
HBA1C MFR BLD: 12.5 % (ref 0–5.6)
HCO3 BLDV-SCNC: 26 MMOL/L (ref 21–28)
HCT VFR BLD AUTO: 46.7 % (ref 40–53)
HEMOCCULT STL QL: NEGATIVE
HGB BLD-MCNC: 15.2 G/DL (ref 13.3–17.7)
HGB BLD-MCNC: 15.5 G/DL (ref 13.3–17.7)
HGB UR QL STRIP: NEGATIVE
IMM GRANULOCYTES # BLD: 0 10E9/L (ref 0–0.4)
IMM GRANULOCYTES NFR BLD: 0.3 %
INTERPRETATION ECG - MUSE: NORMAL
KETONES BLD-SCNC: 3.1 MMOL/L (ref 0–0.6)
KETONES UR STRIP-MCNC: 40 MG/DL
LABORATORY COMMENT REPORT: NORMAL
LABORATORY COMMENT REPORT: NORMAL
LACTATE BLD-SCNC: 1.4 MMOL/L (ref 0.7–2)
LEUKOCYTE ESTERASE UR QL STRIP: NEGATIVE
LYMPHOCYTES # BLD AUTO: 1.6 10E9/L (ref 0.8–5.3)
LYMPHOCYTES NFR BLD AUTO: 25.4 %
MAGNESIUM SERPL-MCNC: 2 MG/DL (ref 1.6–2.3)
MAGNESIUM SERPL-MCNC: 2 MG/DL (ref 1.6–2.3)
MAGNESIUM SERPL-MCNC: 2.1 MG/DL (ref 1.6–2.3)
MAGNESIUM SERPL-MCNC: 2.2 MG/DL (ref 1.6–2.3)
MAGNESIUM SERPL-MCNC: 2.3 MG/DL (ref 1.6–2.3)
MAGNESIUM SERPL-MCNC: 2.4 MG/DL (ref 1.6–2.3)
MAGNESIUM SERPL-MCNC: 2.5 MG/DL (ref 1.6–2.3)
MCH RBC QN AUTO: 27.6 PG (ref 26.5–33)
MCHC RBC AUTO-ENTMCNC: 33.2 G/DL (ref 31.5–36.5)
MCV RBC AUTO: 83 FL (ref 78–100)
MONOCYTES # BLD AUTO: 0.4 10E9/L (ref 0–1.3)
MONOCYTES NFR BLD AUTO: 6.3 %
MUCOUS THREADS #/AREA URNS LPF: PRESENT /LPF
NEUTROPHILS # BLD AUTO: 4.2 10E9/L (ref 1.6–8.3)
NEUTROPHILS NFR BLD AUTO: 65.8 %
NITRATE UR QL: NEGATIVE
NOROV GI+II ORF1-ORF2 JNC STL QL NAA+PR: NOT DETECTED
NRBC # BLD AUTO: 0 10*3/UL
NRBC BLD AUTO-RTO: 0 /100
O+P STL MICRO: NEGATIVE
PCO2 BLDV: 49 MM HG (ref 40–50)
PH BLDV: 7.33 PH (ref 7.32–7.43)
PH UR STRIP: 5 PH (ref 5–7)
PLATELET # BLD AUTO: 188 10E9/L (ref 150–450)
PO2 BLDV: 27 MM HG (ref 25–47)
POTASSIUM BLD-SCNC: 3.4 MMOL/L (ref 3.4–5.3)
POTASSIUM SERPL-SCNC: 3.1 MMOL/L (ref 3.4–5.3)
POTASSIUM SERPL-SCNC: 3.3 MMOL/L (ref 3.4–5.3)
POTASSIUM SERPL-SCNC: 3.5 MMOL/L (ref 3.4–5.3)
POTASSIUM SERPL-SCNC: 3.5 MMOL/L (ref 3.4–5.3)
POTASSIUM SERPL-SCNC: 3.7 MMOL/L (ref 3.4–5.3)
POTASSIUM SERPL-SCNC: 3.8 MMOL/L (ref 3.4–5.3)
POTASSIUM SERPL-SCNC: 4 MMOL/L (ref 3.4–5.3)
POTASSIUM SERPL-SCNC: 4.1 MMOL/L (ref 3.4–5.3)
POTASSIUM SERPL-SCNC: 4.4 MMOL/L (ref 3.4–5.3)
PROT SERPL-MCNC: 7.6 G/DL (ref 6.8–8.8)
RBC # BLD AUTO: 5.62 10E12/L (ref 4.4–5.9)
RBC #/AREA URNS AUTO: 0 /HPF (ref 0–2)
RVA NSP5 STL QL NAA+PROBE: NOT DETECTED
SALMONELLA SP RPOD STL QL NAA+PROBE: NOT DETECTED
SARS-COV-2 RNA RESP QL NAA+PROBE: NEGATIVE
SHIGELLA SP+EIEC IPAH STL QL NAA+PROBE: NOT DETECTED
SODIUM SERPL-SCNC: 131 MMOL/L (ref 133–144)
SODIUM SERPL-SCNC: 139 MMOL/L (ref 133–144)
SODIUM SERPL-SCNC: 140 MMOL/L (ref 133–144)
SOURCE: ABNORMAL
SP GR UR STRIP: 1.01 (ref 1–1.03)
SPECIMEN SOURCE: NORMAL
SPECIMEN SOURCE: NORMAL
TROPONIN I SERPL-MCNC: <0.015 UG/L (ref 0–0.04)
TSH SERPL DL<=0.005 MIU/L-ACNC: 0.79 MU/L (ref 0.4–4)
UROBILINOGEN UR STRIP-MCNC: NORMAL MG/DL (ref 0–2)
V CHOL+PARA RFBL+TRKH+TNAA STL QL NAA+PR: NOT DETECTED
WBC # BLD AUTO: 6.4 10E9/L (ref 4–11)
WBC #/AREA URNS AUTO: <1 /HPF (ref 0–5)
Y ENTERO RECN STL QL NAA+PROBE: NOT DETECTED

## 2021-01-01 PROCEDURE — 250N000009 HC RX 250: Performed by: EMERGENCY MEDICINE

## 2021-01-01 PROCEDURE — 250N000013 HC RX MED GY IP 250 OP 250 PS 637: Performed by: INTERNAL MEDICINE

## 2021-01-01 PROCEDURE — 97535 SELF CARE MNGMENT TRAINING: CPT | Mod: GO | Performed by: REHABILITATION PRACTITIONER

## 2021-01-01 PROCEDURE — 36415 COLL VENOUS BLD VENIPUNCTURE: CPT | Performed by: INTERNAL MEDICINE

## 2021-01-01 PROCEDURE — 97116 GAIT TRAINING THERAPY: CPT | Mod: GP | Performed by: PHYSICAL THERAPIST

## 2021-01-01 PROCEDURE — 84132 ASSAY OF SERUM POTASSIUM: CPT | Performed by: INTERNAL MEDICINE

## 2021-01-01 PROCEDURE — 87493 C DIFF AMPLIFIED PROBE: CPT | Mod: ORL,XU | Performed by: NURSE PRACTITIONER

## 2021-01-01 PROCEDURE — 83605 ASSAY OF LACTIC ACID: CPT | Performed by: EMERGENCY MEDICINE

## 2021-01-01 PROCEDURE — 82565 ASSAY OF CREATININE: CPT | Performed by: INTERNAL MEDICINE

## 2021-01-01 PROCEDURE — 258N000003 HC RX IP 258 OP 636: Performed by: EMERGENCY MEDICINE

## 2021-01-01 PROCEDURE — 250N000013 HC RX MED GY IP 250 OP 250 PS 637: Performed by: EMERGENCY MEDICINE

## 2021-01-01 PROCEDURE — 99232 SBSQ HOSP IP/OBS MODERATE 35: CPT | Performed by: INTERNAL MEDICINE

## 2021-01-01 PROCEDURE — 99233 SBSQ HOSP IP/OBS HIGH 50: CPT | Performed by: INTERNAL MEDICINE

## 2021-01-01 PROCEDURE — 120N000013 HC R&B IMCU

## 2021-01-01 PROCEDURE — 83735 ASSAY OF MAGNESIUM: CPT | Performed by: INTERNAL MEDICINE

## 2021-01-01 PROCEDURE — 87635 SARS-COV-2 COVID-19 AMP PRB: CPT | Performed by: EMERGENCY MEDICINE

## 2021-01-01 PROCEDURE — 83036 HEMOGLOBIN GLYCOSYLATED A1C: CPT | Performed by: INTERNAL MEDICINE

## 2021-01-01 PROCEDURE — 999N001017 HC STATISTIC GLUCOSE BY METER IP

## 2021-01-01 PROCEDURE — C9803 HOPD COVID-19 SPEC COLLECT: HCPCS

## 2021-01-01 PROCEDURE — 85025 COMPLETE CBC W/AUTO DIFF WBC: CPT | Performed by: EMERGENCY MEDICINE

## 2021-01-01 PROCEDURE — 99283 EMERGENCY DEPT VISIT LOW MDM: CPT

## 2021-01-01 PROCEDURE — 82272 OCCULT BLD FECES 1-3 TESTS: CPT | Mod: ORL | Performed by: NURSE PRACTITIONER

## 2021-01-01 PROCEDURE — 36415 COLL VENOUS BLD VENIPUNCTURE: CPT | Mod: ORL | Performed by: NURSE PRACTITIONER

## 2021-01-01 PROCEDURE — 97166 OT EVAL MOD COMPLEX 45 MIN: CPT | Mod: GO

## 2021-01-01 PROCEDURE — 99285 EMERGENCY DEPT VISIT HI MDM: CPT | Mod: 25

## 2021-01-01 PROCEDURE — 120N000001 HC R&B MED SURG/OB

## 2021-01-01 PROCEDURE — 99207 PR CDG-MDM COMPONENT: MEETS MODERATE - UP CODED: CPT | Performed by: INTERNAL MEDICINE

## 2021-01-01 PROCEDURE — 84484 ASSAY OF TROPONIN QUANT: CPT | Performed by: EMERGENCY MEDICINE

## 2021-01-01 PROCEDURE — 250N000012 HC RX MED GY IP 250 OP 636 PS 637: Performed by: INTERNAL MEDICINE

## 2021-01-01 PROCEDURE — U0005 INFEC AGEN DETEC AMPLI PROBE: HCPCS | Mod: ORL | Performed by: FAMILY MEDICINE

## 2021-01-01 PROCEDURE — 80048 BASIC METABOLIC PNL TOTAL CA: CPT | Mod: ORL | Performed by: NURSE PRACTITIONER

## 2021-01-01 PROCEDURE — 87177 OVA AND PARASITES SMEARS: CPT | Mod: ORL | Performed by: NURSE PRACTITIONER

## 2021-01-01 PROCEDURE — P9603 ONE-WAY ALLOW PRORATED MILES: HCPCS | Mod: ORL | Performed by: NURSE PRACTITIONER

## 2021-01-01 PROCEDURE — 97162 PT EVAL MOD COMPLEX 30 MIN: CPT | Mod: GP | Performed by: PHYSICAL THERAPIST

## 2021-01-01 PROCEDURE — 87635 SARS-COV-2 COVID-19 AMP PRB: CPT | Performed by: INTERNAL MEDICINE

## 2021-01-01 PROCEDURE — 97530 THERAPEUTIC ACTIVITIES: CPT | Mod: GP | Performed by: PHYSICAL THERAPIST

## 2021-01-01 PROCEDURE — 83735 ASSAY OF MAGNESIUM: CPT | Performed by: EMERGENCY MEDICINE

## 2021-01-01 PROCEDURE — 70496 CT ANGIOGRAPHY HEAD: CPT | Mod: ME

## 2021-01-01 PROCEDURE — 93005 ELECTROCARDIOGRAM TRACING: CPT

## 2021-01-01 PROCEDURE — 80048 BASIC METABOLIC PNL TOTAL CA: CPT | Performed by: INTERNAL MEDICINE

## 2021-01-01 PROCEDURE — 97535 SELF CARE MNGMENT TRAINING: CPT | Mod: GO

## 2021-01-01 PROCEDURE — 96365 THER/PROPH/DIAG IV INF INIT: CPT

## 2021-01-01 PROCEDURE — 82803 BLOOD GASES ANY COMBINATION: CPT | Performed by: EMERGENCY MEDICINE

## 2021-01-01 PROCEDURE — U0003 INFECTIOUS AGENT DETECTION BY NUCLEIC ACID (DNA OR RNA); SEVERE ACUTE RESPIRATORY SYNDROME CORONAVIRUS 2 (SARS-COV-2) (CORONAVIRUS DISEASE [COVID-19]), AMPLIFIED PROBE TECHNIQUE, MAKING USE OF HIGH THROUGHPUT TECHNOLOGIES AS DESCRIBED BY CMS-2020-01-R: HCPCS | Mod: ORL | Performed by: FAMILY MEDICINE

## 2021-01-01 PROCEDURE — 258N000003 HC RX IP 258 OP 636: Performed by: INTERNAL MEDICINE

## 2021-01-01 PROCEDURE — 87506 IADNA-DNA/RNA PROBE TQ 6-11: CPT | Mod: ORL | Performed by: NURSE PRACTITIONER

## 2021-01-01 PROCEDURE — 81001 URINALYSIS AUTO W/SCOPE: CPT | Performed by: EMERGENCY MEDICINE

## 2021-01-01 PROCEDURE — 250N000011 HC RX IP 250 OP 636: Performed by: EMERGENCY MEDICINE

## 2021-01-01 PROCEDURE — 82010 KETONE BODYS QUAN: CPT | Performed by: EMERGENCY MEDICINE

## 2021-01-01 PROCEDURE — 97112 NEUROMUSCULAR REEDUCATION: CPT | Mod: GP | Performed by: PHYSICAL THERAPIST

## 2021-01-01 PROCEDURE — 99239 HOSP IP/OBS DSCHRG MGMT >30: CPT | Performed by: INTERNAL MEDICINE

## 2021-01-01 PROCEDURE — 250N000012 HC RX MED GY IP 250 OP 636 PS 637: Performed by: EMERGENCY MEDICINE

## 2021-01-01 PROCEDURE — 85018 HEMOGLOBIN: CPT | Mod: ORL | Performed by: NURSE PRACTITIONER

## 2021-01-01 PROCEDURE — 84443 ASSAY THYROID STIM HORMONE: CPT | Performed by: EMERGENCY MEDICINE

## 2021-01-01 PROCEDURE — 97530 THERAPEUTIC ACTIVITIES: CPT | Mod: GO

## 2021-01-01 PROCEDURE — 96361 HYDRATE IV INFUSION ADD-ON: CPT

## 2021-01-01 PROCEDURE — 70450 CT HEAD/BRAIN W/O DYE: CPT | Mod: ME

## 2021-01-01 PROCEDURE — 80053 COMPREHEN METABOLIC PANEL: CPT | Performed by: EMERGENCY MEDICINE

## 2021-01-01 PROCEDURE — 96372 THER/PROPH/DIAG INJ SC/IM: CPT | Performed by: EMERGENCY MEDICINE

## 2021-01-01 PROCEDURE — 99223 1ST HOSP IP/OBS HIGH 75: CPT | Mod: AI | Performed by: INTERNAL MEDICINE

## 2021-01-01 RX ORDER — QUETIAPINE FUMARATE 25 MG/1
25 TABLET, FILM COATED ORAL ONCE
Status: COMPLETED | OUTPATIENT
Start: 2021-01-01 | End: 2021-01-01

## 2021-01-01 RX ORDER — KETOROLAC TROMETHAMINE 15 MG/ML
15 INJECTION, SOLUTION INTRAMUSCULAR; INTRAVENOUS ONCE
Status: DISCONTINUED | OUTPATIENT
Start: 2021-01-01 | End: 2021-01-01 | Stop reason: CLARIF

## 2021-01-01 RX ORDER — INSULIN ASPART 100 [IU]/ML
7 INJECTION, SOLUTION INTRAVENOUS; SUBCUTANEOUS
Qty: 9 ML | Refills: 0 | Status: SHIPPED | OUTPATIENT
Start: 2021-01-01

## 2021-01-01 RX ORDER — RIVASTIGMINE 4.6 MG/24H
4.6 PATCH, EXTENDED RELEASE TRANSDERMAL DAILY
Status: ON HOLD | COMMUNITY
Start: 2021-04-23 | End: 2022-01-01

## 2021-01-01 RX ORDER — NITROGLYCERIN 0.4 MG/1
0.4 TABLET SUBLINGUAL EVERY 5 MIN PRN
Status: DISCONTINUED | OUTPATIENT
Start: 2021-01-01 | End: 2021-01-01

## 2021-01-01 RX ORDER — DEXTROSE MONOHYDRATE 25 G/50ML
25-50 INJECTION, SOLUTION INTRAVENOUS
Status: DISCONTINUED | OUTPATIENT
Start: 2021-01-01 | End: 2021-01-01 | Stop reason: HOSPADM

## 2021-01-01 RX ORDER — NICOTINE POLACRILEX 4 MG
15-30 LOZENGE BUCCAL
Status: DISCONTINUED | OUTPATIENT
Start: 2021-01-01 | End: 2021-01-01

## 2021-01-01 RX ORDER — NICOTINE POLACRILEX 4 MG
15-30 LOZENGE BUCCAL
Status: DISCONTINUED | OUTPATIENT
Start: 2021-01-01 | End: 2021-01-01 | Stop reason: HOSPADM

## 2021-01-01 RX ORDER — IOPAMIDOL 755 MG/ML
500 INJECTION, SOLUTION INTRAVASCULAR ONCE
Status: COMPLETED | OUTPATIENT
Start: 2021-01-01 | End: 2021-01-01

## 2021-01-01 RX ORDER — HYDRALAZINE HYDROCHLORIDE 20 MG/ML
10 INJECTION INTRAMUSCULAR; INTRAVENOUS EVERY 4 HOURS PRN
Status: DISCONTINUED | OUTPATIENT
Start: 2021-01-01 | End: 2021-01-01 | Stop reason: HOSPADM

## 2021-01-01 RX ORDER — RIVASTIGMINE 4.6 MG/24H
4.6 PATCH, EXTENDED RELEASE TRANSDERMAL DAILY
Status: DISCONTINUED | OUTPATIENT
Start: 2021-01-01 | End: 2021-01-01 | Stop reason: HOSPADM

## 2021-01-01 RX ORDER — PHENOL 1.4 %
10 AEROSOL, SPRAY (ML) MUCOUS MEMBRANE AT BEDTIME
COMMUNITY

## 2021-01-01 RX ORDER — ATORVASTATIN CALCIUM 40 MG/1
80 TABLET, FILM COATED ORAL DAILY
Status: DISCONTINUED | OUTPATIENT
Start: 2021-01-01 | End: 2021-01-01 | Stop reason: HOSPADM

## 2021-01-01 RX ORDER — POTASSIUM CHLORIDE 1500 MG/1
40 TABLET, EXTENDED RELEASE ORAL ONCE
Status: COMPLETED | OUTPATIENT
Start: 2021-01-01 | End: 2021-01-01

## 2021-01-01 RX ORDER — LIDOCAINE 40 MG/G
CREAM TOPICAL
Status: DISCONTINUED | OUTPATIENT
Start: 2021-01-01 | End: 2021-01-01 | Stop reason: HOSPADM

## 2021-01-01 RX ORDER — DEXTROSE MONOHYDRATE 100 MG/ML
INJECTION, SOLUTION INTRAVENOUS CONTINUOUS PRN
Status: DISCONTINUED | OUTPATIENT
Start: 2021-01-01 | End: 2021-01-01 | Stop reason: HOSPADM

## 2021-01-01 RX ORDER — NITROGLYCERIN 0.4 MG/1
0.4 TABLET SUBLINGUAL EVERY 5 MIN PRN
Status: DISCONTINUED | OUTPATIENT
Start: 2021-01-01 | End: 2021-01-01 | Stop reason: HOSPADM

## 2021-01-01 RX ORDER — SENNOSIDES 8.6 MG
2 TABLET ORAL DAILY PRN
Qty: 60 TABLET | Refills: 0 | Status: SHIPPED | OUTPATIENT
Start: 2021-01-01

## 2021-01-01 RX ORDER — ACETAMINOPHEN 325 MG/1
325-650 TABLET ORAL EVERY 6 HOURS PRN
Status: DISCONTINUED | OUTPATIENT
Start: 2021-01-01 | End: 2021-01-01 | Stop reason: HOSPADM

## 2021-01-01 RX ORDER — METOPROLOL TARTRATE 25 MG/1
25 TABLET, FILM COATED ORAL 2 TIMES DAILY
Status: DISCONTINUED | OUTPATIENT
Start: 2021-01-01 | End: 2021-01-01 | Stop reason: HOSPADM

## 2021-01-01 RX ORDER — METFORMIN HCL 500 MG
500 TABLET, EXTENDED RELEASE 24 HR ORAL 2 TIMES DAILY WITH MEALS
Status: DISCONTINUED | OUTPATIENT
Start: 2021-01-01 | End: 2021-01-01

## 2021-01-01 RX ORDER — LIDOCAINE 40 MG/G
CREAM TOPICAL
Status: DISCONTINUED | OUTPATIENT
Start: 2021-01-01 | End: 2021-01-01

## 2021-01-01 RX ORDER — CLOPIDOGREL BISULFATE 75 MG/1
75 TABLET ORAL DAILY
Status: DISCONTINUED | OUTPATIENT
Start: 2021-01-01 | End: 2021-01-01 | Stop reason: HOSPADM

## 2021-01-01 RX ORDER — SODIUM CHLORIDE 9 MG/ML
INJECTION, SOLUTION INTRAVENOUS CONTINUOUS
Status: DISCONTINUED | OUTPATIENT
Start: 2021-01-01 | End: 2021-01-01

## 2021-01-01 RX ORDER — QUETIAPINE FUMARATE 25 MG/1
25 TABLET, FILM COATED ORAL EVERY 12 HOURS PRN
Qty: 15 TABLET | Refills: 0 | Status: ON HOLD | OUTPATIENT
Start: 2021-01-01 | End: 2022-01-01

## 2021-01-01 RX ORDER — POLYETHYLENE GLYCOL 3350 17 G/17G
17 POWDER, FOR SOLUTION ORAL 2 TIMES DAILY PRN
Status: DISCONTINUED | OUTPATIENT
Start: 2021-01-01 | End: 2021-01-01 | Stop reason: HOSPADM

## 2021-01-01 RX ORDER — ASPIRIN 81 MG/1
81 TABLET ORAL DAILY
Status: DISCONTINUED | OUTPATIENT
Start: 2021-01-01 | End: 2021-01-01 | Stop reason: HOSPADM

## 2021-01-01 RX ORDER — SENNOSIDES 8.6 MG
2 TABLET ORAL DAILY PRN
Status: DISCONTINUED | OUTPATIENT
Start: 2021-01-01 | End: 2021-01-01 | Stop reason: HOSPADM

## 2021-01-01 RX ORDER — DEXTROSE MONOHYDRATE 25 G/50ML
25-50 INJECTION, SOLUTION INTRAVENOUS
Status: DISCONTINUED | OUTPATIENT
Start: 2021-01-01 | End: 2021-01-01

## 2021-01-01 RX ORDER — QUETIAPINE FUMARATE 25 MG/1
25 TABLET, FILM COATED ORAL EVERY 12 HOURS PRN
Status: DISCONTINUED | OUTPATIENT
Start: 2021-01-01 | End: 2021-01-01 | Stop reason: HOSPADM

## 2021-01-01 RX ADMIN — INSULIN DETEMIR 20 UNITS: 100 INJECTION, SOLUTION SUBCUTANEOUS at 10:23

## 2021-01-01 RX ADMIN — ASPIRIN 81 MG: 81 TABLET ORAL at 08:22

## 2021-01-01 RX ADMIN — ACETAMINOPHEN 650 MG: 325 TABLET, FILM COATED ORAL at 17:40

## 2021-01-01 RX ADMIN — METOPROLOL TARTRATE 25 MG: 25 TABLET, FILM COATED ORAL at 08:39

## 2021-01-01 RX ADMIN — INSULIN DETEMIR 20 UNITS: 100 INJECTION, SOLUTION SUBCUTANEOUS at 21:02

## 2021-01-01 RX ADMIN — CLOPIDOGREL BISULFATE 75 MG: 75 TABLET ORAL at 09:14

## 2021-01-01 RX ADMIN — OMEPRAZOLE 20 MG: 20 CAPSULE, DELAYED RELEASE ORAL at 08:54

## 2021-01-01 RX ADMIN — METOPROLOL TARTRATE 25 MG: 25 TABLET, FILM COATED ORAL at 21:58

## 2021-01-01 RX ADMIN — METOPROLOL TARTRATE 25 MG: 25 TABLET, FILM COATED ORAL at 08:17

## 2021-01-01 RX ADMIN — CLOPIDOGREL BISULFATE 75 MG: 75 TABLET ORAL at 08:54

## 2021-01-01 RX ADMIN — METOPROLOL TARTRATE 25 MG: 25 TABLET, FILM COATED ORAL at 09:14

## 2021-01-01 RX ADMIN — ASPIRIN 81 MG: 81 TABLET ORAL at 10:14

## 2021-01-01 RX ADMIN — CLOPIDOGREL BISULFATE 75 MG: 75 TABLET ORAL at 10:14

## 2021-01-01 RX ADMIN — METFORMIN HYDROCHLORIDE 1000 MG: 500 TABLET, FILM COATED ORAL at 18:05

## 2021-01-01 RX ADMIN — INSULIN DETEMIR 20 UNITS: 100 INJECTION, SOLUTION SUBCUTANEOUS at 08:12

## 2021-01-01 RX ADMIN — INSULIN ASPART 2 UNITS: 100 INJECTION, SOLUTION INTRAVENOUS; SUBCUTANEOUS at 08:00

## 2021-01-01 RX ADMIN — RIVASTIGMINE 1 PATCH: 4.6 PATCH TRANSDERMAL at 08:43

## 2021-01-01 RX ADMIN — ATORVASTATIN CALCIUM 80 MG: 40 TABLET, FILM COATED ORAL at 08:54

## 2021-01-01 RX ADMIN — SENNOSIDES 2 TABLET: 8.6 TABLET, FILM COATED ORAL at 09:28

## 2021-01-01 RX ADMIN — CLOPIDOGREL BISULFATE 75 MG: 75 TABLET ORAL at 08:22

## 2021-01-01 RX ADMIN — METOPROLOL TARTRATE 25 MG: 25 TABLET, FILM COATED ORAL at 20:14

## 2021-01-01 RX ADMIN — ACETAMINOPHEN 650 MG: 325 TABLET, FILM COATED ORAL at 04:26

## 2021-01-01 RX ADMIN — METOPROLOL TARTRATE 25 MG: 25 TABLET, FILM COATED ORAL at 08:22

## 2021-01-01 RX ADMIN — POTASSIUM CHLORIDE 40 MEQ: 1500 TABLET, EXTENDED RELEASE ORAL at 08:22

## 2021-01-01 RX ADMIN — ATORVASTATIN CALCIUM 80 MG: 40 TABLET, FILM COATED ORAL at 08:22

## 2021-01-01 RX ADMIN — QUETIAPINE FUMARATE 25 MG: 25 TABLET ORAL at 00:56

## 2021-01-01 RX ADMIN — METOPROLOL TARTRATE 25 MG: 25 TABLET, FILM COATED ORAL at 08:59

## 2021-01-01 RX ADMIN — ATORVASTATIN CALCIUM 80 MG: 40 TABLET, FILM COATED ORAL at 08:59

## 2021-01-01 RX ADMIN — INSULIN DETEMIR 20 UNITS: 100 INJECTION, SOLUTION SUBCUTANEOUS at 08:30

## 2021-01-01 RX ADMIN — METOPROLOL TARTRATE 25 MG: 25 TABLET, FILM COATED ORAL at 20:15

## 2021-01-01 RX ADMIN — INSULIN DETEMIR 20 UNITS: 100 INJECTION, SOLUTION SUBCUTANEOUS at 09:57

## 2021-01-01 RX ADMIN — INSULIN ASPART 10 UNITS: 100 INJECTION, SOLUTION INTRAVENOUS; SUBCUTANEOUS at 16:07

## 2021-01-01 RX ADMIN — METFORMIN ER 500 MG 500 MG: 500 TABLET ORAL at 17:50

## 2021-01-01 RX ADMIN — METFORMIN HYDROCHLORIDE 1000 MG: 500 TABLET, FILM COATED ORAL at 10:04

## 2021-01-01 RX ADMIN — OMEPRAZOLE 20 MG: 20 CAPSULE, DELAYED RELEASE ORAL at 08:22

## 2021-01-01 RX ADMIN — OMEPRAZOLE 20 MG: 20 CAPSULE, DELAYED RELEASE ORAL at 08:55

## 2021-01-01 RX ADMIN — INSULIN DETEMIR 20 UNITS: 100 INJECTION, SOLUTION SUBCUTANEOUS at 08:49

## 2021-01-01 RX ADMIN — ACETAMINOPHEN 650 MG: 325 TABLET, FILM COATED ORAL at 23:59

## 2021-01-01 RX ADMIN — QUETIAPINE FUMARATE 25 MG: 25 TABLET ORAL at 23:59

## 2021-01-01 RX ADMIN — RIVASTIGMINE 1 PATCH: 4.6 PATCH TRANSDERMAL at 08:57

## 2021-01-01 RX ADMIN — RIVASTIGMINE 1 PATCH: 4.6 PATCH TRANSDERMAL at 09:13

## 2021-01-01 RX ADMIN — QUETIAPINE FUMARATE 25 MG: 25 TABLET ORAL at 12:30

## 2021-01-01 RX ADMIN — INSULIN ASPART 5 UNITS: 100 INJECTION, SOLUTION INTRAVENOUS; SUBCUTANEOUS at 21:47

## 2021-01-01 RX ADMIN — ACETAMINOPHEN 650 MG: 325 TABLET, FILM COATED ORAL at 02:04

## 2021-01-01 RX ADMIN — SODIUM CHLORIDE: 9 INJECTION, SOLUTION INTRAVENOUS at 01:19

## 2021-01-01 RX ADMIN — INSULIN ASPART 6 UNITS: 100 INJECTION, SOLUTION INTRAVENOUS; SUBCUTANEOUS at 08:58

## 2021-01-01 RX ADMIN — ASPIRIN 81 MG: 81 TABLET ORAL at 08:54

## 2021-01-01 RX ADMIN — QUETIAPINE FUMARATE 25 MG: 25 TABLET ORAL at 18:00

## 2021-01-01 RX ADMIN — INSULIN ASPART 9 UNITS: 100 INJECTION, SOLUTION INTRAVENOUS; SUBCUTANEOUS at 12:29

## 2021-01-01 RX ADMIN — ACETAMINOPHEN 650 MG: 325 TABLET, FILM COATED ORAL at 13:20

## 2021-01-01 RX ADMIN — ATORVASTATIN CALCIUM 80 MG: 40 TABLET, FILM COATED ORAL at 10:17

## 2021-01-01 RX ADMIN — METFORMIN HYDROCHLORIDE 1000 MG: 500 TABLET, FILM COATED ORAL at 08:54

## 2021-01-01 RX ADMIN — Medication 5.5 UNITS/HR: at 23:28

## 2021-01-01 RX ADMIN — SODIUM CHLORIDE 1000 ML: 9 INJECTION, SOLUTION INTRAVENOUS at 21:26

## 2021-01-01 RX ADMIN — CLOPIDOGREL BISULFATE 75 MG: 75 TABLET ORAL at 08:39

## 2021-01-01 RX ADMIN — INSULIN ASPART 6 UNITS: 100 INJECTION, SOLUTION INTRAVENOUS; SUBCUTANEOUS at 11:33

## 2021-01-01 RX ADMIN — RIVASTIGMINE 1 PATCH: 4.6 PATCH TRANSDERMAL at 08:22

## 2021-01-01 RX ADMIN — METFORMIN ER 500 MG 500 MG: 500 TABLET ORAL at 17:56

## 2021-01-01 RX ADMIN — RIVASTIGMINE 1 PATCH: 4.6 PATCH TRANSDERMAL at 10:14

## 2021-01-01 RX ADMIN — ASPIRIN 81 MG: 81 TABLET ORAL at 08:16

## 2021-01-01 RX ADMIN — INSULIN DETEMIR 20 UNITS: 100 INJECTION, SOLUTION SUBCUTANEOUS at 21:54

## 2021-01-01 RX ADMIN — RIVASTIGMINE 1 PATCH: 4.6 PATCH TRANSDERMAL at 08:58

## 2021-01-01 RX ADMIN — QUETIAPINE FUMARATE 25 MG: 25 TABLET ORAL at 00:27

## 2021-01-01 RX ADMIN — METFORMIN ER 500 MG 500 MG: 500 TABLET ORAL at 08:22

## 2021-01-01 RX ADMIN — ATORVASTATIN CALCIUM 80 MG: 40 TABLET, FILM COATED ORAL at 09:13

## 2021-01-01 RX ADMIN — CLOPIDOGREL BISULFATE 75 MG: 75 TABLET ORAL at 08:23

## 2021-01-01 RX ADMIN — CLOPIDOGREL BISULFATE 75 MG: 75 TABLET ORAL at 08:17

## 2021-01-01 RX ADMIN — METOPROLOL TARTRATE 25 MG: 25 TABLET, FILM COATED ORAL at 08:23

## 2021-01-01 RX ADMIN — INSULIN ASPART 4 UNITS: 100 INJECTION, SOLUTION INTRAVENOUS; SUBCUTANEOUS at 18:00

## 2021-01-01 RX ADMIN — ASPIRIN 81 MG: 81 TABLET ORAL at 08:38

## 2021-01-01 RX ADMIN — METOPROLOL TARTRATE 25 MG: 25 TABLET, FILM COATED ORAL at 08:54

## 2021-01-01 RX ADMIN — ATORVASTATIN CALCIUM 80 MG: 40 TABLET, FILM COATED ORAL at 08:38

## 2021-01-01 RX ADMIN — OMEPRAZOLE 20 MG: 20 CAPSULE, DELAYED RELEASE ORAL at 08:20

## 2021-01-01 RX ADMIN — METOPROLOL TARTRATE 25 MG: 25 TABLET, FILM COATED ORAL at 21:05

## 2021-01-01 RX ADMIN — CLOPIDOGREL BISULFATE 75 MG: 75 TABLET ORAL at 08:59

## 2021-01-01 RX ADMIN — RIVASTIGMINE 1 PATCH: 4.6 PATCH TRANSDERMAL at 08:28

## 2021-01-01 RX ADMIN — INSULIN ASPART 2 UNITS: 100 INJECTION, SOLUTION INTRAVENOUS; SUBCUTANEOUS at 16:54

## 2021-01-01 RX ADMIN — INSULIN DETEMIR 20 UNITS: 100 INJECTION, SOLUTION SUBCUTANEOUS at 09:12

## 2021-01-01 RX ADMIN — OMEPRAZOLE 20 MG: 20 CAPSULE, DELAYED RELEASE ORAL at 08:39

## 2021-01-01 RX ADMIN — METOPROLOL TARTRATE 25 MG: 25 TABLET, FILM COATED ORAL at 08:20

## 2021-01-01 RX ADMIN — Medication 1 MG: at 00:27

## 2021-01-01 RX ADMIN — Medication 1 MG: at 21:05

## 2021-01-01 RX ADMIN — INSULIN DETEMIR 20 UNITS: 100 INJECTION, SOLUTION SUBCUTANEOUS at 07:57

## 2021-01-01 RX ADMIN — ASPIRIN 81 MG: 81 TABLET ORAL at 08:59

## 2021-01-01 RX ADMIN — METOPROLOL TARTRATE 25 MG: 25 TABLET, FILM COATED ORAL at 21:54

## 2021-01-01 RX ADMIN — ACETAMINOPHEN 650 MG: 325 TABLET, FILM COATED ORAL at 01:50

## 2021-01-01 RX ADMIN — INSULIN ASPART 3 UNITS: 100 INJECTION, SOLUTION INTRAVENOUS; SUBCUTANEOUS at 12:16

## 2021-01-01 RX ADMIN — INSULIN DETEMIR 20 UNITS: 100 INJECTION, SOLUTION SUBCUTANEOUS at 21:21

## 2021-01-01 RX ADMIN — METFORMIN HYDROCHLORIDE 500 MG: 500 TABLET, FILM COATED ORAL at 18:00

## 2021-01-01 RX ADMIN — OMEPRAZOLE 20 MG: 20 CAPSULE, DELAYED RELEASE ORAL at 08:17

## 2021-01-01 RX ADMIN — METOPROLOL TARTRATE 25 MG: 25 TABLET, FILM COATED ORAL at 21:29

## 2021-01-01 RX ADMIN — METOPROLOL TARTRATE 25 MG: 25 TABLET, FILM COATED ORAL at 21:46

## 2021-01-01 RX ADMIN — INSULIN ASPART 5 UNITS: 100 INJECTION, SOLUTION INTRAVENOUS; SUBCUTANEOUS at 11:41

## 2021-01-01 RX ADMIN — ASPIRIN 81 MG: 81 TABLET ORAL at 08:20

## 2021-01-01 RX ADMIN — METOPROLOL TARTRATE 25 MG: 25 TABLET, FILM COATED ORAL at 01:14

## 2021-01-01 RX ADMIN — ATORVASTATIN CALCIUM 80 MG: 40 TABLET, FILM COATED ORAL at 08:17

## 2021-01-01 RX ADMIN — CLOPIDOGREL BISULFATE 75 MG: 75 TABLET ORAL at 08:20

## 2021-01-01 RX ADMIN — RIVASTIGMINE 1 PATCH: 4.6 PATCH TRANSDERMAL at 08:46

## 2021-01-01 RX ADMIN — ASPIRIN 81 MG: 81 TABLET ORAL at 09:14

## 2021-01-01 RX ADMIN — RIVASTIGMINE 1 PATCH: 4.6 PATCH TRANSDERMAL at 19:09

## 2021-01-01 RX ADMIN — OMEPRAZOLE 20 MG: 20 CAPSULE, DELAYED RELEASE ORAL at 09:14

## 2021-01-01 RX ADMIN — METOPROLOL TARTRATE 25 MG: 25 TABLET, FILM COATED ORAL at 10:17

## 2021-01-01 RX ADMIN — METFORMIN ER 500 MG 500 MG: 500 TABLET ORAL at 08:39

## 2021-01-01 RX ADMIN — ATORVASTATIN CALCIUM 80 MG: 40 TABLET, FILM COATED ORAL at 08:20

## 2021-01-01 RX ADMIN — OMEPRAZOLE 20 MG: 20 CAPSULE, DELAYED RELEASE ORAL at 08:59

## 2021-01-01 RX ADMIN — INSULIN DETEMIR 20 UNITS: 100 INJECTION, SOLUTION SUBCUTANEOUS at 08:52

## 2021-01-01 RX ADMIN — METFORMIN HYDROCHLORIDE 500 MG: 500 TABLET, FILM COATED ORAL at 08:20

## 2021-01-01 RX ADMIN — INSULIN DETEMIR 20 UNITS: 100 INJECTION, SOLUTION SUBCUTANEOUS at 08:43

## 2021-01-01 RX ADMIN — OMEPRAZOLE 20 MG: 20 CAPSULE, DELAYED RELEASE ORAL at 10:14

## 2021-01-01 RX ADMIN — POTASSIUM CHLORIDE 40 MEQ: 1500 TABLET, EXTENDED RELEASE ORAL at 08:39

## 2021-01-01 RX ADMIN — RIVASTIGMINE 1 PATCH: 4.6 PATCH TRANSDERMAL at 08:18

## 2021-01-01 RX ADMIN — IOPAMIDOL 70 ML: 755 INJECTION, SOLUTION INTRAVENOUS at 21:13

## 2021-01-01 RX ADMIN — SODIUM CHLORIDE 80 ML: 9 INJECTION, SOLUTION INTRAVENOUS at 21:13

## 2021-01-01 RX ADMIN — INSULIN DETEMIR 20 UNITS: 100 INJECTION, SOLUTION SUBCUTANEOUS at 08:20

## 2021-01-01 ASSESSMENT — ACTIVITIES OF DAILY LIVING (ADL)
ADLS_ACUITY_SCORE: 17
ADLS_ACUITY_SCORE: 14
ADLS_ACUITY_SCORE: 17
ADLS_ACUITY_SCORE: 21
ADLS_ACUITY_SCORE: 17
ADLS_ACUITY_SCORE: 17
ADLS_ACUITY_SCORE: 21
ADLS_ACUITY_SCORE: 17
ADLS_ACUITY_SCORE: 14
ADLS_ACUITY_SCORE: 17
ADLS_ACUITY_SCORE: 14
ADLS_ACUITY_SCORE: 17
ADLS_ACUITY_SCORE: 17
ADLS_ACUITY_SCORE: 14
ADLS_ACUITY_SCORE: 18
ADLS_ACUITY_SCORE: 14
ADLS_ACUITY_SCORE: 16
ADLS_ACUITY_SCORE: 17
ADLS_ACUITY_SCORE: 16
ADLS_ACUITY_SCORE: 19
ADLS_ACUITY_SCORE: 17
ADLS_ACUITY_SCORE: 17
ADLS_ACUITY_SCORE: 16
ADLS_ACUITY_SCORE: 18
ADLS_ACUITY_SCORE: 17
ADLS_ACUITY_SCORE: 18
ADLS_ACUITY_SCORE: 17
ADLS_ACUITY_SCORE: 17
ADLS_ACUITY_SCORE: 16
ADLS_ACUITY_SCORE: 17
ADLS_ACUITY_SCORE: 14
ADLS_ACUITY_SCORE: 18
ADLS_ACUITY_SCORE: 16
ADLS_ACUITY_SCORE: 17
IADL_COMMENTS: SPOUSE ASSISTS WITH IADLS
ADLS_ACUITY_SCORE: 19
ADLS_ACUITY_SCORE: 17
ADLS_ACUITY_SCORE: 14
ADLS_ACUITY_SCORE: 16
ADLS_ACUITY_SCORE: 14
ADLS_ACUITY_SCORE: 16

## 2021-01-01 ASSESSMENT — MIFFLIN-ST. JEOR
SCORE: 1663.03
SCORE: 1651.69
SCORE: 1631.74
SCORE: 1650.33
SCORE: 1650.79
SCORE: 1650.33
SCORE: 1658.5
SCORE: 1635.82
SCORE: 1639.45

## 2021-01-01 ASSESSMENT — ENCOUNTER SYMPTOMS: CONFUSION: 1

## 2021-01-06 ENCOUNTER — TRANSFERRED RECORDS (OUTPATIENT)
Dept: HEALTH INFORMATION MANAGEMENT | Facility: CLINIC | Age: 71
End: 2021-01-06

## 2021-01-06 ENCOUNTER — DOCUMENTATION ONLY (OUTPATIENT)
Dept: CARDIOLOGY | Facility: CLINIC | Age: 71
End: 2021-01-06

## 2021-01-06 LAB
ANION GAP SERPL CALCULATED.3IONS-SCNC: 8 MMOL/L
BUN SERPL-MCNC: 22 MG/DL
CALCIUM SERPL-MCNC: 22 MG/DL
CHLORIDE SERPLBLD-SCNC: 106 MMOL/L
CO2 SERPL-SCNC: 25 MMOL/L
CREAT SERPL-MCNC: 0.91 MG/DL
GFR SERPL CREATININE-BSD FRML MDRD: ABNORMAL ML/MIN/{1.73_M2}
GLUCOSE SERPL-MCNC: 103 MG/DL (ref 70–180)
POTASSIUM SERPL-SCNC: 3.9 MMOL/L
SODIUM SERPL-SCNC: 139 MMOL/L

## 2021-01-08 NOTE — PROGRESS NOTES
Outside result noted. Routed to [facility] Team 3 nursing for review.    Ava Suazo RN  [facility]

## 2021-01-22 ENCOUNTER — DOCUMENTATION ONLY (OUTPATIENT)
Dept: CARDIOLOGY | Facility: CLINIC | Age: 71
End: 2021-01-22

## 2021-01-22 NOTE — PROGRESS NOTES
Received a from from [facility] stating that patient is to have a root tip tooth extraction and if its ok to proceed.They are not asking to stop DAPT. Dr. Strickland reviewed and signed. I faxed to form to [phone].

## 2021-02-06 ENCOUNTER — APPOINTMENT (OUTPATIENT)
Dept: GENERAL RADIOLOGY | Facility: CLINIC | Age: 71
End: 2021-02-06
Attending: EMERGENCY MEDICINE
Payer: MEDICARE

## 2021-02-06 ENCOUNTER — HOSPITAL ENCOUNTER (EMERGENCY)
Facility: CLINIC | Age: 71
Discharge: HOME | End: 2021-02-06
Attending: EMERGENCY MEDICINE | Admitting: EMERGENCY MEDICINE
Payer: MEDICARE

## 2021-02-06 VITALS
HEART RATE: 74 BPM | RESPIRATION RATE: 16 BRPM | SYSTOLIC BLOOD PRESSURE: 140 MMHG | DIASTOLIC BLOOD PRESSURE: 84 MMHG | TEMPERATURE: 96.3 F | OXYGEN SATURATION: 97 %

## 2021-02-06 DIAGNOSIS — R07.9 CHEST PAIN, UNSPECIFIED TYPE: ICD-10-CM

## 2021-02-06 LAB
ALBUMIN SERPL-MCNC: 3.3 G/DL (ref 3.4–5)
ALP SERPL-CCNC: 174 U/L (ref 40–150)
ALT SERPL W P-5'-P-CCNC: 42 U/L (ref 0–70)
ANION GAP SERPL CALCULATED.3IONS-SCNC: 8 MMOL/L (ref 3–14)
AST SERPL W P-5'-P-CCNC: 18 U/L (ref 0–45)
BASOPHILS # BLD AUTO: 0.1 10E9/L (ref 0–0.2)
BASOPHILS NFR BLD AUTO: 0.9 %
BILIRUB SERPL-MCNC: 0.3 MG/DL (ref 0.2–1.3)
BUN SERPL-MCNC: 30 MG/DL (ref 7–30)
CALCIUM SERPL-MCNC: 8.7 MG/DL (ref 8.5–10.1)
CHLORIDE SERPL-SCNC: 102 MMOL/L (ref 94–109)
CO2 SERPL-SCNC: 27 MMOL/L (ref 20–32)
CREAT SERPL-MCNC: 1.62 MG/DL (ref 0.66–1.25)
DIFFERENTIAL METHOD BLD: NORMAL
EOSINOPHIL # BLD AUTO: 0.1 10E9/L (ref 0–0.7)
EOSINOPHIL NFR BLD AUTO: 1.9 %
ERYTHROCYTE [DISTWIDTH] IN BLOOD BY AUTOMATED COUNT: 13.2 % (ref 10–15)
FLUAV RNA RESP QL NAA+PROBE: NEGATIVE
FLUBV RNA RESP QL NAA+PROBE: NEGATIVE
GFR SERPL CREATININE-BSD FRML MDRD: 43 ML/MIN/{1.73_M2}
GLUCOSE SERPL-MCNC: 263 MG/DL (ref 70–99)
HCT VFR BLD AUTO: 44 % (ref 40–53)
HGB BLD-MCNC: 14.5 G/DL (ref 13.3–17.7)
IMM GRANULOCYTES # BLD: 0 10E9/L (ref 0–0.4)
IMM GRANULOCYTES NFR BLD: 0.1 %
INTERPRETATION ECG - MUSE: NORMAL
LABORATORY COMMENT REPORT: NORMAL
LIPASE SERPL-CCNC: 124 U/L (ref 73–393)
LYMPHOCYTES # BLD AUTO: 2.3 10E9/L (ref 0.8–5.3)
LYMPHOCYTES NFR BLD AUTO: 34.5 %
MCH RBC QN AUTO: 27.6 PG (ref 26.5–33)
MCHC RBC AUTO-ENTMCNC: 33 G/DL (ref 31.5–36.5)
MCV RBC AUTO: 84 FL (ref 78–100)
MONOCYTES # BLD AUTO: 0.6 10E9/L (ref 0–1.3)
MONOCYTES NFR BLD AUTO: 9.6 %
NEUTROPHILS # BLD AUTO: 3.5 10E9/L (ref 1.6–8.3)
NEUTROPHILS NFR BLD AUTO: 53 %
NRBC # BLD AUTO: 0 10*3/UL
NRBC BLD AUTO-RTO: 0 /100
PLATELET # BLD AUTO: 289 10E9/L (ref 150–450)
POTASSIUM SERPL-SCNC: 4.3 MMOL/L (ref 3.4–5.3)
PROT SERPL-MCNC: 7 G/DL (ref 6.8–8.8)
RBC # BLD AUTO: 5.25 10E12/L (ref 4.4–5.9)
RSV RNA SPEC QL NAA+PROBE: NORMAL
SARS-COV-2 RNA RESP QL NAA+PROBE: NEGATIVE
SODIUM SERPL-SCNC: 137 MMOL/L (ref 133–144)
SPECIMEN SOURCE: NORMAL
TROPONIN I SERPL-MCNC: <0.015 UG/L (ref 0–0.04)
TROPONIN I SERPL-MCNC: <0.015 UG/L (ref 0–0.04)
WBC # BLD AUTO: 6.7 10E9/L (ref 4–11)

## 2021-02-06 PROCEDURE — C9803 HOPD COVID-19 SPEC COLLECT: HCPCS

## 2021-02-06 PROCEDURE — 83690 ASSAY OF LIPASE: CPT | Performed by: EMERGENCY MEDICINE

## 2021-02-06 PROCEDURE — 93005 ELECTROCARDIOGRAM TRACING: CPT

## 2021-02-06 PROCEDURE — 85025 COMPLETE CBC W/AUTO DIFF WBC: CPT | Performed by: EMERGENCY MEDICINE

## 2021-02-06 PROCEDURE — 80053 COMPREHEN METABOLIC PANEL: CPT | Performed by: EMERGENCY MEDICINE

## 2021-02-06 PROCEDURE — 84484 ASSAY OF TROPONIN QUANT: CPT | Performed by: EMERGENCY MEDICINE

## 2021-02-06 PROCEDURE — 99285 EMERGENCY DEPT VISIT HI MDM: CPT | Mod: 25

## 2021-02-06 PROCEDURE — 87636 SARSCOV2 & INF A&B AMP PRB: CPT | Performed by: EMERGENCY MEDICINE

## 2021-02-06 PROCEDURE — 71046 X-RAY EXAM CHEST 2 VIEWS: CPT

## 2021-02-06 ASSESSMENT — ENCOUNTER SYMPTOMS
DIAPHORESIS: 0
VOMITING: 0
SHORTNESS OF BREATH: 0
NAUSEA: 1

## 2021-02-06 NOTE — ED PROVIDER NOTES
History   Chief Complaint:  Chest Pain     History appears to be somewhat limited.   HPI:  Matt Way is a 70 year old male with history of diabetes, GERD, Lewy body dementia, CAD, and NSTEMI on Xarelto and Plavix who presents via EMS from home with left sided chest pain since yesterday morning. The patient says that the pain was initially a 1/10, but gradually worsened to a 5/10 since onset. He says that the pain is sharp, but does not radiate. He says that the pain feels similar to when he has had an MI in the past. The patient endorses some nausea. He denies any vomiting, shortness of breath, diaphoresis, or new leg swelling. The patient is unsure what medications he has taken. EMS notes that he took a nitroglycerin and full aspirin prior to arrival to the ED. The patient was also potentially exposed to COVID on 2/1-2/2.     Notes from Coronary angiogram 9/23/20  The patient was brought in for elective PCI of known distal  RCA lesion in the setting of ongoing symptoms of angina. He has coronary artery disease with a previous NSTEMI  requiring stenting to the proximal LAD. Echocardiogram shows an LVEF of 40-45%.The other medical conditions include a h/o unprovoked PE, mild cognitive impairment.  Conclusion:   1. Coronary artery disease involving the distal RCA with a previous proximal LAD stent and moderate circumflex disease.  2.  Successful CECILY deployed to the distal RCA lesion with a synergy 3 x 32 mm.  Residual stenosis of 0%.  Plan:  1. To continue dual antiplatelet therapy for at least 12 months.  2. Continue cardiac risk factor prevention aiming for target BP and lipid levels.    Review of Systems   Constitutional: Negative for diaphoresis.   Respiratory: Negative for shortness of breath.    Cardiovascular: Positive for chest pain. Negative for leg swelling.   Gastrointestinal: Positive for nausea. Negative for vomiting.   All other systems reviewed and are negative.    Allergies:  Brilinta  [Ticagrelor]  Duloxetine  Gabapentin  Lisinopril    Medications:  Xarelto  Prilosec  Nitrostat  Lopressor  Metformin  Cozaar  Levemir  Novolog  Aricept  Plavix  Lipitor  Aspirin   Senokot   Kenalog     Past Medical History:    Diabetes  NSTEMI  Pulmonary embolism   CAD  FLAQUITA  Obesity  Hyperlipidemia   GERD    Arthritis   Lewy body dementia    Past Surgical History:    Vasectomy  Shoulder surgery  CV PCI stent drug eluting x2  CV left ventriculogram  CV left heart cath  Instantaneous wave-free ratio  Coronary angiogram x2  Coronary artery angiogram with stent (CECILY) to LAD  Colonoscopy      Family History:    Lupus    Social History:  Patient presents via EMS.     Physical Exam     Patient Vitals for the past 24 hrs:   BP Temp Temp src Pulse Resp SpO2   02/06/21 2000 (!) 140/84 -- -- 74 16 --   02/06/21 1900 (!) 141/84 -- -- 65 9 97 %   02/06/21 1830 136/79 -- -- 72 16 98 %   02/06/21 1800 120/70 -- -- 72 16 97 %   02/06/21 1730 105/78 -- -- 75 24 94 %   02/06/21 1700 103/63 -- -- 74 17 94 %   02/06/21 1600 99/62 -- -- 76 16 96 %   02/06/21 1555 -- 96.3  F (35.7  C) Temporal -- 16 --   02/06/21 1553 103/61 -- -- 80 -- 97 %       Physical Exam  Nursing note and vitals reviewed.  General: Oriented to person, place, and time. Appears well-developed and well-nourished.   Head: No signs of trauma.   Mouth/Throat: Oropharynx is clear and moist.   Eyes: Conjunctivae are normal. Pupils are equal, round, and reactive to light.   Neck: Normal range of motion. No nuchal rigidity.   Cardiovascular: Normal rate and regular rhythm.    Respiratory: Effort normal and breath sounds normal. No respiratory distress.   Abdominal: Soft. There is no tenderness. There is no guarding.   Musculoskeletal: Normal range of motion. no edema.   Neurological: The patient is alert and oriented to person, place, and time.  PERRLA, EOMI, visual fields intact, strength in upper/lower extremities normal and symmetrical.   Sensation normal. Speech  normal  GCS eye subscore is 4. GCS verbal subscore is 5. GCS motor subscore is 6.   Skin: Skin is warm and dry. No rash noted.   Psychiatric: normal mood and affect. behavior is normal.     Emergency Department Course     ECG:  ECG taken at 1551, ECG read at 1556  Normal sinus rhythm  Left axis deviation   Inferior infarct, age undetermined   Abnormal ECG  No significant change compared to EKG dated 9/23/2020  Rate 81 bpm. WA interval 176 ms. QRS duration 72 ms. QT/QTc 386/448 ms. P-R-T axes 53 -36 29.     Imaging:  XR Chest 2 Views  There are no acute infiltrates. The cardiac silhouette is  not enlarged. Pulmonary vasculature is unremarkable.  CHELO WALDEN MD  Reading per radiology     Laboratory:    Troponin (Collected 1550): <0.015   Troponin (Collected 1829): <0.015     CBC: WBC 6.7, HGB 14.5,   CMP:  (H), creatinine 1.62 (H), GFR 43 (L), albumin 3.3 (L), ALKPHOS 174 (H) o/w WNL   Lipase: 124    Asymptomatic Influenza A/B & SARS-CoV2 (COVID-19) Virus PCR Multiplex: negative     Emergency Department Course:  Reviewed:  1551 I reviewed the patient's nursing notes, vitals, past medical records, Care Everywhere.    Assessments:  1625 I performed an exam of the patient as documented above.   2009 Patient rechecked and updated. Admission was recommended due to the patient's cardiac history, but the patient declines stating that he needs to get home to take care of his wife.   Disposition:  The patient was discharged to home.       Impression & Plan     Medical Decision Making:  Matt Way is a 70 year old male who presents to the emergency department today for evaluation of chest pain.  Fortunately the workup in the ED has been unremarkable and at this time I am not concerned for ACS.  However, he has a significant past medical history involving acute coronary syndrome and I am recommending admission because he states this pain feels similar to previous episodes.  He is refusing because of concerns  for having to care for his wife and COVID-19.  The EKG shows no acute ischemic changes. The troponin is negative x2.      I considered other possible causes of chest pain including PE, infection, pneumothorax, aortic dissection, and even more benign causes such as reflux and esophageal motility issues. The physical exam, laboratory, and radiological findings listed above make life threatening conditions less likely. At this time I believe the patient is requesting discharge. I have encouraged close cardiology follow up.    Covid-19  Matt Way was evaluated during a global COVID-19 pandemic, which necessitated consideration that the patient might be at risk for infection with the SARS-CoV-2 virus that causes COVID-19.   Applicable protocols for evaluation were followed during the patient's care.   COVID-19 was considered as part of the patient's evaluation. The plan for testing is:  a test was obtained during this visit.        Diagnosis:    ICD-10-CM    1. Chest pain, unspecified type  R07.9          Scribe Disclosure:  I, Agus Coburn, am serving as a scribe at 3:52 PM on 2/6/2021 to document services personally performed by Onel Villar MD based on my observations and the provider's statements to me.          Onel Villar MD  02/07/21 1339

## 2021-02-06 NOTE — ED NOTES
Bed: ED26  Expected date: 2/6/21  Expected time: 3:32 PM  Means of arrival: Ambulance  Comments:  592 70m chest pain ETA 1534

## 2021-02-06 NOTE — ED TRIAGE NOTES
Pt presents to the ED with chest pain that started last night and into this afternoon. Pt reports taking 1 SL nitroglycerin at home and 4 baby ASA. Denies CP upon arrival to ED.

## 2021-02-07 NOTE — ED NOTES
[facility]  ED Nurse Handoff Report    ED Chief complaint: Chest Pain      ED Diagnosis:   Final diagnoses:   None       Code Status: Full Code    Allergies:   Allergies   Allergen Reactions     Brilinta [Ticagrelor]      Duloxetine      Other reaction(s): Confusion  Per daughter who is MD     Gabapentin      Other reaction(s): Edema     Lisinopril Rash       Patient Story: Pt presents to the ED with c/o chest pain starting last night and persisting through today. Took 1 nitroglycerin at home and full ASA.   Focused Assessment:  Denies CP or SOB at this time. VSS. A&O x3, hx of memory problems, poor historian. Independent.    Treatments and/or interventions provided: IV  Patient's response to treatments and/or interventions: n/a    To be done/followed up on inpatient unit:  n/a    Does this patient have any cognitive concerns?: Forgetful    Activity level - Baseline/Home:  Independent  Activity Level - Current:   Stand with Assist    Patient's Preferred language: English   Needed?: No    Isolation: None  Infection: Not Applicable  Patient tested for COVID 19 prior to admission: YES  Bariatric?: No    Vital Signs:   Vitals:    02/06/21 1730 02/06/21 1800 02/06/21 1830 02/06/21 1900   BP: 105/78 120/70 136/79 (!) 141/84   Pulse: 75 72 72 65   Resp: 24 16 16 9   Temp:       TempSrc:       SpO2: 94% 97% 98% 97%       Cardiac Rhythm:Cardiac Rhythm: Normal sinus rhythm    Was the PSS-3 completed:   Yes  What interventions are required if any?               Family Comments: n/a  OBS brochure/video discussed/provided to patient/family: No              Name of person given brochure if not patient: n/a              Relationship to patient: n/a    For the majority of the shift this patient's behavior was Green.   Behavioral interventions performed were n/a.    ED NURSE PHONE NUMBER: *[phone]

## 2021-02-08 ENCOUNTER — TELEPHONE (OUTPATIENT)
Dept: CARDIOLOGY | Facility: CLINIC | Age: 71
End: 2021-02-08

## 2021-02-08 NOTE — TELEPHONE ENCOUNTER
Seen over the weekend in the ED with chest pain, which he had for 2 days. Negative Troponin. Per ED note, they wanted to admit him, as he described pain that was similar to his pain with past blocked arteries. Declined admission, as he cares for his wife who is in a wheelchair, and worried about COVID.    Daughter Uma, with whom I spoke to on the phone, States patient has demintia, and has been forgetting to take his medications, forgetting to get up and take care of his wife at times. On antiplatelet medications due to recent stenting in September of 2020. Family is working on getting more help and alternative living situations, but parents are both resistant.    Aware Dr. Strickland does not have any openings. Will have scheduling try to set up a follow up with an YO or MD within a day or so.    Agreed with plan. Message sent to scheduling.

## 2021-02-09 ENCOUNTER — OFFICE VISIT (OUTPATIENT)
Dept: CARDIOLOGY | Facility: CLINIC | Age: 71
End: 2021-02-09
Payer: MEDICARE

## 2021-02-09 ENCOUNTER — TELEPHONE (OUTPATIENT)
Dept: CARDIOLOGY | Facility: CLINIC | Age: 71
End: 2021-02-09

## 2021-02-09 VITALS
BODY MASS INDEX: 26.63 KG/M2 | DIASTOLIC BLOOD PRESSURE: 75 MMHG | SYSTOLIC BLOOD PRESSURE: 131 MMHG | HEART RATE: 87 BPM | HEIGHT: 73 IN | WEIGHT: 200.9 LBS

## 2021-02-09 DIAGNOSIS — Z91.89 AT RISK FOR MEDICATION NONADHERENCE: ICD-10-CM

## 2021-02-09 DIAGNOSIS — R07.9 CHEST PAIN, UNSPECIFIED TYPE: Primary | ICD-10-CM

## 2021-02-09 DIAGNOSIS — I10 BENIGN ESSENTIAL HYPERTENSION: ICD-10-CM

## 2021-02-09 DIAGNOSIS — I25.10 CORONARY ARTERY DISEASE INVOLVING NATIVE CORONARY ARTERY OF NATIVE HEART WITHOUT ANGINA PECTORIS: ICD-10-CM

## 2021-02-09 PROCEDURE — 99215 OFFICE O/P EST HI 40 MIN: CPT | Performed by: NURSE PRACTITIONER

## 2021-02-09 RX ORDER — METOPROLOL TARTRATE 25 MG/1
25 TABLET, FILM COATED ORAL 2 TIMES DAILY
Qty: 180 TABLET | Refills: 3 | Status: SHIPPED | OUTPATIENT
Start: 2021-02-09 | End: 2022-01-01

## 2021-02-09 RX ORDER — LOSARTAN POTASSIUM 25 MG/1
12.5 TABLET ORAL DAILY
Status: ON HOLD | COMMUNITY
Start: 2021-02-09 | End: 2022-01-01

## 2021-02-09 ASSESSMENT — MIFFLIN-ST. JEOR: SCORE: 1735.16

## 2021-02-09 NOTE — PATIENT INSTRUCTIONS
"  To help with your \"zingers\" I would like you to increase metoprolol tartrate from 12.5 mg twice daily to metoprolol tartrate 25 mg twice daily.  This will decrease your heart rate and blood pressure.  Please take these intermittently at home.            You are currently on Xarelto for your pulmonary embolism, you are on Plavix for your stent in your heart, and aspirin for the stenting your heart.  You do not need to be on triple therapy meeting all 3 medications.  I would like you to stop your aspirin today.  If you follow-up with someone and they stop your Xarelto then you will need to go back on the aspirin and the Plavix.                 Follow up with Dr. Strickland in a few weeks to months  "

## 2021-02-09 NOTE — PROGRESS NOTES
"HPI:  Matt Way is a 70 year old male who presents to clinic today after being in the emergency room for chest pain and presents with his daughter.    He is a patient of Dr. Strickland and has a medical history of     1. Dementia  2. Coronary artery disease.  NSTEMI (2018) s/p stent to pLAD, NSTEMI (2020) s/p stent mLAD and dRCA  3. Diabetes  4. PE -per Dr. Strickland note patient's Xarelto was eventually discontinued  5. Nonadherence    Diagnostics:  Echocardiogram (12/2020) revealed EF 55-60% with no wall motion abnormalities    In September 2020, patient presented with an NSTEMI and underwent stent to his LAD and RCA.  He was subsequently started on Plavix.    In December 2020, he met with Dr. Strickland with no angina or heart failure complaints and it was emphasized to be compliant with his medications    This past weekend patient had \"zingers\" for 36 hours prior to telling his family who then proceeded to bring him to the emergency room.  In the emergency room his ECG showed normal sinus rhythm and was not significantly different in comparison to the ECG completed on 9/23/2020.  His troponin was negative x2.  They were not concerned about ACS however due to his significant cardiac history they did recommend admission however the patient refused because of concerns of caring for his wife who is a quadriplegic.  He was recommended to follow-up with cardiology.    Today he presents with his daughter (who usually does not attend medical appointments) and the patient states he no longer has the chest discomfort.  He does say that at times he does forget to take his medications.   He does help his wife who is a quadriplegic and does not have significant chest pain or pressure or shortness of breath.. It appears he is taking triple therapy of Xarelto, Plavix, and aspirin however neither the patient or his daughter know the patient's medications.  I have asked him to stop aspirin in the setting of triple therapy.  However in " "reviewing the chart more thoroughly it appears the Plavix should have been discontinued previously.  The patient and his daughter were very concerned about not seeing Dr. Strickland and about reviewing the ECG as they were told there was significant abnormalities in the ECG.  I reviewed the ECG myself did not see significant abnormalities which is congruent with the emergency physician's documentation.  In addition the patient's troponin was negative x2.  More concerning is the patient's nonadherence to his medication regimen.      ASSESSMENT AND PLAN    Coronary artery disease.      NSTEMI (2018) s/p stent to pLAD, NSTEMI (9/2020) s/p stent mLAD and dRCA    Patient is on current triple therapy of Plavix, Xarelto, and aspirin.  Asked him to discontinue the aspirin to avoid excessive bleeding.  However in reviewing the chart it appears the Xarelto should have been discontinued sometime ago as he finishes PE protocol    Patient did have recent chest pain of \"zingers\" which is similar to his previous NSTEMI symptoms.  He has not had the symptoms in the past 2 days.  In the emergency room his troponin was negative and his ECG did not show any ST changes.  He states he is currently taking his medications    He is currently taking metoprolol tartrate 12.5 mg twice daily.  Will increase this to 25 mg twice daily as his blood pressure and heart rate will support this.    Medication nonadherence    Concerning that the patient and his daughter present not knowing the medications as evidence of the triple therapy.  Also patient reported losartan 25 mg daily and our medical record showed losartan 12.5 mg daily.    Will ask RN to follow-up with family regarding medications.          Plan:    During the visit asked patient to stop aspirin and when Xarelto was discontinued then restart aspirin.  However when patient left investigated the chart more thoroughly and discovered that patient was on Xarelto for PE protocol which should have " been completed.  Therefore patient should only be on Plavix and aspirin.  Will ask nursing to follow-up with a phone call to this family on 02/10/21    Increase metoprolol tartrate from 12.5 mg twice daily to 25 mg twice daily    Take blood pressure and pulse rate intermittently at home    Follow-up with Dr. Strickland    Repeat BMP as patient's creatinine was significantly elevated in the ED which was abnormal for patient.    Follow-up with primary care      45 minutes spent on the date of the encounter doing chart review, review of outside records, review of test results, interpretation of tests, patient visit, documentation, discussion with family      Patient expresses understanding and agreement with the plan.     I appreciate the chance to help with Matt Way Please let me know if you have any questions or concerns.    PHILLIP Lopez, CNP    This note was completed in part using Dragon voice recognition software. Although reviewed after completion, some word and grammatical errors may occur.    Orders Placed This Encounter   Procedures     Follow-Up with Cardiologist     Orders Placed This Encounter   Medications     losartan (COZAAR) 25 MG tablet     Sig: Take 1 tablet (25 mg) by mouth daily     metoprolol tartrate (LOPRESSOR) 25 MG tablet     Sig: Take 1 tablet (25 mg) by mouth 2 times daily     Dispense:  180 tablet     Refill:  3     Medications Discontinued During This Encounter   Medication Reason     metoprolol tartrate (LOPRESSOR) 25 MG tablet      losartan (COZAAR) 25 MG tablet      aspirin 81 MG EC tablet          Encounter Diagnoses   Name Primary?     NSTEMI (non-ST elevated myocardial infarction) (H)      Benign essential hypertension      Chest pain, unspecified type Yes       CURRENT MEDICATIONS:  Current Outpatient Medications   Medication Sig Dispense Refill     acetaminophen (TYLENOL) 325 MG tablet Take 325-650 mg by mouth every 6 hours as needed for mild pain       atorvastatin  (LIPITOR) 80 MG tablet Take 1 tablet (80 mg) by mouth daily 90 tablet 1     clopidogrel (PLAVIX) 75 MG tablet Take 1 tablet (75 mg) by mouth daily 30 tablet 0     donepezil (ARICEPT) 5 MG tablet Take 5 mg by mouth every morning       insulin aspart (NOVOLOG FLEXPEN) 100 UNIT/ML injection Inject 16 Units Subcutaneous 2 times daily (with meals) Breakfast and Dinner       insulin detemir (LEVEMIR) 100 UNIT/ML injection Inject 20 Units Subcutaneous 2 times daily        losartan (COZAAR) 25 MG tablet Take 1 tablet (25 mg) by mouth daily       metFORMIN (GLUCOPHAGE) 1000 MG tablet Take 1,000 mg by mouth 2 times daily (with meals)       metoprolol tartrate (LOPRESSOR) 25 MG tablet Take 1 tablet (25 mg) by mouth 2 times daily 180 tablet 3     nitroGLYcerin (NITROSTAT) 0.4 MG sublingual tablet Place 1 tablet (0.4 mg) under the tongue every 5 minutes as needed for chest pain If symptoms persist 5 minutes after 2nd dose call 911. 30 tablet 1     omeprazole (PRILOSEC OTC) 20 MG EC tablet Take 20 mg by mouth daily as needed       rivaroxaban ANTICOAGULANT (XARELTO) 20 MG TABS tablet Take 20 mg by mouth daily       VITAMIN D, CHOLECALCIFEROL, PO Take 1 tablet by mouth daily         ALLERGIES     Allergies   Allergen Reactions     Brilinta [Ticagrelor]      Duloxetine      Other reaction(s): Confusion  Per daughter who is MD     Gabapentin      Other reaction(s): Edema     Lisinopril Rash       PAST MEDICAL HISTORY   Past Medical History:   Diagnosis Date     Coronary artery disease 06/2018    s/p CECILY to LAD      Diabetes mellitus      GERD (gastroesophageal reflux disease)      Hyperlipidemia      Obesity      FLAQUITA (obstructive sleep apnea)      Pulmonary embolism 11/15/2018       Review of Systems:  Skin:  Negative     Eyes:  Positive for glasses  ENT:  Negative    Respiratory:  Negative    Cardiovascular:  Negative    Gastroenterology: Negative    Genitourinary:  Negative    Musculoskeletal:  Negative    Neurologic:  Positive  "for memory problems  Psychiatric:  Negative    Heme/Lymph/Imm:  Negative    Endocrine:  Positive for diabetes    Physical Exam:  Vitals: /75 (BP Location: Right arm, Patient Position: Sitting, Cuff Size: Adult Regular)   Pulse 87   Ht 1.854 m (6' 1\")   Wt 91.1 kg (200 lb 14.4 oz)   BMI 26.51 kg/m    Body mass index is 26.51 kg/m .    Physical Exam   Constitutional: He appears well-developed and well-nourished.   Cardiovascular: Normal rate and regular rhythm.   Pulmonary/Chest: Effort normal and breath sounds normal.   Abdominal: Soft.   Musculoskeletal: Normal range of motion.   Neurological: He is alert.   Memory problems   Skin: Skin is warm and dry.   Psychiatric: He has a normal mood and affect. His behavior is normal.        basic metabolic panel  Lab Results   Component Value Date    WBC 6.7 02/06/2021     Lab Results   Component Value Date    RBC 5.25 02/06/2021     Lab Results   Component Value Date    HGB 14.5 02/06/2021     Lab Results   Component Value Date    HCT 44.0 02/06/2021     No components found for: MCT  Lab Results   Component Value Date    MCV 84 02/06/2021     Lab Results   Component Value Date    MCH 27.6 02/06/2021     Lab Results   Component Value Date    MCHC 33.0 02/06/2021     Lab Results   Component Value Date    RDW 13.2 02/06/2021     Lab Results   Component Value Date     02/06/2021       Last Comprehensive Metabolic Panel:  Sodium   Date Value Ref Range Status   02/06/2021 137 133 - 144 mmol/L Final     Potassium   Date Value Ref Range Status   02/06/2021 4.3 3.4 - 5.3 mmol/L Final     Chloride   Date Value Ref Range Status   02/06/2021 102 94 - 109 mmol/L Final     Carbon Dioxide   Date Value Ref Range Status   02/06/2021 27 20 - 32 mmol/L Final     Anion Gap   Date Value Ref Range Status   02/06/2021 8 3 - 14 mmol/L Final     Glucose   Date Value Ref Range Status   02/06/2021 263 (H) 70 - 99 mg/dL Final     Urea Nitrogen   Date Value Ref Range Status   02/06/2021 " 30 7 - 30 mg/dL Final     Creatinine   Date Value Ref Range Status   02/06/2021 1.62 (H) 0.66 - 1.25 mg/dL Final     GFR Estimate   Date Value Ref Range Status   02/06/2021 43 (L) >60 mL/min/[1.73_m2] Final     Comment:     Non  GFR Calc  Starting 12/18/2018, serum creatinine based estimated GFR (eGFR) will be   calculated using the Chronic Kidney Disease Epidemiology Collaboration   (CKD-EPI) equation.       Calcium   Date Value Ref Range Status   02/06/2021 8.7 8.5 - 10.1 mg/dL Final       CC  No referring provider defined for this encounter.

## 2021-02-09 NOTE — LETTER
"2/9/2021    Kingston Leal MD  [facility] [address] [facility] [address]    RE: Matt Way       Dear Colleague,    I had the pleasure of seeing Matt Way in the [facility].    HPI:  Matt Way is a 70 year old male who presents to clinic today after being in the emergency room for chest pain and presents with his daughter.    He is a patient of Dr. Strickland and has a medical history of     1. Dementia  2. Coronary artery disease.  NSTEMI (2018) s/p stent to pLAD, NSTEMI (2020) s/p stent mLAD and dRCA  3. Diabetes  4. PE -per Dr. Strickland note patient's Xarelto was eventually discontinued  5. Nonadherence    Diagnostics:  Echocardiogram (12/2020) revealed EF 55-60% with no wall motion abnormalities    In September 2020, patient presented with an NSTEMI and underwent stent to his LAD and RCA.  He was subsequently started on Plavix.    In December 2020, he met with Dr. Strickland with no angina or heart failure complaints and it was emphasized to be compliant with his medications    This past weekend patient had \"zingers\" for 36 hours prior to telling his family who then proceeded to bring him to the emergency room.  In the emergency room his ECG showed normal sinus rhythm and was not significantly different in comparison to the ECG completed on 9/23/2020.  His troponin was negative x2.  They were not concerned about ACS however due to his significant cardiac history they did recommend admission however the patient refused because of concerns of caring for his wife who is a quadriplegic.  He was recommended to follow-up with cardiology.    Today he presents with his daughter (who usually does not attend medical appointments) and the patient states he no longer has the chest discomfort.  He does say that at times he does forget to take his medications.   He does help his wife who is a quadriplegic and does not have significant chest " "pain or pressure or shortness of breath.. It appears he is taking triple therapy of Xarelto, Plavix, and aspirin however neither the patient or his daughter know the patient's medications.  I have asked him to stop aspirin in the setting of triple therapy.  However in reviewing the chart more thoroughly it appears the Plavix should have been discontinued previously.  The patient and his daughter were very concerned about not seeing Dr. Strickland and about reviewing the ECG as they were told there was significant abnormalities in the ECG.  I reviewed the ECG myself did not see significant abnormalities which is congruent with the emergency physician's documentation.  In addition the patient's troponin was negative x2.  More concerning is the patient's nonadherence to his medication regimen.      ASSESSMENT AND PLAN    Coronary artery disease.      NSTEMI (2018) s/p stent to pLAD, NSTEMI (9/2020) s/p stent mLAD and dRCA    Patient is on current triple therapy of Plavix, Xarelto, and aspirin.  Asked him to discontinue the aspirin to avoid excessive bleeding.  However in reviewing the chart it appears the Xarelto should have been discontinued sometime ago as he finishes PE protocol    Patient did have recent chest pain of \"zingers\" which is similar to his previous NSTEMI symptoms.  He has not had the symptoms in the past 2 days.  In the emergency room his troponin was negative and his ECG did not show any ST changes.  He states he is currently taking his medications    He is currently taking metoprolol tartrate 12.5 mg twice daily.  Will increase this to 25 mg twice daily as his blood pressure and heart rate will support this.    Medication nonadherence    Concerning that the patient and his daughter present not knowing the medications as evidence of the triple therapy.  Also patient reported losartan 25 mg daily and our medical record showed losartan 12.5 mg daily.    Will ask RN to follow-up with family regarding " medications.          Plan:    During the visit asked patient to stop aspirin and when Xarelto was discontinued then restart aspirin.  However when patient left investigated the chart more thoroughly and discovered that patient was on Xarelto for PE protocol which should have been completed.  Therefore patient should only be on Plavix and aspirin.  Will ask nursing to follow-up with a phone call to this family on 02/10/21    Increase metoprolol tartrate from 12.5 mg twice daily to 25 mg twice daily    Take blood pressure and pulse rate intermittently at home    Follow-up with Dr. Strickland    Repeat BMP as patient's creatinine was significantly elevated in the ED which was abnormal for patient.    Follow-up with primary care      45 minutes spent on the date of the encounter doing chart review, review of outside records, review of test results, interpretation of tests, patient visit, documentation, discussion with family      Patient expresses understanding and agreement with the plan.     I appreciate the chance to help with Matt Way Please let me know if you have any questions or concerns.    Deja Lomax, APRN, CNP    This note was completed in part using Dragon voice recognition software. Although reviewed after completion, some word and grammatical errors may occur.    Orders Placed This Encounter   Procedures     Follow-Up with Cardiologist     Orders Placed This Encounter   Medications     losartan (COZAAR) 25 MG tablet     Sig: Take 1 tablet (25 mg) by mouth daily     metoprolol tartrate (LOPRESSOR) 25 MG tablet     Sig: Take 1 tablet (25 mg) by mouth 2 times daily     Dispense:  180 tablet     Refill:  3     Medications Discontinued During This Encounter   Medication Reason     metoprolol tartrate (LOPRESSOR) 25 MG tablet      losartan (COZAAR) 25 MG tablet      aspirin 81 MG EC tablet          Encounter Diagnoses   Name Primary?     NSTEMI (non-ST elevated myocardial infarction) (H)      Benign  essential hypertension      Chest pain, unspecified type Yes       CURRENT MEDICATIONS:  Current Outpatient Medications   Medication Sig Dispense Refill     acetaminophen (TYLENOL) 325 MG tablet Take 325-650 mg by mouth every 6 hours as needed for mild pain       atorvastatin (LIPITOR) 80 MG tablet Take 1 tablet (80 mg) by mouth daily 90 tablet 1     clopidogrel (PLAVIX) 75 MG tablet Take 1 tablet (75 mg) by mouth daily 30 tablet 0     donepezil (ARICEPT) 5 MG tablet Take 5 mg by mouth every morning       insulin aspart (NOVOLOG FLEXPEN) 100 UNIT/ML injection Inject 16 Units Subcutaneous 2 times daily (with meals) Breakfast and Dinner       insulin detemir (LEVEMIR) 100 UNIT/ML injection Inject 20 Units Subcutaneous 2 times daily        losartan (COZAAR) 25 MG tablet Take 1 tablet (25 mg) by mouth daily       metFORMIN (GLUCOPHAGE) 1000 MG tablet Take 1,000 mg by mouth 2 times daily (with meals)       metoprolol tartrate (LOPRESSOR) 25 MG tablet Take 1 tablet (25 mg) by mouth 2 times daily 180 tablet 3     nitroGLYcerin (NITROSTAT) 0.4 MG sublingual tablet Place 1 tablet (0.4 mg) under the tongue every 5 minutes as needed for chest pain If symptoms persist 5 minutes after 2nd dose call 911. 30 tablet 1     omeprazole (PRILOSEC OTC) 20 MG EC tablet Take 20 mg by mouth daily as needed       rivaroxaban ANTICOAGULANT (XARELTO) 20 MG TABS tablet Take 20 mg by mouth daily       VITAMIN D, CHOLECALCIFEROL, PO Take 1 tablet by mouth daily         ALLERGIES     Allergies   Allergen Reactions     Brilinta [Ticagrelor]      Duloxetine      Other reaction(s): Confusion  Per daughter who is MD     Gabapentin      Other reaction(s): Edema     Lisinopril Rash       PAST MEDICAL HISTORY   Past Medical History:   Diagnosis Date     Coronary artery disease 06/2018    s/p CECILY to LAD      Diabetes mellitus      GERD (gastroesophageal reflux disease)      Hyperlipidemia      Obesity      FLAQUITA (obstructive sleep apnea)      Pulmonary  "embolism 11/15/2018       Review of Systems:  Skin:  Negative     Eyes:  Positive for glasses  ENT:  Negative    Respiratory:  Negative    Cardiovascular:  Negative    Gastroenterology: Negative    Genitourinary:  Negative    Musculoskeletal:  Negative    Neurologic:  Positive for memory problems  Psychiatric:  Negative    Heme/Lymph/Imm:  Negative    Endocrine:  Positive for diabetes    Physical Exam:  Vitals: /75 (BP Location: Right arm, Patient Position: Sitting, Cuff Size: Adult Regular)   Pulse 87   Ht 1.854 m (6' 1\")   Wt 91.1 kg (200 lb 14.4 oz)   BMI 26.51 kg/m    Body mass index is 26.51 kg/m .    Physical Exam   Constitutional: He appears well-developed and well-nourished.   Cardiovascular: Normal rate and regular rhythm.   Pulmonary/Chest: Effort normal and breath sounds normal.   Abdominal: Soft.   Musculoskeletal: Normal range of motion.   Neurological: He is alert.   Memory problems   Skin: Skin is warm and dry.   Psychiatric: He has a normal mood and affect. His behavior is normal.        basic metabolic panel  Lab Results   Component Value Date    WBC 6.7 02/06/2021     Lab Results   Component Value Date    RBC 5.25 02/06/2021     Lab Results   Component Value Date    HGB 14.5 02/06/2021     Lab Results   Component Value Date    HCT 44.0 02/06/2021     No components found for: MCT  Lab Results   Component Value Date    MCV 84 02/06/2021     Lab Results   Component Value Date    MCH 27.6 02/06/2021     Lab Results   Component Value Date    MCHC 33.0 02/06/2021     Lab Results   Component Value Date    RDW 13.2 02/06/2021     Lab Results   Component Value Date     02/06/2021       Last Comprehensive Metabolic Panel:  Sodium   Date Value Ref Range Status   02/06/2021 137 133 - 144 mmol/L Final     Potassium   Date Value Ref Range Status   02/06/2021 4.3 3.4 - 5.3 mmol/L Final     Chloride   Date Value Ref Range Status   02/06/2021 102 94 - 109 mmol/L Final     Carbon Dioxide   Date " Value Ref Range Status   02/06/2021 27 20 - 32 mmol/L Final     Anion Gap   Date Value Ref Range Status   02/06/2021 8 3 - 14 mmol/L Final     Glucose   Date Value Ref Range Status   02/06/2021 263 (H) 70 - 99 mg/dL Final     Urea Nitrogen   Date Value Ref Range Status   02/06/2021 30 7 - 30 mg/dL Final     Creatinine   Date Value Ref Range Status   02/06/2021 1.62 (H) 0.66 - 1.25 mg/dL Final     GFR Estimate   Date Value Ref Range Status   02/06/2021 43 (L) >60 mL/min/[1.73_m2] Final     Comment:     Non  GFR Calc  Starting 12/18/2018, serum creatinine based estimated GFR (eGFR) will be   calculated using the Chronic Kidney Disease Epidemiology Collaboration   (CKD-EPI) equation.       Calcium   Date Value Ref Range Status   02/06/2021 8.7 8.5 - 10.1 mg/dL Final         Thank you for allowing me to participate in the care of your patient.    Sincerely,     PHILLIP Damian CNP     [facility]

## 2021-02-10 DIAGNOSIS — R06.09 DOE (DYSPNEA ON EXERTION): Primary | ICD-10-CM

## 2021-02-10 NOTE — TELEPHONE ENCOUNTER
Spoke with daughter Uma, who attended office visit yesterday. Once home from visit, made aware by other daughter, who is an MD, that patient is not to be on Xarelto. However he has been, per wife, who sets up his medications.      Called and spoke with wife who sets up medications. Clarified with her that patient is to stay on plavix and ASA. He should not be on Xarelto.   She thought that the xarelto was the medication which patient had been put on after last heart cath and stent placement in September 2020. Clarified with her, that the Plavix is the medication patient needs to be on as an antiplatelet medication for one year post stent placement.   Vserbalized back to me that she is to put the ASA back into his medication box and take out the Xarelto. Keep the Plavix.    Clarified dosage of the Losartin is 25 mg. States she had spoken with someone at the clinic, thought it was the primary clinic, unsure when. Complained that it was to difficult to cut the losartan in half as the pill just crumbles, and was therefore told it was okay to give him a whole pill.     Informed her that his kidney function had declined over the past month. His creatine on 1/6/21 was 0.91 And on 2/6/21 it was 1.62.  He needs to follow up with his primary clinic which is [facility] in [place] concerning his worsening kidney function. Verbalized understanding. She was however in a hurry as she needed to get ready to go to another appointment.    Called [facility] in [place], Dr. Leal's office. Left a message asking them to follow up on the elevated creatine level which was noted on blood work done while in the hospital over the weekend. Call back number left, should they have questions.

## 2021-02-10 NOTE — TELEPHONE ENCOUNTER
Can you please call the family to verify medications.  The patient presented in clinic today with his daughter who usually does not come to the visits.  They were under the pression that they were going to see Dr. Strickland yesterday as promised to them in the emergency room.  They were also under the impression his ECG was very abnormal and needed to be seen stat by cardiology.  This does not appear to be the case per the note or in reviewing the ECG.  We spent a lot of time on this.   I then went back and reviewed the chart more thoroughly please see below      1. during the visit asked patient to stop aspirin and when Xarelto was discontinued then restart aspirin.  However when patient left I investigated the chart more thoroughly and discovered that patient was on Xarelto for PE protocol which should have been completed.  Therefore patient should only be on Plavix and aspirin.    2. Can you also verify how much losartan he is taking.  Our records said 12.5 and he reported 25 mg daily  3. Can you ask him to follow with her primary care provider as his creatinine was elevated significantly above his normal.      Thank you,     Deja

## 2021-04-23 ENCOUNTER — IMMUNIZATION (OUTPATIENT)
Dept: NURSING | Facility: CLINIC | Age: 71
End: 2021-04-23
Payer: MEDICARE

## 2021-04-23 PROCEDURE — 0001A PR COVID VAC PFIZER DIL RECON 30 MCG/0.3 ML IM: CPT

## 2021-04-23 PROCEDURE — 91300 PR COVID VAC PFIZER DIL RECON 30 MCG/0.3 ML IM: CPT

## 2021-05-14 ENCOUNTER — IMMUNIZATION (OUTPATIENT)
Dept: NURSING | Facility: CLINIC | Age: 71
End: 2021-05-14
Attending: INTERNAL MEDICINE
Payer: MEDICARE

## 2021-05-14 PROCEDURE — 0002A PR COVID VAC PFIZER DIL RECON 30 MCG/0.3 ML IM: CPT

## 2021-05-14 PROCEDURE — 91300 PR COVID VAC PFIZER DIL RECON 30 MCG/0.3 ML IM: CPT

## 2021-06-05 ENCOUNTER — HEALTH MAINTENANCE LETTER (OUTPATIENT)
Age: 71
End: 2021-06-05

## 2021-06-08 DIAGNOSIS — I21.4 NSTEMI (NON-ST ELEVATED MYOCARDIAL INFARCTION) (H): ICD-10-CM

## 2021-06-08 RX ORDER — ATORVASTATIN CALCIUM 80 MG/1
80 TABLET, FILM COATED ORAL DAILY
Qty: 90 TABLET | Refills: 0 | Status: SHIPPED | OUTPATIENT
Start: 2021-06-08

## 2021-06-28 PROBLEM — R41.0 CONFUSION: Status: ACTIVE | Noted: 2021-01-01

## 2021-06-28 PROBLEM — R73.9 HYPERGLYCEMIA: Status: ACTIVE | Noted: 2021-01-01

## 2021-06-28 NOTE — ED TRIAGE NOTES
Per EMS, patient has been having increased confusion over the past few days and high blood sugars. Patient's daughter brought patient to clinic where patient became to refuse care. At this point in time, patient left the clinic and walked over to a park where he was found by EMS. Also, per EMS, patient is the primary caregiver for wife who is a quadriplegic and is her primary care giver. Upon arrival patient is refusing to change into a gown and is also refusing care. Per EMS, patient's daughter is in route. Patient was also placed on a transport hold by EMS.

## 2021-06-28 NOTE — ED PROVIDER NOTES
History   Chief Complaint:  Altered Mental Status       HPI   Matt Way is a 70 year old male with history of Lewy body dementia, CAD, diabetes, hyperlipidemia, PE, and NSTEMI, who presents with an altered mental status. Per EMS, patient has been exhibiting increased confusion over the past few days. He has also been recording high blood sugars. The patient's daughter brought the patient to clinic where the patient refused care. At this point in time, the patient left the clinic and walked over to a park where he was found by EMS. Also, per EMS, the patient is the primary caregiver for wife who is a quadriplegic. Upon arrival in the ED, the patient is refusing to change into a gown and is also refusing care. Per EMS, the patient's daughter is en route. The patient was also placed on a transport hold by EMS.     Per the patient's daughter, he dose not safe at home. He has been urinating all over the hose and refuses to take his daily medications.     Review of Systems   Psychiatric/Behavioral: Positive for behavioral problems and confusion.   All other systems reviewed and are negative.        Allergies:  Brilinta  Duloxetine  Gabapentin  Lisinopril    Medications:  Atorvastatin  Plavix  Aricept  Novolog  Levemir  Losartan  Metformin  Metoprolol  Nitroglycerin  Omeprazole  Xarelto     Past Medical History:    CAD  Diabetes  GERD  Hyperlipidemia  Obesity  FLAQUITA  PE  NSTEMI     Past Surgical History:    Colonoscopy  Coronary angiogram  instantanuous wave free ration  Left hear cath  Left ventriculogram  pci stent drug eluting  Shoulder surgery  Vasectomy     Family History:    Lupus, mother    Social History:  Arrives via EMS  Lives with wife    Physical Exam     Patient Vitals for the past 24 hrs:   BP Pulse Resp SpO2   06/28/21 2245 -- 79 15 --   06/28/21 2230 (!) 169/93 71 12 --   06/28/21 2200 (!) 159/95 74 12 99 %   06/28/21 2145 -- 75 15 --   06/28/21 2130 (!) 154/87 77 14 97 %   06/28/21 2100 138/86 78 --  --   06/28/21 2045 -- 76 11 97 %   06/28/21 2030 (!) 162/91 79 11 98 %   06/28/21 2015 -- 79 12 96 %   06/28/21 1945 -- 78 9 96 %   06/28/21 1940 (!) 144/83 80 8 95 %   06/28/21 1910 -- 79 11 95 %   06/28/21 1905 -- 80 8 96 %   06/28/21 1900 126/71 81 12 96 %   06/28/21 1855 131/75 81 -- 96 %       Physical Exam  Constitutional:       Appearance: He is well-developed.   HENT:      Right Ear: External ear normal.      Left Ear: External ear normal.      Mouth/Throat:      Mouth: Mucous membranes are moist.      Pharynx: Oropharynx is clear. No oropharyngeal exudate or posterior oropharyngeal erythema.   Eyes:      General: No scleral icterus.     Extraocular Movements: Extraocular movements intact.      Conjunctiva/sclera: Conjunctivae normal.      Pupils: Pupils are equal, round, and reactive to light.   Neck:      Musculoskeletal: Normal range of motion and neck supple.   Cardiovascular:      Rate and Rhythm: Normal rate and regular rhythm.      Heart sounds: Normal heart sounds. No murmur. No friction rub. No gallop.    Pulmonary:      Effort: Pulmonary effort is normal. No respiratory distress.      Breath sounds: Normal breath sounds. No wheezing or rales.   Abdominal:      General: Bowel sounds are normal. There is no distension.      Palpations: Abdomen is soft. There is no mass.      Tenderness: There is no abdominal tenderness.   Musculoskeletal: Normal range of motion.   Skin:     General: Skin is warm and dry.      Capillary Refill: Capillary refill takes less than 2 seconds.      Findings: No rash.   Neurological:      General: No focal deficit present.      Mental Status: He is alert.      Gait: Gait normal.      Comments: Refuses to answer questions. LEIVA x 4, gait normal         Emergency Department Course   ECG  ECG taken at 1853, ECG read at 1853  Normal sinus rhythm  Low voltage QRS  Inferior infarct, age undetermined  Abnormal ECG   Rate 82 bpm. OR interval 178 ms. QRS duration 78 ms. QT/QTc 376/439  ms. P-R-T axes 63 -24 28.     Imaging:    CT Head Neck Angio w/o & w Contrast    HEAD CTA:  1.  The left vertebral and basilar arteries are patent. No proximal large vessel occlusion.  2.  Moderate atherosclerotic narrowing of the right paraclinoid internal carotid artery.  3.  No aneurysm or AVM.    NECK CTA:  1.  Mild carotid artery atherosclerosis. No dissection or hemodynamically significant narrowing in the neck by NASCET criteria.    CT Head w/o Contrast    1.  Extensive hyperdensity of the distal left vertebral artery and basilar artery. While likely artifactual in nature relating to streak and beam hardening artifact, a similar appearance could be seen with hyperdense intraluminal thrombus extending from   the distal left vertebral artery to the basilar artery. For this reason, correlation with stroke like symptoms will be of benefit, and head and neck CTA is recommended to exclude intraluminal thrombus.    2.  No intracranial hemorrhage or extra-axial collection.    3.  Mild age-related changes, as above.    4.  Findings and impression discussed with Dr. Liu by phone at 2030 hours on 06/28/2021.      Laboratory:    Glucose by meter (22:16): 419 (H)   Glucose by meter (21:53): 468 (H)  Glucose by meter (20:26): 467 (H)     CBC: WBC 6.4, HGB 15.5,     CMP: glucose 551 (H!!), bun 34 (H), alkphos 162 (H) o/w WNL (Creatinine 1.05)      Magnesium: 2.5 (H)    Troponin (Collected 1937): <0.015    TSH: 0.79    blood gas venous: pH: 7.33, PCO2: 29, PO2: 27, Bicarbonate: 26, base deficit 0.5     Ketone quantitative: 3.1 (H!!)    Emergency Department Course:    Reviewed:  I reviewed nursing notes, vitals, past medical history and care everywhere    Assessments:  1844 I obtained history and examined the patient as noted above.     Consults:   2218 I consulted with Dr. Renae, of the hospitalist service, who agrees to admit the patient.    Interventions:  2126 Normal Saline 1000 mL IV  2147 Novolog rapid acting  injection 5 units subcutaneous     Disposition:  The patient was admitted to the hospital under the care of Dr. Renae.       Impression & Plan         Medical Decision Making:  Matt Way is a 70 year old male who presents with confusion with daughter. Patient does have a history of lewy body dementia. He has been declining. They have plans for nursing home placement on July 1st. Due to increased confusion and aggression earlier today, the patient is in the ER for further evaluation. There are no signs of infection. Glucose was quite high although it sounds like he is not compliant. They are not sure if he is taking his insulin. There is concern for safety as there is no family that can stay with him tonight or the next few days. The daughter is asking whether we would be able to admit him due to the situation. It is very reasonable given the concern for safety and the lack of supervision at home. The patient is admitted to [facility] due to need for insulin drip to control hyperglycemia. A  will review his case in the morning in terms of placement.         Covid-19  Matt Way was evaluated during a global COVID-19 pandemic, which necessitated consideration that the patient might be at risk for infection with the SARS-CoV-2 virus that causes COVID-19.   Applicable protocols for evaluation were followed during the patient's care.   COVID-19 was considered as part of the patient's evaluation. The plan for testing is:  a test was obtained during this visit.    Diagnosis:    ICD-10-CM    1. Hyperglycemia  R73.9 Ketone Beta-Hydroxybutyrate Quantitative     Glucose by meter     Glucose by meter     Blood gas venous   2. Confusion  R41.0    3. Lewy body dementia with behavioral disturbance (H)  G31.83     F02.81        Scribe Disclosure:  Julio GUZMAN, am serving as a scribe at 6:45 PM on 6/28/2021 to document services personally performed by Austin Liu MD based on my observations and the provider's  statements to me.            Austin Liu MD  06/29/21 0007

## 2021-06-29 NOTE — ED NOTES
"Daughter at bedside, states that he was urinating all throughout the house yesterday and undressing himself, neighbors called 911 on pt as he ended up on their front porch at night. Daughter concerned for UTI or worsening dementia. Daughter reports that pt left UC today stating that daughter was trying to \"lock him up,\" EMS ended up being called as pt began walking around at the park and out into traffic. Daughter concerned for pt's safety. Daughter states they have memory care placement starting July 1st that pt will be living at.    "

## 2021-06-29 NOTE — PHARMACY-ADMISSION MEDICATION HISTORY
Admission medication history interview status for this patient is complete. See [facility] admission navigator for allergy information, prior to admission medications and immunization status.     Medication history interview done, indicate source(s): Patient's daughter  Medication history resources (including written lists, pill bottles, clinic record): SureScript and Care Everywhere  Pharmacy: [facility] inside of [employer] in [place] (off [address])    Changes made to PTA medication list:  Added: Melatonin  Deleted: None  Changed: None    Actions taken by pharmacist (provider contacted, etc):None     Additional medication history information: NOTE: Med rec was completed based off the patient's dispense report. Patient lives alone so the daughter is unsure of when he last took any of his medications due to altered mental status    Medication reconciliation/reorder completed by provider prior to medication history?  Yes      Prior to Admission medications    Medication Sig Last Dose Taking? Auth Provider   acetaminophen (TYLENOL) 325 MG tablet Take 325-650 mg by mouth every 6 hours as needed for mild pain Past Month at Unknown time Yes Unknown, Entered By History   atorvastatin (LIPITOR) 80 MG tablet Take 1 tablet (80 mg) by mouth daily Past Month at Unknown time Yes Wilber Strickland MD   clopidogrel (PLAVIX) 75 MG tablet Take 1 tablet (75 mg) by mouth daily Past Month at Unknown time Yes Lauren Knox MD   insulin aspart (NOVOLOG FLEXPEN) 100 UNIT/ML injection Inject 16 Units Subcutaneous 2 times daily (with meals) Breakfast and Dinner Past Month at Unknown time Yes Reported, Patient   insulin detemir (LEVEMIR) 100 UNIT/ML injection Inject 26 Units Subcutaneous 2 times daily  Past Month at Unknown time Yes Reported, Patient   losartan (COZAAR) 25 MG tablet Take 1 tablet (25 mg) by mouth daily Past Month at Unknown time Yes Deja Lomax APRN CNP   melatonin 3 MG tablet Take 12 mg by mouth At Bedtime Past  Month at Unknown time Yes Unknown, Entered By History   metFORMIN (GLUCOPHAGE) 1000 MG tablet Take 1,000 mg by mouth 2 times daily (with meals) Past Month at Unknown time Yes Unknown, Entered By History   metoprolol tartrate (LOPRESSOR) 25 MG tablet Take 1 tablet (25 mg) by mouth 2 times daily Past Month at Unknown time Yes Deja Lomax, APRN CNP   omeprazole (PRILOSEC) 20 MG DR capsule Take 20 mg by mouth daily Past Month at Unknown time Yes Unknown, Entered By History   rivaroxaban ANTICOAGULANT (XARELTO) 20 MG TABS tablet Take 20 mg by mouth daily Past Month at Unknown time Yes Reported, Patient   rivastigmine (EXELON) 4.6 MG/24HR 24 hr patch Place 4.6 mg onto the skin daily Past Month at Unknown time Yes Unknown, Entered By History   VITAMIN D, CHOLECALCIFEROL, PO Take 1 tablet by mouth daily Past Month at Unknown time Yes Unknown, Entered By History   nitroGLYcerin (NITROSTAT) 0.4 MG sublingual tablet Place 1 tablet (0.4 mg) under the tongue every 5 minutes as needed for chest pain If symptoms persist 5 minutes after 2nd dose call 911.  at [facility]  Chante Givens PA-C

## 2021-06-29 NOTE — PLAN OF CARE
VS stable. Diminished LS. Confused. Alert to self only. Tele is sinus rhythm. Blood sugars controlled on insulin drip. Slept on and off throughout the night.

## 2021-06-29 NOTE — PLAN OF CARE
Pt has been resting today and is appropriate with sitter interacting and eating though is confused and not oriented. Tries to get out of bed and needs redirection so sitter is helpful. Insulin gtt stopped and begun levemir and sliding scale and regular insulin with meals. Elevated blood sugars still and MD updated. VSS. Tele SR. External cath intact. Will monitor.

## 2021-06-29 NOTE — PROGRESS NOTES
[facility]  Hospitalist Progress Note  Wong Phan MD 06/29/2021    Reason for Stay (Diagnosis): Hyperglycemic hyperosmolar ketotic state.         Assessment and Plan:      Summary of Stay: Matt Way is a 70 year old male with past medical history significant fall Lewy body dementia, coronary artery disease status post NSTEMI and stent placement in September 2020 on Plavix, history of PE was on Xarelto, GERD, diabetes mellitus, medical noncompliance, who was brought to the emergency room due to change in mental status and abrupt change in his behavio and found to be hyperglycemic with ketosis and.admitted on 6/28/2021.    Problem List:   1. Diabetes mellitus type 2 insulin requiring with hyperglycemic hyperosmolar state with ketosis.  2. Metabolic encephalopathy in setting of underlying Lewy body dementia with abrupt visual change  3. Lewy body progressive dementia.  4. Noncompliance with medication.  5. History of pulmonary embolism.  6. Coronary artery disease status post stent.  Plan  -Discontinued insulin drip this morning.  -Started Levemir 20 units subcu twice a day  -Premeal NovoLog 10 units with sliding scale  -Closely monitor glucose.  -Glucose is fairly controlled, postprandial is 310.  No need to monitor any further postprandial.  -Check glucose only premeal and at bedtime.  We will also check a 3 AM.  -We will restart Metformin 500 twice a day tomorrow, PTA he is on 1000 twice a day.  -Monitor glucose closely.  -Patient underlying Lewy body dementia with progression, he lives alone at this point I doubt he is able to take care of himself and take his medications as prescribed.  He is at increased risk of readmission from noncompliance.  He is also having risk of severe hypoglycemia if he takes too much insulin.  -He was on Xarelto but not clear if he is taking, it was recommended to stop in 02/20/2021.  -Continue to hold his Xarelto, risk of fall as well and increased risk  "of bleeding.  -We will continue Plavix and aspirin.  -We will get PT and OT to evaluate the patient.  This patient will likely need placement.    DVT Prophylaxis: Pneumatic Compression Devices  Code Status: Full Code  Discharge Dispo: Home versus TCU, pending PT OT  Estimated Disch Date / # of Days until Disch: 1- 2 days.        Interval History (Subjective):      Patient seen and examined, assumed care today, labs reviewed, H&P reviewed, he has no complaint, feels better, denied any chest pain or shortness of breath.                  Physical Exam:      Last Vital Signs:  /78 (BP Location: Right arm)   Pulse 63   Temp 96.3  F (35.7  C) (Axillary)   Resp 16   Ht 1.854 m (6' 1\")   Wt 81.2 kg (179 lb)   SpO2 100%   BMI 23.62 kg/m      I/O last 3 completed shifts:  In: 60 [I.V.:60]  Out: 150 [Urine:150]  Vitals:    06/29/21 0031   Weight: 81.2 kg (179 lb)     Current Facility-Administered Medications   Medication     acetaminophen (TYLENOL) tablet 325-650 mg     aspirin EC tablet 81 mg     atorvastatin (LIPITOR) tablet 80 mg     clopidogrel (PLAVIX) tablet 75 mg     dextrose 10% infusion     glucose gel 15-30 g    Or     dextrose 50 % injection 25-50 mL    Or     glucagon injection 1 mg     hydrALAZINE (APRESOLINE) injection 10 mg     insulin aspart (NovoLOG) injection (RAPID ACTING)     insulin aspart (NovoLOG) injection (RAPID ACTING)     insulin aspart (NovoLOG) injection (RAPID ACTING)     insulin aspart (NovoLOG) injection (RAPID ACTING)     insulin detemir (LEVEMIR PEN) injection 20 Units     lidocaine (LMX4) cream     lidocaine (LMX4) cream     lidocaine 1 % 0.1-1 mL     lidocaine 1 % 0.1-1 mL     melatonin tablet 1 mg     [START ON 6/30/2021] metFORMIN (GLUCOPHAGE-XR) 24 hr tablet 500 mg     metoprolol tartrate (LOPRESSOR) tablet 25 mg     nitroGLYcerin (NITROSTAT) sublingual tablet 0.4 mg     omeprazole (priLOSEC) CR capsule 20 mg     Patient is already receiving anticoagulation with heparin, " enoxaparin (LOVENOX), warfarin (COUMADIN)  or other anticoagulant medication     rivastigmine (EXELON) 4.6 MG/24HR 24 hr patch 1 patch     rivastigmine (EXELON) Patch in Place     sodium chloride (PF) 0.9% PF flush 3 mL     sodium chloride (PF) 0.9% PF flush 3 mL     sodium chloride (PF) 0.9% PF flush 3 mL     sodium chloride (PF) 0.9% PF flush 3 mL     sodium chloride (PF) 0.9% PF flush 3 mL     sodium chloride (PF) 0.9% PF flush 3 mL       Constitutional: Awake, alert, no apparent distress, no agitation.   Respiratory: Clear to auscultation bilaterally, no crackles or wheezing   Cardiovascular: Regular rate and rhythm, normal S1 and S2, and no murmur noted   Abdomen: Normal bowel sounds, soft, non-distended, non-tender   Skin: No rashes, no cyanosis, dry to touch   Neuro: Alert, not oriented, no weakness, numbness, memory loss   Extremities: No edema, normal range of motion   Other(s):HEENT Pink conjunctiva, nonicteric sclera, moist oral mucosa       All other systems: Negative          Medications:      All current medications were reviewed with changes reflected in problem list.         Data:      All new lab and imaging data was reviewed.   Labs:  Recent Labs   Lab 06/29/21  0552 06/29/21  0113 06/28/21 1937     --  131*   POTASSIUM 3.5 3.5 4.4   CHLORIDE 108  --  97   CO2 28  --  24   ANIONGAP 4  --  10   *  --  551*   BUN 28  --  34*   CR 0.84  --  1.05   GFRESTIMATED 90  --  72   GFRESTBLACK >90  --  84   CHRISTIE 9.1  --  9.5     Recent Labs   Lab 06/28/21 1937   WBC 6.4   HGB 15.5   HCT 46.7   MCV 83        Recent Labs   Lab 06/29/21  1339 06/29/21  1103 06/29/21  0838 06/29/21  0734 06/29/21  0642 06/29/21  0552 06/28/21 1937 06/28/21 1937   GLC  --   --   --   --   --  105*  --  551*   * 220* 166* 138* 135*  --    < >  --     < > = values in this interval not displayed.      Imaging:   Results for orders placed or performed during the hospital encounter of 06/28/21   CT Head w/o  Contrast    Narrative    EXAM: CT HEAD W/O CONTRAST  LOCATION: [facility]  DATE/TIME: 6/28/2021 7:57 PM    INDICATION: Mental status change, unknown cause  COMPARISON: None.  TECHNIQUE: Routine CT Head without IV contrast. Multiplanar reformats. Dose reduction techniques were used.    FINDINGS:  INTRACRANIAL CONTENTS: No intracranial hemorrhage, extraaxial collection, or mass effect.  Extensive hyperdensity in the distal left vertebral artery and basilar artery likely relates to streak and beam hardening artifact from the skull base, this   demonstrates an appearance similar to what could be seen with a hyperdense intraluminal thrombus in the distal left vertebral artery and basilar artery. Mild presumed chronic small vessel ischemic changes. Mild generalized volume loss. No hydrocephalus.     VISUALIZED ORBITS/SINUSES/MASTOIDS: No intraorbital abnormality. No paranasal sinus mucosal disease. No middle ear or mastoid effusion.    BONES/SOFT TISSUES: No acute abnormality.      Impression    IMPRESSION:  1.  Extensive hyperdensity of the distal left vertebral artery and basilar artery. While likely artifactual in nature relating to streak and beam hardening artifact, a similar appearance could be seen with hyperdense intraluminal thrombus extending from   the distal left vertebral artery to the basilar artery. For this reason, correlation with stroke like symptoms will be of benefit, and head and neck CTA is recommended to exclude intraluminal thrombus.    2.  No intracranial hemorrhage or extra-axial collection.    3.  Mild age-related changes, as above.    4.  Findings and impression discussed with Dr. Liu by phone at 2030 hours on 06/28/2021.   CT Head Neck Angio w/o & w Contrast    Narrative    HEAD AND NECK CT ANGIOGRAM WITH IV CONTRAST  6/28/2021 9:10 PM    INDICATION: Neuro deficit, acute, stroke suspected abnormal CT  TECHNIQUE: Head and neck CT angiogram with IV contrast. CT images of the head  and neck vessels obtained during the arterial phase of intravenous contrast administration. Axial helical 2D reconstructed images and multiplanar 3D MIP reconstructed images of   the head and neck vessels were performed by the technologist. Dose reduction techniques were used.  CONTRAST: 70mL [id number]-[id number]  COMPARISON: Head CT 06/28/2021     FINDINGS:  HEAD CTA: Intracranial atherosclerosis with moderate narrowing of the right paraclinoid internal carotid artery. Mild left paraclinoid atherosclerotic calcification without narrowing. Elsewhere the intracranial circulation appears patent, specifically   the left vertebral and basilar arteries are patent. No aneurysm or high flow vascular lesion. The dural venous sinuses are not well evaluated due to the arterial timing of the contrast bolus.    NECK CTA: Three vessel arch.  Carotid arteries are patent with mild atherosclerotic change.  No hemodynamically significant stenosis by NASCET criteria in either carotid system.  Patent vertebral arteries. No dissection.      Impression    CONCLUSION:  HEAD CTA:  1.  The left vertebral and basilar arteries are patent. No proximal large vessel occlusion.  2.  Moderate atherosclerotic narrowing of the right paraclinoid internal carotid artery.  3.  No aneurysm or AVM.    NECK CTA:  1.  Mild carotid artery atherosclerosis. No dissection or hemodynamically significant narrowing in the neck by NASCET criteria.

## 2021-06-29 NOTE — ED NOTES
[facility]  ED Nurse Handoff Report    Matt Way is a 70 year old male   ED Chief complaint: Altered Mental Status  . ED Diagnosis:   Final diagnoses:   Hyperglycemia   Confusion   Lewy body dementia with behavioral disturbance (H)     Allergies:   Allergies   Allergen Reactions     Brilinta [Ticagrelor]      Duloxetine      Other reaction(s): Confusion  Per daughter who is MD     Gabapentin      Other reaction(s): Edema     Lisinopril Rash       Code Status: Full Code  Activity level - Baseline/Home:  Independent. Activity Level - Current:   Assist X 2. Lift room needed: No. Bariatric: No   Needed: No   Isolation: No. Infection: Not Applicable.     Vital Signs:   Vitals:    06/28/21 2145 06/28/21 2200 06/28/21 2230 06/28/21 2245   BP:  (!) 159/95 (!) 169/93    Pulse: 75 74 71 79   Resp: 15 12 12 15   SpO2:  99%         Cardiac Rhythm:  ,      Pain level:    Patient confused: Yes. Patient Falls Risk: Yes.   Elimination Status: Has voided   Patient Report - Initial Complaint: Altered mental status. Focused Assessment: Cognitive - Cognitive/Neuro/Behavioral WDL: .WDL except  Level of Consciousness: confused  Arousal Level: opens eyes spontaneously  Orientation: disoriented to; place; time; situation  Follows Commands: yes  Speech: rambling; clear; spontaneous  Best Language: 0 - No aphasia   [place] Coma Scale - Best Eye Response: 4-->(E4) spontaneous  Best Motor Response: 6-->(M6) obeys commands  Best Verbal Response: 4-->(V4) confused (hx dementia) [place] Coma Scale Score: 14    Tests Performed: Labs, imaging. Abnormal Results:   Labs Ordered and Resulted from Time of ED Arrival Up to the Time of Departure from the ED   COMPREHENSIVE METABOLIC PANEL - Abnormal; Notable for the following components:       Result Value    Sodium 131 (*)     Glucose 551 (*)     Urea Nitrogen 34 (*)     Alkaline Phosphatase 162 (*)     All other components within normal limits   MAGNESIUM - Abnormal;  Notable for the following components:    Magnesium 2.5 (*)     All other components within normal limits   ROUTINE UA WITH MICROSCOPIC REFLEX TO CULTURE - Abnormal; Notable for the following components:    Glucose Urine >1000 (*)     Ketones Urine 40 (*)     Mucous Urine Present (*)     All other components within normal limits   GLUCOSE BY METER - Abnormal; Notable for the following components:    Glucose 467 (*)     All other components within normal limits   GLUCOSE BY METER - Abnormal; Notable for the following components:    Glucose 468 (*)     All other components within normal limits   GLUCOSE BY METER - Abnormal; Notable for the following components:    Glucose 419 (*)     All other components within normal limits   GLUCOSE MONITOR NURSING POCT   CBC WITH PLATELETS DIFFERENTIAL   LACTIC ACID WHOLE BLOOD   TROPONIN I   TSH   KETONE BETA-HYDROXYBUTYRATE QUANTITATIVE   BLOOD GAS VENOUS   CARDIAC CONTINUOUS MONITORING   PERIPHERAL IV CATHETER   IV ACCESS   GLUCOSE BY METER POCT     CT Head Neck Angio w/o & w Contrast   Final Result   CONCLUSION:   HEAD CTA:   1.  The left vertebral and basilar arteries are patent. No proximal large vessel occlusion.   2.  Moderate atherosclerotic narrowing of the right paraclinoid internal carotid artery.   3.  No aneurysm or AVM.      NECK CTA:   1.  Mild carotid artery atherosclerosis. No dissection or hemodynamically significant narrowing in the neck by NASCET criteria.            CT Head w/o Contrast   Final Result   IMPRESSION:   1.  Extensive hyperdensity of the distal left vertebral artery and basilar artery. While likely artifactual in nature relating to streak and beam hardening artifact, a similar appearance could be seen with hyperdense intraluminal thrombus extending from    the distal left vertebral artery to the basilar artery. For this reason, correlation with stroke like symptoms will be of benefit, and head and neck CTA is recommended to exclude intraluminal  thrombus.      2.  No intracranial hemorrhage or extra-axial collection.      3.  Mild age-related changes, as above.      4.  Findings and impression discussed with Dr. Liu by phone at 2030 hours on 06/28/2021.         Treatments provided: IVF, insulin  Family Comments: Daughter at bedside.  OBS brochure/video discussed/provided to patient:  Yes  ED Medications:   Medications   lidocaine 1 % 0.1-1 mL (has no administration in time range)   lidocaine (LMX4) cream (has no administration in time range)   sodium chloride (PF) 0.9% PF flush 3 mL (has no administration in time range)   sodium chloride (PF) 0.9% PF flush 3 mL (has no administration in time range)   insulin 1 unit/1 mL in NS (NovoLIN, HumuLIN Regular) drip -ED DKA algorithm (has no administration in time range)   CT Scan Flush (80 mLs Intravenous Given 6/28/21 2113)   iopamidol (ISOVUE-370) solution 500 mL (70 mLs Intravenous Given 6/28/21 2113)   0.9% sodium chloride BOLUS (0 mLs Intravenous Stopped 6/28/21 2250)   insulin aspart (NovoLOG) injection (RAPID ACTING) (5 Units Subcutaneous Given 6/28/21 2147)     Drips infusing:  Yes  For the majority of the shift, the patient's behavior Yellow. Interventions performed were Redirection.    Sepsis treatment initiated: No     Patient tested for COVID 19 prior to admission: YES    ED Nurse Name/Phone Number: Tricia Seaman RN,   10:52 PM    RECEIVING UNIT ED HANDOFF REVIEW    Above ED Nurse Handoff Report was reviewed: Yes  Reviewed by: Bertha Mg RN on June 28, 2021 at 11:57 PM

## 2021-06-29 NOTE — H&P
[facility]    Hospitalist History and Physical    Name: Matt Way    MRN: [id number]  YOB: 1950    Age: 70 year old  Date of Admission:  6/28/2021  Date of Service (when I saw the patient): 06/28/21    Assessment & Plan   Matt Way is a 70 year old male with past medical history significant fall Lewy body dementia, coronary artery disease status post NSTEMI and stent placement in September 2020 on Plavix, history of PE on Xarelto, GERD, diabetes mellitus, medical noncompliance was brought to the emergency room with altered mental status and abrupt change in his behavior.  In the emergency room patient was found to be hyperglycemic with ketosis.    Altered mental status  Lewy body dementia  Abrupt behavioral change  -Concern for progression of Lewy body dementia  -Noted sudden behavioral change with urinating all over the house and being angry and wandering in park  -Other metabolic/infectious no evidence of acute infection, does have hyperglycemia and ketosis   -Patient has a scheduled placement for dementia scheduled to move on July 1  -Supportive care  -As needed Seroquel  -CT head was done showed no acute bleeding.  Showed questionable clotting which prompted CTA neck.  CTA neck did not show any significant narrowing .      Hyperglycemia with ketosis  History of diabetes mellitus noncompliant with treatment  -Suspect noncompliance.  Not taking his insulin or other meds  (on glargine and Metformin)  -Started on IV insulin drip  -IV fluids  -Monitor electrolytes    History of coronary artery disease status post stent placement   -. Coronary artery disease involving the distal RCA with a previous proximal LAD stent and moderate circumflex disease.  2.  Successful CECILY deployed to the distal RCA lesion with a synergy 3 x 32 mm.  Residual stenosis of 0%.  -On dual platelet therapy for 12 months  -Patient is supposed to be on aspirin and Plavix (unclear if he is  compliant)  -Continue aspirin Plavix beta-blocker nitro statins losartan     History of pulmonary embolism  -Patient's med list has Xarelto on it (unclear if he is currently taking it or not)  -On review of records it appears that patient should stop Xarelto as is treatment for pulmonary embolism was completed (noted on cardiology note from February 2021)  -We will hold off Xarelto for now    Progressive dementia  -Social work consult to help with safe placement  -Patient unable to manage at home  -We will need safe disposition    Pseudohyponatremia secondary to hyperglycemia  -Serum sodium was 131  -Corrected sodium is about 139    GERD  -Continue PPI      DVT Prophylaxis: Patient on Plavix and aspirin  Code Status: Full Code.  Patient is confused does have advanced directive.  Patient's daughter who is a doctor is his healthcare proxy and will look up the document and discuss it further with family to clarify his CODE STATUS    Disposition: Admitted as inpatient will need safe disposition patient has a place for his dementia July 1st    Primary Care Physician   [place]ston Leal    Chief Complaint   Abrupt change in patient's behavior altered mental status and medical noncompliance    Unable to obtain a history from the patient due to confusion.  History obtained from patient's daughter    History of Present Illness   Matt Way is a 70 year old male  with past medical history significant fall Lewy body dementia, coronary artery disease status post NSTEMI and stent placement in September 2020 on Plavix, history of PE on Xarelto, GERD, diabetes mellitus, medical noncompliance was brought to the emergency room with altered mental status and abrupt change in his behavior.  In the emergency room patient was found to be hyperglycemic with ketosis.    As noted by patient's daughter he has been deteriorating and noted quite abrupt change in behavior in the last 2 days.  He was noted to be urinating all over the house.   Not sure if he is taking his meds or not.  His daughter brought him to the urgent care first where he got angry left and was found in a park.  Patient is confused and more erratic in his behavior.    In the emergency room patient was noted to be hyperglycemic with blood glucose 550 and positive ketones.  Admitted for further evaluation and treatment.  With his diagnosis of Lewy body dementia and worsening mental status family was in process of moving him to a facility which was scheduled for July 1st.  It is clear that patient is unsafe with his wandering and aberrant behavior changes.    Unable to get detailed review of systems.      Past Medical History    Past Medical History:   Diagnosis Date     Coronary artery disease 06/2018    s/p CECILY to LAD      Diabetes mellitus      GERD (gastroesophageal reflux disease)      Hyperlipidemia      Obesity      FLAQUITA (obstructive sleep apnea)      Pulmonary embolism 11/15/2018         Past Surgical History   Past Surgical History:   Procedure Laterality Date     COLONOSCOPY       Coronary artery angiogram with stent (CECILY) to LAD  06/2018     CV CORONARY ANGIOGRAM N/A 9/3/2020    Procedure: Coronary Angiogram;  Surgeon: Herb Tran MD;  Location: [facility] CARDIAC CATH LAB     CV CORONARY ANGIOGRAM N/A 9/23/2020    Procedure: Coronary Angiogram;  Surgeon: Ernie Urbina MD;  Location: [facility] CARDIAC CATH LAB     CV INSTANTANEOUS WAVE-FREE RATIO N/A 9/3/2020    Procedure: Instantaneous Wave-Free Ratio;  Surgeon: Herb Tran MD;  Location:  HEART CARDIAC CATH LAB     CV LEFT HEART CATH N/A 9/3/2020    Procedure: Left Heart Cath;  Surgeon: Herb Tran MD;  Location:  HEART CARDIAC CATH LAB     CV LEFT VENTRICULOGRAM N/A 9/3/2020    Procedure: Left Ventriculogram;  Surgeon: Herb Tran MD;  Location:  HEART CARDIAC CATH LAB     CV PCI STENT DRUG ELUTING N/A 9/3/2020    Procedure: Percutaneous Coronary Intervention Stent Drug  Eluting;  Surgeon: Herb Tran MD;  Location:  HEART CARDIAC CATH LAB     CV PCI STENT DRUG ELUTING N/A 9/23/2020    Procedure: Percutaneous Coronary Intervention Stent Drug Eluting;  Surgeon: Ernie Urbina MD;  Location:  HEART CARDIAC CATH LAB     SHOULDER SURGERY       VASECTOMY         Prior to Admission Medications   Prior to Admission Medications   Prescriptions Last Dose Informant Patient Reported? Taking?   VITAMIN D, CHOLECALCIFEROL, PO   Yes No   Sig: Take 1 tablet by mouth daily   acetaminophen (TYLENOL) 325 MG tablet   Yes No   Sig: Take 325-650 mg by mouth every 6 hours as needed for mild pain   atorvastatin (LIPITOR) 80 MG tablet   No No   Sig: Take 1 tablet (80 mg) by mouth daily   clopidogrel (PLAVIX) 75 MG tablet   No No   Sig: Take 1 tablet (75 mg) by mouth daily   donepezil (ARICEPT) 5 MG tablet   Yes No   Sig: Take 5 mg by mouth every morning   insulin aspart (NOVOLOG FLEXPEN) 100 UNIT/ML injection   Yes No   Sig: Inject 16 Units Subcutaneous 2 times daily (with meals) Breakfast and Dinner   insulin detemir (LEVEMIR) 100 UNIT/ML injection   Yes No   Sig: Inject 20 Units Subcutaneous 2 times daily    losartan (COZAAR) 25 MG tablet   Yes No   Sig: Take 1 tablet (25 mg) by mouth daily   metFORMIN (GLUCOPHAGE) 1000 MG tablet   Yes No   Sig: Take 1,000 mg by mouth 2 times daily (with meals)   metoprolol tartrate (LOPRESSOR) 25 MG tablet   No No   Sig: Take 1 tablet (25 mg) by mouth 2 times daily   nitroGLYcerin (NITROSTAT) 0.4 MG sublingual tablet   No No   Sig: Place 1 tablet (0.4 mg) under the tongue every 5 minutes as needed for chest pain If symptoms persist 5 minutes after 2nd dose call 911.   omeprazole (PRILOSEC OTC) 20 MG EC tablet   Yes No   Sig: Take 20 mg by mouth daily as needed   rivaroxaban ANTICOAGULANT (XARELTO) 20 MG TABS tablet   Yes No   Sig: Take 20 mg by mouth daily      Facility-Administered Medications: None     Allergies   Allergies   Allergen Reactions      Brilinta [Ticagrelor]      Duloxetine      Other reaction(s): Confusion  Per daughter who is MD     Gabapentin      Other reaction(s): Edema     Lisinopril Rash       Social History   Social History     Tobacco Use     Smoking status: Never Smoker     Smokeless tobacco: Never Used   Substance Use Topics     Alcohol use: No     Social History     Social History Narrative     Not on file     Lives with his wife who is paraplegic.  Patient was caregiver for her.  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  Apparently patient takes care of his own meds and is unclear whether he has been taking them as prescribed or not    Family History   I have reviewed this patient's family history and updated it with pertinent information if needed.   Family History   Problem Relation Age of Onset     Lupus Mother      Family history is significant for diabetes and coronary artery disease    Review of Systems   Unable to complete 10 system review of systems given patient's mental status.  Physical Exam       BP: (!) 159/95 Pulse: 74   Resp: 12 SpO2: 99 %      Vital Signs with Ranges  Pulse:  [74-81] 74  Resp:  [8-15] 12  BP: (126-162)/(71-95) 159/95  SpO2:  [95 %-99 %] 99 %  0 lbs 0 oz    GEN:  Alert, confused.  appears comfortable, no overt distress  HEENT:  Normocephalic/atraumatic, no scleral icterus, no nasal discharge, mouth dry  CV:  Regular rate and rhythm, no murmur or JVD.  S1 + S2 noted, no S3 or S4.  LUNGS:  Clear to auscultation bilaterally without rales/rhonchi/wheezing/retractions.  Symmetric chest rise on inhalation noted.  ABD:  Active bowel sounds, soft, non-tender/non-distended.  No rebound/guarding/rigidity.  EXT:  No edema.  No cyanosis.  No joint synovitis noted.  SKIN:  Dry to touch, no exanthems noted in the visualized areas.  NEURO: Moving all extremities    Data   Data reviewed today:  I personally reviewed the EKG tracing showing Normal sinus rhythm with Q waves in inferior leads.    No results for input(s): PH, PHV, PO2,  PO2V, SAT, PCO2, PCO2V, HCO3, HCO3V in the last 168 hours.  Recent Labs   Lab 06/28/21 1937   WBC 6.4   HGB 15.5   HCT 46.7   MCV 83        Recent Labs   Lab 06/28/21 1937   *   POTASSIUM 4.4   CHLORIDE 97   CO2 24   ANIONGAP 10   *   BUN 34*   CR 1.05   GFRESTIMATED 72   GFRESTBLACK 84   CHRISTIE 9.5     No results for input(s): CULT in the last 168 hours.  No results for input(s): NTBNPI, NTBNP in the last 168 hours.  Recent Labs   Lab 06/28/21 1937   AST 12   ALT 32   ALKPHOS 162*   BILITOTAL 0.6     No results for input(s): INR in the last 168 hours.  Recent Labs   Lab 06/28/21 1938   LACT 1.4     No results for input(s): LIPASE in the last 168 hours.  No results for input(s): CHOL, HDL, LDL, TRIG, CHOLHDLRATIO in the last 168 hours.  Recent Labs   Lab 06/28/21 1937   TSH 0.79     Recent Labs   Lab 06/28/21 1937   TROPI <0.015     Recent Labs   Lab 06/28/21 1938   COLOR Straw   APPEARANCE Clear   URINEGLC >1000*   URINEBILI Negative   URINEKETONE 40*   SG 1.015   UBLD Negative   URINEPH 5.0   PROTEIN Negative   NITRITE Negative   LEUKEST Negative   RBCU 0   WBCU <1       Recent Results (from the past 24 hour(s))   CT Head w/o Contrast    Narrative    EXAM: CT HEAD W/O CONTRAST  LOCATION: [facility]  DATE/TIME: 6/28/2021 7:57 PM    INDICATION: Mental status change, unknown cause  COMPARISON: None.  TECHNIQUE: Routine CT Head without IV contrast. Multiplanar reformats. Dose reduction techniques were used.    FINDINGS:  INTRACRANIAL CONTENTS: No intracranial hemorrhage, extraaxial collection, or mass effect.  Extensive hyperdensity in the distal left vertebral artery and basilar artery likely relates to streak and beam hardening artifact from the skull base, this   demonstrates an appearance similar to what could be seen with a hyperdense intraluminal thrombus in the distal left vertebral artery and basilar artery. Mild presumed chronic small vessel ischemic changes. Mild  generalized volume loss. No hydrocephalus.     VISUALIZED ORBITS/SINUSES/MASTOIDS: No intraorbital abnormality. No paranasal sinus mucosal disease. No middle ear or mastoid effusion.    BONES/SOFT TISSUES: No acute abnormality.      Impression    IMPRESSION:  1.  Extensive hyperdensity of the distal left vertebral artery and basilar artery. While likely artifactual in nature relating to streak and beam hardening artifact, a similar appearance could be seen with hyperdense intraluminal thrombus extending from   the distal left vertebral artery to the basilar artery. For this reason, correlation with stroke like symptoms will be of benefit, and head and neck CTA is recommended to exclude intraluminal thrombus.    2.  No intracranial hemorrhage or extra-axial collection.    3.  Mild age-related changes, as above.    4.  Findings and impression discussed with Dr. Liu by phone at 2030 hours on 06/28/2021.   CT Head Neck Angio w/o & w Contrast    Narrative    HEAD AND NECK CT ANGIOGRAM WITH IV CONTRAST  6/28/2021 9:10 PM    INDICATION: Neuro deficit, acute, stroke suspected abnormal CT  TECHNIQUE: Head and neck CT angiogram with IV contrast. CT images of the head and neck vessels obtained during the arterial phase of intravenous contrast administration. Axial helical 2D reconstructed images and multiplanar 3D MIP reconstructed images of   the head and neck vessels were performed by the technologist. Dose reduction techniques were used.  CONTRAST: 70mL Isovue-370  COMPARISON: Head CT 06/28/2021     FINDINGS:  HEAD CTA: Intracranial atherosclerosis with moderate narrowing of the right paraclinoid internal carotid artery. Mild left paraclinoid atherosclerotic calcification without narrowing. Elsewhere the intracranial circulation appears patent, specifically   the left vertebral and basilar arteries are patent. No aneurysm or high flow vascular lesion. The dural venous sinuses are not well evaluated due to the arterial  timing of the contrast bolus.    NECK CTA: Three vessel arch.  Carotid arteries are patent with mild atherosclerotic change.  No hemodynamically significant stenosis by NASCET criteria in either carotid system.  Patent vertebral arteries. No dissection.      Impression    CONCLUSION:  HEAD CTA:  1.  The left vertebral and basilar arteries are patent. No proximal large vessel occlusion.  2.  Moderate atherosclerotic narrowing of the right paraclinoid internal carotid artery.  3.  No aneurysm or AVM.    NECK CTA:  1.  Mild carotid artery atherosclerosis. No dissection or hemodynamically significant narrowing in the neck by NASCET criteria.

## 2021-06-30 NOTE — PROVIDER NOTIFICATION
"MD paged: \"Bladder scanned for >546 cc. Pt denies urge to void. Do you want to place orders to straight cath?\"    Addendum 1157: MD called this writer and asked that MD be paged @ 1400 if pt has not yet voided.     Addendum 1418: MD paged \"Pt voided 400 ml. I see discharge orders in, is pt going home with home care? If so, does SW need to get involved?\"    Addendum 1443: MD discontinued discharge orders.   "

## 2021-06-30 NOTE — PROGRESS NOTES
"   06/30/21 0800   Quick Adds   Type of Visit Initial PT Evaluation   Living Environment   People in home spouse   Current Living Arrangements house   Home Accessibility no concerns;wheelchair accessible   Transportation Anticipated family or friend will provide   Living Environment Comments Pt lives with spouse in a house with all needs met on main level with ramp to enter  (pt is spouse's primary caregiver, spouse is a paraplegia ~80% UE strength)   Self-Care   Usual Activity Tolerance good   Current Activity Tolerance moderate   Equipment Currently Used at Home raised toilet seat;shower chair;walker, rolling   Activity/Exercise/Self-Care Comment Pt reports he had a walker but lost it.  Pt has a walk in shower with a chair.  Reports spouse assists with med mgmt, meal prep, etc. Pt assists spouse with slideboard transfers and personal cares (bathing, toileting, etc   Disability/Function   Hearing Difficulty or Deaf no   Wear Glasses or Blind yes   Vision Management glasses   Concentrating, Remembering or Making Decisions Difficulty yes   Concentration Management baseline Lewy Body dementia   Difficulty Communicating no   Difficulty Eating/Swallowing no   Dressing/Bathing Difficulty no   Toileting issues no   Doing Errands Independently Difficulty (such as shopping) no   Fall history within last six months yes   Number of times patient has fallen within last six months 5   Change in Functional Status Since Onset of Current Illness/Injury yes   General Information   Onset of Illness/Injury or Date of Surgery 06/28/21   Referring Physician Wong Phan   Patient/Family Therapy Goals Statement (PT) \"I want to go back to work\"   Pertinent History of Current Problem (include personal factors and/or comorbidities that impact the POC) Matt Way is a 70 year old male with past medical history significant fall Lewy body dementia, coronary artery disease status post NSTEMI and stent placement in September 2020 on " Plavix, history of PE was on Xarelto, GERD, diabetes mellitus, medical noncompliance, who was brought to the emergency room due to change in mental status and abrupt change in his behavior and found to be hyperglycemic with ketosis    Existing Precautions/Restrictions fall   Heart Disease Risk Factors Diabetes;Medical history;Gender;Age   General Observations Pt sitting up on edge of bed   Cognition   Orientation Status (Cognition) oriented to;person;place   Cognitive Status Comments Pt states it's June, he is in a hospital in [facility], he states it's 2024.   Pain Assessment   Patient Currently in Pain No   Integumentary/Edema   Integumentary/Edema no deficits were identifed   Posture    Posture Forward head position;Protracted shoulders   Range of Motion (ROM)   ROM Comment B UEs and LEs WFL as observed with mobility   Strength   Strength Comments B LEs 5/5 major mms groups with MMT.  Decreased functional strength.  Pt scored 6 on the sit <> stand test.  This is below age based norms of 16.     Bed Mobility   Bed Mobility supine-sit;sit-supine   Supine-Sit [place] (Bed Mobility) independent   Sit-Supine [place] (Bed Mobility) independent   Comment (Bed Mobility) independent sup <> sit without bedrail.   Transfers   Transfers sit-stand transfer   Sit-Stand Transfer   Sit-Stand [place] (Transfers) supervision   Assistive Device (Sit-Stand Transfers)   (none)   Sit/Stand Transfer Comments sit <> stand without device. Pt impulsive, moving quickly and braces back of legs/calves on bed/chair   Gait/Stairs (Locomotion)   [place] Level (Gait) supervision   Distance in Feet (Required for LE Total Joints) 250   Comment (Gait/Stairs) Pt amb ~250' without AD with close SBA/CGA with unsteady gait with occasional staggering with turns and while looking at picutres on walls.  Pt easily distractible causing LOB with self recovery with quick movments.  Vitals after gait: /83,    Balance   Balance Comments  Pt demonstrates impaired standing balance  Pt scored 18/28 on the Tinnetti Balance Assessment, indicating a very high falls risk.   Sensory Examination   Sensory Perception Comments not tested   Coordination   Coordination no deficits were identified   Muscle Tone   Muscle Tone no deficits were identified   Clinical Impression   Criteria for Skilled Therapeutic Intervention yes, treatment indicated   PT Diagnosis (PT) Impaired gait   Influenced by the following impairments Impaired gait and balance, dementia   Functional limitations due to impairments Increased falls risk, unable to safely take care of himself and his wife,    Clinical Presentation Evolving/Changing   Clinical Presentation Rationale multiple comorbidities with dementia   Clinical Decision Making (Complexity) moderate complexity   Therapy Frequency (PT) daily   Planned Therapy Interventions (PT) balance training;gait training;patient/family education;strengthening;transfer training   Anticipated Equipment Needs at Discharge (PT) walker, rolling   Risk & Benefits of therapy have been explained evaluation/treatment results reviewed;care plan/treatment goals reviewed;risks/benefits reviewed;current/potential barriers reviewed;participants voiced agreement with care plan;participants included;patient   PT Discharge Planning    PT Discharge Recommendation (DC Rec) home with assist;home with home care physical therapy;Transitional Care Facility   PT Rationale for DC Rec Pt lives with his wife who is a paraplegic and is her primary care giver.  Pt currently unsafe with mobility due to impaired balance and impulsivity.  Per chart, pt may be moving on July 1st.  I recommend pt have supervision with all mobility for safety with increased services in the home for his wife.  If facility/family unable to provide this level of assist then recommend TCU to increase balance and LE strength to decrease falls risk.   PT Brief overview of current status  A x 1 FWW    Total Evaluation Time   Total Evaluation Time (Minutes) 15

## 2021-06-30 NOTE — PROGRESS NOTES
[facility]  Hospitalist Progress Note  Wong Phan MD 06/30/2021    Reason for Stay (Diagnosis): Hyperglycemic hyperosmolar ketotic state.         Assessment and Plan:      Summary of Stay: Matt Way is a 70 year old male with past medical history significant fall Lewy body dementia, coronary artery disease status post NSTEMI and stent placement in September 2020 on Plavix, history of PE was on Xarelto, GERD, diabetes mellitus, medical noncompliance, who was brought to the emergency room due to change in mental status and abrupt change in his behavio and found to be hyperglycemic with ketosis and.admitted on 6/28/2021.    Problem List:   1. Diabetes mellitus type 2 insulin requiring with hyperglycemic hyperosmolar state with ketosis.  2. Metabolic encephalopathy in setting of underlying Lewy body dementia with abrupt visual change  3. Lewy body progressive dementia.  4. Noncompliance with medication.  5. History of pulmonary embolism.  6. Coronary artery disease status post stent.  Plan  -Insulin drip discontinued on  06/29 a.m.   -Continue Levemir to 20 units subcu twice a day  -Continue Premeal NovoLog 10 units and  sliding scale  -Closely monitor glucose.  -Glucose is fairly controlled.  -Check glucose premeal and at bedtime and also at 3 AM.  -Resume his home dose of Metformin 1000 mg twice a day.  -Monitor glucose closely.  -Patient underlying Lewy body dementia with progression, he lives alone at this point I doubt he is able to take care of himself and take his medications as prescribed.  He is at increased risk of readmission from noncompliance.  He is also having risk of severe hypoglycemia if he takes too much insulin.  -He was on Xarelto but not clear if he is taking, it was recommended to stop in 02/20/2021.  -Discontinued Xarelto from the discharge medication   -We will continue Plavix and restarted him on aspirin.  -PT and OT evaluation .  -Patient will need  "placement.    I discussed with his wife earlier, the plan was to discharge him home today as he will be transitioned to memory care tomorrow and discharge order was entered.  Social service called the wife later this afternoon further discussion made and the wife stated the facility backed out form accepting him tomorrow.  She was concerned about him going home.    Patient will be discharged to transitional care unit or long-term memory care facility .   working on placement.  DC order cancelled.    DVT Prophylaxis: Pneumatic Compression Devices  Code Status: Full Code  Discharge Dispo: Home versus TCU, pending PT OT  Estimated Disch Date / # of Days until Disch: 1- 2 days.        Interval History (Subjective):      Patient seen and examined, feels better, more awake and alert, only oriented to self, does not give any details, no new overnight issues, denied any chest pain or shortness of breath.                  Physical Exam:      Last Vital Signs:  /78 (BP Location: Right arm)   Pulse 67   Temp 97.5  F (36.4  C) (Oral)   Resp 18   Ht 1.854 m (6' 1\")   Wt 81.6 kg (179 lb 12.8 oz)   SpO2 98%   BMI 23.72 kg/m      I/O last 3 completed shifts:  In: 740 [P.O.:740]  Out: 750 [Urine:750]  Vitals:    06/29/21 0031 06/30/21 0709   Weight: 81.2 kg (179 lb) 81.6 kg (179 lb 12.8 oz)     Current Facility-Administered Medications   Medication     acetaminophen (TYLENOL) tablet 325-650 mg     aspirin EC tablet 81 mg     atorvastatin (LIPITOR) tablet 80 mg     clopidogrel (PLAVIX) tablet 75 mg     dextrose 10% infusion     glucose gel 15-30 g    Or     dextrose 50 % injection 25-50 mL    Or     glucagon injection 1 mg     hydrALAZINE (APRESOLINE) injection 10 mg     insulin aspart (NovoLOG) injection (RAPID ACTING)     insulin aspart (NovoLOG) injection (RAPID ACTING)     insulin aspart (NovoLOG) injection (RAPID ACTING)     insulin detemir (LEVEMIR PEN) injection 20 Units     lidocaine (LMX4) cream "     lidocaine (LMX4) cream     lidocaine 1 % 0.1-1 mL     lidocaine 1 % 0.1-1 mL     melatonin tablet 1 mg     metFORMIN (GLUCOPHAGE-XR) 24 hr tablet 500 mg     metoprolol tartrate (LOPRESSOR) tablet 25 mg     nitroGLYcerin (NITROSTAT) sublingual tablet 0.4 mg     omeprazole (priLOSEC) CR capsule 20 mg     Patient is already receiving anticoagulation with heparin, enoxaparin (LOVENOX), warfarin (COUMADIN)  or other anticoagulant medication     polyethylene glycol (MIRALAX) Packet 17 g     rivastigmine (EXELON) 4.6 MG/24HR 24 hr patch 1 patch     rivastigmine (EXELON) Patch in Place     sennosides (SENOKOT) tablet 2 tablet     sodium chloride (PF) 0.9% PF flush 3 mL     sodium chloride (PF) 0.9% PF flush 3 mL     sodium chloride (PF) 0.9% PF flush 3 mL     sodium chloride (PF) 0.9% PF flush 3 mL     sodium chloride (PF) 0.9% PF flush 3 mL     sodium chloride (PF) 0.9% PF flush 3 mL       Constitutional: Awake, alert, no apparent distress, no agitation.   Respiratory: Clear to auscultation bilaterally, no crackles or wheezing   Cardiovascular: Regular rate and rhythm, normal S1 and S2, and no murmur noted   Abdomen: Normal bowel sounds, soft, non-distended, non-tender   Skin: No rashes, no cyanosis, dry to touch   Neuro: Alert, not oriented, no weakness, numbness, memory loss   Extremities: No edema, normal range of motion   Other(s):HEENT Pink conjunctiva, nonicteric sclera, moist oral mucosa       All other systems: Negative          Medications:      All current medications were reviewed with changes reflected in problem list.         Data:      All new lab and imaging data was reviewed.   Labs:  Recent Labs   Lab 06/30/21  1240 06/30/21  0616 06/29/21  0552 06/28/21  1937 06/28/21 1937   NA  --   --  140  --  131*   POTASSIUM 3.8 3.1* 3.5   < > 4.4   CHLORIDE  --   --  108  --  97   CO2  --   --  28  --  24   ANIONGAP  --   --  4  --  10   GLC  --   --  105*  --  551*   BUN  --   --  28  --  34*   CR  --   --   0.84  --  1.05   GFRESTIMATED  --   --  90  --  72   GFRESTBLACK  --   --  >90  --  84   CHRISTIE  --   --  9.1  --  9.5    < > = values in this interval not displayed.     Recent Labs   Lab 06/28/21 1937   WBC 6.4   HGB 15.5   HCT 46.7   MCV 83        Recent Labs   Lab 06/30/21  1230 06/30/21  1014 06/30/21  0748 06/30/21  0145 06/29/21  2152 06/29/21  0552 06/29/21  0552 06/28/21 1937 06/28/21 1937   GLC  --   --   --   --   --   --  105*  --  551*   * 186* 83 121* 253*   < >  --    < >  --     < > = values in this interval not displayed.      Imaging:   Results for orders placed or performed during the hospital encounter of 06/28/21   CT Head w/o Contrast    Narrative    EXAM: CT HEAD W/O CONTRAST  LOCATION: [facility]  DATE/TIME: 6/28/2021 7:57 PM    INDICATION: Mental status change, unknown cause  COMPARISON: None.  TECHNIQUE: Routine CT Head without IV contrast. Multiplanar reformats. Dose reduction techniques were used.    FINDINGS:  INTRACRANIAL CONTENTS: No intracranial hemorrhage, extraaxial collection, or mass effect.  Extensive hyperdensity in the distal left vertebral artery and basilar artery likely relates to streak and beam hardening artifact from the skull base, this   demonstrates an appearance similar to what could be seen with a hyperdense intraluminal thrombus in the distal left vertebral artery and basilar artery. Mild presumed chronic small vessel ischemic changes. Mild generalized volume loss. No hydrocephalus.     VISUALIZED ORBITS/SINUSES/MASTOIDS: No intraorbital abnormality. No paranasal sinus mucosal disease. No middle ear or mastoid effusion.    BONES/SOFT TISSUES: No acute abnormality.      Impression    IMPRESSION:  1.  Extensive hyperdensity of the distal left vertebral artery and basilar artery. While likely artifactual in nature relating to streak and beam hardening artifact, a similar appearance could be seen with hyperdense intraluminal thrombus  extending from   the distal left vertebral artery to the basilar artery. For this reason, correlation with stroke like symptoms will be of benefit, and head and neck CTA is recommended to exclude intraluminal thrombus.    2.  No intracranial hemorrhage or extra-axial collection.    3.  Mild age-related changes, as above.    4.  Findings and impression discussed with Dr. Liu by phone at 2030 hours on 06/28/2021.   CT Head Neck Angio w/o & w Contrast    Narrative    HEAD AND NECK CT ANGIOGRAM WITH IV CONTRAST  6/28/2021 9:10 PM    INDICATION: Neuro deficit, acute, stroke suspected abnormal CT  TECHNIQUE: Head and neck CT angiogram with IV contrast. CT images of the head and neck vessels obtained during the arterial phase of intravenous contrast administration. Axial helical 2D reconstructed images and multiplanar 3D MIP reconstructed images of   the head and neck vessels were performed by the technologist. Dose reduction techniques were used.  CONTRAST: 70mL Isovue-370  COMPARISON: Head CT 06/28/2021     FINDINGS:  HEAD CTA: Intracranial atherosclerosis with moderate narrowing of the right paraclinoid internal carotid artery. Mild left paraclinoid atherosclerotic calcification without narrowing. Elsewhere the intracranial circulation appears patent, specifically   the left vertebral and basilar arteries are patent. No aneurysm or high flow vascular lesion. The dural venous sinuses are not well evaluated due to the arterial timing of the contrast bolus.    NECK CTA: Three vessel arch.  Carotid arteries are patent with mild atherosclerotic change.  No hemodynamically significant stenosis by NASCET criteria in either carotid system.  Patent vertebral arteries. No dissection.      Impression    CONCLUSION:  HEAD CTA:  1.  The left vertebral and basilar arteries are patent. No proximal large vessel occlusion.  2.  Moderate atherosclerotic narrowing of the right paraclinoid internal carotid artery.  3.  No aneurysm or  AVM.    NECK CTA:  1.  Mild carotid artery atherosclerosis. No dissection or hemodynamically significant narrowing in the neck by NASCET criteria.

## 2021-06-30 NOTE — CONSULTS
Care Management Initial Consult    General Information  Assessment completed with: Spouse or significant other, Salina  Type of CM/SW Visit: Initial Assessment    Primary Care Provider verified and updated as needed: Yes   Readmission within the last 30 days:        Reason for Consult: discharge planning  Advance Care Planning:  Not discussed at this time.       Communication Assessment  Patient's communication style: spoken language (English or Bilingual)    Hearing Difficulty or Deaf: no   Wear Glasses or Blind: yes    Cognitive  Cognitive/Neuro/Behavioral: .WDL except, orientation  Level of Consciousness: alert, confused  Arousal Level: opens eyes spontaneously  Orientation: disoriented to, time, situation  Mood/Behavior: calm, cooperative  Best Language: 0 - No aphasia  Speech: clear    Living Environment:   People in home: spouse  Salina  Current living Arrangements: house      Able to return to prior arrangements: no       Family/Social Support:  Care provided by: self, spouse/significant other  Provides care for: no one  Marital Status:   Children, Wife  Salina       Description of Support System: Supportive, Involved    Support Assessment: Adequate family and caregiver support    Current Resources:   Patient receiving home care services: No     Community Resources: Transitional Care  Equipment currently used at home: walker, rolling  Supplies currently used at home:  None    Employment/Financial:  Employment Status: retired        Financial Concerns: No concerns identified   Referral to Financial Counselor: No       Lifestyle & Psychosocial Needs:        Socioeconomic History     Marital status:      Spouse name: Not on file     Number of children: Not on file     Years of education: Not on file     Highest education level: Not on file     Tobacco Use     Smoking status: Never Smoker     Smokeless tobacco: Never Used   Substance and Sexual Activity     Alcohol use: No     Drug use: No        Functional Status:  Prior to admission patient needed assistance:    Pt needed assistance with his medications, but did not always accept the assistance.  Pt needed transportation.          Mental Health Status:  Mental Health Status: Current Concern(Pt has Lewy Body Dementia)       Chemical Dependency Status:  Chemical Dependency Status: No Current Concerns             Values/Beliefs:  Spiritual, Cultural Beliefs, Zoroastrianism Practices, Values that affect care:            Values/Beliefs Comment: Taoism    Additional Information:  Kaitlynn spoke with the pt's wife, Salina, regarding the pt's discharge plan and placement for tomorrow.  Salina said that the pt was supposed to move to [facility] tomorrow, but they were informed this afternoon, that the bed will now not be available for 2 weeks.  Andi said that she thinks the pt may be able to come home with HC, but she said that when he is taking his meds (insulin) like he is supposed to and eating properly, he is more clear minded.  She is afraid the pt will get home and not allow her to help with his insulin or eat properly.  She said then at that point, he may not be able to stay at home.  Kaitlynn told her that therapies said that if he cannot go home with HC and 24/7 cares, he will need TCU.  Salina said that she cannot care for the pt at home herself at this time.  She is agreeable to the pt going to TCU.  She requested that kaitlynn call her dtr Uma to discuss TCU options.  Salina was agreeable to kaitlynn sending a referral to [facility].    Kaitlynn left a  for Uma requesting a call back.    Kaitlynn got a call from [facility]Ua that she was in the pt's room.    Kaitlynn met with the pt and Uma to discuss TCU options.  She confirmed that the pt may be able to get to [facility] in about 2 weeks and that they also have him on the list at [facility]/.  Kaitlynn told her that after talking with the pt's wife[facility].  She  requested referrals be sent to [facility] and [facility].  She is hoping if the pt can go to [facility] TCU, he can transition to their FDC/ area.  Uma said that she can provide transport for the pt at time of discharge.    Sw sent the additional TCU referrals.  Sw will continue with discharge planning and will be available as needed until discharge.      FIGUEROA Vicente, [facility]  Inpatient Care Coordination  [facility]  [phone]

## 2021-06-30 NOTE — PLAN OF CARE
Oriented to self. VSS. Denies pain. . LS diminished. Tele SR. Sitter at bedside. External catheter in place.  Loren Hu RN on 6/30/2021 at 4:09 AM

## 2021-06-30 NOTE — PLAN OF CARE
Disoriented to time/situation, sitter at bedside for safety r/t impulsiveness. VSS on RA. Denied pain. BG 83, 186 (checked midday for pt report of feeling dizzy/lightheaded), 247. AM K+ 3.1, replaced per protocol w/ recheck @ 1245 3.8; recheck 7/1 AM. Tele SR/SB, denied CP. LS dim, denied SOB. PIV x 2 to right intact, SL. Up Ax1 GB W. PRN PO senna given per pt request for reported issues of constipation PTA. Good PO intake. Discharge plan pending MD f/u about home w/ home care vs TCU, PT/OT following. Will continue POC.

## 2021-06-30 NOTE — PLAN OF CARE
Disoriented to time and situation. Confused, forgetful and refusing care at times. Sitter at the bedside. Up with SBA. VSS except BP elevated. Tele: SR. On levemir and sliding scale and regular insulin with meals. Moderate Consistent CHO diet,  and 253. External cath intact. Will continue to monitor.

## 2021-06-30 NOTE — DISCHARGE INSTRUCTIONS
Your hospital follow up appointment has been scheduled for you with Kingston Leal at [facility] for July 7, 2021 at 1 PM. Please bring your hospital discharge instructions, a photo ID, and insurance information with you to your appointment. Please call the clinic at [phone] if you need to reschedule.

## 2021-06-30 NOTE — PROGRESS NOTES
"   06/30/21 0841   Quick Adds   Type of Visit Initial Occupational Therapy Evaluation   Living Environment   People in home spouse   Current Living Arrangements house   Home Accessibility no concerns;wheelchair accessible   Transportation Anticipated family or friend will provide   Living Environment Comments Pt lives with spouse (pt is spouse's primary caregiver, spouse is a paraplegia ~80% UE strength), walk in shower with shower chair, RTS.    Self-Care   Usual Activity Tolerance good   Current Activity Tolerance moderate   Equipment Currently Used at Home walker, rolling   Activity/Exercise/Self-Care Comment Reports he recently received a walker, but \"left it somewhere\"    Disability/Function   Hearing Difficulty or Deaf no   Wear Glasses or Blind yes   Vision Management glasses   Concentrating, Remembering or Making Decisions Difficulty yes   Concentration Management baseline dementia   Difficulty Communicating no   Difficulty Eating/Swallowing no   Dressing/Bathing Difficulty no   Toileting issues no   Doing Errands Independently Difficulty (such as shopping) no   Fall history within last six months yes   Change in Functional Status Since Onset of Current Illness/Injury yes   General Information   Onset of Illness/Injury or Date of Surgery 06/28/21   Referring Physician Wong Phan MD   Patient/Family Therapy Goal Statement (OT) \"I want to go home\"    Additional Occupational Profile Info/Pertinent History of Current Problem Per chart: Pt is a 70 year old male admitted with change in mental status and abrupt change in his behavior and found to be hyperglycemic with ketosis.   Performance Patterns (Routines, Roles, Habits) Pt reports indep in all ADLs and mobility tasks with no AD at baseline. Reports he was recently issued a walker, but \"I left it somewhere.\" Reports spouse assists with med mgmt, meal prep, etc. Pt assists spouse with slideboard transfers and personal cares (bathing, toileting, " "etc).    Existing Precautions/Restrictions fall   Cognitive Status Examination   Cognitive Status Comments Some confusion noted. Calm and appropriate. Insight into \"memory troubles.\"    Visual Perception   Visual Impairment/Limitations corrective lenses full-time   Sensory   Sensory Quick Adds No deficits were identified   Pain Assessment   Patient Currently in Pain No   Range of Motion Comprehensive   Comment, General Range of Motion BUEs WFL   Strength Comprehensive (MMT)   Comment, General Manual Muscle Testing (MMT) Assessment BUEs 4/5   Bed Mobility   Supine-Sit [place] (Bed Mobility) supervision   Sit-Supine [place] (Bed Mobility) supervision   Transfers   Transfers sit-stand transfer;toilet transfer   Sit-Stand Transfer   Sit-Stand [place] (Transfers) contact guard   Assistive Device (Sit-Stand Transfers) walker, front-wheeled   Toilet Transfer   [place] Level (Toilet Transfer) contact guard   Assistive Device (Toilet Transfer) walker, front-wheeled   Balance   Balance Comments Mild unsteadiness while in stance FWW level. Reports baseline balance deficit   Activities of Daily Living   BADL Assessment/Intervention lower body dressing;grooming   Lower Body Dressing Assessment/Training   [place] Level (Lower Body Dressing) minimum assist (75% patient effort)   Grooming Assessment/Training   [place] Level (Grooming) minimum assist (75% patient effort)   Instrumental Activities of Daily Living (IADL)   IADL Comments Spouse assists with IADLs    Clinical Impression   Criteria for Skilled Therapeutic Interventions Met (OT) yes;meets criteria;skilled treatment is necessary   OT Diagnosis Impaired ADLs, IADLs and mobility tasks   OT Problem List-Impairments impacting ADL problems related to;activity tolerance impaired;balance;cognition;strength   ADL comments/analysis Pt below baseline level of functioning in daily tasks   Assessment of Occupational Performance 5 or more Performance " Deficits   Identified Performance Deficits Bathing, dressing, grooming, toileting, transfers   Planned Therapy Interventions (OT) ADL retraining;cognition;strengthening;transfer training   Clinical Decision Making Complexity (OT) moderate complexity   Therapy Frequency (OT) Daily   Predicted Duration of Therapy 5 days   Risk & Benefits of therapy have been explained evaluation/treatment results reviewed;care plan/treatment goals reviewed;risks/benefits reviewed;current/potential barriers reviewed;participants voiced agreement with care plan;participants included;patient   OT Discharge Planning    OT Discharge Recommendation (DC Rec) Home with assist;home with home care occupational therapy;Transitional Care Facility   OT Rationale for DC Rec Chart indicates potential placement on July 1. If pt able to discharge to supportive living environment, recommend HH OT as HH would be indicated as leaving the home/facility environment would require significant effort at this time. If not discharging to more supportive living environment, would benefit from TCU.    Total Evaluation Time (Minutes)   Total Evaluation Time (Minutes) 8

## 2021-07-01 NOTE — PROGRESS NOTES
[facility]    Hospitalist Progress Note  Name: Matt Way    MRN: [id number]  Provider: Radha Renae MD  Date of Service: 07/01/2021    Assessment & Plan   Summary of Stay: Matt Way is a 70 year old male who was admitted on 6/28/2021 for altered mental status and hyperglycemia with ketosis. past medical history significant fall Lewy body dementia, coronary artery disease status post NSTEMI and stent placement in September 2020 on Plavix, history of PE on Xarelto, GERD, diabetes mellitus, medical noncompliance was brought to the emergency room with altered mental status and abrupt change in his behavior    Altered mental status  Metabolic encephalopathy in setting of underlyingLewy body dementia  Abrupt behavioral change  -Concern for progression of Lewy body dementia  -Noted sudden behavioral change with urinating all over the house and being angry and wandering in park  -Other metabolic/infectious no evidence of acute infection, does have hyperglycemia and ketosis   -Patient has a scheduled placement for dementia scheduled to move on July 1  -Supportive care  -As needed Seroquel  -CT head was done showed no acute bleeding.  Showed questionable clotting which prompted CTA neck.  CTA neck did not show any significant narrowing .        Hyperglycemia with ketosis: Resolved  History of diabetes mellitus noncompliant with treatment  -Suspect noncompliance.  Not taking his insulin or other meds  (on glargine and Metformin)  -Started on IV insulin drip initially now switched to subcu insulin  -On Levemir 20 units twice daily  -And premeal NovoLog 10 units.  Sliding scale insulin  -Replacing electrolytes.     History of coronary artery disease status post stent placement   -. Coronary artery disease involving the distal RCA with a previous proximal LAD stent and moderate circumflex disease.  2.  Successful CECILY deployed to the distal RCA lesion with a synergy 3 x 32 mm.  Residual stenosis of  0%.  -On dual platelet therapy for 12 months  -Patient is supposed to be on aspirin and Plavix (unclear if he is compliant)  -Continue aspirin Plavix beta-blocker nitro statins losartan      History of pulmonary embolism  -Patient's med list has Xarelto on it (unclear if he is currently taking it or not)  -On review of records it appears that patient should stop Xarelto as is treatment for pulmonary embolism was completed (noted on cardiology note from February 2021)  -Xarelto was discontinued     Progressive dementia  -Social work consult to help with safe placement  -Patient unable to manage at home  - working on TCU placement     Pseudohyponatremia secondary to hyperglycemia  -Serum sodium was 131  -Corrected sodium is about 139     GERD  -Continue PPI    Hypokalemia  - replace per protocol      DVT Prophylaxis: On Plavix and aspirin  Code Status: Full Code    Disposition: Expected discharge in 1 to 2 days once placement is available    Interval History   Assumed care reviewed chart.  Patient says he is not feeling well, he would like to go home.  He appears to be confused talks tangential.  Very limited review of systems due to his underlying mental status and cognitive issues.  Calm redirectable.  10 point review of systems was completed was otherwise negative    -Data reviewed today: I reviewed all new labs and imaging reports over the last 24 hours. I personally reviewed no images or EKG's today.    Physical Exam   Temp: 97.5  F (36.4  C) Temp src: Oral BP: 100/69 Pulse: 71   Resp: 18 SpO2: 99 % O2 Device: None (Room air)    Vitals:    06/29/21 0031 06/30/21 0709 07/01/21 0556   Weight: 81.2 kg (179 lb) 81.6 kg (179 lb 12.8 oz) 80.8 kg (178 lb 1.6 oz)     Vital Signs with Ranges  Temp:  [97.4  F (36.3  C)-97.5  F (36.4  C)] 97.5  F (36.4  C)  Pulse:  [67-77] 71  Resp:  [16-18] 18  BP: ()/(59-78) 100/69  SpO2:  [99 %] 99 %  I/O last 3 completed shifts:  In: 880 [P.O.:880]  Out: 600  [Urine:600]      GEN:  Alert, confused.  appears comfortable, no overt distress  HEENT:  Normocephalic/atraumatic, no scleral icterus, no nasal discharge, mouth dry  CV:  Regular rate and rhythm, no murmur or JVD.  S1 + S2 noted, no S3 or S4.  LUNGS:  Clear to auscultation bilaterally without rales/rhonchi/wheezing/retractions.  Symmetric chest rise on inhalation noted.  ABD:  Active bowel sounds, soft, non-tender/non-distended.  No rebound/guarding/rigidity.  EXT:  No edema.  No cyanosis.  No joint synovitis noted.  SKIN:  Dry to touch, no exanthems noted in the visualized areas.  NEURO: Moving all extremities    Medications     dextrose       - MEDICATION INSTRUCTIONS -         aspirin  81 mg Oral Daily     atorvastatin  80 mg Oral Daily     clopidogrel  75 mg Oral Daily     insulin aspart  1-7 Units Subcutaneous TID AC     insulin aspart  1-5 Units Subcutaneous At Bedtime     insulin aspart  10 Units Subcutaneous TID w/meals     insulin detemir  20 Units Subcutaneous BID     metFORMIN  500 mg Oral BID w/meals     metoprolol tartrate  25 mg Oral BID     omeprazole  20 mg Oral Daily     potassium chloride  40 mEq Oral Once     rivastigmine  4.6 mg Transdermal Daily     rivastigmine   Transdermal Q8H     sodium chloride (PF)  3 mL Intracatheter Q8H     sodium chloride (PF)  3 mL Intracatheter Q8H     Data     Recent Labs   Lab 06/28/21 1937   WBC 6.4   HGB 15.5   HCT 46.7   MCV 83        Recent Labs   Lab 07/01/21  0610 06/30/21  1240 06/30/21  0616 06/29/21  0552 06/28/21  1937 06/28/21 1937   NA  --   --   --  140  --  131*   POTASSIUM 3.3* 3.8 3.1* 3.5   < > 4.4   CHLORIDE  --   --   --  108  --  97   CO2  --   --   --  28  --  24   ANIONGAP  --   --   --  4  --  10   GLC  --   --   --  105*  --  551*   BUN  --   --   --  28  --  34*   CR  --   --   --  0.84  --  1.05   GFRESTIMATED  --   --   --  90  --  72   GFRESTBLACK  --   --   --  >90  --  84   CHRISTIE  --   --   --  9.1  --  9.5    < > = values in  this interval not displayed.     No results for input(s): CULT in the last 168 hours.  No results for input(s): NTBNPI, NTBNP in the last 168 hours.  Recent Labs   Lab 06/28/21 1937   AST 12   ALT 32   ALKPHOS 162*   BILITOTAL 0.6     No results for input(s): INR in the last 168 hours.  Recent Labs   Lab 06/28/21 1938   LACT 1.4     No results for input(s): CHOL, HDL, LDL, TRIG, CHOLHDLRATIO in the last 168 hours.  Recent Labs   Lab 06/28/21 1937   TROPI <0.015     Recent Labs   Lab 06/28/21 1938   COLOR Straw   APPEARANCE Clear   URINEGLC >1000*   URINEBILI Negative   URINEKETONE 40*   SG 1.015   UBLD Negative   URINEPH 5.0   PROTEIN Negative   NITRITE Negative   LEUKEST Negative   RBCU 0   WBCU <1       No results found for this or any previous visit (from the past 24 hour(s)).

## 2021-07-01 NOTE — PLAN OF CARE
RN - [id number] - A&O to self only. Denying pain. Vitals stable. Pt up with SBA in room. Tolerating PO intake well. Blood glucose premeal 70 - receiving no pre meal coverage per provider order. Voiding adequately in toilet. Pt cooperative and redirectable with cares although adamantly refusing potassium recheck - plan to defer to routine morning draw. Pt hopeful for TCU placement in upcoming days.

## 2021-07-01 NOTE — PLAN OF CARE
Disoriented to time only. Confused and forgetful at times but easily redirectable. Up with SBA with walker. VSS. Denies pain. Discontinued Tele per MD. Diabetic diet w/ good appetite.  and 124. K replaced, recheck ordered in this afternoon. PIV SL. PT/OT following. SW following for discharge placement. Will continue monitor.

## 2021-07-01 NOTE — PROVIDER NOTIFICATION
This writer spoke with Dr. Renae order received to hold meal time 10units of insulin for a BS of 70. Information given to Primary RN Lee

## 2021-07-01 NOTE — PROVIDER NOTIFICATION
0945AM: MD paged: Pt. refusing to wear tele monitor. Tele: SR.  Is it okay to be off from the monitor? Thanks.     10:00AM: Discontinued Tele monitoring per verbal order of Dr. Renae.

## 2021-07-01 NOTE — PLAN OF CARE
VSS. Disoriented to time, place and situation, cooperative. Denies pain. , 154. Up Ax1 with a walker and gait belt, ambulated halls x2. Tele - SR. K recheck this AM. PIV to right upper arm kinked and half out so discontinued. PIV to right hand intact and SL. PT/OT following. SW following for discharge placement. Will continue POC.

## 2021-07-01 NOTE — PROGRESS NOTES
Care Management Follow Up    Length of Stay (days): 3    Expected Discharge Date: 07/01/21     Concerns to be Addressed: discharge planning     Patient plan of care discussed at interdisciplinary rounds: Yes    Anticipated Discharge Disposition: Transitional Care, Skilled Nursing Facilty     Anticipated Discharge Services: None  Anticipated Discharge DME: (TBD)    Patient/family educated on Medicare website which has current facility and service quality ratings: yes  Education Provided on the Discharge Plan:  yes  Patient/Family in Agreement with the Plan: yes    Referrals Placed by CM/SW: Post Acute Facilities  Private pay costs discussed: private room/amenity fees and transportation costs    Additional Information:  [facility] and [facility] declined the pt due to not having an appropriate bed available for the pt.    Sw left a  for Lilly at [facility], [phone], requesting a call back to follow-up on the TCU referral that was sent.    Sw will continue with discharge planning and will be available as needed until discharge.    FIGUEROA Vicente, [facility]  Inpatient Care Coordination  [facility]  [phone]

## 2021-07-01 NOTE — CONSULTS
CLINICAL NUTRITION SERVICES  -  ASSESSMENT NOTE    Recommendations Ordered by Registered Dietitian (RD):   Continue diet as ordered   Ordered Ensure Enlive BID    Malnutrition:   % Weight Loss:  > 10% in 6 months (severe malnutrition)  % Intake: unable to determine - no nutrition history   Subcutaneous Fat Loss:  Orbital region moderate depletion and Upper arm region moderate depletion  Muscle Loss:  Temporal region mild-moderate depletion, Clavicle bone region mild-moderate depletion, Acromion bone region mild-moderate depletion, Scapular bone region mild depletion, Anterior thigh region mild-moderate depletion and Posterior calf region mild depletion  Fluid Retention:  None noted    Malnutrition Diagnosis: Severe malnutrition  In Context of:  Acute illness or injury  Chronic illness or disease     REASON FOR ASSESSMENT  Matt Way is a 70 year old male seen by Registered Dietitian for Admission Nutrition Risk Screen for positive    NUTRITION HISTORY  - Information obtained from chart, of note pt confused and forgetful at times. Disoriented to time.   - Patient admitted for Altered mental status, Lewy body dementia and Abrupt behavioral change  - History of Lewy body dementia, coronary artery disease status post NSTEMI and stent placement in September 2020 on Plavix, history of PE on Xarelto, GERD, diabetes mellitus, medical noncompliance   - Unable to obtain nutrition history from patient, see above   - Food allergies: NKFA     CURRENT NUTRITION ORDERS  Diet Order:     Moderate Consistent Carbohydrate and Low Saturated Fat/2400 mg Sodium     Current Intake/Tolerance:  Upon review of the flowsheets, pt is consuming 100% of meals ordered. Ordering meals TID.     NUTRITION FOCUSED PHYSICAL ASSESSMENT FOR DIAGNOSING MALNUTRITION)  Yes    Observed:    Muscle wasting (refer to documentation in Malnutrition section) and Subcutaneous fat loss (refer to documentation in Malnutrition section)    Obtained from  "Chart/Interdisciplinary Team:  - PT, OT and pharmacy department were consulted     ANTHROPOMETRICS  Height: 6' 1\"  Weight: 80.8 kg ( 178 lbs 1.6 oz)  Body mass index is 23.5 kg/m .  Weight Status:  Normal BMI  Weight History: weight is down 10.3 kg in 5 months, this equates to a weight loss of 11%.   Wt Readings from Last 10 Encounters:   07/01/21 80.8 kg (178 lb 1.6 oz)   02/09/21 91.1 kg (200 lb 14.4 oz)   12/18/20 96.2 kg (212 lb)   09/23/20 94.3 kg (208 lb)   09/16/20 95.3 kg (210 lb)   09/03/20 92.5 kg (204 lb)   08/28/20 94.8 kg (209 lb)   02/17/20 82.6 kg (182 lb)   07/26/19 95.8 kg (211 lb 4.8 oz)   07/15/19 93.6 kg (206 lb 6.4 oz)     LABS  Labs reviewed    Labs:  Electrolytes  Potassium (mmol/L)   Date Value   07/01/2021 3.3 (L)   06/30/2021 3.8   06/30/2021 3.1 (L)     Phosphorus (mg/dL)   Date Value   10/03/2018 3.4    Blood Glucose  Glucose (mg/dL)   Date Value   06/29/2021 105 (H)   06/28/2021 551 (HH)   02/06/2021 263 (H)   01/06/2021 103 (A)   09/24/2020 328 (H)     Hemoglobin A1C (%)   Date Value   06/29/2021 12.5 (H)   10/03/2018 9.9 (H)   06/08/2018 7.7 (H)    Inflammatory Markers  WBC (10e9/L)   Date Value   06/28/2021 6.4   02/06/2021 6.7   09/23/2020 7.2     Albumin (g/dL)   Date Value   06/28/2021 3.7   02/06/2021 3.3 (L)   08/28/2020 3.3 (L)      Magnesium (mg/dL)   Date Value   07/01/2021 2.1   06/30/2021 2.0   06/29/2021 2.3     Sodium (mmol/L)   Date Value   06/29/2021 140   06/28/2021 131 (L)   02/06/2021 137    Renal  Urea Nitrogen (mg/dL)   Date Value   06/29/2021 28   06/28/2021 34 (H)   02/06/2021 30     Creatinine (mg/dL)   Date Value   06/29/2021 0.84   06/28/2021 1.05   02/06/2021 1.62 (H)     Additional  Triglycerides (mg/dL)   Date Value   09/23/2020 206 (H)   09/03/2020 246 (H)   10/18/2018 215 (H)     Ketones Urine (mg/dL)   Date Value   06/28/2021 40 (A)        MEDICATIONS  Medications reviewed    aspirin  81 mg Oral Daily     atorvastatin  80 mg Oral Daily     clopidogrel  75 " mg Oral Daily     insulin aspart  1-7 Units Subcutaneous TID AC     insulin aspart  1-5 Units Subcutaneous At Bedtime     insulin aspart  10 Units Subcutaneous TID w/meals     insulin detemir  20 Units Subcutaneous BID     metFORMIN  500 mg Oral BID w/meals     metoprolol tartrate  25 mg Oral BID     omeprazole  20 mg Oral Daily     rivastigmine  4.6 mg Transdermal Daily     rivastigmine   Transdermal Q8H     sodium chloride (PF)  3 mL Intracatheter Q8H     sodium chloride (PF)  3 mL Intracatheter Q8H        dextrose       - MEDICATION INSTRUCTIONS -        acetaminophen, dextrose, glucose **OR** dextrose **OR** glucagon, hydrALAZINE, lidocaine 4%, lidocaine 4%, lidocaine (buffered or not buffered), lidocaine (buffered or not buffered), melatonin, nitroGLYcerin, - MEDICATION INSTRUCTIONS -, polyethylene glycol, sennosides, sodium chloride (PF), sodium chloride (PF), sodium chloride (PF)     ASSESSED NUTRITION NEEDS PER APPROVED PRACTICE GUIDELINES:    Dosing Weight 80.8 kg   Estimated Energy Needs: 0341-6317 kcals (25-30 Kcal/Kg)  Justification: maintenance  Estimated Protein Needs:  grams protein (1.2-1.5 g pro/Kg)  Justification: preservation of lean body mass  Estimated Fluid Needs: per MD     MALNUTRITION:  % Weight Loss:  > 10% in 6 months (severe malnutrition)  % Intake: unable to determine - no nutrition history   Subcutaneous Fat Loss:  Orbital region moderate depletion and Upper arm region moderate depletion  Muscle Loss:  Temporal region mild-moderate depletion, Clavicle bone region mild-moderate depletion, Acromion bone region mild-moderate depletion, Scapular bone region mild depletion, Anterior thigh region mild-moderate depletion and Posterior calf region mild depletion  Fluid Retention:  None noted    Malnutrition Diagnosis: Severe malnutrition  In Context of:  Acute illness or injury  Chronic illness or disease    NUTRITION DIAGNOSIS:  Predicted inadequate nutrient intake (protein/energy)  related to potential for PO intake to decline pending clinical course and LOS     NUTRITION INTERVENTIONS  Recommendations / Nutrition Prescription  Continue diet as ordered   Ordered Ensure Enlive BID     Implementation  Nutrition education: Not appropriate at this time due to patient condition  Medical Food Supplement: as above  Collaboration and Referral of Nutrition care: discussed pt during interdisciplinary rounds this morning     Nutrition Goals  Pt to consume >/=75% of meals ordered TID     MONITORING AND EVALUATION:  Progress towards goals will be monitored and evaluated per protocol and Practice Guidelines    Marbella Becerril, RD, LD  Clinical Dietitian

## 2021-07-02 NOTE — PLAN OF CARE
Disoriented to time only. Confused and forgetful at times but easily redirectable. Up with SBA with walker. VSS. Denies pain. Diabetic diet w/ good appetite. BGL dropped to 64 in this morning, 2 apple juice given, recheck . MD ordered to reduce nighttime Levemir to 12 units continue morning time 20 units and Metformin discontinued. K protocol, K 4.1. PIV SL. PT/OT following. SW following for discharge placement. Will continue monitor.

## 2021-07-02 NOTE — PLAN OF CARE
1900-2330H    VS stable. A&Ox3-4. Denies any pain. Up SBA, ambulated to hallway. HS BG 151mg/dl. Patient refused potassium draw, deferred to AM draw. Plan for TCU discharge

## 2021-07-02 NOTE — PROVIDER NOTIFICATION
MD paged: Pt. BGL 64, Novolog & Levemir not given. Had 2 apple juices will recheck BGL. Do you want to hold Metformin? Thanks    MD ordered to reduce nighttime Levemir to 12 units continue morning time 20 units and Metformin discontinued.

## 2021-07-02 NOTE — PROGRESS NOTES
[facility]    Hospitalist Progress Note  Name: Matt Way    MRN: [id number]  Provider: Radha Renae MD  Date of Service: 07/02/2021    Assessment & Plan   Summary of Stay: Matt Way is a 70 year old male who was admitted on 6/28/2021 for altered mental status and hyperglycemia with ketosis. past medical history significant fall Lewy body dementia, coronary artery disease status post NSTEMI and stent placement in September 2020 on Plavix, history of PE on Xarelto, GERD, diabetes mellitus, medical noncompliance was brought to the emergency room with altered mental status and abrupt change in his behavior    Patient was hypoglycemic today.  Glucose was down to 67 insulin adjusted nighttime insulin reduced    Altered mental status  Metabolic encephalopathy in setting of underlyingLewy body dementia  Abrupt behavioral change : Improved  -Concern for progression of Lewy body dementia  -Noted sudden behavioral change with urinating all over the house and being angry and wandering in park  -Other metabolic/infectious no evidence of acute infection, does have hyperglycemia and ketosis   -Patient has a scheduled placement for dementia scheduled to move on July 1  -Supportive care  -As needed Seroquel  -CT head was done showed no acute bleeding.  Showed questionable clotting which prompted CTA neck.  CTA neck did not show any significant narrowing .        Hyperglycemia with ketosis: Resolved  History of diabetes mellitus noncompliant with treatment  Episode of Hypoglycemia 7-2-21  -Suspect noncompliance.  Not taking his insulin or other meds  (on glargine and Metformin)  -Started on IV insulin drip initially now switched to subcu insulin  -Reduce nighttime Levemir to 12 units continue morning time 20 units  -And premeal coverage 1 unit per carb unit  -Replacing electrolytes.     History of coronary artery disease status post stent placement   -. Coronary artery disease involving the distal RCA with  a previous proximal LAD stent and moderate circumflex disease.  2.  Successful CECILY deployed to the distal RCA lesion with a synergy 3 x 32 mm.  Residual stenosis of 0%.  -On dual platelet therapy for 12 months  -Patient is supposed to be on aspirin and Plavix (unclear if he is compliant)  -Continue aspirin Plavix beta-blocker nitro statins losartan      History of pulmonary embolism  -Patient's med list has Xarelto on it (unclear if he is currently taking it or not)  -On review of records it appears that patient should stop Xarelto as is treatment for pulmonary embolism was completed (noted on cardiology note from February 2021)  -Xarelto was discontinued     Progressive dementia  -Social work consult to help with safe placement  -Patient unable to manage at home  - working on TCU placement     Pseudohyponatremia secondary to hyperglycemia  -Serum sodium was 131  -Corrected sodium is about 139     GERD  -Continue PPI    Hypokalemia  - replace per protocol      DVT Prophylaxis: On Plavix and aspirin  Code Status: Full Code    Disposition: Expected discharge in 1 to 2 days once placement is available    Interval History   Patient was noted to be hypoglycemic earlier today did not have any symptoms but sitting up in chair offers no complaints.  Calm redirectable.  10 point review of systems was completed was otherwise negative    -Data reviewed today: I reviewed all new labs and imaging reports over the last 24 hours. I personally reviewed no images or EKG's today.    Physical Exam   Temp: 97.3  F (36.3  C) Temp src: Oral BP: 100/88 Pulse: 91   Resp: 18 SpO2: 99 % O2 Device: None (Room air)    Vitals:    06/29/21 0031 06/30/21 0709 07/01/21 0556   Weight: 81.2 kg (179 lb) 81.6 kg (179 lb 12.8 oz) 80.8 kg (178 lb 1.6 oz)     Vital Signs with Ranges  Temp:  [97.3  F (36.3  C)-98.3  F (36.8  C)] 97.3  F (36.3  C)  Pulse:  [68-91] 91  Resp:  [16-18] 18  BP: (100-110)/(72-88) 100/88  SpO2:  [96 %-99 %] 99  %  I/O last 3 completed shifts:  In: 360 [P.O.:360]  Out: -       GEN:  Alert, confused.  appears comfortable, no overt distress  HEENT:  Normocephalic/atraumatic, no scleral icterus, no nasal discharge, mouth dry  CV:  Regular rate and rhythm, no murmur or JVD.  S1 + S2 noted, no S3 or S4.  LUNGS:  Clear to auscultation bilaterally without rales/rhonchi/wheezing/retractions.  Symmetric chest rise on inhalation noted.  ABD:  Active bowel sounds, soft, non-tender/non-distended.  No rebound/guarding/rigidity.  EXT:  No edema.  No cyanosis.  No joint synovitis noted.  SKIN:  Dry to touch, no exanthems noted in the visualized areas.  NEURO: Moving all extremities    Medications     dextrose       - MEDICATION INSTRUCTIONS -         aspirin  81 mg Oral Daily     atorvastatin  80 mg Oral Daily     clopidogrel  75 mg Oral Daily     insulin aspart  1-7 Units Subcutaneous TID AC     insulin aspart  1-5 Units Subcutaneous At Bedtime     insulin aspart  10 Units Subcutaneous TID w/meals     insulin detemir  20 Units Subcutaneous BID     metFORMIN  500 mg Oral BID w/meals     metoprolol tartrate  25 mg Oral BID     omeprazole  20 mg Oral Daily     rivastigmine  4.6 mg Transdermal Daily     rivastigmine   Transdermal Q8H     sodium chloride (PF)  3 mL Intracatheter Q8H     sodium chloride (PF)  3 mL Intracatheter Q8H     Data     Recent Labs   Lab 06/28/21 1937   WBC 6.4   HGB 15.5   HCT 46.7   MCV 83        Recent Labs   Lab 07/01/21  0610 06/30/21  1240 06/30/21  0616 06/29/21  0552 06/28/21 1937 06/28/21 1937   NA  --   --   --  140  --  131*   POTASSIUM 3.3* 3.8 3.1* 3.5   < > 4.4   CHLORIDE  --   --   --  108  --  97   CO2  --   --   --  28  --  24   ANIONGAP  --   --   --  4  --  10   GLC  --   --   --  105*  --  551*   BUN  --   --   --  28  --  34*   CR  --   --   --  0.84  --  1.05   GFRESTIMATED  --   --   --  90  --  72   GFRESTBLACK  --   --   --  >90  --  84   CHRISTIE  --   --   --  9.1  --  9.5    < > =  values in this interval not displayed.     No results for input(s): CULT in the last 168 hours.  No results for input(s): NTBNPI, NTBNP in the last 168 hours.  Recent Labs   Lab 06/28/21 1937   AST 12   ALT 32   ALKPHOS 162*   BILITOTAL 0.6     No results for input(s): INR in the last 168 hours.  Recent Labs   Lab 06/28/21 1938   LACT 1.4     No results for input(s): CHOL, HDL, LDL, TRIG, CHOLHDLRATIO in the last 168 hours.  Recent Labs   Lab 06/28/21 1937   TROPI <0.015     Recent Labs   Lab 06/28/21 1938   COLOR Straw   APPEARANCE Clear   URINEGLC >1000*   URINEBILI Negative   URINEKETONE 40*   SG 1.015   UBLD Negative   URINEPH 5.0   PROTEIN Negative   NITRITE Negative   LEUKEST Negative   RBCU 0   WBCU <1       No results found for this or any previous visit (from the past 24 hour(s)).

## 2021-07-02 NOTE — PROGRESS NOTES
Care Management Follow Up    Length of Stay (days): 4    Expected Discharge Date: 07/03/21     Concerns to be Addressed: discharge planning     Patient plan of care discussed at interdisciplinary rounds: Yes    Anticipated Discharge Disposition: Transitional Care, Skilled Nursing Facilty     Anticipated Discharge Services: None  Anticipated Discharge DME: (TBD)    Patient/family educated on Medicare website which has current facility and service quality ratings: yes  Education Provided on the Discharge Plan:  yes  Patient/Family in Agreement with the Plan: yes    Referrals Placed by CM/SW: Post Acute Facilities  Private pay costs discussed: private room/amenity fees and transportation costs    Additional Information:  Kaitlynn spoke with the pt's dtr Uma regarding the bed availability for the pt.  Sw updated her that they are awaiting a call from [facility] to discuss bed availability.      [facility] may not have a bed at this time, but kaitlynn is to follow-up with them tomorrow to see if bed availability changed.    Sw will continue with discharge planning and will be available as needed until discharge.      FIGUEROA Vicente, [facility]  Inpatient Care Coordination  [facility]  [phone]

## 2021-07-03 NOTE — PLAN OF CARE
VSS. SPO2 93-97% RA. LS: clear. Afebrile. Denies pain. Disoriented to time only, Pt present  but forgetful & confused at times. Up A1 GB. Pt showered at this shift and had multiple hallway walks with staff. &253 coverage given. Pt has no IV access at this time. PT/OT following. Pt to discharge to TCU when bed available.

## 2021-07-03 NOTE — PLAN OF CARE
VSS. Pt denies pain. LS clear. No CP or SOB reported.  this shift. Pt alert to self and place. Disoriented to date, time, and situation. Very cooperative. Occasional difficult word finding. Unable to sleep much this shift. Walked halls with writer. Plan to discharge to TCU when bed found.

## 2021-07-03 NOTE — PROGRESS NOTES
[facility]    Hospitalist Progress Note  Name: Matt Way    MRN: [id number]  Provider: Radha Renae MD  Date of Service: 07/03/2021    Assessment & Plan   Summary of Stay: Matt Way is a 70 year old male who was admitted on 6/28/2021 for altered mental status and hyperglycemia with ketosis. past medical history significant fall Lewy body dementia, coronary artery disease status post NSTEMI and stent placement in September 2020 on Plavix, history of PE on Xarelto, GERD, diabetes mellitus, medical noncompliance was brought to the emergency room with altered mental status and abrupt change in his behavior    Patient was hypoglycemic7/2  Glucose was down to 67 insulin adjusted nighttime insulin reduced.  We will continue carb counting and coverage as necessary    Altered mental status  Metabolic encephalopathy in setting of underlyingLewy body dementia  Abrupt behavioral change : Improved  -Concern for progression of Lewy body dementia  -Noted sudden behavioral change with urinating all over the house and being angry and wandering in park  -Other metabolic/infectious no evidence of acute infection, does have hyperglycemia and ketosis   -Patient has a scheduled placement for dementia scheduled to move on July 1  -Supportive care  -As needed Seroquel  -CT head was done showed no acute bleeding.  Showed questionable clotting which prompted CTA neck.  CTA neck did not show any significant narrowing .        Hyperglycemia with ketosis: Resolved  History of diabetes mellitus noncompliant with treatment  Episode of Hypoglycemia 7-2-21  -Suspect noncompliance.  Not taking his insulin or other meds  (on glargine and Metformin)  -Started on IV insulin drip initially now switched to subcu insulin  -Reduce nighttime Levemir to 12 units continue morning time 20 units  -And premeal coverage 1 unit per carb unit **  -hyperglycemia 2 hours after meal noted.  Most likely mealtime coverage will address  hyperglycemic episodes  -Replacing electrolytes.     History of coronary artery disease status post stent placement   -. Coronary artery disease involving the distal RCA with a previous proximal LAD stent and moderate circumflex disease.  2.  Successful CECILY deployed to the distal RCA lesion with a synergy 3 x 32 mm.  Residual stenosis of 0%.  -On dual platelet therapy for 12 months  -Patient is supposed to be on aspirin and Plavix (unclear if he is compliant)  -Continue aspirin Plavix beta-blocker nitro statins losartan      History of pulmonary embolism  -Patient's med list has Xarelto on it (unclear if he is currently taking it or not)  -On review of records it appears that patient should stop Xarelto as is treatment for pulmonary embolism was completed (noted on cardiology note from February 2021)  -Xarelto was discontinued     Progressive dementia  -Social work consult to help with safe placement  -Patient unable to manage at home  - working on TCU placement     Pseudohyponatremia secondary to hyperglycemia  -Serum sodium was 131  -Corrected sodium is about 139     GERD  -Continue PPI    Hypokalemia  - replace per protocol      DVT Prophylaxis: On Plavix and aspirin  Code Status: Full Code    Disposition: Expected discharge pending placement    Interval History   Reviewed chart.  Patient was seen.  Appeared calm and happy his wife was at bedside denied any chest pain shortness of breath.  More than 10 point review of systems was completed was otherwise negative  10 point review of systems was completed was otherwise negative    -Data reviewed today: I reviewed all new labs and imaging reports over the last 24 hours. I personally reviewed no images or EKG's today.    Physical Exam   Temp: 97.8  F (36.6  C) Temp src: Oral BP: 128/82 Pulse: 65   Resp: 18 SpO2: 97 % O2 Device: None (Room air)    Vitals:    06/30/21 0709 07/01/21 0556 07/03/21 0458   Weight: 81.6 kg (179 lb 12.8 oz) 80.8 kg (178 lb 1.6  oz) 82.6 kg (182 lb 3.2 oz)     Vital Signs with Ranges  Temp:  [97.3  F (36.3  C)-98.5  F (36.9  C)] 97.8  F (36.6  C)  Pulse:  [65-98] 65  Resp:  [18-20] 18  BP: (108-129)/(74-83) 128/82  SpO2:  [97 %-100 %] 97 %  I/O last 3 completed shifts:  In: 720 [P.O.:720]  Out: -       GEN:  Alert, confused.  appears comfortable, no overt distress  HEENT:  Normocephalic/atraumatic, no scleral icterus, no nasal discharge, mouth dry  CV:  Regular rate and rhythm, no murmur or JVD.  S1 + S2 noted, no S3 or S4.  LUNGS:  Clear to auscultation bilaterally without rales/rhonchi/wheezing/retractions.  Symmetric chest rise on inhalation noted.  ABD:  Active bowel sounds, soft, non-tender/non-distended.  No rebound/guarding/rigidity.  EXT:  No edema.  No cyanosis.  No joint synovitis noted.  SKIN:  Dry to touch, no exanthems noted in the visualized areas.  NEURO: Moving all extremities    Medications     dextrose       - MEDICATION INSTRUCTIONS -         aspirin  81 mg Oral Daily     atorvastatin  80 mg Oral Daily     clopidogrel  75 mg Oral Daily     insulin aspart   Subcutaneous TID w/meals     insulin aspart  1-7 Units Subcutaneous TID AC     insulin aspart  1-5 Units Subcutaneous At Bedtime     insulin detemir  12 Units Subcutaneous At Bedtime     insulin detemir  20 Units Subcutaneous QAM AC     metoprolol tartrate  25 mg Oral BID     omeprazole  20 mg Oral Daily     rivastigmine  4.6 mg Transdermal Daily     rivastigmine   Transdermal Q8H     sodium chloride (PF)  3 mL Intracatheter Q8H     sodium chloride (PF)  3 mL Intracatheter Q8H     Data     Recent Labs   Lab 06/28/21 1937   WBC 6.4   HGB 15.5   HCT 46.7   MCV 83        Recent Labs   Lab 07/03/21  0637 07/02/21  0908 07/01/21  0610 06/29/21  0552 06/29/21  0552 06/28/21 1937 06/28/21 1937   NA  --   --   --   --  140  --  131*   POTASSIUM 4.0 4.1 3.3*   < > 3.5   < > 4.4   CHLORIDE  --   --   --   --  108  --  97   CO2  --   --   --   --  28  --  24   ANIONGAP   --   --   --   --  4  --  10   GLC  --   --   --   --  105*  --  551*   BUN  --   --   --   --  28  --  34*   CR  --  0.90  --   --  0.84  --  1.05   GFRESTIMATED  --  87  --   --  90  --  72   GFRESTBLACK  --  >90  --   --  >90  --  84   CHRISTIE  --   --   --   --  9.1  --  9.5    < > = values in this interval not displayed.     No results for input(s): CULT in the last 168 hours.  No results for input(s): NTBNPI, NTBNP in the last 168 hours.  Recent Labs   Lab 06/28/21 1937   AST 12   ALT 32   ALKPHOS 162*   BILITOTAL 0.6     No results for input(s): INR in the last 168 hours.  Recent Labs   Lab 06/28/21 1938   LACT 1.4     No results for input(s): CHOL, HDL, LDL, TRIG, CHOLHDLRATIO in the last 168 hours.  Recent Labs   Lab 06/28/21 1937   TROPI <0.015     Recent Labs   Lab 06/28/21 1938   COLOR Straw   APPEARANCE Clear   URINEGLC >1000*   URINEBILI Negative   URINEKETONE 40*   SG 1.015   UBLD Negative   URINEPH 5.0   PROTEIN Negative   NITRITE Negative   LEUKEST Negative   RBCU 0   WBCU <1       No results found for this or any previous visit (from the past 24 hour(s)).

## 2021-07-03 NOTE — PLAN OF CARE
A&OX4. Forgetful at times, but easily redirectable. Up with SBA.  VSS. Denies pain. Diabetic diet w/ good appetite. ,391 and 437. K protocol, K 4.0. No IV access. PT/OT following. SW following for discharge placement. Will continue monitor.

## 2021-07-04 NOTE — PLAN OF CARE
A&OX4. Forgetful at times, but easily redirectable. Up with SBA.  VSS. Denies pain. Diabetic diet w/ good appetite.  & 286. PT/OT following. SW following for discharge placement. Will continue monitor.

## 2021-07-04 NOTE — PROGRESS NOTES
[facility]    Hospitalist Progress Note  Name: Matt Way    MRN: [id number]  Provider: Radha Renae MD  Date of Service: 07/04/2021    Assessment & Plan   Summary of Stay: Matt Way is a 70 year old male who was admitted on 6/28/2021 for altered mental status and hyperglycemia with ketosis. past medical history significant fall Lewy body dementia, coronary artery disease status post NSTEMI and stent placement in September 2020 on Plavix, history of PE on Xarelto, GERD, diabetes mellitus, medical noncompliance was brought to the emergency room with altered mental status and abrupt change in his behavior    Patient was hypoglycemic7/2  Glucose was down to 67 insulin adjusted nighttime insulin reduced.  We will continue carb counting and coverage as necessary     7/4/2021: Overnight patient was agitated and restless worse not able to get his medication even missed his nightly insulin.  Required Seroquel.  Is calm this morning.  Was able to get all his meds today.  His blood glucose has been erratic due to missed medications and also food brought from home.    Patient has a place for long-term placement.  Can be discharged once bed is available.  Will need to adjust diabetes medication and intermittently treat agitation      Altered mental status  Metabolic encephalopathy in setting of underlyingLewy body dementia  Abrupt behavioral change : Improved  -Concern for progression of Lewy body dementia  -Noted sudden behavioral change with urinating all over the house and being angry and wandering in park  -Other metabolic/infectious no evidence of acute infection, does have hyperglycemia and ketosis   -Patient has a scheduled placement for dementia scheduled to move on July 1  -Supportive care  -As needed Seroquel  -CT head was done showed no acute bleeding.  Showed questionable clotting which prompted CTA neck.  CTA neck did not show any significant narrowing .        Hyperglycemia with ketosis:  Resolved  History of diabetes mellitus noncompliant with treatment  Episode of Hypoglycemia 7-2-21  Uncontrolled blood glucose due to erratic p.o. intake and missed nighttime Lantus  -Suspect noncompliance.  Not taking his insulin or other meds  (on glargine and Metformin)  -Started on IV insulin drip initially now switched to subcu insulin  -Reduce nighttime Levemir to 12 units continue morning time 20 units  -And premeal coverage 1 unit per carb unit   -hyperglycemia 2 hours after meal noted.  Most likely mealtime coverage will address hyperglycemic episodes  -Replacing electrolytes.  -Patient missed nighttime Lantus and had excessive amount of fruits.  We will not make any significant changes in long-term insulin coverage.  We will adjust sliding scale based on carb intake     History of coronary artery disease status post stent placement   -. Coronary artery disease involving the distal RCA with a previous proximal LAD stent and moderate circumflex disease.  2.  Successful CECILY deployed to the distal RCA lesion with a synergy 3 x 32 mm.  Residual stenosis of 0%.  -On dual platelet therapy for 12 months  -Patient is supposed to be on aspirin and Plavix (unclear if he is compliant)  -Continue aspirin Plavix beta-blocker nitro statins losartan      History of pulmonary embolism  -Patient's med list has Xarelto on it (unclear if he is currently taking it or not)  -On review of records it appears that patient should stop Xarelto as is treatment for pulmonary embolism was completed (noted on cardiology note from February 2021)  -Xarelto was discontinued     Progressive dementia  -Social work consult to help with safe placement  -Patient unable to manage at home  - working on TCU placement     Pseudohyponatremia secondary to hyperglycemia  -Serum sodium was 131  -Corrected sodium is about 139     GERD  -Continue PPI    Hypokalemia  - replace per protocol      DVT Prophylaxis: On Plavix and aspirin  Code  Status: Full Code    Disposition: Expected discharge pending placement    Interval History   Assumed care reviewed overnight issues.  Patient was sleepy today got Seroquel last night was agitated did not sleep much at night per report.  Was able to tolerate his breakfast and then was napping.  Unable to do review of systems as patient was asleep    -Data reviewed today: I reviewed all new labs and imaging reports over the last 24 hours. I personally reviewed no images or EKG's today.    Physical Exam   Temp: 98.2  F (36.8  C) Temp src: Oral BP: 125/76 Pulse: 81   Resp: 20 SpO2: 98 % O2 Device: None (Room air)    Vitals:    07/01/21 0556 07/03/21 0458 07/04/21 0630   Weight: 80.8 kg (178 lb 1.6 oz) 82.6 kg (182 lb 3.2 oz) 82.7 kg (182 lb 4.8 oz)     Vital Signs with Ranges  Temp:  [97.4  F (36.3  C)-98.2  F (36.8  C)] 98.2  F (36.8  C)  Pulse:  [67-81] 81  Resp:  [18-20] 20  BP: (124-140)/(68-77) 125/76  SpO2:  [97 %-98 %] 98 %  I/O last 3 completed shifts:  In: 560 [P.O.:560]  Out: -       GEN: Sleepy no overt distress  HEENT:  Normocephalic/atraumatic, no scleral icterus, no nasal discharge, mouth dry  CV:  Regular rate and rhythm, no murmur or JVD.  S1 + S2 noted, no S3 or S4.  LUNGS:  Clear to auscultation bilaterally without rales/rhonchi/wheezing/retractions.  Symmetric chest rise on inhalation noted.  ABD:  Active bowel sounds, soft, non-tender/non-distended.  No rebound/guarding/rigidity.  EXT:  No edema.  No cyanosis.  No joint synovitis noted.  SKIN:  Dry to touch, no exanthems noted in the visualized areas.  NEURO: Moving all extremities    Medications     dextrose       - MEDICATION INSTRUCTIONS -         aspirin  81 mg Oral Daily     atorvastatin  80 mg Oral Daily     clopidogrel  75 mg Oral Daily     insulin aspart   Subcutaneous TID w/meals     insulin aspart  1-7 Units Subcutaneous TID AC     insulin aspart  1-5 Units Subcutaneous At Bedtime     insulin detemir  12 Units Subcutaneous At Bedtime      insulin detemir  20 Units Subcutaneous QAM AC     metoprolol tartrate  25 mg Oral BID     omeprazole  20 mg Oral Daily     rivastigmine  4.6 mg Transdermal Daily     rivastigmine   Transdermal Q8H     Data     Recent Labs   Lab 06/28/21 1937   WBC 6.4   HGB 15.5   HCT 46.7   MCV 83        Recent Labs   Lab 07/03/21  0637 07/02/21  0908 07/01/21  0610 06/29/21  0552 06/29/21  0552 06/28/21 1937 06/28/21 1937   NA  --   --   --   --  140  --  131*   POTASSIUM 4.0 4.1 3.3*   < > 3.5   < > 4.4   CHLORIDE  --   --   --   --  108  --  97   CO2  --   --   --   --  28  --  24   ANIONGAP  --   --   --   --  4  --  10   GLC  --   --   --   --  105*  --  551*   BUN  --   --   --   --  28  --  34*   CR  --  0.90  --   --  0.84  --  1.05   GFRESTIMATED  --  87  --   --  90  --  72   GFRESTBLACK  --  >90  --   --  >90  --  84   CHRISTIE  --   --   --   --  9.1  --  9.5    < > = values in this interval not displayed.     No results for input(s): CULT in the last 168 hours.  No results for input(s): NTBNPI, NTBNP in the last 168 hours.  Recent Labs   Lab 06/28/21 1937   AST 12   ALT 32   ALKPHOS 162*   BILITOTAL 0.6     No results for input(s): INR in the last 168 hours.  Recent Labs   Lab 06/28/21 1938   LACT 1.4     No results for input(s): CHOL, HDL, LDL, TRIG, CHOLHDLRATIO in the last 168 hours.  Recent Labs   Lab 06/28/21 1937   TROPI <0.015     Recent Labs   Lab 06/28/21 1938   COLOR Straw   APPEARANCE Clear   URINEGLC >1000*   URINEBILI Negative   URINEKETONE 40*   SG 1.015   UBLD Negative   URINEPH 5.0   PROTEIN Negative   NITRITE Negative   LEUKEST Negative   RBCU 0   WBCU <1       No results found for this or any previous visit (from the past 24 hour(s)).

## 2021-07-04 NOTE — PLAN OF CARE
7PM-7AM RN     Patient vital signs are at baseline: Yes  Patient able to ambulate as they were prior to admission or with assist devices provided by therapies during their stay:  Yes  Patient MUST void prior to discharge:  Yes  Patient able to tolerate oral intake:  Yes  Pain has adequate pain control using Oral analgesics:  Denies pain.     Up with SBA. Non-compliant with calling for help when getting out of bed. Steady however and socks on. Flat upon initial assessment (wouldn't open eyes), but alert shortly hereafter and pleasant.  @ 0200.

## 2021-07-04 NOTE — PLAN OF CARE
VSS. SPO2 94-97% RA. LS: clear. Denies Pain. Afebrile. Pt disoriented to situation and time. At around 1700 Pt awoke from nap very confused and wanted to leave, mildly combative, after a long conversation plus calling the spouse to talk to Pt, Pt adamant that he wants to get out but the pt did calm down, but refused all his cares, medications & Vital signs. Second episode before 7pm Pt walked out to the hallway this nurse and other staff members tried to redirect Pt back to the room with no success, called security but before the arrival Pt got back to his room straight to bed. Md ordered seroquel PRN but Pt at this time refusing everything. Pt took off his gown, walking in room refusing to wear socks. Continue monitoring Pt to discharge when bed available.

## 2021-07-04 NOTE — PLAN OF CARE
VSS. SPO2 95-99% RA. LS: clear. Afebrile. Denies pain. Disoriented to situation & time, with some confusion and forgetfulness but easily redirected. Up with SBA & GB. Pt on MOD CHO diet with good appetite.  & 235, carb count/S/S coverage given. No PIV access. SW following for TCU placement. Continue monitoring, Pt to discharge when bed available.

## 2021-07-04 NOTE — PROGRESS NOTES
Left a message for pt daughter/POA for additional TCU options. Writer requested a call back.   Received a call from Uma requesting for additional referrals to send to [facility], [facility], [facility], [facility]. Daughter okay with additional facilities so she wants her father to discharge. She noted she open to anything and his new home at assisted living will be ready soon.   Send referrals     June Hernandez  Care management   [phone]

## 2021-07-05 NOTE — PLAN OF CARE
"Alert to self, states \"I don't know where I am or what I'm doing here, but I'm ready to go home,\" wanting to put clothes on, and became increasingly agitated with staff. Lights dimmed, TV turned off to encourage sleep. PRN seroquel given. Reorientation and reassurance provided. VSS. B. Reports headache this morning, prn tylenol and Tea provided per pt request. MD notified due to severity of headache, though pt reports the headache has completely resolved. Pt denies N/V, SOB, lightheadedness, and dizziness. Voiding without difficulty. Up with SBA and walker; though refuses gb and walker. Education on fall prevention provided. Alarms on for safety. Plan for discharge to TCU, awaiting placement.       "

## 2021-07-05 NOTE — PLAN OF CARE
Pt Alert to self only, VSS, pt denies pain, headache, dizziness, N/V & SOB. Pt up SBA w/cane (pt refuses gait belt). Lung sounds clear, RA. Blood glucose levels: 181. PT, OT, Social Work. Pending placement at TCU. Will continue with plan of care.

## 2021-07-05 NOTE — PROGRESS NOTES
[facility]    Hospitalist Progress Note      Assessment & Plan   Matt Way is a 70 year old male who was admitted on 6/28/2021.    Summary of Stay:   Matt Way is a 70 year old male who was admitted on 6/28/2021 for altered mental status and hyperglycemia with ketosis. past medical history significant fall Lewy body dementia, coronary artery disease status post NSTEMI and stent placement in September 2020 on Plavix, history of PE on Xarelto, GERD, diabetes mellitus, medical noncompliance was brought to the emergency room with altered mental status and abrupt change in his behavior     Patient was hypoglycemic on 7/2  Glucose was down to 67 insulin adjusted nighttime insulin reduced.  We will continue carb counting and coverage as necessary      7/4/2021: Overnight patient was agitated and restless worse not able to get his medication even missed his nightly insulin.  Required Seroquel. His blood glucose has been erratic due to missed medications and also food brought from home.     Patient has a place for long-term placement.  Can be discharged once bed is available.  Will need to adjust diabetes medication and intermittently treat agitation    Plan:    Altered mental status  Metabolic encephalopathy in setting of underlyingLewy body dementia  Abrupt behavioral change : Improved  -Concern for progression of Lewy body dementia  -Noted sudden behavioral change with urinating all over the house and being angry and wandering in park  -Other metabolic/infectious no evidence of acute infection, does have hyperglycemia and ketosis   -Supportive care  -As needed Seroquel  -CT head was done showed no acute bleeding.  Showed questionable clotting which prompted CTA neck.  CTA neck did not show any significant narrowing .  -currently resting comfortably after receiving tylenol for a headache this AM     Hyperglycemia with ketosis: Resolved  History of diabetes mellitus noncompliant with  treatment  Episode of Hypoglycemia 7-2-21  Uncontrolled blood glucose due to erratic p.o. intake and missed nighttime Lantus  -Suspect noncompliance.  Not taking his insulin or other meds  (on glargine and Metformin), hemoglobin A1c of 12.  -Initially, treated with IV insulin drip initially now switched to subcu insulin  -Initially kept on Levemir 20 units twice daily but had hypoglycemia episode so evening dose was dropped to 12 units.  -Also on carb coverage with sliding scale insulin.  -Blood sugar numbers are now running high: Increase nighttime Levemir to 15 units.  Change sliding scale insulin to high intensity.  Keep the carb coverage as before, 1 unit for 10 g of carb.     History of coronary artery disease status post stent placement, in sept 2020  - Coronary artery disease involving the distal RCA with a previous proximal LAD stent and moderate circumflex disease.  -  Successful CECILY deployed to the distal RCA lesion with a synergy 3 x 32 mm.  Residual stenosis of 0%.  -On dual platelet therapy for 12 months  -Patient is supposed to be on aspirin and Plavix (unclear if he is compliant)  -Continue aspirin Plavix beta-blocker nitro statins losartan      History of pulmonary embolism  -Patient's med list has Xarelto on it (unclear if he is currently taking it or not)  -On review of records it appears that patient should stop Xarelto as is treatment for pulmonary embolism was completed (noted on cardiology note from February 2021)  -Xarelto was discontinued     Progressive dementia  -Social work consult to help with safe placement  -Patient unable to manage at home  - working on TCU placement     Pseudohyponatremia secondary to hyperglycemia  -Serum sodium was 131  -Corrected sodium is about 139     GERD  -Continue PPI     Hypokalemia  - replace per protocol      DVT Prophylaxis: aspirin plavix  Code Status: Full Code  Expected discharge: waiting for TCU bed    Tony Cabrera MD  Text Page (7am -  6pm, M-F)    Interval History   Patient was evaluated with nursing staff. Overnight issues discussed.    Review of systems:   Unable to obtain due to dementia.    -Data reviewed today: Labs and medications.    Physical Exam   Temp: 97.7  F (36.5  C) Temp src: Oral BP: 122/78 Pulse: 78   Resp: 16 SpO2: 98 % O2 Device: None (Room air)    Vitals:    07/03/21 0458 07/04/21 0630 07/05/21 0435   Weight: 82.6 kg (182 lb 3.2 oz) 82.7 kg (182 lb 4.8 oz) 82.8 kg (182 lb 8 oz)     Vital Signs with Ranges  Temp:  [97.7  F (36.5  C)-97.8  F (36.6  C)] 97.7  F (36.5  C)  Pulse:  [60-80] 78  Resp:  [16] 16  BP: (110-150)/(61-83) 122/78  SpO2:  [97 %-99 %] 98 %  I/O last 3 completed shifts:  In: 1480 [P.O.:1480]  Out: -     Constitutional: Resting comfortably in the bed.  Does not appear to be in any distress.  HEENT: Trachea midline, sclera is clear   Respiratory: No crackles. No wheezing. Equal breath sounds bilaterally.  Cardiovascular: Regular rate and rhythm, normal S1 and S2, and no murmur noted  GI: Normal bowel sounds, soft, non-distended, non-tender  Extremities: No pitting edema     Medications     dextrose       - MEDICATION INSTRUCTIONS -         aspirin  81 mg Oral Daily     atorvastatin  80 mg Oral Daily     clopidogrel  75 mg Oral Daily     insulin aspart   Subcutaneous TID w/meals     insulin aspart  1-7 Units Subcutaneous TID AC     insulin aspart  1-5 Units Subcutaneous At Bedtime     insulin detemir  12 Units Subcutaneous At Bedtime     insulin detemir  20 Units Subcutaneous QAM AC     metoprolol tartrate  25 mg Oral BID     omeprazole  20 mg Oral Daily     rivastigmine  4.6 mg Transdermal Daily     rivastigmine   Transdermal Q8H       Data   Recent Labs   Lab 07/05/21  0740 07/03/21  0637 07/02/21  0908 06/29/21  0552 06/29/21  0552 06/28/21  1937 06/28/21 1937   WBC  --   --   --   --   --   --  6.4   HGB  --   --   --   --   --   --  15.5   MCV  --   --   --   --   --   --  83   PLT  --   --   --   --   --    --  188   NA  --   --   --   --  140  --  131*   POTASSIUM 3.8 4.0 4.1   < > 3.5   < > 4.4   CHLORIDE  --   --   --   --  108  --  97   CO2  --   --   --   --  28  --  24   BUN  --   --   --   --  28  --  34*   CR  --   --  0.90  --  0.84  --  1.05   ANIONGAP  --   --   --   --  4  --  10   CHRISTIE  --   --   --   --  9.1  --  9.5   GLC  --   --   --   --  105*  --  551*   ALBUMIN  --   --   --   --   --   --  3.7   PROTTOTAL  --   --   --   --   --   --  7.6   BILITOTAL  --   --   --   --   --   --  0.6   ALKPHOS  --   --   --   --   --   --  162*   ALT  --   --   --   --   --   --  32   AST  --   --   --   --   --   --  12   TROPI  --   --   --   --   --   --  <0.015    < > = values in this interval not displayed.       No results found for this or any previous visit (from the past 24 hour(s)).

## 2021-07-05 NOTE — PROVIDER NOTIFICATION
FYI - checked pt BG at 4pm prior to ordering meal, BG was 377. Administered sliding scale novolog of 10 units, recheck showed a BG of 401. Please advise, thanks    Diony De Santiago RN on 7/5/2021 at 5:24 PM    1805- No orders received    Diony De Santiago RN on 7/5/2021 at 6:05 PM

## 2021-07-05 NOTE — PROVIDER NOTIFICATION
"MD notified: pt reports headache 10/10 \"terrible\" bilat sharp aching, while holding head. VSS. Unrelieved with tylenol and tea. Please advise.    Edit: orders placed for toradol. Offered pt medication, explaining IV would need to be placed. Pt declined, stating that HA is decreasing. Updated MD via telephone.  "

## 2021-07-05 NOTE — PROGRESS NOTES
Care Management Follow Up    Length of Stay (days): 7    Expected Discharge Date: 07/06/21     Concerns to be Addressed: discharge planning     Patient plan of care discussed at interdisciplinary rounds: Yes    Anticipated Discharge Disposition: Transitional Care, Skilled Nursing Facilty     Anticipated Discharge Services: None  Anticipated Discharge DME: (TBD)    Patient/family educated on Medicare website which has current facility and service quality ratings: yes  Education Provided on the Discharge Plan:  yes  Patient/Family in Agreement with the Plan: yes    Referrals Placed by CM/SW: Post Acute Facilities  Private pay costs discussed: private room/amenity fees and transportation costs    Additional Information:  Sw left vms for [facility], [facility], and [facility] requesting calls back to discuss bed availability and possible admission of the pt.    Sw updated the pt's dtr Uma.    Sw will continue with discharge planning and will be available as needed until discharge.      FIGUEROA Vicente, [facility]  Inpatient Care Coordination  [facility]  [phone]

## 2021-07-06 NOTE — PLAN OF CARE
Vitals: Temp: 97.5  F (36.4  C) Temp src: Oral BP: 126/69 Pulse: 67   Resp: 18 SpO2: 98 % O2 Device: None (Room air)    Pain: PRN Tylenol x1 for headache  Neuro: Alert, lethargic. Disoriented to situation.   Respiratory: LS clr, denies SOB  Cardiac/tele: WDL  GI/: WDL  Skin: WDL  LDAs: No IV access  Labs: K 3.7, Mag 2.0; rechecks scheduled for AM. BG 98, 223,201  Diet: Regular  Activity: SBA with cane  Plan: SW following. Will continue POC

## 2021-07-06 NOTE — PLAN OF CARE
OT session attempted - pt declining activity due to headache pain. Requesting to check back later if able.

## 2021-07-06 NOTE — PROGRESS NOTES
[facility]    Medicine Progress Note - Hospitalist Service       Date of Admission:  6/28/2021  Length of stay: 8 days    Assessment & Plan   Matt Way is a 70 year old male who was admitted on 6/28/2021 for altered mental status and hyperglycemia with ketosis. PMH significant for Lewy body dementia, coronary artery disease status post NSTEMI and stent placement in September 2020 on Plavix, history of PE, GERD, diabetes mellitus, medical noncompliance.    Patient was brought into the hospital for abrupt change in mental status in terms of urinating over the house, being angry and wandering around.  Was worked up for infectious or metabolic cause of encephalopathy and none was found.  Has had some issues during the hospitalization with both high and low blood sugars but those are improving.  At this point stable for placement and appreciate social work involvement.     Altered mental status  Metabolic encephalopathy in setting of underlying Lewy body dementia  - Noted sudden behavioral change with urinating all over the house and being angry and wandering in park  - Other metabolic/infectious no evidence of acute infection, does have hyperglycemia and ketosis   - CT head was done showed no acute bleeding. Complains of mild headache. Showed questionable clotting which prompted CTA neck.  CTA neck did not show any significant narrowing .  - Initial episode probably due to the hyperglycemia.  Mentation is improved with treatment of blood sugar.  Also question possible progression of underlying LBD.    Hyperglycemia with ketosis: Resolved  History of diabetes mellitus noncompliant with treatment  Episode of Hypoglycemia 7-2-21  Uncontrolled blood glucose due to erratic p.o. intake and missed nighttime Lantus  - Suspect noncompliance.  Not taking his insulin or other meds  (on glargine and Metformin), hemoglobin A1c of 12.  PTA regimen listed as Levemir 26 units twice daily, Metformin 1 g twice daily,  "and aspart 16 units twice daily  - Initially, treated with IV insulin drip initially now switched to subcutaneous insulin  - Labile blood sugars during hospitalization  - Ok to resume metformin  - Continue sliding scale insulin, carb coverage 1:10.  Levemir 15 at bedtime and 20 units each morning.    History of coronary artery disease status post stent placement, in Sept 2020  - Coronary artery disease involving the distal RCA with a previous proximal LAD stent and moderate circumflex disease.  - Successful CECILY deployed to the distal RCA lesion with a synergy 3 x 32 mm.  Residual stenosis of 0%.  - On dual platelet therapy for 12 months  - Patient is supposed to be on aspirin and Plavix (unclear if he is compliant)  - Continue aspirin, Plavix, beta-blocker, nitro, statins and losartan      History of pulmonary embolism  - Patient's med list has Xarelto on it. On review of records it appears that patient should stop Xarelto as his treatment for pulmonary embolism was completed (noted on cardiology note from February 2021)  - Xarelto was discontinued      GERD  - Continue PPI     Hypokalemia  - replace per protocol    Severe malnutrition  - Evidenced by weight loss, muscle depletion  - RD consulted    Diet: Diabetic  DVT Prophylaxis: Pneumatic Compression Devices  Schulte Catheter: No  Code Status: Full Code     Disposition Plan   Expected discharge:   Appropriate for discharge.     Malcolm Landaverde MD  Hospitalist Service  [facility]  ______________________________________________________________________    Interval History   No acute overnight events noted.  Lying flat in the bed in no distress this morning.    Data reviewed today: I reviewed all medications, new labs and imaging results over the last 24 hours.     Physical Exam   /69 (BP Location: Left arm)   Pulse 67   Temp 97.5  F (36.4  C) (Oral)   Resp 18   Ht 1.854 m (6' 1\")   Wt 83.9 kg (185 lb)   SpO2 98%   BMI 24.41 kg/m  "   185 lbs 0 oz       General: Lying on his right side, no distress.    HEENT: No scleral icterus. Oropharynx moist.     Neck: Supple. Normal range of motion.     Pulmonary: Normal work of breathing. Clear to auscultation bilaterally.    Cardiovascular: Regular rate and rhythm without murmur or extra heart sounds.    Abdomen: Soft and non-tender.    Extremities: No peripheral edema. No clubbing or cyanosis.     Neurologic: Keeps eyes closed but answers questions appropriately and oriented to place.    Skin: Warm and dry.    Psychiatric: Normal affect and mood.     Data    Recent Labs   Lab 07/06/21  0620 07/05/21  0740 07/03/21  0637 07/02/21  0908   POTASSIUM 3.7 3.8 4.0 4.1   CR  --   --   --  0.90     No results found for this or any previous visit (from the past 24 hour(s)).    Medications      Current Facility-Administered Medications   Medication Dose Route Frequency     aspirin  81 mg Oral Daily     atorvastatin  80 mg Oral Daily     clopidogrel  75 mg Oral Daily     insulin aspart  1-10 Units Subcutaneous TID AC     insulin aspart  1-7 Units Subcutaneous At Bedtime     insulin aspart   Subcutaneous TID w/meals     insulin detemir  15 Units Subcutaneous At Bedtime     insulin detemir  20 Units Subcutaneous QAM AC     metFORMIN  500 mg Oral BID w/meals     metoprolol tartrate  25 mg Oral BID     omeprazole  20 mg Oral Daily     rivastigmine  4.6 mg Transdermal Daily     rivastigmine   Transdermal Q8H

## 2021-07-06 NOTE — PLAN OF CARE
Alert to self, disoriented to place an situation. Can be impulsive; able to redirect. SBA w cane. No IV access. VSS. . Reported headache, received prn po tylenol and seroquel, effective. LS dim. Discharge pending TCU placement.

## 2021-07-06 NOTE — PROGRESS NOTES
Care Management Follow Up    Length of Stay (days): 8    Expected Discharge Date: 07/07/21     Concerns to be Addressed: discharge planning     Patient plan of care discussed at interdisciplinary rounds: Yes    Anticipated Discharge Disposition: Transitional Care, Skilled Nursing Facilty     Anticipated Discharge Services: None  Anticipated Discharge DME: (TBD)    Patient/family educated on Medicare website which has current facility and service quality ratings: yes  Education Provided on the Discharge Plan:  yes  Patient/Family in Agreement with the Plan: yes    Referrals Placed by CM/SW: Post Acute Facilities  Private pay costs discussed: private room/amenity fees and transportation costs    Additional Information:  [facility] and [facility].  Guillermo spoke with the pt's dtr Uma and updated her.  Uma told sw permission to widen the search if needed.  Guillermo sent additional referrals to [facility], [facility], [facility], and [facility].  Guillermo received a call from [facility] updating sw that they do not have an appropriate bed available for the pt at this time.    Sw will continue with discharge planning and will be available as needed until discharge.    FIGUEROA Vicente, [facility]  Inpatient Care Coordination  [facility]  [phone]    ADDENDUM 1348:  Guillermo [facility], [phone], to determine if their nurse can do an onsite assessment of the pt while in the hospital for admission to their facility.  The pt's family has put a deposit down for a room in their [facility]/ facility.  Randi said that the nurse who does their assessments is out of the office until tomorrow.  Randi or the nurse will call sw tomorrow to determine if they can do an onsite assessment and directly admit the pt to [facility]/ instead of going to TCU first.    Guillermo left a vm for the pt's dtr Clau updating her  that sw is still trying to find a TCU for the pt and that they reached out to [facility] to determine a plan with them.    ADDENDUM 1500:  Sw received a call from Haily at [facility] who said that they are unable to accept the pt because they do not have an appropriate bed available for the pt.    ADDENDUM 1528:  Sw received a call from Pat at [facility] who said that they are unable to accept the pt due to bed availability.    ADDENDUM 1555:  Guillermo received a call from Randi Green requesting that sw fax the pt's clinical information to their RN, so she can review the referral right away in the morning.  Guillermo faxed the pt's clinical information to Jing Palafox RN at [facility] P: [phone] F: [phone].

## 2021-07-06 NOTE — PLAN OF CARE
Alert to self only. VSS. Denies pain. SBA with benavidez. Very confused and impulsive, requires frequent redirection. Refuses gait belt. , 401, 249. See provider notification note and MAR for novolog administration. PT/SW/OT following. Will continue POC    Diony De Santiago RN on 7/5/2021 at 9:25 PM

## 2021-07-07 NOTE — PROGRESS NOTES
Care Management Follow Up    Length of Stay (days): 9    Expected Discharge Date: 07/08/21     Concerns to be Addressed: discharge planning     Patient plan of care discussed at interdisciplinary rounds: Yes    Anticipated Discharge Disposition: shelter/     Anticipated Discharge Services: None  Anticipated Discharge DME: (TBD)    Patient/family educated on Medicare website which has current facility and service quality ratings: yes  Education Provided on the Discharge Plan:  yes  Patient/Family in Agreement with the Plan: yes    Referrals Placed by CM/SW: Post Acute Facilities  Private pay costs discussed: The pt's family will talk with the [facility]/ regarding costs.    Additional Information:  Guillermo spoke with Jing Palafox RN at [facility] P: [phone] F: [phone], regarding the pt's possible admission tomorrow.  Guillermo addressed all of Jing's questions and concerns.  Jing said that they should be able to accept the pt tomorrow.  She is going to stop at [facility] later today to do an onsite assessment of the pt.  She is going to have the Sales and , Randi, call the pt's dtr to further discuss the details of the pt's admission.  Jing said that the pt will need to admit tomorrow morning.    Guillermo called the pt's dtr Uma to update her that the pt may be able to admit to [facility] tomorrow.  Uma said that she can transport the pt tomorrow morning if all plans are approved and he is able to move in tomorrow.    Guillermo will await a call from [facility] to confirm all discharge plans.    Guillermo will continue with discharge planning and will be available as needed until discharge.      FIGUEROA Vicente, [facility]  Inpatient Care Coordination  [facility]  [phone]

## 2021-07-07 NOTE — PROGRESS NOTES
[facility]    Medicine Progress Note - Hospitalist Service       Date of Admission:  6/28/2021  Length of stay: 9 days    Assessment & Plan   Matt Way is a 70 year old male who was admitted on 6/28/2021 for altered mental status and hyperglycemia with ketosis. PMH significant for Lewy body dementia, coronary artery disease status post NSTEMI and stent placement in September 2020 on Plavix, history of PE, GERD, diabetes mellitus, medical noncompliance.    Patient was brought into the hospital for abrupt change in mental status in terms of urinating over the house, being angry and wandering around.  Was worked up for infectious or metabolic cause of encephalopathy and none was found.  Has had some issues during the hospitalization with both high and low blood sugars but those are improving.  At this point stable for placement and appreciate social work involvement.     Altered mental status  Metabolic encephalopathy in setting of underlying Lewy body dementia  - Noted sudden behavioral change with urinating all over the house and being angry and wandering in park  - Other metabolic/infectious no evidence of acute infection, does have hyperglycemia and ketosis   - CT head was done showed no acute bleeding. Complains of mild headache. Showed questionable clotting which prompted CTA neck.  CTA neck did not show any significant narrowing .  - Initial episode probably due to the hyperglycemia.  Mentation has improved with treatment of blood sugar.  Also question possible progression of underlying LBD.    Hyperglycemia with ketosis: Resolved  History of diabetes mellitus noncompliant with treatment  Episode of Hypoglycemia 7-2-21  Uncontrolled blood glucose due to erratic p.o. intake and missed nighttime Lantus  - Suspect noncompliance.  Not taking his insulin or other meds  (on glargine and Metformin), hemoglobin A1c of 12.  PTA regimen listed as Levemir 26 units twice daily, Metformin 1 g twice daily,  "and aspart 16 units twice daily  - Initially, treated with IV insulin drip initially now switched to subcutaneous insulin  - Labile blood sugars during hospitalization  - Ok to resume metformin  - Continue sliding scale insulin, carb coverage 1:10.  Levemir 15 at bedtime and 20 units each morning.    History of coronary artery disease status post stent placement, in Sept 2020  - Coronary artery disease involving the distal RCA with a previous proximal LAD stent and moderate circumflex disease. Successful CECILY deployed to the distal RCA lesion with a synergy 3 x 32 mm.  Residual stenosis of 0%.  - On dual platelet therapy for 12 months  - Patient is supposed to be on aspirin and Plavix  - Continue aspirin, Plavix, beta-blocker, nitro, statins and losartan      History of pulmonary embolism  - Patient's med list has Xarelto on it. On review of records it appears that patient should stop Xarelto as his treatment for pulmonary embolism was completed (noted on cardiology note from February 2021)  - Xarelto was discontinued      GERD  - Continue PPI     Hypokalemia  - replaced per protocol    Severe malnutrition  - Evidenced by weight loss, muscle depletion  - RD consulted    Diet: Diabetic  DVT Prophylaxis: Pneumatic Compression Devices  Schulte Catheter: No  Code Status: Full Code     Disposition Plan   Expected discharge:   Looks like he will be able to discharge tomorrow, 7/8.    Malcolm Landaverde MD  Hospitalist Service  [facility]  ______________________________________________________________________    Interval History   No acute overnight events.  No agitation noted.    Data reviewed today: I reviewed all medications, new labs and imaging results over the last 24 hours.     Physical Exam   /78 (BP Location: Right arm)   Pulse 74   Temp 97.4  F (36.3  C) (Oral)   Resp 20   Ht 1.854 m (6' 1\")   Wt 82.6 kg (182 lb 3.2 oz)   SpO2 98%   BMI 24.04 kg/m    182 lbs 3.2 oz       General: " Sitting up in the chair wearing street clothes wanting to go home.    HEENT: No scleral icterus. Oropharynx moist.     Neck: Supple. Normal range of motion.     Pulmonary: Normal work of breathing. Clear to auscultation bilaterally.    Cardiovascular: Regular rate and rhythm without murmur or extra heart sounds.    Abdomen: Soft and non-tender.    Extremities: No peripheral edema. No clubbing or cyanosis.     Neurologic: appropriate in interactions.    Skin: Warm and dry.    Psychiatric: Normal affect and mood.     Data    Recent Labs   Lab 07/07/21  0628 07/06/21  0620 07/05/21  0740 07/02/21  0908 07/02/21  0908   POTASSIUM 3.8 3.7 3.8   < > 4.1   CR  --   --   --   --  0.90    < > = values in this interval not displayed.     No results found for this or any previous visit (from the past 24 hour(s)).    Medications      Current Facility-Administered Medications   Medication Dose Route Frequency     aspirin  81 mg Oral Daily     atorvastatin  80 mg Oral Daily     clopidogrel  75 mg Oral Daily     insulin aspart  1-10 Units Subcutaneous TID AC     insulin aspart  1-7 Units Subcutaneous At Bedtime     insulin aspart   Subcutaneous TID w/meals     insulin detemir  15 Units Subcutaneous At Bedtime     insulin detemir  20 Units Subcutaneous QAM AC     metFORMIN  1,000 mg Oral BID w/meals     metoprolol tartrate  25 mg Oral BID     omeprazole  20 mg Oral Daily     rivastigmine  4.6 mg Transdermal Daily     rivastigmine   Transdermal Q8H

## 2021-07-07 NOTE — PLAN OF CARE
"/81   Pulse 67   Temp 97.7  F (36.5  C) (Oral)   Resp 16   Ht 1.854 m (6' 1\")   Wt 83.9 kg (185 lb)   SpO2 97%   BMI 24.41 kg/m      Alert to self, disoriented to place an situation. Can be impulsive; able to redirect. SBA w cane. No IV access. VSS. , 284. PRN seroquel given for mild agitation/confusion. LS dim. Discharge pending TCU placement.  "

## 2021-07-07 NOTE — PLAN OF CARE
Vitals VSS  Neuro Alert. Disoriented to time (year). Can be more confused when waking up/falling asleep   Respiratory 98% RA  GI/ Continent  Skin Blanchable redness at sacrum   LDAs No IV access- MD aware  Labs K+ 3.8, Mag 2.1 both rechecks in for tomorrow am.  and 246- coverage provided (see MAR)  Diet Mod CHO  Activity SBA w/ walker and gait belt. Patient refuses assistance at times  Plan Waiting for placement. OT and SW following. Continue with POC.

## 2021-07-07 NOTE — PLAN OF CARE
LS clear. No IV access. Alert to self, disoriented to time and situation. Can be impulsive; able to redirect. SBA w cane. VSS. . Reported headache, received prn po tylenol. Discharge  shelter pending placement possibly later on today.

## 2021-07-07 NOTE — PROGRESS NOTES
"CLINICAL NUTRITION SERVICES - REASSESSMENT NOTE      Malnutrition:   % Weight Loss:  > 10% in 6 months (severe malnutrition)  % Intake: unable to determine - no nutrition history   Subcutaneous Fat Loss:  Orbital region moderate depletion and Upper arm region moderate depletion  Muscle Loss:  Temporal region mild-moderate depletion, Clavicle bone region mild-moderate depletion, Acromion bone region mild-moderate depletion, Scapular bone region mild depletion, Anterior thigh region mild-moderate depletion and Posterior calf region mild depletion  Fluid Retention:  None noted     Malnutrition Diagnosis: Severe malnutrition  In Context of:  Acute illness or injury  Chronic illness or disease       EVALUATION OF PROGRESS TOWARD GOALS   Diet:  Regular    Intake/Tolerance:    Visited with patient this afternoon, he was sleeping but briefly woke up to answer a few questions  He reports that eating is going well and that he loves the food here, \"I eat like a horse\"  Flowsheets document consistent % meal consumption daily   Pt is fully dressed and says he is going home tomorrow   Unable to obtain further diet hx at this time   Continues to be intermittently confused     Vitals:    07/03/21 0458 07/04/21 0630 07/05/21 0435 07/06/21 0606   Weight: 82.6 kg (182 lb 3.2 oz) 82.7 kg (182 lb 4.8 oz) 82.8 kg (182 lb 8 oz) 83.9 kg (185 lb)    07/07/21 0545   Weight: 82.6 kg (182 lb 3.2 oz)       ASSESSED NUTRITION NEEDS:  Dosing Weight 80.8 kg   Estimated Energy Needs: 4075-3868 kcals (25-30 Kcal/Kg)  Justification: maintenance  Estimated Protein Needs:  grams protein (1.2-1.5 g pro/Kg)  Justification: preservation of lean body mass  Estimated Fluid Needs: per MD     NEW FINDINGS:   Plans for Primo1D at discharge tomorrow     Previous Goals:   Pt to consume >/=75% of meals ordered TID   Evaluation: Met    Previous Nutrition Diagnosis:   Predicted inadequate nutrient intake (protein/energy) related to potential for PO " intake to decline pending clinical course and LOS   Evaluation: No change, ongoing       CURRENT NUTRITION DIAGNOSIS  Predicted inadequate nutrient intake (protein/energy) related to potential for PO intake to decline pending clinical course, cognition and LOS     INTERVENTIONS  Recommendations / Nutrition Prescription  Continue regular diet and Ensure     Implementation  Collaboration and Referral of Nutrition care: patient was discussed during interdisciplinary team rounds     Goals  Patient will consume >/=75% meals TID      MONITORING AND EVALUATION:  Progress towards goals will be monitored and evaluated per protocol and Practice Guidelines      Alexandria Kelly RD, LD  Clinical Dietitian

## 2021-07-08 NOTE — PROGRESS NOTES
Care Management Discharge Note    Discharge Date: 07/08/21  Expected Time of Departure: 0930    Discharge Disposition: Assisted Living, Home Care    Discharge Services: None    Discharge DME: None    Discharge Transportation: family or friend will provide - Pt's dtr Kristen    Private pay costs discussed: Pt's family spoke with the [facility]/ regarding the costs    PAS Confirmation Code: (N/A)  Patient/family educated on Medicare website which has current facility and service quality ratings: yes    Education Provided on the Discharge Plan:  yes  Persons Notified of Discharge Plans: Pt's dtr Uma and Jing at [facility]    Patient/Family in Agreement with the Plan: yes    Handoff Referral Completed: No    Additional Information:  The pt will discharge to [facility]/ today.  Pt's dtr Kristen will provide transport about 0945.  Guillermo confirmed the discharge plan with the pt's dtr Uma who said that they moved all of the pt's belongings to the [facility] yesterday.  She has requested [facility] for RN/PT/OT services.  Uma had no additional questions or concerns for sw at this time.    Guillermo confirmed the discharge plan for today with Jing at [facility] and faxed her the pt's discharge orders P: [phone] F: [phone].    Guillermo emailed the HC referral to [facility].    Sw will continue to be available as needed until discharge.      FIGUEROA Vicente, [facility]  Inpatient Care Coordination  [facility]  [phone]

## 2021-07-08 NOTE — PROGRESS NOTES
[facility]   Due to a high volume of referrals [facility] has requested this patient's home care episode be transferred to their deferral partner, [facility], [phone].  Care team notified.

## 2021-07-08 NOTE — PLAN OF CARE
Occupational Therapy Discharge Summary    Reason for therapy discharge:    Discharged to [facility] w/ HH services    Progress towards therapy goal(s). See goals on Care Plan in [facility] electronic health record for goal details.  Goals partially met.  Barriers to achieving goals:   discharge from facility.    Therapy recommendation(s):    Continued therapy is recommended.  Rationale/Recommendations:  HH OT eval and treat in supportive living environment.      **Pt not seen by discharging therapist on this date, note written based on previous treating therapist's notes and recommendations

## 2021-07-08 NOTE — PLAN OF CARE
"Tele: NA  Vitals: /72 (BP Location: Right arm)   Pulse 67   Temp 97.7  F (36.5  C) (Oral)   Resp 18   Ht 1.854 m (6' 1\")   Wt 82.6 kg (182 lb 3.2 oz)   SpO2 98%   BMI 24.04 kg/m    Pain: Reports headache overnight, relieved w/ PRN tylenol.  Neuro: Disoriented to time. Pleasantly confused at times, easily redirectable. Gets OOB w/o assist despite reminders.   Cardiac: Denies CP  Resp: LS clear. Sats WNL on RA.  GI/: Voiding.  Skin: Intact  Mobility: SBA  Misc: 0200   Plan: Continue POC. Possible discharge today to [facility], see SW note.    "

## 2021-07-08 NOTE — DISCHARGE SUMMARY
"[facility]  Hospitalist Discharge Summary       Date of Admission:  6/28/2021  Date of Discharge:  7/8/2021 10:40 AM  Discharging Provider: Malcolm Landaverde MD      Discharge Diagnoses   Acute metabolic encephalopathy due to hypoglycemia with underlying Lewy body dementia  Hyperglycemia with ketosis but no DKA  Poorly controlled diabetes with labile blood glucose  History of coronary artery disease status post stent placement  Severe malnutrition  Hypokalemia    Follow-ups Needed After Discharge   Follow-up Appointments     Follow-up and recommended labs and tests       Follow up with primary care provider, Kingston Leal, within 7 days for   hospital follow- up.             Discharge Disposition   Discharged to assisted living  Condition at discharge: Stable    History of Present Illness   Per admission H&P:  \"Matt Way is a 70 year old male  with past medical history significant fall Lewy body dementia, coronary artery disease status post NSTEMI and stent placement in September 2020 on Plavix, history of PE on Xarelto, GERD, diabetes mellitus, medical noncompliance was brought to the emergency room with altered mental status and abrupt change in his behavior.  In the emergency room patient was found to be hyperglycemic with ketosis.     As noted by patient's daughter he has been deteriorating and noted quite abrupt change in behavior in the last 2 days.  He was noted to be urinating all over the house.  Not sure if he is taking his meds or not.  His daughter brought him to the urgent care first where he got angry left and was found in a park.  Patient is confused and more erratic in his behavior.     In the emergency room patient was noted to be hyperglycemic with blood glucose 550 and positive ketones.  Admitted for further evaluation and treatment.  With his diagnosis of Lewy body dementia and worsening mental status family was in process of moving him to a facility which was " "scheduled for July 1st.  It is clear that patient is unsafe with his wandering and aberrant behavior changes.     Unable to get detailed review of systems.\"    Hospital Course     Matt Way is a 70 year old male who was admitted on 6/28/2021 for altered mental status and hyperglycemia with ketosis. PMH significant for Lewy body dementia, coronary artery disease status post NSTEMI and stent placement in September 2020 on Plavix, history of PE, GERD, diabetes mellitus, medical noncompliance.     Patient was brought into the hospital for abrupt change in mental status in terms of urinating over the house, being angry and wandering around.  Was worked up for infectious or metabolic cause of encephalopathy and none was found.  Has had some issues during the hospitalization with both high and low blood sugars but those improved.  At this point stable for placement to assisted living.      Altered mental status  Metabolic encephalopathy in setting of underlying Lewy body dementia  As outlined above, patient was reported to have an abrupt change in his behaviors prior to admission.  Given that his blood sugar was so high and that his behaviors overall improved during hospitalization, it is felt that this was primarily metabolic encephalopathy due to the hyperglycemia.  Other work-up showed no sign of infection as well as a CT of the head and CTA neck which did not show any acute neurologic process.  He received some intermittent as needed Seroquel during the hospitalization and will be sent to assisted living with a prescription for that as well.     Hyperglycemia with ketosis: Resolved  On admission, the patient's blood glucose was elevated at over 500.  The etiology of the severe elevation felt to be noncompliance with medication regimen.  Per report he was not taking his insulin or other medications.  Hemoglobin A1c was 12%.  He was initially treated with IV insulin infusion and then converted to subcutaneous " insulin.  He had an episode of hypoglycemia on 7/2 down to 64.  Blood sugars thereafter were somewhat labile.  Ultimately we landed on a stable regimen of sign scale insulin, carb coverage 1 for every 10, Levemir 15 at bedtime and 20 into the morning with 1000 of Metformin twice daily.  Unfortunately, he will not be able to do carb counting at the assisted living.  Therefore we will discharge him on the same dose of Levemir 15/20 and add 7 units of NovoLog with each meal which is about what he has been getting when combining the sliding scale and carb coverage.  Hope that with the Metformin stabilizing his blood sugar and that insulin regimen that he can stay out of the hospital.     History of coronary artery disease status post stent placement, in Sept 2020  Coronary artery disease involving the distal RCA with a previous proximal LAD stent and moderate circumflex disease. Successful CECILY deployed to the distal RCA lesion with a synergy 3 x 32 mm.  Residual stenosis of 0%. On dual platelet therapy for 12 months.      History of pulmonary embolism  Patient's PTA med list had Xarelto on it. On review of records it appears that patient should stop Xarelto as his treatment for pulmonary embolism was completed (noted on cardiology note from February 2021). Xarelto discontinued on discharge.       Severe malnutrition    Consultations This Hospital Stay   PHARMACY IP CONSULT  PHYSICAL THERAPY ADULT IP CONSULT  OCCUPATIONAL THERAPY ADULT IP CONSULT  CARE MANAGEMENT / SOCIAL WORK IP CONSULT    Code Status   Full Code    Time Spent on this Encounter   I, Malcolm Landaverde MD, personally saw the patient today and spent greater than 30 minutes discharging this patient.       Malcolm Landaverde MD  [facility]  ______________________________________________________________________    Physical Exam   Vital Signs: Temp: 97.4  F (36.3  C) Temp src: Oral BP: (!) 143/84 Pulse: 63   Resp: 18 SpO2: 98 %  O2 Device: None (Room air)    Weight: 184 lbs 0 oz      General: Sitting up in the bed, not in distress.    HEENT: No scleral icterus. Oropharynx moist.     Neck: Supple. Normal range of motion.    Pulmonary: Normal work of breathing. Clear to auscultation bilaterally.    Cardiovascular: Regular rate and rhythm without murmur or extra heart sounds.    Abdomen: Soft and non-tender.    Extremities: No peripheral edema. No clubbing.    Neurologic: Awake, alert, appropriate.    Skin: Warm and dry.    Psychiatric: Normal affect and mood.       Primary Care Physician   Kingston Leal    Discharge Orders      Home care nursing referral      Home Care PT Referral for Hospital Discharge      Home Care OT Referral for Hospital Discharge      Reason for your hospital stay    Hyperglycemic hyperosmolar state secondary to noncompliance with medications.  Dementia     Follow-up and recommended labs and tests     Follow up with primary care provider, Kingston Leal, within 7 days for hospital follow- up.     Activity    Your activity upon discharge: activity as tolerated, fall precaution     Monitor and record    blood glucose 4 times a day, before meals and at bedtime     MD face to face encounter    Documentation of Face to Face and Certification for Home Health Services    I certify that patient: Matt Way is under my care and that I, or a nurse practitioner or physician's assistant working with me, had a face-to-face encounter that meets the physician face-to-face encounter requirements with this patient on: July 8, 2021.    This encounter with the patient was in whole, or in part, for the following medical condition, which is the primary reason for home health care: confusion.    I certify that, based on my findings, the following services are medically necessary home health services: Nursing, Occupational Therapy and Physical Therapy.    My clinical findings support the need for the above services because: Nurse is  needed: To assess blood sugar after changes in medications or other medical regimen.., Occupational Therapy Services are needed to assess and treat cognitive ability and address ADL safety due to impairment in movement. and Physical Therapy Services are needed to assess and treat the following functional impairments: weakness.    Further, I certify that my clinical findings support that this patient is homebound (i.e. absences from home require considerable and taxing effort and are for medical reasons or [employer] services or infrequently or of short duration when for other reasons) because: Requires assistance of another person or specialized equipment to access medical services because patient: Is prone to wander/get lost without assistance...    Based on the above findings. I certify that this patient is confined to the home and needs intermittent skilled nursing care, physical therapy and/or speech therapy.  The patient is under my care, and I have initiated the establishment of the plan of care.  This patient will be followed by a physician who will periodically review the plan of care.  Physician/Provider to provide follow up care: Kingston Leal    Attending hospital physician (the Medicare certified [place] provider): Malcolm Landaverde MD  Physician Signature: See electronic signature associated with these discharge orders.  Date: 7/8/2021     Full Code     Diet    Follow this diet upon discharge: Orders Placed This Encounter      Combination Diet 9345-1229 Calories: Moderate Consistent CHO (4-6 CHO units/meal); Low Saturated Fat Na <2400mg Diet, No Caffeine Diet       Significant Results and Procedures   Most Recent 3 CBC's:  Recent Labs   Lab Test 06/28/21  1937 02/06/21  1550 09/23/20  0830   WBC 6.4 6.7 7.2   HGB 15.5 14.5 14.4   MCV 83 84 86    289 219     Most Recent 3 BMP's:  Recent Labs   Lab Test 07/08/21  0647 07/07/21  0628 07/06/21  0620 07/02/21  0908 07/02/21  0908 06/29/21  0552  06/29/21  0552 06/28/21 1937 06/28/21 1937 02/06/21  1550   NA  --   --   --   --   --   --  140  --  131* 137   POTASSIUM 3.8 3.8 3.7   < > 4.1   < > 3.5   < > 4.4 4.3   CHLORIDE  --   --   --   --   --   --  108  --  97 102   CO2  --   --   --   --   --   --  28  --  24 27   BUN  --   --   --   --   --   --  28  --  34* 30   CR  --   --   --   --  0.90  --  0.84  --  1.05 1.62*   ANIONGAP  --   --   --   --   --   --  4  --  10 8   CHRISTIE  --   --   --   --   --   --  9.1  --  9.5 8.7   GLC  --   --   --   --   --   --  105*  --  551* 263*    < > = values in this interval not displayed.     Most Recent 2 LFT's:  Recent Labs   Lab Test 06/28/21 1937 02/06/21  1550   AST 12 18   ALT 32 42   ALKPHOS 162* 174*   BILITOTAL 0.6 0.3     Most Recent 3 INR's:  Recent Labs   Lab Test 09/23/20  0830   INR 1.06     Most Recent 6 Bacteria Isolates From Any Culture (See EPIC Reports for Culture Details):No lab results found.,   Results for orders placed or performed during the hospital encounter of 06/28/21   CT Head w/o Contrast    Narrative    EXAM: CT HEAD W/O CONTRAST  LOCATION: [facility]  DATE/TIME: 6/28/2021 7:57 PM    INDICATION: Mental status change, unknown cause  COMPARISON: None.  TECHNIQUE: Routine CT Head without IV contrast. Multiplanar reformats. Dose reduction techniques were used.    FINDINGS:  INTRACRANIAL CONTENTS: No intracranial hemorrhage, extraaxial collection, or mass effect.  Extensive hyperdensity in the distal left vertebral artery and basilar artery likely relates to streak and beam hardening artifact from the skull base, this   demonstrates an appearance similar to what could be seen with a hyperdense intraluminal thrombus in the distal left vertebral artery and basilar artery. Mild presumed chronic small vessel ischemic changes. Mild generalized volume loss. No hydrocephalus.     VISUALIZED ORBITS/SINUSES/MASTOIDS: No intraorbital abnormality. No paranasal sinus mucosal disease. No  middle ear or mastoid effusion.    BONES/SOFT TISSUES: No acute abnormality.      Impression    IMPRESSION:  1.  Extensive hyperdensity of the distal left vertebral artery and basilar artery. While likely artifactual in nature relating to streak and beam hardening artifact, a similar appearance could be seen with hyperdense intraluminal thrombus extending from   the distal left vertebral artery to the basilar artery. For this reason, correlation with stroke like symptoms will be of benefit, and head and neck CTA is recommended to exclude intraluminal thrombus.    2.  No intracranial hemorrhage or extra-axial collection.    3.  Mild age-related changes, as above.    4.  Findings and impression discussed with Dr. Liu by phone at 2030 hours on 06/28/2021.   CT Head Neck Angio w/o & w Contrast    Narrative    HEAD AND NECK CT ANGIOGRAM WITH IV CONTRAST  6/28/2021 9:10 PM    INDICATION: Neuro deficit, acute, stroke suspected abnormal CT  TECHNIQUE: Head and neck CT angiogram with IV contrast. CT images of the head and neck vessels obtained during the arterial phase of intravenous contrast administration. Axial helical 2D reconstructed images and multiplanar 3D MIP reconstructed images of   the head and neck vessels were performed by the technologist. Dose reduction techniques were used.  CONTRAST: 70mL Isovue-370  COMPARISON: Head CT 06/28/2021     FINDINGS:  HEAD CTA: Intracranial atherosclerosis with moderate narrowing of the right paraclinoid internal carotid artery. Mild left paraclinoid atherosclerotic calcification without narrowing. Elsewhere the intracranial circulation appears patent, specifically   the left vertebral and basilar arteries are patent. No aneurysm or high flow vascular lesion. The dural venous sinuses are not well evaluated due to the arterial timing of the contrast bolus.    NECK CTA: Three vessel arch.  Carotid arteries are patent with mild atherosclerotic change.  No hemodynamically  significant stenosis by NASCET criteria in either carotid system.  Patent vertebral arteries. No dissection.      Impression    CONCLUSION:  HEAD CTA:  1.  The left vertebral and basilar arteries are patent. No proximal large vessel occlusion.  2.  Moderate atherosclerotic narrowing of the right paraclinoid internal carotid artery.  3.  No aneurysm or AVM.    NECK CTA:  1.  Mild carotid artery atherosclerosis. No dissection or hemodynamically significant narrowing in the neck by NASCET criteria.             Discharge Medications   Discharge Medication List as of 7/8/2021 10:29 AM      START taking these medications    Details   aspirin (ASA) 81 MG EC tablet Take 1 tablet (81 mg) by mouth daily, Disp-60 tablet, R-3, E-Prescribe      QUEtiapine (SEROQUEL) 25 MG tablet Take 1 tablet (25 mg) by mouth every 12 hours as needed (agitation), Disp-15 tablet, R-0, E-Prescribe      sennosides (SENOKOT) 8.6 MG tablet Take 2 tablets by mouth daily as needed for constipation, Disp-60 tablet, R-0, E-Prescribe         CONTINUE these medications which have CHANGED    Details   insulin aspart (NOVOLOG FLEXPEN) 100 UNIT/ML pen Inject 7 Units Subcutaneous 3 times daily (with meals) Breakfast and Dinner, Disp-9 mL, R-0, E-Prescribe      !! insulin detemir (LEVEMIR PEN) 100 UNIT/ML pen Inject 20 Units Subcutaneous every morning (before breakfast), Disp-9 mL, R-0, E-Prescribe      !! insulin detemir (LEVEMIR PEN) 100 UNIT/ML pen Inject 15 Units Subcutaneous At Bedtime, Historical       !! - Potential duplicate medications found. Please discuss with provider.      CONTINUE these medications which have NOT CHANGED    Details   acetaminophen (TYLENOL) 325 MG tablet Take 325-650 mg by mouth every 6 hours as needed for mild pain, Historical      atorvastatin (LIPITOR) 80 MG tablet Take 1 tablet (80 mg) by mouth daily, Disp-90 tablet, R-0, E-Prescribe      clopidogrel (PLAVIX) 75 MG tablet Take 1 tablet (75 mg) by mouth daily, Disp-30  tablet,R-0, E-Prescribe      losartan (COZAAR) 25 MG tablet Take 1 tablet (25 mg) by mouth daily, Historical      melatonin 3 MG tablet Take 12 mg by mouth At Bedtime , Historical      metFORMIN (GLUCOPHAGE) 1000 MG tablet Take 1,000 mg by mouth 2 times daily (with meals), Historical      metoprolol tartrate (LOPRESSOR) 25 MG tablet Take 1 tablet (25 mg) by mouth 2 times daily, Disp-180 tablet, R-3, E-Prescribe      omeprazole (PRILOSEC) 20 MG DR capsule Take 20 mg by mouth daily, Historical      rivastigmine (EXELON) 4.6 MG/24HR 24 hr patch Place 4.6 mg onto the skin daily, Historical      VITAMIN D, CHOLECALCIFEROL, PO Take 1 tablet by mouth daily, Historical      nitroGLYcerin (NITROSTAT) 0.4 MG sublingual tablet Place 1 tablet (0.4 mg) under the tongue every 5 minutes as needed for chest pain If symptoms persist 5 minutes after 2nd dose call 911., Disp-30 tablet,R-1, E-Prescribe         STOP taking these medications       rivaroxaban ANTICOAGULANT (XARELTO) 20 MG TABS tablet Comments:   Reason for Stopping:             Allergies   Allergies   Allergen Reactions     Brilinta [Ticagrelor]      Duloxetine      Other reaction(s): Confusion  Per daughter who is MD     Gabapentin      Other reaction(s): Edema     Lisinopril Rash

## 2021-07-08 NOTE — PLAN OF CARE
Physical Therapy Discharge Summary    Reason for therapy discharge:    Discharged to [facility] w/ HH services    Progress towards therapy goal(s). See goals on Care Plan in Psychiatric electronic health record for goal details.  Goals partially met.  Barriers to achieving goals:   discharge from facility.    Therapy recommendation(s):    Continued therapy is recommended.  Rationale/Recommendations:  HH PT eval and treat in supportive living environment.      Note: Pt not seen by documenting PT on this date. Information obtained from chart review.

## 2021-07-08 NOTE — PLAN OF CARE
"BP (P) 113/74 (BP Location: Right arm)   Pulse (P) 69   Temp (P) 98.3  F (36.8  C) (Oral)   Resp (P) 16   Ht 1.854 m (6' 1\")   Wt 82.6 kg (182 lb 3.2 oz)   SpO2 (P) 98%   BMI 24.04 kg/m      Alert to self, disoriented to place an situation. Can be impulsive; able to redirect. SBA w cane. No IV access. VSS. , 186. LS dim. Discharge pending TCU placement  "

## 2021-12-13 NOTE — ED NOTES
Facility did not send any paper work with pt. Have spend an hour calling any phone number I could find, even tried [facility] dispatch for their 911 call back number and all numbers go to a voicemail.
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
## 2021-12-13 NOTE — ED NOTES
Bed: ED21  Expected date:   Expected time:   Means of arrival:   Comments:  Meena 539 71M memory care patient- had behavioral outburst eta 3042

## 2021-12-13 NOTE — ED NOTES
Was able to get a hold of facility RN at [phone]    Hand of report done to RN and facility is ready for pt.     We will arrange EMS transport
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
## 2022-01-01 ENCOUNTER — MEDICAL CORRESPONDENCE (OUTPATIENT)
Dept: HEALTH INFORMATION MANAGEMENT | Facility: CLINIC | Age: 72
End: 2022-01-01

## 2022-01-01 ENCOUNTER — LAB REQUISITION (OUTPATIENT)
Dept: LAB | Facility: CLINIC | Age: 72
DRG: 312 | End: 2022-01-01
Payer: MEDICARE

## 2022-01-01 ENCOUNTER — LAB REQUISITION (OUTPATIENT)
Dept: LAB | Facility: CLINIC | Age: 72
End: 2022-01-01
Payer: MEDICARE

## 2022-01-01 ENCOUNTER — DOCUMENTATION ONLY (OUTPATIENT)
Dept: OTHER | Facility: CLINIC | Age: 72
End: 2022-01-01
Payer: OTHER MISCELLANEOUS

## 2022-01-01 ENCOUNTER — APPOINTMENT (OUTPATIENT)
Dept: NUCLEAR MEDICINE | Facility: CLINIC | Age: 72
End: 2022-01-01
Attending: INTERNAL MEDICINE
Payer: MEDICARE

## 2022-01-01 ENCOUNTER — APPOINTMENT (OUTPATIENT)
Dept: CT IMAGING | Facility: CLINIC | Age: 72
End: 2022-01-01
Attending: EMERGENCY MEDICINE
Payer: MEDICARE

## 2022-01-01 ENCOUNTER — PATIENT OUTREACH (OUTPATIENT)
Dept: CARE COORDINATION | Facility: CLINIC | Age: 72
End: 2022-01-01
Payer: MEDICARE

## 2022-01-01 ENCOUNTER — HOSPITAL ENCOUNTER (INPATIENT)
Facility: CLINIC | Age: 72
LOS: 5 days | Discharge: HOSPICE | DRG: 057 | End: 2022-05-20
Attending: EMERGENCY MEDICINE | Admitting: INTERNAL MEDICINE
Payer: MEDICARE

## 2022-01-01 ENCOUNTER — APPOINTMENT (OUTPATIENT)
Dept: GENERAL RADIOLOGY | Facility: CLINIC | Age: 72
DRG: 177 | End: 2022-01-01
Attending: EMERGENCY MEDICINE
Payer: MEDICARE

## 2022-01-01 ENCOUNTER — HOSPITAL ENCOUNTER (EMERGENCY)
Facility: CLINIC | Age: 72
Discharge: HOME | End: 2022-02-08
Attending: EMERGENCY MEDICINE | Admitting: EMERGENCY MEDICINE
Payer: MEDICARE

## 2022-01-01 ENCOUNTER — APPOINTMENT (OUTPATIENT)
Dept: CT IMAGING | Facility: CLINIC | Age: 72
DRG: 312 | End: 2022-01-01
Attending: EMERGENCY MEDICINE
Payer: MEDICARE

## 2022-01-01 ENCOUNTER — HOSPITAL ENCOUNTER (EMERGENCY)
Facility: CLINIC | Age: 72
Discharge: HOME | End: 2022-05-05
Attending: EMERGENCY MEDICINE | Admitting: EMERGENCY MEDICINE
Payer: MEDICARE

## 2022-01-01 ENCOUNTER — HOSPITAL ENCOUNTER (INPATIENT)
Facility: CLINIC | Age: 72
LOS: 34 days | End: 2022-06-23
Attending: INTERNAL MEDICINE | Admitting: INTERNAL MEDICINE
Payer: OTHER MISCELLANEOUS

## 2022-01-01 ENCOUNTER — DOCUMENTATION ONLY (OUTPATIENT)
Dept: OTHER | Facility: CLINIC | Age: 72
End: 2022-01-01
Payer: MEDICARE

## 2022-01-01 ENCOUNTER — HOSPITAL ENCOUNTER (INPATIENT)
Facility: CLINIC | Age: 72
LOS: 7 days | Discharge: NURSING, REHAB OR LONG-TERM CARE | DRG: 177 | End: 2022-01-31
Attending: EMERGENCY MEDICINE | Admitting: HOSPITALIST
Payer: MEDICARE

## 2022-01-01 ENCOUNTER — APPOINTMENT (OUTPATIENT)
Dept: GENERAL RADIOLOGY | Facility: CLINIC | Age: 72
End: 2022-01-01
Attending: EMERGENCY MEDICINE
Payer: MEDICARE

## 2022-01-01 ENCOUNTER — HOSPITAL ENCOUNTER (EMERGENCY)
Facility: CLINIC | Age: 72
Discharge: HOME | End: 2022-01-19
Attending: EMERGENCY MEDICINE | Admitting: EMERGENCY MEDICINE
Payer: MEDICARE

## 2022-01-01 ENCOUNTER — APPOINTMENT (OUTPATIENT)
Dept: CT IMAGING | Facility: CLINIC | Age: 72
End: 2022-01-01
Attending: EMERGENCY MEDICINE
Payer: OTHER MISCELLANEOUS

## 2022-01-01 ENCOUNTER — HOSPITAL ENCOUNTER (INPATIENT)
Facility: CLINIC | Age: 72
LOS: 2 days | Discharge: HOME HEALTH | DRG: 312 | End: 2022-02-07
Attending: EMERGENCY MEDICINE | Admitting: INTERNAL MEDICINE
Payer: MEDICARE

## 2022-01-01 ENCOUNTER — HOSPITAL ENCOUNTER (EMERGENCY)
Facility: CLINIC | Age: 72
Discharge: HOME | End: 2022-05-04
Attending: EMERGENCY MEDICINE | Admitting: EMERGENCY MEDICINE
Payer: OTHER MISCELLANEOUS

## 2022-01-01 ENCOUNTER — HOSPITAL ENCOUNTER (EMERGENCY)
Facility: CLINIC | Age: 72
Discharge: HOME | End: 2022-04-08
Attending: EMERGENCY MEDICINE | Admitting: EMERGENCY MEDICINE
Payer: OTHER MISCELLANEOUS

## 2022-01-01 ENCOUNTER — HEALTH MAINTENANCE LETTER (OUTPATIENT)
Age: 72
End: 2022-01-01

## 2022-01-01 ENCOUNTER — APPOINTMENT (OUTPATIENT)
Dept: NUCLEAR MEDICINE | Facility: CLINIC | Age: 72
DRG: 177 | End: 2022-01-01
Attending: INTERNAL MEDICINE
Payer: MEDICARE

## 2022-01-01 ENCOUNTER — APPOINTMENT (OUTPATIENT)
Dept: CARDIOLOGY | Facility: CLINIC | Age: 72
DRG: 177 | End: 2022-01-01
Attending: HOSPITALIST
Payer: MEDICARE

## 2022-01-01 VITALS
TEMPERATURE: 98.2 F | OXYGEN SATURATION: 94 % | SYSTOLIC BLOOD PRESSURE: 136 MMHG | HEIGHT: 73 IN | HEART RATE: 88 BPM | BODY MASS INDEX: 22.73 KG/M2 | WEIGHT: 171.5 LBS | DIASTOLIC BLOOD PRESSURE: 80 MMHG | RESPIRATION RATE: 18 BRPM

## 2022-01-01 VITALS
WEIGHT: 208 LBS | SYSTOLIC BLOOD PRESSURE: 114 MMHG | HEIGHT: 73 IN | TEMPERATURE: 97.8 F | OXYGEN SATURATION: 97 % | RESPIRATION RATE: 16 BRPM | HEART RATE: 77 BPM | DIASTOLIC BLOOD PRESSURE: 59 MMHG | BODY MASS INDEX: 27.57 KG/M2

## 2022-01-01 VITALS
RESPIRATION RATE: 16 BRPM | TEMPERATURE: 98.1 F | DIASTOLIC BLOOD PRESSURE: 76 MMHG | OXYGEN SATURATION: 98 % | SYSTOLIC BLOOD PRESSURE: 103 MMHG | HEART RATE: 72 BPM

## 2022-01-01 VITALS
TEMPERATURE: 97.5 F | SYSTOLIC BLOOD PRESSURE: 150 MMHG | RESPIRATION RATE: 18 BRPM | DIASTOLIC BLOOD PRESSURE: 94 MMHG | OXYGEN SATURATION: 100 % | HEART RATE: 68 BPM

## 2022-01-01 VITALS
HEART RATE: 82 BPM | DIASTOLIC BLOOD PRESSURE: 72 MMHG | SYSTOLIC BLOOD PRESSURE: 106 MMHG | RESPIRATION RATE: 16 BRPM | OXYGEN SATURATION: 97 % | TEMPERATURE: 98.7 F

## 2022-01-01 VITALS
HEART RATE: 76 BPM | OXYGEN SATURATION: 95 % | SYSTOLIC BLOOD PRESSURE: 103 MMHG | RESPIRATION RATE: 16 BRPM | DIASTOLIC BLOOD PRESSURE: 77 MMHG | WEIGHT: 190.1 LBS | HEIGHT: 73 IN | BODY MASS INDEX: 25.2 KG/M2 | TEMPERATURE: 98.3 F

## 2022-01-01 VITALS
TEMPERATURE: 97.3 F | WEIGHT: 192.1 LBS | OXYGEN SATURATION: 97 % | BODY MASS INDEX: 25.34 KG/M2 | SYSTOLIC BLOOD PRESSURE: 122 MMHG | DIASTOLIC BLOOD PRESSURE: 80 MMHG | HEART RATE: 68 BPM | RESPIRATION RATE: 16 BRPM

## 2022-01-01 VITALS
OXYGEN SATURATION: 94 % | TEMPERATURE: 97.4 F | WEIGHT: 175.8 LBS | BODY MASS INDEX: 23.19 KG/M2 | DIASTOLIC BLOOD PRESSURE: 33 MMHG | SYSTOLIC BLOOD PRESSURE: 58 MMHG | HEART RATE: 102 BPM | RESPIRATION RATE: 16 BRPM

## 2022-01-01 VITALS
WEIGHT: 200 LBS | HEIGHT: 73 IN | DIASTOLIC BLOOD PRESSURE: 76 MMHG | SYSTOLIC BLOOD PRESSURE: 118 MMHG | OXYGEN SATURATION: 97 % | TEMPERATURE: 97.8 F | RESPIRATION RATE: 16 BRPM | BODY MASS INDEX: 26.51 KG/M2 | HEART RATE: 90 BPM

## 2022-01-01 DIAGNOSIS — F02.818 LEWY BODY DEMENTIA WITH BEHAVIORAL DISTURBANCE (H): ICD-10-CM

## 2022-01-01 DIAGNOSIS — Z51.81 ENCOUNTER FOR THERAPEUTIC DRUG LEVEL MONITORING: ICD-10-CM

## 2022-01-01 DIAGNOSIS — Z86.59 HISTORY OF DEMENTIA: ICD-10-CM

## 2022-01-01 DIAGNOSIS — G31.83 LEWY BODY DEMENTIA WITH BEHAVIORAL DISTURBANCE (H): ICD-10-CM

## 2022-01-01 DIAGNOSIS — I21.4 NSTEMI (NON-ST ELEVATED MYOCARDIAL INFARCTION) (H): Primary | ICD-10-CM

## 2022-01-01 DIAGNOSIS — N28.9 RENAL INSUFFICIENCY: ICD-10-CM

## 2022-01-01 DIAGNOSIS — Z11.59 ENCOUNTER FOR SCREENING FOR OTHER VIRAL DISEASES: ICD-10-CM

## 2022-01-01 DIAGNOSIS — I21.4 NSTEMI (NON-ST ELEVATED MYOCARDIAL INFARCTION) (H): ICD-10-CM

## 2022-01-01 DIAGNOSIS — U07.1 INFECTION DUE TO 2019 NOVEL CORONAVIRUS: ICD-10-CM

## 2022-01-01 DIAGNOSIS — S80.01XA CONTUSION OF RIGHT KNEE, INITIAL ENCOUNTER: ICD-10-CM

## 2022-01-01 DIAGNOSIS — E11.65 TYPE 2 DIABETES MELLITUS WITH HYPERGLYCEMIA, WITH LONG-TERM CURRENT USE OF INSULIN (H): ICD-10-CM

## 2022-01-01 DIAGNOSIS — R07.9 CHEST PAIN, UNSPECIFIED TYPE: ICD-10-CM
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
## 2022-01-01 DIAGNOSIS — F32.A DEPRESSION, UNSPECIFIED DEPRESSION TYPE: ICD-10-CM

## 2022-01-01 DIAGNOSIS — F03.918 AGGRESSIVE BEHAVIOR DUE TO DEMENTIA (H): ICD-10-CM

## 2022-01-01 DIAGNOSIS — Z71.89 OTHER SPECIFIED COUNSELING: ICD-10-CM

## 2022-01-01 DIAGNOSIS — R42 DIZZINESS: ICD-10-CM

## 2022-01-01 DIAGNOSIS — S00.03XA CONTUSION OF SCALP, INITIAL ENCOUNTER: ICD-10-CM

## 2022-01-01 DIAGNOSIS — Z53.20 REFUSAL OF MEDICATION: ICD-10-CM

## 2022-01-01 DIAGNOSIS — R46.89 AGGRESSIVE BEHAVIOR: ICD-10-CM

## 2022-01-01 DIAGNOSIS — S09.90XA CLOSED HEAD INJURY, INITIAL ENCOUNTER: ICD-10-CM

## 2022-01-01 DIAGNOSIS — R73.9 HYPERGLYCEMIA: Primary | ICD-10-CM

## 2022-01-01 DIAGNOSIS — R55 SYNCOPE, UNSPECIFIED SYNCOPE TYPE: ICD-10-CM

## 2022-01-01 DIAGNOSIS — R07.9 ACUTE CHEST PAIN: ICD-10-CM

## 2022-01-01 DIAGNOSIS — Z79.4 TYPE 2 DIABETES MELLITUS WITH HYPERGLYCEMIA, WITH LONG-TERM CURRENT USE OF INSULIN (H): ICD-10-CM

## 2022-01-01 LAB
ALBUMIN SERPL-MCNC: 2.9 G/DL (ref 3.4–5)
ALBUMIN SERPL-MCNC: 3 G/DL (ref 3.4–5)
ALBUMIN SERPL-MCNC: 3.5 G/DL (ref 3.4–5)
ALBUMIN SERPL-MCNC: 3.6 G/DL (ref 3.4–5)
ALBUMIN SERPL-MCNC: 3.6 G/DL (ref 3.4–5)
ALBUMIN SERPL-MCNC: 3.7 G/DL (ref 3.4–5)
ALBUMIN SERPL-MCNC: 3.7 G/DL (ref 3.4–5)
ALBUMIN UR-MCNC: 10 MG/DL
ALBUMIN UR-MCNC: 20 MG/DL
ALBUMIN UR-MCNC: NEGATIVE MG/DL
ALP SERPL-CCNC: 100 U/L (ref 40–150)
ALP SERPL-CCNC: 104 U/L (ref 40–150)
ALP SERPL-CCNC: 104 U/L (ref 40–150)
ALP SERPL-CCNC: 106 U/L (ref 40–150)
ALP SERPL-CCNC: 117 U/L (ref 40–150)
ALP SERPL-CCNC: 128 U/L (ref 40–150)
ALP SERPL-CCNC: 98 U/L (ref 40–150)
ALT SERPL W P-5'-P-CCNC: 31 U/L (ref 0–70)
ALT SERPL W P-5'-P-CCNC: 35 U/L (ref 0–70)
ALT SERPL W P-5'-P-CCNC: 45 U/L (ref 0–70)
ALT SERPL W P-5'-P-CCNC: 47 U/L (ref 0–70)
ALT SERPL W P-5'-P-CCNC: 48 U/L (ref 0–70)
ALT SERPL W P-5'-P-CCNC: 53 U/L (ref 0–70)
ALT SERPL W P-5'-P-CCNC: 57 U/L (ref 0–70)
AMMONIA PLAS-SCNC: 41 UMOL/L (ref 10–50)
ANION GAP SERPL CALCULATED.3IONS-SCNC: 10 MMOL/L (ref 3–14)
ANION GAP SERPL CALCULATED.3IONS-SCNC: 3 MMOL/L (ref 3–14)
ANION GAP SERPL CALCULATED.3IONS-SCNC: 3 MMOL/L (ref 3–14)
ANION GAP SERPL CALCULATED.3IONS-SCNC: 4 MMOL/L (ref 3–14)
ANION GAP SERPL CALCULATED.3IONS-SCNC: 4 MMOL/L (ref 3–14)
ANION GAP SERPL CALCULATED.3IONS-SCNC: 5 MMOL/L (ref 3–14)
ANION GAP SERPL CALCULATED.3IONS-SCNC: 6 MMOL/L (ref 3–14)
ANION GAP SERPL CALCULATED.3IONS-SCNC: 7 MMOL/L (ref 3–14)
ANION GAP SERPL CALCULATED.3IONS-SCNC: 7 MMOL/L (ref 3–14)
ANION GAP SERPL CALCULATED.3IONS-SCNC: 8 MMOL/L (ref 3–14)
ANION GAP SERPL CALCULATED.3IONS-SCNC: 9 MMOL/L (ref 3–14)
APPEARANCE UR: CLEAR
AST SERPL W P-5'-P-CCNC: 15 U/L (ref 0–45)
AST SERPL W P-5'-P-CCNC: 15 U/L (ref 0–45)
AST SERPL W P-5'-P-CCNC: 17 U/L (ref 0–45)
AST SERPL W P-5'-P-CCNC: 21 U/L (ref 0–45)
AST SERPL W P-5'-P-CCNC: 23 U/L (ref 0–45)
AST SERPL W P-5'-P-CCNC: 24 U/L (ref 0–45)
AST SERPL W P-5'-P-CCNC: 24 U/L (ref 0–45)
ATRIAL RATE - MUSE: 72 BPM
ATRIAL RATE - MUSE: 72 BPM
ATRIAL RATE - MUSE: 80 BPM
ATRIAL RATE - MUSE: 83 BPM
ATRIAL RATE - MUSE: 85 BPM
BASOPHILS # BLD AUTO: 0 10E3/UL (ref 0–0.2)
BASOPHILS # BLD AUTO: 0 10E3/UL (ref 0–0.2)
BASOPHILS # BLD AUTO: 0.1 10E3/UL (ref 0–0.2)
BASOPHILS NFR BLD AUTO: 1 %
BASOPHILS NFR BLD AUTO: 2 %
BI-PLANE LVEF ECHO: NORMAL
BILIRUB DIRECT SERPL-MCNC: 0.1 MG/DL (ref 0–0.2)
BILIRUB DIRECT SERPL-MCNC: 0.2 MG/DL (ref 0–0.2)
BILIRUB SERPL-MCNC: 0.3 MG/DL (ref 0.2–1.3)
BILIRUB SERPL-MCNC: 0.5 MG/DL (ref 0.2–1.3)
BILIRUB SERPL-MCNC: 0.5 MG/DL (ref 0.2–1.3)
BILIRUB SERPL-MCNC: 0.6 MG/DL (ref 0.2–1.3)
BILIRUB SERPL-MCNC: 0.8 MG/DL (ref 0.2–1.3)
BILIRUB SERPL-MCNC: 0.9 MG/DL (ref 0.2–1.3)
BILIRUB SERPL-MCNC: 1.1 MG/DL (ref 0.2–1.3)
BILIRUB UR QL STRIP: NEGATIVE
BUN SERPL-MCNC: 10 MG/DL (ref 7–30)
BUN SERPL-MCNC: 15 MG/DL (ref 7–30)
BUN SERPL-MCNC: 16 MG/DL (ref 7–30)
BUN SERPL-MCNC: 16 MG/DL (ref 7–30)
BUN SERPL-MCNC: 18 MG/DL (ref 7–30)
BUN SERPL-MCNC: 21 MG/DL (ref 7–30)
BUN SERPL-MCNC: 22 MG/DL (ref 7–30)
BUN SERPL-MCNC: 23 MG/DL (ref 7–30)
BUN SERPL-MCNC: 25 MG/DL (ref 7–30)
BUN SERPL-MCNC: 26 MG/DL (ref 7–30)
BUN SERPL-MCNC: 26 MG/DL (ref 7–30)
BUN SERPL-MCNC: 30 MG/DL (ref 7–30)
BUN SERPL-MCNC: 36 MG/DL (ref 7–30)
CALCIUM SERPL-MCNC: 8.3 MG/DL (ref 8.5–10.1)
CALCIUM SERPL-MCNC: 8.3 MG/DL (ref 8.5–10.1)
CALCIUM SERPL-MCNC: 8.4 MG/DL (ref 8.5–10.1)
CALCIUM SERPL-MCNC: 8.5 MG/DL (ref 8.5–10.1)
CALCIUM SERPL-MCNC: 8.6 MG/DL (ref 8.5–10.1)
CALCIUM SERPL-MCNC: 8.6 MG/DL (ref 8.5–10.1)
CALCIUM SERPL-MCNC: 8.7 MG/DL (ref 8.5–10.1)
CALCIUM SERPL-MCNC: 8.7 MG/DL (ref 8.5–10.1)
CALCIUM SERPL-MCNC: 8.8 MG/DL (ref 8.5–10.1)
CALCIUM SERPL-MCNC: 8.8 MG/DL (ref 8.5–10.1)
CALCIUM SERPL-MCNC: 8.9 MG/DL (ref 8.5–10.1)
CALCIUM SERPL-MCNC: 8.9 MG/DL (ref 8.5–10.1)
CALCIUM SERPL-MCNC: 9 MG/DL (ref 8.5–10.1)
CALCIUM SERPL-MCNC: 9.1 MG/DL (ref 8.5–10.1)
CALCIUM SERPL-MCNC: 9.2 MG/DL (ref 8.5–10.1)
CHLORIDE BLD-SCNC: 104 MMOL/L (ref 94–109)
CHLORIDE BLD-SCNC: 104 MMOL/L (ref 94–109)
CHLORIDE BLD-SCNC: 105 MMOL/L (ref 94–109)
CHLORIDE BLD-SCNC: 105 MMOL/L (ref 94–109)
CHLORIDE BLD-SCNC: 106 MMOL/L (ref 94–109)
CHLORIDE BLD-SCNC: 107 MMOL/L (ref 94–109)
CHLORIDE BLD-SCNC: 108 MMOL/L (ref 94–109)
CHLORIDE BLD-SCNC: 110 MMOL/L (ref 94–109)
CHLORIDE BLD-SCNC: 110 MMOL/L (ref 94–109)
CO2 SERPL-SCNC: 22 MMOL/L (ref 20–32)
CO2 SERPL-SCNC: 23 MMOL/L (ref 20–32)
CO2 SERPL-SCNC: 24 MMOL/L (ref 20–32)
CO2 SERPL-SCNC: 24 MMOL/L (ref 20–32)
CO2 SERPL-SCNC: 25 MMOL/L (ref 20–32)
CO2 SERPL-SCNC: 26 MMOL/L (ref 20–32)
CO2 SERPL-SCNC: 28 MMOL/L (ref 20–32)
CO2 SERPL-SCNC: 28 MMOL/L (ref 20–32)
COLOR UR AUTO: ABNORMAL
COLOR UR AUTO: YELLOW
COLOR UR AUTO: YELLOW
CREAT SERPL-MCNC: 0.75 MG/DL (ref 0.66–1.25)
CREAT SERPL-MCNC: 0.79 MG/DL (ref 0.66–1.25)
CREAT SERPL-MCNC: 0.8 MG/DL (ref 0.66–1.25)
CREAT SERPL-MCNC: 0.81 MG/DL (ref 0.66–1.25)
CREAT SERPL-MCNC: 0.88 MG/DL (ref 0.66–1.25)
CREAT SERPL-MCNC: 0.88 MG/DL (ref 0.66–1.25)
CREAT SERPL-MCNC: 0.95 MG/DL (ref 0.66–1.25)
CREAT SERPL-MCNC: 0.99 MG/DL (ref 0.66–1.25)
CREAT SERPL-MCNC: 1.06 MG/DL (ref 0.66–1.25)
CREAT SERPL-MCNC: 1.07 MG/DL (ref 0.66–1.25)
CREAT SERPL-MCNC: 1.11 MG/DL (ref 0.66–1.25)
CREAT SERPL-MCNC: 1.13 MG/DL (ref 0.66–1.25)
CREAT SERPL-MCNC: 1.19 MG/DL (ref 0.66–1.25)
CREAT SERPL-MCNC: 1.42 MG/DL (ref 0.66–1.25)
CREAT SERPL-MCNC: 1.65 MG/DL (ref 0.66–1.25)
CRP SERPL-MCNC: 13.1 MG/L (ref 0–8)
CRP SERPL-MCNC: 16.6 MG/L (ref 0–8)
CV STRESS MAX HR HE: 71
D DIMER PPP FEU-MCNC: 1.07 UG/ML FEU (ref 0–0.5)
D DIMER PPP FEU-MCNC: 1.17 UG/ML FEU (ref 0–0.5)
D DIMER PPP FEU-MCNC: 1.28 UG/ML FEU (ref 0–0.5)
DIASTOLIC BLOOD PRESSURE - MUSE: NORMAL MMHG
EOSINOPHIL # BLD AUTO: 0 10E3/UL (ref 0–0.7)
EOSINOPHIL # BLD AUTO: 0.1 10E3/UL (ref 0–0.7)
EOSINOPHIL # BLD AUTO: 0.2 10E3/UL (ref 0–0.7)
EOSINOPHIL NFR BLD AUTO: 0 %
EOSINOPHIL NFR BLD AUTO: 1 %
EOSINOPHIL NFR BLD AUTO: 2 %
EOSINOPHIL NFR BLD AUTO: 3 %
ERYTHROCYTE [DISTWIDTH] IN BLOOD BY AUTOMATED COUNT: 13.2 % (ref 10–15)
ERYTHROCYTE [DISTWIDTH] IN BLOOD BY AUTOMATED COUNT: 13.3 % (ref 10–15)
ERYTHROCYTE [DISTWIDTH] IN BLOOD BY AUTOMATED COUNT: 13.4 % (ref 10–15)
ERYTHROCYTE [DISTWIDTH] IN BLOOD BY AUTOMATED COUNT: 13.9 % (ref 10–15)
ERYTHROCYTE [DISTWIDTH] IN BLOOD BY AUTOMATED COUNT: 14.2 % (ref 10–15)
ERYTHROCYTE [DISTWIDTH] IN BLOOD BY AUTOMATED COUNT: 14.3 % (ref 10–15)
ERYTHROCYTE [DISTWIDTH] IN BLOOD BY AUTOMATED COUNT: 14.4 % (ref 10–15)
ERYTHROCYTE [DISTWIDTH] IN BLOOD BY AUTOMATED COUNT: 14.7 % (ref 10–15)
ERYTHROCYTE [DISTWIDTH] IN BLOOD BY AUTOMATED COUNT: 14.7 % (ref 10–15)
ERYTHROCYTE [DISTWIDTH] IN BLOOD BY AUTOMATED COUNT: 14.8 % (ref 10–15)
ERYTHROCYTE [DISTWIDTH] IN BLOOD BY AUTOMATED COUNT: 14.9 % (ref 10–15)
ERYTHROCYTE [DISTWIDTH] IN BLOOD BY AUTOMATED COUNT: 14.9 % (ref 10–15)
ERYTHROCYTE [DISTWIDTH] IN BLOOD BY AUTOMATED COUNT: 15.4 % (ref 10–15)
FLUAV RNA SPEC QL NAA+PROBE: NEGATIVE
FLUAV RNA SPEC QL NAA+PROBE: NEGATIVE
FLUBV RNA RESP QL NAA+PROBE: NEGATIVE
FLUBV RNA RESP QL NAA+PROBE: NEGATIVE
GFR SERPL CREATININE-BSD FRML MDRD: 45 ML/MIN/1.73M2
GFR SERPL CREATININE-BSD FRML MDRD: 53 ML/MIN/1.73M2
GFR SERPL CREATININE-BSD FRML MDRD: 66 ML/MIN/1.73M2
GFR SERPL CREATININE-BSD FRML MDRD: 70 ML/MIN/1.73M2
GFR SERPL CREATININE-BSD FRML MDRD: 72 ML/MIN/1.73M2
GFR SERPL CREATININE-BSD FRML MDRD: 75 ML/MIN/1.73M2
GFR SERPL CREATININE-BSD FRML MDRD: 76 ML/MIN/1.73M2
GFR SERPL CREATININE-BSD FRML MDRD: 82 ML/MIN/1.73M2
GFR SERPL CREATININE-BSD FRML MDRD: 87 ML/MIN/1.73M2
GFR SERPL CREATININE-BSD FRML MDRD: >90 ML/MIN/1.73M2
GLUCOSE BLD-MCNC: 137 MG/DL (ref 70–99)
GLUCOSE BLD-MCNC: 138 MG/DL (ref 70–99)
GLUCOSE BLD-MCNC: 163 MG/DL (ref 70–99)
GLUCOSE BLD-MCNC: 183 MG/DL (ref 70–99)
GLUCOSE BLD-MCNC: 184 MG/DL (ref 70–99)
GLUCOSE BLD-MCNC: 190 MG/DL (ref 70–99)
GLUCOSE BLD-MCNC: 208 MG/DL (ref 70–99)
GLUCOSE BLD-MCNC: 210 MG/DL (ref 70–99)
GLUCOSE BLD-MCNC: 224 MG/DL (ref 70–99)
GLUCOSE BLD-MCNC: 231 MG/DL (ref 70–99)
GLUCOSE BLD-MCNC: 247 MG/DL (ref 70–99)
GLUCOSE BLD-MCNC: 253 MG/DL (ref 70–99)
GLUCOSE BLD-MCNC: 260 MG/DL (ref 70–99)
GLUCOSE BLD-MCNC: 280 MG/DL (ref 70–99)
GLUCOSE BLD-MCNC: 298 MG/DL (ref 70–99)
GLUCOSE BLDC GLUCOMTR-MCNC: 104 MG/DL (ref 70–99)
GLUCOSE BLDC GLUCOMTR-MCNC: 105 MG/DL (ref 70–99)
GLUCOSE BLDC GLUCOMTR-MCNC: 107 MG/DL (ref 70–99)
GLUCOSE BLDC GLUCOMTR-MCNC: 112 MG/DL (ref 70–99)
GLUCOSE BLDC GLUCOMTR-MCNC: 115 MG/DL (ref 70–99)
GLUCOSE BLDC GLUCOMTR-MCNC: 116 MG/DL (ref 70–99)
GLUCOSE BLDC GLUCOMTR-MCNC: 117 MG/DL (ref 70–99)
GLUCOSE BLDC GLUCOMTR-MCNC: 118 MG/DL (ref 70–99)
GLUCOSE BLDC GLUCOMTR-MCNC: 121 MG/DL (ref 70–99)
GLUCOSE BLDC GLUCOMTR-MCNC: 121 MG/DL (ref 70–99)
GLUCOSE BLDC GLUCOMTR-MCNC: 123 MG/DL (ref 70–99)
GLUCOSE BLDC GLUCOMTR-MCNC: 124 MG/DL (ref 70–99)
GLUCOSE BLDC GLUCOMTR-MCNC: 126 MG/DL (ref 70–99)
GLUCOSE BLDC GLUCOMTR-MCNC: 126 MG/DL (ref 70–99)
GLUCOSE BLDC GLUCOMTR-MCNC: 129 MG/DL (ref 70–99)
GLUCOSE BLDC GLUCOMTR-MCNC: 130 MG/DL (ref 70–99)
GLUCOSE BLDC GLUCOMTR-MCNC: 131 MG/DL (ref 70–99)
GLUCOSE BLDC GLUCOMTR-MCNC: 131 MG/DL (ref 70–99)
GLUCOSE BLDC GLUCOMTR-MCNC: 132 MG/DL (ref 70–99)
GLUCOSE BLDC GLUCOMTR-MCNC: 133 MG/DL (ref 70–99)
GLUCOSE BLDC GLUCOMTR-MCNC: 134 MG/DL (ref 70–99)
GLUCOSE BLDC GLUCOMTR-MCNC: 135 MG/DL (ref 70–99)
GLUCOSE BLDC GLUCOMTR-MCNC: 135 MG/DL (ref 70–99)
GLUCOSE BLDC GLUCOMTR-MCNC: 139 MG/DL (ref 70–99)
GLUCOSE BLDC GLUCOMTR-MCNC: 145 MG/DL (ref 70–99)
GLUCOSE BLDC GLUCOMTR-MCNC: 146 MG/DL (ref 70–99)
GLUCOSE BLDC GLUCOMTR-MCNC: 146 MG/DL (ref 70–99)
GLUCOSE BLDC GLUCOMTR-MCNC: 148 MG/DL (ref 70–99)
GLUCOSE BLDC GLUCOMTR-MCNC: 149 MG/DL (ref 70–99)
GLUCOSE BLDC GLUCOMTR-MCNC: 149 MG/DL (ref 70–99)
GLUCOSE BLDC GLUCOMTR-MCNC: 151 MG/DL (ref 70–99)
GLUCOSE BLDC GLUCOMTR-MCNC: 151 MG/DL (ref 70–99)
GLUCOSE BLDC GLUCOMTR-MCNC: 152 MG/DL (ref 70–99)
GLUCOSE BLDC GLUCOMTR-MCNC: 153 MG/DL (ref 70–99)
GLUCOSE BLDC GLUCOMTR-MCNC: 154 MG/DL (ref 70–99)
GLUCOSE BLDC GLUCOMTR-MCNC: 155 MG/DL (ref 70–99)
GLUCOSE BLDC GLUCOMTR-MCNC: 157 MG/DL (ref 70–99)
GLUCOSE BLDC GLUCOMTR-MCNC: 161 MG/DL (ref 70–99)
GLUCOSE BLDC GLUCOMTR-MCNC: 165 MG/DL (ref 70–99)
GLUCOSE BLDC GLUCOMTR-MCNC: 171 MG/DL (ref 70–99)
GLUCOSE BLDC GLUCOMTR-MCNC: 174 MG/DL (ref 70–99)
GLUCOSE BLDC GLUCOMTR-MCNC: 176 MG/DL (ref 70–99)
GLUCOSE BLDC GLUCOMTR-MCNC: 176 MG/DL (ref 70–99)
GLUCOSE BLDC GLUCOMTR-MCNC: 177 MG/DL (ref 70–99)
GLUCOSE BLDC GLUCOMTR-MCNC: 177 MG/DL (ref 70–99)
GLUCOSE BLDC GLUCOMTR-MCNC: 182 MG/DL (ref 70–99)
GLUCOSE BLDC GLUCOMTR-MCNC: 185 MG/DL (ref 70–99)
GLUCOSE BLDC GLUCOMTR-MCNC: 187 MG/DL (ref 70–99)
GLUCOSE BLDC GLUCOMTR-MCNC: 187 MG/DL (ref 70–99)
GLUCOSE BLDC GLUCOMTR-MCNC: 188 MG/DL (ref 70–99)
GLUCOSE BLDC GLUCOMTR-MCNC: 188 MG/DL (ref 70–99)
GLUCOSE BLDC GLUCOMTR-MCNC: 189 MG/DL (ref 70–99)
GLUCOSE BLDC GLUCOMTR-MCNC: 190 MG/DL (ref 70–99)
GLUCOSE BLDC GLUCOMTR-MCNC: 193 MG/DL (ref 70–99)
GLUCOSE BLDC GLUCOMTR-MCNC: 193 MG/DL (ref 70–99)
GLUCOSE BLDC GLUCOMTR-MCNC: 194 MG/DL (ref 70–99)
GLUCOSE BLDC GLUCOMTR-MCNC: 195 MG/DL (ref 70–99)
GLUCOSE BLDC GLUCOMTR-MCNC: 197 MG/DL (ref 70–99)
GLUCOSE BLDC GLUCOMTR-MCNC: 198 MG/DL (ref 70–99)
GLUCOSE BLDC GLUCOMTR-MCNC: 199 MG/DL (ref 70–99)
GLUCOSE BLDC GLUCOMTR-MCNC: 201 MG/DL (ref 70–99)
GLUCOSE BLDC GLUCOMTR-MCNC: 203 MG/DL (ref 70–99)
GLUCOSE BLDC GLUCOMTR-MCNC: 203 MG/DL (ref 70–99)
GLUCOSE BLDC GLUCOMTR-MCNC: 204 MG/DL (ref 70–99)
GLUCOSE BLDC GLUCOMTR-MCNC: 207 MG/DL (ref 70–99)
GLUCOSE BLDC GLUCOMTR-MCNC: 207 MG/DL (ref 70–99)
GLUCOSE BLDC GLUCOMTR-MCNC: 210 MG/DL (ref 70–99)
GLUCOSE BLDC GLUCOMTR-MCNC: 211 MG/DL (ref 70–99)
GLUCOSE BLDC GLUCOMTR-MCNC: 211 MG/DL (ref 70–99)
GLUCOSE BLDC GLUCOMTR-MCNC: 212 MG/DL (ref 70–99)
GLUCOSE BLDC GLUCOMTR-MCNC: 212 MG/DL (ref 70–99)
GLUCOSE BLDC GLUCOMTR-MCNC: 214 MG/DL (ref 70–99)
GLUCOSE BLDC GLUCOMTR-MCNC: 215 MG/DL (ref 70–99)
GLUCOSE BLDC GLUCOMTR-MCNC: 215 MG/DL (ref 70–99)
GLUCOSE BLDC GLUCOMTR-MCNC: 218 MG/DL (ref 70–99)
GLUCOSE BLDC GLUCOMTR-MCNC: 218 MG/DL (ref 70–99)
GLUCOSE BLDC GLUCOMTR-MCNC: 219 MG/DL (ref 70–99)
GLUCOSE BLDC GLUCOMTR-MCNC: 220 MG/DL (ref 70–99)
GLUCOSE BLDC GLUCOMTR-MCNC: 220 MG/DL (ref 70–99)
GLUCOSE BLDC GLUCOMTR-MCNC: 226 MG/DL (ref 70–99)
GLUCOSE BLDC GLUCOMTR-MCNC: 227 MG/DL (ref 70–99)
GLUCOSE BLDC GLUCOMTR-MCNC: 228 MG/DL (ref 70–99)
GLUCOSE BLDC GLUCOMTR-MCNC: 229 MG/DL (ref 70–99)
GLUCOSE BLDC GLUCOMTR-MCNC: 230 MG/DL (ref 70–99)
GLUCOSE BLDC GLUCOMTR-MCNC: 232 MG/DL (ref 70–99)
GLUCOSE BLDC GLUCOMTR-MCNC: 233 MG/DL (ref 70–99)
GLUCOSE BLDC GLUCOMTR-MCNC: 235 MG/DL (ref 70–99)
GLUCOSE BLDC GLUCOMTR-MCNC: 236 MG/DL (ref 70–99)
GLUCOSE BLDC GLUCOMTR-MCNC: 237 MG/DL (ref 70–99)
GLUCOSE BLDC GLUCOMTR-MCNC: 238 MG/DL (ref 70–99)
GLUCOSE BLDC GLUCOMTR-MCNC: 239 MG/DL (ref 70–99)
GLUCOSE BLDC GLUCOMTR-MCNC: 240 MG/DL (ref 70–99)
GLUCOSE BLDC GLUCOMTR-MCNC: 240 MG/DL (ref 70–99)
GLUCOSE BLDC GLUCOMTR-MCNC: 244 MG/DL (ref 70–99)
GLUCOSE BLDC GLUCOMTR-MCNC: 245 MG/DL (ref 70–99)
GLUCOSE BLDC GLUCOMTR-MCNC: 247 MG/DL (ref 70–99)
GLUCOSE BLDC GLUCOMTR-MCNC: 249 MG/DL (ref 70–99)
GLUCOSE BLDC GLUCOMTR-MCNC: 249 MG/DL (ref 70–99)
GLUCOSE BLDC GLUCOMTR-MCNC: 250 MG/DL (ref 70–99)
GLUCOSE BLDC GLUCOMTR-MCNC: 252 MG/DL (ref 70–99)
GLUCOSE BLDC GLUCOMTR-MCNC: 253 MG/DL (ref 70–99)
GLUCOSE BLDC GLUCOMTR-MCNC: 254 MG/DL (ref 70–99)
GLUCOSE BLDC GLUCOMTR-MCNC: 255 MG/DL (ref 70–99)
GLUCOSE BLDC GLUCOMTR-MCNC: 255 MG/DL (ref 70–99)
GLUCOSE BLDC GLUCOMTR-MCNC: 256 MG/DL (ref 70–99)
GLUCOSE BLDC GLUCOMTR-MCNC: 257 MG/DL (ref 70–99)
GLUCOSE BLDC GLUCOMTR-MCNC: 258 MG/DL (ref 70–99)
GLUCOSE BLDC GLUCOMTR-MCNC: 259 MG/DL (ref 70–99)
GLUCOSE BLDC GLUCOMTR-MCNC: 263 MG/DL (ref 70–99)
GLUCOSE BLDC GLUCOMTR-MCNC: 264 MG/DL (ref 70–99)
GLUCOSE BLDC GLUCOMTR-MCNC: 265 MG/DL (ref 70–99)
GLUCOSE BLDC GLUCOMTR-MCNC: 265 MG/DL (ref 70–99)
GLUCOSE BLDC GLUCOMTR-MCNC: 266 MG/DL (ref 70–99)
GLUCOSE BLDC GLUCOMTR-MCNC: 267 MG/DL (ref 70–99)
GLUCOSE BLDC GLUCOMTR-MCNC: 268 MG/DL (ref 70–99)
GLUCOSE BLDC GLUCOMTR-MCNC: 269 MG/DL (ref 70–99)
GLUCOSE BLDC GLUCOMTR-MCNC: 270 MG/DL (ref 70–99)
GLUCOSE BLDC GLUCOMTR-MCNC: 272 MG/DL (ref 70–99)
GLUCOSE BLDC GLUCOMTR-MCNC: 278 MG/DL (ref 70–99)
GLUCOSE BLDC GLUCOMTR-MCNC: 279 MG/DL (ref 70–99)
GLUCOSE BLDC GLUCOMTR-MCNC: 280 MG/DL (ref 70–99)
GLUCOSE BLDC GLUCOMTR-MCNC: 280 MG/DL (ref 70–99)
GLUCOSE BLDC GLUCOMTR-MCNC: 281 MG/DL (ref 70–99)
GLUCOSE BLDC GLUCOMTR-MCNC: 284 MG/DL (ref 70–99)
GLUCOSE BLDC GLUCOMTR-MCNC: 285 MG/DL (ref 70–99)
GLUCOSE BLDC GLUCOMTR-MCNC: 287 MG/DL (ref 70–99)
GLUCOSE BLDC GLUCOMTR-MCNC: 290 MG/DL (ref 70–99)
GLUCOSE BLDC GLUCOMTR-MCNC: 292 MG/DL (ref 70–99)
GLUCOSE BLDC GLUCOMTR-MCNC: 296 MG/DL (ref 70–99)
GLUCOSE BLDC GLUCOMTR-MCNC: 296 MG/DL (ref 70–99)
GLUCOSE BLDC GLUCOMTR-MCNC: 297 MG/DL (ref 70–99)
GLUCOSE BLDC GLUCOMTR-MCNC: 303 MG/DL (ref 70–99)
GLUCOSE BLDC GLUCOMTR-MCNC: 308 MG/DL (ref 70–99)
GLUCOSE BLDC GLUCOMTR-MCNC: 313 MG/DL (ref 70–99)
GLUCOSE BLDC GLUCOMTR-MCNC: 317 MG/DL (ref 70–99)
GLUCOSE BLDC GLUCOMTR-MCNC: 327 MG/DL (ref 70–99)
GLUCOSE BLDC GLUCOMTR-MCNC: 330 MG/DL (ref 70–99)
GLUCOSE BLDC GLUCOMTR-MCNC: 333 MG/DL (ref 70–99)
GLUCOSE BLDC GLUCOMTR-MCNC: 334 MG/DL (ref 70–99)
GLUCOSE BLDC GLUCOMTR-MCNC: 341 MG/DL (ref 70–99)
GLUCOSE BLDC GLUCOMTR-MCNC: 342 MG/DL (ref 70–99)
GLUCOSE BLDC GLUCOMTR-MCNC: 346 MG/DL (ref 70–99)
GLUCOSE BLDC GLUCOMTR-MCNC: 370 MG/DL (ref 70–99)
GLUCOSE BLDC GLUCOMTR-MCNC: 375 MG/DL (ref 70–99)
GLUCOSE BLDC GLUCOMTR-MCNC: 389 MG/DL (ref 70–99)
GLUCOSE BLDC GLUCOMTR-MCNC: 412 MG/DL (ref 70–99)
GLUCOSE BLDC GLUCOMTR-MCNC: 69 MG/DL (ref 70–99)
GLUCOSE BLDC GLUCOMTR-MCNC: 75 MG/DL (ref 70–99)
GLUCOSE BLDC GLUCOMTR-MCNC: 89 MG/DL (ref 70–99)
GLUCOSE BLDC GLUCOMTR-MCNC: 97 MG/DL (ref 70–99)
GLUCOSE UR STRIP-MCNC: 300 MG/DL
GLUCOSE UR STRIP-MCNC: >=1000 MG/DL
GLUCOSE UR STRIP-MCNC: NEGATIVE MG/DL
HBA1C MFR BLD: 7 % (ref 0–5.6)
HCT VFR BLD AUTO: 41.1 % (ref 40–53)
HCT VFR BLD AUTO: 41.3 % (ref 40–53)
HCT VFR BLD AUTO: 41.4 % (ref 40–53)
HCT VFR BLD AUTO: 42.2 % (ref 40–53)
HCT VFR BLD AUTO: 42.2 % (ref 40–53)
HCT VFR BLD AUTO: 42.5 % (ref 40–53)
HCT VFR BLD AUTO: 42.9 % (ref 40–53)
HCT VFR BLD AUTO: 43.1 % (ref 40–53)
HCT VFR BLD AUTO: 43.3 % (ref 40–53)
HCT VFR BLD AUTO: 43.4 % (ref 40–53)
HCT VFR BLD AUTO: 43.8 % (ref 40–53)
HCT VFR BLD AUTO: 43.9 % (ref 40–53)
HCT VFR BLD AUTO: 44.8 % (ref 40–53)
HGB BLD-MCNC: 13.1 G/DL (ref 13.3–17.7)
HGB BLD-MCNC: 13.4 G/DL (ref 13.3–17.7)
HGB BLD-MCNC: 13.5 G/DL (ref 13.3–17.7)
HGB BLD-MCNC: 13.6 G/DL (ref 13.3–17.7)
HGB BLD-MCNC: 13.8 G/DL (ref 13.3–17.7)
HGB BLD-MCNC: 13.8 G/DL (ref 13.3–17.7)
HGB BLD-MCNC: 13.9 G/DL (ref 13.3–17.7)
HGB BLD-MCNC: 14 G/DL (ref 13.3–17.7)
HGB BLD-MCNC: 14 G/DL (ref 13.3–17.7)
HGB BLD-MCNC: 14.7 G/DL (ref 13.3–17.7)
HGB BLD-MCNC: 14.8 G/DL (ref 13.3–17.7)
HGB UR QL STRIP: NEGATIVE
HOLD SPECIMEN: NORMAL
HYALINE CASTS: 3 /LPF
HYALINE CASTS: 35 /LPF
HYALINE CASTS: 5 /LPF
IMM GRANULOCYTES # BLD: 0 10E3/UL
IMM GRANULOCYTES NFR BLD: 0 %
INTERPRETATION ECG - MUSE: NORMAL
KETONES UR STRIP-MCNC: 10 MG/DL
KETONES UR STRIP-MCNC: 20 MG/DL
KETONES UR STRIP-MCNC: ABNORMAL MG/DL
LEUKOCYTE ESTERASE UR QL STRIP: NEGATIVE
LVEF ECHO: NORMAL
LYMPHOCYTES # BLD AUTO: 0.8 10E3/UL (ref 0.8–5.3)
LYMPHOCYTES # BLD AUTO: 1.1 10E3/UL (ref 0.8–5.3)
LYMPHOCYTES # BLD AUTO: 1.6 10E3/UL (ref 0.8–5.3)
LYMPHOCYTES # BLD AUTO: 1.7 10E3/UL (ref 0.8–5.3)
LYMPHOCYTES # BLD AUTO: 1.8 10E3/UL (ref 0.8–5.3)
LYMPHOCYTES # BLD AUTO: 2 10E3/UL (ref 0.8–5.3)
LYMPHOCYTES # BLD AUTO: 2.1 10E3/UL (ref 0.8–5.3)
LYMPHOCYTES # BLD AUTO: 2.1 10E3/UL (ref 0.8–5.3)
LYMPHOCYTES # BLD AUTO: 2.2 10E3/UL (ref 0.8–5.3)
LYMPHOCYTES # BLD AUTO: 2.3 10E3/UL (ref 0.8–5.3)
LYMPHOCYTES # BLD AUTO: 2.4 10E3/UL (ref 0.8–5.3)
LYMPHOCYTES NFR BLD AUTO: 13 %
LYMPHOCYTES NFR BLD AUTO: 24 %
LYMPHOCYTES NFR BLD AUTO: 24 %
LYMPHOCYTES NFR BLD AUTO: 26 %
LYMPHOCYTES NFR BLD AUTO: 29 %
LYMPHOCYTES NFR BLD AUTO: 30 %
LYMPHOCYTES NFR BLD AUTO: 30 %
LYMPHOCYTES NFR BLD AUTO: 33 %
LYMPHOCYTES NFR BLD AUTO: 33 %
LYMPHOCYTES NFR BLD AUTO: 38 %
LYMPHOCYTES NFR BLD AUTO: 47 %
MAGNESIUM SERPL-MCNC: 1.7 MG/DL (ref 1.8–2.6)
MCH RBC QN AUTO: 27.4 PG (ref 26.5–33)
MCH RBC QN AUTO: 27.6 PG (ref 26.5–33)
MCH RBC QN AUTO: 27.6 PG (ref 26.5–33)
MCH RBC QN AUTO: 27.7 PG (ref 26.5–33)
MCH RBC QN AUTO: 27.9 PG (ref 26.5–33)
MCH RBC QN AUTO: 28 PG (ref 26.5–33)
MCH RBC QN AUTO: 28.1 PG (ref 26.5–33)
MCH RBC QN AUTO: 28.1 PG (ref 26.5–33)
MCH RBC QN AUTO: 28.3 PG (ref 26.5–33)
MCHC RBC AUTO-ENTMCNC: 31.6 G/DL (ref 31.5–36.5)
MCHC RBC AUTO-ENTMCNC: 32 G/DL (ref 31.5–36.5)
MCHC RBC AUTO-ENTMCNC: 32.2 G/DL (ref 31.5–36.5)
MCHC RBC AUTO-ENTMCNC: 32.3 G/DL (ref 31.5–36.5)
MCHC RBC AUTO-ENTMCNC: 32.3 G/DL (ref 31.5–36.5)
MCHC RBC AUTO-ENTMCNC: 32.4 G/DL (ref 31.5–36.5)
MCHC RBC AUTO-ENTMCNC: 32.9 G/DL (ref 31.5–36.5)
MCHC RBC AUTO-ENTMCNC: 33 G/DL (ref 31.5–36.5)
MCHC RBC AUTO-ENTMCNC: 33.5 G/DL (ref 31.5–36.5)
MCHC RBC AUTO-ENTMCNC: 33.6 G/DL (ref 31.5–36.5)
MCV RBC AUTO: 83 FL (ref 78–100)
MCV RBC AUTO: 84 FL (ref 78–100)
MCV RBC AUTO: 84 FL (ref 78–100)
MCV RBC AUTO: 85 FL (ref 78–100)
MCV RBC AUTO: 86 FL (ref 78–100)
MCV RBC AUTO: 87 FL (ref 78–100)
MCV RBC AUTO: 88 FL (ref 78–100)
MCV RBC AUTO: 88 FL (ref 78–100)
MONOCYTES # BLD AUTO: 0.4 10E3/UL (ref 0–1.3)
MONOCYTES # BLD AUTO: 0.4 10E3/UL (ref 0–1.3)
MONOCYTES # BLD AUTO: 0.5 10E3/UL (ref 0–1.3)
MONOCYTES # BLD AUTO: 0.6 10E3/UL (ref 0–1.3)
MONOCYTES # BLD AUTO: 0.6 10E3/UL (ref 0–1.3)
MONOCYTES # BLD AUTO: 0.7 10E3/UL (ref 0–1.3)
MONOCYTES # BLD AUTO: 0.8 10E3/UL (ref 0–1.3)
MONOCYTES # BLD AUTO: 1 10E3/UL (ref 0–1.3)
MONOCYTES NFR BLD AUTO: 10 %
MONOCYTES NFR BLD AUTO: 10 %
MONOCYTES NFR BLD AUTO: 11 %
MONOCYTES NFR BLD AUTO: 16 %
MONOCYTES NFR BLD AUTO: 18 %
MONOCYTES NFR BLD AUTO: 6 %
MONOCYTES NFR BLD AUTO: 8 %
MONOCYTES NFR BLD AUTO: 8 %
MUCOUS THREADS #/AREA URNS LPF: PRESENT /LPF
NEUTROPHILS # BLD AUTO: 1.3 10E3/UL (ref 1.6–8.3)
NEUTROPHILS # BLD AUTO: 2.1 10E3/UL (ref 1.6–8.3)
NEUTROPHILS # BLD AUTO: 2.3 10E3/UL (ref 1.6–8.3)
NEUTROPHILS # BLD AUTO: 3.5 10E3/UL (ref 1.6–8.3)
NEUTROPHILS # BLD AUTO: 3.8 10E3/UL (ref 1.6–8.3)
NEUTROPHILS # BLD AUTO: 3.9 10E3/UL (ref 1.6–8.3)
NEUTROPHILS # BLD AUTO: 4.3 10E3/UL (ref 1.6–8.3)
NEUTROPHILS # BLD AUTO: 4.4 10E3/UL (ref 1.6–8.3)
NEUTROPHILS # BLD AUTO: 4.5 10E3/UL (ref 1.6–8.3)
NEUTROPHILS # BLD AUTO: 4.7 10E3/UL (ref 1.6–8.3)
NEUTROPHILS # BLD AUTO: 5.6 10E3/UL (ref 1.6–8.3)
NEUTROPHILS NFR BLD AUTO: 33 %
NEUTROPHILS NFR BLD AUTO: 50 %
NEUTROPHILS NFR BLD AUTO: 53 %
NEUTROPHILS NFR BLD AUTO: 54 %
NEUTROPHILS NFR BLD AUTO: 56 %
NEUTROPHILS NFR BLD AUTO: 57 %
NEUTROPHILS NFR BLD AUTO: 58 %
NEUTROPHILS NFR BLD AUTO: 59 %
NEUTROPHILS NFR BLD AUTO: 62 %
NEUTROPHILS NFR BLD AUTO: 65 %
NEUTROPHILS NFR BLD AUTO: 72 %
NITRATE UR QL: NEGATIVE
NRBC # BLD AUTO: 0 10E3/UL
NRBC BLD AUTO-RTO: 0 /100
NUC STRESS EJECTION FRACTION: 34 %
P AXIS - MUSE: 51 DEGREES
P AXIS - MUSE: 54 DEGREES
P AXIS - MUSE: 55 DEGREES
P AXIS - MUSE: 57 DEGREES
P AXIS - MUSE: 60 DEGREES
PH UR STRIP: 5 [PH] (ref 5–7)
PH UR STRIP: 5.5 [PH] (ref 5–7)
PH UR STRIP: 6 [PH] (ref 5–7)
PLATELET # BLD AUTO: 114 10E3/UL (ref 150–450)
PLATELET # BLD AUTO: 150 10E3/UL (ref 150–450)
PLATELET # BLD AUTO: 171 10E3/UL (ref 150–450)
PLATELET # BLD AUTO: 178 10E3/UL (ref 150–450)
PLATELET # BLD AUTO: 189 10E3/UL (ref 150–450)
PLATELET # BLD AUTO: 190 10E3/UL (ref 150–450)
PLATELET # BLD AUTO: 192 10E3/UL (ref 150–450)
PLATELET # BLD AUTO: 196 10E3/UL (ref 150–450)
PLATELET # BLD AUTO: 201 10E3/UL (ref 150–450)
PLATELET # BLD AUTO: 202 10E3/UL (ref 150–450)
PLATELET # BLD AUTO: 209 10E3/UL (ref 150–450)
PLATELET # BLD AUTO: 218 10E3/UL (ref 150–450)
PLATELET # BLD AUTO: 218 10E3/UL (ref 150–450)
PLATELET # BLD AUTO: 221 10E3/UL (ref 150–450)
PLATELET # BLD AUTO: 225 10E3/UL (ref 150–450)
PLATELET # BLD AUTO: 228 10E3/UL (ref 150–450)
PLATELET # BLD AUTO: 229 10E3/UL (ref 150–450)
PLATELET # BLD AUTO: 241 10E3/UL (ref 150–450)
PLATELET # BLD AUTO: 251 10E3/UL (ref 150–450)
PLATELET # BLD AUTO: 259 10E3/UL (ref 150–450)
PLATELET # BLD AUTO: 282 10E3/UL (ref 150–450)
POTASSIUM BLD-SCNC: 3.2 MMOL/L (ref 3.4–5.3)
POTASSIUM BLD-SCNC: 3.7 MMOL/L (ref 3.4–5.3)
POTASSIUM BLD-SCNC: 3.8 MMOL/L (ref 3.4–5.3)
POTASSIUM BLD-SCNC: 4.1 MMOL/L (ref 3.4–5.3)
POTASSIUM BLD-SCNC: 4.1 MMOL/L (ref 3.4–5.3)
POTASSIUM BLD-SCNC: 4.2 MMOL/L (ref 3.4–5.3)
POTASSIUM BLD-SCNC: 4.2 MMOL/L (ref 3.4–5.3)
POTASSIUM BLD-SCNC: 4.3 MMOL/L (ref 3.4–5.3)
POTASSIUM BLD-SCNC: 4.4 MMOL/L (ref 3.4–5.3)
POTASSIUM BLD-SCNC: 4.5 MMOL/L (ref 3.4–5.3)
POTASSIUM BLD-SCNC: 4.5 MMOL/L (ref 3.4–5.3)
POTASSIUM BLD-SCNC: 4.7 MMOL/L (ref 3.4–5.3)
POTASSIUM BLD-SCNC: 5 MMOL/L (ref 3.4–5.3)
PR INTERVAL - MUSE: 150 MS
PR INTERVAL - MUSE: 158 MS
PR INTERVAL - MUSE: 164 MS
PR INTERVAL - MUSE: 172 MS
PR INTERVAL - MUSE: 172 MS
PROCALCITONIN SERPL-MCNC: <0.05 NG/ML
PROCALCITONIN SERPL-MCNC: <0.05 NG/ML
PROT SERPL-MCNC: 6.7 G/DL (ref 6.8–8.8)
PROT SERPL-MCNC: 6.8 G/DL (ref 6.8–8.8)
PROT SERPL-MCNC: 6.9 G/DL (ref 6.8–8.8)
PROT SERPL-MCNC: 7 G/DL (ref 6.8–8.8)
PROT SERPL-MCNC: 7.1 G/DL (ref 6.8–8.8)
PROT SERPL-MCNC: 7.1 G/DL (ref 6.8–8.8)
PROT SERPL-MCNC: 7.2 G/DL (ref 6.8–8.8)
QRS DURATION - MUSE: 74 MS
QRS DURATION - MUSE: 76 MS
QRS DURATION - MUSE: 78 MS
QT - MUSE: 382 MS
QT - MUSE: 390 MS
QT - MUSE: 400 MS
QT - MUSE: 412 MS
QT - MUSE: 432 MS
QTC - MUSE: 440 MS
QTC - MUSE: 451 MS
QTC - MUSE: 458 MS
QTC - MUSE: 473 MS
QTC - MUSE: 476 MS
R AXIS - MUSE: -28 DEGREES
R AXIS - MUSE: -31 DEGREES
R AXIS - MUSE: -33 DEGREES
R AXIS - MUSE: -36 DEGREES
R AXIS - MUSE: -42 DEGREES
RATE PRESSURE PRODUCT: 8449
RBC # BLD AUTO: 4.73 10E6/UL (ref 4.4–5.9)
RBC # BLD AUTO: 4.85 10E6/UL (ref 4.4–5.9)
RBC # BLD AUTO: 4.86 10E6/UL (ref 4.4–5.9)
RBC # BLD AUTO: 4.88 10E6/UL (ref 4.4–5.9)
RBC # BLD AUTO: 4.9 10E6/UL (ref 4.4–5.9)
RBC # BLD AUTO: 4.91 10E6/UL (ref 4.4–5.9)
RBC # BLD AUTO: 4.92 10E6/UL (ref 4.4–5.9)
RBC # BLD AUTO: 4.95 10E6/UL (ref 4.4–5.9)
RBC # BLD AUTO: 4.99 10E6/UL (ref 4.4–5.9)
RBC # BLD AUTO: 5.01 10E6/UL (ref 4.4–5.9)
RBC # BLD AUTO: 5.06 10E6/UL (ref 4.4–5.9)
RBC # BLD AUTO: 5.24 10E6/UL (ref 4.4–5.9)
RBC # BLD AUTO: 5.4 10E6/UL (ref 4.4–5.9)
RBC URINE: 1 /HPF
RBC URINE: <1 /HPF
RBC URINE: <1 /HPF
SARS-COV-2 RNA RESP QL NAA+PROBE: NEGATIVE
SARS-COV-2 RNA RESP QL NAA+PROBE: POSITIVE
SODIUM SERPL-SCNC: 132 MMOL/L (ref 133–144)
SODIUM SERPL-SCNC: 133 MMOL/L (ref 133–144)
SODIUM SERPL-SCNC: 134 MMOL/L (ref 133–144)
SODIUM SERPL-SCNC: 135 MMOL/L (ref 133–144)
SODIUM SERPL-SCNC: 136 MMOL/L (ref 133–144)
SODIUM SERPL-SCNC: 137 MMOL/L (ref 133–144)
SODIUM SERPL-SCNC: 138 MMOL/L (ref 133–144)
SODIUM SERPL-SCNC: 140 MMOL/L (ref 133–144)
SODIUM SERPL-SCNC: 140 MMOL/L (ref 133–144)
SODIUM SERPL-SCNC: 142 MMOL/L (ref 133–144)
SP GR UR STRIP: 1.02 (ref 1–1.03)
SP GR UR STRIP: 1.02 (ref 1–1.03)
SP GR UR STRIP: 1.03 (ref 1–1.03)
STRESS ECHO BASELINE DIASTOLIC HE: 71
STRESS ECHO BASELINE HR: 60 BPM
STRESS ECHO BASELINE SYSTOLIC BP: 132
STRESS ECHO CALCULATED PERCENT HR: 47 %
STRESS ECHO LAST STRESS DIASTOLIC BP: 65
STRESS ECHO LAST STRESS SYSTOLIC BP: 119
STRESS ECHO TARGET HR: 151
SYSTOLIC BLOOD PRESSURE - MUSE: NORMAL MMHG
T AXIS - MUSE: 58 DEGREES
T AXIS - MUSE: 64 DEGREES
T AXIS - MUSE: 67 DEGREES
T AXIS - MUSE: 68 DEGREES
T AXIS - MUSE: 75 DEGREES
TROPONIN I SERPL HS-MCNC: 14 NG/L
TROPONIN I SERPL HS-MCNC: 169 NG/L
TROPONIN I SERPL HS-MCNC: 18 NG/L
TROPONIN I SERPL HS-MCNC: 18 NG/L
TROPONIN I SERPL HS-MCNC: 22 NG/L
TROPONIN I SERPL HS-MCNC: 292 NG/L
TROPONIN I SERPL HS-MCNC: 50 NG/L
TROPONIN I SERPL HS-MCNC: 93 NG/L
TROPONIN I SERPL HS-MCNC: 96 NG/L
TROPONIN I SERPL HS-MCNC: 99 NG/L
UFH PPP CHRO-ACNC: 0.12 IU/ML
UFH PPP CHRO-ACNC: 0.28 IU/ML
UFH PPP CHRO-ACNC: 0.31 IU/ML
UFH PPP CHRO-ACNC: 0.31 IU/ML
UFH PPP CHRO-ACNC: 0.32 IU/ML
UFH PPP CHRO-ACNC: 0.38 IU/ML
UFH PPP CHRO-ACNC: 0.4 IU/ML
UFH PPP CHRO-ACNC: 0.41 IU/ML
UFH PPP CHRO-ACNC: 0.53 IU/ML
UFH PPP CHRO-ACNC: <0.1 IU/ML
UFH PPP CHRO-ACNC: <0.1 IU/ML
UROBILINOGEN UR STRIP-MCNC: NORMAL MG/DL
VALPROATE SERPL-MCNC: 5.1 UG/ML
VALPROATE SERPL-MCNC: 50 MG/L
VALPROATE SERPL-MCNC: 51 MG/L
VALPROATE SERPL-MCNC: 58 MG/L
VALPROATE SERPL-MCNC: 91 MG/L
VENTRICULAR RATE- MUSE: 72 BPM
VENTRICULAR RATE- MUSE: 72 BPM
VENTRICULAR RATE- MUSE: 80 BPM
VENTRICULAR RATE- MUSE: 83 BPM
VENTRICULAR RATE- MUSE: 85 BPM
WBC # BLD AUTO: 3.9 10E3/UL (ref 4–11)
WBC # BLD AUTO: 4.1 10E3/UL (ref 4–11)
WBC # BLD AUTO: 4.1 10E3/UL (ref 4–11)
WBC # BLD AUTO: 6.1 10E3/UL (ref 4–11)
WBC # BLD AUTO: 6.1 10E3/UL (ref 4–11)
WBC # BLD AUTO: 6.2 10E3/UL (ref 4–11)
WBC # BLD AUTO: 6.3 10E3/UL (ref 4–11)
WBC # BLD AUTO: 6.8 10E3/UL (ref 4–11)
WBC # BLD AUTO: 6.9 10E3/UL (ref 4–11)
WBC # BLD AUTO: 7.2 10E3/UL (ref 4–11)
WBC # BLD AUTO: 7.6 10E3/UL (ref 4–11)
WBC # BLD AUTO: 8 10E3/UL (ref 4–11)
WBC # BLD AUTO: 9 10E3/UL (ref 4–11)
WBC URINE: 1 /HPF
WBC URINE: 1 /HPF
WBC URINE: 2 /HPF

## 2022-01-01 PROCEDURE — 250N000011 HC RX IP 250 OP 636: Performed by: INTERNAL MEDICINE

## 2022-01-01 PROCEDURE — 250N000013 HC RX MED GY IP 250 OP 250 PS 637

## 2022-01-01 PROCEDURE — 93325 DOPPLER ECHO COLOR FLOW MAPG: CPT | Mod: 26 | Performed by: INTERNAL MEDICINE

## 2022-01-01 PROCEDURE — C9803 HOPD COVID-19 SPEC COLLECT: HCPCS

## 2022-01-01 PROCEDURE — 99152 MOD SED SAME PHYS/QHP 5/>YRS: CPT | Mod: GC | Performed by: INTERNAL MEDICINE

## 2022-01-01 PROCEDURE — 258N000003 HC RX IP 258 OP 636: Performed by: INTERNAL MEDICINE

## 2022-01-01 PROCEDURE — 36415 COLL VENOUS BLD VENIPUNCTURE: CPT | Performed by: INTERNAL MEDICINE

## 2022-01-01 PROCEDURE — 85014 HEMATOCRIT: CPT | Performed by: EMERGENCY MEDICINE

## 2022-01-01 PROCEDURE — 250N000009 HC RX 250: Performed by: INTERNAL MEDICINE

## 2022-01-01 PROCEDURE — 99219 PR INITIAL OBSERVATION CARE,LEVEL II: CPT | Performed by: HOSPITALIST

## 2022-01-01 PROCEDURE — 36415 COLL VENOUS BLD VENIPUNCTURE: CPT | Performed by: PSYCHIATRY & NEUROLOGY

## 2022-01-01 PROCEDURE — 85025 COMPLETE CBC W/AUTO DIFF WBC: CPT | Performed by: HOSPITALIST

## 2022-01-01 PROCEDURE — 250N000011 HC RX IP 250 OP 636: Performed by: HOSPITALIST

## 2022-01-01 PROCEDURE — 99285 EMERGENCY DEPT VISIT HI MDM: CPT | Mod: 25

## 2022-01-01 PROCEDURE — 250N000013 HC RX MED GY IP 250 OP 250 PS 637: Performed by: INTERNAL MEDICINE

## 2022-01-01 PROCEDURE — 250N000013 HC RX MED GY IP 250 OP 250 PS 637: Performed by: HOSPITALIST

## 2022-01-01 PROCEDURE — 250N000011 HC RX IP 250 OP 636

## 2022-01-01 PROCEDURE — 258N000003 HC RX IP 258 OP 636: Performed by: HOSPITALIST

## 2022-01-01 PROCEDURE — G0378 HOSPITAL OBSERVATION PER HR: HCPCS

## 2022-01-01 PROCEDURE — 99222 1ST HOSP IP/OBS MODERATE 55: CPT | Performed by: INTERNAL MEDICINE

## 2022-01-01 PROCEDURE — 36415 COLL VENOUS BLD VENIPUNCTURE: CPT | Mod: ORL | Performed by: NURSE PRACTITIONER

## 2022-01-01 PROCEDURE — 250N000012 HC RX MED GY IP 250 OP 636 PS 637: Performed by: INTERNAL MEDICINE

## 2022-01-01 PROCEDURE — 250N000013 HC RX MED GY IP 250 OP 250 PS 637: Performed by: NURSE PRACTITIONER

## 2022-01-01 PROCEDURE — 82962 GLUCOSE BLOOD TEST: CPT

## 2022-01-01 PROCEDURE — 84484 ASSAY OF TROPONIN QUANT: CPT | Mod: 91 | Performed by: EMERGENCY MEDICINE

## 2022-01-01 PROCEDURE — 82040 ASSAY OF SERUM ALBUMIN: CPT | Performed by: EMERGENCY MEDICINE

## 2022-01-01 PROCEDURE — 250N000009 HC RX 250

## 2022-01-01 PROCEDURE — B2111ZZ FLUOROSCOPY OF MULTIPLE CORONARY ARTERIES USING LOW OSMOLAR CONTRAST: ICD-10-PCS | Performed by: INTERNAL MEDICINE

## 2022-01-01 PROCEDURE — 250N000013 HC RX MED GY IP 250 OP 250 PS 637: Performed by: REGISTERED NURSE

## 2022-01-01 PROCEDURE — 99222 1ST HOSP IP/OBS MODERATE 55: CPT | Performed by: REGISTERED NURSE

## 2022-01-01 PROCEDURE — 85025 COMPLETE CBC W/AUTO DIFF WBC: CPT | Performed by: EMERGENCY MEDICINE

## 2022-01-01 PROCEDURE — 99232 SBSQ HOSP IP/OBS MODERATE 35: CPT | Performed by: INTERNAL MEDICINE

## 2022-01-01 PROCEDURE — 110N000005 HC R&B HOSPICE, ACCENT

## 2022-01-01 PROCEDURE — 258N000003 HC RX IP 258 OP 636: Performed by: EMERGENCY MEDICINE

## 2022-01-01 PROCEDURE — U0005 INFEC AGEN DETEC AMPLI PROBE: HCPCS | Performed by: EMERGENCY MEDICINE

## 2022-01-01 PROCEDURE — 99220 PR INITIAL OBSERVATION CARE,LEVEL III: CPT | Performed by: INTERNAL MEDICINE

## 2022-01-01 PROCEDURE — 99232 SBSQ HOSP IP/OBS MODERATE 35: CPT | Mod: 25

## 2022-01-01 PROCEDURE — 71045 X-RAY EXAM CHEST 1 VIEW: CPT

## 2022-01-01 PROCEDURE — 99233 SBSQ HOSP IP/OBS HIGH 50: CPT | Performed by: INTERNAL MEDICINE

## 2022-01-01 PROCEDURE — 85049 AUTOMATED PLATELET COUNT: CPT | Performed by: INTERNAL MEDICINE

## 2022-01-01 PROCEDURE — 99231 SBSQ HOSP IP/OBS SF/LOW 25: CPT | Performed by: REGISTERED NURSE

## 2022-01-01 PROCEDURE — 86140 C-REACTIVE PROTEIN: CPT | Performed by: HOSPITALIST

## 2022-01-01 PROCEDURE — 99223 1ST HOSP IP/OBS HIGH 75: CPT | Performed by: NURSE PRACTITIONER

## 2022-01-01 PROCEDURE — 258N000003 HC RX IP 258 OP 636: Performed by: NURSE PRACTITIONER

## 2022-01-01 PROCEDURE — G1004 CDSM NDSC: HCPCS

## 2022-01-01 PROCEDURE — 81001 URINALYSIS AUTO W/SCOPE: CPT | Performed by: EMERGENCY MEDICINE

## 2022-01-01 PROCEDURE — U0005 INFEC AGEN DETEC AMPLI PROBE: HCPCS | Performed by: INTERNAL MEDICINE

## 2022-01-01 PROCEDURE — 82040 ASSAY OF SERUM ALBUMIN: CPT | Performed by: HOSPITALIST

## 2022-01-01 PROCEDURE — 73562 X-RAY EXAM OF KNEE 3: CPT | Mod: RT

## 2022-01-01 PROCEDURE — 96374 THER/PROPH/DIAG INJ IV PUSH: CPT

## 2022-01-01 PROCEDURE — 999N000049 HC STATISTIC ECHO STRESS OR NM NPI

## 2022-01-01 PROCEDURE — 250N000011 HC RX IP 250 OP 636: Performed by: NURSE PRACTITIONER

## 2022-01-01 PROCEDURE — 36415 COLL VENOUS BLD VENIPUNCTURE: CPT | Performed by: HOSPITALIST

## 2022-01-01 PROCEDURE — 36415 COLL VENOUS BLD VENIPUNCTURE: CPT | Performed by: EMERGENCY MEDICINE

## 2022-01-01 PROCEDURE — 258N000003 HC RX IP 258 OP 636: Performed by: STUDENT IN AN ORGANIZED HEALTH CARE EDUCATION/TRAINING PROGRAM

## 2022-01-01 PROCEDURE — 99207 PR NO BILLABLE SERVICE THIS VISIT: CPT | Performed by: NURSE PRACTITIONER

## 2022-01-01 PROCEDURE — 99232 SBSQ HOSP IP/OBS MODERATE 35: CPT | Mod: GV | Performed by: HOSPITALIST

## 2022-01-01 PROCEDURE — 210N000002 HC R&B HEART CARE

## 2022-01-01 PROCEDURE — 99232 SBSQ HOSP IP/OBS MODERATE 35: CPT | Performed by: HOSPITALIST

## 2022-01-01 PROCEDURE — 71275 CT ANGIOGRAPHY CHEST: CPT | Mod: ME

## 2022-01-01 PROCEDURE — 250N000009 HC RX 250: Performed by: EMERGENCY MEDICINE

## 2022-01-01 PROCEDURE — 87636 SARSCOV2 & INF A&B AMP PRB: CPT | Performed by: EMERGENCY MEDICINE

## 2022-01-01 PROCEDURE — 82040 ASSAY OF SERUM ALBUMIN: CPT | Performed by: INTERNAL MEDICINE

## 2022-01-01 PROCEDURE — 99207 PR NO BILLABLE SERVICE THIS VISIT: CPT | Performed by: INTERNAL MEDICINE

## 2022-01-01 PROCEDURE — 84484 ASSAY OF TROPONIN QUANT: CPT | Performed by: INTERNAL MEDICINE

## 2022-01-01 PROCEDURE — 80048 BASIC METABOLIC PNL TOTAL CA: CPT | Performed by: HOSPITALIST

## 2022-01-01 PROCEDURE — 999N000128 HC STATISTIC PERIPHERAL IV START W/O US GUIDANCE

## 2022-01-01 PROCEDURE — 85520 HEPARIN ASSAY: CPT | Performed by: INTERNAL MEDICINE

## 2022-01-01 PROCEDURE — 99232 SBSQ HOSP IP/OBS MODERATE 35: CPT | Performed by: STUDENT IN AN ORGANIZED HEALTH CARE EDUCATION/TRAINING PROGRAM

## 2022-01-01 PROCEDURE — 72125 CT NECK SPINE W/O DYE: CPT | Mod: MG

## 2022-01-01 PROCEDURE — 93005 ELECTROCARDIOGRAM TRACING: CPT

## 2022-01-01 PROCEDURE — 81001 URINALYSIS AUTO W/SCOPE: CPT | Performed by: INTERNAL MEDICINE

## 2022-01-01 PROCEDURE — 99231 SBSQ HOSP IP/OBS SF/LOW 25: CPT | Performed by: NURSE PRACTITIONER

## 2022-01-01 PROCEDURE — 99232 SBSQ HOSP IP/OBS MODERATE 35: CPT | Performed by: NURSE PRACTITIONER

## 2022-01-01 PROCEDURE — 96372 THER/PROPH/DIAG INJ SC/IM: CPT | Performed by: NURSE PRACTITIONER

## 2022-01-01 PROCEDURE — 36415 COLL VENOUS BLD VENIPUNCTURE: CPT | Performed by: NURSE PRACTITIONER

## 2022-01-01 PROCEDURE — 120N000001 HC R&B MED SURG/OB

## 2022-01-01 PROCEDURE — 84484 ASSAY OF TROPONIN QUANT: CPT | Performed by: EMERGENCY MEDICINE

## 2022-01-01 PROCEDURE — 99239 HOSP IP/OBS DSCHRG MGMT >30: CPT | Performed by: INTERNAL MEDICINE

## 2022-01-01 PROCEDURE — 82248 BILIRUBIN DIRECT: CPT | Performed by: INTERNAL MEDICINE

## 2022-01-01 PROCEDURE — G1004 CDSM NDSC: HCPCS | Performed by: INTERNAL MEDICINE

## 2022-01-01 PROCEDURE — 250N000009 HC RX 250: Performed by: NURSE PRACTITIONER

## 2022-01-01 PROCEDURE — 99152 MOD SED SAME PHYS/QHP 5/>YRS: CPT | Performed by: INTERNAL MEDICINE

## 2022-01-01 PROCEDURE — 99283 EMERGENCY DEPT VISIT LOW MDM: CPT

## 2022-01-01 PROCEDURE — 99233 SBSQ HOSP IP/OBS HIGH 50: CPT | Mod: GV | Performed by: HOSPITALIST

## 2022-01-01 PROCEDURE — 80164 ASSAY DIPROPYLACETIC ACD TOT: CPT | Performed by: PSYCHIATRY & NEUROLOGY

## 2022-01-01 PROCEDURE — 85025 COMPLETE CBC W/AUTO DIFF WBC: CPT | Performed by: INTERNAL MEDICINE

## 2022-01-01 PROCEDURE — 99225 PR SUBSEQUENT OBSERVATION CARE,LEVEL II: CPT | Performed by: INTERNAL MEDICINE

## 2022-01-01 PROCEDURE — 83735 ASSAY OF MAGNESIUM: CPT | Performed by: INTERNAL MEDICINE

## 2022-01-01 PROCEDURE — A9502 TC99M TETROFOSMIN: HCPCS | Performed by: HOSPITALIST

## 2022-01-01 PROCEDURE — 99207 PR CDG-CUT & PASTE-POTENTIAL IMPACT ON LEVEL: CPT | Performed by: HOSPITALIST

## 2022-01-01 PROCEDURE — 82140 ASSAY OF AMMONIA: CPT | Performed by: INTERNAL MEDICINE

## 2022-01-01 PROCEDURE — 82248 BILIRUBIN DIRECT: CPT | Mod: ORL | Performed by: NURSE PRACTITIONER

## 2022-01-01 PROCEDURE — 93308 TTE F-UP OR LMTD: CPT | Mod: 26 | Performed by: INTERNAL MEDICINE

## 2022-01-01 PROCEDURE — 85027 COMPLETE CBC AUTOMATED: CPT | Performed by: INTERNAL MEDICINE

## 2022-01-01 PROCEDURE — 96360 HYDRATION IV INFUSION INIT: CPT

## 2022-01-01 PROCEDURE — 82310 ASSAY OF CALCIUM: CPT | Performed by: HOSPITALIST

## 2022-01-01 PROCEDURE — 84145 PROCALCITONIN (PCT): CPT | Performed by: EMERGENCY MEDICINE

## 2022-01-01 PROCEDURE — 93325 DOPPLER ECHO COLOR FLOW MAPG: CPT

## 2022-01-01 PROCEDURE — 250N000009 HC RX 250: Performed by: HOSPITALIST

## 2022-01-01 PROCEDURE — 93321 DOPPLER ECHO F-UP/LMTD STD: CPT | Mod: 26 | Performed by: INTERNAL MEDICINE

## 2022-01-01 PROCEDURE — 250N000013 HC RX MED GY IP 250 OP 250 PS 637: Performed by: EMERGENCY MEDICINE

## 2022-01-01 PROCEDURE — 99231 SBSQ HOSP IP/OBS SF/LOW 25: CPT | Performed by: HOSPITALIST

## 2022-01-01 PROCEDURE — U0003 INFECTIOUS AGENT DETECTION BY NUCLEIC ACID (DNA OR RNA); SEVERE ACUTE RESPIRATORY SYNDROME CORONAVIRUS 2 (SARS-COV-2) (CORONAVIRUS DISEASE [COVID-19]), AMPLIFIED PROBE TECHNIQUE, MAKING USE OF HIGH THROUGHPUT TECHNOLOGIES AS DESCRIBED BY CMS-2020-01-R: HCPCS | Performed by: INTERNAL MEDICINE

## 2022-01-01 PROCEDURE — 85379 FIBRIN DEGRADATION QUANT: CPT | Performed by: EMERGENCY MEDICINE

## 2022-01-01 PROCEDURE — 96376 TX/PRO/DX INJ SAME DRUG ADON: CPT

## 2022-01-01 PROCEDURE — 85520 HEPARIN ASSAY: CPT | Performed by: HOSPITALIST

## 2022-01-01 PROCEDURE — 84132 ASSAY OF SERUM POTASSIUM: CPT | Performed by: INTERNAL MEDICINE

## 2022-01-01 PROCEDURE — 80164 ASSAY DIPROPYLACETIC ACD TOT: CPT | Performed by: NURSE PRACTITIONER

## 2022-01-01 PROCEDURE — 250N000011 HC RX IP 250 OP 636: Performed by: EMERGENCY MEDICINE

## 2022-01-01 PROCEDURE — 80048 BASIC METABOLIC PNL TOTAL CA: CPT | Performed by: EMERGENCY MEDICINE

## 2022-01-01 PROCEDURE — 84484 ASSAY OF TROPONIN QUANT: CPT | Performed by: HOSPITALIST

## 2022-01-01 PROCEDURE — 96372 THER/PROPH/DIAG INJ SC/IM: CPT | Performed by: INTERNAL MEDICINE

## 2022-01-01 PROCEDURE — 96372 THER/PROPH/DIAG INJ SC/IM: CPT

## 2022-01-01 PROCEDURE — 99223 1ST HOSP IP/OBS HIGH 75: CPT | Mod: AI | Performed by: INTERNAL MEDICINE

## 2022-01-01 PROCEDURE — 99207 PR NO CHARGE LOS: CPT

## 2022-01-01 PROCEDURE — XW033E5 INTRODUCTION OF REMDESIVIR ANTI-INFECTIVE INTO PERIPHERAL VEIN, PERCUTANEOUS APPROACH, NEW TECHNOLOGY GROUP 5: ICD-10-PCS | Performed by: HOSPITALIST

## 2022-01-01 PROCEDURE — 84145 PROCALCITONIN (PCT): CPT | Performed by: HOSPITALIST

## 2022-01-01 PROCEDURE — 80164 ASSAY DIPROPYLACETIC ACD TOT: CPT | Performed by: INTERNAL MEDICINE

## 2022-01-01 PROCEDURE — 78452 HT MUSCLE IMAGE SPECT MULT: CPT | Mod: 26 | Performed by: INTERNAL MEDICINE

## 2022-01-01 PROCEDURE — 93017 CV STRESS TEST TRACING ONLY: CPT

## 2022-01-01 PROCEDURE — 99231 SBSQ HOSP IP/OBS SF/LOW 25: CPT | Performed by: INTERNAL MEDICINE

## 2022-01-01 PROCEDURE — 82310 ASSAY OF CALCIUM: CPT | Performed by: EMERGENCY MEDICINE

## 2022-01-01 PROCEDURE — 96375 TX/PRO/DX INJ NEW DRUG ADDON: CPT

## 2022-01-01 PROCEDURE — C1887 CATHETER, GUIDING: HCPCS | Performed by: INTERNAL MEDICINE

## 2022-01-01 PROCEDURE — 96372 THER/PROPH/DIAG INJ SC/IM: CPT | Performed by: HOSPITALIST

## 2022-01-01 PROCEDURE — 93454 CORONARY ARTERY ANGIO S&I: CPT | Mod: 26 | Performed by: INTERNAL MEDICINE

## 2022-01-01 PROCEDURE — 99238 HOSP IP/OBS DSCHRG MGMT 30/<: CPT

## 2022-01-01 PROCEDURE — 120N000004 HC R&B MS OVERFLOW

## 2022-01-01 PROCEDURE — P9604 ONE-WAY ALLOW PRORATED TRIP: HCPCS | Mod: ORL | Performed by: NURSE PRACTITIONER

## 2022-01-01 PROCEDURE — 80053 COMPREHEN METABOLIC PANEL: CPT | Performed by: INTERNAL MEDICINE

## 2022-01-01 PROCEDURE — G0463 HOSPITAL OUTPT CLINIC VISIT: HCPCS

## 2022-01-01 PROCEDURE — 99231 SBSQ HOSP IP/OBS SF/LOW 25: CPT | Mod: GV | Performed by: HOSPITALIST

## 2022-01-01 PROCEDURE — 84484 ASSAY OF TROPONIN QUANT: CPT | Mod: 59 | Performed by: INTERNAL MEDICINE

## 2022-01-01 PROCEDURE — 99233 SBSQ HOSP IP/OBS HIGH 50: CPT | Performed by: REGISTERED NURSE

## 2022-01-01 PROCEDURE — U0003 INFECTIOUS AGENT DETECTION BY NUCLEIC ACID (DNA OR RNA); SEVERE ACUTE RESPIRATORY SYNDROME CORONAVIRUS 2 (SARS-COV-2) (CORONAVIRUS DISEASE [COVID-19]), AMPLIFIED PROBE TECHNIQUE, MAKING USE OF HIGH THROUGHPUT TECHNOLOGIES AS DESCRIBED BY CMS-2020-01-R: HCPCS | Mod: ORL | Performed by: FAMILY MEDICINE

## 2022-01-01 PROCEDURE — 80164 ASSAY DIPROPYLACETIC ACD TOT: CPT | Mod: ORL | Performed by: NURSE PRACTITIONER

## 2022-01-01 PROCEDURE — 93018 CV STRESS TEST I&R ONLY: CPT | Mod: MG | Performed by: INTERNAL MEDICINE

## 2022-01-01 PROCEDURE — 80048 BASIC METABOLIC PNL TOTAL CA: CPT | Performed by: INTERNAL MEDICINE

## 2022-01-01 PROCEDURE — 73030 X-RAY EXAM OF SHOULDER: CPT | Mod: RT

## 2022-01-01 PROCEDURE — U0003 INFECTIOUS AGENT DETECTION BY NUCLEIC ACID (DNA OR RNA); SEVERE ACUTE RESPIRATORY SYNDROME CORONAVIRUS 2 (SARS-COV-2) (CORONAVIRUS DISEASE [COVID-19]), AMPLIFIED PROBE TECHNIQUE, MAKING USE OF HIGH THROUGHPUT TECHNOLOGIES AS DESCRIBED BY CMS-2020-01-R: HCPCS | Performed by: HOSPITALIST

## 2022-01-01 PROCEDURE — 85025 COMPLETE CBC W/AUTO DIFF WBC: CPT | Mod: ORL | Performed by: NURSE PRACTITIONER

## 2022-01-01 PROCEDURE — 80053 COMPREHEN METABOLIC PANEL: CPT | Performed by: EMERGENCY MEDICINE

## 2022-01-01 PROCEDURE — 99224 PR SUBSEQUENT OBSERVATION CARE,LEVEL I: CPT | Performed by: NURSE PRACTITIONER

## 2022-01-01 PROCEDURE — 99232 SBSQ HOSP IP/OBS MODERATE 35: CPT | Performed by: PSYCHIATRY & NEUROLOGY

## 2022-01-01 PROCEDURE — 93016 CV STRESS TEST SUPVJ ONLY: CPT | Performed by: INTERNAL MEDICINE

## 2022-01-01 PROCEDURE — 99233 SBSQ HOSP IP/OBS HIGH 50: CPT | Performed by: HOSPITALIST

## 2022-01-01 PROCEDURE — 99221 1ST HOSP IP/OBS SF/LOW 40: CPT | Performed by: NURSE PRACTITIONER

## 2022-01-01 PROCEDURE — 93005 ELECTROCARDIOGRAM TRACING: CPT | Mod: 76

## 2022-01-01 PROCEDURE — 99233 SBSQ HOSP IP/OBS HIGH 50: CPT | Mod: 25 | Performed by: INTERNAL MEDICINE

## 2022-01-01 PROCEDURE — 343N000001 HC RX 343: Performed by: HOSPITALIST

## 2022-01-01 PROCEDURE — 93321 DOPPLER ECHO F-UP/LMTD STD: CPT

## 2022-01-01 PROCEDURE — 99284 EMERGENCY DEPT VISIT MOD MDM: CPT | Mod: 25

## 2022-01-01 PROCEDURE — C1894 INTRO/SHEATH, NON-LASER: HCPCS | Performed by: INTERNAL MEDICINE

## 2022-01-01 PROCEDURE — 99207 PR CDG-CODE CATEGORY CHANGED: CPT | Performed by: INTERNAL MEDICINE

## 2022-01-01 PROCEDURE — 255N000002 HC RX 255 OP 636: Performed by: HOSPITALIST

## 2022-01-01 PROCEDURE — 96365 THER/PROPH/DIAG IV INF INIT: CPT

## 2022-01-01 PROCEDURE — 85379 FIBRIN DEGRADATION QUANT: CPT | Performed by: HOSPITALIST

## 2022-01-01 PROCEDURE — 80053 COMPREHEN METABOLIC PANEL: CPT | Performed by: HOSPITALIST

## 2022-01-01 PROCEDURE — 99207 PR CONSULT E&M CHANGED TO INITIAL LEVEL: CPT | Performed by: NURSE PRACTITIONER

## 2022-01-01 PROCEDURE — 272N000001 HC OR GENERAL SUPPLY STERILE: Performed by: INTERNAL MEDICINE

## 2022-01-01 PROCEDURE — 96372 THER/PROPH/DIAG INJ SC/IM: CPT | Performed by: EMERGENCY MEDICINE

## 2022-01-01 PROCEDURE — 83036 HEMOGLOBIN GLYCOSYLATED A1C: CPT | Performed by: HOSPITALIST

## 2022-01-01 PROCEDURE — 93454 CORONARY ARTERY ANGIO S&I: CPT | Performed by: INTERNAL MEDICINE

## 2022-01-01 PROCEDURE — 96361 HYDRATE IV INFUSION ADD-ON: CPT

## 2022-01-01 PROCEDURE — 250N000012 HC RX MED GY IP 250 OP 636 PS 637: Performed by: HOSPITALIST

## 2022-01-01 RX ORDER — WATER 10 ML/10ML
INJECTION INTRAMUSCULAR; INTRAVENOUS; SUBCUTANEOUS
Status: DISPENSED
Start: 2022-01-01 | End: 2022-01-01

## 2022-01-01 RX ORDER — METOPROLOL SUCCINATE 25 MG/1
25 TABLET, EXTENDED RELEASE ORAL AT BEDTIME
Status: DISCONTINUED | OUTPATIENT
Start: 2022-01-01 | End: 2022-01-01 | Stop reason: HOSPADM

## 2022-01-01 RX ORDER — OLANZAPINE 5 MG/1
5 TABLET, ORALLY DISINTEGRATING ORAL AT BEDTIME
DISCHARGE
Start: 2022-01-01 | End: 2022-01-01

## 2022-01-01 RX ORDER — ASPIRIN 81 MG/1
81 TABLET ORAL DAILY
Status: DISCONTINUED | OUTPATIENT
Start: 2022-01-01 | End: 2022-01-01 | Stop reason: HOSPADM

## 2022-01-01 RX ORDER — AMOXICILLIN 250 MG
2 CAPSULE ORAL DAILY PRN
Status: DISCONTINUED | OUTPATIENT
Start: 2022-01-01 | End: 2022-01-01 | Stop reason: HOSPADM

## 2022-01-01 RX ORDER — ASPIRIN 81 MG/1
81 TABLET ORAL DAILY
Status: DISCONTINUED | OUTPATIENT
Start: 2022-01-01 | End: 2022-01-01

## 2022-01-01 RX ORDER — ATROPINE SULFATE 0.1 MG/ML
0.5 INJECTION INTRAVENOUS
Status: ACTIVE | OUTPATIENT
Start: 2022-01-01 | End: 2022-01-01

## 2022-01-01 RX ORDER — CAFFEINE CITRATE 20 MG/ML
60 SOLUTION INTRAVENOUS
Status: DISCONTINUED | OUTPATIENT
Start: 2022-01-01 | End: 2022-01-01 | Stop reason: CLARIF

## 2022-01-01 RX ORDER — NICOTINE POLACRILEX 4 MG
15-30 LOZENGE BUCCAL
Status: DISCONTINUED | OUTPATIENT
Start: 2022-01-01 | End: 2022-01-01 | Stop reason: HOSPADM

## 2022-01-01 RX ORDER — ATORVASTATIN CALCIUM 80 MG/1
80 TABLET, FILM COATED ORAL DAILY
Status: DISCONTINUED | OUTPATIENT
Start: 2022-01-01 | End: 2022-01-01 | Stop reason: HOSPADM

## 2022-01-01 RX ORDER — ATORVASTATIN CALCIUM 40 MG/1
80 TABLET, FILM COATED ORAL EVERY EVENING
Status: DISCONTINUED | OUTPATIENT
Start: 2022-01-01 | End: 2022-01-01

## 2022-01-01 RX ORDER — OLANZAPINE 5 MG/1
5 TABLET, ORALLY DISINTEGRATING ORAL 2 TIMES DAILY PRN
DISCHARGE
Start: 2022-01-01 | End: 2022-01-01

## 2022-01-01 RX ORDER — LORAZEPAM 2 MG/ML
1 INJECTION INTRAMUSCULAR ONCE
Status: COMPLETED | OUTPATIENT
Start: 2022-01-01 | End: 2022-01-01

## 2022-01-01 RX ORDER — SODIUM CHLORIDE 9 MG/ML
INJECTION, SOLUTION INTRAVENOUS CONTINUOUS
Status: ACTIVE | OUTPATIENT
Start: 2022-01-01 | End: 2022-01-01

## 2022-01-01 RX ORDER — HEPARIN SODIUM 5000 [USP'U]/.5ML
5000 INJECTION, SOLUTION INTRAVENOUS; SUBCUTANEOUS EVERY 12 HOURS
Status: DISCONTINUED | OUTPATIENT
Start: 2022-01-01 | End: 2022-01-01

## 2022-01-01 RX ORDER — LOPERAMIDE HCL 2 MG
2 CAPSULE ORAL 3 TIMES DAILY PRN
COMMUNITY

## 2022-01-01 RX ORDER — OLANZAPINE 5 MG/1
5 TABLET ORAL 2 TIMES DAILY PRN
Status: DISCONTINUED | OUTPATIENT
Start: 2022-01-01 | End: 2022-01-01

## 2022-01-01 RX ORDER — CHLORPROMAZINE HYDROCHLORIDE 50 MG/1
50 TABLET, FILM COATED ORAL 2 TIMES DAILY PRN
Status: DISCONTINUED | OUTPATIENT
Start: 2022-01-01 | End: 2022-01-01

## 2022-01-01 RX ORDER — CITALOPRAM HYDROBROMIDE 20 MG/1
20 TABLET ORAL DAILY
Status: DISCONTINUED | OUTPATIENT
Start: 2022-01-01 | End: 2022-01-01

## 2022-01-01 RX ORDER — AMOXICILLIN 250 MG
1 CAPSULE ORAL 2 TIMES DAILY PRN
Status: DISCONTINUED | OUTPATIENT
Start: 2022-01-01 | End: 2022-01-01 | Stop reason: HOSPADM

## 2022-01-01 RX ORDER — HALOPERIDOL 5 MG/ML
2 INJECTION INTRAMUSCULAR ONCE
Status: COMPLETED | OUTPATIENT
Start: 2022-01-01 | End: 2022-01-01

## 2022-01-01 RX ORDER — HYDROMORPHONE HYDROCHLORIDE 1 MG/ML
0.5 SOLUTION ORAL
Status: DISCONTINUED | OUTPATIENT
Start: 2022-01-01 | End: 2022-01-01

## 2022-01-01 RX ORDER — CITALOPRAM HYDROBROMIDE 10 MG/1
10 TABLET ORAL DAILY
Status: DISCONTINUED | OUTPATIENT
Start: 2022-01-01 | End: 2022-01-01 | Stop reason: HOSPADM

## 2022-01-01 RX ORDER — QUETIAPINE FUMARATE 25 MG/1
25 TABLET, FILM COATED ORAL ONCE
Status: COMPLETED | OUTPATIENT
Start: 2022-01-01 | End: 2022-01-01

## 2022-01-01 RX ORDER — OLANZAPINE 10 MG/2ML
INJECTION, POWDER, FOR SOLUTION INTRAMUSCULAR
Status: DISCONTINUED
Start: 2022-01-01 | End: 2022-01-01 | Stop reason: HOSPADM

## 2022-01-01 RX ORDER — PANTOPRAZOLE SODIUM 20 MG/1
20 TABLET, DELAYED RELEASE ORAL DAILY
Status: DISCONTINUED | OUTPATIENT
Start: 2022-01-01 | End: 2022-01-01

## 2022-01-01 RX ORDER — MORPHINE SULFATE 10 MG/5ML
2.5-5 SOLUTION ORAL
Status: DISCONTINUED | OUTPATIENT
Start: 2022-01-01 | End: 2022-01-01 | Stop reason: HOSPADM

## 2022-01-01 RX ORDER — OLANZAPINE 10 MG/2ML
5 INJECTION, POWDER, FOR SOLUTION INTRAMUSCULAR ONCE
Status: COMPLETED | OUTPATIENT
Start: 2022-01-01 | End: 2022-01-01

## 2022-01-01 RX ORDER — CITALOPRAM HYDROBROMIDE 10 MG/1
10 TABLET ORAL DAILY
DISCHARGE
Start: 2022-01-01 | End: 2022-01-01

## 2022-01-01 RX ORDER — FLUTICASONE PROPIONATE 50 MCG
1 SPRAY, SUSPENSION (ML) NASAL AT BEDTIME
Status: DISCONTINUED | OUTPATIENT
Start: 2022-01-01 | End: 2022-01-01

## 2022-01-01 RX ORDER — LOPERAMIDE HCL 2 MG
2 CAPSULE ORAL DAILY PRN
Status: DISCONTINUED | OUTPATIENT
Start: 2022-01-01 | End: 2022-01-01 | Stop reason: HOSPADM

## 2022-01-01 RX ORDER — CHLORPROMAZINE HYDROCHLORIDE 50 MG/1
50-100 TABLET, FILM COATED ORAL 2 TIMES DAILY PRN
Status: DISCONTINUED | OUTPATIENT
Start: 2022-01-01 | End: 2022-01-01 | Stop reason: HOSPADM

## 2022-01-01 RX ORDER — RISPERIDONE 0.5 MG/1
0.5 TABLET, ORALLY DISINTEGRATING ORAL 3 TIMES DAILY
Status: DISCONTINUED | OUTPATIENT
Start: 2022-01-01 | End: 2022-01-01

## 2022-01-01 RX ORDER — WATER 10 ML/10ML
INJECTION INTRAMUSCULAR; INTRAVENOUS; SUBCUTANEOUS
Status: COMPLETED
Start: 2022-01-01 | End: 2022-01-01

## 2022-01-01 RX ORDER — NICOTINE POLACRILEX 4 MG
15-30 LOZENGE BUCCAL
Status: DISCONTINUED | OUTPATIENT
Start: 2022-01-01 | End: 2022-01-01

## 2022-01-01 RX ORDER — HYDROMORPHONE HYDROCHLORIDE 1 MG/ML
0.5 SOLUTION ORAL
Status: DISCONTINUED | OUTPATIENT
Start: 2022-01-01 | End: 2022-01-01 | Stop reason: HOSPADM

## 2022-01-01 RX ORDER — NITROGLYCERIN 0.4 MG/1
0.4 TABLET SUBLINGUAL EVERY 5 MIN PRN
Status: DISCONTINUED | OUTPATIENT
Start: 2022-01-01 | End: 2022-01-01 | Stop reason: HOSPADM

## 2022-01-01 RX ORDER — DEXTROSE MONOHYDRATE 25 G/50ML
25-50 INJECTION, SOLUTION INTRAVENOUS
Status: DISCONTINUED | OUTPATIENT
Start: 2022-01-01 | End: 2022-01-01

## 2022-01-01 RX ORDER — QUETIAPINE FUMARATE 25 MG/1
12.5 TABLET, FILM COATED ORAL EVERY 6 HOURS PRN
Status: ON HOLD | COMMUNITY
End: 2022-01-01

## 2022-01-01 RX ORDER — OLANZAPINE 2.5 MG/1
2.5 TABLET, FILM COATED ORAL 2 TIMES DAILY PRN
Qty: 30 TABLET | Refills: 0 | Status: SHIPPED | OUTPATIENT
Start: 2022-01-01

## 2022-01-01 RX ORDER — VERAPAMIL HYDROCHLORIDE 2.5 MG/ML
INJECTION, SOLUTION INTRAVENOUS
Status: DISCONTINUED | OUTPATIENT
Start: 2022-01-01 | End: 2022-01-01 | Stop reason: HOSPADM

## 2022-01-01 RX ORDER — DIVALPROEX SODIUM 125 MG/1
375 CAPSULE, COATED PELLETS ORAL EVERY 12 HOURS SCHEDULED
Status: DISCONTINUED | OUTPATIENT
Start: 2022-01-01 | End: 2022-01-01

## 2022-01-01 RX ORDER — QUETIAPINE FUMARATE 25 MG/1
25 TABLET, FILM COATED ORAL DAILY
Status: DISCONTINUED | OUTPATIENT
Start: 2022-01-01 | End: 2022-01-01

## 2022-01-01 RX ORDER — ONDANSETRON 4 MG/1
4 TABLET, ORALLY DISINTEGRATING ORAL EVERY 6 HOURS PRN
Status: DISCONTINUED | OUTPATIENT
Start: 2022-01-01 | End: 2022-01-01 | Stop reason: HOSPADM

## 2022-01-01 RX ORDER — DIVALPROEX SODIUM 125 MG/1
375 CAPSULE, COATED PELLETS ORAL EVERY 12 HOURS
Qty: 60 CAPSULE | Refills: 0 | Status: ON HOLD | OUTPATIENT
Start: 2022-01-01 | End: 2022-01-01

## 2022-01-01 RX ORDER — AMOXICILLIN 250 MG
2 CAPSULE ORAL 2 TIMES DAILY PRN
Status: DISCONTINUED | OUTPATIENT
Start: 2022-01-01 | End: 2022-01-01 | Stop reason: HOSPADM

## 2022-01-01 RX ORDER — ISOSORBIDE MONONITRATE 30 MG/1
30 TABLET, EXTENDED RELEASE ORAL DAILY
Status: DISCONTINUED | OUTPATIENT
Start: 2022-01-01 | End: 2022-01-01 | Stop reason: HOSPADM

## 2022-01-01 RX ORDER — DEXTROSE MONOHYDRATE 25 G/50ML
25-50 INJECTION, SOLUTION INTRAVENOUS
Status: DISCONTINUED | OUTPATIENT
Start: 2022-01-01 | End: 2022-01-01 | Stop reason: HOSPADM

## 2022-01-01 RX ORDER — ACETAMINOPHEN 325 MG/1
650 TABLET ORAL 3 TIMES DAILY
Status: DISCONTINUED | OUTPATIENT
Start: 2022-01-01 | End: 2022-01-01 | Stop reason: HOSPADM

## 2022-01-01 RX ORDER — MULTIPLE VITAMINS W/ MINERALS TAB 9MG-400MCG
1 TAB ORAL DAILY
Status: DISCONTINUED | OUTPATIENT
Start: 2022-01-01 | End: 2022-01-01 | Stop reason: HOSPADM

## 2022-01-01 RX ORDER — OXYCODONE HYDROCHLORIDE 5 MG/1
10 TABLET ORAL EVERY 4 HOURS PRN
Status: DISCONTINUED | OUTPATIENT
Start: 2022-01-01 | End: 2022-01-01 | Stop reason: HOSPADM

## 2022-01-01 RX ORDER — RISPERIDONE 0.5 MG/1
0.5 TABLET, ORALLY DISINTEGRATING ORAL 3 TIMES DAILY PRN
Status: DISCONTINUED | OUTPATIENT
Start: 2022-01-01 | End: 2022-01-01

## 2022-01-01 RX ORDER — OLANZAPINE 2.5 MG/1
5 TABLET, FILM COATED ORAL AT BEDTIME
Status: DISCONTINUED | OUTPATIENT
Start: 2022-01-01 | End: 2022-01-01

## 2022-01-01 RX ORDER — LOPERAMIDE HCL 2 MG
2 CAPSULE ORAL 3 TIMES DAILY PRN
Status: DISCONTINUED | OUTPATIENT
Start: 2022-01-01 | End: 2022-01-01 | Stop reason: HOSPADM

## 2022-01-01 RX ORDER — BISACODYL 10 MG
10 SUPPOSITORY, RECTAL RECTAL DAILY PRN
COMMUNITY

## 2022-01-01 RX ORDER — OLANZAPINE 5 MG/1
5 TABLET, ORALLY DISINTEGRATING ORAL 2 TIMES DAILY PRN
Qty: 30 TABLET | Refills: 0 | Status: ON HOLD | OUTPATIENT
Start: 2022-01-01 | End: 2022-01-01

## 2022-01-01 RX ORDER — OLANZAPINE 10 MG/2ML
INJECTION, POWDER, FOR SOLUTION INTRAMUSCULAR
Status: COMPLETED
Start: 2022-01-01 | End: 2022-01-01

## 2022-01-01 RX ORDER — OLANZAPINE 10 MG/2ML
5 INJECTION, POWDER, FOR SOLUTION INTRAMUSCULAR 2 TIMES DAILY PRN
Status: DISCONTINUED | OUTPATIENT
Start: 2022-01-01 | End: 2022-01-01 | Stop reason: HOSPADM

## 2022-01-01 RX ORDER — OLANZAPINE 5 MG/1
5 TABLET, ORALLY DISINTEGRATING ORAL AT BEDTIME
Status: DISCONTINUED | OUTPATIENT
Start: 2022-01-01 | End: 2022-01-01

## 2022-01-01 RX ORDER — NALOXONE HYDROCHLORIDE 0.4 MG/ML
0.1 INJECTION, SOLUTION INTRAMUSCULAR; INTRAVENOUS; SUBCUTANEOUS
Status: DISCONTINUED | OUTPATIENT
Start: 2022-01-01 | End: 2022-01-01 | Stop reason: HOSPADM

## 2022-01-01 RX ORDER — NALOXONE HYDROCHLORIDE 0.4 MG/ML
0.2 INJECTION, SOLUTION INTRAMUSCULAR; INTRAVENOUS; SUBCUTANEOUS
Status: ACTIVE | OUTPATIENT
Start: 2022-01-01 | End: 2022-01-01

## 2022-01-01 RX ORDER — ACETAMINOPHEN 650 MG/1
650 SUPPOSITORY RECTAL EVERY 4 HOURS PRN
Status: DISCONTINUED | OUTPATIENT
Start: 2022-01-01 | End: 2022-01-01 | Stop reason: HOSPADM

## 2022-01-01 RX ORDER — SODIUM CHLORIDE, SODIUM LACTATE, POTASSIUM CHLORIDE, CALCIUM CHLORIDE 600; 310; 30; 20 MG/100ML; MG/100ML; MG/100ML; MG/100ML
INJECTION, SOLUTION INTRAVENOUS CONTINUOUS
Status: DISCONTINUED | OUTPATIENT
Start: 2022-01-01 | End: 2022-01-01 | Stop reason: HOSPADM

## 2022-01-01 RX ORDER — CITALOPRAM HYDROBROMIDE 20 MG/1
20 TABLET ORAL DAILY
Status: ON HOLD | COMMUNITY
End: 2022-01-01

## 2022-01-01 RX ORDER — SENNOSIDES 8.6 MG
2 TABLET ORAL DAILY PRN
Status: DISCONTINUED | OUTPATIENT
Start: 2022-01-01 | End: 2022-01-01 | Stop reason: HOSPADM

## 2022-01-01 RX ORDER — QUETIAPINE FUMARATE 25 MG/1
25 TABLET, FILM COATED ORAL AT BEDTIME
Status: DISCONTINUED | OUTPATIENT
Start: 2022-01-01 | End: 2022-01-01

## 2022-01-01 RX ORDER — ACETAMINOPHEN 325 MG/1
650 TABLET ORAL EVERY 8 HOURS
Status: DISCONTINUED | OUTPATIENT
Start: 2022-01-01 | End: 2022-01-01 | Stop reason: HOSPADM

## 2022-01-01 RX ORDER — QUETIAPINE FUMARATE 25 MG/1
37.5 TABLET, FILM COATED ORAL EVERY EVENING
COMMUNITY

## 2022-01-01 RX ORDER — NALOXONE HYDROCHLORIDE 0.4 MG/ML
0.1 INJECTION, SOLUTION INTRAMUSCULAR; INTRAVENOUS; SUBCUTANEOUS
Status: CANCELLED | OUTPATIENT
Start: 2022-01-01

## 2022-01-01 RX ORDER — ALBUTEROL SULFATE 90 UG/1
2 AEROSOL, METERED RESPIRATORY (INHALATION) EVERY 5 MIN PRN
Status: DISCONTINUED | OUTPATIENT
Start: 2022-01-01 | End: 2022-01-01

## 2022-01-01 RX ORDER — BISACODYL 10 MG
10 SUPPOSITORY, RECTAL RECTAL DAILY PRN
Status: DISCONTINUED | OUTPATIENT
Start: 2022-01-01 | End: 2022-01-01 | Stop reason: HOSPADM

## 2022-01-01 RX ORDER — CITALOPRAM HYDROBROMIDE 10 MG/1
10 TABLET ORAL DAILY
Qty: 30 TABLET | Refills: 0 | Status: SHIPPED | OUTPATIENT
Start: 2022-01-01 | End: 2022-01-01

## 2022-01-01 RX ORDER — PANTOPRAZOLE SODIUM 20 MG/1
20 TABLET, DELAYED RELEASE ORAL DAILY
Status: DISCONTINUED | OUTPATIENT
Start: 2022-01-01 | End: 2022-01-01 | Stop reason: HOSPADM

## 2022-01-01 RX ORDER — IOPAMIDOL 755 MG/ML
74 INJECTION, SOLUTION INTRAVASCULAR ONCE
Status: COMPLETED | OUTPATIENT
Start: 2022-01-01 | End: 2022-01-01

## 2022-01-01 RX ORDER — ATORVASTATIN CALCIUM 40 MG/1
80 TABLET, FILM COATED ORAL DAILY
Status: DISCONTINUED | OUTPATIENT
Start: 2022-01-01 | End: 2022-01-01 | Stop reason: HOSPADM

## 2022-01-01 RX ORDER — DIVALPROEX SODIUM 125 MG/1
375 CAPSULE, COATED PELLETS ORAL EVERY 12 HOURS
Status: DISCONTINUED | OUTPATIENT
Start: 2022-01-01 | End: 2022-01-01 | Stop reason: HOSPADM

## 2022-01-01 RX ORDER — DOCUSATE SODIUM 100 MG/1
100 CAPSULE, LIQUID FILLED ORAL 2 TIMES DAILY
Status: DISCONTINUED | OUTPATIENT
Start: 2022-01-01 | End: 2022-01-01 | Stop reason: HOSPADM

## 2022-01-01 RX ORDER — AMINOPHYLLINE 25 MG/ML
50-100 INJECTION, SOLUTION INTRAVENOUS
Status: DISCONTINUED | OUTPATIENT
Start: 2022-01-01 | End: 2022-01-01

## 2022-01-01 RX ORDER — BENZONATATE 100 MG/1
100 CAPSULE ORAL 3 TIMES DAILY PRN
Status: DISCONTINUED | OUTPATIENT
Start: 2022-01-01 | End: 2022-01-01 | Stop reason: HOSPADM

## 2022-01-01 RX ORDER — NITROGLYCERIN 5 MG/ML
VIAL (ML) INTRAVENOUS
Status: DISCONTINUED | OUTPATIENT
Start: 2022-01-01 | End: 2022-01-01 | Stop reason: HOSPADM

## 2022-01-01 RX ORDER — FENTANYL CITRATE 50 UG/ML
25 INJECTION, SOLUTION INTRAMUSCULAR; INTRAVENOUS
Status: DISCONTINUED | OUTPATIENT
Start: 2022-01-01 | End: 2022-01-01 | Stop reason: HOSPADM

## 2022-01-01 RX ORDER — OLANZAPINE 2.5 MG/1
2.5 TABLET, FILM COATED ORAL EVERY MORNING
Status: DISCONTINUED | OUTPATIENT
Start: 2022-01-01 | End: 2022-01-01

## 2022-01-01 RX ORDER — CLOPIDOGREL BISULFATE 75 MG/1
75 TABLET ORAL DAILY
Status: DISCONTINUED | OUTPATIENT
Start: 2022-01-01 | End: 2022-01-01 | Stop reason: HOSPADM

## 2022-01-01 RX ORDER — OLANZAPINE 5 MG/1
5 TABLET ORAL AT BEDTIME
Status: DISCONTINUED | OUTPATIENT
Start: 2022-01-01 | End: 2022-01-01

## 2022-01-01 RX ORDER — ACETAMINOPHEN 325 MG/1
325-650 TABLET ORAL EVERY 6 HOURS PRN
Status: DISCONTINUED | OUTPATIENT
Start: 2022-01-01 | End: 2022-01-01 | Stop reason: HOSPADM

## 2022-01-01 RX ORDER — TAMSULOSIN HYDROCHLORIDE 0.4 MG/1
0.4 CAPSULE ORAL DAILY
Status: DISCONTINUED | OUTPATIENT
Start: 2022-01-01 | End: 2022-01-01 | Stop reason: HOSPADM

## 2022-01-01 RX ORDER — POLYETHYLENE GLYCOL 3350 17 G/17G
17 POWDER, FOR SOLUTION ORAL DAILY
COMMUNITY

## 2022-01-01 RX ORDER — LIDOCAINE 40 MG/G
CREAM TOPICAL
Status: DISCONTINUED | OUTPATIENT
Start: 2022-01-01 | End: 2022-01-01 | Stop reason: HOSPADM

## 2022-01-01 RX ORDER — OLANZAPINE 5 MG/1
5 TABLET, ORALLY DISINTEGRATING ORAL ONCE
Status: COMPLETED | OUTPATIENT
Start: 2022-01-01 | End: 2022-01-01

## 2022-01-01 RX ORDER — ACETAMINOPHEN 650 MG/1
650 SUPPOSITORY RECTAL EVERY 6 HOURS PRN
Status: DISCONTINUED | OUTPATIENT
Start: 2022-01-01 | End: 2022-01-01 | Stop reason: HOSPADM

## 2022-01-01 RX ORDER — HEPARIN SODIUM 10000 [USP'U]/100ML
100-1000 INJECTION, SOLUTION INTRAVENOUS CONTINUOUS PRN
Status: DISCONTINUED | OUTPATIENT
Start: 2022-01-01 | End: 2022-01-01 | Stop reason: HOSPADM

## 2022-01-01 RX ORDER — NALOXONE HYDROCHLORIDE 0.4 MG/ML
0.4 INJECTION, SOLUTION INTRAMUSCULAR; INTRAVENOUS; SUBCUTANEOUS
Status: ACTIVE | OUTPATIENT
Start: 2022-01-01 | End: 2022-01-01

## 2022-01-01 RX ORDER — ACETAMINOPHEN 650 MG/1
650 SUPPOSITORY RECTAL EVERY 4 HOURS PRN
COMMUNITY

## 2022-01-01 RX ORDER — RIVASTIGMINE 4.6 MG/24H
1 PATCH, EXTENDED RELEASE TRANSDERMAL DAILY
Status: DISCONTINUED | OUTPATIENT
Start: 2022-01-01 | End: 2022-01-01 | Stop reason: HOSPADM

## 2022-01-01 RX ORDER — FEXOFENADINE HCL 180 MG/1
180 TABLET ORAL DAILY
Status: DISCONTINUED | OUTPATIENT
Start: 2022-01-01 | End: 2022-01-01 | Stop reason: HOSPADM

## 2022-01-01 RX ORDER — CHLORPROMAZINE HYDROCHLORIDE 50 MG/1
50-100 TABLET, FILM COATED ORAL 2 TIMES DAILY PRN
Status: DISCONTINUED | OUTPATIENT
Start: 2022-01-01 | End: 2022-01-01

## 2022-01-01 RX ORDER — POTASSIUM CHLORIDE 1500 MG/1
20 TABLET, EXTENDED RELEASE ORAL
Status: DISCONTINUED | OUTPATIENT
Start: 2022-01-01 | End: 2022-01-01 | Stop reason: HOSPADM

## 2022-01-01 RX ORDER — ASPIRIN 325 MG
325 TABLET ORAL ONCE
Status: COMPLETED | OUTPATIENT
Start: 2022-01-01 | End: 2022-01-01

## 2022-01-01 RX ORDER — NITROGLYCERIN 0.4 MG/1
0.4 TABLET SUBLINGUAL EVERY 5 MIN PRN
Status: DISCONTINUED | OUTPATIENT
Start: 2022-01-01 | End: 2022-01-01

## 2022-01-01 RX ORDER — ACETAMINOPHEN 325 MG/1
650 TABLET ORAL EVERY 4 HOURS PRN
Status: DISCONTINUED | OUTPATIENT
Start: 2022-01-01 | End: 2022-01-01

## 2022-01-01 RX ORDER — LORAZEPAM 2 MG/ML
INJECTION INTRAMUSCULAR
Status: COMPLETED
Start: 2022-01-01 | End: 2022-01-01

## 2022-01-01 RX ORDER — OLANZAPINE 5 MG/1
5 TABLET ORAL ONCE
Status: COMPLETED | OUTPATIENT
Start: 2022-01-01 | End: 2022-01-01

## 2022-01-01 RX ORDER — ONDANSETRON 2 MG/ML
4 INJECTION INTRAMUSCULAR; INTRAVENOUS EVERY 6 HOURS PRN
Status: DISCONTINUED | OUTPATIENT
Start: 2022-01-01 | End: 2022-01-01 | Stop reason: HOSPADM

## 2022-01-01 RX ORDER — QUETIAPINE FUMARATE 25 MG/1
25 TABLET, FILM COATED ORAL DAILY
COMMUNITY

## 2022-01-01 RX ORDER — PANTOPRAZOLE SODIUM 20 MG/1
20 TABLET, DELAYED RELEASE ORAL
Status: DISCONTINUED | OUTPATIENT
Start: 2022-01-01 | End: 2022-01-01

## 2022-01-01 RX ORDER — ACETAMINOPHEN 325 MG/1
650 TABLET ORAL EVERY 4 HOURS PRN
Status: DISCONTINUED | OUTPATIENT
Start: 2022-01-01 | End: 2022-01-01 | Stop reason: HOSPADM

## 2022-01-01 RX ORDER — CHLORPROMAZINE HYDROCHLORIDE 50 MG/1
50 TABLET, FILM COATED ORAL 3 TIMES DAILY
Status: DISCONTINUED | OUTPATIENT
Start: 2022-01-01 | End: 2022-01-01 | Stop reason: HOSPADM

## 2022-01-01 RX ORDER — ATORVASTATIN CALCIUM 40 MG/1
80 TABLET, FILM COATED ORAL DAILY
Status: DISCONTINUED | OUTPATIENT
Start: 2022-01-01 | End: 2022-01-01

## 2022-01-01 RX ORDER — OLANZAPINE 5 MG/1
5 TABLET, ORALLY DISINTEGRATING ORAL 2 TIMES DAILY
Status: DISCONTINUED | OUTPATIENT
Start: 2022-01-01 | End: 2022-01-01

## 2022-01-01 RX ORDER — OLANZAPINE 2.5 MG/1
2.5 TABLET, FILM COATED ORAL 2 TIMES DAILY PRN
Status: DISCONTINUED | OUTPATIENT
Start: 2022-01-01 | End: 2022-01-01 | Stop reason: HOSPADM

## 2022-01-01 RX ORDER — OXYCODONE HYDROCHLORIDE 5 MG/1
5 TABLET ORAL EVERY 4 HOURS PRN
Status: DISCONTINUED | OUTPATIENT
Start: 2022-01-01 | End: 2022-01-01 | Stop reason: HOSPADM

## 2022-01-01 RX ORDER — POTASSIUM CHLORIDE 7.45 MG/ML
10 INJECTION INTRAVENOUS
Status: COMPLETED | OUTPATIENT
Start: 2022-01-01 | End: 2022-01-01

## 2022-01-01 RX ORDER — DIVALPROEX SODIUM 125 MG/1
500 CAPSULE, COATED PELLETS ORAL
Status: DISCONTINUED | OUTPATIENT
Start: 2022-01-01 | End: 2022-01-01

## 2022-01-01 RX ORDER — OLANZAPINE 2.5 MG/1
2.5 TABLET, FILM COATED ORAL EVERY MORNING
COMMUNITY

## 2022-01-01 RX ORDER — ACYCLOVIR 200 MG/1
0-1 CAPSULE ORAL
Status: DISCONTINUED | OUTPATIENT
Start: 2022-01-01 | End: 2022-01-01 | Stop reason: CLARIF

## 2022-01-01 RX ORDER — OLANZAPINE 2.5 MG/1
5 TABLET, FILM COATED ORAL AT BEDTIME
COMMUNITY

## 2022-01-01 RX ORDER — CHLORPROMAZINE HYDROCHLORIDE 50 MG/1
50 TABLET, FILM COATED ORAL 3 TIMES DAILY PRN
Status: DISCONTINUED | OUTPATIENT
Start: 2022-01-01 | End: 2022-01-01

## 2022-01-01 RX ORDER — LIDOCAINE HYDROCHLORIDE 20 MG/ML
JELLY TOPICAL
Status: COMPLETED
Start: 2022-01-01 | End: 2022-01-01

## 2022-01-01 RX ORDER — RISPERIDONE 0.5 MG/1
0.5 TABLET, ORALLY DISINTEGRATING ORAL 2 TIMES DAILY
Status: DISCONTINUED | OUTPATIENT
Start: 2022-01-01 | End: 2022-01-01

## 2022-01-01 RX ORDER — ATROPINE SULFATE 10 MG/ML
2 SOLUTION/ DROPS OPHTHALMIC
Status: DISCONTINUED | OUTPATIENT
Start: 2022-01-01 | End: 2022-01-01 | Stop reason: HOSPADM

## 2022-01-01 RX ORDER — DIVALPROEX SODIUM 125 MG/1
750 CAPSULE, COATED PELLETS ORAL 2 TIMES DAILY
Status: DISCONTINUED | OUTPATIENT
Start: 2022-01-01 | End: 2022-01-01

## 2022-01-01 RX ORDER — IOPAMIDOL 755 MG/ML
70 INJECTION, SOLUTION INTRAVASCULAR ONCE
Status: COMPLETED | OUTPATIENT
Start: 2022-01-01 | End: 2022-01-01

## 2022-01-01 RX ORDER — SODIUM CHLORIDE 9 MG/ML
INJECTION, SOLUTION INTRAVENOUS CONTINUOUS
Status: DISCONTINUED | OUTPATIENT
Start: 2022-01-01 | End: 2022-01-01 | Stop reason: HOSPADM

## 2022-01-01 RX ORDER — OLANZAPINE 10 MG/2ML
5 INJECTION, POWDER, FOR SOLUTION INTRAMUSCULAR EVERY 8 HOURS PRN
Status: DISCONTINUED | OUTPATIENT
Start: 2022-01-01 | End: 2022-01-01 | Stop reason: HOSPADM

## 2022-01-01 RX ORDER — ATORVASTATIN CALCIUM 40 MG/1
80 TABLET, FILM COATED ORAL EVERY EVENING
Status: DISCONTINUED | OUTPATIENT
Start: 2022-01-01 | End: 2022-01-01 | Stop reason: HOSPADM

## 2022-01-01 RX ORDER — OLANZAPINE 5 MG/1
5 TABLET, ORALLY DISINTEGRATING ORAL AT BEDTIME
Qty: 30 TABLET | Refills: 0 | Status: ON HOLD | OUTPATIENT
Start: 2022-01-01 | End: 2022-01-01

## 2022-01-01 RX ORDER — LOSARTAN POTASSIUM 25 MG/1
25 TABLET ORAL DAILY
Status: DISCONTINUED | OUTPATIENT
Start: 2022-01-01 | End: 2022-01-01 | Stop reason: HOSPADM

## 2022-01-01 RX ORDER — BENZTROPINE MESYLATE 1 MG/ML
1 INJECTION, SOLUTION INTRAMUSCULAR; INTRAVENOUS 2 TIMES DAILY PRN
Status: DISCONTINUED | OUTPATIENT
Start: 2022-01-01 | End: 2022-01-01 | Stop reason: HOSPADM

## 2022-01-01 RX ORDER — OLANZAPINE 10 MG/2ML
10 INJECTION, POWDER, FOR SOLUTION INTRAMUSCULAR ONCE
Status: COMPLETED | OUTPATIENT
Start: 2022-01-01 | End: 2022-01-01

## 2022-01-01 RX ORDER — HEPARIN SODIUM 10000 [USP'U]/100ML
0-5000 INJECTION, SOLUTION INTRAVENOUS CONTINUOUS
Status: DISCONTINUED | OUTPATIENT
Start: 2022-01-01 | End: 2022-01-01

## 2022-01-01 RX ORDER — DIVALPROEX SODIUM 125 MG/1
375 CAPSULE, COATED PELLETS ORAL EVERY 12 HOURS
DISCHARGE
Start: 2022-01-01 | End: 2022-01-01

## 2022-01-01 RX ORDER — NALOXONE HYDROCHLORIDE 0.4 MG/ML
0.2 INJECTION, SOLUTION INTRAMUSCULAR; INTRAVENOUS; SUBCUTANEOUS
Status: CANCELLED | OUTPATIENT
Start: 2022-01-01

## 2022-01-01 RX ORDER — QUETIAPINE FUMARATE 25 MG/1
25 TABLET, FILM COATED ORAL 2 TIMES DAILY
Status: DISCONTINUED | OUTPATIENT
Start: 2022-01-01 | End: 2022-01-01

## 2022-01-01 RX ORDER — LANOLIN ALCOHOL/MO/W.PET/CERES
3 CREAM (GRAM) TOPICAL AT BEDTIME
Status: DISCONTINUED | OUTPATIENT
Start: 2022-01-01 | End: 2022-01-01

## 2022-01-01 RX ORDER — RIVASTIGMINE 4.6 MG/24H
1 PATCH, EXTENDED RELEASE TRANSDERMAL DAILY
Status: DISCONTINUED | OUTPATIENT
Start: 2022-01-01 | End: 2022-01-01

## 2022-01-01 RX ORDER — CITALOPRAM HYDROBROMIDE 20 MG/1
20 TABLET ORAL DAILY
COMMUNITY

## 2022-01-01 RX ORDER — RIVASTIGMINE 4.6 MG/24H
4.6 PATCH, EXTENDED RELEASE TRANSDERMAL DAILY
Status: DISCONTINUED | OUTPATIENT
Start: 2022-01-01 | End: 2022-01-01 | Stop reason: HOSPADM

## 2022-01-01 RX ORDER — WATER 10 ML/10ML
INJECTION INTRAMUSCULAR; INTRAVENOUS; SUBCUTANEOUS
Status: DISCONTINUED
Start: 2022-01-01 | End: 2022-01-01 | Stop reason: HOSPADM

## 2022-01-01 RX ORDER — RISPERIDONE 0.5 MG/1
1 TABLET, ORALLY DISINTEGRATING ORAL EVERY MORNING
Status: DISCONTINUED | OUTPATIENT
Start: 2022-01-01 | End: 2022-01-01

## 2022-01-01 RX ORDER — LOPERAMIDE HCL 2 MG
2 CAPSULE ORAL DAILY
Status: DISCONTINUED | OUTPATIENT
Start: 2022-01-01 | End: 2022-01-01

## 2022-01-01 RX ORDER — NALOXONE HYDROCHLORIDE 0.4 MG/ML
0.2 INJECTION, SOLUTION INTRAMUSCULAR; INTRAVENOUS; SUBCUTANEOUS
Status: DISCONTINUED | OUTPATIENT
Start: 2022-01-01 | End: 2022-01-01 | Stop reason: HOSPADM

## 2022-01-01 RX ORDER — HALOPERIDOL 5 MG/ML
INJECTION INTRAMUSCULAR
Status: COMPLETED
Start: 2022-01-01 | End: 2022-01-01

## 2022-01-01 RX ORDER — METOPROLOL SUCCINATE 25 MG/1
25 TABLET, EXTENDED RELEASE ORAL AT BEDTIME
Status: ON HOLD | COMMUNITY
End: 2022-01-01

## 2022-01-01 RX ORDER — ACETAMINOPHEN 325 MG/1
650 TABLET ORAL EVERY 6 HOURS PRN
Status: DISCONTINUED | OUTPATIENT
Start: 2022-01-01 | End: 2022-01-01 | Stop reason: HOSPADM

## 2022-01-01 RX ORDER — QUETIAPINE FUMARATE 25 MG/1
12.5 TABLET, FILM COATED ORAL EVERY 6 HOURS PRN
Qty: 30 TABLET | Refills: 0 | Status: SHIPPED | OUTPATIENT
Start: 2022-01-01

## 2022-01-01 RX ORDER — LORAZEPAM 2 MG/ML
0.5 INJECTION INTRAMUSCULAR
Status: DISCONTINUED | OUTPATIENT
Start: 2022-01-01 | End: 2022-01-01 | Stop reason: HOSPADM

## 2022-01-01 RX ORDER — METOPROLOL SUCCINATE 25 MG/1
25 TABLET, EXTENDED RELEASE ORAL AT BEDTIME
Start: 2022-01-01

## 2022-01-01 RX ORDER — LORAZEPAM 0.5 MG/1
0.5 TABLET ORAL
Status: DISCONTINUED | OUTPATIENT
Start: 2022-01-01 | End: 2022-01-01 | Stop reason: HOSPADM

## 2022-01-01 RX ORDER — VITAMIN B COMPLEX
25 TABLET ORAL DAILY
Status: DISCONTINUED | OUTPATIENT
Start: 2022-01-01 | End: 2022-01-01 | Stop reason: HOSPADM

## 2022-01-01 RX ORDER — CITALOPRAM HYDROBROMIDE 10 MG/1
10 TABLET ORAL DAILY
Status: DISCONTINUED | OUTPATIENT
Start: 2022-01-01 | End: 2022-01-01

## 2022-01-01 RX ORDER — PANTOPRAZOLE SODIUM 20 MG/1
20 TABLET, DELAYED RELEASE ORAL DAILY
COMMUNITY

## 2022-01-01 RX ORDER — OLANZAPINE 10 MG/2ML
INJECTION, POWDER, FOR SOLUTION INTRAMUSCULAR
Status: DISPENSED
Start: 2022-01-01 | End: 2022-01-01

## 2022-01-01 RX ORDER — ASPIRIN 81 MG/1
243 TABLET, CHEWABLE ORAL ONCE
Status: COMPLETED | OUTPATIENT
Start: 2022-01-01 | End: 2022-01-01

## 2022-01-01 RX ORDER — OLANZAPINE 5 MG/1
5 TABLET ORAL 2 TIMES DAILY
Status: DISCONTINUED | OUTPATIENT
Start: 2022-01-01 | End: 2022-01-01 | Stop reason: HOSPADM

## 2022-01-01 RX ORDER — SODIUM CHLORIDE 9 MG/ML
INJECTION, SOLUTION INTRAVENOUS CONTINUOUS
Status: DISCONTINUED | OUTPATIENT
Start: 2022-01-01 | End: 2022-01-01

## 2022-01-01 RX ORDER — FLUTICASONE PROPIONATE 50 MCG
1 SPRAY, SUSPENSION (ML) NASAL AT BEDTIME
COMMUNITY

## 2022-01-01 RX ORDER — OLANZAPINE 5 MG/1
5 TABLET, ORALLY DISINTEGRATING ORAL 3 TIMES DAILY
Status: DISCONTINUED | OUTPATIENT
Start: 2022-01-01 | End: 2022-01-01

## 2022-01-01 RX ORDER — REGADENOSON 0.08 MG/ML
0.4 INJECTION, SOLUTION INTRAVENOUS ONCE
Status: COMPLETED | OUTPATIENT
Start: 2022-01-01 | End: 2022-01-01

## 2022-01-01 RX ORDER — FLUMAZENIL 0.1 MG/ML
0.2 INJECTION, SOLUTION INTRAVENOUS
Status: ACTIVE | OUTPATIENT
Start: 2022-01-01 | End: 2022-01-01

## 2022-01-01 RX ORDER — ISOSORBIDE MONONITRATE 30 MG/1
30 TABLET, EXTENDED RELEASE ORAL DAILY
DISCHARGE
Start: 2022-01-01 | End: 2022-01-01

## 2022-01-01 RX ORDER — FEXOFENADINE HCL 180 MG/1
180 TABLET ORAL DAILY
COMMUNITY

## 2022-01-01 RX ORDER — SENNOSIDES 8.6 MG
2 TABLET ORAL DAILY PRN
Status: DISCONTINUED | OUTPATIENT
Start: 2022-01-01 | End: 2022-01-01

## 2022-01-01 RX ORDER — NALOXONE HYDROCHLORIDE 0.4 MG/ML
0.4 INJECTION, SOLUTION INTRAMUSCULAR; INTRAVENOUS; SUBCUTANEOUS
Status: DISCONTINUED | OUTPATIENT
Start: 2022-01-01 | End: 2022-01-01 | Stop reason: HOSPADM

## 2022-01-01 RX ORDER — IOPAMIDOL 755 MG/ML
INJECTION, SOLUTION INTRAVASCULAR
Status: DISCONTINUED | OUTPATIENT
Start: 2022-01-01 | End: 2022-01-01 | Stop reason: HOSPADM

## 2022-01-01 RX ORDER — RIVASTIGMINE 4.6 MG/24H
1 PATCH, EXTENDED RELEASE TRANSDERMAL DAILY
Qty: 30 PATCH | Refills: 0 | Status: SHIPPED | OUTPATIENT
Start: 2022-01-01

## 2022-01-01 RX ORDER — RIVASTIGMINE 4.6 MG/24H
1 PATCH, EXTENDED RELEASE TRANSDERMAL DAILY
DISCHARGE
Start: 2022-01-01 | End: 2022-01-01

## 2022-01-01 RX ORDER — LOPERAMIDE HCL 2 MG
2 CAPSULE ORAL 3 TIMES DAILY PRN
Status: DISCONTINUED | OUTPATIENT
Start: 2022-01-01 | End: 2022-01-01

## 2022-01-01 RX ORDER — POLYETHYLENE GLYCOL 3350 17 G/17G
17 POWDER, FOR SOLUTION ORAL DAILY
Status: DISCONTINUED | OUTPATIENT
Start: 2022-01-01 | End: 2022-01-01 | Stop reason: HOSPADM

## 2022-01-01 RX ORDER — DIVALPROEX SODIUM 125 MG/1
375 CAPSULE, COATED PELLETS ORAL EVERY 12 HOURS SCHEDULED
Status: DISCONTINUED | OUTPATIENT
Start: 2022-01-01 | End: 2022-01-01 | Stop reason: HOSPADM

## 2022-01-01 RX ORDER — FEXOFENADINE HCL 180 MG/1
180 TABLET ORAL DAILY
Status: DISCONTINUED | OUTPATIENT
Start: 2022-01-01 | End: 2022-01-01

## 2022-01-01 RX ORDER — ACETAMINOPHEN 500 MG
1000 TABLET ORAL ONCE
Status: COMPLETED | OUTPATIENT
Start: 2022-01-01 | End: 2022-01-01

## 2022-01-01 RX ORDER — ISOSORBIDE MONONITRATE 30 MG/1
30 TABLET, EXTENDED RELEASE ORAL DAILY
Qty: 30 TABLET | Refills: 0 | Status: ON HOLD | OUTPATIENT
Start: 2022-01-01 | End: 2022-01-01

## 2022-01-01 RX ORDER — HEPARIN SODIUM 1000 [USP'U]/ML
INJECTION, SOLUTION INTRAVENOUS; SUBCUTANEOUS
Status: DISCONTINUED | OUTPATIENT
Start: 2022-01-01 | End: 2022-01-01 | Stop reason: HOSPADM

## 2022-01-01 RX ORDER — OLANZAPINE 5 MG/1
5 TABLET, ORALLY DISINTEGRATING ORAL AT BEDTIME
Status: DISCONTINUED | OUTPATIENT
Start: 2022-01-01 | End: 2022-01-01 | Stop reason: HOSPADM

## 2022-01-01 RX ORDER — FENTANYL CITRATE 50 UG/ML
INJECTION, SOLUTION INTRAMUSCULAR; INTRAVENOUS
Status: DISCONTINUED | OUTPATIENT
Start: 2022-01-01 | End: 2022-01-01 | Stop reason: HOSPADM

## 2022-01-01 RX ORDER — ISOSORBIDE MONONITRATE 30 MG/1
30 TABLET, EXTENDED RELEASE ORAL DAILY
Qty: 30 TABLET | Refills: 0
Start: 2022-01-01 | End: 2022-01-01

## 2022-01-01 RX ORDER — OLANZAPINE 10 MG/2ML
10 INJECTION, POWDER, FOR SOLUTION INTRAMUSCULAR DAILY PRN
Status: DISCONTINUED | OUTPATIENT
Start: 2022-01-01 | End: 2022-01-01 | Stop reason: HOSPADM

## 2022-01-01 RX ORDER — SODIUM CHLORIDE 9 MG/ML
75 INJECTION, SOLUTION INTRAVENOUS CONTINUOUS
Status: ACTIVE | OUTPATIENT
Start: 2022-01-01 | End: 2022-01-01

## 2022-01-01 RX ORDER — ACETAMINOPHEN 325 MG/10.15ML
650 LIQUID ORAL EVERY 4 HOURS PRN
Status: DISCONTINUED | OUTPATIENT
Start: 2022-01-01 | End: 2022-01-01 | Stop reason: HOSPADM

## 2022-01-01 RX ORDER — FLUTICASONE PROPIONATE 50 MCG
1 SPRAY, SUSPENSION (ML) NASAL DAILY
Status: DISCONTINUED | OUTPATIENT
Start: 2022-01-01 | End: 2022-01-01 | Stop reason: HOSPADM

## 2022-01-01 RX ORDER — QUETIAPINE FUMARATE 25 MG/1
12.5 TABLET, FILM COATED ORAL EVERY 6 HOURS PRN
DISCHARGE
Start: 2022-01-01 | End: 2022-01-01

## 2022-01-01 RX ORDER — LOPERAMIDE HCL 2 MG
2 CAPSULE ORAL EVERY EVENING
COMMUNITY
End: 2022-01-01

## 2022-01-01 RX ORDER — OLANZAPINE 10 MG/2ML
5 INJECTION, POWDER, FOR SOLUTION INTRAMUSCULAR 3 TIMES DAILY PRN
Status: DISCONTINUED | OUTPATIENT
Start: 2022-01-01 | End: 2022-01-01 | Stop reason: HOSPADM

## 2022-01-01 RX ADMIN — MELATONIN 5 MG TABLET 10 MG: at 22:09

## 2022-01-01 RX ADMIN — RIVASTIGMINE TRANSDERMAL SYSTEM 1 PATCH: 4.6 PATCH, EXTENDED RELEASE TRANSDERMAL at 09:41

## 2022-01-01 RX ADMIN — LOSARTAN POTASSIUM 25 MG: 25 TABLET, FILM COATED ORAL at 10:12

## 2022-01-01 RX ADMIN — MICONAZOLE NITRATE: 20 POWDER TOPICAL at 21:29

## 2022-01-01 RX ADMIN — CITALOPRAM HYDROBROMIDE 10 MG: 10 TABLET ORAL at 08:16

## 2022-01-01 RX ADMIN — RISPERIDONE 0.5 MG: 0.5 TABLET, ORALLY DISINTEGRATING ORAL at 20:51

## 2022-01-01 RX ADMIN — ACETAMINOPHEN 650 MG: 325 TABLET ORAL at 10:16

## 2022-01-01 RX ADMIN — OLANZAPINE 5 MG: 5 TABLET, FILM COATED ORAL at 13:33

## 2022-01-01 RX ADMIN — ISOSORBIDE MONONITRATE 30 MG: 30 TABLET, EXTENDED RELEASE ORAL at 08:05

## 2022-01-01 RX ADMIN — LOSARTAN POTASSIUM 25 MG: 25 TABLET, FILM COATED ORAL at 07:49

## 2022-01-01 RX ADMIN — TAMSULOSIN HYDROCHLORIDE 0.4 MG: 0.4 CAPSULE ORAL at 10:17

## 2022-01-01 RX ADMIN — OLANZAPINE 5 MG: 5 TABLET, ORALLY DISINTEGRATING ORAL at 23:57

## 2022-01-01 RX ADMIN — OLANZAPINE 2.5 MG: 5 TABLET, ORALLY DISINTEGRATING ORAL at 03:38

## 2022-01-01 RX ADMIN — INSULIN ASPART 3 UNITS: 100 INJECTION, SOLUTION INTRAVENOUS; SUBCUTANEOUS at 10:11

## 2022-01-01 RX ADMIN — ACETAMINOPHEN 650 MG: 325 TABLET ORAL at 17:45

## 2022-01-01 RX ADMIN — ATORVASTATIN CALCIUM 80 MG: 40 TABLET, FILM COATED ORAL at 21:46

## 2022-01-01 RX ADMIN — IOPAMIDOL 70 ML: 755 INJECTION, SOLUTION INTRAVENOUS at 15:59

## 2022-01-01 RX ADMIN — ACETAMINOPHEN 650 MG: 325 TABLET ORAL at 08:17

## 2022-01-01 RX ADMIN — FLUTICASONE PROPIONATE 1 SPRAY: 50 SPRAY, METERED NASAL at 08:40

## 2022-01-01 RX ADMIN — VALPROATE SODIUM 500 MG: 100 INJECTION, SOLUTION INTRAVENOUS at 09:08

## 2022-01-01 RX ADMIN — METFORMIN HYDROCHLORIDE 1000 MG: 500 TABLET ORAL at 09:15

## 2022-01-01 RX ADMIN — CHLORPROMAZINE HYDROCHLORIDE 50 MG: 50 TABLET, FILM COATED ORAL at 08:58

## 2022-01-01 RX ADMIN — RISPERIDONE 0.5 MG: 0.5 TABLET, ORALLY DISINTEGRATING ORAL at 14:24

## 2022-01-01 RX ADMIN — RIVASTIGMINE 1 PATCH: 4.6 PATCH TRANSDERMAL at 09:36

## 2022-01-01 RX ADMIN — POLYETHYLENE GLYCOL 3350 17 G: 17 POWDER, FOR SOLUTION ORAL at 08:29

## 2022-01-01 RX ADMIN — POLYETHYLENE GLYCOL 3350 17 G: 17 POWDER, FOR SOLUTION ORAL at 08:06

## 2022-01-01 RX ADMIN — METOPROLOL SUCCINATE 12.5 MG: 25 TABLET, EXTENDED RELEASE ORAL at 22:00

## 2022-01-01 RX ADMIN — VALPROATE SODIUM 250 MG: 100 INJECTION, SOLUTION INTRAVENOUS at 14:11

## 2022-01-01 RX ADMIN — TAMSULOSIN HYDROCHLORIDE 0.4 MG: 0.4 CAPSULE ORAL at 09:06

## 2022-01-01 RX ADMIN — METOPROLOL SUCCINATE 12.5 MG: 25 TABLET, EXTENDED RELEASE ORAL at 21:36

## 2022-01-01 RX ADMIN — ACETAMINOPHEN 650 MG: 325 TABLET ORAL at 18:01

## 2022-01-01 RX ADMIN — FLUTICASONE PROPIONATE 1 SPRAY: 50 SPRAY, METERED NASAL at 21:36

## 2022-01-01 RX ADMIN — GUAIFENESIN 10 ML: 200 SOLUTION ORAL at 01:15

## 2022-01-01 RX ADMIN — MELATONIN 5 MG TABLET 10 MG: at 21:32

## 2022-01-01 RX ADMIN — TETROFOSMIN 10 MCI.: 1.38 INJECTION, POWDER, LYOPHILIZED, FOR SOLUTION INTRAVENOUS at 12:53

## 2022-01-01 RX ADMIN — CLOPIDOGREL BISULFATE 75 MG: 75 TABLET ORAL at 08:36

## 2022-01-01 RX ADMIN — INSULIN DETEMIR 12 UNITS: 100 INJECTION, SOLUTION SUBCUTANEOUS at 09:05

## 2022-01-01 RX ADMIN — INSULIN ASPART 1 UNITS: 100 INJECTION, SOLUTION INTRAVENOUS; SUBCUTANEOUS at 09:19

## 2022-01-01 RX ADMIN — DIVALPROEX SODIUM 500 MG: 125 CAPSULE, COATED PELLETS ORAL at 11:51

## 2022-01-01 RX ADMIN — ATORVASTATIN CALCIUM 80 MG: 80 TABLET, FILM COATED ORAL at 09:31

## 2022-01-01 RX ADMIN — WATER 10 ML: 1 INJECTION INTRAMUSCULAR; INTRAVENOUS; SUBCUTANEOUS at 14:59

## 2022-01-01 RX ADMIN — ACETAMINOPHEN 650 MG: 325 TABLET ORAL at 11:04

## 2022-01-01 RX ADMIN — MICONAZOLE NITRATE: 20 POWDER TOPICAL at 21:17

## 2022-01-01 RX ADMIN — CHLORPROMAZINE HYDROCHLORIDE 50 MG: 50 TABLET, FILM COATED ORAL at 00:52

## 2022-01-01 RX ADMIN — RISPERIDONE 0.5 MG: 0.5 TABLET, ORALLY DISINTEGRATING ORAL at 20:03

## 2022-01-01 RX ADMIN — RISPERIDONE 0.5 MG: 0.5 TABLET, ORALLY DISINTEGRATING ORAL at 13:11

## 2022-01-01 RX ADMIN — LOPERAMIDE HYDROCHLORIDE 2 MG: 2 CAPSULE ORAL at 10:13

## 2022-01-01 RX ADMIN — DEXAMETHASONE 6 MG: 2 TABLET ORAL at 08:38

## 2022-01-01 RX ADMIN — ASPIRIN 81 MG: 81 TABLET, COATED ORAL at 09:39

## 2022-01-01 RX ADMIN — MICONAZOLE NITRATE: 20 POWDER TOPICAL at 09:03

## 2022-01-01 RX ADMIN — VALPROATE SODIUM 1000 MG: 100 INJECTION, SOLUTION INTRAVENOUS at 22:07

## 2022-01-01 RX ADMIN — RISPERIDONE 0.5 MG: 0.5 TABLET, ORALLY DISINTEGRATING ORAL at 21:52

## 2022-01-01 RX ADMIN — ACETAMINOPHEN 650 MG: 325 TABLET ORAL at 08:21

## 2022-01-01 RX ADMIN — MELATONIN 5 MG TABLET 10 MG: at 20:50

## 2022-01-01 RX ADMIN — HYDROMORPHONE HYDROCHLORIDE 0.5 MG: 1 SOLUTION ORAL at 03:51

## 2022-01-01 RX ADMIN — RISPERIDONE 0.5 MG: 0.5 TABLET, ORALLY DISINTEGRATING ORAL at 19:59

## 2022-01-01 RX ADMIN — RISPERIDONE 0.5 MG: 0.5 TABLET, ORALLY DISINTEGRATING ORAL at 08:44

## 2022-01-01 RX ADMIN — MELATONIN 5 MG TABLET 10 MG: at 21:39

## 2022-01-01 RX ADMIN — SODIUM CHLORIDE, POTASSIUM CHLORIDE, SODIUM LACTATE AND CALCIUM CHLORIDE: 600; 310; 30; 20 INJECTION, SOLUTION INTRAVENOUS at 18:00

## 2022-01-01 RX ADMIN — CITALOPRAM HYDROBROMIDE 10 MG: 10 TABLET ORAL at 09:08

## 2022-01-01 RX ADMIN — OLANZAPINE 5 MG: 5 TABLET, FILM COATED ORAL at 21:08

## 2022-01-01 RX ADMIN — METFORMIN HYDROCHLORIDE 1000 MG: 500 TABLET ORAL at 17:23

## 2022-01-01 RX ADMIN — QUETIAPINE FUMARATE 25 MG: 25 TABLET ORAL at 21:39

## 2022-01-01 RX ADMIN — INSULIN ASPART 1 UNITS: 100 INJECTION, SOLUTION INTRAVENOUS; SUBCUTANEOUS at 17:14

## 2022-01-01 RX ADMIN — ASPIRIN 81 MG: 81 TABLET, COATED ORAL at 08:58

## 2022-01-01 RX ADMIN — INSULIN ASPART 1 UNITS: 100 INJECTION, SOLUTION INTRAVENOUS; SUBCUTANEOUS at 12:47

## 2022-01-01 RX ADMIN — CHLORPROMAZINE HYDROCHLORIDE 50 MG: 50 TABLET, FILM COATED ORAL at 08:48

## 2022-01-01 RX ADMIN — METOPROLOL SUCCINATE 12.5 MG: 25 TABLET, EXTENDED RELEASE ORAL at 21:13

## 2022-01-01 RX ADMIN — VALPROATE SODIUM 250 MG: 100 INJECTION, SOLUTION INTRAVENOUS at 09:06

## 2022-01-01 RX ADMIN — WATER 10 ML: 1 INJECTION INTRAMUSCULAR; INTRAVENOUS; SUBCUTANEOUS at 03:32

## 2022-01-01 RX ADMIN — METOPROLOL SUCCINATE 25 MG: 25 TABLET, EXTENDED RELEASE ORAL at 20:50

## 2022-01-01 RX ADMIN — Medication 25 MCG: at 07:48

## 2022-01-01 RX ADMIN — RIVASTIGMINE 1 PATCH: 4.6 PATCH TRANSDERMAL at 10:19

## 2022-01-01 RX ADMIN — ACETAMINOPHEN 650 MG: 325 TABLET ORAL at 10:58

## 2022-01-01 RX ADMIN — Medication 25 MCG: at 10:13

## 2022-01-01 RX ADMIN — DIVALPROEX SODIUM 750 MG: 125 CAPSULE, COATED PELLETS ORAL at 22:09

## 2022-01-01 RX ADMIN — INSULIN GLARGINE 22 UNITS: 100 INJECTION, SOLUTION SUBCUTANEOUS at 21:53

## 2022-01-01 RX ADMIN — ACETAMINOPHEN 650 MG: 325 TABLET ORAL at 16:21

## 2022-01-01 RX ADMIN — VALPROATE SODIUM 750 MG: 100 INJECTION, SOLUTION INTRAVENOUS at 14:22

## 2022-01-01 RX ADMIN — CITALOPRAM HYDROBROMIDE 10 MG: 10 TABLET ORAL at 08:56

## 2022-01-01 RX ADMIN — HYDROMORPHONE HYDROCHLORIDE 0.5 MG: 1 SOLUTION ORAL at 18:15

## 2022-01-01 RX ADMIN — METOPROLOL SUCCINATE 25 MG: 25 TABLET, EXTENDED RELEASE ORAL at 22:43

## 2022-01-01 RX ADMIN — HALOPERIDOL 2 MG: 5 INJECTION INTRAMUSCULAR at 13:50

## 2022-01-01 RX ADMIN — PANTOPRAZOLE SODIUM 20 MG: 20 TABLET, DELAYED RELEASE ORAL at 08:36

## 2022-01-01 RX ADMIN — CITALOPRAM HYDROBROMIDE 10 MG: 10 TABLET ORAL at 13:54

## 2022-01-01 RX ADMIN — SODIUM CHLORIDE, POTASSIUM CHLORIDE, SODIUM LACTATE AND CALCIUM CHLORIDE: 600; 310; 30; 20 INJECTION, SOLUTION INTRAVENOUS at 20:28

## 2022-01-01 RX ADMIN — ISOSORBIDE MONONITRATE 30 MG: 30 TABLET, EXTENDED RELEASE ORAL at 09:31

## 2022-01-01 RX ADMIN — VALPROATE SODIUM 1000 MG: 100 INJECTION, SOLUTION INTRAVENOUS at 21:53

## 2022-01-01 RX ADMIN — QUETIAPINE FUMARATE 25 MG: 25 TABLET ORAL at 02:49

## 2022-01-01 RX ADMIN — INSULIN GLARGINE 22 UNITS: 100 INJECTION, SOLUTION SUBCUTANEOUS at 21:31

## 2022-01-01 RX ADMIN — SODIUM CHLORIDE: 9 INJECTION, SOLUTION INTRAVENOUS at 00:15

## 2022-01-01 RX ADMIN — WATER 10 ML: 1 INJECTION INTRAMUSCULAR; INTRAVENOUS; SUBCUTANEOUS at 14:44

## 2022-01-01 RX ADMIN — ATORVASTATIN CALCIUM 80 MG: 80 TABLET, FILM COATED ORAL at 08:05

## 2022-01-01 RX ADMIN — MELATONIN 5 MG TABLET 10 MG: at 20:00

## 2022-01-01 RX ADMIN — TAMSULOSIN HYDROCHLORIDE 0.4 MG: 0.4 CAPSULE ORAL at 09:13

## 2022-01-01 RX ADMIN — OLANZAPINE 5 MG: 5 TABLET, ORALLY DISINTEGRATING ORAL at 19:52

## 2022-01-01 RX ADMIN — FLUTICASONE PROPIONATE 1 SPRAY: 50 SPRAY, METERED NASAL at 07:56

## 2022-01-01 RX ADMIN — OLANZAPINE 5 MG: 5 TABLET, FILM COATED ORAL at 10:58

## 2022-01-01 RX ADMIN — DIVALPROEX SODIUM 500 MG: 125 CAPSULE, COATED PELLETS ORAL at 08:43

## 2022-01-01 RX ADMIN — RISPERIDONE 0.5 MG: 0.5 TABLET, ORALLY DISINTEGRATING ORAL at 14:11

## 2022-01-01 RX ADMIN — DIVALPROEX SODIUM 375 MG: 125 CAPSULE, COATED PELLETS ORAL at 11:07

## 2022-01-01 RX ADMIN — MELATONIN TAB 3 MG 3 MG: 3 TAB at 21:48

## 2022-01-01 RX ADMIN — Medication 12.5 MG: at 15:54

## 2022-01-01 RX ADMIN — MELATONIN TAB 3 MG 3 MG: 3 TAB at 21:24

## 2022-01-01 RX ADMIN — VALPROATE SODIUM 250 MG: 100 INJECTION, SOLUTION INTRAVENOUS at 02:14

## 2022-01-01 RX ADMIN — VALPROATE SODIUM 750 MG: 100 INJECTION, SOLUTION INTRAVENOUS at 21:56

## 2022-01-01 RX ADMIN — ASPIRIN 81 MG: 81 TABLET, COATED ORAL at 10:15

## 2022-01-01 RX ADMIN — INSULIN ASPART 3 UNITS: 100 INJECTION, SOLUTION INTRAVENOUS; SUBCUTANEOUS at 17:57

## 2022-01-01 RX ADMIN — TAMSULOSIN HYDROCHLORIDE 0.4 MG: 0.4 CAPSULE ORAL at 09:36

## 2022-01-01 RX ADMIN — QUETIAPINE FUMARATE 25 MG: 25 TABLET ORAL at 21:38

## 2022-01-01 RX ADMIN — WATER: 1 INJECTION INTRAMUSCULAR; INTRAVENOUS; SUBCUTANEOUS at 13:36

## 2022-01-01 RX ADMIN — RISPERIDONE 0.5 MG: 0.5 TABLET, ORALLY DISINTEGRATING ORAL at 13:43

## 2022-01-01 RX ADMIN — ACETAMINOPHEN 650 MG: 325 TABLET ORAL at 16:28

## 2022-01-01 RX ADMIN — HYDROMORPHONE HYDROCHLORIDE 0.5 MG: 1 SOLUTION ORAL at 12:24

## 2022-01-01 RX ADMIN — METOPROLOL SUCCINATE 12.5 MG: 25 TABLET, EXTENDED RELEASE ORAL at 21:42

## 2022-01-01 RX ADMIN — INSULIN ASPART 3 UNITS: 100 INJECTION, SOLUTION INTRAVENOUS; SUBCUTANEOUS at 14:04

## 2022-01-01 RX ADMIN — OLANZAPINE 5 MG: 5 TABLET, ORALLY DISINTEGRATING ORAL at 11:30

## 2022-01-01 RX ADMIN — METOPROLOL SUCCINATE 12.5 MG: 25 TABLET, EXTENDED RELEASE ORAL at 21:28

## 2022-01-01 RX ADMIN — ASPIRIN 81 MG: 81 TABLET, COATED ORAL at 13:32

## 2022-01-01 RX ADMIN — LORAZEPAM 1 MG: 2 INJECTION INTRAMUSCULAR; INTRAVENOUS at 13:48

## 2022-01-01 RX ADMIN — Medication 12.5 MG: at 00:08

## 2022-01-01 RX ADMIN — ACETAMINOPHEN 650 MG: 325 TABLET ORAL at 17:21

## 2022-01-01 RX ADMIN — DIVALPROEX SODIUM 375 MG: 125 CAPSULE, COATED PELLETS ORAL at 21:06

## 2022-01-01 RX ADMIN — RIVASTIGMINE 1 PATCH: 4.6 PATCH TRANSDERMAL at 09:09

## 2022-01-01 RX ADMIN — RIVASTIGMINE 1 PATCH: 4.6 PATCH TRANSDERMAL at 09:05

## 2022-01-01 RX ADMIN — INSULIN ASPART 2 UNITS: 100 INJECTION, SOLUTION INTRAVENOUS; SUBCUTANEOUS at 12:33

## 2022-01-01 RX ADMIN — PANTOPRAZOLE SODIUM 20 MG: 20 TABLET, DELAYED RELEASE ORAL at 09:36

## 2022-01-01 RX ADMIN — ATORVASTATIN CALCIUM 80 MG: 40 TABLET, FILM COATED ORAL at 21:39

## 2022-01-01 RX ADMIN — PANTOPRAZOLE SODIUM 20 MG: 20 TABLET, DELAYED RELEASE ORAL at 08:04

## 2022-01-01 RX ADMIN — METOPROLOL TARTRATE 12.5 MG: 25 TABLET, FILM COATED ORAL at 21:45

## 2022-01-01 RX ADMIN — CLOPIDOGREL BISULFATE 75 MG: 75 TABLET ORAL at 10:13

## 2022-01-01 RX ADMIN — INSULIN ASPART 3 UNITS: 100 INJECTION, SOLUTION INTRAVENOUS; SUBCUTANEOUS at 12:13

## 2022-01-01 RX ADMIN — PANTOPRAZOLE SODIUM 20 MG: 20 TABLET, DELAYED RELEASE ORAL at 08:50

## 2022-01-01 RX ADMIN — DOCUSATE SODIUM 100 MG: 100 CAPSULE, LIQUID FILLED ORAL at 20:25

## 2022-01-01 RX ADMIN — CITALOPRAM HYDROBROMIDE 20 MG: 20 TABLET ORAL at 09:23

## 2022-01-01 RX ADMIN — PANTOPRAZOLE SODIUM 20 MG: 20 TABLET, DELAYED RELEASE ORAL at 07:57

## 2022-01-01 RX ADMIN — OLANZAPINE 5 MG: 10 INJECTION, POWDER, FOR SOLUTION INTRAMUSCULAR at 12:29

## 2022-01-01 RX ADMIN — QUETIAPINE FUMARATE 25 MG: 25 TABLET ORAL at 22:07

## 2022-01-01 RX ADMIN — SODIUM CHLORIDE 94 ML: 9 INJECTION, SOLUTION INTRAVENOUS at 15:59

## 2022-01-01 RX ADMIN — FLUTICASONE PROPIONATE 1 SPRAY: 50 SPRAY, METERED NASAL at 12:30

## 2022-01-01 RX ADMIN — REMDESIVIR 200 MG: 100 INJECTION, POWDER, LYOPHILIZED, FOR SOLUTION INTRAVENOUS at 01:26

## 2022-01-01 RX ADMIN — OLANZAPINE 5 MG: 5 TABLET, ORALLY DISINTEGRATING ORAL at 10:24

## 2022-01-01 RX ADMIN — RIVASTIGMINE 1 PATCH: 4.6 PATCH TRANSDERMAL at 08:00

## 2022-01-01 RX ADMIN — ASPIRIN 81 MG: 81 TABLET, COATED ORAL at 08:50

## 2022-01-01 RX ADMIN — ATORVASTATIN CALCIUM 80 MG: 40 TABLET, FILM COATED ORAL at 21:13

## 2022-01-01 RX ADMIN — LIDOCAINE HYDROCHLORIDE: 20 JELLY TOPICAL at 11:30

## 2022-01-01 RX ADMIN — METFORMIN HYDROCHLORIDE 1000 MG: 500 TABLET ORAL at 11:45

## 2022-01-01 RX ADMIN — RIVASTIGMINE TRANSDERMAL SYSTEM 1 PATCH: 4.6 PATCH, EXTENDED RELEASE TRANSDERMAL at 09:26

## 2022-01-01 RX ADMIN — ACETAMINOPHEN 650 MG: 325 TABLET ORAL at 16:25

## 2022-01-01 RX ADMIN — RISPERIDONE 0.5 MG: 0.5 TABLET, ORALLY DISINTEGRATING ORAL at 18:18

## 2022-01-01 RX ADMIN — FLUTICASONE PROPIONATE 1 SPRAY: 50 SPRAY, METERED NASAL at 22:00

## 2022-01-01 RX ADMIN — Medication 12.5 MG: at 06:43

## 2022-01-01 RX ADMIN — REGADENOSON 0.4 MG: 0.08 INJECTION, SOLUTION INTRAVENOUS at 14:18

## 2022-01-01 RX ADMIN — SODIUM CHLORIDE, POTASSIUM CHLORIDE, SODIUM LACTATE AND CALCIUM CHLORIDE: 600; 310; 30; 20 INJECTION, SOLUTION INTRAVENOUS at 16:06

## 2022-01-01 RX ADMIN — WATER 2.1 ML: 1 INJECTION INTRAMUSCULAR; INTRAVENOUS; SUBCUTANEOUS at 17:04

## 2022-01-01 RX ADMIN — SODIUM CHLORIDE 50 ML: 9 INJECTION, SOLUTION INTRAVENOUS at 01:29

## 2022-01-01 RX ADMIN — WATER: 1 INJECTION INTRAMUSCULAR; INTRAVENOUS; SUBCUTANEOUS at 18:11

## 2022-01-01 RX ADMIN — VALPROATE SODIUM 500 MG: 100 INJECTION, SOLUTION INTRAVENOUS at 09:11

## 2022-01-01 RX ADMIN — RISPERIDONE 0.5 MG: 0.5 TABLET, ORALLY DISINTEGRATING ORAL at 03:37

## 2022-01-01 RX ADMIN — Medication 12.5 MG: at 06:36

## 2022-01-01 RX ADMIN — OLANZAPINE 5 MG: 5 TABLET, FILM COATED ORAL at 02:52

## 2022-01-01 RX ADMIN — OLANZAPINE 5 MG: 5 TABLET, FILM COATED ORAL at 09:05

## 2022-01-01 RX ADMIN — CITALOPRAM HYDROBROMIDE 10 MG: 10 TABLET ORAL at 09:43

## 2022-01-01 RX ADMIN — HEPARIN SODIUM 1150 UNITS/HR: 1000 INJECTION INTRAVENOUS; SUBCUTANEOUS at 23:27

## 2022-01-01 RX ADMIN — METFORMIN HYDROCHLORIDE 1000 MG: 500 TABLET ORAL at 09:07

## 2022-01-01 RX ADMIN — ISOSORBIDE MONONITRATE 30 MG: 30 TABLET, EXTENDED RELEASE ORAL at 08:56

## 2022-01-01 RX ADMIN — RISPERIDONE 0.5 MG: 0.5 TABLET, ORALLY DISINTEGRATING ORAL at 20:32

## 2022-01-01 RX ADMIN — VALPROATE SODIUM 250 MG: 100 INJECTION, SOLUTION INTRAVENOUS at 09:35

## 2022-01-01 RX ADMIN — RISPERIDONE 0.5 MG: 0.5 TABLET, ORALLY DISINTEGRATING ORAL at 20:25

## 2022-01-01 RX ADMIN — INSULIN ASPART 2 UNITS: 100 INJECTION, SOLUTION INTRAVENOUS; SUBCUTANEOUS at 08:23

## 2022-01-01 RX ADMIN — CITALOPRAM HYDROBROMIDE 20 MG: 20 TABLET ORAL at 09:00

## 2022-01-01 RX ADMIN — DIVALPROEX SODIUM 750 MG: 125 CAPSULE, COATED PELLETS ORAL at 14:09

## 2022-01-01 RX ADMIN — TAMSULOSIN HYDROCHLORIDE 0.4 MG: 0.4 CAPSULE ORAL at 08:17

## 2022-01-01 RX ADMIN — HALOPERIDOL LACTATE 2 MG: 5 INJECTION, SOLUTION INTRAMUSCULAR at 13:50

## 2022-01-01 RX ADMIN — ATORVASTATIN CALCIUM 80 MG: 40 TABLET, FILM COATED ORAL at 19:12

## 2022-01-01 RX ADMIN — ACETAMINOPHEN 650 MG: 325 SUSPENSION ORAL at 19:40

## 2022-01-01 RX ADMIN — METFORMIN HYDROCHLORIDE 1000 MG: 500 TABLET ORAL at 18:49

## 2022-01-01 RX ADMIN — ACETAMINOPHEN 650 MG: 325 TABLET ORAL at 04:19

## 2022-01-01 RX ADMIN — POLYETHYLENE GLYCOL 3350 17 G: 17 POWDER, FOR SOLUTION ORAL at 13:49

## 2022-01-01 RX ADMIN — CLOPIDOGREL BISULFATE 75 MG: 75 TABLET ORAL at 09:43

## 2022-01-01 RX ADMIN — RIVASTIGMINE 1 PATCH: 4.6 PATCH TRANSDERMAL at 08:37

## 2022-01-01 RX ADMIN — GUAIFENESIN 10 ML: 200 SOLUTION ORAL at 22:43

## 2022-01-01 RX ADMIN — PANTOPRAZOLE SODIUM 20 MG: 20 TABLET, DELAYED RELEASE ORAL at 09:00

## 2022-01-01 RX ADMIN — CHLORPROMAZINE HYDROCHLORIDE 50 MG: 50 TABLET, FILM COATED ORAL at 19:42

## 2022-01-01 RX ADMIN — Medication 12.5 MG: at 03:24

## 2022-01-01 RX ADMIN — ASPIRIN 81 MG: 81 TABLET, COATED ORAL at 10:12

## 2022-01-01 RX ADMIN — INSULIN ASPART 2 UNITS: 100 INJECTION, SOLUTION INTRAVENOUS; SUBCUTANEOUS at 09:00

## 2022-01-01 RX ADMIN — ATORVASTATIN CALCIUM 80 MG: 40 TABLET, FILM COATED ORAL at 21:48

## 2022-01-01 RX ADMIN — RIVASTIGMINE 1 PATCH: 4.6 PATCH TRANSDERMAL at 09:37

## 2022-01-01 RX ADMIN — ACETAMINOPHEN 650 MG: 325 SUSPENSION ORAL at 14:22

## 2022-01-01 RX ADMIN — INSULIN ASPART 4 UNITS: 100 INJECTION, SOLUTION INTRAVENOUS; SUBCUTANEOUS at 09:08

## 2022-01-01 RX ADMIN — ASPIRIN 325 MG ORAL TABLET 325 MG: 325 PILL ORAL at 08:34

## 2022-01-01 RX ADMIN — POLYETHYLENE GLYCOL 3350 17 G: 17 POWDER, FOR SOLUTION ORAL at 07:51

## 2022-01-01 RX ADMIN — CITALOPRAM HYDROBROMIDE 10 MG: 10 TABLET ORAL at 09:36

## 2022-01-01 RX ADMIN — Medication 12.5 MG: at 00:21

## 2022-01-01 RX ADMIN — FLUTICASONE PROPIONATE 1 SPRAY: 50 SPRAY, METERED NASAL at 22:08

## 2022-01-01 RX ADMIN — CITALOPRAM HYDROBROMIDE 10 MG: 10 TABLET ORAL at 11:02

## 2022-01-01 RX ADMIN — DIVALPROEX SODIUM 500 MG: 125 CAPSULE, COATED PELLETS ORAL at 08:19

## 2022-01-01 RX ADMIN — ATORVASTATIN CALCIUM 80 MG: 40 TABLET, FILM COATED ORAL at 21:36

## 2022-01-01 RX ADMIN — PANTOPRAZOLE SODIUM 20 MG: 20 TABLET, DELAYED RELEASE ORAL at 09:37

## 2022-01-01 RX ADMIN — HEPARIN SODIUM 950 UNITS/HR: 1000 INJECTION INTRAVENOUS; SUBCUTANEOUS at 09:08

## 2022-01-01 RX ADMIN — RISPERIDONE 0.5 MG: 0.5 TABLET, ORALLY DISINTEGRATING ORAL at 14:54

## 2022-01-01 RX ADMIN — METOPROLOL SUCCINATE 12.5 MG: 25 TABLET, EXTENDED RELEASE ORAL at 21:53

## 2022-01-01 RX ADMIN — RISPERIDONE 0.5 MG: 0.5 TABLET, ORALLY DISINTEGRATING ORAL at 20:01

## 2022-01-01 RX ADMIN — CITALOPRAM HYDROBROMIDE 20 MG: 20 TABLET ORAL at 08:39

## 2022-01-01 RX ADMIN — QUETIAPINE FUMARATE 25 MG: 25 TABLET ORAL at 21:53

## 2022-01-01 RX ADMIN — INSULIN GLARGINE 22 UNITS: 100 INJECTION, SOLUTION SUBCUTANEOUS at 21:51

## 2022-01-01 RX ADMIN — RIVASTIGMINE 1 PATCH: 4.6 PATCH TRANSDERMAL at 09:43

## 2022-01-01 RX ADMIN — DIVALPROEX SODIUM 500 MG: 125 CAPSULE, COATED PELLETS ORAL at 09:11

## 2022-01-01 RX ADMIN — VALPROATE SODIUM 1000 MG: 100 INJECTION, SOLUTION INTRAVENOUS at 21:39

## 2022-01-01 RX ADMIN — TAMSULOSIN HYDROCHLORIDE 0.4 MG: 0.4 CAPSULE ORAL at 08:44

## 2022-01-01 RX ADMIN — CITALOPRAM HYDROBROMIDE 10 MG: 10 TABLET ORAL at 10:13

## 2022-01-01 RX ADMIN — INSULIN ASPART 2 UNITS: 100 INJECTION, SOLUTION INTRAVENOUS; SUBCUTANEOUS at 13:30

## 2022-01-01 RX ADMIN — DIVALPROEX SODIUM 750 MG: 125 CAPSULE, COATED PELLETS ORAL at 21:18

## 2022-01-01 RX ADMIN — LOSARTAN POTASSIUM 25 MG: 25 TABLET, FILM COATED ORAL at 09:31

## 2022-01-01 RX ADMIN — ACETAMINOPHEN 650 MG: 325 TABLET ORAL at 00:44

## 2022-01-01 RX ADMIN — INSULIN ASPART 7 UNITS: 100 INJECTION, SOLUTION INTRAVENOUS; SUBCUTANEOUS at 12:48

## 2022-01-01 RX ADMIN — CITALOPRAM HYDROBROMIDE 10 MG: 10 TABLET ORAL at 08:50

## 2022-01-01 RX ADMIN — RISPERIDONE 0.5 MG: 0.5 TABLET, ORALLY DISINTEGRATING ORAL at 17:08

## 2022-01-01 RX ADMIN — OLANZAPINE 5 MG: 5 TABLET, FILM COATED ORAL at 09:00

## 2022-01-01 RX ADMIN — HEPARIN SODIUM 1150 UNITS/HR: 1000 INJECTION INTRAVENOUS; SUBCUTANEOUS at 03:39

## 2022-01-01 RX ADMIN — TAMSULOSIN HYDROCHLORIDE 0.4 MG: 0.4 CAPSULE ORAL at 08:27

## 2022-01-01 RX ADMIN — DIVALPROEX SODIUM 750 MG: 125 CAPSULE, COATED PELLETS ORAL at 21:54

## 2022-01-01 RX ADMIN — VALPROATE SODIUM 250 MG: 100 INJECTION, SOLUTION INTRAVENOUS at 14:24

## 2022-01-01 RX ADMIN — OLANZAPINE 2.5 MG: 2.5 TABLET, FILM COATED ORAL at 00:54

## 2022-01-01 RX ADMIN — INSULIN ASPART 10 UNITS: 100 INJECTION, SOLUTION INTRAVENOUS; SUBCUTANEOUS at 17:32

## 2022-01-01 RX ADMIN — ISOSORBIDE MONONITRATE 30 MG: 30 TABLET, EXTENDED RELEASE ORAL at 10:13

## 2022-01-01 RX ADMIN — Medication 12.5 MG: at 11:25

## 2022-01-01 RX ADMIN — INSULIN ASPART 3 UNITS: 100 INJECTION, SOLUTION INTRAVENOUS; SUBCUTANEOUS at 12:16

## 2022-01-01 RX ADMIN — POLYETHYLENE GLYCOL 3350 17 G: 17 POWDER, FOR SOLUTION ORAL at 09:36

## 2022-01-01 RX ADMIN — MELATONIN TAB 3 MG 3 MG: 3 TAB at 20:49

## 2022-01-01 RX ADMIN — INSULIN ASPART 3 UNITS: 100 INJECTION, SOLUTION INTRAVENOUS; SUBCUTANEOUS at 13:20

## 2022-01-01 RX ADMIN — INSULIN ASPART 6 UNITS: 100 INJECTION, SOLUTION INTRAVENOUS; SUBCUTANEOUS at 18:34

## 2022-01-01 RX ADMIN — METOPROLOL SUCCINATE 12.5 MG: 25 TABLET, EXTENDED RELEASE ORAL at 22:11

## 2022-01-01 RX ADMIN — RISPERIDONE 0.5 MG: 0.5 TABLET, ORALLY DISINTEGRATING ORAL at 18:12

## 2022-01-01 RX ADMIN — RISPERIDONE 0.5 MG: 0.5 TABLET, ORALLY DISINTEGRATING ORAL at 19:34

## 2022-01-01 RX ADMIN — OLANZAPINE 2.5 MG: 2.5 TABLET, FILM COATED ORAL at 01:18

## 2022-01-01 RX ADMIN — INSULIN ASPART 2 UNITS: 100 INJECTION, SOLUTION INTRAVENOUS; SUBCUTANEOUS at 18:26

## 2022-01-01 RX ADMIN — RIVASTIGMINE 1 PATCH: 4.6 PATCH TRANSDERMAL at 10:17

## 2022-01-01 RX ADMIN — OLANZAPINE 5 MG: 5 TABLET, ORALLY DISINTEGRATING ORAL at 21:36

## 2022-01-01 RX ADMIN — SODIUM CHLORIDE 50 ML: 9 INJECTION, SOLUTION INTRAVENOUS at 22:07

## 2022-01-01 RX ADMIN — PANTOPRAZOLE SODIUM 20 MG: 20 TABLET, DELAYED RELEASE ORAL at 11:02

## 2022-01-01 RX ADMIN — INSULIN ASPART 1 UNITS: 100 INJECTION, SOLUTION INTRAVENOUS; SUBCUTANEOUS at 11:21

## 2022-01-01 RX ADMIN — RIVASTIGMINE TRANSDERMAL SYSTEM 1 PATCH: 4.6 PATCH, EXTENDED RELEASE TRANSDERMAL at 09:05

## 2022-01-01 RX ADMIN — METOPROLOL SUCCINATE 25 MG: 25 TABLET, EXTENDED RELEASE ORAL at 21:21

## 2022-01-01 RX ADMIN — ACETAMINOPHEN 650 MG: 325 TABLET ORAL at 09:37

## 2022-01-01 RX ADMIN — TAMSULOSIN HYDROCHLORIDE 0.4 MG: 0.4 CAPSULE ORAL at 10:16

## 2022-01-01 RX ADMIN — ACETAMINOPHEN 650 MG: 325 TABLET ORAL at 08:44

## 2022-01-01 RX ADMIN — PANTOPRAZOLE SODIUM 20 MG: 20 TABLET, DELAYED RELEASE ORAL at 13:33

## 2022-01-01 RX ADMIN — ACETAMINOPHEN 650 MG: 325 TABLET ORAL at 17:46

## 2022-01-01 RX ADMIN — INSULIN ASPART 3 UNITS: 100 INJECTION, SOLUTION INTRAVENOUS; SUBCUTANEOUS at 10:34

## 2022-01-01 RX ADMIN — MELATONIN 5 MG TABLET 10 MG: at 21:28

## 2022-01-01 RX ADMIN — ATORVASTATIN CALCIUM 80 MG: 40 TABLET, FILM COATED ORAL at 20:51

## 2022-01-01 RX ADMIN — PANTOPRAZOLE SODIUM 20 MG: 20 TABLET, DELAYED RELEASE ORAL at 09:32

## 2022-01-01 RX ADMIN — INSULIN ASPART 3 UNITS: 100 INJECTION, SOLUTION INTRAVENOUS; SUBCUTANEOUS at 14:28

## 2022-01-01 RX ADMIN — RISPERIDONE 0.5 MG: 0.5 TABLET, ORALLY DISINTEGRATING ORAL at 12:42

## 2022-01-01 RX ADMIN — ASPIRIN 81 MG: 81 TABLET, COATED ORAL at 12:19

## 2022-01-01 RX ADMIN — MICONAZOLE NITRATE: 20 POWDER TOPICAL at 22:21

## 2022-01-01 RX ADMIN — POLYETHYLENE GLYCOL 3350 17 G: 17 POWDER, FOR SOLUTION ORAL at 08:00

## 2022-01-01 RX ADMIN — VALPROATE SODIUM 250 MG: 100 INJECTION, SOLUTION INTRAVENOUS at 11:12

## 2022-01-01 RX ADMIN — METFORMIN HYDROCHLORIDE 1000 MG: 500 TABLET ORAL at 17:09

## 2022-01-01 RX ADMIN — VALPROATE SODIUM 750 MG: 100 INJECTION, SOLUTION INTRAVENOUS at 14:54

## 2022-01-01 RX ADMIN — OLANZAPINE 5 MG: 5 TABLET, FILM COATED ORAL at 20:31

## 2022-01-01 RX ADMIN — ACETAMINOPHEN 650 MG: 325 TABLET ORAL at 13:32

## 2022-01-01 RX ADMIN — VALPROATE SODIUM 500 MG: 100 INJECTION, SOLUTION INTRAVENOUS at 11:05

## 2022-01-01 RX ADMIN — INSULIN ASPART 3 UNITS: 100 INJECTION, SOLUTION INTRAVENOUS; SUBCUTANEOUS at 07:57

## 2022-01-01 RX ADMIN — ACETAMINOPHEN 650 MG: 325 TABLET ORAL at 16:36

## 2022-01-01 RX ADMIN — INSULIN ASPART 3 UNITS: 100 INJECTION, SOLUTION INTRAVENOUS; SUBCUTANEOUS at 15:10

## 2022-01-01 RX ADMIN — INSULIN ASPART 3 UNITS: 100 INJECTION, SOLUTION INTRAVENOUS; SUBCUTANEOUS at 13:51

## 2022-01-01 RX ADMIN — MELATONIN 5 MG TABLET 10 MG: at 21:54

## 2022-01-01 RX ADMIN — INSULIN ASPART 2 UNITS: 100 INJECTION, SOLUTION INTRAVENOUS; SUBCUTANEOUS at 18:15

## 2022-01-01 RX ADMIN — INSULIN ASPART 5 UNITS: 100 INJECTION, SOLUTION INTRAVENOUS; SUBCUTANEOUS at 17:22

## 2022-01-01 RX ADMIN — MICONAZOLE NITRATE: 20 POWDER TOPICAL at 11:24

## 2022-01-01 RX ADMIN — OLANZAPINE 5 MG: 10 INJECTION, POWDER, FOR SOLUTION INTRAMUSCULAR at 00:42

## 2022-01-01 RX ADMIN — MELATONIN 5 MG TABLET 10 MG: at 21:27

## 2022-01-01 RX ADMIN — CITALOPRAM HYDROBROMIDE 10 MG: 10 TABLET ORAL at 08:19

## 2022-01-01 RX ADMIN — ACETAMINOPHEN 650 MG: 325 TABLET ORAL at 16:51

## 2022-01-01 RX ADMIN — OLANZAPINE 5 MG: 5 TABLET, ORALLY DISINTEGRATING ORAL at 09:17

## 2022-01-01 RX ADMIN — FLUTICASONE PROPIONATE 1 SPRAY: 50 SPRAY, METERED NASAL at 21:33

## 2022-01-01 RX ADMIN — ASPIRIN 81 MG: 81 TABLET, COATED ORAL at 08:36

## 2022-01-01 RX ADMIN — MELATONIN TAB 3 MG 3 MG: 3 TAB at 21:40

## 2022-01-01 RX ADMIN — CITALOPRAM HYDROBROMIDE 10 MG: 10 TABLET ORAL at 07:54

## 2022-01-01 RX ADMIN — NITROGLYCERIN 0.4 MG: 0.4 TABLET SUBLINGUAL at 22:09

## 2022-01-01 RX ADMIN — CITALOPRAM HYDROBROMIDE 10 MG: 10 TABLET ORAL at 08:37

## 2022-01-01 RX ADMIN — Medication 12.5 MG: at 17:51

## 2022-01-01 RX ADMIN — LORAZEPAM 1 MG: 2 INJECTION INTRAMUSCULAR; INTRAVENOUS at 17:23

## 2022-01-01 RX ADMIN — RISPERIDONE 0.5 MG: 0.5 TABLET, ORALLY DISINTEGRATING ORAL at 14:01

## 2022-01-01 RX ADMIN — CITALOPRAM HYDROBROMIDE 10 MG: 10 TABLET ORAL at 08:27

## 2022-01-01 RX ADMIN — ACETAMINOPHEN 650 MG: 325 TABLET ORAL at 02:07

## 2022-01-01 RX ADMIN — CLOPIDOGREL BISULFATE 75 MG: 75 TABLET ORAL at 07:48

## 2022-01-01 RX ADMIN — FLUTICASONE PROPIONATE 1 SPRAY: 50 SPRAY, METERED NASAL at 21:53

## 2022-01-01 RX ADMIN — INSULIN ASPART 5 UNITS: 100 INJECTION, SOLUTION INTRAVENOUS; SUBCUTANEOUS at 13:51

## 2022-01-01 RX ADMIN — Medication 12.5 MG: at 00:43

## 2022-01-01 RX ADMIN — ACETAMINOPHEN 650 MG: 325 TABLET ORAL at 09:03

## 2022-01-01 RX ADMIN — ACETAMINOPHEN 650 MG: 325 TABLET ORAL at 08:37

## 2022-01-01 RX ADMIN — ASPIRIN 81 MG: 81 TABLET, COATED ORAL at 10:47

## 2022-01-01 RX ADMIN — ASPIRIN 81 MG: 81 TABLET, COATED ORAL at 09:17

## 2022-01-01 RX ADMIN — QUETIAPINE FUMARATE 25 MG: 25 TABLET ORAL at 21:06

## 2022-01-01 RX ADMIN — OLANZAPINE 10 MG: 10 INJECTION, POWDER, FOR SOLUTION INTRAMUSCULAR at 00:06

## 2022-01-01 RX ADMIN — RIVASTIGMINE TRANSDERMAL SYSTEM 1 PATCH: 4.6 PATCH, EXTENDED RELEASE TRANSDERMAL at 18:47

## 2022-01-01 RX ADMIN — INSULIN ASPART 3 UNITS: 100 INJECTION, SOLUTION INTRAVENOUS; SUBCUTANEOUS at 17:46

## 2022-01-01 RX ADMIN — HYDROMORPHONE HYDROCHLORIDE 0.5 MG: 1 SOLUTION ORAL at 22:46

## 2022-01-01 RX ADMIN — RIVASTIGMINE 1 PATCH: 4.6 PATCH TRANSDERMAL at 10:25

## 2022-01-01 RX ADMIN — RIVASTIGMINE 1 PATCH: 4.6 PATCH TRANSDERMAL at 08:46

## 2022-01-01 RX ADMIN — INSULIN ASPART 2 UNITS: 100 INJECTION, SOLUTION INTRAVENOUS; SUBCUTANEOUS at 12:55

## 2022-01-01 RX ADMIN — PANTOPRAZOLE SODIUM 20 MG: 20 TABLET, DELAYED RELEASE ORAL at 10:17

## 2022-01-01 RX ADMIN — CLOPIDOGREL BISULFATE 75 MG: 75 TABLET ORAL at 08:56

## 2022-01-01 RX ADMIN — MELATONIN TAB 3 MG 3 MG: 3 TAB at 22:06

## 2022-01-01 RX ADMIN — CITALOPRAM HYDROBROMIDE 10 MG: 10 TABLET ORAL at 09:42

## 2022-01-01 RX ADMIN — Medication 1 MG: at 20:25

## 2022-01-01 RX ADMIN — RISPERIDONE 0.5 MG: 0.5 TABLET, ORALLY DISINTEGRATING ORAL at 20:17

## 2022-01-01 RX ADMIN — INSULIN ASPART 3 UNITS: 100 INJECTION, SOLUTION INTRAVENOUS; SUBCUTANEOUS at 17:36

## 2022-01-01 RX ADMIN — INSULIN DETEMIR 12 UNITS: 100 INJECTION, SOLUTION SUBCUTANEOUS at 10:18

## 2022-01-01 RX ADMIN — LOPERAMIDE HYDROCHLORIDE 2 MG: 2 CAPSULE ORAL at 08:57

## 2022-01-01 RX ADMIN — LOPERAMIDE HYDROCHLORIDE 2 MG: 2 CAPSULE ORAL at 11:08

## 2022-01-01 RX ADMIN — Medication 1 MG: at 22:22

## 2022-01-01 RX ADMIN — PANTOPRAZOLE SODIUM 20 MG: 20 TABLET, DELAYED RELEASE ORAL at 10:13

## 2022-01-01 RX ADMIN — RISPERIDONE 0.5 MG: 0.5 TABLET, ORALLY DISINTEGRATING ORAL at 13:27

## 2022-01-01 RX ADMIN — TAMSULOSIN HYDROCHLORIDE 0.4 MG: 0.4 CAPSULE ORAL at 09:37

## 2022-01-01 RX ADMIN — OLANZAPINE 10 MG: 10 INJECTION, POWDER, FOR SOLUTION INTRAMUSCULAR at 04:24

## 2022-01-01 RX ADMIN — DIVALPROEX SODIUM 375 MG: 125 CAPSULE, COATED PELLETS ORAL at 12:50

## 2022-01-01 RX ADMIN — VALPROATE SODIUM 250 MG: 100 INJECTION, SOLUTION INTRAVENOUS at 18:25

## 2022-01-01 RX ADMIN — METOPROLOL SUCCINATE 12.5 MG: 25 TABLET, EXTENDED RELEASE ORAL at 21:18

## 2022-01-01 RX ADMIN — OLANZAPINE 5 MG: 5 TABLET, ORALLY DISINTEGRATING ORAL at 21:21

## 2022-01-01 RX ADMIN — METFORMIN HYDROCHLORIDE 1000 MG: 500 TABLET ORAL at 08:50

## 2022-01-01 RX ADMIN — ATORVASTATIN CALCIUM 80 MG: 40 TABLET, FILM COATED ORAL at 20:25

## 2022-01-01 RX ADMIN — ACETAMINOPHEN 650 MG: 325 TABLET ORAL at 08:50

## 2022-01-01 RX ADMIN — RIVASTIGMINE 1 PATCH: 4.6 PATCH TRANSDERMAL at 11:28

## 2022-01-01 RX ADMIN — QUETIAPINE FUMARATE 25 MG: 25 TABLET ORAL at 21:51

## 2022-01-01 RX ADMIN — FEXOFENADINE HYDROCHLORIDE 180 MG: 180 TABLET ORAL at 07:48

## 2022-01-01 RX ADMIN — ISOSORBIDE MONONITRATE 30 MG: 30 TABLET, EXTENDED RELEASE ORAL at 08:36

## 2022-01-01 RX ADMIN — OLANZAPINE 5 MG: 10 INJECTION, POWDER, FOR SOLUTION INTRAMUSCULAR at 00:20

## 2022-01-01 RX ADMIN — VALPROATE SODIUM 500 MG: 100 INJECTION, SOLUTION INTRAVENOUS at 06:00

## 2022-01-01 RX ADMIN — VALPROATE SODIUM 250 MG: 100 INJECTION, SOLUTION INTRAVENOUS at 14:02

## 2022-01-01 RX ADMIN — Medication 25 MCG: at 08:56

## 2022-01-01 RX ADMIN — TAMSULOSIN HYDROCHLORIDE 0.4 MG: 0.4 CAPSULE ORAL at 08:16

## 2022-01-01 RX ADMIN — DIVALPROEX SODIUM 750 MG: 125 CAPSULE, COATED PELLETS ORAL at 21:04

## 2022-01-01 RX ADMIN — SODIUM CHLORIDE 1000 ML: 9 INJECTION, SOLUTION INTRAVENOUS at 14:26

## 2022-01-01 RX ADMIN — RISPERIDONE 0.5 MG: 0.5 TABLET, ORALLY DISINTEGRATING ORAL at 15:55

## 2022-01-01 RX ADMIN — INSULIN ASPART 3 UNITS: 100 INJECTION, SOLUTION INTRAVENOUS; SUBCUTANEOUS at 17:32

## 2022-01-01 RX ADMIN — VALPROATE SODIUM 1000 MG: 100 INJECTION, SOLUTION INTRAVENOUS at 21:51

## 2022-01-01 RX ADMIN — REMDESIVIR 100 MG: 100 INJECTION, POWDER, LYOPHILIZED, FOR SOLUTION INTRAVENOUS at 22:07

## 2022-01-01 RX ADMIN — VALPROATE SODIUM 250 MG: 100 INJECTION, SOLUTION INTRAVENOUS at 11:29

## 2022-01-01 RX ADMIN — MORPHINE SULFATE 2.5 MG: 10 SOLUTION ORAL at 16:09

## 2022-01-01 RX ADMIN — RISPERIDONE 0.5 MG: 0.5 TABLET, ORALLY DISINTEGRATING ORAL at 08:27

## 2022-01-01 RX ADMIN — RISPERIDONE 0.5 MG: 0.5 TABLET, ORALLY DISINTEGRATING ORAL at 10:16

## 2022-01-01 RX ADMIN — ACETAMINOPHEN 650 MG: 325 TABLET ORAL at 00:22

## 2022-01-01 RX ADMIN — VALPROATE SODIUM 250 MG: 100 INJECTION, SOLUTION INTRAVENOUS at 09:55

## 2022-01-01 RX ADMIN — ACETAMINOPHEN 650 MG: 325 TABLET ORAL at 17:08

## 2022-01-01 RX ADMIN — RIVASTIGMINE 1 PATCH: 4.6 PATCH TRANSDERMAL at 08:59

## 2022-01-01 RX ADMIN — RISPERIDONE 0.5 MG: 0.5 TABLET, ORALLY DISINTEGRATING ORAL at 21:18

## 2022-01-01 RX ADMIN — ACETAMINOPHEN 650 MG: 325 TABLET ORAL at 08:54

## 2022-01-01 RX ADMIN — PANTOPRAZOLE SODIUM 20 MG: 20 TABLET, DELAYED RELEASE ORAL at 10:46

## 2022-01-01 RX ADMIN — ASPIRIN 81 MG: 81 TABLET, COATED ORAL at 09:41

## 2022-01-01 RX ADMIN — CITALOPRAM HYDROBROMIDE 10 MG: 10 TABLET ORAL at 08:36

## 2022-01-01 RX ADMIN — DIVALPROEX SODIUM 375 MG: 125 CAPSULE, COATED PELLETS ORAL at 00:30

## 2022-01-01 RX ADMIN — DIVALPROEX SODIUM 750 MG: 125 CAPSULE, COATED PELLETS ORAL at 22:11

## 2022-01-01 RX ADMIN — QUETIAPINE FUMARATE 25 MG: 25 TABLET ORAL at 21:31

## 2022-01-01 RX ADMIN — RISPERIDONE 0.5 MG: 0.5 TABLET, ORALLY DISINTEGRATING ORAL at 21:27

## 2022-01-01 RX ADMIN — VALPROATE SODIUM 1000 MG: 100 INJECTION, SOLUTION INTRAVENOUS at 22:16

## 2022-01-01 RX ADMIN — VALPROATE SODIUM 750 MG: 100 INJECTION, SOLUTION INTRAVENOUS at 14:24

## 2022-01-01 RX ADMIN — CITALOPRAM HYDROBROMIDE 10 MG: 10 TABLET ORAL at 09:12

## 2022-01-01 RX ADMIN — INSULIN ASPART 2 UNITS: 100 INJECTION, SOLUTION INTRAVENOUS; SUBCUTANEOUS at 16:25

## 2022-01-01 RX ADMIN — METOPROLOL SUCCINATE 12.5 MG: 25 TABLET, EXTENDED RELEASE ORAL at 21:30

## 2022-01-01 RX ADMIN — ACETAMINOPHEN 650 MG: 325 TABLET ORAL at 10:15

## 2022-01-01 RX ADMIN — MICONAZOLE NITRATE: 20 POWDER TOPICAL at 22:13

## 2022-01-01 RX ADMIN — DIVALPROEX SODIUM 750 MG: 125 CAPSULE, COATED PELLETS ORAL at 21:26

## 2022-01-01 RX ADMIN — OLANZAPINE 5 MG: 5 TABLET, ORALLY DISINTEGRATING ORAL at 21:48

## 2022-01-01 RX ADMIN — POLYETHYLENE GLYCOL 3350 17 G: 17 POWDER, FOR SOLUTION ORAL at 11:17

## 2022-01-01 RX ADMIN — INSULIN ASPART 2 UNITS: 100 INJECTION, SOLUTION INTRAVENOUS; SUBCUTANEOUS at 09:19

## 2022-01-01 RX ADMIN — RIVASTIGMINE 1 PATCH: 4.6 PATCH TRANSDERMAL at 08:10

## 2022-01-01 RX ADMIN — OLANZAPINE 5 MG: 5 TABLET, FILM COATED ORAL at 21:32

## 2022-01-01 RX ADMIN — RIVASTIGMINE TRANSDERMAL SYSTEM 1 PATCH: 4.6 PATCH, EXTENDED RELEASE TRANSDERMAL at 08:51

## 2022-01-01 RX ADMIN — METFORMIN HYDROCHLORIDE 1000 MG: 500 TABLET ORAL at 13:32

## 2022-01-01 RX ADMIN — RIVASTIGMINE 1 PATCH: 4.6 PATCH TRANSDERMAL at 09:00

## 2022-01-01 RX ADMIN — MELATONIN 5 MG TABLET 10 MG: at 21:53

## 2022-01-01 RX ADMIN — MICONAZOLE NITRATE: 20 POWDER TOPICAL at 08:48

## 2022-01-01 RX ADMIN — HYDROMORPHONE HYDROCHLORIDE 0.5 MG: 1 SOLUTION ORAL at 05:22

## 2022-01-01 RX ADMIN — MULTIPLE VITAMINS W/ MINERALS TAB 1 TABLET: TAB at 13:52

## 2022-01-01 RX ADMIN — CITALOPRAM HYDROBROMIDE 10 MG: 10 TABLET ORAL at 14:48

## 2022-01-01 RX ADMIN — MORPHINE SULFATE 2.5 MG: 10 SOLUTION ORAL at 14:30

## 2022-01-01 RX ADMIN — RIVASTIGMINE TRANSDERMAL SYSTEM 1 PATCH: 4.6 PATCH, EXTENDED RELEASE TRANSDERMAL at 09:42

## 2022-01-01 RX ADMIN — INSULIN ASPART 4 UNITS: 100 INJECTION, SOLUTION INTRAVENOUS; SUBCUTANEOUS at 17:36

## 2022-01-01 RX ADMIN — INSULIN DETEMIR 10 UNITS: 100 INJECTION, SOLUTION SUBCUTANEOUS at 22:48

## 2022-01-01 RX ADMIN — OLANZAPINE 5 MG: 5 TABLET, ORALLY DISINTEGRATING ORAL at 21:25

## 2022-01-01 RX ADMIN — CITALOPRAM HYDROBROMIDE 20 MG: 20 TABLET ORAL at 10:13

## 2022-01-01 RX ADMIN — INSULIN ASPART 3 UNITS: 100 INJECTION, SOLUTION INTRAVENOUS; SUBCUTANEOUS at 13:35

## 2022-01-01 RX ADMIN — ASPIRIN 81 MG: 81 TABLET, COATED ORAL at 08:05

## 2022-01-01 RX ADMIN — ATORVASTATIN CALCIUM 80 MG: 40 TABLET, FILM COATED ORAL at 21:30

## 2022-01-01 RX ADMIN — TAMSULOSIN HYDROCHLORIDE 0.4 MG: 0.4 CAPSULE ORAL at 09:11

## 2022-01-01 RX ADMIN — POLYETHYLENE GLYCOL 3350 17 G: 17 POWDER, FOR SOLUTION ORAL at 11:10

## 2022-01-01 RX ADMIN — RISPERIDONE 0.5 MG: 0.5 TABLET, ORALLY DISINTEGRATING ORAL at 20:04

## 2022-01-01 RX ADMIN — INSULIN ASPART 5 UNITS: 100 INJECTION, SOLUTION INTRAVENOUS; SUBCUTANEOUS at 14:03

## 2022-01-01 RX ADMIN — POLYETHYLENE GLYCOL 3350 17 G: 17 POWDER, FOR SOLUTION ORAL at 08:55

## 2022-01-01 RX ADMIN — MELATONIN TAB 3 MG 3 MG: 3 TAB at 20:03

## 2022-01-01 RX ADMIN — RISPERIDONE 0.5 MG: 0.5 TABLET, ORALLY DISINTEGRATING ORAL at 10:17

## 2022-01-01 RX ADMIN — ACETAMINOPHEN 650 MG: 325 TABLET ORAL at 10:17

## 2022-01-01 RX ADMIN — ASPIRIN 81 MG: 81 TABLET, COATED ORAL at 09:37

## 2022-01-01 RX ADMIN — OLANZAPINE 5 MG: 10 INJECTION, POWDER, FOR SOLUTION INTRAMUSCULAR at 18:56

## 2022-01-01 RX ADMIN — RISPERIDONE 1 MG: 0.5 TABLET, ORALLY DISINTEGRATING ORAL at 08:50

## 2022-01-01 RX ADMIN — HEPARIN SODIUM 1150 UNITS/HR: 1000 INJECTION INTRAVENOUS; SUBCUTANEOUS at 00:44

## 2022-01-01 RX ADMIN — LOSARTAN POTASSIUM 25 MG: 25 TABLET, FILM COATED ORAL at 08:57

## 2022-01-01 RX ADMIN — METFORMIN HYDROCHLORIDE 1000 MG: 500 TABLET ORAL at 18:55

## 2022-01-01 RX ADMIN — INSULIN ASPART 3 UNITS: 100 INJECTION, SOLUTION INTRAVENOUS; SUBCUTANEOUS at 18:26

## 2022-01-01 RX ADMIN — SODIUM CHLORIDE 50 ML: 9 INJECTION, SOLUTION INTRAVENOUS at 00:30

## 2022-01-01 RX ADMIN — POTASSIUM CHLORIDE 10 MEQ: 7.46 INJECTION, SOLUTION INTRAVENOUS at 20:42

## 2022-01-01 RX ADMIN — CHLORPROMAZINE HYDROCHLORIDE 50 MG: 50 TABLET, FILM COATED ORAL at 21:29

## 2022-01-01 RX ADMIN — DIVALPROEX SODIUM 750 MG: 125 CAPSULE, COATED PELLETS ORAL at 21:36

## 2022-01-01 RX ADMIN — INSULIN ASPART 3 UNITS: 100 INJECTION, SOLUTION INTRAVENOUS; SUBCUTANEOUS at 16:56

## 2022-01-01 RX ADMIN — ACETAMINOPHEN 650 MG: 325 TABLET ORAL at 22:15

## 2022-01-01 RX ADMIN — FEXOFENADINE HYDROCHLORIDE 180 MG: 180 TABLET ORAL at 09:31

## 2022-01-01 RX ADMIN — INSULIN ASPART 3 UNITS: 100 INJECTION, SOLUTION INTRAVENOUS; SUBCUTANEOUS at 12:33

## 2022-01-01 RX ADMIN — DIVALPROEX SODIUM 500 MG: 125 CAPSULE, COATED PELLETS ORAL at 08:50

## 2022-01-01 RX ADMIN — WATER 10 ML: 1 INJECTION INTRAMUSCULAR; INTRAVENOUS; SUBCUTANEOUS at 16:51

## 2022-01-01 RX ADMIN — METFORMIN HYDROCHLORIDE 1000 MG: 500 TABLET ORAL at 19:37

## 2022-01-01 RX ADMIN — DIVALPROEX SODIUM 500 MG: 125 CAPSULE, COATED PELLETS ORAL at 09:37

## 2022-01-01 RX ADMIN — ACETAMINOPHEN 650 MG: 325 TABLET ORAL at 07:52

## 2022-01-01 RX ADMIN — WATER 10 ML: 1 INJECTION INTRAMUSCULAR; INTRAVENOUS; SUBCUTANEOUS at 18:56

## 2022-01-01 RX ADMIN — POLYETHYLENE GLYCOL 3350 17 G: 17 POWDER, FOR SOLUTION ORAL at 10:34

## 2022-01-01 RX ADMIN — LOPERAMIDE HYDROCHLORIDE 2 MG: 2 CAPSULE ORAL at 07:48

## 2022-01-01 RX ADMIN — RISPERIDONE 0.5 MG: 0.5 TABLET, ORALLY DISINTEGRATING ORAL at 12:47

## 2022-01-01 RX ADMIN — POTASSIUM CHLORIDE 10 MEQ: 7.46 INJECTION, SOLUTION INTRAVENOUS at 19:19

## 2022-01-01 RX ADMIN — INSULIN ASPART 2 UNITS: 100 INJECTION, SOLUTION INTRAVENOUS; SUBCUTANEOUS at 09:04

## 2022-01-01 RX ADMIN — CLOPIDOGREL BISULFATE 75 MG: 75 TABLET ORAL at 08:04

## 2022-01-01 RX ADMIN — PANTOPRAZOLE SODIUM 20 MG: 20 TABLET, DELAYED RELEASE ORAL at 08:17

## 2022-01-01 RX ADMIN — METFORMIN HYDROCHLORIDE 1000 MG: 500 TABLET ORAL at 08:01

## 2022-01-01 RX ADMIN — INSULIN ASPART 3 UNITS: 100 INJECTION, SOLUTION INTRAVENOUS; SUBCUTANEOUS at 17:29

## 2022-01-01 RX ADMIN — HYDROMORPHONE HYDROCHLORIDE 0.5 MG: 1 SOLUTION ORAL at 00:19

## 2022-01-01 RX ADMIN — FEXOFENADINE HYDROCHLORIDE 180 MG: 180 TABLET ORAL at 08:56

## 2022-01-01 RX ADMIN — VALPROATE SODIUM 1000 MG: 100 INJECTION, SOLUTION INTRAVENOUS at 22:52

## 2022-01-01 RX ADMIN — CITALOPRAM HYDROBROMIDE 10 MG: 10 TABLET ORAL at 10:17

## 2022-01-01 RX ADMIN — OLANZAPINE 5 MG: 10 INJECTION, POWDER, FOR SOLUTION INTRAMUSCULAR at 05:56

## 2022-01-01 RX ADMIN — MELATONIN 5 MG TABLET 10 MG: at 21:18

## 2022-01-01 RX ADMIN — OLANZAPINE 5 MG: 5 TABLET, ORALLY DISINTEGRATING ORAL at 13:40

## 2022-01-01 RX ADMIN — FLUTICASONE PROPIONATE 1 SPRAY: 50 SPRAY, METERED NASAL at 21:52

## 2022-01-01 RX ADMIN — WATER: 1 INJECTION INTRAMUSCULAR; INTRAVENOUS; SUBCUTANEOUS at 00:39

## 2022-01-01 RX ADMIN — RISPERIDONE 0.5 MG: 0.5 TABLET, ORALLY DISINTEGRATING ORAL at 10:46

## 2022-01-01 RX ADMIN — MELATONIN 5 MG TABLET 5 MG: at 21:07

## 2022-01-01 RX ADMIN — CHLORPROMAZINE HYDROCHLORIDE 50 MG: 50 TABLET, FILM COATED ORAL at 08:55

## 2022-01-01 RX ADMIN — INSULIN ASPART 2 UNITS: 100 INJECTION, SOLUTION INTRAVENOUS; SUBCUTANEOUS at 08:20

## 2022-01-01 RX ADMIN — INSULIN ASPART 3 UNITS: 100 INJECTION, SOLUTION INTRAVENOUS; SUBCUTANEOUS at 09:43

## 2022-01-01 RX ADMIN — INSULIN ASPART 2 UNITS: 100 INJECTION, SOLUTION INTRAVENOUS; SUBCUTANEOUS at 16:13

## 2022-01-01 RX ADMIN — FLUTICASONE PROPIONATE 1 SPRAY: 50 SPRAY, METERED NASAL at 08:08

## 2022-01-01 RX ADMIN — DOCUSATE SODIUM 100 MG: 100 CAPSULE, LIQUID FILLED ORAL at 08:36

## 2022-01-01 RX ADMIN — MICONAZOLE NITRATE: 20 POWDER TOPICAL at 07:56

## 2022-01-01 RX ADMIN — Medication 12.5 MG: at 00:44

## 2022-01-01 RX ADMIN — ACETAMINOPHEN 650 MG: 325 TABLET ORAL at 09:36

## 2022-01-01 RX ADMIN — OLANZAPINE 5 MG: 10 INJECTION, POWDER, FOR SOLUTION INTRAMUSCULAR at 11:57

## 2022-01-01 RX ADMIN — CITALOPRAM HYDROBROMIDE 10 MG: 10 TABLET ORAL at 09:37

## 2022-01-01 RX ADMIN — ACETAMINOPHEN 650 MG: 325 TABLET ORAL at 01:32

## 2022-01-01 RX ADMIN — INSULIN ASPART 2 UNITS: 100 INJECTION, SOLUTION INTRAVENOUS; SUBCUTANEOUS at 14:57

## 2022-01-01 RX ADMIN — METOPROLOL SUCCINATE 12.5 MG: 25 TABLET, EXTENDED RELEASE ORAL at 22:09

## 2022-01-01 RX ADMIN — PANTOPRAZOLE SODIUM 20 MG: 20 TABLET, DELAYED RELEASE ORAL at 11:04

## 2022-01-01 RX ADMIN — SODIUM CHLORIDE, POTASSIUM CHLORIDE, SODIUM LACTATE AND CALCIUM CHLORIDE: 600; 310; 30; 20 INJECTION, SOLUTION INTRAVENOUS at 21:23

## 2022-01-01 RX ADMIN — INSULIN DETEMIR 10 UNITS: 100 INJECTION, SOLUTION SUBCUTANEOUS at 21:05

## 2022-01-01 RX ADMIN — FLUTICASONE PROPIONATE 1 SPRAY: 50 SPRAY, METERED NASAL at 10:28

## 2022-01-01 RX ADMIN — RISPERIDONE 0.5 MG: 0.5 TABLET, ORALLY DISINTEGRATING ORAL at 12:17

## 2022-01-01 RX ADMIN — DIVALPROEX SODIUM 500 MG: 125 CAPSULE, COATED PELLETS ORAL at 08:54

## 2022-01-01 RX ADMIN — OLANZAPINE 5 MG: 5 TABLET, ORALLY DISINTEGRATING ORAL at 21:07

## 2022-01-01 RX ADMIN — OLANZAPINE 2.5 MG: 5 TABLET, ORALLY DISINTEGRATING ORAL at 16:00

## 2022-01-01 RX ADMIN — PANTOPRAZOLE SODIUM 20 MG: 20 TABLET, DELAYED RELEASE ORAL at 09:17

## 2022-01-01 RX ADMIN — SODIUM CHLORIDE 75 ML/HR: 9 INJECTION, SOLUTION INTRAVENOUS at 11:00

## 2022-01-01 RX ADMIN — ACETAMINOPHEN 650 MG: 325 TABLET ORAL at 15:08

## 2022-01-01 RX ADMIN — CITALOPRAM HYDROBROMIDE 10 MG: 10 TABLET ORAL at 09:17

## 2022-01-01 RX ADMIN — RIVASTIGMINE 1 PATCH: 4.6 PATCH TRANSDERMAL at 08:17

## 2022-01-01 RX ADMIN — REMDESIVIR 100 MG: 100 INJECTION, POWDER, LYOPHILIZED, FOR SOLUTION INTRAVENOUS at 21:18

## 2022-01-01 RX ADMIN — ATORVASTATIN CALCIUM 80 MG: 40 TABLET, FILM COATED ORAL at 21:52

## 2022-01-01 RX ADMIN — VALPROATE SODIUM 250 MG: 100 INJECTION, SOLUTION INTRAVENOUS at 17:57

## 2022-01-01 RX ADMIN — OLANZAPINE 5 MG: 10 INJECTION, POWDER, FOR SOLUTION INTRAMUSCULAR at 17:01

## 2022-01-01 RX ADMIN — TAMSULOSIN HYDROCHLORIDE 0.4 MG: 0.4 CAPSULE ORAL at 12:02

## 2022-01-01 RX ADMIN — WATER 2.1 ML: 1 INJECTION INTRAMUSCULAR; INTRAVENOUS; SUBCUTANEOUS at 14:15

## 2022-01-01 RX ADMIN — INSULIN ASPART 10 UNITS: 100 INJECTION, SOLUTION INTRAVENOUS; SUBCUTANEOUS at 17:54

## 2022-01-01 RX ADMIN — CITALOPRAM HYDROBROMIDE 10 MG: 10 TABLET ORAL at 08:18

## 2022-01-01 RX ADMIN — INSULIN DETEMIR 18 UNITS: 100 INJECTION, SOLUTION SUBCUTANEOUS at 09:57

## 2022-01-01 RX ADMIN — VALPROATE SODIUM 500 MG: 100 INJECTION, SOLUTION INTRAVENOUS at 09:27

## 2022-01-01 RX ADMIN — CHLORPROMAZINE HYDROCHLORIDE 50 MG: 50 TABLET, FILM COATED ORAL at 15:25

## 2022-01-01 RX ADMIN — POLYETHYLENE GLYCOL 3350 17 G: 17 POWDER, FOR SOLUTION ORAL at 10:18

## 2022-01-01 RX ADMIN — DIVALPROEX SODIUM 375 MG: 125 CAPSULE, COATED PELLETS ORAL at 20:50

## 2022-01-01 RX ADMIN — INSULIN ASPART 7 UNITS: 100 INJECTION, SOLUTION INTRAVENOUS; SUBCUTANEOUS at 18:51

## 2022-01-01 RX ADMIN — PANTOPRAZOLE SODIUM 20 MG: 20 TABLET, DELAYED RELEASE ORAL at 10:15

## 2022-01-01 RX ADMIN — ACETAMINOPHEN 650 MG: 325 TABLET ORAL at 13:43

## 2022-01-01 RX ADMIN — RISPERIDONE 0.5 MG: 0.5 TABLET, ORALLY DISINTEGRATING ORAL at 11:58

## 2022-01-01 RX ADMIN — DEXAMETHASONE 6 MG: 2 TABLET ORAL at 07:48

## 2022-01-01 RX ADMIN — RIVASTIGMINE 1 PATCH: 4.6 PATCH TRANSDERMAL at 08:33

## 2022-01-01 RX ADMIN — RISPERIDONE 0.5 MG: 0.5 TABLET, ORALLY DISINTEGRATING ORAL at 08:10

## 2022-01-01 RX ADMIN — ACETAMINOPHEN 650 MG: 325 TABLET ORAL at 00:43

## 2022-01-01 RX ADMIN — DIVALPROEX SODIUM 500 MG: 125 CAPSULE, COATED PELLETS ORAL at 09:09

## 2022-01-01 RX ADMIN — CHLORPROMAZINE HYDROCHLORIDE 50 MG: 50 TABLET, FILM COATED ORAL at 21:53

## 2022-01-01 RX ADMIN — METFORMIN HYDROCHLORIDE 1000 MG: 500 TABLET ORAL at 17:26

## 2022-01-01 RX ADMIN — INSULIN ASPART 7 UNITS: 100 INJECTION, SOLUTION INTRAVENOUS; SUBCUTANEOUS at 09:57

## 2022-01-01 RX ADMIN — CITALOPRAM HYDROBROMIDE 10 MG: 10 TABLET ORAL at 09:32

## 2022-01-01 RX ADMIN — ACETAMINOPHEN 650 MG: 325 TABLET ORAL at 09:05

## 2022-01-01 RX ADMIN — CITALOPRAM HYDROBROMIDE 20 MG: 20 TABLET ORAL at 13:33

## 2022-01-01 RX ADMIN — INSULIN DETEMIR 12 UNITS: 100 INJECTION, SOLUTION SUBCUTANEOUS at 07:47

## 2022-01-01 RX ADMIN — RIVASTIGMINE 1 PATCH: 4.6 PATCH TRANSDERMAL at 09:34

## 2022-01-01 RX ADMIN — ASPIRIN 81 MG: 81 TABLET, COATED ORAL at 10:16

## 2022-01-01 RX ADMIN — MELATONIN TAB 3 MG 3 MG: 3 TAB at 21:45

## 2022-01-01 RX ADMIN — RIVASTIGMINE 1 PATCH: 4.6 PATCH TRANSDERMAL at 08:20

## 2022-01-01 RX ADMIN — CITALOPRAM HYDROBROMIDE 10 MG: 10 TABLET ORAL at 12:02

## 2022-01-01 RX ADMIN — QUETIAPINE FUMARATE 25 MG: 25 TABLET ORAL at 21:52

## 2022-01-01 RX ADMIN — MELATONIN 5 MG TABLET 10 MG: at 21:04

## 2022-01-01 RX ADMIN — OLANZAPINE 5 MG: 5 TABLET, ORALLY DISINTEGRATING ORAL at 14:26

## 2022-01-01 RX ADMIN — CITALOPRAM HYDROBROMIDE 20 MG: 20 TABLET ORAL at 09:38

## 2022-01-01 RX ADMIN — VALPROATE SODIUM 250 MG: 100 INJECTION, SOLUTION INTRAVENOUS at 01:53

## 2022-01-01 RX ADMIN — FLUTICASONE PROPIONATE 1 SPRAY: 50 SPRAY, METERED NASAL at 21:51

## 2022-01-01 RX ADMIN — INSULIN DETEMIR 18 UNITS: 100 INJECTION, SOLUTION SUBCUTANEOUS at 09:12

## 2022-01-01 RX ADMIN — QUETIAPINE 12.5 MG: 25 TABLET, FILM COATED ORAL at 20:25

## 2022-01-01 RX ADMIN — HYDROMORPHONE HYDROCHLORIDE 0.5 MG: 1 SOLUTION ORAL at 17:23

## 2022-01-01 RX ADMIN — ACETAMINOPHEN 650 MG: 325 TABLET ORAL at 22:43

## 2022-01-01 RX ADMIN — ACETAMINOPHEN 650 MG: 325 TABLET ORAL at 21:30

## 2022-01-01 RX ADMIN — MELATONIN 5 MG TABLET 10 MG: at 21:06

## 2022-01-01 RX ADMIN — WATER 10 ML: 1 INJECTION INTRAMUSCULAR; INTRAVENOUS; SUBCUTANEOUS at 19:40

## 2022-01-01 RX ADMIN — ASPIRIN 81 MG: 81 TABLET, COATED ORAL at 09:36

## 2022-01-01 RX ADMIN — QUETIAPINE FUMARATE 25 MG: 25 TABLET ORAL at 21:27

## 2022-01-01 RX ADMIN — CITALOPRAM HYDROBROMIDE 10 MG: 10 TABLET ORAL at 08:10

## 2022-01-01 RX ADMIN — RISPERIDONE 0.5 MG: 0.5 TABLET, ORALLY DISINTEGRATING ORAL at 21:54

## 2022-01-01 RX ADMIN — OLANZAPINE 2.5 MG: 5 TABLET, ORALLY DISINTEGRATING ORAL at 06:39

## 2022-01-01 RX ADMIN — TAMSULOSIN HYDROCHLORIDE 0.4 MG: 0.4 CAPSULE ORAL at 08:48

## 2022-01-01 RX ADMIN — RIVASTIGMINE 1 PATCH: 4.6 PATCH TRANSDERMAL at 08:28

## 2022-01-01 RX ADMIN — QUETIAPINE FUMARATE 25 MG: 25 TABLET ORAL at 21:44

## 2022-01-01 RX ADMIN — Medication 12.5 MG: at 02:01

## 2022-01-01 RX ADMIN — RISPERIDONE 0.5 MG: 0.5 TABLET, ORALLY DISINTEGRATING ORAL at 02:56

## 2022-01-01 RX ADMIN — OLANZAPINE 5 MG: 10 INJECTION, POWDER, FOR SOLUTION INTRAMUSCULAR at 20:08

## 2022-01-01 RX ADMIN — INSULIN ASPART 2 UNITS: 100 INJECTION, SOLUTION INTRAVENOUS; SUBCUTANEOUS at 08:08

## 2022-01-01 RX ADMIN — INSULIN DETEMIR 10 UNITS: 100 INJECTION, SOLUTION SUBCUTANEOUS at 21:14

## 2022-01-01 RX ADMIN — QUETIAPINE FUMARATE 25 MG: 25 TABLET ORAL at 21:18

## 2022-01-01 RX ADMIN — CITALOPRAM HYDROBROMIDE 10 MG: 10 TABLET ORAL at 08:21

## 2022-01-01 RX ADMIN — PANTOPRAZOLE SODIUM 20 MG: 20 TABLET, DELAYED RELEASE ORAL at 09:42

## 2022-01-01 RX ADMIN — DIVALPROEX SODIUM 375 MG: 125 CAPSULE, COATED PELLETS ORAL at 08:04

## 2022-01-01 RX ADMIN — MELATONIN 5 MG TABLET 10 MG: at 20:22

## 2022-01-01 RX ADMIN — RISPERIDONE 0.5 MG: 0.5 TABLET, ORALLY DISINTEGRATING ORAL at 11:04

## 2022-01-01 RX ADMIN — OLANZAPINE 5 MG: 10 INJECTION, POWDER, FOR SOLUTION INTRAMUSCULAR at 03:17

## 2022-01-01 RX ADMIN — RIVASTIGMINE 1 PATCH: 4.6 PATCH TRANSDERMAL at 09:33

## 2022-01-01 RX ADMIN — POTASSIUM CHLORIDE 10 MEQ: 7.46 INJECTION, SOLUTION INTRAVENOUS at 16:06

## 2022-01-01 RX ADMIN — INSULIN ASPART 4 UNITS: 100 INJECTION, SOLUTION INTRAVENOUS; SUBCUTANEOUS at 17:50

## 2022-01-01 RX ADMIN — WATER: 1 INJECTION INTRAMUSCULAR; INTRAVENOUS; SUBCUTANEOUS at 12:45

## 2022-01-01 RX ADMIN — ATORVASTATIN CALCIUM 80 MG: 40 TABLET, FILM COATED ORAL at 22:14

## 2022-01-01 RX ADMIN — MELATONIN 5 MG TABLET 10 MG: at 21:49

## 2022-01-01 RX ADMIN — OLANZAPINE 5 MG: 10 INJECTION, POWDER, FOR SOLUTION INTRAMUSCULAR at 19:45

## 2022-01-01 RX ADMIN — OLANZAPINE 5 MG: 10 INJECTION, POWDER, FOR SOLUTION INTRAMUSCULAR at 19:37

## 2022-01-01 RX ADMIN — CHLORPROMAZINE HYDROCHLORIDE 50 MG: 50 TABLET, FILM COATED ORAL at 11:46

## 2022-01-01 RX ADMIN — LOSARTAN POTASSIUM 25 MG: 25 TABLET, FILM COATED ORAL at 10:14

## 2022-01-01 RX ADMIN — HYDROMORPHONE HYDROCHLORIDE 0.5 MG: 1 SOLUTION ORAL at 11:04

## 2022-01-01 RX ADMIN — INSULIN ASPART 5 UNITS: 100 INJECTION, SOLUTION INTRAVENOUS; SUBCUTANEOUS at 17:49

## 2022-01-01 RX ADMIN — POLYETHYLENE GLYCOL 3350 17 G: 17 POWDER, FOR SOLUTION ORAL at 08:25

## 2022-01-01 RX ADMIN — ACETAMINOPHEN 650 MG: 325 TABLET ORAL at 08:18

## 2022-01-01 RX ADMIN — ASPIRIN 81 MG: 81 TABLET, COATED ORAL at 07:52

## 2022-01-01 RX ADMIN — RISPERIDONE 0.5 MG: 0.5 TABLET, ORALLY DISINTEGRATING ORAL at 11:45

## 2022-01-01 RX ADMIN — Medication 12.5 MG: at 06:10

## 2022-01-01 RX ADMIN — WATER 1 ML: 1 INJECTION INTRAMUSCULAR; INTRAVENOUS; SUBCUTANEOUS at 00:47

## 2022-01-01 RX ADMIN — RISPERIDONE 0.5 MG: 0.5 TABLET, ORALLY DISINTEGRATING ORAL at 09:36

## 2022-01-01 RX ADMIN — ATORVASTATIN CALCIUM 80 MG: 40 TABLET, FILM COATED ORAL at 21:24

## 2022-01-01 RX ADMIN — OLANZAPINE 5 MG: 5 TABLET, ORALLY DISINTEGRATING ORAL at 11:01

## 2022-01-01 RX ADMIN — VALPROATE SODIUM 250 MG: 100 INJECTION, SOLUTION INTRAVENOUS at 14:05

## 2022-01-01 RX ADMIN — ACETAMINOPHEN 650 MG: 325 TABLET ORAL at 08:19

## 2022-01-01 RX ADMIN — CITALOPRAM HYDROBROMIDE 20 MG: 20 TABLET ORAL at 18:48

## 2022-01-01 RX ADMIN — METFORMIN HYDROCHLORIDE 1000 MG: 500 TABLET ORAL at 09:17

## 2022-01-01 RX ADMIN — RIVASTIGMINE 1 PATCH: 4.6 PATCH TRANSDERMAL at 08:08

## 2022-01-01 RX ADMIN — OLANZAPINE 5 MG: 10 INJECTION, POWDER, FOR SOLUTION INTRAMUSCULAR at 12:25

## 2022-01-01 RX ADMIN — ACETAMINOPHEN 650 MG: 325 TABLET ORAL at 18:15

## 2022-01-01 RX ADMIN — INSULIN ASPART 7 UNITS: 100 INJECTION, SOLUTION INTRAVENOUS; SUBCUTANEOUS at 17:02

## 2022-01-01 RX ADMIN — VALPROATE SODIUM 250 MG: 100 INJECTION, SOLUTION INTRAVENOUS at 10:32

## 2022-01-01 RX ADMIN — OLANZAPINE 5 MG: 5 TABLET, FILM COATED ORAL at 20:08

## 2022-01-01 RX ADMIN — MELATONIN 5 MG TABLET 5 MG: at 21:21

## 2022-01-01 RX ADMIN — DIVALPROEX SODIUM 500 MG: 125 CAPSULE, COATED PELLETS ORAL at 08:36

## 2022-01-01 RX ADMIN — FEXOFENADINE HYDROCHLORIDE 180 MG: 180 TABLET ORAL at 11:09

## 2022-01-01 RX ADMIN — CHLORPROMAZINE HYDROCHLORIDE 50 MG: 50 TABLET, FILM COATED ORAL at 23:06

## 2022-01-01 RX ADMIN — INSULIN ASPART 3 UNITS: 100 INJECTION, SOLUTION INTRAVENOUS; SUBCUTANEOUS at 18:52

## 2022-01-01 RX ADMIN — RISPERIDONE 0.5 MG: 0.5 TABLET, ORALLY DISINTEGRATING ORAL at 13:53

## 2022-01-01 RX ADMIN — METFORMIN HYDROCHLORIDE 1000 MG: 500 TABLET ORAL at 09:37

## 2022-01-01 RX ADMIN — LIDOCAINE HYDROCHLORIDE: 20 JELLY TOPICAL at 18:15

## 2022-01-01 RX ADMIN — OLANZAPINE 5 MG: 10 INJECTION, POWDER, FOR SOLUTION INTRAMUSCULAR at 01:15

## 2022-01-01 RX ADMIN — DIVALPROEX SODIUM 750 MG: 125 CAPSULE, COATED PELLETS ORAL at 13:27

## 2022-01-01 RX ADMIN — Medication 1 MG: at 00:13

## 2022-01-01 RX ADMIN — OLANZAPINE 5 MG: 5 TABLET, FILM COATED ORAL at 21:30

## 2022-01-01 RX ADMIN — TAMSULOSIN HYDROCHLORIDE 0.4 MG: 0.4 CAPSULE ORAL at 08:51

## 2022-01-01 RX ADMIN — TAMSULOSIN HYDROCHLORIDE 0.4 MG: 0.4 CAPSULE ORAL at 08:01

## 2022-01-01 RX ADMIN — INSULIN ASPART 3 UNITS: 100 INJECTION, SOLUTION INTRAVENOUS; SUBCUTANEOUS at 18:01

## 2022-01-01 RX ADMIN — FEXOFENADINE HYDROCHLORIDE 180 MG: 180 TABLET ORAL at 10:13

## 2022-01-01 RX ADMIN — PANTOPRAZOLE SODIUM 20 MG: 20 TABLET, DELAYED RELEASE ORAL at 07:53

## 2022-01-01 RX ADMIN — ASPIRIN 81 MG: 81 TABLET, COATED ORAL at 10:17

## 2022-01-01 RX ADMIN — OLANZAPINE 5 MG: 5 TABLET, FILM COATED ORAL at 09:39

## 2022-01-01 RX ADMIN — POLYETHYLENE GLYCOL 3350 17 G: 17 POWDER, FOR SOLUTION ORAL at 08:17

## 2022-01-01 RX ADMIN — ACETAMINOPHEN 650 MG: 325 TABLET ORAL at 08:10

## 2022-01-01 RX ADMIN — INSULIN ASPART 2 UNITS: 100 INJECTION, SOLUTION INTRAVENOUS; SUBCUTANEOUS at 09:28

## 2022-01-01 RX ADMIN — ACETAMINOPHEN 650 MG: 325 TABLET ORAL at 16:07

## 2022-01-01 RX ADMIN — ATORVASTATIN CALCIUM 80 MG: 40 TABLET, FILM COATED ORAL at 21:25

## 2022-01-01 RX ADMIN — ASPIRIN 81 MG: 81 TABLET, COATED ORAL at 07:49

## 2022-01-01 RX ADMIN — RIVASTIGMINE 1 PATCH: 4.6 PATCH TRANSDERMAL at 10:16

## 2022-01-01 RX ADMIN — METFORMIN HYDROCHLORIDE 1000 MG: 500 TABLET ORAL at 17:55

## 2022-01-01 RX ADMIN — RISPERIDONE 0.5 MG: 0.5 TABLET, ORALLY DISINTEGRATING ORAL at 13:57

## 2022-01-01 RX ADMIN — OLANZAPINE 5 MG: 5 TABLET, ORALLY DISINTEGRATING ORAL at 10:19

## 2022-01-01 RX ADMIN — INSULIN ASPART 3 UNITS: 100 INJECTION, SOLUTION INTRAVENOUS; SUBCUTANEOUS at 17:58

## 2022-01-01 RX ADMIN — INSULIN ASPART 3 UNITS: 100 INJECTION, SOLUTION INTRAVENOUS; SUBCUTANEOUS at 18:31

## 2022-01-01 RX ADMIN — QUETIAPINE FUMARATE 25 MG: 25 TABLET ORAL at 00:42

## 2022-01-01 RX ADMIN — CITALOPRAM HYDROBROMIDE 10 MG: 10 TABLET ORAL at 10:46

## 2022-01-01 RX ADMIN — SODIUM CHLORIDE: 9 INJECTION, SOLUTION INTRAVENOUS at 11:41

## 2022-01-01 RX ADMIN — MELATONIN 5 MG TABLET 10 MG: at 21:52

## 2022-01-01 RX ADMIN — DIVALPROEX SODIUM 750 MG: 125 CAPSULE, COATED PELLETS ORAL at 13:11

## 2022-01-01 RX ADMIN — RISPERIDONE 0.5 MG: 0.5 TABLET, ORALLY DISINTEGRATING ORAL at 01:39

## 2022-01-01 RX ADMIN — INSULIN ASPART 1 UNITS: 100 INJECTION, SOLUTION INTRAVENOUS; SUBCUTANEOUS at 09:46

## 2022-01-01 RX ADMIN — ATORVASTATIN CALCIUM 80 MG: 80 TABLET, FILM COATED ORAL at 10:14

## 2022-01-01 RX ADMIN — Medication 1 MG: at 00:54

## 2022-01-01 RX ADMIN — VALPROATE SODIUM 750 MG: 100 INJECTION, SOLUTION INTRAVENOUS at 21:41

## 2022-01-01 RX ADMIN — ATORVASTATIN CALCIUM 80 MG: 40 TABLET, FILM COATED ORAL at 20:31

## 2022-01-01 RX ADMIN — HEPARIN SODIUM 5000 UNITS: 5000 INJECTION, SOLUTION INTRAVENOUS; SUBCUTANEOUS at 14:08

## 2022-01-01 RX ADMIN — TETROFOSMIN 31.7 MCI.: 1.38 INJECTION, POWDER, LYOPHILIZED, FOR SOLUTION INTRAVENOUS at 14:20

## 2022-01-01 RX ADMIN — DIVALPROEX SODIUM 750 MG: 125 CAPSULE, COATED PELLETS ORAL at 13:43

## 2022-01-01 RX ADMIN — METOPROLOL SUCCINATE 12.5 MG: 25 TABLET, EXTENDED RELEASE ORAL at 21:32

## 2022-01-01 RX ADMIN — SODIUM CHLORIDE, POTASSIUM CHLORIDE, SODIUM LACTATE AND CALCIUM CHLORIDE: 600; 310; 30; 20 INJECTION, SOLUTION INTRAVENOUS at 18:32

## 2022-01-01 RX ADMIN — TAMSULOSIN HYDROCHLORIDE 0.4 MG: 0.4 CAPSULE ORAL at 08:19

## 2022-01-01 RX ADMIN — Medication 12.5 MG: at 16:29

## 2022-01-01 RX ADMIN — OLANZAPINE 5 MG: 10 INJECTION, POWDER, FOR SOLUTION INTRAMUSCULAR at 09:23

## 2022-01-01 RX ADMIN — ACETAMINOPHEN 650 MG: 325 TABLET ORAL at 00:08

## 2022-01-01 RX ADMIN — SODIUM CHLORIDE: 9 INJECTION, SOLUTION INTRAVENOUS at 05:09

## 2022-01-01 RX ADMIN — TAMSULOSIN HYDROCHLORIDE 0.4 MG: 0.4 CAPSULE ORAL at 08:10

## 2022-01-01 RX ADMIN — INSULIN ASPART 7 UNITS: 100 INJECTION, SOLUTION INTRAVENOUS; SUBCUTANEOUS at 18:29

## 2022-01-01 RX ADMIN — INSULIN ASPART 3 UNITS: 100 INJECTION, SOLUTION INTRAVENOUS; SUBCUTANEOUS at 09:04

## 2022-01-01 RX ADMIN — DIVALPROEX SODIUM 375 MG: 125 CAPSULE, COATED PELLETS ORAL at 01:33

## 2022-01-01 RX ADMIN — OLANZAPINE 5 MG: 5 TABLET, ORALLY DISINTEGRATING ORAL at 17:34

## 2022-01-01 RX ADMIN — IOPAMIDOL 74 ML: 755 INJECTION, SOLUTION INTRAVENOUS at 00:34

## 2022-01-01 RX ADMIN — INSULIN ASPART 1 UNITS: 100 INJECTION, SOLUTION INTRAVENOUS; SUBCUTANEOUS at 13:20

## 2022-01-01 RX ADMIN — INSULIN ASPART 3 UNITS: 100 INJECTION, SOLUTION INTRAVENOUS; SUBCUTANEOUS at 14:30

## 2022-01-01 RX ADMIN — INSULIN ASPART 3 UNITS: 100 INJECTION, SOLUTION INTRAVENOUS; SUBCUTANEOUS at 10:08

## 2022-01-01 RX ADMIN — RIVASTIGMINE 1 PATCH: 4.6 PATCH TRANSDERMAL at 09:18

## 2022-01-01 RX ADMIN — QUETIAPINE FUMARATE 25 MG: 25 TABLET ORAL at 21:28

## 2022-01-01 RX ADMIN — ACETAMINOPHEN 650 MG: 325 TABLET ORAL at 18:55

## 2022-01-01 RX ADMIN — CITALOPRAM HYDROBROMIDE 10 MG: 10 TABLET ORAL at 10:16

## 2022-01-01 RX ADMIN — PANTOPRAZOLE SODIUM 20 MG: 20 TABLET, DELAYED RELEASE ORAL at 10:16

## 2022-01-01 RX ADMIN — LOPERAMIDE HYDROCHLORIDE 2 MG: 2 CAPSULE ORAL at 10:12

## 2022-01-01 RX ADMIN — MELATONIN 5 MG TABLET 10 MG: at 21:24

## 2022-01-01 RX ADMIN — DOCUSATE SODIUM 100 MG: 100 CAPSULE, LIQUID FILLED ORAL at 08:56

## 2022-01-01 RX ADMIN — CHLORPROMAZINE HYDROCHLORIDE 50 MG: 50 TABLET, FILM COATED ORAL at 01:27

## 2022-01-01 RX ADMIN — ACETAMINOPHEN 650 MG: 325 TABLET ORAL at 18:21

## 2022-01-01 RX ADMIN — ASPIRIN 81 MG: 81 TABLET, COATED ORAL at 08:56

## 2022-01-01 RX ADMIN — PANTOPRAZOLE SODIUM 20 MG: 20 TABLET, DELAYED RELEASE ORAL at 09:39

## 2022-01-01 RX ADMIN — ATORVASTATIN CALCIUM 80 MG: 40 TABLET, FILM COATED ORAL at 21:31

## 2022-01-01 RX ADMIN — METOPROLOL SUCCINATE 12.5 MG: 25 TABLET, EXTENDED RELEASE ORAL at 21:48

## 2022-01-01 RX ADMIN — POLYETHYLENE GLYCOL 3350 17 G: 17 POWDER, FOR SOLUTION ORAL at 08:10

## 2022-01-01 RX ADMIN — METFORMIN HYDROCHLORIDE 1000 MG: 500 TABLET ORAL at 08:16

## 2022-01-01 RX ADMIN — ACETAMINOPHEN 650 MG: 325 TABLET ORAL at 08:48

## 2022-01-01 RX ADMIN — QUETIAPINE 12.5 MG: 25 TABLET, FILM COATED ORAL at 03:41

## 2022-01-01 RX ADMIN — ISOSORBIDE MONONITRATE 30 MG: 30 TABLET, EXTENDED RELEASE ORAL at 09:43

## 2022-01-01 RX ADMIN — VALPROATE SODIUM 250 MG: 100 INJECTION, SOLUTION INTRAVENOUS at 08:11

## 2022-01-01 RX ADMIN — ACETAMINOPHEN 650 MG: 325 TABLET ORAL at 17:13

## 2022-01-01 RX ADMIN — Medication 12.5 MG: at 06:13

## 2022-01-01 RX ADMIN — OLANZAPINE 5 MG: 5 TABLET, FILM COATED ORAL at 08:50

## 2022-01-01 RX ADMIN — OLANZAPINE 5 MG: 10 INJECTION, POWDER, FOR SOLUTION INTRAMUSCULAR at 23:16

## 2022-01-01 RX ADMIN — METOPROLOL SUCCINATE 12.5 MG: 25 TABLET, EXTENDED RELEASE ORAL at 22:15

## 2022-01-01 RX ADMIN — HYDROMORPHONE HYDROCHLORIDE 0.5 MG: 1 SOLUTION ORAL at 09:01

## 2022-01-01 RX ADMIN — ACETAMINOPHEN 650 MG: 325 TABLET ORAL at 17:58

## 2022-01-01 RX ADMIN — LOSARTAN POTASSIUM 25 MG: 25 TABLET, FILM COATED ORAL at 11:09

## 2022-01-01 RX ADMIN — VALPROATE SODIUM 250 MG: 100 INJECTION, SOLUTION INTRAVENOUS at 09:42

## 2022-01-01 RX ADMIN — METOPROLOL SUCCINATE 12.5 MG: 25 TABLET, EXTENDED RELEASE ORAL at 21:45

## 2022-01-01 RX ADMIN — RISPERIDONE 0.5 MG: 0.5 TABLET, ORALLY DISINTEGRATING ORAL at 21:47

## 2022-01-01 RX ADMIN — SODIUM CHLORIDE, POTASSIUM CHLORIDE, SODIUM LACTATE AND CALCIUM CHLORIDE: 600; 310; 30; 20 INJECTION, SOLUTION INTRAVENOUS at 21:21

## 2022-01-01 RX ADMIN — FEXOFENADINE HYDROCHLORIDE 180 MG: 180 TABLET ORAL at 08:05

## 2022-01-01 RX ADMIN — OLANZAPINE 5 MG: 5 TABLET, FILM COATED ORAL at 14:47

## 2022-01-01 RX ADMIN — RIVASTIGMINE 1 PATCH: 4.6 PATCH TRANSDERMAL at 09:35

## 2022-01-01 RX ADMIN — CLOPIDOGREL BISULFATE 75 MG: 75 TABLET ORAL at 11:07

## 2022-01-01 RX ADMIN — ASPIRIN 81 MG: 81 TABLET, COATED ORAL at 08:27

## 2022-01-01 RX ADMIN — ACETAMINOPHEN 650 MG: 325 TABLET ORAL at 17:00

## 2022-01-01 RX ADMIN — VALPROATE SODIUM 1000 MG: 100 INJECTION, SOLUTION INTRAVENOUS at 21:38

## 2022-01-01 RX ADMIN — INSULIN GLARGINE 22 UNITS: 100 INJECTION, SOLUTION SUBCUTANEOUS at 21:28

## 2022-01-01 RX ADMIN — INSULIN ASPART 4 UNITS: 100 INJECTION, SOLUTION INTRAVENOUS; SUBCUTANEOUS at 14:28

## 2022-01-01 RX ADMIN — VALPROATE SODIUM 1000 MG: 100 INJECTION, SOLUTION INTRAVENOUS at 21:52

## 2022-01-01 RX ADMIN — CITALOPRAM HYDROBROMIDE 10 MG: 10 TABLET ORAL at 08:01

## 2022-01-01 RX ADMIN — SODIUM CHLORIDE 50 ML: 9 INJECTION, SOLUTION INTRAVENOUS at 21:19

## 2022-01-01 RX ADMIN — INSULIN ASPART 2 UNITS: 100 INJECTION, SOLUTION INTRAVENOUS; SUBCUTANEOUS at 17:29

## 2022-01-01 RX ADMIN — ACETAMINOPHEN 650 MG: 325 TABLET ORAL at 09:17

## 2022-01-01 RX ADMIN — QUETIAPINE 12.5 MG: 25 TABLET, FILM COATED ORAL at 00:30

## 2022-01-01 RX ADMIN — PANTOPRAZOLE SODIUM 20 MG: 20 TABLET, DELAYED RELEASE ORAL at 11:07

## 2022-01-01 RX ADMIN — RIVASTIGMINE 1 PATCH: 4.6 PATCH TRANSDERMAL at 10:18

## 2022-01-01 RX ADMIN — TAMSULOSIN HYDROCHLORIDE 0.4 MG: 0.4 CAPSULE ORAL at 08:37

## 2022-01-01 RX ADMIN — VALPROATE SODIUM 750 MG: 100 INJECTION, SOLUTION INTRAVENOUS at 21:50

## 2022-01-01 RX ADMIN — DOCUSATE SODIUM 100 MG: 100 CAPSULE, LIQUID FILLED ORAL at 01:33

## 2022-01-01 RX ADMIN — INSULIN ASPART 3 UNITS: 100 INJECTION, SOLUTION INTRAVENOUS; SUBCUTANEOUS at 14:23

## 2022-01-01 RX ADMIN — VALPROATE SODIUM 500 MG: 100 INJECTION, SOLUTION INTRAVENOUS at 06:16

## 2022-01-01 RX ADMIN — ACETAMINOPHEN 650 MG: 325 TABLET ORAL at 02:06

## 2022-01-01 RX ADMIN — VALPROATE SODIUM 250 MG: 100 INJECTION, SOLUTION INTRAVENOUS at 08:46

## 2022-01-01 RX ADMIN — INSULIN ASPART 3 UNITS: 100 INJECTION, SOLUTION INTRAVENOUS; SUBCUTANEOUS at 18:35

## 2022-01-01 RX ADMIN — VALPROATE SODIUM 500 MG: 100 INJECTION, SOLUTION INTRAVENOUS at 06:58

## 2022-01-01 RX ADMIN — INSULIN ASPART 2 UNITS: 100 INJECTION, SOLUTION INTRAVENOUS; SUBCUTANEOUS at 10:23

## 2022-01-01 RX ADMIN — OLANZAPINE 5 MG: 10 INJECTION, POWDER, FOR SOLUTION INTRAMUSCULAR at 18:06

## 2022-01-01 RX ADMIN — VALPROATE SODIUM 1000 MG: 100 INJECTION, SOLUTION INTRAVENOUS at 21:24

## 2022-01-01 RX ADMIN — INSULIN ASPART 6 UNITS: 100 INJECTION, SOLUTION INTRAVENOUS; SUBCUTANEOUS at 15:10

## 2022-01-01 RX ADMIN — CITALOPRAM HYDROBROMIDE 10 MG: 10 TABLET ORAL at 10:15

## 2022-01-01 RX ADMIN — TAMSULOSIN HYDROCHLORIDE 0.4 MG: 0.4 CAPSULE ORAL at 09:42

## 2022-01-01 RX ADMIN — METOPROLOL SUCCINATE 12.5 MG: 25 TABLET, EXTENDED RELEASE ORAL at 21:52

## 2022-01-01 RX ADMIN — INSULIN ASPART 2 UNITS: 100 INJECTION, SOLUTION INTRAVENOUS; SUBCUTANEOUS at 17:10

## 2022-01-01 RX ADMIN — VALPROATE SODIUM 1000 MG: 100 INJECTION, SOLUTION INTRAVENOUS at 22:05

## 2022-01-01 RX ADMIN — RISPERIDONE 0.5 MG: 0.5 TABLET, ORALLY DISINTEGRATING ORAL at 21:39

## 2022-01-01 RX ADMIN — RISPERIDONE 0.5 MG: 0.5 TABLET, ORALLY DISINTEGRATING ORAL at 08:07

## 2022-01-01 RX ADMIN — ACETAMINOPHEN 650 MG: 325 TABLET ORAL at 09:41

## 2022-01-01 RX ADMIN — DEXAMETHASONE 6 MG: 2 TABLET ORAL at 12:29

## 2022-01-01 RX ADMIN — DIVALPROEX SODIUM 750 MG: 125 CAPSULE, COATED PELLETS ORAL at 21:28

## 2022-01-01 RX ADMIN — ACETAMINOPHEN 650 MG: 325 TABLET ORAL at 08:27

## 2022-01-01 RX ADMIN — OLANZAPINE 5 MG: 10 INJECTION, POWDER, FOR SOLUTION INTRAMUSCULAR at 03:19

## 2022-01-01 RX ADMIN — ISOSORBIDE MONONITRATE 30 MG: 30 TABLET, EXTENDED RELEASE ORAL at 11:09

## 2022-01-01 RX ADMIN — OLANZAPINE 2.5 MG: 2.5 TABLET, FILM COATED ORAL at 02:05

## 2022-01-01 RX ADMIN — Medication 25 MCG: at 09:31

## 2022-01-01 RX ADMIN — RIVASTIGMINE 1 PATCH: 4.6 PATCH TRANSDERMAL at 10:48

## 2022-01-01 RX ADMIN — METOPROLOL SUCCINATE 12.5 MG: 25 TABLET, EXTENDED RELEASE ORAL at 21:39

## 2022-01-01 RX ADMIN — RISPERIDONE 0.5 MG: 0.5 TABLET, ORALLY DISINTEGRATING ORAL at 03:59

## 2022-01-01 RX ADMIN — INSULIN ASPART 3 UNITS: 100 INJECTION, SOLUTION INTRAVENOUS; SUBCUTANEOUS at 08:09

## 2022-01-01 RX ADMIN — CITALOPRAM HYDROBROMIDE 20 MG: 20 TABLET ORAL at 08:59

## 2022-01-01 RX ADMIN — RISPERIDONE 0.5 MG: 0.5 TABLET, ORALLY DISINTEGRATING ORAL at 21:24

## 2022-01-01 RX ADMIN — QUETIAPINE FUMARATE 25 MG: 25 TABLET ORAL at 21:04

## 2022-01-01 RX ADMIN — VALPROATE SODIUM 250 MG: 100 INJECTION, SOLUTION INTRAVENOUS at 10:59

## 2022-01-01 RX ADMIN — ASPIRIN 81 MG: 81 TABLET, COATED ORAL at 18:52

## 2022-01-01 RX ADMIN — INSULIN ASPART 2 UNITS: 100 INJECTION, SOLUTION INTRAVENOUS; SUBCUTANEOUS at 17:55

## 2022-01-01 RX ADMIN — RISPERIDONE 0.5 MG: 0.5 TABLET, ORALLY DISINTEGRATING ORAL at 21:31

## 2022-01-01 RX ADMIN — MICONAZOLE NITRATE: 20 POWDER TOPICAL at 21:40

## 2022-01-01 RX ADMIN — QUETIAPINE FUMARATE 25 MG: 25 TABLET ORAL at 21:47

## 2022-01-01 RX ADMIN — DIVALPROEX SODIUM 750 MG: 125 CAPSULE, COATED PELLETS ORAL at 13:53

## 2022-01-01 RX ADMIN — WATER: 1 INJECTION INTRAMUSCULAR; INTRAVENOUS; SUBCUTANEOUS at 02:01

## 2022-01-01 RX ADMIN — INSULIN DETEMIR 10 UNITS: 100 INJECTION, SOLUTION SUBCUTANEOUS at 22:04

## 2022-01-01 RX ADMIN — QUETIAPINE FUMARATE 25 MG: 25 TABLET ORAL at 22:36

## 2022-01-01 RX ADMIN — ACETAMINOPHEN 650 MG: 325 TABLET ORAL at 00:12

## 2022-01-01 RX ADMIN — INSULIN ASPART 1 UNITS: 100 INJECTION, SOLUTION INTRAVENOUS; SUBCUTANEOUS at 10:23

## 2022-01-01 RX ADMIN — INSULIN ASPART 1 UNITS: 100 INJECTION, SOLUTION INTRAVENOUS; SUBCUTANEOUS at 08:05

## 2022-01-01 RX ADMIN — DIVALPROEX SODIUM 750 MG: 125 CAPSULE, COATED PELLETS ORAL at 14:22

## 2022-01-01 RX ADMIN — RISPERIDONE 1 MG: 0.5 TABLET, ORALLY DISINTEGRATING ORAL at 09:08

## 2022-01-01 RX ADMIN — CITALOPRAM HYDROBROMIDE 10 MG: 10 TABLET ORAL at 11:19

## 2022-01-01 RX ADMIN — OLANZAPINE 5 MG: 10 INJECTION, POWDER, FOR SOLUTION INTRAMUSCULAR at 14:59

## 2022-01-01 RX ADMIN — INSULIN ASPART 4 UNITS: 100 INJECTION, SOLUTION INTRAVENOUS; SUBCUTANEOUS at 12:45

## 2022-01-01 RX ADMIN — FLUTICASONE PROPIONATE 1 SPRAY: 50 SPRAY, METERED NASAL at 21:35

## 2022-01-01 RX ADMIN — VALPROATE SODIUM 750 MG: 100 INJECTION, SOLUTION INTRAVENOUS at 13:36

## 2022-01-01 RX ADMIN — OLANZAPINE 5 MG: 5 TABLET, FILM COATED ORAL at 12:18

## 2022-01-01 RX ADMIN — WATER 10 ML: 1 INJECTION INTRAMUSCULAR; INTRAVENOUS; SUBCUTANEOUS at 18:06

## 2022-01-01 RX ADMIN — ATORVASTATIN CALCIUM 80 MG: 80 TABLET, FILM COATED ORAL at 11:09

## 2022-01-01 RX ADMIN — RISPERIDONE 1 MG: 0.5 TABLET, ORALLY DISINTEGRATING ORAL at 08:00

## 2022-01-01 RX ADMIN — RISPERIDONE 1 MG: 0.5 TABLET, ORALLY DISINTEGRATING ORAL at 08:19

## 2022-01-01 RX ADMIN — INSULIN ASPART 3 UNITS: 100 INJECTION, SOLUTION INTRAVENOUS; SUBCUTANEOUS at 18:16

## 2022-01-01 RX ADMIN — RISPERIDONE 0.5 MG: 0.5 TABLET, ORALLY DISINTEGRATING ORAL at 08:37

## 2022-01-01 RX ADMIN — DIVALPROEX SODIUM 750 MG: 125 CAPSULE, COATED PELLETS ORAL at 14:01

## 2022-01-01 RX ADMIN — POLYETHYLENE GLYCOL 3350 17 G: 17 POWDER, FOR SOLUTION ORAL at 09:35

## 2022-01-01 RX ADMIN — DIVALPROEX SODIUM 750 MG: 125 CAPSULE, COATED PELLETS ORAL at 13:51

## 2022-01-01 RX ADMIN — MICONAZOLE NITRATE: 20 POWDER TOPICAL at 09:12

## 2022-01-01 RX ADMIN — OLANZAPINE 5 MG: 10 INJECTION, POWDER, FOR SOLUTION INTRAMUSCULAR at 14:15

## 2022-01-01 RX ADMIN — ACETAMINOPHEN 650 MG: 325 SUSPENSION ORAL at 08:01

## 2022-01-01 RX ADMIN — MELATONIN 5 MG TABLET 10 MG: at 20:25

## 2022-01-01 RX ADMIN — Medication 12.5 MG: at 06:16

## 2022-01-01 RX ADMIN — MELATONIN 5 MG TABLET 10 MG: at 20:17

## 2022-01-01 RX ADMIN — RISPERIDONE 0.5 MG: 0.5 TABLET, ORALLY DISINTEGRATING ORAL at 14:32

## 2022-01-01 RX ADMIN — ACETAMINOPHEN 650 MG: 325 TABLET ORAL at 11:51

## 2022-01-01 RX ADMIN — POLYETHYLENE GLYCOL 3350 17 G: 17 POWDER, FOR SOLUTION ORAL at 08:36

## 2022-01-01 RX ADMIN — FLUTICASONE PROPIONATE 1 SPRAY: 50 SPRAY, METERED NASAL at 21:54

## 2022-01-01 RX ADMIN — Medication 25 MCG: at 08:05

## 2022-01-01 RX ADMIN — Medication 1 MG: at 21:36

## 2022-01-01 RX ADMIN — RIVASTIGMINE 1 PATCH: 4.6 PATCH TRANSDERMAL at 08:16

## 2022-01-01 RX ADMIN — OLANZAPINE 10 MG: 10 INJECTION, POWDER, FOR SOLUTION INTRAMUSCULAR at 14:44

## 2022-01-01 RX ADMIN — RIVASTIGMINE 1 PATCH: 4.6 PATCH TRANSDERMAL at 08:55

## 2022-01-01 RX ADMIN — INSULIN ASPART 1 UNITS: 100 INJECTION, SOLUTION INTRAVENOUS; SUBCUTANEOUS at 17:24

## 2022-01-01 RX ADMIN — RIVASTIGMINE TRANSDERMAL SYSTEM 1 PATCH: 4.6 PATCH, EXTENDED RELEASE TRANSDERMAL at 09:11

## 2022-01-01 RX ADMIN — METOPROLOL SUCCINATE 12.5 MG: 25 TABLET, EXTENDED RELEASE ORAL at 20:37

## 2022-01-01 RX ADMIN — TAMSULOSIN HYDROCHLORIDE 0.4 MG: 0.4 CAPSULE ORAL at 11:04

## 2022-01-01 RX ADMIN — ASPIRIN 81 MG: 81 TABLET, COATED ORAL at 08:17

## 2022-01-01 RX ADMIN — RISPERIDONE 1 MG: 0.5 TABLET, ORALLY DISINTEGRATING ORAL at 09:12

## 2022-01-01 RX ADMIN — ACETAMINOPHEN 650 MG: 325 TABLET ORAL at 10:46

## 2022-01-01 RX ADMIN — INSULIN DETEMIR 10 UNITS: 100 INJECTION, SOLUTION SUBCUTANEOUS at 21:25

## 2022-01-01 RX ADMIN — VALPROATE SODIUM 1000 MG: 100 INJECTION, SOLUTION INTRAVENOUS at 21:45

## 2022-01-01 RX ADMIN — DIVALPROEX SODIUM 750 MG: 125 CAPSULE, COATED PELLETS ORAL at 13:46

## 2022-01-01 RX ADMIN — INSULIN GLARGINE 8 UNITS: 100 INJECTION, SOLUTION SUBCUTANEOUS at 22:00

## 2022-01-01 RX ADMIN — HUMAN ALBUMIN MICROSPHERES AND PERFLUTREN 9 ML: 10; .22 INJECTION, SOLUTION INTRAVENOUS at 10:31

## 2022-01-01 RX ADMIN — METOPROLOL SUCCINATE 12.5 MG: 25 TABLET, EXTENDED RELEASE ORAL at 00:40

## 2022-01-01 RX ADMIN — METOPROLOL SUCCINATE 12.5 MG: 25 TABLET, EXTENDED RELEASE ORAL at 21:51

## 2022-01-01 RX ADMIN — FLUTICASONE PROPIONATE 1 SPRAY: 50 SPRAY, METERED NASAL at 10:16

## 2022-01-01 RX ADMIN — VALPROATE SODIUM 250 MG: 100 INJECTION, SOLUTION INTRAVENOUS at 12:07

## 2022-01-01 RX ADMIN — POLYETHYLENE GLYCOL 3350 17 G: 17 POWDER, FOR SOLUTION ORAL at 09:52

## 2022-01-01 RX ADMIN — FLUTICASONE PROPIONATE 1 SPRAY: 50 SPRAY, METERED NASAL at 09:32

## 2022-01-01 RX ADMIN — DIVALPROEX SODIUM 750 MG: 125 CAPSULE, COATED PELLETS ORAL at 21:53

## 2022-01-01 RX ADMIN — RIVASTIGMINE 1 PATCH: 4.6 PATCH TRANSDERMAL at 08:51

## 2022-01-01 RX ADMIN — METOPROLOL SUCCINATE 25 MG: 25 TABLET, EXTENDED RELEASE ORAL at 01:32

## 2022-01-01 RX ADMIN — TAMSULOSIN HYDROCHLORIDE 0.4 MG: 0.4 CAPSULE ORAL at 18:01

## 2022-01-01 RX ADMIN — RIVASTIGMINE 1 PATCH: 4.6 PATCH TRANSDERMAL at 08:06

## 2022-01-01 RX ADMIN — DIVALPROEX SODIUM 750 MG: 125 CAPSULE, COATED PELLETS ORAL at 21:05

## 2022-01-01 RX ADMIN — CITALOPRAM HYDROBROMIDE 10 MG: 10 TABLET ORAL at 11:04

## 2022-01-01 RX ADMIN — INSULIN ASPART 2 UNITS: 100 INJECTION, SOLUTION INTRAVENOUS; SUBCUTANEOUS at 07:47

## 2022-01-01 RX ADMIN — RIVASTIGMINE 1 PATCH: 4.6 PATCH TRANSDERMAL at 07:54

## 2022-01-01 RX ADMIN — LOSARTAN POTASSIUM 25 MG: 25 TABLET, FILM COATED ORAL at 08:36

## 2022-01-01 RX ADMIN — OLANZAPINE 2.5 MG: 2.5 TABLET, FILM COATED ORAL at 00:39

## 2022-01-01 RX ADMIN — VALPROATE SODIUM 500 MG: 100 INJECTION, SOLUTION INTRAVENOUS at 22:15

## 2022-01-01 RX ADMIN — INSULIN ASPART 3 UNITS: 100 INJECTION, SOLUTION INTRAVENOUS; SUBCUTANEOUS at 10:14

## 2022-01-01 RX ADMIN — REMDESIVIR 100 MG: 100 INJECTION, POWDER, LYOPHILIZED, FOR SOLUTION INTRAVENOUS at 00:23

## 2022-01-01 RX ADMIN — MELATONIN 5 MG TABLET 10 MG: at 20:51

## 2022-01-01 RX ADMIN — ACETAMINOPHEN 650 MG: 325 TABLET ORAL at 18:06

## 2022-01-01 RX ADMIN — METOPROLOL SUCCINATE 12.5 MG: 25 TABLET, EXTENDED RELEASE ORAL at 21:25

## 2022-01-01 RX ADMIN — INSULIN ASPART 1 UNITS: 100 INJECTION, SOLUTION INTRAVENOUS; SUBCUTANEOUS at 13:50

## 2022-01-01 RX ADMIN — ASPIRIN 81 MG: 81 TABLET, COATED ORAL at 10:14

## 2022-01-01 RX ADMIN — INSULIN ASPART 3 UNITS: 100 INJECTION, SOLUTION INTRAVENOUS; SUBCUTANEOUS at 17:54

## 2022-01-01 RX ADMIN — Medication 12.5 MG: at 03:17

## 2022-01-01 RX ADMIN — SODIUM CHLORIDE 97 ML: 9 INJECTION, SOLUTION INTRAVENOUS at 00:34

## 2022-01-01 RX ADMIN — HEPARIN SODIUM 950 UNITS/HR: 1000 INJECTION INTRAVENOUS; SUBCUTANEOUS at 01:05

## 2022-01-01 RX ADMIN — OLANZAPINE 2.5 MG: 5 TABLET, ORALLY DISINTEGRATING ORAL at 20:49

## 2022-01-01 RX ADMIN — WATER 10 ML: 1 INJECTION INTRAMUSCULAR; INTRAVENOUS; SUBCUTANEOUS at 20:09

## 2022-01-01 RX ADMIN — CITALOPRAM HYDROBROMIDE 10 MG: 10 TABLET ORAL at 08:05

## 2022-01-01 RX ADMIN — CHLORPROMAZINE HYDROCHLORIDE 50 MG: 50 TABLET, FILM COATED ORAL at 21:36

## 2022-01-01 RX ADMIN — RIVASTIGMINE 1 PATCH: 4.6 PATCH TRANSDERMAL at 10:36

## 2022-01-01 RX ADMIN — Medication 12.5 MG: at 14:37

## 2022-01-01 RX ADMIN — QUETIAPINE FUMARATE 25 MG: 25 TABLET ORAL at 22:09

## 2022-01-01 RX ADMIN — TAMSULOSIN HYDROCHLORIDE 0.4 MG: 0.4 CAPSULE ORAL at 08:55

## 2022-01-01 RX ADMIN — OLANZAPINE 5 MG: 5 TABLET, ORALLY DISINTEGRATING ORAL at 20:48

## 2022-01-01 RX ADMIN — SENNOSIDES AND DOCUSATE SODIUM 1 TABLET: 50; 8.6 TABLET ORAL at 15:08

## 2022-01-01 RX ADMIN — CLOPIDOGREL BISULFATE 75 MG: 75 TABLET ORAL at 09:31

## 2022-01-01 RX ADMIN — OLANZAPINE 5 MG: 5 TABLET, ORALLY DISINTEGRATING ORAL at 21:44

## 2022-01-01 RX ADMIN — RISPERIDONE 1 MG: 0.5 TABLET, ORALLY DISINTEGRATING ORAL at 09:37

## 2022-01-01 RX ADMIN — RIVASTIGMINE 1 PATCH: 4.6 PATCH TRANSDERMAL at 10:32

## 2022-01-01 RX ADMIN — METOPROLOL SUCCINATE 12.5 MG: 25 TABLET, EXTENDED RELEASE ORAL at 21:38

## 2022-01-01 RX ADMIN — CHLORPROMAZINE HYDROCHLORIDE 50 MG: 50 TABLET, FILM COATED ORAL at 18:01

## 2022-01-01 RX ADMIN — INSULIN ASPART 4 UNITS: 100 INJECTION, SOLUTION INTRAVENOUS; SUBCUTANEOUS at 13:59

## 2022-01-01 RX ADMIN — RIVASTIGMINE 1 PATCH: 4.6 PATCH TRANSDERMAL at 11:19

## 2022-01-01 RX ADMIN — Medication 25 MCG: at 11:07

## 2022-01-01 RX ADMIN — RISPERIDONE 0.5 MG: 0.5 TABLET, ORALLY DISINTEGRATING ORAL at 19:44

## 2022-01-01 RX ADMIN — ACETAMINOPHEN 650 MG: 325 TABLET ORAL at 08:01

## 2022-01-01 RX ADMIN — CHLORPROMAZINE HYDROCHLORIDE 50 MG: 50 TABLET, FILM COATED ORAL at 07:57

## 2022-01-01 RX ADMIN — CHLORPROMAZINE HYDROCHLORIDE 50 MG: 50 TABLET, FILM COATED ORAL at 15:55

## 2022-01-01 RX ADMIN — SODIUM CHLORIDE: 9 INJECTION, SOLUTION INTRAVENOUS at 08:31

## 2022-01-01 RX ADMIN — VALPROATE SODIUM 250 MG: 100 INJECTION, SOLUTION INTRAVENOUS at 10:47

## 2022-01-01 RX ADMIN — INSULIN DETEMIR 10 UNITS: 100 INJECTION, SOLUTION SUBCUTANEOUS at 20:50

## 2022-01-01 RX ADMIN — QUETIAPINE FUMARATE 25 MG: 25 TABLET ORAL at 21:42

## 2022-01-01 RX ADMIN — RISPERIDONE 0.5 MG: 0.5 TABLET, ORALLY DISINTEGRATING ORAL at 21:04

## 2022-01-01 RX ADMIN — DIVALPROEX SODIUM 500 MG: 125 CAPSULE, COATED PELLETS ORAL at 08:16

## 2022-01-01 RX ADMIN — METOPROLOL SUCCINATE 12.5 MG: 25 TABLET, EXTENDED RELEASE ORAL at 22:07

## 2022-01-01 RX ADMIN — QUETIAPINE FUMARATE 25 MG: 25 TABLET ORAL at 21:24

## 2022-01-01 RX ADMIN — METOPROLOL TARTRATE 12.5 MG: 25 TABLET, FILM COATED ORAL at 20:51

## 2022-01-01 RX ADMIN — ACETAMINOPHEN 650 MG: 325 TABLET ORAL at 09:39

## 2022-01-01 RX ADMIN — RISPERIDONE 0.5 MG: 0.5 TABLET, ORALLY DISINTEGRATING ORAL at 12:56

## 2022-01-01 RX ADMIN — INSULIN ASPART 3 UNITS: 100 INJECTION, SOLUTION INTRAVENOUS; SUBCUTANEOUS at 18:07

## 2022-01-01 RX ADMIN — VALPROATE SODIUM 250 MG: 100 INJECTION, SOLUTION INTRAVENOUS at 01:26

## 2022-01-01 RX ADMIN — OLANZAPINE 5 MG: 5 TABLET, ORALLY DISINTEGRATING ORAL at 22:57

## 2022-01-01 RX ADMIN — POTASSIUM CHLORIDE 10 MEQ: 7.46 INJECTION, SOLUTION INTRAVENOUS at 17:39

## 2022-01-01 RX ADMIN — CHLORPROMAZINE HYDROCHLORIDE 50 MG: 50 TABLET, FILM COATED ORAL at 14:22

## 2022-01-01 RX ADMIN — Medication 12.5 MG: at 20:04

## 2022-01-01 RX ADMIN — ASPIRIN 81 MG: 81 TABLET, COATED ORAL at 11:00

## 2022-01-01 RX ADMIN — MELATONIN 5 MG TABLET 10 MG: at 19:34

## 2022-01-01 RX ADMIN — POLYETHYLENE GLYCOL 3350 17 G: 17 POWDER, FOR SOLUTION ORAL at 08:45

## 2022-01-01 RX ADMIN — VALPROATE SODIUM 250 MG: 100 INJECTION, SOLUTION INTRAVENOUS at 11:02

## 2022-01-01 RX ADMIN — DIVALPROEX SODIUM 750 MG: 125 CAPSULE, COATED PELLETS ORAL at 15:55

## 2022-01-01 RX ADMIN — CITALOPRAM HYDROBROMIDE 10 MG: 10 TABLET ORAL at 08:44

## 2022-01-01 RX ADMIN — VALPROATE SODIUM 250 MG: 100 INJECTION, SOLUTION INTRAVENOUS at 12:15

## 2022-01-01 RX ADMIN — REMDESIVIR 100 MG: 100 INJECTION, POWDER, LYOPHILIZED, FOR SOLUTION INTRAVENOUS at 00:18

## 2022-01-01 RX ADMIN — DIVALPROEX SODIUM 750 MG: 125 CAPSULE, COATED PELLETS ORAL at 21:44

## 2022-01-01 RX ADMIN — RISPERIDONE 0.5 MG: 0.5 TABLET, ORALLY DISINTEGRATING ORAL at 13:51

## 2022-01-01 RX ADMIN — RISPERIDONE 0.5 MG: 0.5 TABLET, ORALLY DISINTEGRATING ORAL at 08:17

## 2022-01-01 RX ADMIN — INSULIN ASPART 6 UNITS: 100 INJECTION, SOLUTION INTRAVENOUS; SUBCUTANEOUS at 10:16

## 2022-01-01 RX ADMIN — MORPHINE SULFATE 2.5 MG: 10 SOLUTION ORAL at 20:52

## 2022-01-01 RX ADMIN — CITALOPRAM HYDROBROMIDE 20 MG: 20 TABLET ORAL at 12:18

## 2022-01-01 RX ADMIN — VALPROATE SODIUM 750 MG: 100 INJECTION, SOLUTION INTRAVENOUS at 21:35

## 2022-01-01 RX ADMIN — RISPERIDONE 1 MG: 0.5 TABLET, ORALLY DISINTEGRATING ORAL at 11:45

## 2022-01-01 RX ADMIN — INSULIN ASPART 2 UNITS: 100 INJECTION, SOLUTION INTRAVENOUS; SUBCUTANEOUS at 12:27

## 2022-01-01 RX ADMIN — INSULIN GLARGINE 22 UNITS: 100 INJECTION, SOLUTION SUBCUTANEOUS at 21:25

## 2022-01-01 RX ADMIN — METOPROLOL SUCCINATE 12.5 MG: 25 TABLET, EXTENDED RELEASE ORAL at 22:06

## 2022-01-01 RX ADMIN — WATER 10 ML: 1 INJECTION INTRAMUSCULAR; INTRAVENOUS; SUBCUTANEOUS at 11:53

## 2022-01-01 RX ADMIN — METOPROLOL SUCCINATE 25 MG: 25 TABLET, EXTENDED RELEASE ORAL at 21:11

## 2022-01-01 RX ADMIN — INSULIN ASPART 2 UNITS: 100 INJECTION, SOLUTION INTRAVENOUS; SUBCUTANEOUS at 13:40

## 2022-01-01 RX ADMIN — CHLORPROMAZINE HYDROCHLORIDE 50 MG: 50 TABLET, FILM COATED ORAL at 05:37

## 2022-01-01 RX ADMIN — INSULIN ASPART 2 UNITS: 100 INJECTION, SOLUTION INTRAVENOUS; SUBCUTANEOUS at 12:04

## 2022-01-01 RX ADMIN — VALPROATE SODIUM 500 MG: 100 INJECTION, SOLUTION INTRAVENOUS at 21:24

## 2022-01-01 RX ADMIN — RISPERIDONE 0.5 MG: 0.5 TABLET, ORALLY DISINTEGRATING ORAL at 21:53

## 2022-01-01 RX ADMIN — VALPROATE SODIUM 250 MG: 100 INJECTION, SOLUTION INTRAVENOUS at 16:12

## 2022-01-01 RX ADMIN — ACETAMINOPHEN 650 MG: 325 TABLET ORAL at 19:51

## 2022-01-01 RX ADMIN — FLUTICASONE PROPIONATE 1 SPRAY: 50 SPRAY, METERED NASAL at 21:24

## 2022-01-01 RX ADMIN — VALPROATE SODIUM 250 MG: 100 INJECTION, SOLUTION INTRAVENOUS at 17:48

## 2022-01-01 RX ADMIN — Medication 12.5 MG: at 16:25

## 2022-01-01 RX ADMIN — OLANZAPINE 5 MG: 5 TABLET, FILM COATED ORAL at 22:15

## 2022-01-01 RX ADMIN — ACETAMINOPHEN 650 MG: 325 TABLET ORAL at 22:00

## 2022-01-01 RX ADMIN — INSULIN DETEMIR 10 UNITS: 100 INJECTION, SOLUTION SUBCUTANEOUS at 00:54

## 2022-01-01 RX ADMIN — CLOPIDOGREL BISULFATE 75 MG: 75 TABLET ORAL at 08:34

## 2022-01-01 RX ADMIN — OLANZAPINE 5 MG: 10 INJECTION, POWDER, FOR SOLUTION INTRAMUSCULAR at 16:51

## 2022-01-01 RX ADMIN — OLANZAPINE 5 MG: 10 INJECTION, POWDER, FOR SOLUTION INTRAMUSCULAR at 15:55

## 2022-01-01 RX ADMIN — ACETAMINOPHEN 650 MG: 325 TABLET ORAL at 16:35

## 2022-01-01 RX ADMIN — RISPERIDONE 0.5 MG: 0.5 TABLET, ORALLY DISINTEGRATING ORAL at 03:46

## 2022-01-01 RX ADMIN — DIVALPROEX SODIUM 375 MG: 125 CAPSULE, COATED PELLETS ORAL at 09:30

## 2022-01-01 RX ADMIN — ACETAMINOPHEN 1000 MG: 500 TABLET, FILM COATED ORAL at 11:00

## 2022-01-01 RX ADMIN — POLYETHYLENE GLYCOL 3350 17 G: 17 POWDER, FOR SOLUTION ORAL at 08:20

## 2022-01-01 RX ADMIN — SODIUM CHLORIDE 1000 ML: 9 INJECTION, SOLUTION INTRAVENOUS at 11:40

## 2022-01-01 RX ADMIN — METOPROLOL SUCCINATE 12.5 MG: 25 TABLET, EXTENDED RELEASE ORAL at 21:06

## 2022-01-01 RX ADMIN — VALPROATE SODIUM 500 MG: 100 INJECTION, SOLUTION INTRAVENOUS at 06:27

## 2022-01-01 RX ADMIN — INSULIN ASPART 3 UNITS: 100 INJECTION, SOLUTION INTRAVENOUS; SUBCUTANEOUS at 09:40

## 2022-01-01 RX ADMIN — CITALOPRAM HYDROBROMIDE 20 MG: 20 TABLET ORAL at 07:48

## 2022-01-01 RX ADMIN — TAMSULOSIN HYDROCHLORIDE 0.4 MG: 0.4 CAPSULE ORAL at 08:07

## 2022-01-01 RX ADMIN — LOSARTAN POTASSIUM 25 MG: 25 TABLET, FILM COATED ORAL at 08:05

## 2022-01-01 RX ADMIN — ACETAMINOPHEN 650 MG: 325 TABLET, FILM COATED ORAL at 00:55

## 2022-01-01 RX ADMIN — RISPERIDONE 1 MG: 0.5 TABLET, ORALLY DISINTEGRATING ORAL at 08:16

## 2022-01-01 RX ADMIN — RISPERIDONE 0.5 MG: 0.5 TABLET, ORALLY DISINTEGRATING ORAL at 09:35

## 2022-01-01 RX ADMIN — INSULIN ASPART 4 UNITS: 100 INJECTION, SOLUTION INTRAVENOUS; SUBCUTANEOUS at 13:48

## 2022-01-01 RX ADMIN — ATORVASTATIN CALCIUM 80 MG: 40 TABLET, FILM COATED ORAL at 19:21

## 2022-01-01 RX ADMIN — DIVALPROEX SODIUM 375 MG: 125 CAPSULE, COATED PELLETS ORAL at 21:20

## 2022-01-01 RX ADMIN — RISPERIDONE 0.5 MG: 0.5 TABLET, ORALLY DISINTEGRATING ORAL at 21:06

## 2022-01-01 RX ADMIN — TAMSULOSIN HYDROCHLORIDE 0.4 MG: 0.4 CAPSULE ORAL at 07:53

## 2022-01-01 RX ADMIN — INSULIN ASPART 1 UNITS: 100 INJECTION, SOLUTION INTRAVENOUS; SUBCUTANEOUS at 12:15

## 2022-01-01 RX ADMIN — CHLORPROMAZINE HYDROCHLORIDE 50 MG: 50 TABLET, FILM COATED ORAL at 09:03

## 2022-01-01 RX ADMIN — METOPROLOL SUCCINATE 12.5 MG: 25 TABLET, EXTENDED RELEASE ORAL at 21:24

## 2022-01-01 RX ADMIN — VALPROATE SODIUM 250 MG: 100 INJECTION, SOLUTION INTRAVENOUS at 13:16

## 2022-01-01 RX ADMIN — RIVASTIGMINE TRANSDERMAL SYSTEM 1 PATCH: 4.6 PATCH, EXTENDED RELEASE TRANSDERMAL at 09:00

## 2022-01-01 RX ADMIN — WATER: 1 INJECTION INTRAMUSCULAR; INTRAVENOUS; SUBCUTANEOUS at 12:29

## 2022-01-01 RX ADMIN — TAMSULOSIN HYDROCHLORIDE 0.4 MG: 0.4 CAPSULE ORAL at 10:46

## 2022-01-01 RX ADMIN — RIVASTIGMINE 1 PATCH: 4.6 PATCH TRANSDERMAL at 09:38

## 2022-01-01 RX ADMIN — METOPROLOL SUCCINATE 25 MG: 25 TABLET, EXTENDED RELEASE ORAL at 00:54

## 2022-01-01 RX ADMIN — ACETAMINOPHEN 650 MG: 325 TABLET ORAL at 18:24

## 2022-01-01 RX ADMIN — VALPROATE SODIUM 500 MG: 100 INJECTION, SOLUTION INTRAVENOUS at 22:06

## 2022-01-01 RX ADMIN — ASPIRIN 81 MG: 81 TABLET, COATED ORAL at 09:31

## 2022-01-01 RX ADMIN — ATORVASTATIN CALCIUM 80 MG: 40 TABLET, FILM COATED ORAL at 20:03

## 2022-01-01 RX ADMIN — DIVALPROEX SODIUM 500 MG: 125 CAPSULE, COATED PELLETS ORAL at 08:01

## 2022-01-01 RX ADMIN — CHLORPROMAZINE HYDROCHLORIDE 50 MG: 50 TABLET, FILM COATED ORAL at 16:35

## 2022-01-01 RX ADMIN — ACETAMINOPHEN 650 MG: 325 TABLET ORAL at 18:28

## 2022-01-01 RX ADMIN — SODIUM CHLORIDE 50 ML: 9 INJECTION, SOLUTION INTRAVENOUS at 03:44

## 2022-01-01 RX ADMIN — VALPROATE SODIUM 250 MG: 100 INJECTION, SOLUTION INTRAVENOUS at 09:48

## 2022-01-01 RX ADMIN — RIVASTIGMINE 1 PATCH: 4.6 PATCH TRANSDERMAL at 08:05

## 2022-01-01 RX ADMIN — INSULIN ASPART 2 UNITS: 100 INJECTION, SOLUTION INTRAVENOUS; SUBCUTANEOUS at 17:05

## 2022-01-01 RX ADMIN — ASPIRIN 81 MG: 81 TABLET, COATED ORAL at 11:04

## 2022-01-01 RX ADMIN — METOPROLOL SUCCINATE 25 MG: 25 TABLET, EXTENDED RELEASE ORAL at 22:04

## 2022-01-01 RX ADMIN — RISPERIDONE 0.5 MG: 0.5 TABLET, ORALLY DISINTEGRATING ORAL at 10:15

## 2022-01-01 RX ADMIN — QUETIAPINE 12.5 MG: 25 TABLET, FILM COATED ORAL at 17:51

## 2022-01-01 RX ADMIN — ACETAMINOPHEN 650 MG: 325 TABLET ORAL at 00:42

## 2022-01-01 RX ADMIN — LORAZEPAM 1 MG: 2 INJECTION INTRAMUSCULAR at 13:48

## 2022-01-01 RX ADMIN — VALPROATE SODIUM 250 MG: 100 INJECTION, SOLUTION INTRAVENOUS at 09:25

## 2022-01-01 RX ADMIN — OLANZAPINE 5 MG: 10 INJECTION, POWDER, FOR SOLUTION INTRAMUSCULAR at 12:55

## 2022-01-01 RX ADMIN — CITALOPRAM HYDROBROMIDE 10 MG: 10 TABLET ORAL at 08:54

## 2022-01-01 RX ADMIN — METFORMIN HYDROCHLORIDE 1000 MG: 500 TABLET ORAL at 08:44

## 2022-01-01 RX ADMIN — QUETIAPINE FUMARATE 25 MG: 25 TABLET ORAL at 21:08

## 2022-01-01 ASSESSMENT — ACTIVITIES OF DAILY LIVING (ADL)
ADLS_ACUITY_SCORE: 13
ADLS_ACUITY_SCORE: 53
ADLS_ACUITY_SCORE: 49
ADLS_ACUITY_SCORE: 12
ADLS_ACUITY_SCORE: 53
ADLS_ACUITY_SCORE: 53
ADLS_ACUITY_SCORE: 55
ADLS_ACUITY_SCORE: 47
ADLS_ACUITY_SCORE: 51
ADLS_ACUITY_SCORE: 51
ADLS_ACUITY_SCORE: 47
ADLS_ACUITY_SCORE: 55
ADLS_ACUITY_SCORE: 53
ADLS_ACUITY_SCORE: 14
ADLS_ACUITY_SCORE: 50
ADLS_ACUITY_SCORE: 45
ADLS_ACUITY_SCORE: 55
ADLS_ACUITY_SCORE: 43
ADLS_ACUITY_SCORE: 51
ADLS_ACUITY_SCORE: 53
ADLS_ACUITY_SCORE: 23
ADLS_ACUITY_SCORE: 51
ADLS_ACUITY_SCORE: 24
ADLS_ACUITY_SCORE: 51
ADLS_ACUITY_SCORE: 49
ADLS_ACUITY_SCORE: 26
ADLS_ACUITY_SCORE: 53
ADLS_ACUITY_SCORE: 12
ADLS_ACUITY_SCORE: 43
ADLS_ACUITY_SCORE: 53
ADLS_ACUITY_SCORE: 41
ADLS_ACUITY_SCORE: 44
ADLS_ACUITY_SCORE: 16
ADLS_ACUITY_SCORE: 44
ADLS_ACUITY_SCORE: 16
ADLS_ACUITY_SCORE: 16
ADLS_ACUITY_SCORE: 45
ADLS_ACUITY_SCORE: 14
ADLS_ACUITY_SCORE: 24
ADLS_ACUITY_SCORE: 16
ADLS_ACUITY_SCORE: 51
ADLS_ACUITY_SCORE: 53
ADLS_ACUITY_SCORE: 12
ADLS_ACUITY_SCORE: 15
ADLS_ACUITY_SCORE: 49
ADLS_ACUITY_SCORE: 41
EQUIPMENT_CURRENTLY_USED_AT_HOME: OTHER (SEE COMMENTS)
ADLS_ACUITY_SCORE: 23
ADLS_ACUITY_SCORE: 41
ADLS_ACUITY_SCORE: 41
ADLS_ACUITY_SCORE: 47
ADLS_ACUITY_SCORE: 55
ADLS_ACUITY_SCORE: 51
ADLS_ACUITY_SCORE: 12
ADLS_ACUITY_SCORE: 53
ADLS_ACUITY_SCORE: 51
ADLS_ACUITY_SCORE: 47
ADLS_ACUITY_SCORE: 41
ADLS_ACUITY_SCORE: 51
ADLS_ACUITY_SCORE: 16
ADLS_ACUITY_SCORE: 51
ADLS_ACUITY_SCORE: 16
ADLS_ACUITY_SCORE: 47
ADLS_ACUITY_SCORE: 53
ADLS_ACUITY_SCORE: 51
ADLS_ACUITY_SCORE: 51
ADLS_ACUITY_SCORE: 16
ADLS_ACUITY_SCORE: 45
ADLS_ACUITY_SCORE: 43
ADLS_ACUITY_SCORE: 50
ADLS_ACUITY_SCORE: 24
ADLS_ACUITY_SCORE: 53
ADLS_ACUITY_SCORE: 45
ADLS_ACUITY_SCORE: 12
ADLS_ACUITY_SCORE: 47
ADLS_ACUITY_SCORE: 24
ADLS_ACUITY_SCORE: 51
ADLS_ACUITY_SCORE: 55
ADLS_ACUITY_SCORE: 47
ADLS_ACUITY_SCORE: 51
ADLS_ACUITY_SCORE: 12
ADLS_ACUITY_SCORE: 14
ADLS_ACUITY_SCORE: 19
ADLS_ACUITY_SCORE: 55
ADLS_ACUITY_SCORE: 19
ADLS_ACUITY_SCORE: 55
ADLS_ACUITY_SCORE: 41
ADLS_ACUITY_SCORE: 51
ADLS_ACUITY_SCORE: 15
ADLS_ACUITY_SCORE: 16
ADLS_ACUITY_SCORE: 24
ADLS_ACUITY_SCORE: 47
ADLS_ACUITY_SCORE: 51
ADLS_ACUITY_SCORE: 50
ADLS_ACUITY_SCORE: 51
ADLS_ACUITY_SCORE: 53
ADLS_ACUITY_SCORE: 12
ADLS_ACUITY_SCORE: 53
ADLS_ACUITY_SCORE: 12
ADLS_ACUITY_SCORE: 41
ADLS_ACUITY_SCORE: 55
ADLS_ACUITY_SCORE: 13
ADLS_ACUITY_SCORE: 55
ADLS_ACUITY_SCORE: 51
ADLS_ACUITY_SCORE: 16
ADLS_ACUITY_SCORE: 15
ADLS_ACUITY_SCORE: 15
ADLS_ACUITY_SCORE: 55
ADLS_ACUITY_SCORE: 51
ADLS_ACUITY_SCORE: 47
ADLS_ACUITY_SCORE: 51
ADLS_ACUITY_SCORE: 43
ADLS_ACUITY_SCORE: 16
ADLS_ACUITY_SCORE: 49
ADLS_ACUITY_SCORE: 16
ADLS_ACUITY_SCORE: 49
CHANGE_IN_FUNCTIONAL_STATUS_SINCE_ONSET_OF_CURRENT_ILLNESS/INJURY: YES
ADLS_ACUITY_SCORE: 53
ADLS_ACUITY_SCORE: 49
ADLS_ACUITY_SCORE: 53
ADLS_ACUITY_SCORE: 45
ADLS_ACUITY_SCORE: 12
ADLS_ACUITY_SCORE: 13
ADLS_ACUITY_SCORE: 55
ADLS_ACUITY_SCORE: 16
ADLS_ACUITY_SCORE: 45
ADLS_ACUITY_SCORE: 53
ADLS_ACUITY_SCORE: 45
ADLS_ACUITY_SCORE: 49
ADLS_ACUITY_SCORE: 16
ADLS_ACUITY_SCORE: 53
ADLS_ACUITY_SCORE: 53
ADLS_ACUITY_SCORE: 43
CHANGE_IN_FUNCTIONAL_STATUS_SINCE_ONSET_OF_CURRENT_ILLNESS/INJURY: YES
ADLS_ACUITY_SCORE: 47
ADLS_ACUITY_SCORE: 44
ADLS_ACUITY_SCORE: 51
ADLS_ACUITY_SCORE: 45
ADLS_ACUITY_SCORE: 53
ADLS_ACUITY_SCORE: 45
ADLS_ACUITY_SCORE: 45
ADLS_ACUITY_SCORE: 12
ADLS_ACUITY_SCORE: 41
ADLS_ACUITY_SCORE: 53
ADLS_ACUITY_SCORE: 41
ADLS_ACUITY_SCORE: 45
ADLS_ACUITY_SCORE: 14
ADLS_ACUITY_SCORE: 45
ADLS_ACUITY_SCORE: 53
ADLS_ACUITY_SCORE: 55
DIFFICULTY_COMMUNICATING: NO
ADLS_ACUITY_SCORE: 35
ADLS_ACUITY_SCORE: 17
ADLS_ACUITY_SCORE: 47
ADLS_ACUITY_SCORE: 53
ADLS_ACUITY_SCORE: 46
ADLS_ACUITY_SCORE: 23
ADLS_ACUITY_SCORE: 53
ADLS_ACUITY_SCORE: 57
ADLS_ACUITY_SCORE: 53
ADLS_ACUITY_SCORE: 24
ADLS_ACUITY_SCORE: 14
ADLS_ACUITY_SCORE: 16
ADLS_ACUITY_SCORE: 45
ADLS_ACUITY_SCORE: 46
ADLS_ACUITY_SCORE: 45
ADLS_ACUITY_SCORE: 19
TOILETING_ISSUES: OTHER (SEE COMMENTS)
ADLS_ACUITY_SCORE: 47
ADLS_ACUITY_SCORE: 41
ADLS_ACUITY_SCORE: 14
ADLS_ACUITY_SCORE: 41
ADLS_ACUITY_SCORE: 47
ADLS_ACUITY_SCORE: 12
ADLS_ACUITY_SCORE: 45
ADLS_ACUITY_SCORE: 53
ADLS_ACUITY_SCORE: 15
ADLS_ACUITY_SCORE: 15
ADLS_ACUITY_SCORE: 47
ADLS_ACUITY_SCORE: 35
ADLS_ACUITY_SCORE: 53
ADLS_ACUITY_SCORE: 53
ADLS_ACUITY_SCORE: 47
ADLS_ACUITY_SCORE: 51
ADLS_ACUITY_SCORE: 48
ADLS_ACUITY_SCORE: 41
ADLS_ACUITY_SCORE: 53
DIFFICULTY_EATING/SWALLOWING: NO
ADLS_ACUITY_SCORE: 51
ADLS_ACUITY_SCORE: 47
ADLS_ACUITY_SCORE: 19
DIFFICULTY_EATING/SWALLOWING: NO
ADLS_ACUITY_SCORE: 47
ADLS_ACUITY_SCORE: 47
ADLS_ACUITY_SCORE: 44
ADLS_ACUITY_SCORE: 17
ADLS_ACUITY_SCORE: 16
ADLS_ACUITY_SCORE: 12
ADLS_ACUITY_SCORE: 51
ADLS_ACUITY_SCORE: 51
ADLS_ACUITY_SCORE: 16
ADLS_ACUITY_SCORE: 16
ADLS_ACUITY_SCORE: 47
ADLS_ACUITY_SCORE: 16
ADLS_ACUITY_SCORE: 17
ADLS_ACUITY_SCORE: 16
ADLS_ACUITY_SCORE: 48
ADLS_ACUITY_SCORE: 24
ADLS_ACUITY_SCORE: 51
ADLS_ACUITY_SCORE: 45
ADLS_ACUITY_SCORE: 49
ADLS_ACUITY_SCORE: 47
ADLS_ACUITY_SCORE: 51
ADLS_ACUITY_SCORE: 24
EQUIPMENT_CURRENTLY_USED_AT_HOME: WALKER, STANDARD
ADLS_ACUITY_SCORE: 23
ADLS_ACUITY_SCORE: 53
ADLS_ACUITY_SCORE: 52
ADLS_ACUITY_SCORE: 53
ADLS_ACUITY_SCORE: 43
ADLS_ACUITY_SCORE: 47
ADLS_ACUITY_SCORE: 47
ADLS_ACUITY_SCORE: 16
ADLS_ACUITY_SCORE: 12
ADLS_ACUITY_SCORE: 53
ADLS_ACUITY_SCORE: 47
TOILETING_ISSUES: NO
ADLS_ACUITY_SCORE: 17
ADLS_ACUITY_SCORE: 53
ADLS_ACUITY_SCORE: 23
ADLS_ACUITY_SCORE: 23
ADLS_ACUITY_SCORE: 53
ADLS_ACUITY_SCORE: 41
ADLS_ACUITY_SCORE: 43
ADLS_ACUITY_SCORE: 47
ADLS_ACUITY_SCORE: 47
ADLS_ACUITY_SCORE: 35
ADLS_ACUITY_SCORE: 51
ADLS_ACUITY_SCORE: 16
ADLS_ACUITY_SCORE: 16
ADLS_ACUITY_SCORE: 55
ADLS_ACUITY_SCORE: 53
FALL_HISTORY_WITHIN_LAST_SIX_MONTHS: NO
ADLS_ACUITY_SCORE: 49
ADLS_ACUITY_SCORE: 15
ADLS_ACUITY_SCORE: 47
ADLS_ACUITY_SCORE: 45
DRESSING/BATHING_DIFFICULTY: OTHER (SEE COMMENTS)
ADLS_ACUITY_SCORE: 14
ADLS_ACUITY_SCORE: 51
ADLS_ACUITY_SCORE: 12
ADLS_ACUITY_SCORE: 13
ADLS_ACUITY_SCORE: 45
ADLS_ACUITY_SCORE: 53
ADLS_ACUITY_SCORE: 50
ADLS_ACUITY_SCORE: 18
ADLS_ACUITY_SCORE: 14
ADLS_ACUITY_SCORE: 53
ADLS_ACUITY_SCORE: 51
ADLS_ACUITY_SCORE: 51
ADLS_ACUITY_SCORE: 16
ADLS_ACUITY_SCORE: 55
ADLS_ACUITY_SCORE: 47
ADLS_ACUITY_SCORE: 55
ADLS_ACUITY_SCORE: 47
ADLS_ACUITY_SCORE: 51
ADLS_ACUITY_SCORE: 47
ADLS_ACUITY_SCORE: 47
ADLS_ACUITY_SCORE: 19
ADLS_ACUITY_SCORE: 14
ADLS_ACUITY_SCORE: 51
ADLS_ACUITY_SCORE: 13
ADLS_ACUITY_SCORE: 47
ADLS_ACUITY_SCORE: 53
ADLS_ACUITY_SCORE: 16
ADLS_ACUITY_SCORE: 47
ADLS_ACUITY_SCORE: 53
ADLS_ACUITY_SCORE: 52
ADLS_ACUITY_SCORE: 12
ADLS_ACUITY_SCORE: 51
ADLS_ACUITY_SCORE: 47
ADLS_ACUITY_SCORE: 51
ADLS_ACUITY_SCORE: 12
ADLS_ACUITY_SCORE: 47
ADLS_ACUITY_SCORE: 47
ADLS_ACUITY_SCORE: 16
ADLS_ACUITY_SCORE: 15
ADLS_ACUITY_SCORE: 49
ADLS_ACUITY_SCORE: 53
ADLS_ACUITY_SCORE: 51
ADLS_ACUITY_SCORE: 53
ADLS_ACUITY_SCORE: 55
ADLS_ACUITY_SCORE: 51
ADLS_ACUITY_SCORE: 53
ADLS_ACUITY_SCORE: 12
ADLS_ACUITY_SCORE: 51
ADLS_ACUITY_SCORE: 15
ADLS_ACUITY_SCORE: 16
ADLS_ACUITY_SCORE: 49
ADLS_ACUITY_SCORE: 51
ADLS_ACUITY_SCORE: 51
ADLS_ACUITY_SCORE: 23
ADLS_ACUITY_SCORE: 45
DOING_ERRANDS_INDEPENDENTLY_DIFFICULTY: OTHER (SEE COMMENTS)
ADLS_ACUITY_SCORE: 27
ADLS_ACUITY_SCORE: 12
ADLS_ACUITY_SCORE: 16
ADLS_ACUITY_SCORE: 16
ADLS_ACUITY_SCORE: 47
ADLS_ACUITY_SCORE: 49
ADLS_ACUITY_SCORE: 51
ADLS_ACUITY_SCORE: 45
ADLS_ACUITY_SCORE: 53
ADLS_ACUITY_SCORE: 45
ADLS_ACUITY_SCORE: 52
ADLS_ACUITY_SCORE: 16
ADLS_ACUITY_SCORE: 51
ADLS_ACUITY_SCORE: 16
ADLS_ACUITY_SCORE: 55
ADLS_ACUITY_SCORE: 45
ADLS_ACUITY_SCORE: 43
ADLS_ACUITY_SCORE: 51
ADLS_ACUITY_SCORE: 45
ADLS_ACUITY_SCORE: 45
ADLS_ACUITY_SCORE: 51
ADLS_ACUITY_SCORE: 47
ADLS_ACUITY_SCORE: 53
ADLS_ACUITY_SCORE: 51
ADLS_ACUITY_SCORE: 47
ADLS_ACUITY_SCORE: 12
ADLS_ACUITY_SCORE: 41
ADLS_ACUITY_SCORE: 51
ADLS_ACUITY_SCORE: 51
ADLS_ACUITY_SCORE: 12
ADLS_ACUITY_SCORE: 15
ADLS_ACUITY_SCORE: 50
ADLS_ACUITY_SCORE: 43
ADLS_ACUITY_SCORE: 19
ADLS_ACUITY_SCORE: 41
ADLS_ACUITY_SCORE: 53
ADLS_ACUITY_SCORE: 53
ADLS_ACUITY_SCORE: 26
ADLS_ACUITY_SCORE: 13
ADLS_ACUITY_SCORE: 51
ADLS_ACUITY_SCORE: 45
ADLS_ACUITY_SCORE: 47
ADLS_ACUITY_SCORE: 50
ADLS_ACUITY_SCORE: 55
ADLS_ACUITY_SCORE: 49
ADLS_ACUITY_SCORE: 12
ADLS_ACUITY_SCORE: 16
ADLS_ACUITY_SCORE: 12
TOILETING_ISSUES: OTHER (SEE COMMENTS)
ADLS_ACUITY_SCORE: 15
ADLS_ACUITY_SCORE: 51
ADLS_ACUITY_SCORE: 50
ADLS_ACUITY_SCORE: 41
ADLS_ACUITY_SCORE: 53
ADLS_ACUITY_SCORE: 49
ADLS_ACUITY_SCORE: 47
ADLS_ACUITY_SCORE: 51
ADLS_ACUITY_SCORE: 55
ADLS_ACUITY_SCORE: 45
ADLS_ACUITY_SCORE: 16
ADLS_ACUITY_SCORE: 53
ADLS_ACUITY_SCORE: 45
ADLS_ACUITY_SCORE: 47
ADLS_ACUITY_SCORE: 19
ADLS_ACUITY_SCORE: 51
ADLS_ACUITY_SCORE: 12
EQUIPMENT_CURRENTLY_USED_AT_HOME: OTHER (SEE COMMENTS)
ADLS_ACUITY_SCORE: 41
ADLS_ACUITY_SCORE: 26
ADLS_ACUITY_SCORE: 12
ADLS_ACUITY_SCORE: 53
ADLS_ACUITY_SCORE: 52
ADLS_ACUITY_SCORE: 24
ADLS_ACUITY_SCORE: 45
ADLS_ACUITY_SCORE: 55
ADLS_ACUITY_SCORE: 46
ADLS_ACUITY_SCORE: 47
ADLS_ACUITY_SCORE: 55
ADLS_ACUITY_SCORE: 16
ADLS_ACUITY_SCORE: 47
ADLS_ACUITY_SCORE: 13
ADLS_ACUITY_SCORE: 14
ADLS_ACUITY_SCORE: 49
ADLS_ACUITY_SCORE: 45
ADLS_ACUITY_SCORE: 55
ADLS_ACUITY_SCORE: 51
ADLS_ACUITY_SCORE: 41
ADLS_ACUITY_SCORE: 53
ADLS_ACUITY_SCORE: 41
ADLS_ACUITY_SCORE: 12
ADLS_ACUITY_SCORE: 45
ADLS_ACUITY_SCORE: 51
ADLS_ACUITY_SCORE: 47
ADLS_ACUITY_SCORE: 12
ADLS_ACUITY_SCORE: 41
ADLS_ACUITY_SCORE: 53
ADLS_ACUITY_SCORE: 45
ADLS_ACUITY_SCORE: 16
ADLS_ACUITY_SCORE: 47
ADLS_ACUITY_SCORE: 53
ADLS_ACUITY_SCORE: 41
ADLS_ACUITY_SCORE: 17
ADLS_ACUITY_SCORE: 16
ADLS_ACUITY_SCORE: 16
ADLS_ACUITY_SCORE: 49
ADLS_ACUITY_SCORE: 47
ADLS_ACUITY_SCORE: 13
ADLS_ACUITY_SCORE: 55
ADLS_ACUITY_SCORE: 53
ADLS_ACUITY_SCORE: 43
ADLS_ACUITY_SCORE: 46
ADLS_ACUITY_SCORE: 48
ADLS_ACUITY_SCORE: 13
ADLS_ACUITY_SCORE: 12
ADLS_ACUITY_SCORE: 51
ADLS_ACUITY_SCORE: 47
ADLS_ACUITY_SCORE: 53
ADLS_ACUITY_SCORE: 13
ADLS_ACUITY_SCORE: 47
ADLS_ACUITY_SCORE: 12
ADLS_ACUITY_SCORE: 43
ADLS_ACUITY_SCORE: 18
ADLS_ACUITY_SCORE: 12
ADLS_ACUITY_SCORE: 12
ADLS_ACUITY_SCORE: 51
ADLS_ACUITY_SCORE: 44
ADLS_ACUITY_SCORE: 26
CONCENTRATING,_REMEMBERING_OR_MAKING_DECISIONS_DIFFICULTY: YES
ADLS_ACUITY_SCORE: 46
ADLS_ACUITY_SCORE: 53
ADLS_ACUITY_SCORE: 24
ADLS_ACUITY_SCORE: 19
WALKING_OR_CLIMBING_STAIRS_DIFFICULTY: OTHER (SEE COMMENTS)
ADLS_ACUITY_SCORE: 35
ADLS_ACUITY_SCORE: 12
ADLS_ACUITY_SCORE: 24
ADLS_ACUITY_SCORE: 41
ADLS_ACUITY_SCORE: 41
DRESSING/BATHING_DIFFICULTY: OTHER (SEE COMMENTS)
ADLS_ACUITY_SCORE: 51
ADLS_ACUITY_SCORE: 47
ADLS_ACUITY_SCORE: 23
ADLS_ACUITY_SCORE: 12
ADLS_ACUITY_SCORE: 53
ADLS_ACUITY_SCORE: 47
CONCENTRATING,_REMEMBERING_OR_MAKING_DECISIONS_DIFFICULTY: YES
ADLS_ACUITY_SCORE: 41
ADLS_ACUITY_SCORE: 55
ADLS_ACUITY_SCORE: 12
ADLS_ACUITY_SCORE: 45
ADLS_ACUITY_SCORE: 51
ADLS_ACUITY_SCORE: 55
ADLS_ACUITY_SCORE: 47
ADLS_ACUITY_SCORE: 53
ADLS_ACUITY_SCORE: 41
ADLS_ACUITY_SCORE: 43
ADLS_ACUITY_SCORE: 12
ADLS_ACUITY_SCORE: 13
ADLS_ACUITY_SCORE: 16
ADLS_ACUITY_SCORE: 41
ADLS_ACUITY_SCORE: 53
ADLS_ACUITY_SCORE: 16
ADLS_ACUITY_SCORE: 51
ADLS_ACUITY_SCORE: 51
ADLS_ACUITY_SCORE: 53
ADLS_ACUITY_SCORE: 14
ADLS_ACUITY_SCORE: 45
ADLS_ACUITY_SCORE: 16
ADLS_ACUITY_SCORE: 43
ADLS_ACUITY_SCORE: 53
ADLS_ACUITY_SCORE: 12
ADLS_ACUITY_SCORE: 45
ADLS_ACUITY_SCORE: 45
ADLS_ACUITY_SCORE: 47
ADLS_ACUITY_SCORE: 47
ADLS_ACUITY_SCORE: 12
ADLS_ACUITY_SCORE: 53
ADLS_ACUITY_SCORE: 16
ADLS_ACUITY_SCORE: 41
ADLS_ACUITY_SCORE: 53
ADLS_ACUITY_SCORE: 44
ADLS_ACUITY_SCORE: 47
ADLS_ACUITY_SCORE: 16
ADLS_ACUITY_SCORE: 46
ADLS_ACUITY_SCORE: 43
ADLS_ACUITY_SCORE: 53
ADLS_ACUITY_SCORE: 19
ADLS_ACUITY_SCORE: 45
ADLS_ACUITY_SCORE: 45
ADLS_ACUITY_SCORE: 53
ADLS_ACUITY_SCORE: 45
ADLS_ACUITY_SCORE: 17
ADLS_ACUITY_SCORE: 53
ADLS_ACUITY_SCORE: 44
ADLS_ACUITY_SCORE: 51
ADLS_ACUITY_SCORE: 41
ADLS_ACUITY_SCORE: 14
ADLS_ACUITY_SCORE: 53
ADLS_ACUITY_SCORE: 47
ADLS_ACUITY_SCORE: 13
ADLS_ACUITY_SCORE: 53
ADLS_ACUITY_SCORE: 48
ADLS_ACUITY_SCORE: 45
ADLS_ACUITY_SCORE: 19
ADLS_ACUITY_SCORE: 55
ADLS_ACUITY_SCORE: 51
ADLS_ACUITY_SCORE: 53
ADLS_ACUITY_SCORE: 45
ADLS_ACUITY_SCORE: 14
ADLS_ACUITY_SCORE: 51
ADLS_ACUITY_SCORE: 55
ADLS_ACUITY_SCORE: 46
ADLS_ACUITY_SCORE: 47
ADLS_ACUITY_SCORE: 12
ADLS_ACUITY_SCORE: 50
ADLS_ACUITY_SCORE: 45
ADLS_ACUITY_SCORE: 53
ADLS_ACUITY_SCORE: 16
ADLS_ACUITY_SCORE: 14
ADLS_ACUITY_SCORE: 45
ADLS_ACUITY_SCORE: 13
ADLS_ACUITY_SCORE: 45
ADLS_ACUITY_SCORE: 55
ADLS_ACUITY_SCORE: 49
ADLS_ACUITY_SCORE: 53
ADLS_ACUITY_SCORE: 16
ADLS_ACUITY_SCORE: 49
ADLS_ACUITY_SCORE: 45
ADLS_ACUITY_SCORE: 53
ADLS_ACUITY_SCORE: 53
ADLS_ACUITY_SCORE: 41
ADLS_ACUITY_SCORE: 51
ADLS_ACUITY_SCORE: 15
ADLS_ACUITY_SCORE: 16
ADLS_ACUITY_SCORE: 16
ADLS_ACUITY_SCORE: 49
ADLS_ACUITY_SCORE: 47
ADLS_ACUITY_SCORE: 53
ADLS_ACUITY_SCORE: 16
ADLS_ACUITY_SCORE: 53
ADLS_ACUITY_SCORE: 53
ADLS_ACUITY_SCORE: 16
ADLS_ACUITY_SCORE: 47
ADLS_ACUITY_SCORE: 13
ADLS_ACUITY_SCORE: 45
ADLS_ACUITY_SCORE: 53
ADLS_ACUITY_SCORE: 45
ADLS_ACUITY_SCORE: 12
ADLS_ACUITY_SCORE: 51
ADLS_ACUITY_SCORE: 12
ADLS_ACUITY_SCORE: 15
ADLS_ACUITY_SCORE: 16
ADLS_ACUITY_SCORE: 49
ADLS_ACUITY_SCORE: 51
ADLS_ACUITY_SCORE: 49
ADLS_ACUITY_SCORE: 15
ADLS_ACUITY_SCORE: 12
ADLS_ACUITY_SCORE: 47
ADLS_ACUITY_SCORE: 19
ADLS_ACUITY_SCORE: 51
ADLS_ACUITY_SCORE: 48
ADLS_ACUITY_SCORE: 41
ADLS_ACUITY_SCORE: 51
ADLS_ACUITY_SCORE: 13
ADLS_ACUITY_SCORE: 41
ADLS_ACUITY_SCORE: 35
ADLS_ACUITY_SCORE: 16
ADLS_ACUITY_SCORE: 43
ADLS_ACUITY_SCORE: 55
ADLS_ACUITY_SCORE: 41
ADLS_ACUITY_SCORE: 41
ADLS_ACUITY_SCORE: 13
ADLS_ACUITY_SCORE: 53
FALL_HISTORY_WITHIN_LAST_SIX_MONTHS: NO
ADLS_ACUITY_SCORE: 49
ADLS_ACUITY_SCORE: 44
ADLS_ACUITY_SCORE: 43
ADLS_ACUITY_SCORE: 15
ADLS_ACUITY_SCORE: 45
ADLS_ACUITY_SCORE: 43
ADLS_ACUITY_SCORE: 49
ADLS_ACUITY_SCORE: 15
ADLS_ACUITY_SCORE: 24
ADLS_ACUITY_SCORE: 47
ADLS_ACUITY_SCORE: 13
ADLS_ACUITY_SCORE: 53
ADLS_ACUITY_SCORE: 53
ADLS_ACUITY_SCORE: 14
ADLS_ACUITY_SCORE: 53
ADLS_ACUITY_SCORE: 47
ADLS_ACUITY_SCORE: 41
ADLS_ACUITY_SCORE: 41
ADLS_ACUITY_SCORE: 53
ADLS_ACUITY_SCORE: 53
ADLS_ACUITY_SCORE: 23
ADLS_ACUITY_SCORE: 12
ADLS_ACUITY_SCORE: 55
ADLS_ACUITY_SCORE: 47
ADLS_ACUITY_SCORE: 51
ADLS_ACUITY_SCORE: 41
ADLS_ACUITY_SCORE: 16
ADLS_ACUITY_SCORE: 51
ADLS_ACUITY_SCORE: 49
ADLS_ACUITY_SCORE: 53
ADLS_ACUITY_SCORE: 14
ADLS_ACUITY_SCORE: 53
ADLS_ACUITY_SCORE: 13
ADLS_ACUITY_SCORE: 48
ADLS_ACUITY_SCORE: 43
ADLS_ACUITY_SCORE: 53
ADLS_ACUITY_SCORE: 16
ADLS_ACUITY_SCORE: 47
ADLS_ACUITY_SCORE: 12
ADLS_ACUITY_SCORE: 47
ADLS_ACUITY_SCORE: 53
ADLS_ACUITY_SCORE: 47
ADLS_ACUITY_SCORE: 12
ADLS_ACUITY_SCORE: 51
ADLS_ACUITY_SCORE: 19
ADLS_ACUITY_SCORE: 47
DOING_ERRANDS_INDEPENDENTLY_DIFFICULTY: OTHER (SEE COMMENTS)
ADLS_ACUITY_SCORE: 16
ADLS_ACUITY_SCORE: 14
ADLS_ACUITY_SCORE: 41
ADLS_ACUITY_SCORE: 13
ADLS_ACUITY_SCORE: 51
ADLS_ACUITY_SCORE: 15
ADLS_ACUITY_SCORE: 12
ADLS_ACUITY_SCORE: 16
ADLS_ACUITY_SCORE: 51
ADLS_ACUITY_SCORE: 53
ADLS_ACUITY_SCORE: 53
ADLS_ACUITY_SCORE: 47
ADLS_ACUITY_SCORE: 49
ADLS_ACUITY_SCORE: 47
ADLS_ACUITY_SCORE: 14
DRESSING/BATHING_DIFFICULTY: NO
ADLS_ACUITY_SCORE: 41
ADLS_ACUITY_SCORE: 45
ADLS_ACUITY_SCORE: 13
ADLS_ACUITY_SCORE: 44
WEAR_GLASSES_OR_BLIND: OTHER (SEE COMMENTS)
ADLS_ACUITY_SCORE: 41
ADLS_ACUITY_SCORE: 53
ADLS_ACUITY_SCORE: 12
ADLS_ACUITY_SCORE: 45
ADLS_ACUITY_SCORE: 55
ADLS_ACUITY_SCORE: 13
ADLS_ACUITY_SCORE: 41
ADLS_ACUITY_SCORE: 47
ADLS_ACUITY_SCORE: 13
ADLS_ACUITY_SCORE: 12
ADLS_ACUITY_SCORE: 47
ADLS_ACUITY_SCORE: 53
ADLS_ACUITY_SCORE: 41
ADLS_ACUITY_SCORE: 47
ADLS_ACUITY_SCORE: 16
ADLS_ACUITY_SCORE: 45
ADLS_ACUITY_SCORE: 47
ADLS_ACUITY_SCORE: 26
ADLS_ACUITY_SCORE: 53
ADLS_ACUITY_SCORE: 45
DIFFICULTY_EATING/SWALLOWING: NO
ADLS_ACUITY_SCORE: 45
ADLS_ACUITY_SCORE: 53
ADLS_ACUITY_SCORE: 17
ADLS_ACUITY_SCORE: 16
ADLS_ACUITY_SCORE: 45
DEPENDENT_IADLS:: CLEANING;COOKING;SHOPPING;MEAL PREPARATION;MEDICATION MANAGEMENT;MONEY MANAGEMENT;TRANSPORTATION
ADLS_ACUITY_SCORE: 41
ADLS_ACUITY_SCORE: 53
ADLS_ACUITY_SCORE: 55
ADLS_ACUITY_SCORE: 24
ADLS_ACUITY_SCORE: 47
ADLS_ACUITY_SCORE: 53
ADLS_ACUITY_SCORE: 49
WALKING_OR_CLIMBING_STAIRS_DIFFICULTY: OTHER (SEE COMMENTS)
ADLS_ACUITY_SCORE: 16
ADLS_ACUITY_SCORE: 53
ADLS_ACUITY_SCORE: 12
ADLS_ACUITY_SCORE: 51
ADLS_ACUITY_SCORE: 49
ADLS_ACUITY_SCORE: 16
ADLS_ACUITY_SCORE: 49
ADLS_ACUITY_SCORE: 51
ADLS_ACUITY_SCORE: 13
ADLS_ACUITY_SCORE: 47
ADLS_ACUITY_SCORE: 13
ADLS_ACUITY_SCORE: 44
ADLS_ACUITY_SCORE: 43
WEAR_GLASSES_OR_BLIND: NO
ADLS_ACUITY_SCORE: 53
ADLS_ACUITY_SCORE: 49
ADLS_ACUITY_SCORE: 44
ADLS_ACUITY_SCORE: 15
ADLS_ACUITY_SCORE: 53
ADLS_ACUITY_SCORE: 14
ADLS_ACUITY_SCORE: 16
ADLS_ACUITY_SCORE: 45
ADLS_ACUITY_SCORE: 12
ADLS_ACUITY_SCORE: 16
ADLS_ACUITY_SCORE: 17

## 2022-01-01 ASSESSMENT — ENCOUNTER SYMPTOMS
SHORTNESS OF BREATH: 0
NECK PAIN: 0
DIARRHEA: 0
NAUSEA: 0
HEADACHES: 0
ABDOMINAL PAIN: 0
LIGHT-HEADEDNESS: 1
VOMITING: 0
COUGH: 0
DIZZINESS: 1
COUGH: 1
FEVER: 0
DIZZINESS: 1
FEVER: 0
VOMITING: 0
FEVER: 0
NUMBNESS: 0
BLOOD IN STOOL: 0
SHORTNESS OF BREATH: 0
SHORTNESS OF BREATH: 0
AGITATION: 1
SORE THROAT: 0
HEADACHES: 1
WOUND: 1
ABDOMINAL PAIN: 0
ARTHRALGIAS: 1
FEVER: 0
HEADACHES: 1
AGITATION: 1
CHILLS: 0
NECK PAIN: 1

## 2022-01-01 ASSESSMENT — MIFFLIN-ST. JEOR
SCORE: 1730.16
SCORE: 1762.36
SCORE: 1677.09
SCORE: 1681.17
SCORE: 1760.09
SCORE: 1676.63
SCORE: 1762.36

## 2022-01-19 NOTE — ED TRIAGE NOTES
Pt lives in memory care at [facility]. Pt thought someone was in his room going through mail. Pt got stressed and told staff he had chest pain. Pt given 324mg of ASA and 2 nitroglycerin. EMS reports low blood pressure and dizziness. Pt in ER reports no chest pain or dizziness

## 2022-01-19 NOTE — ED PROVIDER NOTES
History   Chief Complaint:  Chest Pain    The history is provided by the patient and the EMS personnel.      Matt Way is a 71 year old male who has assisted living with history of dementia, type 2 diabetes, NSTEMI and CAD who presents via EMS with chest pain. Matt saw someone in his room going through his mail and got stressed out. Once he started walking he developed chest pain, felt dizzy and lightheaded. Staff gave him 324 mg of ASA and 2 nitroglycerin. Per EMS his Blood pressure was 116/63, pulse was 99, temperature 95.9, oxygen saturation 94, respiratory rate 18 and blood glucose 219. His chest pain is now gone and he does not feel dizzy and he feels much better now. He denies having a cough or feeling shortness of breath. He does not have fevers, chills or soar throat, and no vomiting or diarrhea. He denies abdominal pain, leg pain or swelling. He has no trouble urinating and does not have pain with urination. Matt has been under a lot of stress lately and would not mind if he dies but has no plans to commit suicide.     Review of Systems   Constitutional: Negative for chills and fever.   HENT: Negative for sore throat.    Respiratory: Negative for cough and shortness of breath.    Cardiovascular: Positive for chest pain. Negative for leg swelling.   Gastrointestinal: Negative for abdominal pain, diarrhea and vomiting.   Neurological: Positive for dizziness and light-headedness.   Psychiatric/Behavioral: Positive for suicidal ideas.   All other systems reviewed and are negative.    Allergies:  Brilinta [Ticagrelor]  Duloxetine  Gabapentin  Lisinopril    Medications:  aspirin (ASA) 81 MG   atorvastatin   clopidogrel   insulin aspart  insulin detemir   losartan   melatonin   metformin   metoprolol tartrate   nitroglycerin   omeprazole   Quetiapine  rivastigmine   sennosides       Past Medical History:     CAD  Type 2 diabetes  Hyperlipidemia  GERD   Obesity  FLAQUITA  Pulmonary embolism  NSTEMI  Dementia  "associated with other underlying disease without behavioral disturbance   RBD (REM behavioral disorder)    Past Surgical History:    COLONOSCOPY  Coronary artery angiogram with stent (CECILY) to LAD  CV CORONARY ANGIOGRAM  CV INSTANTANEOUS WAVE-FREE RATIO  CV LEFT VENTRICULOGRAM  VASECTOMY    Family History:    Lupus  Diabetes, Type 2  Heart disease  Stroke    Social History:  Presents via EMS  Lives in an assisted living facility    Physical Exam     Patient Vitals for the past 24 hrs:   BP Temp Temp src Pulse Resp SpO2 Height Weight   01/19/22 0343 -- -- -- -- 16 97 % -- --   01/19/22 0342 114/59 -- -- 77 -- -- -- --   01/19/22 0100 132/80 -- -- 77 16 95 % -- --   01/19/22 0044 116/73 -- -- 73 10 96 % -- --   01/18/22 2330 109/65 -- -- 78 17 95 % -- --   01/18/22 2323 113/69 -- -- 81 15 97 % -- --   01/18/22 2217 (!) 88/60 97.8  F (36.6  C) Oral 87 16 94 % 1.854 m (6' 1\") 94.3 kg (208 lb)       Physical Exam  Nursing note and vitals reviewed.  Constitutional:  Appears well-developed and well-nourished.   HENT:   Head:    Atraumatic.   Mouth/Throat:   Oropharynx is clear and moist. No oropharyngeal exudate.   Eyes:    Pupils are equal, round, and reactive to light.   Neck:    Normal range of motion. Neck supple.      No tracheal deviation present. No thyromegaly present.   Cardiovascular:  Normal rate, regular rhythm, no murmur   Pulmonary/Chest: Breath sounds are clear and equal without wheezes or crackles.  Abdominal:   Soft. Bowel sounds are normal. Exhibits no distension and      no mass. There is no tenderness.      There is no rebound and no guarding.   Musculoskeletal:  Exhibits no edema.   Lymphadenopathy:  No cervical adenopathy.   Neurological:   Alert and oriented to person, place. GCS 15.  CN 2-12 intact.  and proximal upper extremity strength strong and equal.  Bilateral lower extremity strength strong and equal, including strong dorsiflexion and plantarflexion strength.  Sensation intact and equal " to the face, arms and legs.  No facial droop or weakness. Normal speech.  Follows commands and answers questions normally.    Skin:    Skin is warm and dry. No rash noted. No pallor.       Emergency Department Course   ECG  ECG obtained at 2222, ECG read at 2223  Normal sinus rhythm  Left axis deviation  Low voltage QRS  Inferior infarct, age undetermined  Anterolateral infarct, age undetermined  Abnormal ECG   Rate 85 bpm. MN interval 164 ms. QRS duration 74 ms. QT/QTc 400/476 ms. P-R-T axes 54 -42 68.     Imaging:  CT Chest Pulmonary Embolism w Contrast   Final Result   IMPRESSION:   1.  No evidence of pulmonary embolus.      XR Chest Port 1 View   Final Result   IMPRESSION: Normal heart size and pulmonary vascularity. Lungs clear. Degenerative changes in the spine and shoulders.        Report per radiology    Laboratory:  Labs Ordered and Resulted from Time of ED Arrival to Time of ED Departure   D DIMER QUANTITATIVE - Abnormal       Result Value    D-Dimer Quantitative 1.17 (*)    COMPREHENSIVE METABOLIC PANEL - Abnormal    Sodium 136      Potassium 4.4      Chloride 106      Carbon Dioxide (CO2) 22      Anion Gap 8      Urea Nitrogen 26      Creatinine 1.42 (*)     Calcium 8.7      Glucose 231 (*)     Alkaline Phosphatase 104      AST 24      ALT 53      Protein Total 6.7 (*)     Albumin 3.7      Bilirubin Total 0.8      GFR Estimate 53 (*)    ROUTINE UA WITH MICROSCOPIC REFLEX TO CULTURE - Abnormal    Color Urine Yellow      Appearance Urine Clear      Glucose Urine Negative      Bilirubin Urine Negative      Ketones Urine 10  (*)     Specific Gravity Urine 1.016      Blood Urine Negative      pH Urine 5.5      Protein Albumin Urine 10  (*)     Urobilinogen Urine Normal      Nitrite Urine Negative      Leukocyte Esterase Urine Negative      Mucus Urine Present (*)     RBC Urine 1      WBC Urine 2      Hyaline Casts Urine 35 (*)    TROPONIN I - Normal    Troponin I High Sensitivity 14     PROCALCITONIN - Normal     Procalcitonin <0.05     INFLUENZA A/B & SARS-COV2 PCR MULTIPLEX - Normal    Influenza A PCR Negative      Influenza B PCR Negative      SARS CoV2 PCR Negative     TROPONIN I - Normal    Troponin I High Sensitivity 22     CBC WITH PLATELETS AND DIFFERENTIAL    WBC Count 8.0      RBC Count 4.99      Hemoglobin 13.9      Hematocrit 43.1      MCV 86      MCH 27.9      MCHC 32.3      RDW 14.7      Platelet Count 221      % Neutrophils 59      % Lymphocytes 30      % Monocytes 8      % Eosinophils 2      % Basophils 1      % Immature Granulocytes 0      NRBCs per 100 WBC 0      Absolute Neutrophils 4.7      Absolute Lymphocytes 2.4      Absolute Monocytes 0.6      Absolute Eosinophils 0.2      Absolute Basophils 0.1      Absolute Immature Granulocytes 0.0      Absolute NRBCs 0.0          Emergency Department Course:    Mental Health Risk Assessment      PSS-3    Date and Time Over the past 2 weeks have you felt down, depressed, or hopeless? Over the past 2 weeks have you had thoughts of killing yourself? Have you ever attempted to kill yourself? When did this last happen? User   01/18/22 2218 yes yes yes -- MLO                  Reviewed:  I reviewed nursing notes, vitals, past medical history and Care Everywhere    Assessments:  2212 I obtained history and examined the patient as noted above.   0008 I rechecked the patient and explained findings. Will do chest CT.   0230    Patient discharged.     Interventions:  0249    Quetiapine, 25 mg, PO    Disposition:  The patient was discharged to home.     Impression & Plan     Medical Decision Making:  I feel that this patient's chest pain was due to anxiety attack.  His symptoms are completely resolved now.  His blood pressure was slightly decreased initially which I felt was due to nitroglycerin and improved.  The patient has a cardiac history so troponins x2 were performed which were both negative.  D-dimer was elevated so chest CT was performed and there was no sign of  pulmonary embolism or aortic dissection.  There was no sign of pneumonia or pneumothorax either.  I felt that the patient could be safely discharged back to his care facility.  Instructions were given to return if chest pain returns.  He should also follow-up with his primary care doctor    Diagnosis:    ICD-10-CM    1. Acute chest pain  R07.9        Discharge Medications:  Discharge Medication List as of 1/19/2022  2:34 AM          Scribe Disclosure:  I, Radha Jansen, am serving as a scribe at 10:17 PM on 1/18/2022 to document services personally performed by Lelo Mahoney MD based on my observations and the provider's statements to me.              Lelo Mahoney MD  01/19/22 0753

## 2022-01-23 PROBLEM — U07.1 INFECTION DUE TO 2019 NOVEL CORONAVIRUS: Status: ACTIVE | Noted: 2022-01-01

## 2022-01-23 PROBLEM — R07.9 CHEST PAIN, UNSPECIFIED TYPE: Status: ACTIVE | Noted: 2022-01-01

## 2022-01-23 NOTE — DISCHARGE INSTRUCTIONS
Hold your Seroquel (Quetiapne), atorvastatin and metformin for a total of 8 days after starting the Paxlovid.    Discharge Instructions  Chest Pain    You have been seen today for chest pain or discomfort.  At this time, your provider has found no signs that your chest pain is due to a serious or life-threatening condition, (or you have declined more testing and/or admission to the hospital). However, sometimes there is a serious problem that does not show up right away. Your evaluation today may not be complete and you may need further testing and evaluation.     Generally, every Emergency Department visit should have a follow-up clinic visit with either a primary or a specialty clinic/provider. Please follow-up as instructed by your emergency provider today.  Return to the Emergency Department if:  Your chest pain changes, gets worse, starts to happen more often, or comes with less activity.  You are newly short of breath.  You get very weak or tired.  You pass out or faint.  You have any new symptoms, like fever, cough, numb legs, or you cough up blood.  You have anything else that worries you.    Until you follow-up with your regular provider, please do the following:  Take one aspirin daily unless you have an allergy or are told not to by your provider.  If a stress test appointment has been made, go to the appointment.  If you have questions, contact your regular provider.  Follow-up with your regular provider/clinic as directed; this is very important.    If you were given a prescription for medicine here today, be sure to read all of the information (including the package insert) that comes with your prescription.  This will include important information about the medicine, its side effects, and any warnings that you need to know about.  The pharmacist who fills the prescription can provide more information and answer questions you may have about the medicine.  If you have questions or concerns that the  pharmacist cannot address, please call or return to the Emergency Department.       Remember that you can always come back to the Emergency Department if you are not able to see your regular provider in the amount of time listed above, if you get any new symptoms, or if there is anything that worries you.

## 2022-01-23 NOTE — ED TRIAGE NOTES
CP that started around 330pm today, has resolved itself now. Given 324 ASA by EMS, endorses a cough and HA as well.

## 2022-01-23 NOTE — ED NOTES
"         MASSBP Score 1/23/2022   Age Greater than or equal to 65 years 2   BMI greater than or equal to 35 kg/m2 0   Has Diabetes Mellitus 2   Has Chronic Kidney Disease 0   Has Cardiovascular Disease and 55 years or older 2   Has Chronic Respiratory Disease and 55 years or older 0   Has Hypertension and 55 years or older 1   Is Immunocompromised 0   Is Pregnant 0   Member of BIP community (Black/, /, ,  or , or  or [place] Native)  0   MASSBP Score 7   Has the patient had a positive COVID test outside our system?  No   What day did symptoms start?  1/23/2022       Estimated body mass index is 27.44 kg/m  as calculated from the following:    Height as of 1/18/22: 1.854 m (6' 1\").    Weight as of 1/18/22: 94.3 kg (208 lb).     GFR Estimate   Date Value Ref Range Status   01/23/2022 66 >60 mL/min/1.73m2 Final     Comment:     Effective December 21, 2021 eGFRcr in adults is calculated using the 2021 CKD-EPI creatinine equation which includes age and gender (Dorene et al., NEJM, DOI: 10.1056/EWOKyi3468246)   07/02/2021 87 >60 mL/min/[1.73_m2] Final     Comment:     Non  GFR Calc  Starting 12/18/2018, serum creatinine based estimated GFR (eGFR) will be   calculated using the Chronic Kidney Disease Epidemiology Collaboration   (CKD-EPI) equation.          FDA Facts Sheet  [facility] Drug Interaction review    Medications were reviewed with the patient and held or adjusted where applicable.    No current facility-administered medications for this encounter.     Current Outpatient Medications   Medication     aspirin (ASA) 81 MG EC tablet     atorvastatin (LIPITOR) 80 MG tablet     acetaminophen (TYLENOL) 325 MG tablet     clopidogrel (PLAVIX) 75 MG tablet     insulin aspart (NOVOLOG FLEXPEN) 100 UNIT/ML pen     insulin detemir (LEVEMIR PEN) 100 UNIT/ML pen     insulin detemir (LEVEMIR PEN) 100 UNIT/ML pen     losartan (COZAAR) " 25 MG tablet     melatonin 3 MG tablet     metFORMIN (GLUCOPHAGE) 1000 MG tablet     metoprolol tartrate (LOPRESSOR) 25 MG tablet     nitroGLYcerin (NITROSTAT) 0.4 MG sublingual tablet     omeprazole (PRILOSEC) 20 MG DR capsule     QUEtiapine (SEROQUEL) 25 MG tablet     rivastigmine (EXELON) 4.6 MG/24HR 24 hr patch     sennosides (SENOKOT) 8.6 MG tablet     VITAMIN D, CHOLECALCIFEROL, PO                                        Trigger, Dung Cotton MD  01/23/22 5155

## 2022-01-23 NOTE — ED PROVIDER NOTES
History   Chief Complaint:  Chest Pain and Cough       The history is provided by the patient.      Matt Way is a 71 year old male with history of  who presents with chest pain and cough. The patient is fully vaccinated against COVID-19 and he tested negative two days ago. The patient also received a cardiac catheter in September 2021. Per Chart Review, on 01/18/22, the patient developed chest pain with dizziness and lightheadedness. No headache or cough at that time. He received nitroglycerin x2 and Aspirin and was transported via EMS to the [facility] emergency department. He underwent an x-ray and CT scan of his chest, detailed below, as well as negative troponin and delta troponin. He was then discharged to home in stable condition.     Today, at about 1530, the patient had an onset of discomfort to the left side of his chest. This pain was sharper than his chest pain on 1/18/22. He drank a glass of cold water and then called EMS, but his chest pain resolved prior to their arrival. So, EMS gave him 324 ASA and no nitroglycerin. Here, he also reports a cough and headache. He denies any known triggers for his chest pain. Additionally, Matt denies any fevers, anosmia, or ageusia.     CT Chest Pulmonary Embolism with Contrast (1/18/22)  1.  No evidence of pulmonary embolus.  Reading per radiology         Review of Systems   Constitutional: Negative for fever.   HENT:        No anosmia  No ageusia   Respiratory: Positive for cough.    Cardiovascular: Positive for chest pain (left sided - resolved).   Neurological: Positive for headaches.   All other systems reviewed and are negative.    Allergies:  Brilinta [Ticagrelor]  Duloxetine  Gabapentin  Lisinopril    Medications:    aspirin EC 81 MG enteric coated tablet     Melatonin 12 MG TABS     omeprazole (PRILOSEC) 20 MG capsule    Sennosides (SENNA) 8.6 MG tablet     metoprolol tartrate (LOPRESSOR) 25 MG tablet     nitroglycerin (NITROSTAT) 0.4 MG sublingual  tablet     insulin aspart (NOVOLOG) 100 UNIT/ML injection (vial)     QUEtiapine (SEROQUEL) 25 MG tablet     acetaminophen (TYLENOL) 325 MG tablet    Cholecalciferol (VITAMIN D3) 25 MCG (1000 UT) ta     rivastigmine (EXELON) 4.6 MG/24HR patch     fexofenadine (ALLEGRA) 180 MG tablet     losartan (COZAAR) 25 MG tablet    metFORMIN (GLUCOPHAGE) 1000 MG tablet    fluticasone propionate (FLONASE) 50 MCG/ACT nasal solution     LEVEMIR FLEXTOUCH 100 UNIT/ML pen     NOVOLOG FLEXPEN 100 UNIT/ML pen injection     clopidogrel (PLAVIX) 75 MG tablet     NOVOFINE 30 30G X 8 MM     atorvastatin (LIPITOR) 80 MG tablet     citalopram (CELEXA) 20 MG tablet     pantoprazole DR (PROTONIX) 20 MG tablet     metoprolol succinate (TOPROL XL) 25 MG 24 hour release tablet       Past Medical History:     Confusion   Hyperglycemia   RBD (REM behavioral disorder)   Coronary artery disease involving native coronary artery of native heart without angina pectoris  Pulmonary embolism   Lewy body dementia   Dementia associated with other underlying disease without behavioral disturbance   NSTEMI (non-ST elevated myocardial infarction)   Acute chest pain   History of gastroesophageal reflux (GERD)   Neck arthritis   Overweight   FLAQUITA (obstructive sleep apnea)   Intolerant to CPAP  Erectile dysfunction   Mixed hyperlipidemia   Toenail fungus  Type 2 diabetes mellitus   Diabetes mellitus type 2 with neurological manifestations  Rotator cuff injury, left      Past Surgical History:    Left heart catheter retro-brach/fem; PERQ  Colonoscopy   Vasectomy   Shoulder surgery  Angiogram   Coronary angiogram x2  PCI stent drug eluting x2  Instantaneous wave-free ratio   Left heart cath   Left ventriculogram      Family History:    Lupus (mother)     Social History:  The patient is .   He lives in an assisted living facility.     Physical Exam     Patient Vitals for the past 24 hrs:   BP Temp Temp src Pulse Resp SpO2 Height   01/23/22 1800 128/74 -- -- 75  "19 97 % --   01/23/22 1745 -- -- -- 75 (!) 33 96 % --   01/23/22 1730 123/73 -- -- 76 28 96 % --   01/23/22 1700 118/69 -- -- 78 17 96 % --   01/23/22 1649 -- -- -- -- -- -- 1.854 m (6' 1\")   01/23/22 1648 118/70 99  F (37.2  C) Oral 86 20 97 % --       Physical Exam    General: Alert, interactive in mild distress  Head:  Scalp is atraumatic  Eyes:  The pupils are equal, round, and reactive to light    EOM's intact    No scleral icterus  ENT:      Nose:  The external nose is normal  Ears:  External ears are normal  Mouth/Throat: The oropharynx is normal    Mucus membranes are moist      Neck:  Normal range of motion.      There is no rigidity.    Trachea is in the midline         CV:  Regular rate and rhythm    No murmur     2+ pulses in all four extremities   Resp:  Breath sounds are clear bilaterally    Non-labored, no retractions or accessory muscle use      GI:  Abdomen is soft, no distension, no tenderness.       MS:  Normal strength in all 4 extremities, No midline cervical, thoracic, or lumbar tenderness  Skin:  Warm and dry, No rash or lesions noted.  Neuro:      Strength 5/5 x4.  Sensation intact  In all 4 extremities.      Cranial nerves 2-12 intact.    GCS: 15  Psych:  Awake. Alert.  Normal affect.      Appropriate interactions.    Emergency Department Course   ECG  ECG obtained at 1650, ECG read at 1657  Normal sinus rhythm   Left axis deviation   Low voltage QRS   Possible lateral infarct, age undetermined  Inferior infarct, age undetermined  Abnormal ECG    No significant change as compared to prior, dated 1/18/22.  Rate 80 bpm. DC interval 172 ms. QRS duration 74 ms. QT/QTc 382/440 ms. P-R-T axes 55 -31 64.     Imaging:  XR Chest Port 1 View   Final Result   IMPRESSION: Mild hazy opacities in both lower lungs are suspicious for pneumonia. No pneumothorax. Pulmonary vascularity is within normal limits.        Report per radiology    Laboratory:  Labs Ordered and Resulted from Time of ED Arrival to Time " of ED Departure   COMPREHENSIVE METABOLIC PANEL - Abnormal       Result Value    Sodium 134      Potassium 4.7      Chloride 104      Carbon Dioxide (CO2) 25      Anion Gap 5      Urea Nitrogen 16      Creatinine 1.19      Calcium 8.3 (*)     Glucose 208 (*)     Alkaline Phosphatase 117      AST 17      ALT 47      Protein Total 7.1      Albumin 3.7      Bilirubin Total 0.5      GFR Estimate 66     INFLUENZA A/B & SARS-COV2 PCR MULTIPLEX - Abnormal    Influenza A PCR Negative      Influenza B PCR Negative      SARS CoV2 PCR Positive (*)    TROPONIN I - Normal    Troponin I High Sensitivity 18     TROPONIN I - Normal    Troponin I High Sensitivity 50     CBC WITH PLATELETS AND DIFFERENTIAL    WBC Count 6.2      RBC Count 4.86      Hemoglobin 13.6      Hematocrit 42.5      MCV 87      MCH 28.0      MCHC 32.0      RDW 14.8      Platelet Count 189      % Neutrophils 72      % Lymphocytes 13      % Monocytes 11      % Eosinophils 3      % Basophils 1      % Immature Granulocytes 0      NRBCs per 100 WBC 0      Absolute Neutrophils 4.5      Absolute Lymphocytes 0.8      Absolute Monocytes 0.7      Absolute Eosinophils 0.2      Absolute Basophils 0.1      Absolute Immature Granulocytes 0.0      Absolute NRBCs 0.0        Emergency Department Course:  Reviewed:  I reviewed nursing notes, vitals, past medical history, Care Everywhere and [facility]    Assessments:  1656 I obtained history and examined the patient as noted above.   1750 I spoke over the phone with the patient's daughter, Danielle Mtz, on course treatment with Paxlovid, including benefits and risks. She consents to using this treatment.     Consult:  1958 I spoke to Dr. Berkowitz of the Hospitalist service over the phone.     Interventions:  Aspirin 324 mg p.o. per EMS    Disposition:  The patient was admitted to the hospital under the care of Dr. Berkowitz.     Impression & Plan     Medical Decision Making:  Following presentation history and physical  examination were performed, prior records reviewed.  Given the patient's history of coronary artery disease EKG was obtained and is nonischemic as noted above, initial troponin was negative however given the patient's past history a delta troponin was obtained and demonstrated an over 30 point jump concerning for possible myocardial ischemia.  Given this and the patient's history he will be admitted for serial troponins.  He has no active pain at this time.  Of note he had a CT scan of his chest 5 days ago which demonstrated no acute findings at that time.    Coincidentally the patient also has a cough myalgias and a headache, COVID testing is positive.  Initially it was felt that the patient would likely be discharged and Paxlovid was ordered however given the hospitalization this may need to be deferred, he may be a candidate for remdesivir while admitted to the hospital.  The patient will be admitted to the hospitalist service for further evaluation and treatment, he is not hypoxic and there is no signs of severe sepsis or septic shock.    Diagnosis:    ICD-10-CM    1. Infection due to 2019 novel coronavirus  U07.1 COVID-19 [facility] Referral     Care Coordination Referral   2. Chest pain, unspecified type  R07.9        Discharge Medications:  New Prescriptions    NIRMATRELVIR AND RITONAVIR (PAXLOVID) THERAPY PACK    Take 3 tablets by mouth 2 times daily for 5 days Take 2 Nirmatrelvir tablets and 1 Ritonavir tablet twice daily for 5 days.       Scribe Disclosure:  Lilly GUZMAN, am serving as a scribe at 4:56 PM on 1/23/2022 to document services personally performed by Dung Solis MD based on my observations and the provider's statements to me.      Dung Solis MD  01/23/22 2012

## 2022-01-24 NOTE — PROGRESS NOTES
Observation goals  PRIOR TO DISCHARGE        Comments: -diagnostic tests and consults completed and resulted , not met, cardio consult pending  -vital signs normal or at patient baseline , met  -tolerating oral intake to maintain hydration , met  -safe disposition plan has been identified , not met  Patient denies chest pain

## 2022-01-24 NOTE — PROGRESS NOTES
[facility]    Medicine Progress Note - Hospitalist Service    Date of Admission:  1/23/2022    Assessment & Plan          Matt Way is a 71 year old male admitted on 1/23/2022    Chest pain similar to previous MI  History of coronary artery disease status post PCI of the LAD  Troponin trending up  EKG with no acute ischemic changes  - continue ASA  - serial troponins  - resting echocardiogram  - Heparin gtt was started overnight. Cardiology consulted this morning  - continue home aspirin, Plavix, losartan, and metoprolol    COVID 19 infection likely causing mild pneumonia with no hypoxemia  Patient is vaccinated but not boosted.  Mild amount of pneumonia on the CXR but no hypoxemia yet  At this point his chest x-ray findings most likely represent COVID; he does not endorse any productive cough or fever  Nonetheless we will be vigilant in case he is developing early bacterial pneumonia  - remdesivir while he is hospitalized  - Procalcitonin was not elevated  - He was prescribed paxlovid by the emergency department physician and is apparently available at the [facility] outpatient pharmacy  - He could probably start Paxlovid on discharge.  The emergency department pharmacist had gone over his medications and advised holding Seroquel, atorvastatin, and metformin for total of 8 days after starting the paxlovid    Type 2 diabetes  Plan  - Resume aspart and Levemir at slightly reduced dose  - sliding scale insulin and diabetic diet    HLD  - resume statin    Lewy body dementia  Plan  - Hold home Seroquel in anticipation of his anticoagulant medication       Diet: Moderate Consistent Carb (60 g CHO per Meal) Diet    DVT Prophylaxis: heparin infusion  Schulte Catheter: Not present  Central Lines: None  Cardiac Monitoring: ACTIVE order. Indication: Chest pain/ ACS rule out (24 hours)  Code Status: Full Code      Disposition Plan   Expected Discharge: 01/25/2022     Anticipated discharge location:   Awaiting care coordination huddle  Delays:            The patient's care was discussed with the Patient.    Betys Robledo MD  Hospitalist Service  [facility]  Securely message with the Vocera Web Console (learn more here)  Text page via [employer] Paging/Directory         Clinically Significant Risk Factors Present on Admission              # Platelet Defect: home medication list includes an antiplatelet medication   # Diabetes, type II: last A1C 7.0 % (Ref range: 0.0 - 5.6 %)  # Overweight: last Body mass index is 27.38 kg/m .      ______________________________________________________________________    Interval History   Patient feels well, no CP or SOB at the time of my eval. No acute complaints. We discussed cardiology consult and plan for TTE.     Data reviewed today: I reviewed all medications, new labs and imaging results over the last 24 hours. I personally reviewed no images or EKG's today.    Physical Exam   Vital Signs: Temp: 99  F (37.2  C) Temp src: Oral BP: 116/60 Pulse: 74   Resp: 16 SpO2: 94 % O2 Device: None (Room air)    Weight: 207 lbs 8 oz  Constitutional: Awake, alert, cooperative, no apparent distress  HEENT: neck supple, no LAD noted, moist mucous membranes  Respiratory: Clear to auscultation bilaterally, no crackles or wheezing  Cardiovascular: Regular rate and rhythm, normal S1 and S2, and no murmur noted  GI: Normal bowel sounds, soft, non-distended, non-tender  Skin/Integumen: No rashes, no cyanosis, no edema  Ext: no edema, moving all extremities  Neuro: CN 2-12 intact, non focal exam      Data   Recent Labs   Lab 01/24/22  1147 01/24/22  0810 01/24/22  0743 01/24/22  0026 01/23/22  1652 01/18/22  2221   WBC  --  4.1  --   --  6.2 8.0   HGB  --  13.9  --   --  13.6 13.9   MCV  --  87  --   --  87 86   PLT  --  171  --   --  189 221   NA  --  135  --   --  134 136   POTASSIUM  --  4.3  --   --  4.7 4.4   CHLORIDE  --  105  --   --  104 106   CO2  --  23  --   --   25 22   BUN  --  15  --   --  16 26   CR  --  0.99  --   --  1.19 1.42*   ANIONGAP  --  7  --   --  5 8   CHRISTIE  --  8.5  --   --  8.3* 8.7   * 184* 198*   < > 208* 231*   ALBUMIN  --  3.6  --   --  3.7 3.7   PROTTOTAL  --  7.1  --   --  7.1 6.7*   BILITOTAL  --  0.3  --   --  0.5 0.8   ALKPHOS  --  106  --   --  117 104   ALT  --  45  --   --  47 53   AST  --  21  --   --  17 24    < > = values in this interval not displayed.     Recent Results (from the past 24 hour(s))   XR Chest Port 1 View    Narrative    EXAM: XR CHEST PORT 1 VIEW  LOCATION: [facility]  DATE/TIME: 1/23/2022 5:00 PM    INDICATION: Chest pain. Cough.  COMPARISON: 01/18/2022.      Impression    IMPRESSION: Mild hazy opacities in both lower lungs are suspicious for pneumonia. No pneumothorax. Pulmonary vascularity is within normal limits.     Medications     heparin 1,150 Units/hr (01/24/22 0339)       remdesivir  100 mg Intravenous Q24H    And     sodium chloride 0.9%  50 mL Intravenous Q24H     aspirin  81 mg Oral Daily     citalopram  20 mg Oral Daily     clopidogrel  75 mg Oral Daily     fexofenadine  180 mg Oral Daily     fluticasone  1 spray Both Nostrils Daily     insulin aspart  3 Units Subcutaneous TID w/meals     insulin aspart  1-7 Units Subcutaneous TID AC     insulin aspart  1-5 Units Subcutaneous At Bedtime     insulin detemir  10 Units Subcutaneous At Bedtime     insulin detemir  12 Units Subcutaneous QAM AC     loperamide  2 mg Oral Daily     losartan  25 mg Oral Daily     metoprolol succinate ER  25 mg Oral At Bedtime     pantoprazole  20 mg Oral Daily     rivastigmine  4.6 mg Transdermal Daily     rivastigmine   Transdermal Q8H     Vitamin D3  25 mcg Oral Daily

## 2022-01-24 NOTE — PROGRESS NOTES
Xcoverage: paged by RN regarding Telemetry; patient admitted with chest pain; Tele ordered.    ADDENDUM: 0108- pt requesting cough medications, Robitussin and Tessalon prn ordered.    Jumana Dye MD

## 2022-01-24 NOTE — PROVIDER NOTIFICATION
MD Notification    Notified Person: MD    Notified Person Name:Dr. Dye    Notification Date/Time:1/24/22 0035    Notification Interaction:AMCOM    Purpose of Notification:Pt has serial troponin checks. Would you like pt to be on Tele monitoring? Please advice.    Orders Received:Tele ordered.    Comments:

## 2022-01-24 NOTE — ED PROVIDER NOTES
Addendum:    Repeat ECG was performed and detailed below.     ECG:  ECG taken at 2217, ECG read at 2220  Normal sinus rhythm   Left axis deviation   Low voltage QRS   Inferior infarct, age undetermined  Abnormal ECG    No significant change as compared to prior, dated 1/23/22.  Rate 83 bpm. WY interval 150 ms. QRS duration 78 ms. QT/QTc 390/458 ms. P-R-T axes 60 -33 58.     Scribe Disclosure:  Lilly GUZMAN, am serving as a scribe at 10:23 PM on 1/23/2022 to document services personally performed by Dung Solis MD based on my observations and the provider's statements to me.      Dung Solis MD  01/23/22 2012

## 2022-01-24 NOTE — PROVIDER NOTIFICATION
MD Notification    Notified Person: MD    Notified Person Name:Dr. Dye    Notification Date/Time:1/24/22 0242    Notification Interaction:AMCOM    Purpose of Notification:FYI: Critical lab: Troponin 169  Please advice.      Orders Received:start heparin drip now, Tele, serial trops, echo already ordered, Cardiology consult in am.    Comments:

## 2022-01-24 NOTE — PROGRESS NOTES
Observation goals  PRIOR TO DISCHARGE        Comments: -diagnostic tests and consults completed and resulted , waiting for echo   -vital signs normal or at patient baseline , met

## 2022-01-24 NOTE — ED NOTES
[facility]  ED Nurse Handoff Report    ED Chief complaint: Chest Pain and Cough      ED Diagnosis:   Final diagnoses:   Infection due to 2019 novel coronavirus   Chest pain, unspecified type       Code Status: to be determined by admitting    Allergies:   Allergies   Allergen Reactions     Brilinta [Ticagrelor]      Duloxetine      Other reaction(s): Confusion  Per daughter who is MD     Gabapentin      Other reaction(s): Edema     Lisinopril Rash       Patient Story:   Per initial:  CP that started around 330pm today, has resolved itself now. Given 324 ASA by EMS, endorses a cough and HA as well.    Patient recently seen in ED for chest pain as well, covid negative at that time.       Focused Assessment:      NSR on cardiac monitor.   Cough.    Treatments and/or interventions provided:     Troponin: 18, then 50.   Covid +    Patient's response to treatments and/or interventions:   WNL    To be done/followed up on inpatient unit: Patient was prescribed Paxovid for discharge when planning to discharge from ED. Need to determine if this prescription will still be given.     Does this patient have any cognitive concerns?: Baseline dementia    Activity level - Baseline/Home:  Unknown, states that he walks  Activity Level - Current:   Unknown    Patient's Preferred language: English   Needed?: No    Isolation: COVID r/o and special precautions  Infection: COVID r/o and special precautions  Patient tested for COVID 19 prior to admission: YES  Bariatric?: No    Vital Signs:   Vitals:    01/23/22 2000 01/23/22 2030 01/23/22 2039 01/23/22 2100   BP: 133/80   134/86   Pulse: 75 78  77   Resp: 22 22 16   Temp: 99.6  F (37.6  C)      TempSrc: Oral      SpO2: 97% 96%     Weight:   94.3 kg (208 lb)    Height:           Cardiac Rhythm:     Was the PSS-3 completed:   Yes  What interventions are required if any?               Family Comments: lives at assisted living, both facility and family have been  updated  OBS brochure/video discussed/provided to patient/family: Yes              Name of person given brochure if not patient: patient              Relationship to patient: N/A    For the majority of the shift this patient's behavior was Green.   Behavioral interventions performed were none.    ED NURSE PHONE NUMBER: [id number]

## 2022-01-24 NOTE — PROGRESS NOTES
Special precautions maintained.Pt A&O self and situation. [place] and Lewy body dementia at baseline. VSS on RA. Denies and chest pain and SOB. C/o of headached and back pain PRN tylenol given with effect. PRN robitussin given for cough with effect. Tele: NSR.Up SBA with GB. Tolerating mod carb diet.B.Trop: 169. Started heparin; 1150 Units/hr with a loading dose 5650, Xa check due at 930. Started 1st dose of remdesivir. Plan for a resting echo today and cardiology consult. Discharge pending test results. Continue to monitor.

## 2022-01-24 NOTE — CONSULTS
Cardio consult dictated  Clinically Significant Risk Factors Present on Admission             # Platelet Defect: home medication list includes an antiplatelet medication                         Neurology: Dementia: Unspecified dementia with behavioral disturbance    Pulmonology: Pneumonia

## 2022-01-24 NOTE — CONSULTS
Consult Date: 01/24/2022    CARDIOLOGY CONSULTATION    REASON FOR CONSULTATION:  Elevated troponin in the setting of known coronary disease and COVID.    HISTORY OF PRESENT ILLNESS:  This is a pleasant 71-year-old gentleman ho unfortunately has Lewy body dementia and his history is of no use.  He thinks he is actually at his nursing home or wherever it is that he lives.  He had no idea he is in the hospital nor does he know why he is in the building.  Again, as I stated, he does not even know he is in the hospital.  He was unable to tell me anything about his past history, so this note is based on Dr. Strickland nurse practitioner's note and his admission note to this hospitalization.  He has had coronary disease.  I was able to review the 2 previous angiograms in 2018 and 2020.  He had stents to the LAD proximally in 2018.  In 2020, he had additional narrowing in the LAD and additional LAD stents and also had stent in the distal right coronary artery.  There is mild, but diffuse left circumflex disease.  His ejection fraction is reduced.  His last echo dated 12/07/2020 showed ejection fraction of 55% without regional wall motion abnormalities; however, I am a little suspect on that because his EKG shows Q-waves and his previous echo showed EF 40% to 45%, distal anteroseptal akinesis, severe apical wall akinesis.  Also, notes say that he is noncompliant with medication, and he told me (when I asked him about that) that he is unreliable to take his medicines and so someone else has to give it to him and it is not clear to me if he meant his wife who has paraplegia or if he really is in a nursing home and they are dispensing his medicines.  The reason I bring this up is he has been on triple therapy with aspirin, Xarelto and Plavix and somewhat recently the Xarelto was stopped, although he was on Xarelto for apparently an unprovoked pulmonary embolism and it was supposed to be long-term.  In any case, per the admission  doctor's note, the patient was in the Emergency Room on 01/18/2022 with chest pain, dizziness and lightheadedness.  He does not acknowledge that to me.  Treated with nitroglycerin and aspirin and a CT pulmonary embolism study and was discharged after serial troponins were negative.  He apparently developed recurrent chest pain on 01/23 and they that he said the pain was the same as with his previous heart attack, and again I did not quite get that same history, although he did mention he has had chest pain recently, but not today.  He was found to be COVID positive.  Apparently, he was vaccinated x2, but not 3, but again I do not know how reliable any that history is.    PAST MEDICAL HISTORY:  Per chart:  1.  Coronary stenting as above.  2.  Pulmonary embolism in 2018.  3.  Diabetes mellitus.  4.  GERD.  5.  Lewy body dementia.  6.  Hyperlipidemia.  7.  Obstructive sleep apnea.  8.  Shoulder surgery.  9.  Vasectomy.    MEDICATIONS:  On admission are unclear.  It is listed as Tylenol p.r.n., aspirin 81 mg daily, Lipitor 80 mg daily, Celexa 80 mg daily, Plavix 75 mg daily, Allegra 180 mg daily, Flonase daily, insulin, Imodium, losartan 25 mg daily, melatonin 10 mg at bedtime, Glucophage 1000 mg b.i.d., metoprolol XL 25 mg at bedtime, Protonix 20 mg daily, Seroquel 25 mg b.i.d. p.r.n. agitation, Rivastigmine Exelon patch daily, Senokot p.r.n., vitamin D.    ALLERGIES:  BRILINTA, DULOXETINE, GABAPENTIN AND LISINOPRIL.    I do not know if he lives in an assisted living center, which is what it sounds like.  He is a never smoker.  Does not use alcohol.  .    FAMILY HISTORY:  Not obtained except as stated mother had lupus.    REVIEW OF SYSTEMS:  Again, very difficult to get.  He had chest pain in the past and I really cannot get much more history from him.    PHYSICAL EXAMINATION:    GENERAL:  Reveals a pleasant gentleman, sitting upright and actually eating lunch.  VITAL SIGNS:  Blood pressure 114/68, temperature  98.8, heart rate 72.  SKIN:  No petechia or rash.  HEENT:  Eyes nonicteric.  NECK:  Supple, without thyromegaly or adenopathy.  CHEST:  Notable for crackles throughout both lung fields.  CARDIOVASCULAR:  Distant heart tones.  No obvious murmur or rub.  Carotid pulses 2+.  ABDOMEN:  Positive bowel sounds are noted.  EXTREMITIES:  No edema.  NEUROLOGIC:  Nonfocal and clearly memory disturbances.    LABORATORY DATA:  Previous heart cath and echoes are as above.  Today, sodium 135, potassium 4.3, creatinine 0.99.  Troponin was negative previously.  It is running in the low 100s to a peak of 292 on the high sensitivity troponin.  Blood sugar was 184.  CBC is unremarkable.  EKGs have shown Q-waves inferiorly and poor R-wave progression V1-V3, but there is no significant ST elevation.  CT of the chest dated 01/19 shows 2 mm right middle lobe nodule, unchanged; mild biapical scarring.  No evidence of pulmonary embolism.  Chest x-ray (01/23):  Mild hazy opacities in both lower lung suspicious for pneumonia.  COVID test is positive.    IMPRESSION:  This is a patient who has known multivessel coronary artery disease.  He had a previous history of pulmonary embolism.  He was taken off the Xarelto I believe within the past several months.  He is now COVID positive and may have COVID pneumonia.  His lungs are certainly junky sounding.  His troponins are minimally elevated, although they are slowly going up.  I recommend getting an echocardiogram to determine if there is a new drop in ejection fraction.  See history above regarding the reduced EF and the normalization.  Given that he has COVID and he does not have any further chest pain and the troponin is not that high, I do not think angiography is indicated.  We could recommend doing a Lexiscan nuclear stress test before discharge given that he was in the ER twice with his chest pain syndrome, although some of this could all be due to COVID.  He does have coronary disease.  The  other question is, is he actually taking his medicines.  Again, I do not know where he came from, it sounds like it was a nursing home.  If so, I am sure he is getting his prescribed medicines, but he freely told me he was incapable of taking his own medications at home in the past.  We will follow with you.    Asael Riggs MD        D: 2022   T: 2022   MT: [id number]    Name:     JUSTYN TYLER  MRN:      [id number]        Account:      [id number]   :      1950           Consult Date: 2022     Document: [id number]

## 2022-01-24 NOTE — ED NOTES
Patient called out, complaining of chest pain. Stated the pain was sharp, and radiated down his left arm and into jaw. Patient medicated with nitroglycerin and repeat EKG obtained. Within minutes, patient stated that his chest pain was improving, but still present. MD notified.    After 10 minutes, patient states he is completely pain free.

## 2022-01-24 NOTE — PROGRESS NOTES
Clinic Care Coordination Contact    Referral Type: Virtual Home Monitoring - [employer] Program    Patient referred for Virtual Home Monitoring Program for COVID-19 following recent [facility] ED visit.  [facility] referral is in place.    Criteria for Virtual Home Monitoring telephone outreach is not met after review of ED encounter/ED provider note because:    1) Patient was admitted 1/23/22 from ED.    [employer] team remains available to support patient via [employer] account once activated by patient.     Clau Sewell RN  [facility]  - RN Care Coordinator

## 2022-01-24 NOTE — PLAN OF CARE
Alert, confused, pulled his IV access x 1,  VSS on  RA, calm and cooperative, denies pain, A1 GB and W ambulating to the bathroom, no BM this shift, Troponin trending up, heparin at 1150 units/ hour infusing, CRP inflammation at 16.6, NM lexiscan pending, tele NSR, continue to monitor.

## 2022-01-24 NOTE — PHARMACY-ADMISSION MEDICATION HISTORY
Pharmacy Medication History  Admission medication history interview status for the 1/23/2022  admission is complete. See EPIC admission navigator for prior to admission medications     Location of Interview: Outside patient room but on unit  Medication history sources: MAR ([facility] assisted living)    Significant changes made to the medication list:  Deleted metoprolol tartrate, omeprazole, loperamide  Added metoprolol succinate, pantoprazole  Adjusted melatonin dose and nightly insulin from 15 to 14 units  In the past week, patient estimated taking medication this percent of the time: greater than 90%    Additional medication history information:   Nurse Shannon called back and went over all the meds and last doses as the home is unable to fax MAR after hours    Medication reconciliation completed by provider prior to medication history? No    Time spent in this activity: 25min    Prior to Admission medications    Medication Sig Last Dose Taking? Auth Provider   acetaminophen (TYLENOL) 325 MG tablet Take 325-650 mg by mouth every 6 hours as needed for mild pain 12/17/2021 at n/a Yes Unknown, Entered By History   aspirin (ASA) 81 MG EC tablet Take 1 tablet (81 mg) by mouth daily 1/23/2022 at am Yes Wong Phan MD   atorvastatin (LIPITOR) 80 MG tablet Take 1 tablet (80 mg) by mouth daily 1/22/2022 at pm Yes Wilber Strickland MD   citalopram (CELEXA) 20 MG tablet Take 20 mg by mouth daily 1/23/2022 at am Yes Unknown, Entered By History   clopidogrel (PLAVIX) 75 MG tablet Take 1 tablet (75 mg) by mouth daily 1/23/2022 at am Yes Lauren Knox MD   fexofenadine (ALLEGRA) 180 MG tablet Take 180 mg by mouth daily 1/23/2022 at am Yes Unknown, Entered By History   fluticasone (FLONASE) 50 MCG/ACT nasal spray Spray 1 spray into both nostrils daily 1/22/2022 at Unknown time Yes Unknown, Entered By History   insulin aspart (NOVOLOG FLEXPEN) 100 UNIT/ML pen Inject 7 Units Subcutaneous 3 times daily (with meals)  Breakfast and Dinner 1/23/2022 at Unknown time Yes Malcolm Landaverde MD   insulin detemir (LEVEMIR PEN) 100 UNIT/ML pen Inject 20 Units Subcutaneous every morning (before breakfast)  Patient taking differently: Inject 18 Units Subcutaneous every morning (before breakfast)  1/23/2022 at am Yes Malcolm Landaverde MD   insulin detemir (LEVEMIR PEN) 100 UNIT/ML pen Inject 14 Units Subcutaneous At Bedtime  1/22/2022 at Unknown time Yes Malcolm Landaverde MD   loperamide (IMODIUM) 2 MG capsule Take 2 mg by mouth daily 1/22/2022 at Unknown time Yes Unknown, Entered By History   loperamide (IMODIUM) 2 MG capsule Take 2 mg by mouth 3 times daily as needed for diarrhea prn at prn Yes Unknown, Entered By History   losartan (COZAAR) 25 MG tablet Take 1 tablet (25 mg) by mouth daily 1/23/2022 at am Yes Deja Lomax APRN CNP   Melatonin 10 MG TABS tablet Take 10 mg by mouth At Bedtime  1/22/2022 at Unknown time Yes Unknown, Entered By History   metFORMIN (GLUCOPHAGE) 1000 MG tablet Take 1,000 mg by mouth 2 times daily (with meals) 1/23/2022 at am Yes Unknown, Entered By History   metoprolol succinate ER (TOPROL-XL) 25 MG 24 hr tablet Take 25 mg by mouth At Bedtime 1/22/2022 at Unknown time Yes Unknown, Entered By History   nirmatrelvir and ritonavir (PAXLOVID) therapy pack Take 3 tablets by mouth 2 times daily for 5 days Take 2 Nirmatrelvir tablets and 1 Ritonavir tablet twice daily for 5 days.  Yes Trigger, Dung Cotton MD   nitroGLYcerin (NITROSTAT) 0.4 MG sublingual tablet Place 1 tablet (0.4 mg) under the tongue every 5 minutes as needed for chest pain If symptoms persist 5 minutes after 2nd dose call 911. 1/18/2022 at n/a Yes Chante Givens PA-C   pantoprazole (PROTONIX) 20 MG EC tablet Take 20 mg by mouth daily 1/23/2022 at am Yes Unknown, Entered By History   QUEtiapine (SEROQUEL) 25 MG tablet Take 1 tablet (25 mg) by mouth every 12 hours as needed (agitation) 1/19/2022 at  n/a Yes Malcolm Landaverde MD   rivastigmine (EXELON) 4.6 MG/24HR 24 hr patch Place 4.6 mg onto the skin daily 1/23/2022 at Unknown time Yes Unknown, Entered By History   sennosides (SENOKOT) 8.6 MG tablet Take 2 tablets by mouth daily as needed for constipation prn at prn Yes Wong Phan MD   Vitamin D (Cholecalciferol) 25 MCG (1000 UT) TABS Take 1 tablet by mouth daily  1/23/2022 at am Yes Unknown, Entered By History       The information provided in this note is only as accurate as the sources available at the time of update(s)

## 2022-01-24 NOTE — PROGRESS NOTES
Xcoverage: paged by RN regarding troponin up to 169<--50<--18; he is admitted with chest pain and COVID infection; he is already on ASA and Plavix (his home meds); start heparin drip now, Tele, serial trops, echo already ordered, Cardiology consult in am.    Jumana Dye MD

## 2022-01-24 NOTE — ED NOTES
Paxlovid and home medication drug interactions reviewed in the ED.     Recommended holding for 8 days:   -atorvastatin  -quetiapine prn  -metformin -risk for lactic acidosis    Prema Whalen RPH on 1/23/2022 at 9:23 PM

## 2022-01-24 NOTE — UTILIZATION REVIEW
Admission Status; Secondary Review Determination         Under the authority of the Utilization Management Committee, the utilization review process indicated a secondary review on the above patient.  The review outcome is based on review of the medical records, discussions with staff, and applying clinical experience noted on the date of the review.        (x)      Inpatient Status Appropriate - This patient's medical care is consistent with medical management for inpatient care and reasonable inpatient medical practice.      RATIONALE FOR DETERMINATION   The patient is a 71-year-old admitted on 1/23/2022.  The patient has had chest pain which caused him to be sent to the emergency room.  He does have Lewy body dementia with significant loss of cognition.  He has rising troponins and a history of coronary artery disease.  Cardiology consultation recommends a Lexiscan and possible intervention if appropriate.  Patient also has positive COVID-19 infection with pneumonia seen on chest x-ray.  Patient has been started on remdesivir and likely antibiotics.  Troponin has increased to 292.  CRP is 16.6.  Patient also has diabetes mellitus and is being managed with long and short acting insulin.  Sliding scale added.  Based on complexity and need for at least an additional day stay in the hospital, recommend switching to inpatient status.  An [employer] paging text was sent to Dr. Betsy Robledo  with this request and to call me if there are any questions.    The severity of illness, intensity of service provided, expected LOS and risk for adverse outcome make the care complex, high risk and appropriate for hospital admission.        The information on this document is developed by the utilization review team in order for the business office to ensure compliance.  This only denotes the appropriateness of proper admission status and does not reflect the quality of care rendered.         The definitions of Inpatient Status  and Observation Status used in making the determination above are those provided in the CMS Coverage Manual, Chapter 1 and Chapter 6, section 70.4.      Sincerely,     aB Robles MD  Physician Advisor  Utilization Review/ Case Management  [facility].

## 2022-01-24 NOTE — PROGRESS NOTES
RECEIVING UNIT ED HANDOFF REVIEW    ED Nurse Handoff Report was reviewed by: Sophia Lunsford RN on January 23, 2022 at 10:03 PM

## 2022-01-24 NOTE — PROGRESS NOTES
Observation goals  PRIOR TO DISCHARGE        Comments: -diagnostic tests and consults completed and resulted: Not met  -vital signs normal or at patient baseline: Met  Nurse to notify provider when observation goals have been met and patient is ready for discharge.

## 2022-01-25 NOTE — PROGRESS NOTES
"I spoke to hospitalist this afternoon  Pt not in room, he is down for nuc gxt  The hospitalist was concerned that our echo shows EF 30-35 with WMA yet prior echo was \"normal\"  Per my consult note I was rather certain the \"normal\" echo was wrong since he had an MI, his ecg showed Q waves and an earlier echo showed EF 40 with WMA  I recommend continuing with getting the nuc GXT  My interview with pt yesterday showed an unreliable history due to his dementia.    I will see pt tomorrow, given the late hour the nuc GXT will not be completed for quite some time    Pt not seen by me today, only discussed with hospital service Dr Robledo  Per RN notes, no issues over noc  "

## 2022-01-25 NOTE — PROGRESS NOTES
[facility]    Medicine Progress Note - Hospitalist Service    Date of Admission:  1/23/2022    Assessment & Plan          Matt Way is a 71 year old male admitted on 1/23/2022    Chest pain similar to previous MI  History of coronary artery disease status post PCI of the LAD  Troponin trending up  EKG with no acute ischemic changes  - continue ASA  - serial troponins  - resting echocardiogram with significant decrease in EF from previous as well as WMAs - however, I spoke with Dr Riggs who clarified that the previous TTE report was wrong and actual imaging is not significantly changed from previous. We will go forward with Lexiscan.  - Heparin gtt was started overnight 1/24. Appreciate cardiology consult. Lexiscan stress test ordered   - continue telemetry  - continue home aspirin, Plavix, losartan, and metoprolol    COVID 19 infection likely causing mild pneumonia with no hypoxemia  Patient is vaccinated but not boosted.  Mild amount of pneumonia on the CXR but no hypoxemia yet  At this point his chest x-ray findings most likely represent COVID; he does not endorse any productive cough or fever  Nonetheless we will be vigilant in case he is developing early bacterial pneumonia  - remdesivir and decadron while he is hospitalized  - Procalcitonin was not elevated. Low suspicion for secondary bacterial pneumonia.  - He was prescribed paxlovid by the emergency department physician and is apparently available at the [place] outpatient pharmacy  - He could probably start Paxlovid on discharge.  The emergency department pharmacist had gone over his medications and advised holding Seroquel, atorvastatin, and metformin for total of 8 days after starting the paxlovid    Type 2 diabetes  - Resume aspart and Levemir at slightly reduced dose  - sliding scale insulin and diabetic diet    HLD  - resume statin    Lewy body dementia  - Hold home Seroquel in anticipation of his anticoagulant  medication  - continue rivastigmine       Diet: No Caffeine Diet    DVT Prophylaxis: heparin infusion  Schulte Catheter: Not present  Central Lines: None  Cardiac Monitoring: ACTIVE order. Indication: Chest pain/ ACS rule out (24 hours)  Code Status: Full Code      Disposition Plan   Expected Discharge: 01/26/2022     Anticipated discharge location:  Awaiting care coordination huddle  Delays:     Other (Add Comment)          The patient's care was discussed with the Patient.    Betsy Robledo MD  Hospitalist Service  [facility]  Securely message with the Vocera Web Console (learn more here)  Text page via [employer] Paging/Directory         Clinically Significant Risk Factors Present on Admission           # Hypocalcemia: Ca = 8.4 mg/dL (Ref range: 8.5 - 10.1 mg/dL) and/or iCa = N/A on admission, will replace as needed     # Platelet Defect: home medication list includes an antiplatelet medication    # Diabetes, type II: last A1C 7.0 % (Ref range: 0.0 - 5.6 %)  # Overweight: last Body mass index is 27.38 kg/m .      ______________________________________________________________________    Interval History   Patient feels well, no CP or SOB at the time of my eval. No acute complaints. Lexiscan pending.    Data reviewed today: I reviewed all medications, new labs and imaging results over the last 24 hours. I personally reviewed no images or EKG's today.    Physical Exam   Vital Signs: Temp: 99.1  F (37.3  C) Temp src: Oral BP: 110/58 Pulse: 61   Resp: 16 SpO2: 95 % O2 Device: None (Room air)    Weight: 207 lbs 8 oz  Constitutional: Awake, alert, cooperative, no apparent distress  HEENT: neck supple, no LAD noted, moist mucous membranes  Respiratory: Clear to auscultation bilaterally, no crackles or wheezing  Cardiovascular: Regular rate and rhythm, normal S1 and S2, and no murmur noted  GI: Normal bowel sounds, soft, non-distended, non-tender  Skin/Integumen: No rashes, no cyanosis, no  edema  Ext: no edema, moving all extremities  Neuro: CN 2-12 intact, non focal exam      Data   Recent Labs   Lab 01/25/22  0919 01/25/22  0636 01/25/22  0151 01/24/22  1147 01/24/22  0810 01/24/22  0026 01/23/22  1652   WBC  --  3.9*  --   --  4.1  --  6.2   HGB  --  13.4  --   --  13.9  --  13.6   MCV  --  85  --   --  87  --  87   PLT  --  150  --   --  171  --  189   NA  --  133  --   --  135  --  134   POTASSIUM  --  4.2  --   --  4.3  --  4.7   CHLORIDE  --  107  --   --  105  --  104   CO2  --  23  --   --  23  --  25   BUN  --  18  --   --  15  --  16   CR  --  0.88  --   --  0.99  --  1.19   ANIONGAP  --  3  --   --  7  --  5   CHRISTIE  --  8.4*  --   --  8.5  --  8.3*   * 137* 135*   < > 184*   < > 208*   ALBUMIN  --   --   --   --  3.6  --  3.7   PROTTOTAL  --   --   --   --  7.1  --  7.1   BILITOTAL  --   --   --   --  0.3  --  0.5   ALKPHOS  --   --   --   --  106  --  117   ALT  --   --   --   --  45  --  47   AST  --   --   --   --  21  --  17    < > = values in this interval not displayed.     No results found for this or any previous visit (from the past 24 hour(s)).  Medications    heparin 1,150 Units/hr (01/25/22 1021)      remdesivir  100 mg Intravenous Q24H    And    sodium chloride 0.9%  50 mL Intravenous Q24H    aspirin  81 mg Oral Daily    citalopram  20 mg Oral Daily    clopidogrel  75 mg Oral Daily    dexamethasone  6 mg Oral Daily    fexofenadine  180 mg Oral Daily    fluticasone  1 spray Both Nostrils Daily    insulin aspart  3 Units Subcutaneous TID w/meals    insulin aspart  1-7 Units Subcutaneous TID AC    insulin aspart  1-5 Units Subcutaneous At Bedtime    insulin detemir  10 Units Subcutaneous At Bedtime    insulin detemir  12 Units Subcutaneous QAM AC    loperamide  2 mg Oral Daily    losartan  25 mg Oral Daily    metoprolol succinate ER  25 mg Oral At Bedtime    pantoprazole  20 mg Oral Daily    rivastigmine  4.6 mg Transdermal Daily    rivastigmine   Transdermal Q8H    Vitamin  D3  25 mcg Oral Daily

## 2022-01-25 NOTE — PROGRESS NOTES
Care Suites Procedure Nursing Note    Patient Information  Name: Matt Way  Age: 71 year old    Procedure: Lexiscan Stress: Patient admitted for Lexiscan stress test. Admission intake info taken. PIV in place and is flushed and patent. Patient's lungs sound clear to auscultation. Patient was monitored throughout test with 12 lead EKG, BP and O2 sats. Patient tolerated well. VSS. Patient had some shortness of breath but denied chest pain or pressure throughout monitoring period.    1430- Pt transferred back to  515-1 per wheelchair.  Procedure start time: 1415  Procedure complete time: 1430  Concerns/abnormal assessment: No  If abnormal assessment, provider notified: N/A  Plan/Other: return to [facility] for scans.    Yessica Farrell RN

## 2022-01-25 NOTE — PLAN OF CARE
Pt oriented to self only. COVID precautions maintained. Denies pain. VSS on room air. Heparin drip infusing. Hep10a was therapeutic, next hep10a tomorrow in AM. Echo completed. Pt has been NPO for [facility]. Pt is currently at [facility].

## 2022-01-25 NOTE — PLAN OF CARE
Special precautions maintained. [place] and Lewy body dementia at baseline. Alert to self only. VSS on RA. Heparin drip 1150 units. PRN robitussin given for cough. Tele NSR. Up SBA with GB. Mod carb diet NPO 3 hrs before procedure. BG@ 10P 157, 1 unit insulin given and 0200 is 135.Trop 292. Scheduled remdesivir given. Exolon patch in place R back side. Plan for echo and lexiscan nuc today. Discharge pending test results. Cardio is following. Denies chest pain, sob or lightheadedness. Continue to monitor.

## 2022-01-26 NOTE — PLAN OF CARE
COVID special precautions maintained. AO to self, occassionally place/situation. VSS on RA. Hep drip at 1150 units/hr. Hep 10a recheck in AM. Denies pain. Moves SBA. Tele SR. Denies CP, dizziness, SOB. Cardiology following. Discharge pending. Continue to monitor.

## 2022-01-26 NOTE — PROGRESS NOTES
See my first note today  I spoke with Danielle  His daughter who is MD in [place]  Went thru consent form for heart cath pci interventions  She wishes for us to proceed  Verbal telephone consent with me and witnessed by Lula GRACIA on [facility]    Danielle [phone]      No openings today so will plan tomorrow

## 2022-01-26 NOTE — PROGRESS NOTES
"Patient has made comments about \"wanting to shoot myself\", \"strangle myself with this cord (IV line)\", \"why won't anyone just let me die? I can't think anymore by myself\", \"I want to die and be shot.\"     Sitter placed in room d/t impulsiveness & now SI.   " not applicable

## 2022-01-26 NOTE — PROGRESS NOTES
[facility]  Cardiology Progress Note         Assessment and Plan:     Chest pain, unspecified type    Infection due to 2019 novel coronavirus    lewy body dementia      I have reviewed the prior caths 2018 2020  Stent to rca system and recurrent stent to prox lad system but there was diffuse mid/distal lad diseae  The echo from last year WAS WRONG (see my prior note to hospitalist)  The current nuclear gxt shows ant and inf ischemia with moderate ant (and minimal inf) ischemia  Given some ?cp and elevated Tp we recommend repeat heart cath  I had long discussion via phone with his wife and she wishes for us to do cath but she said her daughter Danielle (--a GI MD in [place]) is POA or at least is the one that would be needed for consent  I left a long detailed VM for Danielle to call me and risk/benefit/indication for cath  I havent heard back from her  Danielle [phone]    Pt wishes to proceed but he clearly is not able to consent               Interval History:   Denies cp but he doesn't realize he is in a hospital and cannot tell me what city or state he is currently in              Medications:   Scheduled Meds:     remdesivir  100 mg Intravenous Q24H    And     sodium chloride 0.9%  50 mL Intravenous Q24H     aspirin  81 mg Oral Daily     citalopram  20 mg Oral Daily     clopidogrel  75 mg Oral Daily     dexamethasone  6 mg Oral Daily     fexofenadine  180 mg Oral Daily     fluticasone  1 spray Both Nostrils Daily     insulin aspart  3 Units Subcutaneous TID w/meals     insulin aspart  1-7 Units Subcutaneous TID AC     insulin aspart  1-5 Units Subcutaneous At Bedtime     insulin detemir  10 Units Subcutaneous At Bedtime     insulin detemir  12 Units Subcutaneous QAM AC     loperamide  2 mg Oral Daily     losartan  25 mg Oral Daily     metoprolol succinate ER  25 mg Oral At Bedtime     pantoprazole  20 mg Oral Daily     rivastigmine  4.6 mg Transdermal Daily     rivastigmine   Transdermal Q8H  "    Vitamin D3  25 mcg Oral Daily     PRN Meds: acetaminophen, benzonatate, glucose **OR** dextrose **OR** glucagon, guaiFENesin, HOLD MEDICATION, HOLD MEDICATION, HOLD MEDICATION, HOLD MEDICATION, loperamide, melatonin, nitroGLYcerin, ondansetron **OR** ondansetron, senna-docusate **OR** senna-docusate, sennosides, sodium chloride (PF), sodium chloride (PF)         Physical Exam:   Blood pressure 135/78, pulse 57, temperature 97.5  F (36.4  C), temperature source Oral, resp. rate 16, height 1.854 m (6' 1\"), weight 94.1 kg (207 lb 8 oz), SpO2 95 %.  Vitals:    229 22 0240   Weight: 94.3 kg (208 lb) 94.1 kg (207 lb 8 oz)       Intake/Output Summary (Last 24 hours) at 2022 1055  Last data filed at 2022 1915  Gross per 24 hour   Intake 540 ml   Output --   Net 540 ml           Vital Sign Ranges  Temperature Temp  Av.7  F (36.5  C)  Min: 97.3  F (36.3  C)  Max: 98.6  F (37  C)   Blood pressure Systolic (24hrs), Av , Min:106 , Max:139        Diastolic (24hrs), Av, Min:53, Max:86      Pulse Pulse  Av.2  Min: 57  Max: 71   Respirations Resp  Av.7  Min: 16  Max: 20   Pulse oximetry SpO2  Av.8 %  Min: 92 %  Max: 98 %             Constitutional: Awake, alert, cooperative, no apparent distress       Skin: No rash, petechia, cyanosis       Neck: No thyromegaly or adenopathy       Lungs: Basilar crackles mild       Cardiovasc: RRR no murmur or rub       Abdomen: Normal bowel sounds, soft, non-distended, non-tender, no hepatomegaly       Neuro: confused non focal exam       Extremities: No edema       Other:             Data:     Recent Labs   Lab Test 22  0636 22  0636 21  1550 20  0830   WBC 4.1 3.9*   < > 7.2   HGB 14.7 13.4   < > 14.4   MCV 84 85   < > 86    150   < > 219   INR  --   --   --  1.06    < > = values in this interval not displayed.      Recent Labs   Lab Test 22  0636 22  0636   * 133   POTASSIUM 4.2 4.2 "   CHLORIDE 104 107   BUN 21 18   CR 0.88 0.88     Recent Labs   Lab 01/26/22  0745 01/26/22  0636 01/26/22  0211 01/25/22  2155   * 247* 238* 284*     Recent Labs   Lab Test 01/24/22  0810 01/23/22  1652   ALT 45 47   AST 21 17     No components found for: TROPONINIES    Lab Results   Component Value Date    CHOL 150 09/23/2020     Lab Results   Component Value Date    HDL 37 09/23/2020     Lab Results   Component Value Date    LDL 72 09/23/2020     Lab Results   Component Value Date    TRIG 206 09/23/2020     No results found for: CHOLHDLRATIO       TSH   Date Value Ref Range Status   06/28/2021 0.79 0.40 - 4.00 mU/L Final

## 2022-01-26 NOTE — PLAN OF CARE
A/O x2, disoriented to time and situation at times. VSS on RA. Tele NSR. Up with SBA. Tolerating no caffeine diet. , 284, sliding scale given. Heparin running at 1150, recheck scheduled for AM. Remdezavir given. Discharge pending.

## 2022-01-26 NOTE — PROVIDER NOTIFICATION
MD Notification    Notified Person: MD    Notified Person Name: Kristine    Notification Date/Time: 1/26 1530    Notification Interaction: Text paged    Purpose of Notification:    pt has made many suicidal comments, though he does contract for safety - do you want a psych consult?     Orders Received: Psych consult placed, SI precautions in place, sitter at bedside    Comments:

## 2022-01-26 NOTE — PROGRESS NOTES
[facility]    Medicine Progress Note - Hospitalist Service        Date of Admission:  1/23/2022  4:45 PM    Assessment & Plan:   Matt Way is a 71 year old male admitted on 1/23/2022     Chest pain   Abnormal stress test  History of coronary artery disease status post PCI of the LAD  -Troponin minimally elevated with a peak of 292  -D-dimer is elevated however, patient had a higher D-dimer during his prior ER visit on 1/18, and CT chest was negative for PE at that time.   -Chest pain most likely is cardiac.  -resting echocardiogram with significant decrease in EF from previous as well as WMAs - however, per Dr Riggs who clarified that the previous TTE report was wrong and actual imaging is not significantly changed from previous.   -Underwent Lexiscan today which showed large area of transmural infarction in the apical, anterior, anteroseptal segments of the left ventricle associated with moderate degree of alen-infarct ischemia.  -Cardiology following, plan for coronary angiogram tomorrow  -Continue heparin drip  -Continue aspirin, Plavix, atorvastatin, losartan and metoprolol.     COVID 19 infection likely causing mild pneumonia with no hypoxemia  -Patient is vaccinated but not boosted.  -Mild amount of pneumonia on the CXR but no hypoxemia yet  -At this point his chest x-ray findings most likely represent COVID  -Continue remdesivir and decadron while he is hospitalized  -Monitor oxygen status closely.    Type 2 diabetes  -Continue sliding scale, increase to high resistance.  -Continue Levemir 10 units at bedtime and 12 units in the morning(slightly reduced from home dose).  Although will hold a.m. dose of Levemir tomorrow as patient will be n.p.o.     HLD  -resume Lipitor     Lewy body dementia  -continue to admission rivastigmine and Seroquel       Diet: Low Saturated Fat Na <2400 mg     DVT Prophylaxis: Heparin SQ   Schulte Catheter: Not present  Code Status: Full Code     Disposition  "Plan    Expected Discharge: 01/27/2022     Anticipated discharge location:  Awaiting care coordination huddle  Delays:     Other (Add Comment)          Entered: Raúl Stern MD 01/26/2022, 12:32 PM        Clinically Significant Risk Factors Present on Admission             # Diabetes, type II: last A1C 7.0 % (Ref range: 0.0 - 5.6 %)  # Overweight: last Body mass index is 27.38 kg/m .        The patient's care was discussed with the Bedside Nurse and Patient.    Raúl Stern MD  Hospitalist Service  [facility]  Text Page 7AM-6PM  Securely message with the Vocera Web Console (learn more here)  Text page via [employer] Paging/Directory    ______________________________________________________________________    Interval History   Patient denies chest pain or dyspnea.  He is afebrile.  Stress test came back abnormal.  Endorses mild cough.    Data reviewed today: I reviewed all medications, new labs and imaging results over the last 24 hours. I personally reviewed no images or EKG's today.    Physical Exam   Vital signs:  Temp: 98.3  F (36.8  C) Temp src: Axillary BP: 135/79 Pulse: 57   Resp: 16 SpO2: 94 % O2 Device: None (Room air)   Height: 185.4 cm (6' 1\") Weight: 94.1 kg (207 lb 8 oz)  Estimated body mass index is 27.38 kg/m  as calculated from the following:    Height as of this encounter: 1.854 m (6' 1\").    Weight as of this encounter: 94.1 kg (207 lb 8 oz).      Wt Readings from Last 2 Encounters:   01/24/22 94.1 kg (207 lb 8 oz)   01/18/22 94.3 kg (208 lb)       Gen: AAOX3, NAD, comfortable  HEENT: Supple neck, moist oral mucosa, no pallor  Resp: CTA B/L, normal WOB, no crackles, no wheezes  CVS: RRR, no murmur  Abd/GI: Soft, non-tender. BS- normoactive.  No G/R/R  Skin: Warm, dry no rashes  MSK: No joint deformities, no pedal edema  Neuro- CN- intact. No focal deficits.       Data   Recent Labs   Lab 01/26/22  0745 01/26/22  0636 01/26/22  0211 01/25/22  0919 01/25/22  0636 " 01/24/22  1147 01/24/22  0810 01/24/22  0026 01/23/22  1652   WBC  --  4.1  --   --  3.9*  --  4.1  --  6.2   HGB  --  14.7  --   --  13.4  --  13.9  --  13.6   MCV  --  84  --   --  85  --  87  --  87   PLT  --  178  --   --  150  --  171  --  189   NA  --  132*  --   --  133  --  135  --  134   POTASSIUM  --  4.2  --   --  4.2  --  4.3  --  4.7   CHLORIDE  --  104  --   --  107  --  105  --  104   CO2  --  23  --   --  23  --  23  --  25   BUN  --  21  --   --  18  --  15  --  16   CR  --  0.88  --   --  0.88  --  0.99  --  1.19   ANIONGAP  --  5  --   --  3  --  7  --  5   CHRISTIE  --  8.7  --   --  8.4*  --  8.5  --  8.3*   * 247* 238*   < > 137*   < > 184*   < > 208*   ALBUMIN  --   --   --   --   --   --  3.6  --  3.7   PROTTOTAL  --   --   --   --   --   --  7.1  --  7.1   BILITOTAL  --   --   --   --   --   --  0.3  --  0.5   ALKPHOS  --   --   --   --   --   --  106  --  117   ALT  --   --   --   --   --   --  45  --  47   AST  --   --   --   --   --   --  21  --  17    < > = values in this interval not displayed.       Recent Results (from the past 24 hour(s))   NM Lexiscan stress test (nuc card)   Result Value    Target     Baseline Systolic     Baseline Diastolic BP 71    Last Stress Systolic     Last Stress Diastolic BP 65    Baseline HR 60    Max HR  71    Max Predicted HR  47    Rate Pressure Product 8,449.0    Left Ventricular EF 34    Narrative       The nuclear stress test is abnormal.     There is a large area of transmural infarction in the apical, anterior   and anteroseptal segment(s) of the left ventricle associated with a   moderate degree of alen-infarct ischemia.     There is nontransmural infarction in the inferior and inferoseptal   segment(s) of the left ventricle associated with a mild degree of   alen-infarct ischemia.     Left ventricular function is moderately reduced.     The left ventricular ejection fraction at stress is 34%.     Mild left ventricular  enlargement is noted.     There is no prior study for comparison.       Medications     heparin 950 Units/hr (01/26/22 0956)       remdesivir  100 mg Intravenous Q24H    And     sodium chloride 0.9%  50 mL Intravenous Q24H     aspirin  81 mg Oral Daily     citalopram  20 mg Oral Daily     clopidogrel  75 mg Oral Daily     dexamethasone  6 mg Oral Daily     fexofenadine  180 mg Oral Daily     fluticasone  1 spray Both Nostrils Daily     insulin aspart  3 Units Subcutaneous TID w/meals     insulin aspart  1-7 Units Subcutaneous TID AC     insulin aspart  1-5 Units Subcutaneous At Bedtime     insulin detemir  10 Units Subcutaneous At Bedtime     insulin detemir  12 Units Subcutaneous QAM AC     loperamide  2 mg Oral Daily     losartan  25 mg Oral Daily     metoprolol succinate ER  25 mg Oral At Bedtime     pantoprazole  20 mg Oral Daily     rivastigmine  4.6 mg Transdermal Daily     rivastigmine   Transdermal Q8H     Vitamin D3  25 mcg Oral Daily

## 2022-01-27 NOTE — PROGRESS NOTES
[facility]  Cardiology Progress Note         Assessment and Plan:     Chest pain, unspecified type    Infection due to 2019 novel coronavirus    lewy body dementia    40 min visit today includes pt visit, review of cath, discussion with dr mobley and call to his daughter (see below)  I have reviewed the prior caths 2018 2020 and today cath (see below)  Stent to rca system and recurrent stent to prox lad system but there was diffuse mid/distal lad diseae  The echo from last year I think was incorrect (see my prior note to hospitalist)  The current nuclear gxt shows ant and inf ischemia with moderate ant (and minimal inf) ischemia and infarct anterior >inferior  Given some ?cp and elevated Tp we recommend repeat heart cath  Heart cath today with dr mobley, the Lad stents in essence are closed wit trivial antegrade flow-it is possible this is due to poor run off given known diffuse mid/distal Lad disease and partially infarcted ant wall leading to less flow and this could contribute to stent closure. In addition with covid which is procoagulating this could also contribute to stent closure.  Dr Mobley felt a third go around stenting this prox Lad is futile and he has survived the closure.  I called  Danielle [phone] his daughter (she is an MD) and explained the results of cath to her    Separately he had an RRT due to behavior issues in middle of night   There is a history of occasionally this happening at his care center and some missed meds as a result   That too might make another stent less effective (if missed p2y12 meds etc)    I will add imdur empirically to his meds  On low dose BB we are limited by low HR so cant raise that dose    Home anytime per hospitalist                   Interval History:   See above  No cp    Post cath doing ok              Medications:   Scheduled Meds:     remdesivir  100 mg Intravenous Q24H    And     sodium chloride 0.9%  50 mL Intravenous Q24H     aspirin  81  "mg Oral Daily     atorvastatin  80 mg Oral Daily     citalopram  20 mg Oral Daily     clopidogrel  75 mg Oral Daily     dexamethasone  6 mg Oral Daily     fexofenadine  180 mg Oral Daily     fluticasone  1 spray Both Nostrils Daily     insulin aspart  1-10 Units Subcutaneous TID AC     insulin aspart  1-7 Units Subcutaneous At Bedtime     insulin aspart  3 Units Subcutaneous TID w/meals     insulin detemir  10 Units Subcutaneous At Bedtime     [Held by provider] insulin detemir  12 Units Subcutaneous QAM AC     loperamide  2 mg Oral Daily     losartan  25 mg Oral Daily     metoprolol succinate ER  25 mg Oral At Bedtime     pantoprazole  20 mg Oral Daily     rivastigmine  4.6 mg Transdermal Daily     rivastigmine   Transdermal Q8H     Vitamin D3  25 mcg Oral Daily     PRN Meds: sodium chloride 0.9%, acetaminophen, acetaminophen, atropine, benzonatate, glucose **OR** dextrose **OR** glucagon, fentaNYL, flumazenil, guaiFENesin, HOLD MEDICATION, loperamide, melatonin, midazolam, naloxone **OR** naloxone **OR** naloxone **OR** naloxone, nitroGLYcerin, ondansetron **OR** ondansetron, oxyCODONE **OR** oxyCODONE, senna-docusate **OR** senna-docusate, sennosides         Physical Exam:   Blood pressure 132/72, pulse 51, temperature (!) 96.5  F (35.8  C), temperature source Axillary, resp. rate 16, height 1.854 m (6' 1\"), weight 94.1 kg (207 lb 8 oz), SpO2 97 %.  Vitals:    22 0240   Weight: 94.3 kg (208 lb) 94.1 kg (207 lb 8 oz)       Intake/Output Summary (Last 24 hours) at 2022 1055  Last data filed at 2022 1915  Gross per 24 hour   Intake 540 ml   Output --   Net 540 ml           Vital Sign Ranges  Temperature Temp  Av.7  F (36.5  C)  Min: 97.3  F (36.3  C)  Max: 98.6  F (37  C)   Blood pressure Systolic (24hrs), Av , Min:106 , Max:139        Diastolic (24hrs), Av, Min:53, Max:86      Pulse Pulse  Av.2  Min: 57  Max: 71   Respirations Resp  Av.7  Min: 16  Max: 20 " "  Pulse oximetry SpO2  Av.8 %  Min: 92 %  Max: 98 %             Constitutional: Awake, alert, cooperative, no apparent distress       Skin: No rash, petechia, cyanosis       Neck: No thyromegaly or adenopathy       Lungs: Clear today but limited exam since he is not awake so not really taking large breaths       Cardiovasc: RRR no murmur or rub    Cath site ok--TR band in place good color to fingers   Abdomen: Normal bowel sounds, soft, non-distended, non-tender, no hepatomegaly       Neuro: Sedated post cath and \"\" in room, so he is not awake       Extremities: No edema       Other:             Data:     Recent Labs   Lab Test 22  0655 22  0636 21  1550 20  0830   WBC 6.1 4.1   < > 7.2   HGB 14.8 14.7   < > 14.4   MCV 83 84   < > 86    178   < > 219   INR  --   --   --  1.06    < > = values in this interval not displayed.      Recent Labs   Lab Test 22  0636 22  0636   * 133   POTASSIUM 4.2 4.2   CHLORIDE 104 107   BUN 21 18   CR 0.88 0.88     Recent Labs   Lab 22  0727 22  2100 22  1712 22  1224   * 264* 375* 259*     Recent Labs   Lab Test 22  0810 22  1652   ALT 45 47   AST 21 17     No components found for: TROPONINIES    Lab Results   Component Value Date    CHOL 150 2020     Lab Results   Component Value Date    HDL 37 2020     Lab Results   Component Value Date    LDL 72 2020     Lab Results   Component Value Date    TRIG 206 2020     No results found for: CHOLHDLRATIO       TSH   Date Value Ref Range Status   2021 0.79 0.40 - 4.00 mU/L Final       "

## 2022-01-27 NOTE — CODE/RAPID RESPONSE
[facility]    RRT Note:  CODE 21/Restraint check  1/27/2022   Time Called: 0407    RRT called for: Code 21     Assessment & Plan     Code 21 called on Matt Way, a 71 year-old male patient admitted on 1/23/2022 for chest pain. Patient has history significant for CAD with PCI of LAD, Current Covid-19 pneumonia without hypoxemia, DM 2, HLD, and Lewy body dementia.  Code 21 called for escalating patient behaviors and inability to redirect patient with less intrusive alternatives. Patient has a 1:1 bedside attendant who states that patient has been calm most of the shift but he awoke from sleep and was upset and paranoid, disoriented to place and situation. Patient believes he is under arrest redirection attempts yield no change in behavior. Patient was defensive and seemed to be frightened but did not harm himself or staff. Due to patients escalating agitation and unsuccessful attempts to deescalate through non pharmacological methods I made the decision to medicate the patient with 10mg IM zyprexa once. After this was administered the patient agreed to lie down in his hospital bed. At this point restraints are not necessary and the 1:1 attendant will continue.      INTERVENTIONS:  -De escalation   -10mg IM zyprexa  -continue Telemetry monitoring  -continue 1:1 bedside attendant    Discussed with and defer further cares to rounding hospitalist.      Code Status: *Full Code    PHILLIP Rg CNP    Allergies   Allergies   Allergen Reactions     Brilinta [Ticagrelor]      Duloxetine      Other reaction(s): Confusion  Per daughter who is MD     Gabapentin      Other reaction(s): Edema     Lisinopril Rash       Physical Exam   Vital Signs with Ranges:  Temp:  [97.5  F (36.4  C)-98.6  F (37  C)] 98.1  F (36.7  C)  Pulse:  [57-70] 64  Resp:  [16-18] 18  BP: (122-149)/(75-87) 131/87  SpO2:  [94 %-97 %] 97 %  I/O last 3 completed shifts:  In: 60 [P.O.:60]  Out: 1050 [Urine:1050]    Physical  Exam  Vitals and nursing note reviewed.   HENT:      Head: Normocephalic and atraumatic.      Right Ear: External ear normal.      Left Ear: External ear normal.      Mouth/Throat:      Mouth: Mucous membranes are moist.   Eyes:      Pupils: Pupils are equal, round, and reactive to light.   Pulmonary:      Effort: Pulmonary effort is normal.   Skin:     General: Skin is warm.      Capillary Refill: Capillary refill takes less than 2 seconds.   Neurological:      Mental Status: He is alert. He is disoriented.   Psychiatric:         Mood and Affect: Mood is anxious. Affect is angry.         Behavior: Behavior is agitated and hyperactive.         Thought Content: Thought content is paranoid.         Cognition and Memory: Cognition is impaired. Memory is impaired.         Judgment: Judgment is impulsive and inappropriate.         Data     IMAGING: (X-ray/CT/MRI)   No results found for this or any previous visit (from the past 24 hour(s)).    CBC with Diff:  Recent Labs   Lab Test 01/26/22  0636 02/06/21  1550 09/23/20  0830   WBC 4.1   < > 7.2   HGB 14.7   < > 14.4   MCV 84   < > 86      < > 219   INR  --   --  1.06    < > = values in this interval not displayed.        Lactic Acid:    Lab Results   Component Value Date    LACT 1.4 06/28/2021           Comprehensive Metabolic Panel:  Recent Labs   Lab 01/26/22  2100 01/26/22  0745 01/26/22  0636 01/24/22  1147 01/24/22  0810   NA  --   --  132*   < > 135   POTASSIUM  --   --  4.2   < > 4.3   CHLORIDE  --   --  104   < > 105   CO2  --   --  23   < > 23   ANIONGAP  --   --  5   < > 7   *   < > 247*   < > 184*   BUN  --   --  21   < > 15   CR  --   --  0.88   < > 0.99   GFRESTIMATED  --   --  >90   < > 82   CHRISTIE  --   --  8.7   < > 8.5   PROTTOTAL  --   --   --   --  7.1   ALBUMIN  --   --   --   --  3.6   BILITOTAL  --   --   --   --  0.3   ALKPHOS  --   --   --   --  106   AST  --   --   --   --  21   ALT  --   --   --   --  45    < > = values in this  interval not displayed.       Time Spent on this Encounter   I spent 30 minutes on the unit/floor managing the care of Matt Way. Over 50% of my time was spent counseling the patient and/or coordinating care regarding services listed in this note.

## 2022-01-27 NOTE — CONSULTS
Triage and Transition - Consult and Liaison     Matt Way  January 27, 2022      Reason for consult: Psychiatry consult was requested due to suicidal statements and agitation. Patient unable to be seen today due to agitation and several CODE 21s. Patient unable to be seen in person due to COVID status.     Identifying information: Matt is 71 year old White  male   followed related to chest pain and positive COVID test. Patient lives in a assisted living facility. 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 Patient lost his job around time of dementia diagnosis as well. He has two children who are grown but still involved in his life. He has a daughter who is a physician in [place] who is often consulted for medical decisions.     Current medications:   Current Facility-Administered Medications   Medication     0.9% sodium chloride BOLUS     remdesivir 100 mg in sodium chloride 0.9 % 250 mL intermittent infusion    And     0.9% sodium chloride BOLUS     acetaminophen (TYLENOL) tablet 325-650 mg     acetaminophen (TYLENOL) tablet 650 mg     aspirin EC tablet 81 mg     atorvastatin (LIPITOR) tablet 80 mg     atropine injection 0.5 mg     benzonatate (TESSALON) capsule 100 mg     citalopram (celeXA) tablet 20 mg     clopidogrel (PLAVIX) tablet 75 mg     dexamethasone (DECADRON) tablet 6 mg     glucose gel 15-30 g    Or     dextrose 50 % injection 25-50 mL    Or     glucagon injection 1 mg     fentaNYL (PF) (SUBLIMAZE) injection 25 mcg     fexofenadine (ALLEGRA) tablet 180 mg     flumazenil (ROMAZICON) injection 0.2 mg     fluticasone (FLONASE) 50 MCG/ACT spray 1 spray     guaiFENesin (ROBITUSSIN) 20 mg/mL solution 10 mL     HOLD:  Metformin and metformin containing medications if patient received IV contrast with acute kidney injury or severe chronic kidney disease (stage IV or stage V; i.e., eGFR less than 30)      insulin aspart (NovoLOG) injection (RAPID ACTING)     insulin aspart (NovoLOG) injection (RAPID ACTING)     insulin aspart (NovoLOG) injection (RAPID ACTING)     insulin detemir (LEVEMIR PEN) injection 10 Units     [Held by provider] insulin detemir (LEVEMIR PEN) injection 12 Units     isosorbide mononitrate (IMDUR) 24 hr tablet 30 mg     loperamide (IMODIUM) capsule 2 mg     loperamide (IMODIUM) capsule 2 mg     losartan (COZAAR) tablet 25 mg     melatonin tablet 1 mg     metoprolol succinate ER (TOPROL-XL) 24 hr tablet 25 mg     midazolam (VERSED) injection 0.5 mg     naloxone (NARCAN) injection 0.2 mg    Or     naloxone (NARCAN) injection 0.4 mg    Or     naloxone (NARCAN) injection 0.2 mg    Or     naloxone (NARCAN) injection 0.4 mg     nitroGLYcerin (NITROSTAT) sublingual tablet 0.4 mg     OLANZapine (zyPREXA) injection 10 mg     ondansetron (ZOFRAN-ODT) ODT tab 4 mg    Or     ondansetron (ZOFRAN) injection 4 mg     oxyCODONE (ROXICODONE) tablet 5 mg    Or     oxyCODONE (ROXICODONE) tablet 10 mg     pantoprazole (PROTONIX) EC tablet 20 mg     QUEtiapine (SEROquel) half-tab 12.5 mg     QUEtiapine (SEROquel) tablet 25 mg     rivastigmine (EXELON) 4.6 MG/24HR 24 hr patch 1 patch     rivastigmine (EXELON) Patch in Place     senna-docusate (SENOKOT-S/PERICOLACE) 8.6-50 MG per tablet 1 tablet    Or     senna-docusate (SENOKOT-S/PERICOLACE) 8.6-50 MG per tablet 2 tablet     sennosides (SENOKOT) tablet 2 tablet     sodium chloride 0.9% infusion     Vitamin D3 (CHOLECALCIFEROL) tablet 25 mcg     Relevant history: Patient has historical diagnosis of lewy-body dementia, diagnosed around 2018. History of refusing medication and becoming agitated.  No MH hospitalizations or treatment history.    Cultural Influences:  None noted.     Therapeutic intervention and progress:  Therapeutic intervention consisted of building therapeutic rapport, active listening, validation and normalizing.     Collateral information:    Reviewed chart.      Plan:     Coordinated with our provider, David De Jesus CNP, regarding inability to assess patient today due to agitation.  Provider will attempt to see patient via video tomorrow.     Tosin Garcias [facility]  Triage and Transition - Consult and Liaison   [phone]

## 2022-01-27 NOTE — PROGRESS NOTES
SPIRITUAL HEALTH SERVICES: Tele-Encounter  Patient Location: Heart center    Referral Source: Admission Request.    Summary: Called patients spouse, Salina. We spoke of family dynamics, lack of family visitations, her being wheel chair bound. How she hadn't seen Matt since [place] and that they probably will not see one another again for some time. She shared many feelings with me, and was fighting heavy loneliness.       Illness Narrative -Covid and Lewy Body Dementia.      Distress - Hasn't seen her  since before [place] and he was her personal care assistant. Salina speaks of isolation and a unknown future.      Coping - She enjoys cooking.      Meaning-Making - Not named.    PLAN:  [facility] remains available for spiritual care.    Sakshi Rae   Intern   Pager:[phone]    ______________________________    Type of service:  Telephone Visit     has received verbal consent for a TelephoneVisit from the patient? Yes    Distance Provider Location: designated [place] office or home office (secure setting)    Mode of Communication: telephone (via [employer] phone or [employer] tele-call-number ([phone]))

## 2022-01-27 NOTE — CODE/RAPID RESPONSE
House officer note:  Code 21 for escalating behavior.  Matt has previously documented Lewy body dementia and underwent coronary angiogram with intervention this morning.  He required a dose of Zyprexa approximately 4 AM this morning for behavior disturbance.  No ongoing maintenance medication has previously been ordered.    Plan:  -IM Zyprexa x1 dose now  -At bedtime Seroquel and as needed Seroquel available    PHILLIP Harrington, CNP  Hospitalist Service, House Officer  [facility]     Text Page  Pager: [phone]

## 2022-01-27 NOTE — PLAN OF CARE
Patient became uncooperative and agitated this AM. He was disoriented and paranoid that there were people in house. Code 21 was called as patient would not close the door to his room, and is COVID positive. IM Zyprexa given and 1:1 still needed in room. Will continue to monitor.

## 2022-01-27 NOTE — PRE-PROCEDURE
GENERAL PRE-PROCEDURE:   Procedure:  Coronary angiogram  Date/Time:  1/27/2022 9:23 AM    Verbal consent obtained?: Yes    Written consent obtained?: No    Risks and benefits: Risks, benefits and alternatives were discussed    Consent given by:  Power of   Patient states understanding of procedure being performed: Yes    Patient's understanding of procedure matches consent: Yes    Procedure consent matches procedure scheduled: Yes    Expected level of sedation:  Moderate  Appropriately NPO:  Yes  ASA Class:  3  Mallampati  :  Grade 3- soft palate visible, posterior pharyngeal wall not visible  Lungs:  Lungs clear with good breath sounds bilaterally  Heart:  Normal heart sounds and rate  History & Physical reviewed:  History and physical reviewed and no updates needed  Statement of review:  I have reviewed the lab findings, diagnostic data, medications, and the plan for sedation

## 2022-01-27 NOTE — PROGRESS NOTES
[facility]    Medicine Progress Note - Hospitalist Service    Date of Admission:  1/23/2022    Assessment & Plan            Matt Way is a 71 year old male admitted on 1/23/2022     Chest pain   Abnormal stress test  History of coronary artery disease status post PCI of the LAD  -Troponin minimally elevated with a peak of 292  -D-dimer is elevated however, patient had a higher D-dimer during his prior ER visit on 1/18, and CT chest was negative for PE at that time.   -Chest pain most likely is cardiac.  -resting echocardiogram with significant decrease in EF from previous as well as WMAs - however, per Dr Riggs who clarified that the previous TTE report was wrong and actual imaging is not significantly changed from previous.   -Underwent Lexiscan 1/25 which showed large area of transmural infarction in the apical, anterior, anteroseptal segments of the left ventricle associated with moderate degree of alen-infarct ischemia.  -Cardiac catheterization on 1/27-Severe 99% ISR of the pLAD stent (2 layers of stent), Patent stent in dRCA, Mild to moderate non-obstructive CAD in the LCx.  Refer to report for details of the findings.  -Given his Lewy body dementia and history of medication noncompliance, medical therapy recommended  -Continue aspirin, Plavix, atorvastatin, losartan and metoprolol.  -Imdur 30 mg daily added  -Cardiology following,  appreciate assistance     COVID 19 infection likely causing mild pneumonia with no hypoxemia  -Patient is vaccinated but not boosted.  -Mild amount of pneumonia on the CXR but no hypoxemia at this time  -At this point his chest x-ray findings most likely represent COVID  -Discontinue dexamethasone since he is not hypoxic and could also be contributing to his behavioral disturbance  -Continue with remdesivir while he is hospitalized  -Monitor oxygen status closely.    Lewy body dementia  Behavioral disturbance  Code 21 called early this morning  around 4 AM due to behavioral disturbance and again this afternoon due to behavioral disturbance after angiogram.  Has received Zyprexa IM.  See RRT note for detail  -Seroquel 25 mg nightly scheduled and 12.5 mg every 6 hours as needed for agitation  -continue to admission rivastigmine   -will also schedule melatonin at bedtime  - as above, stopped dexamethasone since not hypoxic and could also be contributing to his behavioral disturbance  -frequent reorientation     Suicidal ideation  Patient verbalized suicidal ideation on 1/26 per chart review and psychiatry consulted, pending at this time. However, given his behavioral disturbance, it is very unlikely that he will be able to provide useful information at this time.      Type 2 diabetes  -Continue sliding scale, increase to high resistance.  -Continue Levemir 10 units at bedtime   -Levemir 12 units in the morning, held today as he is n.p.o. for procedure, will resume tomorrow pending his blood sugar levels  -Continue sliding scale insulin  -Monitor for hypoglycemia    HLD  -resume Lipitor          Diet: Moderate Consistent Carb (60 g CHO per Meal) Diet    DVT Prophylaxis: Ambulate every shift  Schulte Catheter: Not present  Central Lines: None  Cardiac Monitoring: ACTIVE order. Indication: Post- PCI/ percutaneous cardiac intervention (24 hours)  Code Status: Full Code      Disposition Plan   Expected Discharge: 01/27/2022   Once behavior is better controlled, anticipate he will be able to discharge  Anticipated discharge location:  Awaiting care coordination huddle  Delays:     Other (Add Comment)            The patient's care was discussed with the Bedside Nurse, Care Coordinator/ and Patient.    Attempted calling daughter, Danielle, went to , did not leave a message.     Reina Benjamin MD  Hospitalist Service  [facility]  Securely message with the Vocera Web Console (learn more here)  Text page via [employer]  Paging/Directory         Clinically Significant Risk Factors Present on Admission             # Diabetes, type II: last A1C 7.0 % (Ref range: 0.0 - 5.6 %)  # Overweight: last Body mass index is 27.38 kg/m .      ______________________________________________________________________    Interval History   Patient is restless in the room. Stating he has to take care of things. Not easily redirectable. Just received zyprexa IM.  Refused to take PO pills (imdur and seroqeul) while I was in the room.      Note Code 21 called again shortly after I left.     Data reviewed today: I reviewed all medications, new labs and imaging results over the last 24 hours. I personally reviewed no images or EKG's today.    Physical Exam   Vital Signs: Temp: (!) 96.5  F (35.8  C) Temp src: Axillary BP: 108/58 Pulse: 55   Resp: 16 SpO2: 96 % O2 Device: None (Room air)    Weight: 207 lbs 8 oz  General Appearance: Alert, awake, not oriented, restless walking in the room  Respiratory: clear to auscultation bilaterally, no wheezing  Cardiovascular: regular rate and rhythm  GI: soft and non-tender  Skin: warm and dry      Data   Recent Labs   Lab 01/27/22  0727 01/27/22  0655 01/26/22  2100 01/26/22  1712 01/26/22  0745 01/26/22  0636 01/25/22  0919 01/25/22  0636 01/24/22  1147 01/24/22  0810 01/24/22  0026 01/23/22  1652   WBC  --  6.1  --   --   --  4.1  --  3.9*  --  4.1  --  6.2   HGB  --  14.8  --   --   --  14.7  --  13.4  --  13.9  --  13.6   MCV  --  83  --   --   --  84  --  85  --  87  --  87   PLT  --  229  --   --   --  178  --  150  --  171  --  189   NA  --   --   --   --   --  132*  --  133  --  135  --  134   POTASSIUM  --   --   --   --   --  4.2  --  4.2  --  4.3  --  4.7   CHLORIDE  --   --   --   --   --  104  --  107  --  105  --  104   CO2  --   --   --   --   --  23  --  23  --  23  --  25   BUN  --   --   --   --   --  21  --  18  --  15  --  16   CR  --   --   --   --   --  0.88  --  0.88  --  0.99  --  1.19   ANIONGAP   --   --   --   --   --  5  --  3  --  7  --  5   CHRISTIE  --   --   --   --   --  8.7  --  8.4*  --  8.5  --  8.3*   *  --  264* 375*   < > 247*   < > 137*   < > 184*   < > 208*   ALBUMIN  --   --   --   --   --   --   --   --   --  3.6  --  3.7   PROTTOTAL  --   --   --   --   --   --   --   --   --  7.1  --  7.1   BILITOTAL  --   --   --   --   --   --   --   --   --  0.3  --  0.5   ALKPHOS  --   --   --   --   --   --   --   --   --  106  --  117   ALT  --   --   --   --   --   --   --   --   --  45  --  47   AST  --   --   --   --   --   --   --   --   --  21  --  17    < > = values in this interval not displayed.     Recent Results (from the past 24 hour(s))   Cardiac Catheterization    Narrative      Dist LAD lesion is 50% stenosed.    Prox Cx lesion is 45% stenosed.    Ost LAD to Mid LAD lesion is 99% stenosed.     Severe 99% ISR of the pLAD stent (2 layers of stent)  Patent stent in dRCA.  Mild to moderate non-obstructive CAD in the LCx.     Medications     sodium chloride         remdesivir  100 mg Intravenous Q24H    And     sodium chloride 0.9%  50 mL Intravenous Q24H     aspirin  81 mg Oral Daily     atorvastatin  80 mg Oral Daily     citalopram  20 mg Oral Daily     clopidogrel  75 mg Oral Daily     dexamethasone  6 mg Oral Daily     fexofenadine  180 mg Oral Daily     fluticasone  1 spray Both Nostrils Daily     insulin aspart  1-10 Units Subcutaneous TID AC     insulin aspart  1-7 Units Subcutaneous At Bedtime     insulin aspart  3 Units Subcutaneous TID w/meals     insulin detemir  10 Units Subcutaneous At Bedtime     [Held by provider] insulin detemir  12 Units Subcutaneous QAM AC     isosorbide mononitrate  30 mg Oral Daily     loperamide  2 mg Oral Daily     losartan  25 mg Oral Daily     metoprolol succinate ER  25 mg Oral At Bedtime     pantoprazole  20 mg Oral Daily     QUEtiapine  25 mg Oral At Bedtime     rivastigmine  4.6 mg Transdermal Daily     rivastigmine   Transdermal Q8H     Vitamin  D3  25 mcg Oral Daily

## 2022-01-28 NOTE — PROGRESS NOTES
"[id number]    1:1 sitter utilized. Restless/agitated/combative through the night.  Attempting to get out of bed repeatedly, verbally threatening sitter, saying \"I'm going to run you over with my semi-truck,\" \"I'm going to knock your ass out,\" threatening to take a knife and slash sitter.  He also attempted to kick staff.      PRN IM Zyprexa given, only effective for about an hour.  Pt still attempting to get out of bed after multiple attempts to re-direct.  Order obtained for 4-point soft restraints for pt safety and to help prevent falls, harm to himself and staff.  Pt did not sleep at all throughout the night. Noted to be rambling, incoherent and nonsensical conversation.    VSS. Tele NSR.  He did comply with remdesivir IV infusion, as RN stayed in the room until it was completed.  Denies pain. Pt scoring red on the Aggression Stop Light Tool. Discharge pending, pt will need to be sitter free for 24 hours without any restraints in order to discharge to TCU/[facility]  "

## 2022-01-28 NOTE — PROGRESS NOTES
Pt extremely restless, attempting to get out of bed.  Sitter attempted multiple times to re-direct pt and explaining it is not safe to stand as he is a fall risk.  Became increasingly agitated, combative, attempting to swing at sit.  Noted to be very unsteady and observed a near fall.  PRN IM zyprexa x1 which helped calm pt down who complied and got back into bed.  Noted to still be restless but more re-directable to stay in bed.   Will CTM to ensure pt safety.

## 2022-01-28 NOTE — PROGRESS NOTES
"Nursing [id number]: Alert. Confused. Impulsive. Uncooperative. Wondering in room. Slightly unsteady. 1:1 NA-R in room for safely. Attempted to give scheduled bedtime medications multiple times however pt refused. At the last attempt, pt threw away the medications on the floor and noted to be slightly agitated. Refused BS checks or insulin administrations. X-cover hospitalist updated. Tele: SB to NSR. No fever. VSS. Latest Vitals: Blood pressure 127/82, pulse 75, temperature 97.7  F (36.5  C), temperature source Axillary, resp. rate 16, height 1.854 m (6' 1\"), weight 94.1 kg (207 lb 8 oz), SpO2 97 %.            "

## 2022-01-28 NOTE — PROGRESS NOTES
[facility]    Medicine Progress Note - Hospitalist Service    Date of Admission:  1/23/2022    Assessment & Plan            Matt Way is a 71 year old male admitted on 1/23/2022     Chest pain   Abnormal stress test  History of coronary artery disease status post PCI of the LAD  -Troponin minimally elevated with a peak of 292  -D-dimer is elevated however, patient had a higher D-dimer during his prior ER visit on 1/18, and CT chest was negative for PE at that time.   -Chest pain most likely is cardiac.  -resting echocardiogram with significant decrease in EF from previous as well as WMAs - however, per Dr Riggs who clarified that the previous TTE report was wrong and actual imaging is not significantly changed from previous.   -Underwent Lexiscan 1/25 which showed large area of transmural infarction in the apical, anterior, anteroseptal segments of the left ventricle associated with moderate degree of alen-infarct ischemia.  -Cardiac catheterization on 1/27-Severe 99% ISR of the pLAD stent (2 layers of stent), Patent stent in dRCA, Mild to moderate non-obstructive CAD in the LCx.  Refer to report for details of the findings.  -Given his Lewy body dementia and history of medication noncompliance, medical therapy recommended  -Continue aspirin, Plavix, atorvastatin, losartan and metoprolol.  -Imdur 30 mg daily added  -Cardiology following,  appreciate assistance    Lewy body dementia  Behavioral disturbance  Likely superimposed delirium   Code 21 called early 1/27  around 4 AM due to behavioral disturbance and again this afternoon due to behavioral disturbance after angiogram.  Has received Zyprexa IM.  See RRT note for detail. Need to be placed on soft restraints overnight of 1/27-1/28 due to increased restlessness, also had attempted to swing at beside sitter.  - appreciate psychiatry assistance, following meds added/adjusted   - Depakote 375mg BID   - Zyprexa 5mg TID, avoid 1st gen  antipsychotics   - decrease Celexa to 10mg daily temporality    - IM Zyprexa prn daily  -continue to admission rivastigmine   -will also schedule melatonin at bedtime  -stopped dexamethasone since not hypoxic and could also be contributing to his behavioral disturbance  - stop seroquel since currently has scheduled zyprexa  -frequent reorientation  - continue sitter at bedside, attempt discontinuation of restraints once he is calm     COVID 19 infection likely causing mild pneumonia with no hypoxemia  -Patient is vaccinated but not boosted.  -Mild amount of pneumonia on the CXR but no hypoxemia at this time  -At this point his chest x-ray findings most likely represent COVID  -Discontinue dexamethasone on 1/27 since he is not hypoxic and could also be contributing to his behavioral disturbance  -Continue with remdesivir while he is hospitalized  -Monitor oxygen status closely.     Suicidal ideation  Patient verbalized suicidal ideation on 1/26 per chart review and psychiatry consulted, pending at this time. However, given his behavioral disturbance, it is very unlikely that he will be able to provide useful information at this time. Psych as above has seen. Per  note regarding suicidal ideation, per facility staff, patient occasional does make statements of that nature at the facility.       Type 2 diabetes  -Continue sliding scale, increase to high resistance.  -Continue Levemir 10 units at bedtime   - hold his am levemir for now since at times has not been allowing BG checks  -Continue sliding scale insulin  -Monitor for hypoglycemia    HLD  -resume Lipitor          Diet: Moderate Consistent Carb (60 g CHO per Meal) Diet    DVT Prophylaxis: Ambulate every shift  Schulte Catheter: Not present  Central Lines: None  Cardiac Monitoring: ACTIVE order. Indication: Post- PCI/ percutaneous cardiac intervention (24 hours)  Code Status: Full Code      Disposition Plan   Expected Discharge: 01/31/2022    Anticipated  discharge location:  Awaiting care coordination huddle  Delays:     Other (Add Comment)            The patient's care was discussed with the Bedside Nurse and Patient.    Updated patient's daughter, Dr. Danielle Winn.    Reina Benjamin MD  Hospitalist Service  [facility]  Securely message with the Vocera Web Console (learn more here)  Text page via [employer] Paging/Directory         Clinically Significant Risk Factors Present on Admission             # Diabetes, type II: last A1C 7.0 % (Ref range: 0.0 - 5.6 %)  # Overweight: last Body mass index is 26.51 kg/m .      ______________________________________________________________________    Interval History   Needed to be placed on restraints overnight after zyprexa due to restlessness and also attempted to swing at sitter.   Patient currently in bed, restless and quite agitated when attempting to talk to him or examine him.     Data reviewed today: I reviewed all medications, new labs and imaging results over the last 24 hours. I personally reviewed no images or EKG's today.    Physical Exam   Vital Signs: Temp: 98.6  F (37  C) Temp src: Oral BP: 102/83 Pulse: 80   Resp: 16 SpO2: 95 % O2 Device: None (Room air)    Weight: 200 lbs 14.4 oz  General Appearance: Alert, awake,restless  Respiratory: clear to auscultation bilaterally  Cardiovascular: regular rate and rhythm  GI: soft and non-tender  Skin: warm and dry      Data   Recent Labs   Lab 01/28/22  0905 01/27/22  1716 01/27/22  1419 01/27/22  0727 01/27/22  0655 01/26/22  0745 01/26/22  0636 01/25/22  0919 01/25/22  0636 01/24/22  1147 01/24/22  0810 01/24/22  0026 01/23/22  1652   WBC  --   --   --   --  6.1  --  4.1  --  3.9*  --  4.1  --  6.2   HGB  --   --   --   --  14.8  --  14.7  --  13.4  --  13.9  --  13.6   MCV  --   --   --   --  83  --  84  --  85  --  87  --  87   PLT  --   --   --   --  229  --  178  --  150  --  171  --  189   NA  --   --   --   --   --   --  132*  --  133   --  135  --  134   POTASSIUM  --   --   --   --   --   --  4.2  --  4.2  --  4.3  --  4.7   CHLORIDE  --   --   --   --   --   --  104  --  107  --  105  --  104   CO2  --   --   --   --   --   --  23  --  23  --  23  --  25   BUN  --   --   --   --   --   --  21  --  18  --  15  --  16   CR  --   --   --   --   --   --  0.88  --  0.88  --  0.99  --  1.19   ANIONGAP  --   --   --   --   --   --  5  --  3  --  7  --  5   CHRISTIE  --   --   --   --   --   --  8.7  --  8.4*  --  8.5  --  8.3*   * 412* 389*   < >  --    < > 247*   < > 137*   < > 184*   < > 208*   ALBUMIN  --   --   --   --   --   --   --   --   --   --  3.6  --  3.7   PROTTOTAL  --   --   --   --   --   --   --   --   --   --  7.1  --  7.1   BILITOTAL  --   --   --   --   --   --   --   --   --   --  0.3  --  0.5   ALKPHOS  --   --   --   --   --   --   --   --   --   --  106  --  117   ALT  --   --   --   --   --   --   --   --   --   --  45  --  47   AST  --   --   --   --   --   --   --   --   --   --  21  --  17    < > = values in this interval not displayed.     No results found for this or any previous visit (from the past 24 hour(s)).  Medications       aspirin  81 mg Oral Daily     atorvastatin  80 mg Oral Daily     citalopram  10 mg Oral Daily     clopidogrel  75 mg Oral Daily     divalproex sodium delayed-release  375 mg Oral Q12H TIFFANY    Or     valproate (DEPACON) in NS intermittent infusion  380 mg Intravenous Q12H TIFFANY     fexofenadine  180 mg Oral Daily     fluticasone  1 spray Both Nostrils Daily     insulin aspart  1-10 Units Subcutaneous TID AC     insulin aspart  1-7 Units Subcutaneous At Bedtime     insulin aspart  3 Units Subcutaneous TID w/meals     insulin detemir  10 Units Subcutaneous At Bedtime     [Held by provider] insulin detemir  12 Units Subcutaneous QAM AC     isosorbide mononitrate  30 mg Oral Daily     loperamide  2 mg Oral Daily     losartan  25 mg Oral Daily     melatonin  5 mg Oral At Bedtime     metoprolol  succinate ER  25 mg Oral At Bedtime     OLANZapine zydis  5 mg Oral TID     pantoprazole  20 mg Oral Daily     QUEtiapine  25 mg Oral At Bedtime     rivastigmine  4.6 mg Transdermal Daily     rivastigmine   Transdermal Q8H     Vitamin D3  25 mcg Oral Daily

## 2022-01-28 NOTE — CONSULTS
"[facility]  Initial Psychiatric Consult   Consult date: January 28, 2022    This visit was conducted via televideo conference using the Modan[employer] system due to COVID-19. Patient located in his hospital bed, provider located at the [facility] nurse's station. Patient unable to consent to video visit due to confusion and agitation.     Start time: 9:07 AM  End time: 9:15 AM         Reason for Consult, requesting source:    Suicidal  Requesting source: Eloy Berkowitz        HPI:   Matt Way is a 71 year old male who was admitted to [facility] for evaluation of chest pain and cough. Patient resides at [facility].  In ED, he was found to be COVID-19 positive. He was placed on Dexamethasone and remdesivir. Dexamethasone discontinued yesterday due to his significant agitation and psychosis. PMH significant for Lewy Body Dementia, anxiety, coronary artery disease, hypertension, hyperlipidemia, and diabetes. Psychiatry is consulted for evaluation.    Per chart review, patient had recently been making suicidal comments during his hospitalization. Per RN documentation, making statements such as \"I want to shoot myself,\" \"strangle myself with this cord (IV line),\" \"why won't anyone just let me die, I can't think anymore by myself,\" and asking to be shot. Per collateral from his [facility], he does occasional make suicidal comments at baseline. He has been significantly agitated in the last couple of days. Yesterday, several code 21s were called. He received doses of IM olanzapine at 0424 and 1444 yesterday, and 0006 today. The notes suggest these calmed him down briefly but behaviors remained escalated. He did not obtain any sleep overnight last night.     On interview today, patient was disorganized and making illogical statements. He was really unable to focus on anything I was saying. He appeared to be responding to unseen others. Per the sitter, he has been taking at the walls " "throughout the morning. He appears to be making comments about getting in a car and driving somewhere, stating \"you lead and I'll follow.\" His eyes appear to be darting around the room. He appears restless and hyperactive in behavior.         Past Psychiatric History:   No known hx of psychiatric hospitalizations or suicide attempts. He does have a history of making suicidal statements. He is managed on quetiapine 25 mg at bedtime and citalopram 20 mg daily at baseline. Diagnosed with Lewy Body Dementia as well as unspecified anxiety. No known hx of ECT or MH commitment.         Substance Use and History:   Per chart review, patient does not drink alcohol or use any illicit substances. He is a non-smoker. No known hx of CD treatment or commitment.         Past Medical History:   PAST MEDICAL HISTORY:   Past Medical History:   Diagnosis Date     Coronary artery disease 06/2018    s/p CECILY to LAD      Diabetes mellitus      GERD (gastroesophageal reflux disease)      Hyperlipidemia      Obesity      FLAQUITA (obstructive sleep apnea)      Pulmonary embolism 11/15/2018       PAST SURGICAL HISTORY:   Past Surgical History:   Procedure Laterality Date     COLONOSCOPY       Coronary artery angiogram with stent (CECILY) to LAD  06/2018     CV CORONARY ANGIOGRAM N/A 9/3/2020    Procedure: Coronary Angiogram;  Surgeon: Herb Tran MD;  Location: [facility] CARDIAC CATH LAB     CV CORONARY ANGIOGRAM N/A 9/23/2020    Procedure: Coronary Angiogram;  Surgeon: Ernie Urbina MD;  Location: [facility] CARDIAC CATH LAB     CV HEART CATHETERIZATION WITH POSSIBLE INTERVENTION N/A 1/27/2022    Procedure: Heart Catheterization with Possible Intervention;  Surgeon: Ronnie Mobley MD;  Location: [facility] CARDIAC CATH LAB     CV INSTANTANEOUS WAVE-FREE RATIO N/A 9/3/2020    Procedure: Instantaneous Wave-Free Ratio;  Surgeon: Herb Tran MD;  Location: [facility] CARDIAC CATH LAB     CV LEFT HEART CATH N/A 9/3/2020    " Procedure: Left Heart Cath;  Surgeon: Herb Tran MD;  Location:  HEART CARDIAC CATH LAB     CV LEFT VENTRICULOGRAM N/A 9/3/2020    Procedure: Left Ventriculogram;  Surgeon: Herb Tran MD;  Location:  HEART CARDIAC CATH LAB     CV PCI STENT DRUG ELUTING N/A 9/3/2020    Procedure: Percutaneous Coronary Intervention Stent Drug Eluting;  Surgeon: Herb Tran MD;  Location:  HEART CARDIAC CATH LAB     CV PCI STENT DRUG ELUTING N/A 9/23/2020    Procedure: Percutaneous Coronary Intervention Stent Drug Eluting;  Surgeon: Ernie Urbina MD;  Location:  HEART CARDIAC CATH LAB     SHOULDER SURGERY       VASECTOMY               Family History:   FAMILY HISTORY:   Family History   Problem Relation Age of Onset     Lupus Mother            Social History:   Patient resides at [facility]. He is  and apparently had been the sole caretaker of his wife prior to his dementia symptoms. Wife is paraplegic due to a spinal injury in an accident. He has 2 adult children, one daughter who lives in [place] and is a physician.          Physical ROS:   The patient was unable to participate in ROS due to confusion and agitation         PTA Medications:     Medications Prior to Admission   Medication Sig Dispense Refill Last Dose     acetaminophen (TYLENOL) 325 MG tablet Take 325-650 mg by mouth every 6 hours as needed for mild pain   12/17/2021 at n/a     aspirin (ASA) 81 MG EC tablet Take 1 tablet (81 mg) by mouth daily 60 tablet 3 1/23/2022 at am     atorvastatin (LIPITOR) 80 MG tablet Take 1 tablet (80 mg) by mouth daily 90 tablet 0 1/22/2022 at pm     citalopram (CELEXA) 20 MG tablet Take 20 mg by mouth daily   1/23/2022 at am     clopidogrel (PLAVIX) 75 MG tablet Take 1 tablet (75 mg) by mouth daily 30 tablet 0 1/23/2022 at am     fexofenadine (ALLEGRA) 180 MG tablet Take 180 mg by mouth daily   1/23/2022 at am     fluticasone (FLONASE) 50 MCG/ACT nasal spray Spray 1 spray into  both nostrils daily   1/22/2022 at Unknown time     insulin aspart (NOVOLOG FLEXPEN) 100 UNIT/ML pen Inject 7 Units Subcutaneous 3 times daily (with meals) Breakfast and Dinner 9 mL 0 1/23/2022 at Unknown time     insulin detemir (LEVEMIR PEN) 100 UNIT/ML pen Inject 20 Units Subcutaneous every morning (before breakfast) (Patient taking differently: Inject 18 Units Subcutaneous every morning (before breakfast) ) 9 mL 0 1/23/2022 at am     insulin detemir (LEVEMIR PEN) 100 UNIT/ML pen Inject 14 Units Subcutaneous At Bedtime    1/22/2022 at Unknown time     loperamide (IMODIUM) 2 MG capsule Take 2 mg by mouth daily   1/22/2022 at Unknown time     loperamide (IMODIUM) 2 MG capsule Take 2 mg by mouth 3 times daily as needed for diarrhea   prn at prn     losartan (COZAAR) 25 MG tablet Take 1 tablet (25 mg) by mouth daily   1/23/2022 at am     Melatonin 10 MG TABS tablet Take 10 mg by mouth At Bedtime    1/22/2022 at Unknown time     metFORMIN (GLUCOPHAGE) 1000 MG tablet Take 1,000 mg by mouth 2 times daily (with meals)   1/23/2022 at am     metoprolol succinate ER (TOPROL-XL) 25 MG 24 hr tablet Take 25 mg by mouth At Bedtime   1/22/2022 at Unknown time     nitroGLYcerin (NITROSTAT) 0.4 MG sublingual tablet Place 1 tablet (0.4 mg) under the tongue every 5 minutes as needed for chest pain If symptoms persist 5 minutes after 2nd dose call 911. 30 tablet 1 1/18/2022 at n/a     pantoprazole (PROTONIX) 20 MG EC tablet Take 20 mg by mouth daily   1/23/2022 at am     QUEtiapine (SEROQUEL) 25 MG tablet Take 1 tablet (25 mg) by mouth every 12 hours as needed (agitation) 15 tablet 0 1/19/2022 at n/a     rivastigmine (EXELON) 4.6 MG/24HR 24 hr patch Place 4.6 mg onto the skin daily   1/23/2022 at Unknown time     sennosides (SENOKOT) 8.6 MG tablet Take 2 tablets by mouth daily as needed for constipation 60 tablet 0 prn at prn     Vitamin D (Cholecalciferol) 25 MCG (1000 UT) TABS Take 1 tablet by mouth daily    1/23/2022 at am           Allergies:     Allergies   Allergen Reactions     Brilinta [Ticagrelor]      Duloxetine      Other reaction(s): Confusion  Per daughter who is MD     Gabapentin      Other reaction(s): Edema     Lisinopril Rash          Labs:     Recent Results (from the past 48 hour(s))   Glucose by meter    Collection Time: 01/26/22 12:24 PM   Result Value Ref Range    GLUCOSE BY METER POCT 259 (H) 70 - 99 mg/dL   Heparin Unfractionated Anti Xa Level    Collection Time: 01/26/22  3:06 PM   Result Value Ref Range    Anti Xa Unfractionated Heparin 0.28 For Reference Range, See Comment IU/mL   Glucose by meter    Collection Time: 01/26/22  5:12 PM   Result Value Ref Range    GLUCOSE BY METER POCT 375 (H) 70 - 99 mg/dL   Glucose by meter    Collection Time: 01/26/22  9:00 PM   Result Value Ref Range    GLUCOSE BY METER POCT 264 (H) 70 - 99 mg/dL   Heparin Unfractionated Anti Xa Level    Collection Time: 01/26/22  9:26 PM   Result Value Ref Range    Anti Xa Unfractionated Heparin 0.31 For Reference Range, See Comment IU/mL   CBC with platelets    Collection Time: 01/27/22  6:55 AM   Result Value Ref Range    WBC Count 6.1 4.0 - 11.0 10e3/uL    RBC Count 5.40 4.40 - 5.90 10e6/uL    Hemoglobin 14.8 13.3 - 17.7 g/dL    Hematocrit 44.8 40.0 - 53.0 %    MCV 83 78 - 100 fL    MCH 27.4 26.5 - 33.0 pg    MCHC 33.0 31.5 - 36.5 g/dL    RDW 14.7 10.0 - 15.0 %    Platelet Count 229 150 - 450 10e3/uL   Heparin Unfractionated Anti Xa Level    Collection Time: 01/27/22  6:55 AM   Result Value Ref Range    Anti Xa Unfractionated Heparin 0.32 For Reference Range, See Comment IU/mL   Glucose by meter    Collection Time: 01/27/22  7:27 AM   Result Value Ref Range    GLUCOSE BY METER POCT 117 (H) 70 - 99 mg/dL   Glucose by meter    Collection Time: 01/27/22  2:19 PM   Result Value Ref Range    GLUCOSE BY METER POCT 389 (H) 70 - 99 mg/dL   Glucose by meter    Collection Time: 01/27/22  5:16 PM   Result Value Ref Range    GLUCOSE BY METER POCT 412 (H) 70 -  "99 mg/dL   Heparin Unfractionated Anti Xa Level    Collection Time: 01/28/22  6:07 AM   Result Value Ref Range    Anti Xa Unfractionated Heparin <0.10 For Reference Range, See Comment IU/mL   Glucose by meter    Collection Time: 01/28/22  9:05 AM   Result Value Ref Range    GLUCOSE BY METER POCT 269 (H) 70 - 99 mg/dL          Physical and Psychiatric Examination:     /83 (BP Location: Left arm)   Pulse 89   Temp 98  F (36.7  C) (Oral)   Resp 16   Ht 1.854 m (6' 1\")   Wt 91.1 kg (200 lb 14.4 oz)   SpO2 97%   BMI 26.51 kg/m    Weight is 200 lbs 14.4 oz  Body mass index is 26.51 kg/m .    Physical Exam:  I have reviewed the physical exam as documented by by the medical team and agree with findings and assessment and have no additional findings to add at this time.    Mental Status Exam:  Appearance: age-appearing, lying in hospital bed, wearing glasses and hospital gown, adequately groomed, confused  Behavior: restless, hyperactive  Eye contact: none, not looking at Ipad  Orientation: alert and and oriented to self. Not oriented to place, date, or time  Movements: did not observe any tics, tremors, or dystonia. No evidence of tardive dyskinesia.   Mood: unable to assess, appears euphoric  Affect: expansive, elevated, labile  Speech: excited tone, normal volume, mildly pressured  Language: fluent in English, fair articulation, naming objects appropriately  Memory: recent and remote memory appear impaired  Fund of knowledge: below baseline intellect. Does not appear aware of current events.  Concentration: distractible, inattentive, hyperactive  Thought process and content: illogical, disorganized. Appears to be responding to internal stimuli, conversing with unseen others. Has been experiencing paranoid delusions, resistive to medications and cares.   Associations: loose  Insight: impaired  Judgement: limited  Safety: has been combative, agitated at times. Has made suicidal statements, which he makes at " baseline, per collateral obtained from his nursing home.            DSM-5 Diagnosis:   #1 Lewy Body Dementia with behavioral disturbance  #2 r/o steroid-induced isaak with exacerbation of psychosis  #3 COVID-19 infection  #4 Unspecified anxiety disorder          Assessment:   Matt Way is a 71 year old male who was admitted to [facility] for evaluation of chest pain and cough, found to be COVID positive while in the ED. Hx of Lewy Body dementia. He was treated with Dexamethasone 6 mg for 3 days. Currently, he demonstrates symptoms of isaak and psychosis. Likely experiences hallucinations at baseline secondary to his Lewy Body Dementia; however, he currently appears rather psychotic. He is restless and agitated. Unable to focus on any conversation. He is disorganized and rambling. Does not appear oriented. He is observed responding to unseen others.     It seems olanzapine alone has been only mildy efficacious in managing patient's behavioral agitation and restlessness. Given his history of Lewy Body Dementia, will need to be cautious with use of antipsychotics and avoid 1st gen antipsychotics. He appears to be tolerating olanzapine without significant extrapyramidal symptoms. At this point, given that he demonstrates symptoms of isaak, a short course of Depakote may be helpful.           Summary of Recommendations:   1) Recommend to initiate Depakote DR Newman 375 mg BID. If unable to take orally, administer IV valproate 375 mg BID. Increase to 500 mg BID as tolerated. Can be tapered and discontinued at resolution of isaak symptoms.     2) Schedule olanzapine 5 mg TID for psychotic symptoms. Avoiding first-generation antipsychotics due to Lewy Body Dementia. Monitor for worsening parkinsonism.     3) Continue olanzapine 10 mg IM daily prn    4) Will decrease citalopram to 10 mg temporarily. Increase back to 20 mg at resolution of his isaak symptoms.     5) Continue 1:1 monitoring until  behaviors improve    6) Reconsult psychiatry as needed, thank you.      Julio De Jesus, PMBRENDAP-BC, APRN, CNP  Consult/Liaison Psychiatry  [facility]  Provider can be paged via [facility] Paging/Directory  If I am unavailable, please contact [facility] at [phone] to reach the on-call provider.

## 2022-01-28 NOTE — PROVIDER NOTIFICATION
"MD Notification    Notified Person: MD    Notified Person Name: Eloy Mccormick MD    Notification Date/Time: 1/28/22 03:06    Notification Interaction:    \"0260-1 GM    Pt has 1:1, restless/agitated/attempting to get out of bed and not re-directable.  IM zyprexa given at 00:10 was effective for only an hour or so.  Asking for an order for soft restraints.    Thanks,  Alice RN  *[id number]\"    Purpose of Notification: agitation/restlessness    Orders Received:    Comments:  "

## 2022-01-29 NOTE — PLAN OF CARE
"/82 (BP Location: Right arm)   Pulse 67   Temp 97.8  F (36.6  C) (Axillary)   Resp 18   Ht 1.854 m (6' 1\")   Wt 91.1 kg (200 lb 14.4 oz)   SpO2 96%   BMI 26.51 kg/m      Pt here with chest pain and cough. Orientation unable to assess d/t confusion and hallucination. VSS on RA. Tele SR. Moderate consistent carb diet, thin liquids. Takes pills whole. Incontinent of bladder. Sitter outside the room. In four point restraint for patient safety /impulsiveness/reslessness/ attempting to get OOB.Covid +. Pt scoring yellow on the Aggression Stop Light Tool. Discharge back to assisted living when off restraints >24hrs still pending.          "

## 2022-01-29 NOTE — PROGRESS NOTES
[facility]    Medicine Progress Note - Hospitalist Service    Date of Admission:  1/23/2022    Assessment & Plan            Matt Way is a 71 year old male admitted on 1/23/2022 for chest pain.  Hospital course complicated by acute delirium      Chest pain   Abnormal stress test  History of coronary artery disease status post PCI of the LAD  -Troponin minimally elevated with a peak of 292  -D-dimer is elevated however, patient had a higher D-dimer during his prior ER visit on 1/18, and CT chest was negative for PE at that time.   -Chest pain most likely is cardiac.  -resting echocardiogram with significant decrease in EF from previous as well as WMAs - however, per Dr Riggs who clarified that the previous TTE report was wrong and actual imaging is not significantly changed from previous.   -Underwent Lexiscan 1/25 which showed large area of transmural infarction in the apical, anterior, anteroseptal segments of the left ventricle associated with moderate degree of alen-infarct ischemia.  -Cardiac catheterization on 1/27-Severe 99% ISR of the pLAD stent (2 layers of stent), Patent stent in dRCA, Mild to moderate non-obstructive CAD in the LCx.  Refer to report for details of the findings.  -Given his Lewy body dementia and history of medication noncompliance, medical therapy recommended  -Continue aspirin, Plavix, atorvastatin, losartan and metoprolol.  -Imdur 30 mg daily added  -Cardiology following,  appreciate assistance    Lewy body dementia  Behavioral disturbance  Acute delirium   Code 21 called early 1/27  around 4 AM due to behavioral disturbance and again this afternoon due to behavioral disturbance after angiogram.  Has received Zyprexa IM.  See RRT note for detail. Need to be placed on soft restraints overnight of 1/27-1/28 due to increased restlessness, also had attempted to swing at beside sitter.  - appreciate psychiatry assistance, following meds added/adjusted on 1/28,  (Depakote 375mg BID, Zyprexa 5mg TID, avoid 1st gen antipsychotics   - decrease Celexa to 10mg daily)  - Change Zyprexa 5mg TID to 5mg at bedtime and 2.5mg BID prn since more sedated  - will continue with melatonin at bedtime to promote sleep  - IM Zyprexa available prn  - checking BMP, LFT and UA today  - continue to admission rivastigmine   - frequent reorientation  - continue sitter at bedside, attempt discontinuation of restraints as able     COVID 19 infection likely causing mild pneumonia with no hypoxemia  -Patient is vaccinated but not boosted.  -Mild amount of pneumonia on the CXR but no hypoxemia at this time  -At this point his chest x-ray findings most likely represent COVID  -Discontinue dexamethasone on 1/27 since he is not hypoxic and could also be contributing to his behavioral disturbance  - has completed 5 doses of Remdesivir x 5 doses on 1/28  -Monitor oxygen status closely.     Suicidal ideation  Patient verbalized suicidal ideation on 1/26 per chart review and psychiatry consulted, pending at this time. However, given his behavioral disturbance, it is very unlikely that he will be able to provide useful information at this time. Psych as above has seen. Per  note regarding suicidal ideation, per facility staff, patient occasional does make statements of that nature at the facility.       Type 2 diabetes  -Continue Levemir 10 units at bedtime   -continue to hold his am levemir for now since PO intake unreliable due to his delirium   -Continue sliding scale insulin  -Monitor for hypoglycemia  - BG in the 200s, monitor for hypoglycemia    HLD  -resume Lipitor          Diet: Moderate Consistent Carb (60 g CHO per Meal) Diet    DVT Prophylaxis: Ambulate every shift  Schulte Catheter: Not present  Central Lines: None  Cardiac Monitoring: ACTIVE order. Indication: Post- PCI/ percutaneous cardiac intervention (24 hours)  Code Status: No CPR- Pre-arrest intubation OK ---> changed on 1/29 after discussion  with patient's daughter, Dr. Danielle Winn, who have spoken to the rest of the family.     Disposition Plan   Expected Discharge: 01/31/2022    Anticipated discharge location:  Awaiting care coordination huddle  Delays:     Other (Add Comment)            The patient's care was discussed with the Bedside Nurse and Patient.    Updated patient's daughter, Dr. Danielle Winn.    Reina Benjamin MD  Hospitalist Service  [facility]  Securely message with the Vocera Web Console (learn more here)  Text page via [employer] Paging/Directory         Clinically Significant Risk Factors Present on Admission                    ______________________________________________________________________    Interval History   Patient more sedated with meds today, he does wake up and mubbles, he pulls on his restraints. Discussed with sitter and RN, when restraint gets removed attempts to crawl out of bed.      Data reviewed today: I reviewed all medications, new labs and imaging results over the last 24 hours. I personally reviewed no images or EKG's today.    Physical Exam   Vital Signs: Temp: 97.5  F (36.4  C) Temp src: Axillary BP: 110/66 Pulse: 64   Resp: 16 SpO2: 93 % O2 Device: None (Room air)    Weight: 189 lbs 3.2 oz  General Appearance: some what sedated, still pulling on restratins  Respiratory: clear to auscultation bilaterally  Cardiovascular: regular rate and rhythm  GI: soft and non-tender  Skin: warm and dry      Data   Recent Labs   Lab 01/29/22  1337 01/29/22  0152 01/28/22  2116 01/27/22  0727 01/27/22  0655 01/26/22  0745 01/26/22  0636 01/25/22  0919 01/25/22  0636 01/24/22  1147 01/24/22  0810 01/24/22  0026 01/23/22  1652   WBC  --   --   --   --  6.1  --  4.1  --  3.9*  --  4.1  --  6.2   HGB  --   --   --   --  14.8  --  14.7  --  13.4  --  13.9  --  13.6   MCV  --   --   --   --  83  --  84  --  85  --  87  --  87   PLT  --   --   --   --  229  --  178  --  150  --  171  --  189   NA  --    --   --   --   --   --  132*  --  133  --  135  --  134   POTASSIUM  --   --   --   --   --   --  4.2  --  4.2  --  4.3  --  4.7   CHLORIDE  --   --   --   --   --   --  104  --  107  --  105  --  104   CO2  --   --   --   --   --   --  23  --  23  --  23  --  25   BUN  --   --   --   --   --   --  21  --  18  --  15  --  16   CR  --   --   --   --   --   --  0.88  --  0.88  --  0.99  --  1.19   ANIONGAP  --   --   --   --   --   --  5  --  3  --  7  --  5   CHRISTIE  --   --   --   --   --   --  8.7  --  8.4*  --  8.5  --  8.3*   * 197* 215*   < >  --    < > 247*   < > 137*   < > 184*   < > 208*   ALBUMIN  --   --   --   --   --   --   --   --   --   --  3.6  --  3.7   PROTTOTAL  --   --   --   --   --   --   --   --   --   --  7.1  --  7.1   BILITOTAL  --   --   --   --   --   --   --   --   --   --  0.3  --  0.5   ALKPHOS  --   --   --   --   --   --   --   --   --   --  106  --  117   ALT  --   --   --   --   --   --   --   --   --   --  45  --  47   AST  --   --   --   --   --   --   --   --   --   --  21  --  17    < > = values in this interval not displayed.     No results found for this or any previous visit (from the past 24 hour(s)).  Medications       aspirin  81 mg Oral Daily     atorvastatin  80 mg Oral Daily     citalopram  10 mg Oral Daily     clopidogrel  75 mg Oral Daily     divalproex sodium delayed-release  375 mg Oral Q12H TIFFANY    Or     valproate (DEPACON) in NS intermittent infusion  380 mg Intravenous Q12H TIFFANY     fexofenadine  180 mg Oral Daily     fluticasone  1 spray Both Nostrils Daily     insulin aspart  1-10 Units Subcutaneous TID AC     insulin aspart  1-7 Units Subcutaneous At Bedtime     insulin aspart  3 Units Subcutaneous TID w/meals     insulin detemir  10 Units Subcutaneous At Bedtime     [Held by provider] insulin detemir  12 Units Subcutaneous QAM AC     isosorbide mononitrate  30 mg Oral Daily     loperamide  2 mg Oral Daily     losartan  25 mg Oral Daily     melatonin  5 mg  Oral At Bedtime     metoprolol succinate ER  25 mg Oral At Bedtime     OLANZapine zydis  5 mg Oral At Bedtime     pantoprazole  20 mg Oral Daily     rivastigmine  4.6 mg Transdermal Daily     rivastigmine   Transdermal Q8H     Vitamin D3  25 mcg Oral Daily

## 2022-01-29 NOTE — PLAN OF CARE
Pt is alert to self only, mod carb diet, is very unsteady while ambulating- is impulsive and will fight to stand up. Pt has been decently pleasant this shift- no verbal/physical abuse, but does try suddenly try to stand and get OOB. Has not refused meds this shift. PIV, SL. Tele is SR. Has hallucinations of people/objects in the room. Marlee Pena updated w/ Pt's condition this shift- reported while at their facility, Pt sometimes has horrible hallucinations of people being hurt in his room but has not experienced that this shift. Depakote not given this shift d/t late pyxis load.

## 2022-01-30 NOTE — PLAN OF CARE
A/O to self, VSS on RA Tele SR. On 4 point restraints, denies pain, Restless and hallucinating at the beginning of shift then calmed down, straight cathed for urine sample . Incontinent

## 2022-01-30 NOTE — PLAN OF CARE
Shift: [id number]    Updates:   -Sleepy throughout the morning. Woke up around 13:00.   -Patient confused but cooperative this afternoon.   -Refused breakfast. Ate all of lunch with assistance from CNA.   -Restraints removed @ 10:00    Vitals: Temp: 98.4  F (36.9  C) Temp src: Oral BP: (!) 143/78 Pulse: 72   Resp: 16 SpO2: 95 % O2 Device: None (Room air)      Neuro/Orientation: alert to self. More restless this afternoon.     Tele/Cardiac: Sinus rhythm    Pain: denies    Access/Drips:   -Right PIV x2    Respiratory/Oxygen:  -lungs diminished  -room air    GI/: voiding infequently  -Diet: mod carb diet    Skin: Right radial site - WNL    Mobility: up with 1, gait belt and walker    Discharge Planning: pending    Aggression tool: Yellow

## 2022-01-30 NOTE — PROGRESS NOTES
[facility]    Medicine Progress Note - Hospitalist Service    Date of Admission:  1/23/2022    Assessment & Plan            Matt Way is a 71 year old male admitted on 1/23/2022 for chest pain.  Hospital course complicated by acute delirium      Chest pain   Abnormal stress test  History of coronary artery disease status post PCI of the LAD  -Troponin minimally elevated with a peak of 292  -D-dimer is elevated however, patient had a higher D-dimer during his prior ER visit on 1/18, and CT chest was negative for PE at that time.   -Chest pain most likely is cardiac.  -resting echocardiogram with significant decrease in EF from previous as well as WMAs - however, per Dr Riggs who clarified that the previous TTE report was wrong and actual imaging is not significantly changed from previous.   -Underwent Lexiscan 1/25 which showed large area of transmural infarction in the apical, anterior, anteroseptal segments of the left ventricle associated with moderate degree of alen-infarct ischemia.  -Cardiac catheterization on 1/27-Severe 99% ISR of the pLAD stent (2 layers of stent), Patent stent in dRCA, Mild to moderate non-obstructive CAD in the LCx.  Refer to report for details of the findings.  -Given his Lewy body dementia and history of medication noncompliance, medical therapy recommended  -Continue aspirin, Plavix, atorvastatin, losartan and metoprolol.  -Imdur 30 mg daily added on 1/27, will continue  -Cardiology following signed off    Lewy body dementia with behavioral disturbance  Acute delirium   Code 21 called early 1/27  around 4 AM due to behavioral disturbance and again this afternoon due to behavioral disturbance after angiogram.  Has received Zyprexa IM.  See RRT note for detail. Need to be placed on soft restraints overnight of 1/27-1/28 due to increased restlessness, also had attempted to swing at beside sitter.  - appreciate psychiatry assistance, following meds  added/adjusted on 1/28, (Depakote 375mg BID, Zyprexa 5mg TID, avoid 1st gen antipsychotic, decrease Celexa to 10mg daily)  - Change Zyprexa 5mg TID to 5mg at bedtime and 2.5mg BID prn since more sedated on 1/29, decrease Zyprexa to 2.5mg at bedtime today  - increase Melatonin to 10mg at bedtime (he takes this PTA)  - IM Zyprexa available prn  - checking BMP, LFT and UA are normal range on 1/29  - continue to admission rivastigmine   - frequent reorientation  - continue sitter, monitor off restraints today     COVID 19 infection likely causing mild pneumonia with no hypoxemia  -Patient is vaccinated but not boosted.  -Mild amount of pneumonia on the CXR but no hypoxemia at this time  -At this point his chest x-ray findings most likely represent COVID  -Discontinue dexamethasone on 1/27 since he is not hypoxic and could also be contributing to his behavioral disturbance  - has completed 5 doses of Remdesivir x 5 doses on 1/28  -Monitor oxygen status closely.     Suicidal ideation  Patient verbalized suicidal ideation on 1/26 per chart review and psychiatry consulted, pending at this time. However, given his behavioral disturbance, it is very unlikely that he will be able to provide useful information at this time. Psych as above has seen. Per  note regarding suicidal ideation, per facility staff, patient occasional does make statements of that nature at the facility.       Type 2 diabetes  -Continue Levemir 10 units at bedtime   -continue to hold his am levemir for now since PO intake unreliable due to his delirium   -Continue sliding scale insulin  -Monitor for hypoglycemia  - -811 today , monitor for hypoglycemia    HLD  -resume Lipitor          Diet: Moderate Consistent Carb (60 g CHO per Meal) Diet    DVT Prophylaxis: Ambulate every shift  Schulte Catheter: Not present  Central Lines: None  Cardiac Monitoring: ACTIVE order. Indication: Post- PCI/ percutaneous cardiac intervention (24 hours)  Code Status: No  CPR- Pre-arrest intubation OK ---> changed on 1/29 after discussion with patient's daughter,  Danielle Pearlde, who has spoken to the rest of the family.     Disposition Plan   Expected Discharge: 02/01/2022    Anticipated discharge location:  Awaiting care coordination huddle  Delays:     Other (Add Comment)            The patient's care was discussed with the Bedside Nurse and Patient.    Updated patient's daughter, Neetu Winn.    Reina Benjamin MD  Hospitalist Service  [facility]  Securely message with the Vocera Web Console (learn more here)  Text page via [facility] Paging/Directory         Clinically Significant Risk Factors Present on Admission                    ______________________________________________________________________    Interval History   At time of visit, patient is calm and sleeping. His restraints are off at the moment. He was able to wake up easily. Denies pain. Denies shortness of breath. He is afebrile.     Data reviewed today: I reviewed all medications, new labs and imaging results over the last 24 hours. I personally reviewed no images or EKG's today.    Physical Exam   Vital Signs: Temp: 98.4  F (36.9  C) Temp src: Oral BP: (!) 143/78 Pulse: 72   Resp: 16 SpO2: 95 % O2 Device: None (Room air)    Weight: 189 lbs 1.6 oz  General Appearance: sleeping, easily arouses, calm at time of visit  Respiratory: clear to auscultation bilaterally  Cardiovascular: regular rate and rhythm  GI: soft and non-tender  Skin: warm and dry      Data   Recent Labs   Lab 01/30/22  1330 01/30/22  0843 01/30/22  0205 01/29/22  1839 01/29/22  1427 01/27/22  0727 01/27/22  0655 01/26/22  0745 01/26/22  0636 01/25/22  0919 01/25/22  0636 01/24/22  1147 01/24/22  0810   WBC  --   --   --   --   --   --  6.1  --  4.1  --  3.9*  --  4.1   HGB  --   --   --   --   --   --  14.8  --  14.7  --  13.4  --  13.9   MCV  --   --   --   --   --   --  83  --  84  --  85  --  87   PLT  --   --    --   --   --   --  229  --  178  --  150  --  171   NA  --   --   --   --  140  --   --   --  132*  --  133  --  135   POTASSIUM  --   --   --   --  4.5  --   --   --  4.2  --  4.2  --  4.3   CHLORIDE  --   --   --   --  110*  --   --   --  104  --  107  --  105   CO2  --   --   --   --  26  --   --   --  23  --  23  --  23   BUN  --   --   --   --  30  --   --   --  21  --  18  --  15   CR  --   --   --   --  0.95  --   --   --  0.88  --  0.88  --  0.99   ANIONGAP  --   --   --   --  4  --   --   --  5  --  3  --  7   CHRISTIE  --   --   --   --  8.9  --   --   --  8.7  --  8.4*  --  8.5   * 165* 165*   < > 280*   < >  --    < > 247*   < > 137*   < > 184*   ALBUMIN  --   --   --   --  3.5  --   --   --   --   --   --   --  3.6   PROTTOTAL  --   --   --   --  7.2  --   --   --   --   --   --   --  7.1   BILITOTAL  --   --   --   --  0.9  --   --   --   --   --   --   --  0.3   ALKPHOS  --   --   --   --  100  --   --   --   --   --   --   --  106   ALT  --   --   --   --  57  --   --   --   --   --   --   --  45   AST  --   --   --   --  24  --   --   --   --   --   --   --  21    < > = values in this interval not displayed.     No results found for this or any previous visit (from the past 24 hour(s)).  Medications       aspirin  81 mg Oral Daily     atorvastatin  80 mg Oral Daily     citalopram  10 mg Oral Daily     clopidogrel  75 mg Oral Daily     divalproex sodium delayed-release  375 mg Oral Q12H TIFFANY    Or     valproate (DEPACON) in NS intermittent infusion  380 mg Intravenous Q12H TIFFANY     fexofenadine  180 mg Oral Daily     fluticasone  1 spray Both Nostrils Daily     insulin aspart  1-10 Units Subcutaneous TID AC     insulin aspart  1-7 Units Subcutaneous At Bedtime     insulin aspart  3 Units Subcutaneous TID w/meals     insulin detemir  10 Units Subcutaneous At Bedtime     [Held by provider] insulin detemir  12 Units Subcutaneous QAM AC     isosorbide mononitrate  30 mg Oral Daily     losartan  25 mg  Oral Daily     melatonin  5 mg Oral At Bedtime     metoprolol succinate ER  25 mg Oral At Bedtime     OLANZapine zydis  5 mg Oral At Bedtime     pantoprazole  20 mg Oral Daily     rivastigmine  4.6 mg Transdermal Daily     rivastigmine   Transdermal Q8H     Vitamin D3  25 mcg Oral Daily

## 2022-01-30 NOTE — PLAN OF CARE
Mostly lethargic and somnolent during the day, he gets agigated with cares and when restraints are off, becomes combative to staff. He is AORX 2.  Denies pain when asked.

## 2022-01-30 NOTE — PROVIDER NOTIFICATION
MD Notification    Notified Person: MD    Notified Person Name:  Sourav    Notification Date/Time:  08:25    Notification Interaction: amcom paging    Purpose of Notification: non-violent restraint order . Patient is still requiring restraints. Patient is combative with staff during cares. Can you renew the order?    Orders Received:    Comments:

## 2022-01-31 NOTE — PROGRESS NOTES
"CLINICAL NUTRITION SERVICES  -  ASSESSMENT NOTE      RECOMMENDATIONS FOR MD/PROVIDER TO ORDER:   None   Recommendations Ordered by Registered Dietitian (RD):   Glucerna with meals  Multivitamin   Future/Additional Recommendations:   Adjust supplement pending pt acceptance/intake   Malnutrition:   % Weight Loss:  > 8% in 1 week (severe malnutrition)  % Intake:  <75% for > 7 days (moderate malnutrition)  Subcutaneous Fat Loss:  Unable to assess  Muscle Loss:  Unable to assess  Fluid Retention:  None noted    Malnutrition Diagnosis: Moderate malnutrition  In Context of:  Acute on chronic illness or injury        REASON FOR ASSESSMENT  Matt Way is a 71 year old male seen by Registered Dietitian for LOS.    Pt presents with chest pain and cough.   Hx of Lewy Body dimentia Coronary stenting, Pulmonary embolism in 2018, Diabetes mellitus, GERD, Hyperlipidemia, Obstructive sleep apnea, Shoulder surgery, Vasectomy.       NUTRITION HISTORY  Nurse reports that he doesn't eat much unless prompted/assisted.      CURRENT NUTRITION ORDERS  Diet Order: Consistent Carbohydrate (60 g CHO/meal)       Current Intake/Tolerance:  Intake variable 25-75% of est kcal needs ordered each day for the past 5 days.      NUTRITION FOCUSED PHYSICAL ASSESSMENT FOR DIAGNOSING MALNUTRITION)  No:  Covid pt                Observed:    Unable to observe    Obtained from Chart/Interdisciplinary Team:  None noted    ANTHROPOMETRICS  Height: 6' 1\"  Weight: 86.2 kg  190 lbs 1.6 oz  Body mass index is 25.08 kg/m .  Weight Status:  Overweight BMI 25-29.9  IBW: 79.9 kg (190 lb 1.6 oz)  % IBW: 108%  Weight History:   Wt Readings from Last 4 Encounters:   01/31/22 86.2 kg (190 lb 1.6 oz)   01/30/22 85.8 kg (189 lb 3.2)   01/28/22 91.1 kg (200 lb 14.4 oz)   01/24/22 94.1 kg (207 lb 8 oz)   01/23/22 94.3 kg (208 lb)       LABS  Trace ketone in urine  -342 last 2 days  Labs reviewed    MEDICATIONS  Lipitor, Insulin, Protonix, Vit D  Medications " reviewed      ASSESSED NUTRITION NEEDS PER APPROVED PRACTICE GUIDELINES:    Dosing Weight 86.2 kg (190 lb 1.6 oz)  Estimated Energy Needs: 5188-5097 kcals (25-30 Kcal/Kg)  Justification: maintenance  Estimated Protein Needs: 103-129 grams protein (1.2-1.5 g pro/Kg)  Justification: hypercatabolism with critical illness  Estimated Fluid Needs: 9122-5141  mL (1 mL/Kcal)  Justification: maintenance    MALNUTRITION:  % Weight Loss:  > 8% in 1 week (severe malnutrition)  % Intake:  <75% for > 7 days (moderate malnutrition)  Subcutaneous Fat Loss:  Unable to assess  Muscle Loss:  Unable to assess  Fluid Retention:  None noted    Malnutrition Diagnosis: Moderate malnutrition  In Context of:  Acute on chronic illness or injury    NUTRITION DIAGNOSIS:  Inadequate protein-energy intake related to suspected loss of appetite secondary to acute infection and diminished cognitive function (Lewy Body dementia) as evidenced by pt consuming less than 75% of est kcal and protein needs and not eating unless prompted and assisted.        NUTRITION INTERVENTIONS  Recommendations / Nutrition Prescription  Recommend assisting/prompting pt to eat as much as possible.      Implementation  Medical Food Supplement  Glucerna with meals  Multivitamin      Nutrition Goals  Pt will not drop below current weight of 86.2 kg (190 lb)    Pt will consume >75% of est kcal and protein needs with meals and supplements.      MONITORING AND EVALUATION:  Progress towards goals will be monitored and evaluated per protocol and Practice Guidelines

## 2022-01-31 NOTE — DISCHARGE SUMMARY
[facility]  Hospitalist Discharge Summary      Date of Admission:  1/23/2022  Date of Discharge:  1/31/2022  Discharging Provider: Drew Everett DO  Discharge Service: Hospitalist Service    Discharge Diagnoses   Chest pain in setting of CAD  Status post PCI of the LAD in the past    Lewy body dementia with behavioral disturbance w/acute delirium    COVID 19 infection likely causing mild pneumonia with no hypoxemia   Suicidal statements   DM type II   HLD      Follow-ups Needed After Discharge   Follow-up Appointments     Follow Up and recommended labs and tests      Follow up with [facility] physician.  The following labs/tests are   recommended: Continue to monitor blood sugars.  If patient having issues   with PO may need to adjust the insulin regimen.  Follow up with primary care provider in 1-2 weeks           Unresulted Labs Ordered in the Past 30 Days of this Admission     No orders found from 12/24/2021 to 1/24/2022.        Discharge Disposition   Discharged to memory care unit  Condition at discharge: Stable    Hospital Course   Matt Way is a 71 year old male admitted on 1/23/2022 for chest pain.  Hospital course complicated by acute delirium      Chest pain   Abnormal stress test  History of coronary artery disease status post PCI of the LAD  -Troponin minimally elevated with a peak of 292  -D-dimer is elevated however, patient had a higher D-dimer during his prior ER visit on 1/18, and CT chest was negative for PE at that time.   -Chest pain most likely is cardiac.  -resting echocardiogram with significant decrease in EF from previous as well as WMAs - however, per Dr Riggs who clarified that the previous TTE report was wrong and actual imaging is not significantly changed from previous.   -Underwent Lexiscan 1/25 which showed large area of transmural infarction in the apical, anterior, anteroseptal segments of the left ventricle associated with moderate degree of  alen-infarct ischemia.  -Cardiac catheterization on 1/27-Severe 99% ISR of the pLAD stent (2 layers of stent), Patent stent in dRCA, Mild to moderate non-obstructive CAD in the LCx.  Refer to report for details of the findings.  -Given his Lewy body dementia and history of medication noncompliance, medical therapy recommended  -Continue aspirin, Plavix, atorvastatin, losartan and metoprolol.  -Imdur 30 mg daily added on 1/27, will continue  -Cardiology signed off     Lewy body dementia with behavioral disturbance  Acute delirium   Code 21 called early 1/27  around 4 AM due to behavioral disturbance and again this afternoon due to behavioral disturbance after angiogram.  Has received Zyprexa IM.  See RRT note for detail. Need to be placed on soft restraints overnight of 1/27-1/28 due to increased restlessness, also had attempted to swing at beside sitter.  - appreciate psychiatry assistance, following meds added/adjusted on 1/28, (Depakote 375mg BID, Zyprexa 5mg TID, avoid 1st gen antipsychotic, decrease Celexa to 10mg daily)  - Change Zyprexa 5mg TID to 5mg at bedtime and 2.5mg BID prn since more sedated on 1/29, decrease Zyprexa to 2.5mg at bedtime today  - increase Melatonin to 10mg at bedtime (he takes this PTA)  - IM Zyprexa available prn  - checking BMP, LFT and UA are normal range on 1/29  - continue to admission rivastigmine   - frequent reorientation  - continue sitter, monitor off restraints today     COVID 19 infection likely causing mild pneumonia with no hypoxemia  -Patient is vaccinated but not boosted.  -Mild amount of pneumonia on the CXR but no hypoxemia at this time  -At this point his chest x-ray findings most likely represent COVID  - Discontinued dexamethasone on 1/27 since he is not hypoxic and could also be contributing to his behavioral disturbance  - has completed 5 doses of Remdesivir x 5 doses on 1/28  -Monitor oxygen status closely.     Suicidal ideation  Patient verbalized suicidal  ideation on 1/26 per chart review and psychiatry consulted, pending at this time. However, given his behavioral disturbance, it is very unlikely that he will be able to provide useful information at this time. Psych as above has seen. Per  note regarding suicidal ideation, per facility staff, patient occasional does make statements of that nature at the facility.         Type 2 diabetes  -Continue Levemir 10 units at bedtime   -continue to hold his am levemir for now since PO intake unreliable due to his delirium   -Continue sliding scale insulin  -Monitor for hypoglycemia  - -737 today , monitor for hypoglycemia     HLD  -resume Lipitor      Consultations This Hospital Stay   PHARMACY IP CONSULT  PHARMACY IP CONSULT  CARDIOLOGY IP CONSULT  CARDIOLOGY IP CONSULT  CARE MANAGEMENT / SOCIAL WORK IP CONSULT  PSYCHIATRY IP CONSULT  PHARMACY IP CONSULT  PHARMACY IP CONSULT  SMOKING CESSATION PROGRAM IP CONSULT    Code Status   No CPR- Pre-arrest intubation OK    Time Spent on this Encounter   I, Drew Everett DO, personally saw the patient today and spent greater than 30 minutes discharging this patient.       Drew Everett DO  [facility] HEART CARE  [address] [place] [address] [address]  [facility] [address]  Phone: [phone]  ______________________________________________________________________    Physical Exam   Vital Signs: Temp: 98.3  F (36.8  C) Temp src: Oral BP: 103/77 Pulse: 76   Resp: 16 SpO2: 95 % O2 Device: None (Room air)    Weight: 190 lbs 1.6 oz  General Appearance: Resting comfortably.  NAD   Respiratory: No respiratory distress on RA  Cardiovascular: RRR.  Appears well perfused  GI: Soft.  Non-distended  Skin: No obvious rashes or cyanosis  Other: Alert.  Moving all extremities grossly         Primary Care Physician   Kingston Leal    Discharge Orders      COVID-19 GetWell Loop Referral      Care Coordination Referral      Medication Instructions - Anticoagulants     Do NOT stop your aspirin or platelet inhibitor unless directed by your Cardiologist.  These medications help to prevent platelets in your blood from sticking together and forming a clot.  Examples of these medications are:  Ticagrelor (Brilinta), Clopidigrel (Plavix), Prasugrel (Effient)     When to call - Contact the Heart Clinic    You may experience symptoms that require follow-up before your scheduled appointment. Contact the Heart Clinic if you develop: Fever over 100.4o Fahrenheit, that lasts more than one day; Redness, heat, or pus at the puncture site; Change in color or temperature in your hand or arm.     When to call - Reasons to Call 911    If your wrist puncture site starts bleeding after discharge, sit down and apply firm pressure with your thumb against the puncture site and fingers against the back of the wrist for 10 minutes. If the bleeding stops, continue to rest, keeping your wrist still for 2 hours. Notify your doctor as soon as possible.  IF BLEEDING DOES NOT STOP OR THERE IS A LARGER AMOUNT OF BLEEDING OR SPURTING CALL 9-1-1 immediately.DO NOT drive yourself to the hospital.     Precautions - Lifting    DO NOT lift more than 5 pounds with affected arm for 48 hours     Precautions - Household Activities    Avoid excessive bending or movement of your wrist for 72 hours.  Do not subject hand/arm to any forceful movements for 24 hours, such as supporting weight when rising from a chair or bed.     Remove the band-aid on the puncture site after 24 hours and leave open to air. If minor oozing, you may apply a band-aid and remove after 12 hours.     Precautions - Active Sports Activities    DO NOT engage in vigorous exercise using your affected arm for 3 days after discharge.  This includes golf, tennis or swimming.     Precautions - Operating yard equipment or vehicles    Do not operate a chainsaw, lawnmower, motorcycle, or all-terrain vehicle for 48 hours after the procedure.     Precautions -  Elective Dental Work    NO elective dental work for 6 weeks after receiving a stent.     Comfort and Pain Management - Bruising after Surgery    Expect mild tingling of hand and tenderness at the wrist puncture site for up to 3 days. You may take Tylenol or a pain medicine recommended by your doctor.     Activity - Cardiac Rehab    You are encouraged to enroll in an Outpatient Cardiac Rehab program after discharge from the hospital.  Our Cardiac Rehab staff may visit briefly with you while you're in the hospital.  If they miss you, someone will contact you after you are home.     Return to Driving    Driving is NOT permitted for 24 hours after surgery     Return to work    You may return to work after 72 hours if you are feeling well and your job does not involve heavy lifting.     Shower / Bathing    You may shower on the day after your procedure.  DO NOT soak of wrist with the puncture site in water for 3 days to prevent infection. DO NOT take a tub bath or wash dishes for 3 days after the procedure     Dressing Removal    Remove the band-aid on the puncture site after 24 hours and leave open to air. If minor oozing, you may apply a band-aid and remove after 12 hours     General info for SNF    Length of Stay Estimate: Long Term Care  Condition at Discharge: Stable  Level of care:skilled   Rehabilitation Potential: Fair  Admission H&P remains valid and up-to-date: Yes  Recent Chemotherapy: N/A  Use Nursing Home Standing Orders: Yes     Mantoux instructions    Give two-step Mantoux (PPD) Per Facility Policy Yes     Follow Up and recommended labs and tests    Follow up with halfway physician.  The following labs/tests are recommended: Continue to monitor blood sugars.  If patient having issues with PO may need to adjust the insulin regimen.  Follow up with primary care provider in 1-2 weeks     Reason for your hospital stay    Chest pain and COVID     Activity - Up with nursing assistance     Additional  Discharge Instructions    Continue to monitor blood sugars with meals and adjust insulin regimen as needed     Diet    Follow this diet upon discharge: Orders Placed This Encounter      Snacks/Supplements Adult: Glucerna; With Meals      Moderate Consistent Carb (60 g CHO per Meal) Diet       Significant Results and Procedures   Most Recent 3 CBC's:Recent Labs   Lab Test 22  0655 22  0636 22  0636   WBC 6.1 4.1 3.9*   HGB 14.8 14.7 13.4   MCV 83 84 85    178 150     Most Recent 3 BMP's:Recent Labs   Lab Test 22  1249 22  0807 22  0158 22  1839 22  1427 22  0745 22  0636 22  0919 22  0636   NA  --   --   --   --  140  --  132*  --  133   POTASSIUM  --   --   --   --  4.5  --  4.2  --  4.2   CHLORIDE  --   --   --   --  110*  --  104  --  107   CO2  --   --   --   --  26  --  23  --  23   BUN  --   --   --   --  30  --  21  --  18   CR  --   --   --   --  0.95  --  0.88  --  0.88   ANIONGAP  --   --   --   --  4  --  5  --  3   CHRISTIE  --   --   --   --  8.9  --  8.7  --  8.4*   * 176* 130*   < > 280*   < > 247*   < > 137*    < > = values in this interval not displayed.   ,   Results for orders placed or performed during the hospital encounter of 22   XR Chest Port 1 View    Narrative    EXAM: XR CHEST PORT 1 VIEW  LOCATION: [facility]  DATE/TIME: 2022 5:00 PM    INDICATION: Chest pain. Cough.  COMPARISON: 2022.      Impression    IMPRESSION: Mild hazy opacities in both lower lungs are suspicious for pneumonia. No pneumothorax. Pulmonary vascularity is within normal limits.   Echocardiogram Limited     Value    LVEF  30-35%    Biplane LVEF 33%    Narrative    [id number]  [id number]  [id number]  [id number]^JENNIFER^RED^SHANTE     [facility]  Echocardiography Laboratory  [address] [place] [address]     Name: NAYELIKERMITLEANDER GRANT  MRN: [id number]  : 1950  Study Date:  01/25/2022 09:58 AM  Age: 71 yrs  Gender: Male  Patient Location: [facility]  Reason For Study: Chest Pain  Ordering Physician: RED RODRIGUEZ  Referring Physician: Kingston Leal  Performed By: Daniel Hodges RDCS     BSA: 2.2 m2  Height: 72 in  Weight: 208 lb  HR: 58  BP: 127/67 mmHg  ______________________________________________________________________________  Procedure  Limited Portable Echo Adult. Optison (NDC #[id number]) given intravenously.  ______________________________________________________________________________  Interpretation Summary     Moderately decreased left ventricular systolic function. The visual ejection  fraction is 30-35%. Biplane LVEF is 33%.  There is severe anterior, septal, and apical wall hypokinesis.  The right ventricle is normal in size and function.  Borderline aortic root dilatation. Normal size ascending aorta.  Valves not well studied on this study.  Compared to the piror study on 12/7/2020, the LVEF is lower and there are new  wall motion abnormalities.  ______________________________________________________________________________  Left Ventricle  The left ventricle is normal in size. Moderately decreased left ventricular  systolic function. The visual ejection fraction is 30-35%. Biplane LVEF is  33%. There is severe anterior, septal, and apical wall hypokinesis. There is  no thrombus seen in the left ventricle.     Right Ventricle  The right ventricle is normal in size and function.     Mitral Valve  The mitral valve leaflets appear thickened, but open well. There is trace  mitral regurgitation.     Aortic Valve  The aortic valve is not well visualized.     Vessels  Borderline aortic root dilatation. Normal size ascending aorta.     Rhythm  Sinus rhythm was noted.  ______________________________________________________________________________  MMode/2D Measurements & Calculations  IVSd: 1.2 cm  LVIDd: 4.9 cm  LVIDs: 3.3 cm  LVPWd: 0.82 cm  FS: 32.5 %  LV mass(C)d:  175.8 grams  LV mass(C)dI: 81.2 grams/m2  Ao root diam: 3.8 cm  asc Aorta Diam: 3.6 cm  RWT: 0.34     ______________________________________________________________________________  Report approved by: Esteban Ortiz 01/25/2022 11:17 AM         Cardiac Catheterization    Narrative      Dist LAD lesion is 50% stenosed.    Prox Cx lesion is 45% stenosed.    Ost LAD to Mid LAD lesion is 99% stenosed.     Severe 99% ISR of the pLAD stent (2 layers of stent)  Patent stent in dRCA.  Mild to moderate non-obstructive CAD in the LCx.   NM Lexiscan stress test (nuc card)     Value    Target     Baseline Systolic     Baseline Diastolic BP 71    Last Stress Systolic     Last Stress Diastolic BP 65    Baseline HR 60    Max HR  71    Max Predicted HR  47    Rate Pressure Product 8,449.0    Left Ventricular EF 34    Narrative       The nuclear stress test is abnormal.     There is a large area of transmural infarction in the apical, anterior   and anteroseptal segment(s) of the left ventricle associated with a   moderate degree of alen-infarct ischemia.     There is nontransmural infarction in the inferior and inferoseptal   segment(s) of the left ventricle associated with a mild degree of   alen-infarct ischemia.     Left ventricular function is moderately reduced.     The left ventricular ejection fraction at stress is 34%.     Mild left ventricular enlargement is noted.     There is no prior study for comparison.           Discharge Medications   Current Discharge Medication List      START taking these medications    Details   divalproex sodium delayed-release (DEPAKOTE SPRINKLE) 125 MG DR capsule Take 375 mg by mouth every 12 hours  Qty: 60 capsule, Refills: 0    Comments: Future refills by PCP Dr. Kingston Leal with phone number [phone].  Associated Diagnoses: Lewy body dementia with behavioral disturbance (H)      isosorbide mononitrate (IMDUR) 30 MG 24 hr tablet Take 1 tablet (30 mg) by mouth  daily  Qty: 30 tablet, Refills: 0    Comments: Future refills by PCP Dr. Kingston Leal with phone number [phone].  Associated Diagnoses: NSTEMI (non-ST elevated myocardial infarction) (H)      !! OLANZapine zydis (ZYPREXA) 5 MG ODT Take 1 tablet (5 mg) by mouth 2 times daily as needed for agitation Try half tablet first  Qty: 30 tablet, Refills: 0    Comments: Future refills by PCP Dr. Kingston Leal with phone number [phone].  Associated Diagnoses: Lewy body dementia with behavioral disturbance (H)      !! OLANZapine zydis (ZYPREXA) 5 MG ODT Take 1 tablet (5 mg) by mouth At Bedtime Take half tablet (2.5 mg)  Qty: 30 tablet, Refills: 0    Associated Diagnoses: Lewy body dementia with behavioral disturbance (H)       !! - Potential duplicate medications found. Please discuss with provider.      CONTINUE these medications which have CHANGED    Details   citalopram (CELEXA) 10 MG tablet Take 1 tablet (10 mg) by mouth daily  Qty: 30 tablet, Refills: 0    Comments: Future refills by PCP Dr. Kingston Leal with phone number [phone].  Associated Diagnoses: Lewy body dementia with behavioral disturbance (H)      QUEtiapine (SEROQUEL) 25 MG tablet Take 0.5 tablets (12.5 mg) by mouth every 6 hours as needed (agitation)  Qty: 30 tablet, Refills: 0    Comments: Future refills by PCP Dr. Kingston Leal with phone number [phone].  Associated Diagnoses: Lewy body dementia with behavioral disturbance (H)      !! rivastigmine (EXELON) 4.6 MG/24HR 24 hr patch Place 1 patch onto the skin daily    Associated Diagnoses: Lewy body dementia with behavioral disturbance (H)      !! rivastigmine (EXELON) 4.6 MG/24HR 24 hr patch Place 1 patch onto the skin daily  Qty: 30 patch, Refills: 0    Comments: Refills by PCP  Associated Diagnoses: Lewy body dementia with behavioral disturbance (H)       !! - Potential duplicate medications found. Please discuss with provider.      CONTINUE these medications which have NOT  CHANGED    Details   acetaminophen (TYLENOL) 325 MG tablet Take 325-650 mg by mouth every 6 hours as needed for mild pain      aspirin (ASA) 81 MG EC tablet Take 1 tablet (81 mg) by mouth daily  Qty: 60 tablet, Refills: 3    Associated Diagnoses: Coronary artery disease involving native coronary artery of native heart without angina pectoris      atorvastatin (LIPITOR) 80 MG tablet Take 1 tablet (80 mg) by mouth daily  Qty: 90 tablet, Refills: 0    Associated Diagnoses: NSTEMI (non-ST elevated myocardial infarction) (H)      clopidogrel (PLAVIX) 75 MG tablet Take 1 tablet (75 mg) by mouth daily  Qty: 30 tablet, Refills: 0    Associated Diagnoses: NSTEMI (non-ST elevated myocardial infarction) (H)      fexofenadine (ALLEGRA) 180 MG tablet Take 180 mg by mouth daily      fluticasone (FLONASE) 50 MCG/ACT nasal spray Spray 1 spray into both nostrils daily      insulin aspart (NOVOLOG FLEXPEN) 100 UNIT/ML pen Inject 7 Units Subcutaneous 3 times daily (with meals) Breakfast and Dinner  Qty: 9 mL, Refills: 0    Associated Diagnoses: Type 2 diabetes mellitus with hyperglycemia, with long-term current use of insulin (H)      !! insulin detemir (LEVEMIR PEN) 100 UNIT/ML pen Inject 20 Units Subcutaneous every morning (before breakfast)  Qty: 9 mL, Refills: 0    Associated Diagnoses: Type 2 diabetes mellitus with hyperglycemia, with long-term current use of insulin (H)      !! insulin detemir (LEVEMIR PEN) 100 UNIT/ML pen Inject 14 Units Subcutaneous At Bedtime   Qty:        !! loperamide (IMODIUM) 2 MG capsule Take 2 mg by mouth daily      !! loperamide (IMODIUM) 2 MG capsule Take 2 mg by mouth 3 times daily as needed for diarrhea      losartan (COZAAR) 25 MG tablet Take 1 tablet (25 mg) by mouth daily    Associated Diagnoses: Benign essential hypertension      Melatonin 10 MG TABS tablet Take 10 mg by mouth At Bedtime       metFORMIN (GLUCOPHAGE) 1000 MG tablet Take 1,000 mg by mouth 2 times daily (with meals)      metoprolol  succinate ER (TOPROL-XL) 25 MG 24 hr tablet Take 25 mg by mouth At Bedtime      nitroGLYcerin (NITROSTAT) 0.4 MG sublingual tablet Place 1 tablet (0.4 mg) under the tongue every 5 minutes as needed for chest pain If symptoms persist 5 minutes after 2nd dose call 911.  Qty: 30 tablet, Refills: 1    Associated Diagnoses: NSTEMI (non-ST elevated myocardial infarction) (H); Acute chest pain      pantoprazole (PROTONIX) 20 MG EC tablet Take 20 mg by mouth daily      sennosides (SENOKOT) 8.6 MG tablet Take 2 tablets by mouth daily as needed for constipation  Qty: 60 tablet, Refills: 0    Associated Diagnoses: Slow transit constipation      Vitamin D (Cholecalciferol) 25 MCG (1000 UT) TABS Take 1 tablet by mouth daily        !! - Potential duplicate medications found. Please discuss with provider.        Allergies   Allergies   Allergen Reactions     Brilinta [Ticagrelor]      Duloxetine      Other reaction(s): Confusion  Per daughter who is MD     Gabapentin      Other reaction(s): Edema     Lisinopril Rash

## 2022-01-31 NOTE — PLAN OF CARE
"RN: pt alert / oriented to self, sleepy this shift but cooperative with taking meds, easily redirectable.  Up with Ax1 to chair, assisted with meal (was not interested in feeding self but was cooperative when assisted with feeding).  Tele off, IV out ,dressed and ready for transport to [place] via stretcher for safety at 1645.      /77   Pulse 76   Temp 98.3  F (36.8  C) (Oral)   Resp 16   Ht 1.854 m (6' 1\")   Wt 86.2 kg (190 lb 1.6 oz)   SpO2 95%   BMI 25.08 kg/m  '  "

## 2022-01-31 NOTE — PROGRESS NOTES
Care Management Follow Up    Length of Stay (days): 7    Expected Discharge Date: 01/31/2022     Concerns to be Addressed:       Patient plan of care discussed at interdisciplinary rounds: Yes    Anticipated Discharge Disposition:       Anticipated Discharge Services:    Anticipated Discharge DME:      Patient/family educated on Medicare website which has current facility and service quality ratings:    Education Provided on the Discharge Plan:    Patient/Family in Agreement with the Plan:      Referrals Placed by CM/SW:    Private pay costs discussed: transportation costs    Additional Information:  TIBURCIO spoke with Li at [facility].  [facility] is able to accept pt back today.  They would need any medication changes on paper scripts. TIBURCIO called and set up stretcher transport at 1645, stretcher due to Covid positive status as well as need for attendant due to dementia.    TIBURCIO text paged MD that any med changes need paper scripts.       Nitza Christy, LICSW

## 2022-01-31 NOTE — PLAN OF CARE
Care Management Discharge Note    Discharge Date: 01/31/2022       Discharge Disposition:  Return to [facility]     Discharge Services:      Discharge DME:      Discharge Transportation:  [facility] stretcher     Private pay costs discussed: transportation costs    PAS Confirmation Code:  N/A  Patient/family educated on Medicare website which has current facility and service quality ratings:      Education Provided on the Discharge Plan:  Yes  Persons Notified of Discharge Plans: MD spoke to daughter, Gilchrist  Patient/Family in Agreement with the Plan:  Yes    Handoff Referral Completed: No    Additional Information:  Discharge orders sent.     Nitza Christy, LICSW

## 2022-01-31 NOTE — PLAN OF CARE
Neuro- Oriented to self, pleasant and redirectable  Most Recent Vitals- Temp: 98.3  F (36.8  C) Temp src: Oral BP: 103/77 Pulse: 76   Resp: 16 SpO2: 95 % O2 Device: None (Room air)   Tele/Cardiac- SR, denies CP  Resp- Stable on RA, LS clear  Activity- SBA-Ax1  Pain- denies  Drips- none  Drains/Tubes- PIV  Skin- R radial- CMA intact  GI/- WDL  Aggression Color- Green  COVID status- Positive  Plan- Psyc consult, continue to monitor  Misc- NA    Pat Leonard, RN

## 2022-01-31 NOTE — PLAN OF CARE
Pt discharged via gurney with all [facility] and EMS to [facility]. AVS and paper scripts sent with patient.

## 2022-02-04 PROBLEM — U07.1 INFECTION DUE TO 2019 NOVEL CORONAVIRUS: Status: RESOLVED | Noted: 2022-01-01 | Resolved: 2022-01-01

## 2022-02-04 PROBLEM — E11.65 TYPE 2 DIABETES MELLITUS WITH HYPERGLYCEMIA, WITH LONG-TERM CURRENT USE OF INSULIN (H): Status: ACTIVE | Noted: 2020-09-01

## 2022-02-04 PROBLEM — F02.818 LEWY BODY DEMENTIA WITH BEHAVIORAL DISTURBANCE (H): Status: ACTIVE | Noted: 2021-01-01

## 2022-02-04 PROBLEM — I26.99 PULMONARY EMBOLISM (H): Status: ACTIVE | Noted: 2018-11-15

## 2022-02-04 PROBLEM — Z79.4 TYPE 2 DIABETES MELLITUS WITH HYPERGLYCEMIA, WITH LONG-TERM CURRENT USE OF INSULIN (H): Status: ACTIVE | Noted: 2020-09-01

## 2022-02-04 PROBLEM — R55 SYNCOPE, UNSPECIFIED SYNCOPE TYPE: Status: ACTIVE | Noted: 2022-01-01

## 2022-02-04 PROBLEM — G31.83 LEWY BODY DEMENTIA WITH BEHAVIORAL DISTURBANCE (H): Status: ACTIVE | Noted: 2021-01-01

## 2022-02-04 PROBLEM — I25.10 CORONARY ARTERY DISEASE INVOLVING NATIVE CORONARY ARTERY OF NATIVE HEART WITHOUT ANGINA PECTORIS: Status: ACTIVE | Noted: 2020-09-16

## 2022-02-04 NOTE — ED PROVIDER NOTES
History   Chief Complaint:  Syncope    HPI History limited due to dementia  Matt Way is a 71 year old male with history of dementia, type 2 diabetes, NSTEMI and CAD  who presents with syncope. He was found unconscious on the floor at his residence. His systolic blood pressure was in the 90s. He states he feels okay but complained of having a headache earlier. He denies having a spinal or arm and leg injury. He has no chest, neck or abdominal pain. He is breathing fine and denies feeling sick or feverish. Matt does not have any numbness or tingling. He was just put on Imdur today. He was admitted on 1/23 for chest pain and Covid.      Review of Systems   Constitutional: Negative for fever.   Respiratory: Negative for shortness of breath.    Cardiovascular: Negative for chest pain.   Gastrointestinal: Negative for abdominal pain.   Musculoskeletal: Negative for neck pain.   Neurological: Positive for syncope and headaches. Negative for numbness.   All other systems reviewed and are negative.      Allergies:  Brilinta [Ticagrelor]  Duloxetine  Gabapentin  Lisinopril    Medications:  acetaminophen (TYLENOL) 325 MG tablet  aspirin (ASA) 81 MG EC tablet  atorvastatin (LIPITOR) 80 MG tablet  citalopram (CELEXA) 10 MG tablet  clopidogrel (PLAVIX) 75 MG tablet  divalproex sodium delayed-release (DEPAKOTE SPRINKLE) 125 MG DR capsule  fexofenadine (ALLEGRA) 180 MG tablet  fluticasone (FLONASE) 50 MCG/ACT nasal spray  insulin aspart (NOVOLOG FLEXPEN) 100 UNIT/ML pen  insulin detemir (LEVEMIR PEN) 100 UNIT/ML pen  insulin detemir (LEVEMIR PEN) 100 UNIT/ML pen  isosorbide mononitrate (IMDUR) 30 MG 24 hr tablet  loperamide (IMODIUM) 2 MG capsule  loperamide (IMODIUM) 2 MG capsule  losartan (COZAAR) 25 MG tablet  Melatonin 10 MG TABS tablet  metFORMIN (GLUCOPHAGE) 1000 MG tablet  metoprolol succinate ER (TOPROL-XL) 25 MG 24 hr tablet  nitroGLYcerin (NITROSTAT) 0.4 MG sublingual tablet  OLANZapine zydis (ZYPREXA) 5 MG  ODT  OLANZapine zydis (ZYPREXA) 5 MG ODT  pantoprazole (PROTONIX) 20 MG EC tablet  QUEtiapine (SEROQUEL) 25 MG tablet  rivastigmine (EXELON) 4.6 MG/24HR 24 hr patch  rivastigmine (EXELON) 4.6 MG/24HR 24 hr patch  sennosides (SENOKOT) 8.6 MG tablet  Vitamin D (Cholecalciferol) 25 MCG (1000 UT) TABS      Past Medical History:     Past Medical History:   Diagnosis Date     Coronary artery disease 06/2018     Diabetes mellitus      GERD (gastroesophageal reflux disease)      Hyperlipidemia      Obesity      FLAQUITA (obstructive sleep apnea)      Pulmonary embolism 11/15/2018     Patient Active Problem List   Diagnosis     Diabetes mellitus, type 2 (H)     Acute chest pain     NSTEMI (non-ST elevated myocardial infarction) (H)     Pulmonary embolism (H)     Type 2 diabetes mellitus with hyperglycemia, with long-term current use of insulin (H)     Coronary artery disease involving native coronary artery of native heart without angina pectoris     Confusion     Hyperglycemia     Lewy body dementia with behavioral disturbance (H)     Chest pain, unspecified type     Infection due to 2019 novel coronavirus     Syncope, unspecified syncope type         Past Surgical History:    Past Surgical History:   Procedure Laterality Date     COLONOSCOPY       Coronary artery angiogram with stent (CECILY) to LAD  06/2018     CV CORONARY ANGIOGRAM N/A 9/3/2020    Procedure: Coronary Angiogram;  Surgeon: Herb Tran MD;  Location: [facility] CARDIAC CATH LAB     CV CORONARY ANGIOGRAM N/A 9/23/2020    Procedure: Coronary Angiogram;  Surgeon: Ernie Urbina MD;  Location: [facility] CARDIAC CATH LAB     CV HEART CATHETERIZATION WITH POSSIBLE INTERVENTION N/A 1/27/2022    Procedure: Heart Catheterization with Possible Intervention;  Surgeon: Ronnie Mobley MD;  Location: [facility] CARDIAC CATH LAB     CV INSTANTANEOUS WAVE-FREE RATIO N/A 9/3/2020    Procedure: Instantaneous Wave-Free Ratio;  Surgeon: Herb Tran MD;   Location:  HEART CARDIAC CATH LAB     CV LEFT HEART CATH N/A 9/3/2020    Procedure: Left Heart Cath;  Surgeon: Herb Tran MD;  Location:  HEART CARDIAC CATH LAB     CV LEFT VENTRICULOGRAM N/A 9/3/2020    Procedure: Left Ventriculogram;  Surgeon: Herb Tran MD;  Location:  HEART CARDIAC CATH LAB     CV PCI STENT DRUG ELUTING N/A 9/3/2020    Procedure: Percutaneous Coronary Intervention Stent Drug Eluting;  Surgeon: Herb Tran MD;  Location:  HEART CARDIAC CATH LAB     CV PCI STENT DRUG ELUTING N/A 9/23/2020    Procedure: Percutaneous Coronary Intervention Stent Drug Eluting;  Surgeon: Ernie Urbina MD;  Location:  HEART CARDIAC CATH LAB     SHOULDER SURGERY       VASECTOMY          Family History:    Family History   Problem Relation Age of Onset     Lupus Mother        Social History:  Social History     Socioeconomic History     Marital status:      Spouse name: Not on file     Number of children: Not on file     Years of education: Not on file     Highest education level: Not on file   Occupational History     Not on file   Tobacco Use     Smoking status: Never Smoker     Smokeless tobacco: Never Used   Substance and Sexual Activity     Alcohol use: No     Drug use: No     Sexual activity: Not on file   Other Topics Concern     Not on file   Social History Narrative     Not on file     Social Determinants of Health     Financial Resource Strain: Not on file   Food Insecurity: Not on file   Transportation Needs: Not on file   Physical Activity: Not on file   Stress: Not on file   Social Connections: Not on file   Intimate Partner Violence: Not on file   Housing Stability: Not on file       Physical Exam     Patient Vitals for the past 24 hrs:   BP Temp Temp src Pulse Resp SpO2   02/04/22 1515 95/60 -- -- 70 -- --   02/04/22 1445 100/63 -- -- 68 -- --   02/04/22 1430 107/66 -- -- 70 -- --   02/04/22 1415 102/64 -- -- 71 -- --   02/04/22 1400 106/61 -- -- 72 --  --   02/04/22 1355 111/71 -- -- -- -- 97 %   02/04/22 1349 113/66 98  F (36.7  C) Oral 72 14 97 %     Physical Exam  SKIN:  Warm, dry.  Atraumatic.  HEMATOLOGIC/IMMUNOLOGIC/LYMPHATIC:  No pallor.  No limb edema.  HENT:  Moist oral mucosa.  No oral mucosal lingual or dental trauma.  Arrives in a cervical collar.  Equivocal cervical spine palpable tenderness.  EYES:  Conjunctivae normal.  Pupils equal round and reactive to light.  Normal extraocular motion.  Blinks bilateral to visual threat.  CARDIOVASCULAR:  Regular rate and rhythm.  No murmur.  No rub.  No carotid bruit.  RESPIRATORY:  No respiratory distress, breath sounds equal and normal.  GASTROINTESTINAL:  Soft, nontender abdomen with active bowel sounds.  No distention.  No palpable mass.  MUSCULOSKELETAL: Painless passive range of motion of the torso and extremities.  NEUROLOGIC:  Alert, answers simple questions yes or no but unreliably, memory is impaired, no gross motor deficit, no gross tactile sensory deficit, no aphasia, no dysarthria..  PSYCHIATRIC: No psychotic features.    Emergency Department Course   ECG  ECG obtained at 1439, ECG read at 1443  Normal sinus rhythm   Low voltage QRS  Inferior infarct, age undetermined   No significant changes as compared to prior, dated 1/23/22.  Rate 72 bpm. DE interval 158 ms. QRS duration 76 ms. QT/QTc 432/473 ms. P-R-T axes 51 -28 75.     Laboratory:  Labs Ordered and Resulted from Time of ED Arrival to Time of ED Departure   BASIC METABOLIC PANEL - Abnormal       Result Value    Sodium 136      Potassium 4.5      Chloride 106      Carbon Dioxide (CO2) 25      Anion Gap 5      Urea Nitrogen 22      Creatinine 1.06      Calcium 8.3 (*)     Glucose 210 (*)     GFR Estimate 76     TROPONIN I - Abnormal    Troponin I High Sensitivity 96 (*)    D DIMER QUANTITATIVE - Abnormal    D-Dimer Quantitative 1.28 (*)    CBC WITH PLATELETS AND DIFFERENTIAL - Abnormal    WBC Count 9.0      RBC Count 4.73      Hemoglobin 13.1  (*)     Hematocrit 41.4      MCV 88      MCH 27.7      MCHC 31.6      RDW 14.2      Platelet Count 218      % Neutrophils 62      % Lymphocytes 24      % Monocytes 11      % Eosinophils 2      % Basophils 1      % Immature Granulocytes 0      NRBCs per 100 WBC 0      Absolute Neutrophils 5.6      Absolute Lymphocytes 2.1      Absolute Monocytes 1.0      Absolute Eosinophils 0.2      Absolute Basophils 0.1      Absolute Immature Granulocytes 0.0      Absolute NRBCs 0.0            Emergency Department Course:  1522    Positive D-dimer    Reviewed:  I reviewed nursing notes, vitals, past medical history and Care Everywhere    Assessments:  1424 I obtained history and examined the patient as noted above.   1630 I rechecked the patient and explained findings.       Consults:  1534 I spoke with Dr. Siddiqui of the hospitalist service who is in agreement to accept the patient for admission.     Interventions:  Medications   sodium chloride (PF) 0.9% PF flush 3 mL (has no administration in time range)   sodium chloride (PF) 0.9% PF flush 3 mL (has no administration in time range)   0.9% sodium chloride BOLUS (1,000 mLs Intravenous New Bag 2/4/22 1426)   Saline Flush - CT (94 mLs Intravenous Given 2/4/22 1559)   iopamidol (ISOVUE-370) solution 70 mL (70 mLs Intravenous Given 2/4/22 1559)       Disposition:  The patient was admitted to the hospital under the care of Dr. Siddiqui.     Impression & Plan     Medical Decision Making:  This patient presents after what was likely a syncopal episode.  Found unconscious on the floor.  Given this episode the above evaluation was performed.  Relatively unrevealing.  Elevated D-dimer prompted a CT chest.  Gladly negative.  Brain and cervical spine imaging was also negative for acute findings.  Lab work revealed elevated troponin but that is improved from recent comparisons during his recent hospitalization.  The patient was started on Imdur during this recent hospitalization which may have  played a role in his syncopal episode.  The patient will be admitted for observation and further care.    Diagnosis:    ICD-10-CM    1. Syncope, unspecified syncope type  R55        Discharge Medications:  New Prescriptions    No medications on file       Scribe Disclosure:  I, Radha Jansen, am serving as a scribe at 2:24 PM on 2/4/2022 to document services personally performed by Peter Garcia MD based on my observations and the provider's statements to me.              Peter Garcia MD  02/04/22 1899

## 2022-02-04 NOTE — ED TRIAGE NOTES
Pt had a syncopal episode today at his assisted living, was found unconscious but awoke with a sternal rub, bg 223, bp in the 90's, started Imdur today, recently had an MI with stents last week and covid 11 days ago

## 2022-02-04 NOTE — H&P
[facility]    History and Physical : Hospitalist Service:        Date of Admission:  2/4/2022    Cumulative Summary:   Matt Way is a 71 year old male with past medical history significant for Lewy body dementia, coronary artery disease, essential hypertension, hyperlipidemia and diabetes mellitus who was admitted from the ER for observation after unwitnessed fall and possible syncopal episode at his residence.  Patient was admitted for further monitoring.    Assessment & Plan     Possible syncope: Patient was found on the floor at his residence by the staff.  EMS was called at the arrival, his systolic blood pressure was in 90s.  Patient regained his consciousness but was only complaining of some headache and denied any other complaints.  Most likely etiology is secondary to hypotension associated with antihypertensive medications versus underlying known coronary artery disease.  His initial troponin is slightly elevated but remains at his baseline like previous hospitalization.  Coronary artery disease: During his last hospitalization , patient underwent extensive cardiac workup,his echocardiogram showed decrease in EF from previous as well as wall motion abnormalities and patient underwent Lexiscan on January 25 which showed large area of transmural infarction in the apical, anterior, anteroseptal segments of the left ventricle associated with moderate degree of alen-infarct ischemia.  Patient underwent cardiac catheterization on January 27 which showed severe 99% ISR of the P LAD stent 2 L of his stent. Imdur 30 mg was also added after cath .Patient has patent stent in RCA, showed mild to moderate nonobstructive coronary artery disease in left circumflex  Given his Lewy body dementia and history of medication noncompliance medical therapy was recommended.  Patient was discharged on aspirin Plavix atorvastatin losartan and metoprolol and Imdur.  Patient does not have any ischemic  changes on arrival to the emergency room this afternoon.  His troponin is elevated around 96, serial troponin has been ordered, patient is denying any chest pain at the time of my evaluation.    --Admit patient for observation with telemetry for 24 hours.  --General diet.  --Activity as tolerated with fall precautions, patient will require most likely bedside attendant due to his advanced dementia.  --We will check troponin every 6 hours for 2 more time.  --At this time I will hold his losartan, I have also place holding parameters on his other antihypertensive medications as I am concerned that patient had iatrogenic hypotension from adjustment in his medications which might have resulted into episode of syncope.  --We will hold his Lipitor due to observation is status.  --Continue patient on aspirin 81 mg, Plavix 75 mg p.o. daily, Toprol-XL 25 mg p.o. daily and Imdur with holding parameters.  --At this time if patient's symptoms resolved with adjustment of medications and if we need to decrease some of the dosages of medications then patient will probably not need any further cardiology evaluation.    Lewy body dementia with behavioral disturbance  High risk for developing acute delirium  During his previous hospitalization patient had code 21 due to behavioral disturbance and was again noticed to have behavioral disturbance after angiogram.  He has received IM Zyprexa in the past at [facility].  He also required soft restraints for a night during his last hospitalization due to increased restlessness and he also attempted to swing at bedside sitter.  Patient was also evaluated by psychiatry during his last hospitalization and he was discharged on Depakote 375 mg p.o. twice daily, Zyprexa 5 mg 3 times daily he was recommended to avoid first generation antipsychotic and his Celexa was decreased to 10 mg p.o. daily.    --Considering patient has advanced dementia I will order a bedside attendant, otherwise patient is at  risk for fall and elopement.  --We will order his PTA Depakote, Zyprexa 5 mg 3 times daily and PTA Celexa.  --We will also order as needed Zyprexa as recommended by psychiatry.  --Will order his PTA melatonin.  --We will continue PTA rivastigmine.  --Patient will require frequent reorientation.  --Of note, this is patient's third hospital visit since the start of this year and if patient continues to require recurrent hospitalizations without any significant interventions offered due to his advanced dementia then further discussion with family regarding goals of care should be addressed to avoid future hospitalization and if patient will benefit from comfort care status .  It seems like hospitalizations are really hard for patient.    Type 2 diabetes mellitus:  Patient PTA regimen includes Levemir 18 units in the morning and 14 units at the bedtime, patient is also taking NovoLog 7 units 3 times daily with each meal.    --We will do Accu-Cheks before every meal and at bedtime.  --Will hold his PTA Metformin while patient is in the hospital.  --I will continue his PTA Levemir 18 units in the morning and 15 units at the bedtime.  --Continue NovoLog 7 units 3 times daily with each meal.  --Continue hypoglycemia protocol.    COVID-19 infection recovered: During his last hospitalization patient was noticed to have pneumonia on the x-ray although patient did not have any hypoxemia was done with dexamethasone which was a stopped during his last hospitalization due to the concern if it was attributing to his behavioral disturbances patient did complete his remdesivir for 5 doses on January 28.  --At this time patient will be considered COVID-19 infection recovered and does not need any COVID-19 isolation expecting his PCR to be high because of recent infection.    History of suicidal ideation: During his last hospitalization there was also concern for patient verbalizing suicidal ideation on January 26, patient was  "evaluated by psych as above.  Given his behavioral disturbance patient was not able to provide any useful information at the time of psych evaluation.  --Facility staff continue to monitor patient closely.    Hyperlipidemia  --We will hold PTA Lipitor    Allergic rhinitis:  --We will hold PTA Allegra, Flonase    GERD:  --We will hold PTA Protonix    Clinically Significant Risk Factors Present on Admission               # Platelet Defect: home medication list includes an antiplatelet medication   # Diabetes, type II: last A1C 7.0 % (Ref range: 0.0 - 5.6 %)  # Overweight: Estimated body mass index is 25.08 kg/m  as calculated from the following:    Height as of 1/23/22: 1.854 m (6' 1\").    Weight as of 1/31/22: 86.2 kg (190 lb 1.6 oz).        Diet:  General diet   Schulte Catheter: Not present  DVT Prophylaxis: Pneumatic Compression Devices  Code Status: DNR    Disposition: Expected discharge in 24-48 hours if patient is doing well     The patient's care was discussed with the Bedside Nurse, Patient and ER Team.    Jenna Siddiqui MD,[facility]   ----------------------------------------------------------------------------------------------------------------------    Primary Care Physician   [place] Leal    Chief Complaint   Syncope   Unwitnessed fall sec to above     History is obtained from the Records and staff, patient is unable to provide history sec to advanced dementia     Patient Active Problem List   Diagnosis     Diabetes mellitus, type 2 (H)     Acute chest pain     NSTEMI (non-ST elevated myocardial infarction) (H)     Pulmonary embolism (H)     Type 2 diabetes mellitus with hyperglycemia, with long-term current use of insulin (H)     Coronary artery disease involving native coronary artery of native heart without angina pectoris     Confusion     Hyperglycemia     Lewy body dementia with behavioral disturbance (H)     Chest pain, unspecified type     Infection due to 2019 novel coronavirus     " Syncope, unspecified syncope type       History of Present Illness   Matt Way is a 71 year old male with past medical history significant for Lewy body dementia, coronary artery disease, essential hypertension, hyperlipidemia and diabetes mellitus who was brought to the emergency department this afternoon after unwitnessed fall and possible syncopal episode.    Has recently multiple visits to the hospitalization and this is his third visit since the start of this year this afternoon.  Patient was initially evaluated on January 18, 2022 when he was seen in the ER with chest pain, dizziness and lightheadedness.  He underwent cardiac work-up including CT scan with PE study and he was discharged after serial troponin were negative.  Patient was then again admitted to the hospital on January 23 and was discharged on January 31 after undergoing a very extensive cardiac work-up during the hospitalization.  During the hospitalization he was also diagnosed with COVID-19 infection with mild pneumonia but no hypoxemia and was treated for COVID-19 infection also.    On reviewing the records it seems like patient was evaluated by cardiology, his echocardiogram showed decrease in EF from previous as well as wall motion abnormalities and patient underwent Lexiscan on January 25 which showed large area of transmural infarction in the apical, anterior, anteroseptal segments of the left ventricle associated with moderate degree of alen-infarct ischemia.  Patient underwent cardiac catheterization on January 27 which showed severe 99% ISR of the P LAD stent 2 L of his stent.  Patient is stent in RCA, showed mild to moderate nonobstructive coronary artery disease in left circumflex given his Lewy body dementia and history of medication noncompliance medical therapy was recommended.  Patient was discharged on aspirin Plavix atorvastatin losartan and metoprolol and Imdur 30 mg was also added on January 27 and cardiology signed  off.    This afternoon patient was found unconscious on the floor at his residence.  At the time of EMS arrival his systolic blood pressure was in 90s.  Patient felt okay but, was complaining of headache.  He denied any spinal or extremities injury.  Patient was brought to the emergency room where he was found to have a blood pressure of 113/66, temperature of 36.7, pulse of 72, respiratory rate of 14 and he was saturating 97% on room air.  EKG showed normal sinus rhythm and low voltage QRS there were no ST segment changes.  Labs were quite insignificant with normal electrolytes, creatinine of 1.06, CBC showed normal WBC and hemoglobin numbers.  His troponins were slightly elevated around 96 but it seems to be about the same range where his numbers were during his last hospitalization.  Due to the fall, CT scan of the head without contrast was done which did not show any evidence of acute intracranial hemorrhage, mass-effect or herniation CT scan showed new focal hypodensity in the anterior inferior temporal lobe which was not present on June 2021 which may be due to age-indeterminate infarct versus traumatic encephalomalacia and can be better assessed with brain MRI if clinically indicated mild nonspecific white matter changes most likely due to chronic microvascular ischemic disease was also noticed.  CT scan of the cervical spine without contrast did not show any signs of acute fracture or subluxation, CT scan of the chest with PE protocol was also done which was again negative for pulmonary embolism.    At the time of my evaluation, patient was alert awake and oriented to person only, he was restless was requesting to leave the hospital discussed with patient that he will be staying for observation as he was found on the floor.  Patient does not remember why he was brought to the hospital.  He is denying any complaints at this point I discussed with him that he would benefit from his staying here overnight so we  can monitor his blood pressure as his blood pressure medications might need to be adjusted which might be causing some iatrogenic hypotension.  Patient will be admitted for observation is status he will probably need bedside sitter.      Review of Systems   Review of systems not obtained due to patient factors - age and dementia     Past Medical History    I have reviewed this patient's medical history and updated it with pertinent information if needed.   Past Medical History:   Diagnosis Date     Coronary artery disease 06/2018    s/p CECILY to LAD      Diabetes mellitus      GERD (gastroesophageal reflux disease)      Hyperlipidemia      Obesity      FLAQUITA (obstructive sleep apnea)      Pulmonary embolism 11/15/2018       Past Surgical History   I have reviewed this patient's surgical history and updated it with pertinent information if needed.  Past Surgical History:   Procedure Laterality Date     COLONOSCOPY       Coronary artery angiogram with stent (CECILY) to LAD  06/2018     CV CORONARY ANGIOGRAM N/A 9/3/2020    Procedure: Coronary Angiogram;  Surgeon: Herb Tran MD;  Location: [facility] CARDIAC CATH LAB     CV CORONARY ANGIOGRAM N/A 9/23/2020    Procedure: Coronary Angiogram;  Surgeon: Ernie Urbina MD;  Location: [facility] CARDIAC CATH LAB     CV HEART CATHETERIZATION WITH POSSIBLE INTERVENTION N/A 1/27/2022    Procedure: Heart Catheterization with Possible Intervention;  Surgeon: Ronnie Mobley MD;  Location: [facility] CARDIAC CATH LAB     CV INSTANTANEOUS WAVE-FREE RATIO N/A 9/3/2020    Procedure: Instantaneous Wave-Free Ratio;  Surgeon: Herb Tran MD;  Location: [facility] CARDIAC CATH LAB     CV LEFT HEART CATH N/A 9/3/2020    Procedure: Left Heart Cath;  Surgeon: Herb Tran MD;  Location: [facility] CARDIAC CATH LAB     CV LEFT VENTRICULOGRAM N/A 9/3/2020    Procedure: Left Ventriculogram;  Surgeon: Herb Tran MD;  Location:  HEART CARDIAC CATH LAB     CV  PCI STENT DRUG ELUTING N/A 9/3/2020    Procedure: Percutaneous Coronary Intervention Stent Drug Eluting;  Surgeon: Herb Tran MD;  Location:  HEART CARDIAC CATH LAB     CV PCI STENT DRUG ELUTING N/A 9/23/2020    Procedure: Percutaneous Coronary Intervention Stent Drug Eluting;  Surgeon: Ernie Urbina MD;  Location:  HEART CARDIAC CATH LAB     SHOULDER SURGERY       VASECTOMY         Prior to Admission Medications   Prior to Admission Medications   Prescriptions Last Dose Informant Patient Reported? Taking?   Melatonin 10 MG TABS tablet 2/3/2022 at Unknown time Nursing Home Yes Yes   Sig: Take 10 mg by mouth At Bedtime    OLANZapine zydis (ZYPREXA) 5 MG ODT  at ON HOLD Nursing Home No No   Sig: Take 1 tablet (5 mg) by mouth 2 times daily as needed for agitation Try half tablet first   Patient taking differently: Take 2.5 mg by mouth 2 times daily as needed for agitation Try half tablet first   OLANZapine zydis (ZYPREXA) 5 MG ODT  at ON HOLD Nursing Home No No   Sig: Take 1 tablet (5 mg) by mouth At Bedtime Take half tablet (2.5 mg)   QUEtiapine (SEROQUEL) 25 MG tablet  Nursing Home No Yes   Sig: Take 0.5 tablets (12.5 mg) by mouth every 6 hours as needed (agitation)   Vitamin D (Cholecalciferol) 25 MCG (1000 UT) TABS 2/4/2022 at Unknown time Nursing Home Yes Yes   Sig: Take 1 tablet by mouth daily    acetaminophen (TYLENOL) 325 MG tablet  Nursing Home Yes Yes   Sig: Take 650 mg by mouth every 4 hours as needed for mild pain    aspirin (ASA) 81 MG EC tablet 2/4/2022 at Unknown time long-term No Yes   Sig: Take 1 tablet (81 mg) by mouth daily   atorvastatin (LIPITOR) 80 MG tablet 2/2/2022 Nursing Home No Yes   Sig: Take 1 tablet (80 mg) by mouth daily   citalopram (CELEXA) 10 MG tablet 2/4/2022 at Unknown time long-term No Yes   Sig: Take 1 tablet (10 mg) by mouth daily   clopidogrel (PLAVIX) 75 MG tablet 2/4/2022 at Unknown time long-term No Yes   Sig: Take 1 tablet (75 mg) by mouth  daily   divalproex sodium delayed-release (DEPAKOTE SPRINKLE) 125 MG DR capsule  at ON HOLD Nursing Home No No   Sig: Take 375 mg by mouth every 12 hours   fexofenadine (ALLEGRA) 180 MG tablet 2/4/2022 at Unknown time Nursing Home Yes Yes   Sig: Take 180 mg by mouth daily   fluticasone (FLONASE) 50 MCG/ACT nasal spray 2/3/2022 at Unknown time Nursing Home Yes Yes   Sig: Spray 1 spray into both nostrils At Bedtime    insulin aspart (NOVOLOG FLEXPEN) 100 UNIT/ML pen 2/4/2022 at am Nursing Home No Yes   Sig: Inject 7 Units Subcutaneous 3 times daily (with meals) Breakfast and Dinner   insulin detemir (LEVEMIR PEN) 100 UNIT/ML pen 2/4/2022 at Unknown time snf No Yes   Sig: Inject 20 Units Subcutaneous every morning (before breakfast)   Patient taking differently: Inject 18 Units Subcutaneous every morning (before breakfast)    insulin detemir (LEVEMIR PEN) 100 UNIT/ML pen 2/2/2022 Nursing Home Yes Yes   Sig: Inject 14 Units Subcutaneous At Bedtime    isosorbide mononitrate (IMDUR) 30 MG 24 hr tablet 2/4/2022 at Unknown time snf No Yes   Sig: Take 1 tablet (30 mg) by mouth daily   loperamide (IMODIUM) 2 MG capsule 2/3/2022 at Unknown time Nursing Home Yes Yes   Sig: Take 2 mg by mouth every evening    loperamide (IMODIUM) 2 MG capsule  Nursing Home Yes Yes   Sig: Take 2 mg by mouth 3 times daily as needed for diarrhea   losartan (COZAAR) 25 MG tablet 2/4/2022 at Unknown time Nursing Home Yes Yes   Sig: Take 12.5 mg by mouth daily    metFORMIN (GLUCOPHAGE) 1000 MG tablet 2/4/2022 at am Nursing Home Yes Yes   Sig: Take 1,000 mg by mouth 2 times daily (with meals)   metoprolol succinate ER (TOPROL-XL) 25 MG 24 hr tablet 2/3/2022 at Unknown time Nursing Home Yes Yes   Sig: Take 25 mg by mouth At Bedtime   nitroGLYcerin (NITROSTAT) 0.4 MG sublingual tablet  Nursing Home No Yes   Sig: Place 1 tablet (0.4 mg) under the tongue every 5 minutes as needed for chest pain If symptoms persist 5 minutes after 2nd dose  call 911.   pantoprazole (PROTONIX) 20 MG EC tablet 2/4/2022 at Unknown time Nursing Home Yes Yes   Sig: Take 20 mg by mouth daily   rivastigmine (EXELON) 4.6 MG/24HR 24 hr patch 2/4/2022 at Unknown time [facility] No Yes   Sig: Place 1 patch onto the skin daily   sennosides (SENOKOT) 8.6 MG tablet  Nursing Home No Yes   Sig: Take 2 tablets by mouth daily as needed for constipation      Facility-Administered Medications: None     Allergies   Allergies   Allergen Reactions     Brilinta [Ticagrelor]      Duloxetine      Other reaction(s): Confusion  Per daughter who is MD     Gabapentin      Other reaction(s): Edema     Lisinopril Rash       Social History   I have reviewed this patient's social history and updated it with pertinent information if needed. Matt Way  reports that he has never smoked. He has never used smokeless tobacco. He reports that he does not drink alcohol and does not use drugs.    Family History   I have reviewed this patient's family history and updated it with pertinent information if needed.   Family History   Problem Relation Age of Onset     Lupus Mother        Physical Exam   Temp: 98  F (36.7  C) Temp src: Oral BP: 95/60 Pulse: 70   Resp: 14 SpO2: 97 %      Vital Signs with Ranges  Temp:  [98  F (36.7  C)] 98  F (36.7  C)  Pulse:  [68-72] 70  Resp:  [14] 14  BP: ()/(60-71) 95/60  SpO2:  [97 %] 97 %  0 lbs 0 oz    Constitutional: Awake, alert,oriented to person only , restless, trying to leave although cooperative , significant memory deficit   Eyes: Conjunctiva and pupils examined and normal.  HEENT: Moist mucous membranes, normal dentition.  Respiratory: Clear to auscultation bilaterally, no crackles or wheezing.  Cardiovascular: Regular rate and rhythm, normal S1 and S2, and no murmur noted.  GI: Soft, non-distended, non-tender, normal bowel sounds.  Lymph/Hematologic: No anterior cervical or supraclavicular adenopathy.  Skin: No rashes, no cyanosis, no  edema.  Musculoskeletal: No joint swelling, erythema or tenderness.  Neurologic: No focal neurological deficit   Psychiatric:  no obvious anxiety or depression.    Data   Data reviewed today:  I personally reviewed CT scan of the chest, head and cervical spine.  Endings as above    Recent Labs   Lab 02/04/22  1358 02/03/22  0700 01/31/22  1249 01/29/22  1839 01/29/22  1427   WBC 9.0 7.2  --   --   --    HGB 13.1* 14.0  --   --   --    MCV 88 87  --   --   --     209  --   --   --     138  --   --  140   POTASSIUM 4.5 4.3  --   --  4.5   CHLORIDE 106 107  --   --  110*   CO2 25 28  --   --  26   BUN 22 22  --   --  30   CR 1.06 0.81  --   --  0.95   ANIONGAP 5 3  --   --  4   CHRISTIE 8.3* 8.8  --   --  8.9   * 163* 259*   < > 280*   ALBUMIN  --  3.0*  --   --  3.5   PROTTOTAL  --  6.9  --   --  7.2   BILITOTAL  --  0.5  --   --  0.9   ALKPHOS  --  98  --   --  100   ALT  --  35  --   --  57   AST  --  15  --   --  24    < > = values in this interval not displayed.       Imaging:  Recent Results (from the past 24 hour(s))   CT Cervical Spine w/o Contrast    Narrative    CT CERVICAL SPINE WITHOUT CONTRAST February 4, 2022 3:08 PM     HISTORY: Trauma to cervical spine injury. Syncope.     TECHNIQUE: Axial images of the cervical spine were obtained without  intravenous contrast. Multiplanar reformations were performed.  Radiation dose for this scan was reduced using automated exposure  control, adjustment of the mA and/or kV according to patient size, or  iterative reconstruction technique.    COMPARISON: None.    FINDINGS: Straightening of the normal cervical lordosis which may be  positional. No acute lucent fracture line visualized. No significant  loss of vertebral body height. No focal destructive bony lesions.  Marked degenerative endplate changes and loss of disc height at C5-C6  and C6-C7. Fusion of the posterior facets at C2-C3 with partial fusion  across the disc space at that level. Central  disc protrusion at C4-C5.  Mild spinal canal narrowings at C4-C5, C5-C6, and C6-C7. Multiple  levels of neural foraminal narrowing, most pronounced on the right at  C3-C4 and bilaterally at C5-C6 and C6-C7.     Visualized paraspinous tissues: Unremarkable.      Impression    IMPRESSION:   1. No evidence of acute fracture or subluxation in the cervical spine.  2. Degenerative changes in the cervical spine as described above.      RONI KRUEGER MD         SYSTEM ID:  [id number]   CT Head w/o Contrast    Narrative    CT SCAN OF THE HEAD WITHOUT CONTRAST   2/4/2022 3:10 PM     HISTORY: Trauma. Head injury. Syncope.    TECHNIQUE:  Axial images of the head and coronal reformations without  IV contrast material. Radiation dose for this scan was reduced using  automated exposure control, adjustment of the mA and/or kV according  to patient size, or iterative reconstruction technique.    COMPARISON: Head CT 6/28/2021.    FINDINGS: No evidence of acute intracranial hemorrhage. No mass effect  or midline shift. New well-defined area of hypodensity in the anterior  inferior right temporal lobe. Ventricular size is within normal limits  without evidence of hydrocephalus. Prominence of the temporal horn of  the right lateral ventricle likely due to ex vacuo dilatation. Mild  patchy periventricular white matter hypodensities which are  nonspecific, but most likely related to chronic microvascular ischemic  disease.    The visualized portions of the sinuses and mastoids appear normal. The  bony calvarium and bones of the skull base appear intact.       Impression    IMPRESSION:     1. No evidence of acute intracranial hemorrhage, mass effect, or  herniation.  2. New focal hypodensity in the anterior inferior temporal lobe which  was not present on 6/28/2021. This may be due to age-indeterminate  infarct versus traumatic encephalomalacia. This could be better  assessed with brain MRI if clinically indicated.  3. Mild nonspecific white  matter changes most likely due to chronic  microvascular ischemic disease.    RONI KRUEGER MD         SYSTEM ID:  [id number]   CT Chest Pulmonary Embolism w Contrast    Narrative    CT CHEST PULMONARY EMBOLISM WITH CONTRAST February 4, 2022 4:10 PM    CLINICAL HISTORY: Chest Pain. Pulmonary embolism suspected,  low/intermediate probability, positive D-dimer.    TECHNIQUE: CT angiogram chest during arterial phase injection IV  contrast. 2D and 3D MIP reconstructions were performed by the CT  technologist. Dose reduction techniques were used.  CONTRAST: 70 mL Isovue-[id number].    COMPARISON: January 19, 2022.    FINDINGS:  ANGIOGRAM CHEST: Pulmonary arteries are normal caliber and negative  for pulmonary emboli. Thoracic aorta is negative for dissection. No CT  evidence of right heart strain.    LUNGS AND PLEURA: Stable 2 mm right middle lobe nodule in the  anterolateral right middle lobe image 175 series 6, this has long-term  stability. No new nodules. No infiltrates or effusions.    MEDIASTINUM/AXILLAE: No adenopathy or aneurysm. There are extensive  coronary vascular calcifications and/or stents consistent with  coronary artery disease.    UPPER ABDOMEN: No acute findings.    MUSCULOSKELETAL: No frankly destructive bony lesions.      Impression    IMPRESSION: No pulmonary embolism demonstrated.    CHELO WALDEN MD         SYSTEM ID:  [id number]

## 2022-02-04 NOTE — ED NOTES
[facility]  ED Nurse Handoff Report    ED Chief complaint: Syncope      ED Diagnosis:   Final diagnoses:   Syncope, unspecified syncope type       Code Status: DNR    Allergies:   Allergies   Allergen Reactions     Brilinta [Ticagrelor]      Duloxetine      Other reaction(s): Confusion  Per daughter who is MD     Gabapentin      Other reaction(s): Edema     Lisinopril Rash       Patient Story: syncope  Focused Assessment:  71 year old male with history of dementia, type 2 diabetes, NSTEMI and CAD  who presents with syncope. He was found unconscious on the floor at his residence. His systolic blood pressure was in the 90s. He states he feels okay but complained of having a headache earlier. He denies having a spinal or arm and leg injury. He has no chest, neck or abdominal pain. He is breathing fine and denies feeling sick or feverish. Matt does not have any numbness or tingling. He was just put on Imdur today. He was admitted on 1/23 for chest pain and Covid.  Pt is very confused at baseline.  Pt is needing bed alarm and extra monitoring for getting up and out of bed.    Treatments and/or interventions provided: bolus, ativan  Patient's response to treatments and/or interventions:       To be done/followed up on inpatient unit:  monitor    Does this patient have any cognitive concerns?: Baseline dementia    Activity level - Baseline/Home:  Independent  Activity Level - Current:   Stand with Assist    Patient's Preferred language: English   Needed?: No    Isolation: None  Infection: Not Applicable  Patient tested for COVID 19 prior to admission: YES  Bariatric?: No    Vital Signs:   Vitals:    02/04/22 1415 02/04/22 1430 02/04/22 1445 02/04/22 1515   BP: 102/64 107/66 100/63 95/60   Pulse: 71 70 68 70   Resp:       Temp:       TempSrc:       SpO2:           Cardiac Rhythm:     Was the PSS-3 completed:   Yes  What interventions are required if any?               Family Comments:     OBS  brochure/video discussed/provided to patient/family: N/A              Name of person given brochure if not patient:                 Relationship to patient:       For the majority of the shift this patient's behavior was Yellow.   Behavioral interventions performed were redirection, pt wants to leave and is confused.    ED NURSE PHONE NUMBER: [id number]

## 2022-02-04 NOTE — ED NOTES
Bed: ED10  Expected date:   Expected time:   Means of arrival:   Comments:  Meena Butler syncope

## 2022-02-04 NOTE — PHARMACY-ADMISSION MEDICATION HISTORY
"Pharmacy Medication History  Admission medication history interview status for the 2/4/2022  admission is complete. See EPIC admission navigator for prior to admission medications     Location of Interview: Outside patient room but on unit  Medication history sources: MAR (Marlee Pena, [phone])    Significant changes made to the medication list:  1. Updated:    -losartan (previously 25 mg daily)   -Tylenol (previously 325-650 mg)    In the past week, patient estimated taking medication this percent of the time: greater than 90%    Additional medication history information:   Per facility, \"Zyprexa and Depakote on hold per PCP due to lethargy and low BP\"    Medication reconciliation completed by provider prior to medication history? No    Time spent in this activity: 20 minutes    Prior to Admission medications    Medication Sig Last Dose Taking? Auth Provider   acetaminophen (TYLENOL) 325 MG tablet Take 650 mg by mouth every 4 hours as needed for mild pain   Yes Unknown, Entered By History   aspirin (ASA) 81 MG EC tablet Take 1 tablet (81 mg) by mouth daily 2/4/2022 at Unknown time Yes Wong Phan MD   atorvastatin (LIPITOR) 80 MG tablet Take 1 tablet (80 mg) by mouth daily 2/2/2022 Yes Wilber Strickland MD   citalopram (CELEXA) 10 MG tablet Take 1 tablet (10 mg) by mouth daily 2/4/2022 at Unknown time Yes Drew Everett,    clopidogrel (PLAVIX) 75 MG tablet Take 1 tablet (75 mg) by mouth daily 2/4/2022 at Unknown time Yes Lauren Knox MD   fexofenadine (ALLEGRA) 180 MG tablet Take 180 mg by mouth daily 2/4/2022 at Unknown time Yes Unknown, Entered By History   fluticasone (FLONASE) 50 MCG/ACT nasal spray Spray 1 spray into both nostrils At Bedtime  2/3/2022 at Unknown time Yes Unknown, Entered By History   insulin aspart (NOVOLOG FLEXPEN) 100 UNIT/ML pen Inject 7 Units Subcutaneous 3 times daily (with meals) Breakfast and Dinner 2/4/2022 at am Yes Malcolm Landaverde MD "   insulin detemir (LEVEMIR PEN) 100 UNIT/ML pen Inject 20 Units Subcutaneous every morning (before breakfast)  Patient taking differently: Inject 18 Units Subcutaneous every morning (before breakfast)  2/4/2022 at Unknown time Yes Malcolm Landaverde MD   insulin detemir (LEVEMIR PEN) 100 UNIT/ML pen Inject 14 Units Subcutaneous At Bedtime  2/2/2022 Yes Malcolm Landaverde MD   isosorbide mononitrate (IMDUR) 30 MG 24 hr tablet Take 1 tablet (30 mg) by mouth daily 2/4/2022 at Unknown time Yes Drew Everett, DO   loperamide (IMODIUM) 2 MG capsule Take 2 mg by mouth every evening  2/3/2022 at Unknown time Yes Unknown, Entered By History   loperamide (IMODIUM) 2 MG capsule Take 2 mg by mouth 3 times daily as needed for diarrhea  Yes Unknown, Entered By History   losartan (COZAAR) 25 MG tablet Take 12.5 mg by mouth daily  2/4/2022 at Unknown time Yes Deja Lomax APRN CNP   Melatonin 10 MG TABS tablet Take 10 mg by mouth At Bedtime  2/3/2022 at Unknown time Yes Unknown, Entered By History   metFORMIN (GLUCOPHAGE) 1000 MG tablet Take 1,000 mg by mouth 2 times daily (with meals) 2/4/2022 at am Yes Unknown, Entered By History   metoprolol succinate ER (TOPROL-XL) 25 MG 24 hr tablet Take 25 mg by mouth At Bedtime 2/3/2022 at Unknown time Yes Unknown, Entered By History   nitroGLYcerin (NITROSTAT) 0.4 MG sublingual tablet Place 1 tablet (0.4 mg) under the tongue every 5 minutes as needed for chest pain If symptoms persist 5 minutes after 2nd dose call 911.  Yes Chante Givens PA-C   pantoprazole (PROTONIX) 20 MG EC tablet Take 20 mg by mouth daily 2/4/2022 at Unknown time Yes Unknown, Entered By History   QUEtiapine (SEROQUEL) 25 MG tablet Take 0.5 tablets (12.5 mg) by mouth every 6 hours as needed (agitation)  Yes Drew Everett, DO   rivastigmine (EXELON) 4.6 MG/24HR 24 hr patch Place 1 patch onto the skin daily 2/4/2022 at Unknown time Yes Drew Everett, DO    sennosides (SENOKOT) 8.6 MG tablet Take 2 tablets by mouth daily as needed for constipation  Yes Wong Phan MD   Vitamin D (Cholecalciferol) 25 MCG (1000 UT) TABS Take 1 tablet by mouth daily  2/4/2022 at Unknown time Yes Unknown, Entered By History   divalproex sodium delayed-release (DEPAKOTE SPRINKLE) 125 MG DR capsule Take 375 mg by mouth every 12 hours  at ON HOLD  Drew Everett, DO   OLANZapine zydis (ZYPREXA) 5 MG ODT Take 1 tablet (5 mg) by mouth 2 times daily as needed for agitation Try half tablet first  Patient taking differently: Take 2.5 mg by mouth 2 times daily as needed for agitation Try half tablet first  at ON HOLD  Drew Everett, DO   OLANZapine zydis (ZYPREXA) 5 MG ODT Take 1 tablet (5 mg) by mouth At Bedtime Take half tablet (2.5 mg)  at ON HOLD  Drew Everett, DO     The information provided in this note is only as accurate as the sources available at the time of update(s)     Robel AguilarD, PGY-1 Resident

## 2022-02-05 NOTE — CODE/RAPID RESPONSE
[facility] CODE 21/Restraint check  2/5/2022   Time Called: 0538    RRT called for: Code 21/Restraint Check.  4 point restraints     Assessment & Plan     Acute agitation 2/2 Lewy body dementia with behavioral disturbance.  Upon arrival, pt sitting upright on edge of bed, awake, alert, in no overt distress; however, insisting on standing up to get to his appointment.  Nursing notes pt has been resting intermittently throughout the night; however, now, pt attempting to get up and difficult to redirect.    -- Refused verbal de-escalation, distraction, re-direction to mostly cooperative.  -- No injury to pt or staff.   -- All restraints applied appropriately w/o complication.   -- Restraints applied supine.  -- Bedside attendant remains at bedside.    INTERVENTIONS:  - Pt refusing PO medications; will order IM zyprexa 5 mg BID PRN, will give dose now   - Despite nursing, security and psychiatry associates presence at bedside, pt yelling, attempting to get up; will place in 4-point soft restraints at this time until medication effect noted  - Bedside attendant remains at bedside    Discussed with and defer further cares to nursing and hospitalist     Interval History     Matt Way is a 71 year old male who was admitted on 2/4/2022 for unwitnessed fall, possible syncope.    Medical history significant for: Lewy body dementia, CAD, HTN, HLP, DMII, GERD    Code Status: No CPR- Pre-arrest intubation OK    PHILLIP [facility]    Allergies   Allergies   Allergen Reactions     Brilinta [Ticagrelor]      Duloxetine      Other reaction(s): Confusion  Per daughter who is MD     Gabapentin      Other reaction(s): Edema     Lisinopril Rash       Physical Exam   Vital Signs with Ranges:  Temp:  [97.7  F (36.5  C)-98  F (36.7  C)] 97.7  F (36.5  C)  Pulse:  [62-72] 64  Resp:  [14-16] 16  BP: ()/(60-71) 113/68  SpO2:  [95 %-97 %] 96 %  No intake/output data  recorded.    Constitutional: Pt siitting on edge of bed, awake, alert, in no overt distress, impulsive, intermittently yeelling  Neck: No upper airway wheezes or stridor noted  Pulmonary: In no overt respiratory distress  Cardiovascular: Appears well perfused  GI: Round  Skin/Integumen: Pink  Neuro: Awake, alert, clear speech, intermittently yelling for [place]  Psych:  Impulse, agitated  Extremities: Moving all extremities       Time Spent on this Encounter   I spent 10 minutes on the unit/floor managing the care of Matt Way. Over 50% of my time was spent counseling the patient and/or coordinating care regarding services listed in this note.

## 2022-02-05 NOTE — PLAN OF CARE
RECEIVING UNIT ED HANDOFF REVIEW    ED Nurse Handoff Report was reviewed by: Ulices Hood RN on February 4, 2022 at 11:09 PM

## 2022-02-05 NOTE — PLAN OF CARE
Observation Goals:  -diagnostic tests and consults completed and resulted: not met   -vital signs normal or at patient baseline: met    Nurse to notify provider when observation goals have been met and patient is ready for discharge.

## 2022-02-05 NOTE — PLAN OF CARE
MD Notification    Notified Person: MD    Notified Person Name: Dr. Norton    Notification Date/Time: 14:30    Notification Interaction: page    Purpose of Notification: Pt refusing depakote

## 2022-02-05 NOTE — UTILIZATION REVIEW
Admission Status; Secondary Review Determination     Under the authority of the Utilization Management Committee, the utilization review process indicated a secondary review on the above patient. The review outcome is based on review of the medical records, discussions with staff, and applying clinical experience noted on the date of the review.     (x) Inpatient Status Appropriate - This patient's medical care is consistent with medical management for inpatient care and reasonable inpatient medical practice.     RATIONALE FOR DETERMINATION   70 yo man with Lewy Body dementia, CAD, hypertension, diabetes who was admitted after an unwitnessed fall with loss of consciousness at his home. Since admission less than 24 hours ago, he has had significant subacute delirium. Now requiring a 1:1 sitter, multiple doses of chemical restraints (lorazepam, zyprexa) and physical restraints for his own safety. While he was initially intended to be watched under observation status, his condition has significantly worsened.     Delirium, an acute decline in attention and cognition, is a common, life-threatening, and potentially preventable clinical syndrome among persons who are 65 years of age or older. The development of delirium often initiates a cascade of events culminating in the loss of independence, an increased risk of morbidity and mortality, and increased health care costs. [Andrew MCCRAY. Delirium in elderly patients. Am J Geriatr Psychiatry 2004;12:7-21]    In patients who are admitted with delirium, mortality rates are high. [Mayda SCOTT, Andrew ALMENDAREZ, Sulema M, Malick F, Armando E. Delirium predicts 12-month mortality. Arch Intern Med. Feb 25 2002;162(4):457-63.]      At this time more than 2 nights hospital complex care is anticipated, based on patient risk of adverse outcome if treated as outpatient and complex care required. Inpatient admission is appropriate based on the Medicare guidelines.     This document was  produced using voice recognition software.    The information on this document is developed by the utilization review team in order for the business office to ensure compliance. This only denotes the appropriateness of proper admission status and does not reflect the quality of care rendered.   The definitions of Inpatient Status and Observation Status used in making the determination above are those provided in the CMS Coverage Manual, Chapter 1 and Chapter 6, section 70.4.     Sincerely,   Uma Garcia MD  Utilization Review  Physician Advisor  [facility].

## 2022-02-05 NOTE — PROGRESS NOTES
[facility]    Medicine Progress Note - Hospitalist Service    Date of Admission:  2/4/2022    Assessment & Plan        Matt Way is a 71 year old male with past medical history significant for Lewy body dementia, coronary artery disease, essential hypertension, hyperlipidemia and diabetes mellitus who was admitted from the ER for observation after unwitnessed fall and possible syncopal episode at his residence.  Patient was admitted for further monitoring.     Possible syncope  Coronary artery disease  Patient was found on the floor at his residence by the staff.  EMS was called at the arrival, his systolic blood pressure was in 90s.  Patient regained his consciousness but was only complaining of some headache and denied any other complaints. Most likely etiology is secondary to hypotension associated with antihypertensive medications versus underlying known coronary artery disease. During his last hospitalization , patient underwent extensive cardiac workup,his echocardiogram showed decrease in EF from previous as well as wall motion abnormalities and patient underwent Lexiscan on January 25 which showed large area of transmural infarction in the apical, anterior, anteroseptal segments of the left ventricle associated with moderate degree of alen-infarct ischemia.  Patient underwent cardiac catheterization on January 27 which showed severe 99% ISR of the P LAD stent 2 L of his stent. Imdur 30 mg was also added after cath .Patient has patent stent in RCA, showed mild to moderate nonobstructive coronary artery disease in left circumflex. Given his Lewy body dementia and history of medication noncompliance, medical therapy was recommended.  Patient was discharged on aspirin Plavix atorvastatin losartan and metoprolol and Imdur. Patient did not have any ischemic changes on arrival to the emergency room. Troponins slightly elevated though stable.  --Activity as tolerated with fall  precautions, patient will require most likely bedside attendant due to his advanced dementia.  --Hold losartan  --Re-start Lipitor  --Continue patient on aspirin 81 mg, Plavix 75 mg p.o. daily, Toprol-XL 25 mg p.o. daily and Imdur with holding parameters     Lewy body dementia with behavioral disturbance  High risk for developing acute delirium  During his previous hospitalization patient had code 21 due to behavioral disturbance and was again noticed to have behavioral disturbance after angiogram.  He has received IM Zyprexa in the past at [facility].  He also required soft restraints for a night during his last hospitalization due to increased restlessness and he also attempted to swing at bedside sitter.  Patient was also evaluated by psychiatry during his last hospitalization and he was discharged on Depakote 375 mg p.o. twice daily, Zyprexa 5 mg 3 times daily he was recommended to avoid first generation antipsychotic and his Celexa was decreased to 10 mg p.o. daily.  --Bedside attendant  --We will order his PTA Depakote, Zyprexa 5 mg 3 times daily and PTA Celexa.  --We will also order as needed Zyprexa as recommended by psychiatry.  --Will order his PTA melatonin.  --We will continue PTA rivastigmine.  --Patient will require frequent reorientation.  --Of note, this is patient's third hospital visit since the start of this year. I spoke with Matt's daughter (Danielle) who is his power of  and is a physician over the phone this afternoon. She was wondering how to prevent further hospitalizations for things like this again. After some discussion, it was decided to consult palliative care to potentially discuss hospice vs other care options to help prevent recurrent hospitalizations. I also spoke with Matt's wife Salina over the phone today. Both Salina and Danielle requested all communication from Matt's care teams go through Danielle first.     Type 2 diabetes mellitus:  Patient PTA regimen includes Levemir  18 units in the morning and 14 units at the bedtime, patient is also taking NovoLog 7 units 3 times daily with each meal.  --We will do Accu-Cheks before every meal and at bedtime.  --Will hold his PTA Metformin while patient is in the hospital.  --Continue his PTA Levemir 18 units in the morning and 15 units at the bedtime.  --Continue NovoLog 7 units 3 times daily with each meal.  --Continue hypoglycemia protocol.     COVID-19 infection recovered: During his last hospitalization patient was noticed to have pneumonia on the x-ray although patient did not have any hypoxemia was done with dexamethasone which was a stopped during his last hospitalization due to the concern if it was attributing to his behavioral disturbances patient did complete his remdesivir for 5 doses on January 28.  --At this time patient will be considered COVID-19 infection recovered and does not need any COVID-19 isolation expecting his PCR to be high because of recent infection.     History of suicidal ideation: During his last hospitalization there was also concern for patient verbalizing suicidal ideation on January 26, patient was evaluated by psych as above. Given his behavioral disturbance patient was not able to provide any useful information at the time of psych evaluation.  --Facility staff continue to monitor patient closely.     Hyperlipidemia  --Re-start atorvastatin     Allergic rhinitis:  --We will hold PTA Allegra, Flonase     GERD:  --We will hold PTA Protonix       Diet: Regular Diet Adult    DVT Prophylaxis: Pneumatic Compression Devices  Schulte Catheter: Not present  Central Lines: None  Cardiac Monitoring: None  Code Status: No CPR- Pre-arrest intubation OK      Disposition Plan   Expected Discharge: 02/08/2022     Anticipated discharge location:  Awaiting care coordination huddle   Delays: Palliative consult ordered today  Both Salina (wife) and Danielle (daughter, is a physician) requested all communication from Wilfredo  "care teams go through Danielle mcfadden.     The patient's care was discussed with the Patient and Patient's Family.    Ortiz Norton MD  Hospitalist Service  [facility]  Securely message with the Vocera Web Console (learn more here)      Clinically Significant Risk Factors Present on Admission               # Platelet Defect: home medication list includes an antiplatelet medication   # Diabetes, type II: last A1C 7.0 % (Ref range: 0.0 - 5.6 %)  # Overweight: Estimated body mass index is 25.34 kg/m  as calculated from the following:    Height as of 1/23/22: 1.854 m (6' 1\").    Weight as of this encounter: 87.1 kg (192 lb 1.6 oz).      ______________________________________________________________________    Interval History   - I assumed care this morning  - RRT called overnight for agitation  - Sleeping in bed on exam, arousable  - I spoke with Matt's daughter (Danielle) who is his power of  and is a physician over the phone this afternoon. She was wondering how to prevent further hospitalizations for things like this again. After some discussion, it was decided to consult palliative care to potentially discuss hospice vs other care options to help prevent recurrent hospitalizations. I also spoke with Matt's wife Salina over the phone today. Both Salina and Danielle requested all communication from Matt's care teams go through Danielle mcfadden.    Data reviewed today: I reviewed all medications, new labs and imaging results over the last 24 hours. I personally reviewed no images or EKG's today.    Physical Exam   Vital Signs: Temp: 97.8  F (36.6  C) Temp src: Oral BP: 126/79 Pulse: 78   Resp: 18 SpO2: 98 % O2 Device: None (Room air)    Weight: 192 lbs 1.6 oz    GENERAL: lying in bed, sleeping, arousable  RESP: CTAB, no w/r/r.  CV: RRR, no m/r/g.  GI: NT, ND, without rebound tenderness or guarding  MSK: No edema. Moves all four extremities freely.  SKIN: No significant ulcers, lesions, " or rashes on the visualized portions of the skin    Data   Recent Labs   Lab 02/05/22  1245 02/05/22  0643 02/05/22  0233 02/04/22  2348 02/04/22  1358 02/03/22  0700   WBC  --  7.6  --   --  9.0 7.2   HGB  --  13.5  --   --  13.1* 14.0   MCV  --  86  --   --  88 87   PLT  --  196  --   --  218 209   NA  --  138  --   --  136 138   POTASSIUM  --  4.1  --   --  4.5 4.3   CHLORIDE  --  108  --   --  106 107   CO2  --  25  --   --  25 28   BUN  --  16  --   --  22 22   CR  --  0.79  --   --  1.06 0.81   ANIONGAP  --  5  --   --  5 3   CHRISTIE  --  8.6  --   --  8.3* 8.8   * 138* 139*   < > 210* 163*   ALBUMIN  --  2.9*  --   --   --  3.0*   PROTTOTAL  --  6.8  --   --   --  6.9   BILITOTAL  --  0.6  --   --   --  0.5   ALKPHOS  --  104  --   --   --  98   ALT  --  31  --   --   --  35   AST  --  15  --   --   --  15    < > = values in this interval not displayed.     Recent Results (from the past 24 hour(s))   CT Cervical Spine w/o Contrast    Narrative    CT CERVICAL SPINE WITHOUT CONTRAST February 4, 2022 3:08 PM     HISTORY: Trauma to cervical spine injury. Syncope.     TECHNIQUE: Axial images of the cervical spine were obtained without  intravenous contrast. Multiplanar reformations were performed.  Radiation dose for this scan was reduced using automated exposure  control, adjustment of the mA and/or kV according to patient size, or  iterative reconstruction technique.    COMPARISON: None.    FINDINGS: Straightening of the normal cervical lordosis which may be  positional. No acute lucent fracture line visualized. No significant  loss of vertebral body height. No focal destructive bony lesions.  Marked degenerative endplate changes and loss of disc height at C5-C6  and C6-C7. Fusion of the posterior facets at C2-C3 with partial fusion  across the disc space at that level. Central disc protrusion at C4-C5.  Mild spinal canal narrowings at C4-C5, C5-C6, and C6-C7. Multiple  levels of neural foraminal narrowing,  most pronounced on the right at  C3-C4 and bilaterally at C5-C6 and C6-C7.     Visualized paraspinous tissues: Unremarkable.      Impression    IMPRESSION:   1. No evidence of acute fracture or subluxation in the cervical spine.  2. Degenerative changes in the cervical spine as described above.      RONI KRUEGER MD         SYSTEM ID:  [id number]   CT Head w/o Contrast    Narrative    CT SCAN OF THE HEAD WITHOUT CONTRAST   2/4/2022 3:10 PM     HISTORY: Trauma. Head injury. Syncope.    TECHNIQUE:  Axial images of the head and coronal reformations without  IV contrast material. Radiation dose for this scan was reduced using  automated exposure control, adjustment of the mA and/or kV according  to patient size, or iterative reconstruction technique.    COMPARISON: Head CT 6/28/2021.    FINDINGS: No evidence of acute intracranial hemorrhage. No mass effect  or midline shift. New well-defined area of hypodensity in the anterior  inferior right temporal lobe. Ventricular size is within normal limits  without evidence of hydrocephalus. Prominence of the temporal horn of  the right lateral ventricle likely due to ex vacuo dilatation. Mild  patchy periventricular white matter hypodensities which are  nonspecific, but most likely related to chronic microvascular ischemic  disease.    The visualized portions of the sinuses and mastoids appear normal. The  bony calvarium and bones of the skull base appear intact.       Impression    IMPRESSION:     1. No evidence of acute intracranial hemorrhage, mass effect, or  herniation.  2. New focal hypodensity in the anterior inferior temporal lobe which  was not present on 6/28/2021. This may be due to age-indeterminate  infarct versus traumatic encephalomalacia. This could be better  assessed with brain MRI if clinically indicated.  3. Mild nonspecific white matter changes most likely due to chronic  microvascular ischemic disease.    RONI KRUEGER MD         SYSTEM ID:  [id number]   CT  Chest Pulmonary Embolism w Contrast    Narrative    CT CHEST PULMONARY EMBOLISM WITH CONTRAST February 4, 2022 4:10 PM    CLINICAL HISTORY: Chest Pain. Pulmonary embolism suspected,  low/intermediate probability, positive D-dimer.    TECHNIQUE: CT angiogram chest during arterial phase injection IV  contrast. 2D and 3D MIP reconstructions were performed by the CT  technologist. Dose reduction techniques were used.  CONTRAST: 70 mL Isovue-370.    COMPARISON: January 19, 2022.    FINDINGS:  ANGIOGRAM CHEST: Pulmonary arteries are normal caliber and negative  for pulmonary emboli. Thoracic aorta is negative for dissection. No CT  evidence of right heart strain.    LUNGS AND PLEURA: Stable 2 mm right middle lobe nodule in the  anterolateral right middle lobe image 175 series 6, this has long-term  stability. No new nodules. No infiltrates or effusions.    MEDIASTINUM/AXILLAE: No adenopathy or aneurysm. There are extensive  coronary vascular calcifications and/or stents consistent with  coronary artery disease.    UPPER ABDOMEN: No acute findings.    MUSCULOSKELETAL: No frankly destructive bony lesions.      Impression    IMPRESSION: No pulmonary embolism demonstrated.    CHELO WALDEN MD         SYSTEM ID:  [id number]     Medications     sodium chloride 75 mL/hr at 02/05/22 1141       aspirin  81 mg Oral Daily     citalopram  10 mg Oral Daily     clopidogrel  75 mg Oral Daily     divalproex sodium delayed-release  375 mg Oral Q12H     docusate sodium  100 mg Oral BID     insulin aspart  7 Units Subcutaneous TID w/meals     insulin aspart  1-7 Units Subcutaneous TID AC     insulin aspart  1-5 Units Subcutaneous At Bedtime     insulin detemir  14 Units Subcutaneous At Bedtime     insulin detemir  18 Units Subcutaneous QAM AC     isosorbide mononitrate  30 mg Oral Daily     metoprolol succinate ER  25 mg Oral At Bedtime     OLANZapine zydis  5 mg Oral At Bedtime     rivastigmine  1 patch Transdermal Daily     rivastigmine    Transdermal Q8H     sodium chloride (PF)  10 mL Intravenous Once     sodium chloride (PF)  3 mL Intracatheter Q8H

## 2022-02-05 NOTE — PLAN OF CARE
Pt oriented to self only- hx of dementia. VSS on RA. Up with GB and assist 1-2. Bedside attendent present. VSS on RA.  and 139. Regular diet. PT, OT, CC/SW consulted.     Code 21 called this AM. Pt attempting to get out of bed and wanting to leave the hospital. Pt became increasingly frustrated. Refused distraction and re-direction and was yelling out. IM zyprexa given and pt placed in restraints. Pt intermittently restless and calling out. Attendant remains at bedside.

## 2022-02-05 NOTE — PLAN OF CARE
Oriented to self. Hallucinating. Restless/trying to get OOB frequently. 4 point soft restraints applied. Incontinent at times. 1:1 sitter. Agitated at times. PO zyprexa given x1 and helped, able to release arms for dinner. B, 297, 97. Palliative, PT, OT and SW consulted.

## 2022-02-06 NOTE — PROVIDER NOTIFICATION
"MD Notification    Notified Person: MD    Notified Person Name: Dr. Almodovar    Notification Date/Time: 17:52    Notification Interaction: page    Purpose of Notification: \"Pt is still very restless and agitated at times. Can the restraints be continued another 4 hours?\"    Orders Received:    Comments:      "

## 2022-02-06 NOTE — PROVIDER NOTIFICATION
"MD Notification    Notified Person: MD    Notified Person Name:  Vamshiar    Notification Date/Time: 13:55    Notification Interaction: page    Purpose of Notification: \"Pt agitated, restless and continuously trying to climb out of bed. It just took 5 staff to get pt back in bed. Can restraints be re-ordered?\"    Orders Received:    Comments:      "

## 2022-02-06 NOTE — PROGRESS NOTES
Restraints discontinued at 0600. Pt laying in bed talking to bedside attendant.     0645: pt attempting to get out of bed and not able to be redirected. swinging and kicking at staff. Restraints reapplied. Was able to get pt to take PO PRN zyprexa. Order for restraints placed for 6 more hours

## 2022-02-06 NOTE — PLAN OF CARE
[facility]  Physical Medicine and Rehabilitation Clinic      Nerve Conduction & EMG Report          Patient:       Amparo Mccray  Patient ID:    [id number]  Gender:        Female  YOB: 1954  Age:           64 Years 4 Months        History & Examination:  Dr Miner referred the patient for EMG/NCS to look into symptoms of dysphagia with fatigue. Symptoms have been present for 4-5 years progressively getting worse. Evaluation for neuromuscular junction disorder.      Techniques: Sensory and motor conduction studies were done with surface recording electrodes. EMG was done with a concentric needle electrode.        Results:  Sensory studies:  Median and  ulnar antidromic studies in the left upper extremity are within normal limits.  Left sural and left superficial peroneal studies are within normal limits.    Motor studies:  Left median and ulnar motor nerve conduction studies are within normal limits.  Left deep peroneal motor study recording over the extensor digitorum brevis reveals a normal distal latency, low amplitude and normal conduction velocity.  Left tibial motor study is within normal limits.    Left tibial and ulnar F waves are within normal limits.    Repetitive nerve stimulation of both the left abductor digiti minimi and left nasalis muscle are within normal limits.    Needle examination reveals some mild neurogenic changes distally in the left lower extremity that normalized proximally.  No other abnormalities are seen.    Interpretation:    The EMG is abnormal.  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  There is no evidence of neuromuscular junction disorder.    EMG Physician:     Nikole Miner MD FAAN        Sensory NCS      Nerve / Sites Rec. Site Onset Peak NP Amp Ref. PP Amp Dist Yonny Ref. Temp     ms ms  V  V  V cm m/s m/s  C   L MEDIAN - Dig II Anti      Wrist Dig II 2.29 3.39 23.4 10.0 33.2 14 61.1 48.0 32.1   L ULNAR - Dig V Anti      Wrist Dig  Oriented to self. Agitated at times. Hallucinating. Restless/trying to get OOB frequently, zyprexa helped. 4 point soft restraints discontinued at 12:45pm, pt continued to be restless/agitated and climbing OOB. Restraint order renewed at 14:05. Incontinent at times. Abrasion on left heel covered with mepilex. 1:1 sitter.  PO zyprexa given x1 and helped for 3 hours. B, 131. Palliative, PT, OT and SW consulted.   V 2.40 3.28 20.0 8.0 40.2 12.5 52.2 48.0 32.1   L SURAL - Lat Mall 60      Calf Ankle 2.40 3.07 13.3 5.0 10.6 12.5 52.2 38.0 30.2   L SUP PERONEAL      Lat Leg Arellano 2.66 3.49 3.5  5.1 12.5 47.1 38.0 29.9       Motor NCS      Nerve / Sites Rec. Site Lat Ref. Amp Ref. Rel Amp Dist Yonny Ref. Dur. Area Temp.     ms ms mV mV % cm m/s m/s ms %  C   L MEDIAN - APB      Wrist APB 3.70 4.40 5.4 5.0 100 8   5.89 100 32.2      Elbow APB 8.02  4.9  90.1 24 55.5 48.0 6.72 98.8 32.1   L ULNAR - ADM      Wrist ADM 2.92 3.50 12.4 5.0 100 8   7.66 100 31.9      B.Elbow ADM 6.88  9.8  78.9 22 55.6 48.0 7.19 82.1 32      A.Elbow ADM 8.28  9.2  74.7 8 56.9 48.0 6.82 79.3 32.2   L DEEP PERONEAL - EDB 60      Ankle EDB 4.74 6.00 0.3 2.0 100 8   5.31 100 30.4      FibHead EDB 12.76  0.3  121 36 44.9 38.0 7.19 1427 30.4      Pop Fos EDB 14.22  0.3  117 8 54.9 38.0 7.50 75.1 30.4   L TIBIAL - AH      Ankle AH 3.85 6.00 4.1 4.0 100 8   6.82 100 30.2      Pop Fos AH 14.11  3.6  87.5 41 40.0 38.0 8.18 78.9 30.2       F  Wave      Nerve Min F Lat Max F Lat Mean FLat Temp.    ms ms ms  C   L TIBIAL 56.41 58.12 57.69 30.2   L ULNAR 27.34 32.71 29.79 32       Rep Nerve Stim 6      Muscle / Train Time Rate Amp 4-1 Fac Ampl Area 4-1 Fac Area     pps mV % % mVms % %   L ABD DIG MIN (UL)   Baseline 0:00:00 3 11.2 -1.6 100 33.2 -5.6 100   Post Exercise : 1 0:01:12 3 11.2 2.1 101 31.4 -3.3 94.4   2 0:02:18 3 11.4 -0.3 102 32.9 -4.3 98.9   3 0:03:22 3 11.4 -0.2 102 33.5 -5.7 101   4 0:04:33 3 11.3 -0.4 101 33.2 -5.6 100   L NASALIS   Baseline 0:00:00 3 3.0 -0 100 7.8 -0.3 100   Post Exercise 1 0:01:34 3 3.0 -2 102 7.9 -1.8 101   2 0:02:51 3 3.0 3.2 102 8.6 -5.6 110   3 0:04:10 3 3.1 -1.8 104 8.8 -6.5 113   4 0:05:34 3 3.1 0.1 104 9.2 -1.6 118       EMG Summary Table     Spontaneous MUAP Recruitment    IA Fib PSW Fasc H.F. Amp Dur. PPP Pattern   L. TIB ANTERIOR N None None None None N N N N   L. GASTROCN (MED) N None None None None 1+ 1+ N N   L. EXT ZABALA  LONG N None None None None 1+ 1+ N Reduced   L. VAST LATERALIS N None None None None N N N N   L. T FASCIA SHAWNA N None None None None N N N N   L. GENIOGLOSSUS N None None None None N N N N

## 2022-02-06 NOTE — PLAN OF CARE
Pt oriented to self, hallucinating. Uncooperative. Restless and trying to get OOB. Small, Dime sized abrasion on L ankle, mepilex applied under ankle restraint. PRN zyprexa given x1 for increased agitation, hallucinations and yelling out. PRN seroquel given x1. Restraints discontinued at 0600. Attendant at bedside. . Palliative care, PT, OT, and SW consulted.

## 2022-02-06 NOTE — PLAN OF CARE
" [id number]  Confused/uncooperative  1:1 sitter @ the bedside.   Liable conversation and interactions. Based on hx dementia.  Verbalized desire to go home.  Restraints reapplied.   Attempting to get OOB with elevation in persistence to do so.   High risk for fall.  Accepted PO fluids.  NS @  75 cc    Medicated with scheduled dose of Seroquel @2025. Medicates accepted in applesauce.   After continuous encouragement. Patient stressed \"that this is the last time I'm going to take that\".  Refused 2300 dose of Depakote. Offered whole.   Increased Agitated with interaction.  Left to rest.    "

## 2022-02-06 NOTE — PROVIDER NOTIFICATION
"MD Notification    Notified Person: MD    Notified Person Name: Dr. Almodovar    Notification Date/Time: 10:18 AM    Notification Interaction: page    Purpose of Notification: \"Pt extremely restless, hallucinating and yelling at staff. Zyprexa has already been given. Pt refusing half his pills. Thanks!\"    Orders Received: 5mg PO zyprexa      "

## 2022-02-06 NOTE — PROGRESS NOTES
[facility]    Medicine Progress Note - Hospitalist Service    Date of Admission:  2/4/2022    Assessment & Plan        Matt Way is a 71 year old male with past medical history significant for Lewy body dementia, coronary artery disease, essential hypertension, hyperlipidemia and diabetes mellitus who was admitted from the ER for observation after unwitnessed fall and possible syncopal episode at his residence.  Patient was admitted for further monitoring.     Possible syncope: Patient was found on the floor at his residence by the staff. unclear etiology,  possibly secondary to hypotension,  Cardiac etiology is less likeley  - Had echocardiogram  on 1/25/2022    Coronary artery disease: During his last hospitalization , patient underwent extensive cardiac workup,his echocardiogram showed decrease in EF from previous as well as wall motion abnormalities and patient underwent Lexiscan on January 25 which showed large area of transmural infarction in the apical, anterior, anteroseptal segments of the left ventricle associated with moderate degree of alen-infarct ischemia.  Patient underwent cardiac catheterization on January 27 which showed severe 99% ISR of the P LAD stent 2 L of his stent. Imdur 30 mg was also added after cath .Patient has patent stent in RCA, showed mild to moderate nonobstructive coronary artery disease in left circumflex  Given his Lewy body dementia and history of medication noncompliance medical therapy was recommended.  Patient was discharged on aspirin Plavix atorvastatin losartan and metoprolol and Imdur.  Patient does not have any ischemic changes on arrival to the emergency room this afternoon.  His troponin is elevated around 96, serial troponin has been ordered, patient is denying any chest pain at the time of my evaluation.    --continue to hold his losartan,  - continue with holding parameters on his other antihypertensive medications given the concern  for iatrogenic hypotension from adjustment in his medications which might have resulted into episode of syncope.  --continue to hold his Lipitor due to observation is status.   --Continue patient on aspirin 81 mg, Plavix 75 mg p.o. daily, Toprol-XL 25 mg p.o. daily and Imdur with holding parameters.  .    Lewy body dementia with behavioral disturbance  High risk for developing acute delirium  During his previous hospitalization patient had code 21 due to behavioral disturbance and was again noticed to have behavioral disturbance after angiogram.  He has received IM Zyprexa in the past at [facility].  He also required soft restraints for a night during his last hospitalization due to increased restlessness and he also attempted to swing at bedside sitter.  Patient was also evaluated by psychiatry during his last hospitalization and he was discharged on Depakote 375 mg p.o. twice daily, Zyprexa 5 mg 3 times daily he was recommended to avoid first generation antipsychotic and his Celexa was decreased to 10 mg p.o. daily.    --Considering patient has advanced dementia I will order a bedside attendant, otherwise patient is at risk for fall and elopement.  --We will order his PTA Depakote, Zyprexa 5 mg 3 times daily and PTA Celexa.  --We will also order as needed Zyprexa as recommended by psychiatry.  --Will order his PTA melatonin.  --We will continue PTA rivastigmine.  --Patient will require frequent reorientation.  --Of note, this is patient's third hospital visit since the start of this year and if patient continues to require recurrent hospitalizations without any significant interventions offered due to his advanced dementia then further discussion with family regarding goals of care should be addressed to avoid future hospitalization and if patient will benefit from comfort care status .  It seems like hospitalizations are really hard for patient.    Type 2 diabetes mellitus:  Patient PTA regimen includes Levemir 18  units in the morning and 14 units at the bedtime, patient is also taking NovoLog 7 units 3 times daily with each meal.    --We will do Accu-Cheks before every meal and at bedtime.  --Will hold his PTA Metformin while patient is in the hospital.  --I will continue his PTA Levemir 18 units in the morning and 15 units at the bedtime.  --Continue NovoLog 7 units 3 times daily with each meal.  --Continue hypoglycemia protocol.    COVID-19 infection recovered: During his last hospitalization patient was noticed to have pneumonia on the x-ray although patient did not have any hypoxemia was done with dexamethasone which was a stopped during his last hospitalization due to the concern if it was attributing to his behavioral disturbances patient did complete his remdesivir for 5 doses on January 28.  --At this time patient will be considered COVID-19 infection recovered and does not need any COVID-19 isolation expecting his PCR to be high because of recent infection.    History of suicidal ideation: During his last hospitalization there was also concern for patient verbalizing suicidal ideation on January 26, patient was evaluated by psych as above.  Given his behavioral disturbance patient was not able to provide any useful information at the time of psych evaluation.  --Facility staff continue to monitor patient closely.    Hyperlipidemia  --We will hold PTA Lipitor    Allergic rhinitis:  --We will hold PTA Allegra, Flonase    GERD:  --We will hold PTA Protonix         Diet: Regular Diet Adult    DVT Prophylaxis: Heparin SQ  Schulte Catheter: Not present  Central Lines: None  Cardiac Monitoring: None  Code Status: No CPR- Pre-arrest intubation OK      Disposition Plan   Expected Discharge: 02/08/2022     Anticipated discharge location:  Awaiting care coordination huddle  Delays:      likely back to his facility tomorrow, if agitations resolves        The patient's care was discussed with the Bedside Nurse.    Reagan  "Sylvia Almodovar MD  Hospitalist Service  [facility]  Securely message with the [employer] Web Console (learn more here)  Text page via [employer] Paging/Directory         Clinically Significant Risk Factors Present on Admission               # Platelet Defect: home medication list includes an antiplatelet medication   # Diabetes, type II: last A1C 7.0 % (Ref range: 0.0 - 5.6 %)  # Overweight: Estimated body mass index is 25.34 kg/m  as calculated from the following:    Height as of 1/23/22: 1.854 m (6' 1\").    Weight as of this encounter: 87.1 kg (192 lb 1.6 oz).      ______________________________________________________________________    Interval History   Agitated earlier today requiring Zyprexa prn an Seroquel, but improved later in the morning early fternoon.   Daughter is at bedside and spoon feeding the patient.   daughter asked for the palliative care team sees him today while she is in town, she is from [place]. I relayed thast request to the nursing staff    Data reviewed today: I reviewed all medications, new labs and imaging results over the last 24 hours. I personally reviewed no images or EKG's today.    Physical Exam   Vital Signs: Temp: 98.3  F (36.8  C) Temp src: Oral BP: (!) 149/89 Pulse: 109   Resp: 20 SpO2: 94 % O2 Device: None (Room air)    Weight: 192 lbs 1.6 oz  General Appearance: Agitated, in NAD  Respiratory:  CTA no wheezing   Cardiovascular:  S1S2  Normal,  No gallops  GI:  Soft NT/ND + BS   Skin:  Warm dry , no acute rashes  Other:      Data   Recent Labs   Lab 02/06/22  0944 02/06/22  0624 02/06/22  0206 02/05/22  1245 02/05/22  0643 02/04/22  2348 02/04/22  1358 02/03/22  0700   WBC  --   --   --   --  7.6  --  9.0 7.2   HGB  --   --   --   --  13.5  --  13.1* 14.0   MCV  --   --   --   --  86  --  88 87   PLT  --   --   --   --  196  --  218 209   NA  --   --   --   --  138  --  136 138   POTASSIUM  --   --   --   --  4.1  --  4.5 4.3   CHLORIDE  --   --   --   --  " 108  --  106 107   CO2  --   --   --   --  25  --  25 28   BUN  --   --   --   --  16  --  22 22   CR  --   --   --   --  0.79  --  1.06 0.81   ANIONGAP  --   --   --   --  5  --  5 3   CHRISTIE  --   --   --   --  8.6  --  8.3* 8.8   * 155* 132*   < > 138*   < > 210* 163*   ALBUMIN  --   --   --   --  2.9*  --   --  3.0*   PROTTOTAL  --   --   --   --  6.8  --   --  6.9   BILITOTAL  --   --   --   --  0.6  --   --  0.5   ALKPHOS  --   --   --   --  104  --   --  98   ALT  --   --   --   --  31  --   --  35   AST  --   --   --   --  15  --   --  15    < > = values in this interval not displayed.

## 2022-02-06 NOTE — PLAN OF CARE
MD Notification    Notified Person: MD    Notified Person Name: Dr. May    Notification Date/Time: 2/6/22 @ 0525    Notification Interaction: web page    Purpose of Notification: Pt's order for restraints will be expiring at 0600. Pt has been restless and agitated throughout the night- requiring PRN Zyprexa and Seroquel. Continues to hallucinate and try to get OOB, not redirectable. Will likely need new order for restraints.     Orders Received: Trial off restraints and utilize PRN's     Comments: Pt starting to calm down and rest. Intermittently restless and pulling at restraints.

## 2022-02-07 NOTE — PROGRESS NOTES
[facility]    Internal Medicine Hospitalist Progress Note  02/07/2022  I evaluated patient on the above date.    Abimael Padilla Jr., MD  [phone] (p)  Text Page  Vocera        Assessment & Plan New actions/orders today (02/07/2022) are underlined.    Matt Way is a 71 year old male with past medical history significant for Lewy body dementia, DM2, HTN, CAD with prior LAD and RCA stents and CHF; with recent hospitalization (1/23-1/31/2022) for COVID-19 and NSTEMI, with angiogram 1/27/2022 showing 99% ISR of the pLAD (with 2 layers of previous stenting) that was medically managed. [facility] with an unwitnessed fall/possible syncope.     Possible syncope, question hypotension from recent new medications.  CAD with recent NSTEMI with pLAD ISR, medically managed.  CHF (HFrEF).  Hypertension (benign essential).  [PTA BP meds: ISMN 30 mg daily; losartan 12.5 mg daily; metoprolol XL 25 mg at bedtime.]  * H/o LAD and RCA stents.  * Recent hospitalization (1/23-1/31) with NSTEMI. Had a mild trop elevation into the 200's. Echo 1/25 showed LVEF 30-35% with severe anterior, septal, and apical wall hypokinesis; RV OK. Cardiology consulted and pt underwent LHC 1/27 that showed 99% ISR of the pLAD (with 2 layers of previous stenting), felt to be the culprit; previous RCA stent was patent; overall, LAD ISR was medically managed as felt that further treatment of the LAD lesion would be futile and he had already survived the insult. Was already on both ASA and clopidogrel and was started on ISMN (was also started on divalproex and olanzapine for delirium but staff was not giving to pt given soft BP's).  * Patient was found on the floor at his residence by the staff.   * On initial evaluation 2/4, was afebrile, hemodynamically stable (though SBP in 90's at times), ECG w/o acute ischemic changes; labs showed trop 96 (was 292 1/24). CT chest 2/4 negative for PE. Head CT 2/4  showed no acute findings; new focal hypodensity in the anterior inferior temporal lobe which was not present on 6/28/2021, noted could be due to age-indeterminate infarct versus traumatic encephalomalacia. CT c-spine 2/4 negative.   * Follow-up trops flat. PTA losartan held on admit.  Recent Labs   Lab 02/05/22  0643 02/05/22  0046 02/04/22  1358   TROPONINIS 93* 99* 96*   - Continue ASA, clopidogrel, atorvastatin.  - Continue metoprolol XL 25 mg at bedtime; ISMN 30 mg daily.  - Continue to hold PTA losartan.  - Monitor i/o's, daily wts.    Lewy body dementia with behavioral disturbance.  Acute metabolic encephalopathy (acute delirium) related to hospitalization.  * During his previous hospitalization patient had code 21 due to behavioral disturbance and was again noticed to have behavioral disturbance after angiogram. Required restraints during his last hospitalization due to increased restlessness and he also attempted to swing at bedside sitter. Last hospitalization, was seen by Psychiatry and started on divalproex and olanzapine and citalopram was decreased. Per pt's daughter, staff was not giving pt divalproex or olanzapine given soft BP's.  * Required IM olanzapine and restraints overnight 2/6-2/7.  - Continue rivastigmine patch.  - Continue citalopram 10 mg daily; divalproex 375 mg q12h; olanzapine 5 mg at bedtime; PRN olanzapine 2.5 mg BID; PRN quetiapine 12.5 mg q6h.  - Stop divalproex and olanzapine at discharge.  - Re-orient as needed.  - Maintain normal day/night, sleep/wake cycles.  - Minimize sedating medications as able.    Goals of care.  * MD d/w pt's daughter Danielle who is POA and is a physician. After further discussion with Danielle as well as pt's wife, it was decided to consult Palliative Care team.  - Palliative care consult pending; possibly could arrange outpatient.  - I d/w pt's dtrs Danielle (who is a GI MD and medical decision maker) and Uma (who is POA); overall, agreed that it would  "be best to discharge pt back to [facility] sooner rather than later. They wanted to discuss with Palliative care regarding strategies to avoid re-hospitalization. I discussed that short of a \"do not re-hospitalize\" order, which they are not in favor of now for non-cardiac treatable issues (such as infection), it would be provider and family dependent whether pt is re-hospitalized for other issues.   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 ER providers or potential admitting Hospitalists would need to discuss with family the pros/cons of hospitalization (weighing high risk for delirium) to determine if it really would be of benefit.    DM2.  [PTA: detemir 18U qam, 14U qHS; metformin 1000 mg BID; ASPART 7U TID with meals.]  * Hgb A1C 7.0 1/23/2022.  Recent Labs   Lab 02/07/22  0844 02/07/22  0204 02/06/22  2129 02/06/22  1702 02/06/22  1506 02/06/22  0944   * 195* 187* 115* 131* 161*   - Continue detemir 18U qam.  - Continue detemir 14U qHS.  - Continue aspart 7U TID with meals.  - Continue aspart ISS (medium).  - Continue to hold metformin for now.    Recent COVID-19, recovered.  * During his last hospitalization, was positive for COVID-19; patient was noticed to have pneumonia on the x-ray although patient did not have any hypoxemia. Was started on dexamethasone and remdesivir; dexamethasone was later stopped later stopped due delirium.    History of suicidal ideation.  * During his last hospitalization there was also concern for patient verbalizing suicidal ideation on January 26; patient was evaluated by Psychiatry as above.    - Monitor clinically.    Allergic rhinitis.  - Resume fexofenadine and fluticasone after discharge.    GERD.  - Resume pantoprazole after discharge.    COVID-19 testing.  COVID-19 PCR Results    COVID-19 PCR Results " 7/6/21 8/12/21 8/17/21 8/24/21 9/1/21 12/13/21 1/7/22 1/12/22 1/18/22 1/23/22   COVID-19 Virus PCR to [facility] - Result             COVID-19 Virus PCR to [facility] - Source             SARS-CoV-2 Virus Specimen Source Nasopharyngeal            Flu A/B & SARS-COV-2 PCR Source             SARS-CoV-2 PCR Result NEGATIVE            SARS CoV2 PCR  Negative Negative Negative Negative Negative Negative Negative Negative Positive (A)   (A) Abnormal value       Comments are available for some flowsheets but are not being displayed.         COVID-19 Antibody Results, Testing for Immunity    COVID-19 Antibody Results, Testing for Immunity   No data to display.             Diet: Regular Diet Adult    Prophylaxis: PCD's, ambulation.   Schulte Catheter: Not present  Central Lines: None  Code Status: No CPR- Pre-arrest intubation OK    Disposition Plan   Expected discharge: today recommended to prior living arrangement.  Entered: Abimael Padilla MD 02/07/2022, 11:14 AM       Communication.  - I d/w pt's daughters 2/7.    Interval History   Pt given IM olanzapine overnight.  Restraints placed to left upper extremity and one leg.  Sleeping now.     -Data reviewed today: I reviewed all new labs and imaging over the last 24 hours. I personally reviewed no images or EKG's today.    Physical Exam    , Blood pressure (!) 141/81, pulse 73, temperature 98.2  F (36.8  C), temperature source Oral, resp. rate 16, weight 87.1 kg (192 lb 1.6 oz), SpO2 94 %.  Vitals:    02/04/22 2330   Weight: 87.1 kg (192 lb 1.6 oz)     Vital Signs with Ranges  Temp:  [97.2  F (36.2  C)-99.7  F (37.6  C)] 98.2  F (36.8  C)  Pulse:  [73-90] 73  Resp:  [16-20] 16  BP: ()/(65-90) 141/81  SpO2:  [94 %-99 %] 94 %  Patient Vitals for the past 24 hrs:   BP Temp Temp src Pulse Resp SpO2   02/07/22 0700 (!) 141/81 98.2  F (36.8  C) Oral 73 16 94 %   02/07/22 0500 -- -- -- -- -- 98 %   02/07/22 0310 112/73 98.4  F (36.9  C) Axillary 78 18 96 %   02/06/22 4045  126/70 99.7  F (37.6  C) Axillary 81 20 95 %   02/06/22 2000 (!) 133/90 98.4  F (36.9  C) Axillary 90 18 99 %   02/06/22 1522 98/65 97.2  F (36.2  C) Oral 85 20 94 %     I/O's Last 24 hours  I/O last 3 completed shifts:  In: 480 [P.O.:480]  Out: 275 [Urine:275]    Constitutional: Sleeping.  Respiratory: Diminished in bases. No crackles or wheezes.  Cardiovascular: RRR, no m/r/g.  GI:   Skin/Integumen:   Other:        Data   Recent Labs   Lab 02/07/22  0844 02/07/22  0204 02/06/22  2129 02/05/22  1245 02/05/22  0643 02/05/22  0233 02/05/22  0046 02/04/22  2348 02/04/22  1358 02/03/22  0700   WBC  --   --   --   --  7.6  --   --   --  9.0 7.2   HGB  --   --   --   --  13.5  --   --   --  13.1* 14.0   MCV  --   --   --   --  86  --   --   --  88 87   PLT  --   --   --   --  196  --   --   --  218 209   NA  --   --   --   --  138  --   --   --  136 138   POTASSIUM  --   --   --   --  4.1  --   --   --  4.5 4.3   CHLORIDE  --   --   --   --  108  --   --   --  106 107   CO2  --   --   --   --  25  --   --   --  25 28   BUN  --   --   --   --  16  --   --   --  22 22   CR  --   --   --   --  0.79  --   --   --  1.06 0.81   ANIONGAP  --   --   --   --  5  --   --   --  5 3   CHRISTIE  --   --   --   --  8.6  --   --   --  8.3* 8.8   * 195* 187*   < > 138*   < >  --    < > 210* 163*   ALBUMIN  --   --   --   --  2.9*  --   --   --   --  3.0*   PROTTOTAL  --   --   --   --  6.8  --   --   --   --  6.9   BILITOTAL  --   --   --   --  0.6  --   --   --   --  0.5   ALKPHOS  --   --   --   --  104  --   --   --   --  98   ALT  --   --   --   --  31  --   --   --   --  35   AST  --   --   --   --  15  --   --   --   --  15   TROPONINIS  --   --   --   --  93*  --  99*  --  96*  --     < > = values in this interval not displayed.     Recent Labs   Lab Test 02/07/22  0844 02/07/22  0204 02/06/22  2129 02/06/22  1702 02/06/22  1506 09/09/21  0630 07/08/21  0847 07/08/21  0201 07/07/21  2119 07/07/21  1637 07/07/21  1136   *  195* 187* 115* 131*   < >  --   --   --   --   --    BGM  --   --   --   --   --   --  102* 115* 186* 175* 246*    < > = values in this interval not displayed.     Recent Labs   Lab 02/05/22  0643 02/04/22  1432 02/04/22  1358 02/03/22  0700   WBC 7.6  --  9.0 7.2   DD  --  1.28*  --   --          No results found for this or any previous visit (from the past 24 hour(s)).    Medications   All medications were reviewed.      aspirin  81 mg Oral Daily     atorvastatin  80 mg Oral QPM     citalopram  10 mg Oral Daily     clopidogrel  75 mg Oral Daily     divalproex sodium delayed-release  375 mg Oral Q12H     docusate sodium  100 mg Oral BID     insulin aspart  7 Units Subcutaneous TID w/meals     insulin aspart  1-7 Units Subcutaneous TID AC     insulin aspart  1-5 Units Subcutaneous At Bedtime     insulin detemir  14 Units Subcutaneous At Bedtime     insulin detemir  18 Units Subcutaneous QAM AC     isosorbide mononitrate  30 mg Oral Daily     metoprolol succinate ER  25 mg Oral At Bedtime     OLANZapine zydis  5 mg Oral At Bedtime     rivastigmine  1 patch Transdermal Daily     rivastigmine   Transdermal Q8H     sodium chloride (PF)  10 mL Intravenous Once     sodium chloride (PF)  3 mL Intracatheter Q8H     acetaminophen **OR** acetaminophen, glucose **OR** dextrose **OR** glucagon, melatonin, nitroGLYcerin, OLANZapine **OR** OLANZapine zydis, ondansetron **OR** ondansetron, QUEtiapine, sodium chloride (PF), sodium chloride (PF)

## 2022-02-07 NOTE — PROGRESS NOTES
T[facility] sent discharge orders to [facility] at 1522, per RN CC request.    MIKAELA Damon  Daytime (8:00am-4:30pm): [phone]  After-Hours [facility] Pager (4:30pm-11:30pm): [phone]

## 2022-02-07 NOTE — PROGRESS NOTES
Offered scheduled  medications hold with water. When patient would not accept this.   The medications wer crushed and given with  applesauce.   Patient refused po intake and medications.  Attempted to kick providers. With attempts, Despite 4 point restraints.   Post HS care and repositioning.   Patient went to sleep.   Based on the degree of agitation, mental confusion, and  combativeness. When he was asleep at  blood sugar checks. At 2330 the blood sugar check and insulin coverage were endorse to the oncoming nurse.

## 2022-02-07 NOTE — PLAN OF CARE
Pt is alert to self, disoriented x3,restless and combative on and off in between sleep throughout the shift, 4 point soft restraints in place,intermittently removed for cares,ROM and weight shifting,sitter in room.Incontinent at times, also asks for urinal, placed on 2l nasal cannular for sleep as pt seems apneic when deep asleep, fabian reg diet, on carb counting, blood sugar checks and insulins,palliative care/PT/OT/CC consults ordered.

## 2022-02-07 NOTE — PROVIDER NOTIFICATION
"MD Notification    Notified Person: MD    Notified Person Name: Emmanuelle Robles    Notification Date/Time: 18:14    Notification Interaction: vocera page    Purpose of Notification: \"507: Pt has order for restraints that need to be renewed every 4 hrs. Pt is still agitated and trying to get OOB. I tried paging the ordering provider at 17:52, but did not get a response and order needs to be renewed. Thanks!\"    Orders Received: Restraints renewed    Comments:      "

## 2022-02-07 NOTE — DISCHARGE SUMMARY
[facility]  Discharge Summary       Matt Way MRN# [id number]   YOB: 1950 Age: 71 year old     Date of Admission: 2/4/2022  Date of Discharge: 2/7/2022  Admitting Physician: Ortiz Norton MD  Discharge Physician: Abimael Padilla MD     Primary Provider: Kingston Leal  Primary Care Physician Phone Number: [phone]         Discharge Diagnoses:   1. Possible syncope, question hypotension from recent new medications.  2. CAD with recent NSTEMI with pLAD in-stent restenosis, medically managed.  3. Lewy body dementia with behavioral disturbance.  4. Acute metabolic encephalopathy (acute delirium) related to hospitalization.        Other Chronic Medical Problems:      1. DM2.  2. Hypertension (benign essential).  3. CHF (HFrEF).  4. Allergic rhinitis.  5. GERD.  6. Recent COVID-19, recovered.       Allergies:         Allergies   Allergen Reactions     Brilinta [Ticagrelor]      Duloxetine      Other reaction(s): Confusion  Per daughter who is MD     Gabapentin      Other reaction(s): Edema     Lisinopril Rash           Discharge Medications:        Current Discharge Medication List      CONTINUE these medications which have CHANGED    Details   isosorbide mononitrate (IMDUR) 30 MG 24 hr tablet Take 1 tablet (30 mg) by mouth daily ; hold for SBP<110.  Qty: 30 tablet, Refills: 0    Associated Diagnoses: NSTEMI (non-ST elevated myocardial infarction) (H)      metoprolol succinate ER (TOPROL-XL) 25 MG 24 hr tablet Take 1 tablet (25 mg) by mouth At Bedtime ; Hold for SBP<100 or HR<55.    Associated Diagnoses: Syncope, unspecified syncope type         CONTINUE these medications which have NOT CHANGED    Details   acetaminophen (TYLENOL) 325 MG tablet Take 650 mg by mouth every 4 hours as needed for mild pain       aspirin (ASA) 81 MG EC tablet Take 1 tablet (81 mg) by mouth daily  Qty: 60 tablet, Refills: 3    Associated Diagnoses: Coronary artery disease  involving native coronary artery of native heart without angina pectoris      atorvastatin (LIPITOR) 80 MG tablet Take 1 tablet (80 mg) by mouth daily  Qty: 90 tablet, Refills: 0    Associated Diagnoses: NSTEMI (non-ST elevated myocardial infarction) (H)      citalopram (CELEXA) 10 MG tablet Take 1 tablet (10 mg) by mouth daily  Qty: 30 tablet, Refills: 0    Comments: Future refills by PCP Dr. Kingston Leal with phone number [phone].  Associated Diagnoses: Lewy body dementia with behavioral disturbance (H)      clopidogrel (PLAVIX) 75 MG tablet Take 1 tablet (75 mg) by mouth daily  Qty: 30 tablet, Refills: 0    Associated Diagnoses: NSTEMI (non-ST elevated myocardial infarction) (H)      fexofenadine (ALLEGRA) 180 MG tablet Take 180 mg by mouth daily      fluticasone (FLONASE) 50 MCG/ACT nasal spray Spray 1 spray into both nostrils At Bedtime       insulin aspart (NOVOLOG FLEXPEN) 100 UNIT/ML pen Inject 7 Units Subcutaneous 3 times daily (with meals) Breakfast and Dinner  Qty: 9 mL, Refills: 0    Associated Diagnoses: Type 2 diabetes mellitus with hyperglycemia, with long-term current use of insulin (H)      !! insulin detemir (LEVEMIR PEN) 100 UNIT/ML pen Inject 20 Units Subcutaneous every morning (before breakfast)  Qty: 9 mL, Refills: 0    Associated Diagnoses: Type 2 diabetes mellitus with hyperglycemia, with long-term current use of insulin (H)      !! insulin detemir (LEVEMIR PEN) 100 UNIT/ML pen Inject 14 Units Subcutaneous At Bedtime   Qty:        !! loperamide (IMODIUM) 2 MG capsule Take 2 mg by mouth every evening       !! loperamide (IMODIUM) 2 MG capsule Take 2 mg by mouth 3 times daily as needed for diarrhea      Melatonin 10 MG TABS tablet Take 10 mg by mouth At Bedtime       metFORMIN (GLUCOPHAGE) 1000 MG tablet Take 1,000 mg by mouth 2 times daily (with meals)      nitroGLYcerin (NITROSTAT) 0.4 MG sublingual tablet Place 1 tablet (0.4 mg) under the tongue every 5 minutes as needed for chest  pain If symptoms persist 5 minutes after 2nd dose call 911.  Qty: 30 tablet, Refills: 1    Associated Diagnoses: NSTEMI (non-ST elevated myocardial infarction) (H); Acute chest pain      pantoprazole (PROTONIX) 20 MG EC tablet Take 20 mg by mouth daily      QUEtiapine (SEROQUEL) 25 MG tablet Take 0.5 tablets (12.5 mg) by mouth every 6 hours as needed (agitation)  Qty: 30 tablet, Refills: 0    Comments: Future refills by PCP Dr. Kingston Leal with phone number [phone].  Associated Diagnoses: Lewy body dementia with behavioral disturbance (H)      rivastigmine (EXELON) 4.6 MG/24HR 24 hr patch Place 1 patch onto the skin daily  Qty: 30 patch, Refills: 0    Comments: Refills by PCP  Associated Diagnoses: Lewy body dementia with behavioral disturbance (H)      sennosides (SENOKOT) 8.6 MG tablet Take 2 tablets by mouth daily as needed for constipation  Qty: 60 tablet, Refills: 0    Associated Diagnoses: Slow transit constipation      Vitamin D (Cholecalciferol) 25 MCG (1000 UT) TABS Take 1 tablet by mouth daily        !! - Potential duplicate medications found. Please discuss with provider.      STOP taking these medications       divalproex sodium delayed-release (DEPAKOTE SPRINKLE) 125 MG DR capsule Comments:   Reason for Stopping:         losartan (COZAAR) 25 MG tablet Comments:   Reason for Stopping:         OLANZapine zydis (ZYPREXA) 5 MG ODT Comments:   Reason for Stopping:         OLANZapine zydis (ZYPREXA) 5 MG ODT Comments:   Reason for Stopping:                   Discharge Instructions and Follow-Up:      Discharge Orders      Home care nursing referral      Reason for your hospital stay    Fall, possible syncope, question related to hypotension from medication effect.  Acute delirium.     Follow-up and recommended labs and tests    Follow-up with primary care provider, Kingston Leal, within 7 days for hospital follow-up.  No follow up labs or test are needed.     Activity    Your activity upon  discharge: activity as tolerated     MD face to face encounter    Documentation of Face to Face and Certification for Home Health Services    I certify that patient: Matt Way is under my care and that I, or a nurse practitioner or physician's assistant working with me, had a face-to-face encounter that meets the physician face-to-face encounter requirements with this patient on: 2/7/2022.    This encounter with the patient was in whole, or in part, for the following medical condition, which is the primary reason for home health care: dementia.    I certify that, based on my findings, the following services are medically necessary home health services: Nursing.    My clinical findings support the need for the above services because: Nurse is needed: assess for home hospice services.    Further, I certify that my clinical findings support that this patient is homebound (i.e. absences from home require considerable and taxing effort and are for medical reasons or Denominational services or infrequently or of short duration when for other reasons) because: Leaving home is medically contraindicated for the following reason(s): Dyspnea on exertion that makes it so they cannot leave their home for needed services without clinical deterioration. and Infection risk / immunocompromised state where it is safer for them to receive services in the home...    Based on the above findings. I certify that this patient is confined to the home and needs intermittent skilled nursing care, physical therapy and/or speech therapy.  The patient is under my care, and I have initiated the establishment of the plan of care.  This patient will be followed by a physician who will periodically review the plan of care.  Physician/Provider to provide follow up care: Kingston Leal    Attending hospital physician (the Medicare certified [place] provider): Abimael Padilla MD  Physician Signature: See electronic signature associated with these  discharge orders.  Date: 2/7/2022     Diet    Follow this diet upon discharge: Orders Placed This Encounter      Regular Diet Adult             Consultations This Hospital Stay:      Palliative Care.        Admission History:      Please see the H&P by Jenna Siddiqui MD on 2/4/2022 for complete details. Briefly, Matt Way is a 71 year old male with past medical history significant for Lewy body dementia, DM2, HTN, CAD with prior LAD and RCA stents and CHF; with recent hospitalization (1/23-1/31/2022) for COVID-19 and NSTEMI, with angiogram 1/27/2022 showing 99% ISR of the pLAD (with 2 layers of previous stenting) that was medically managed. [facility] with an unwitnessed fall/possible syncope.        Problem Oriented Hospital Course:        Possible syncope, question hypotension from recent new medications.  CAD with recent NSTEMI with pLAD ISR, medically managed.  CHF (HFrEF).  Hypertension (benign essential).  [PTA BP meds: ISMN 30 mg daily; losartan 12.5 mg daily; metoprolol XL 25 mg at bedtime.]  * H/o LAD and RCA stents.  * Recent hospitalization (1/23-1/31) with NSTEMI. Had a mild trop elevation into the 200's. Echo 1/25 showed LVEF 30-35% with severe anterior, septal, and apical wall hypokinesis; RV OK. Cardiology consulted and pt underwent LHC 1/27 that showed 99% ISR of the pLAD (with 2 layers of previous stenting), felt to be the culprit; previous RCA stent was patent; overall, LAD ISR was medically managed as felt that further treatment of the LAD lesion would be futile and he had already survived the insult. Was already on both ASA and clopidogrel and was started on ISMN (was also started on divalproex and olanzapine for delirium but staff was not giving to pt given soft BP's).  * Patient was found on the floor at his residence by the staff.   * On initial evaluation 2/4, was afebrile, hemodynamically stable (though SBP in 90's at times), ECG w/o acute ischemic changes; labs  showed trop 96 (was 292 1/24). CT chest 2/4 negative for PE. Head CT 2/4 showed no acute findings; new focal hypodensity in the anterior inferior temporal lobe which was not present on 6/28/2021, noted could be due to age-indeterminate infarct versus traumatic encephalomalacia. CT c-spine 2/4 negative.   * Follow-up trops flat. PTA losartan held on admit.  * BP's stable 2/7.  Recent Labs   Lab 02/05/22  0643 02/05/22  0046 02/04/22  1358   TROPONINIS 93* 99* 96*   - Continue ASA, clopidogrel, atorvastatin.  - Continue metoprolol XL 25 mg at bedtime; ISMN 30 mg daily.  - Continue to hold PTA losartan at discharge.    Lewy body dementia with behavioral disturbance.  Acute metabolic encephalopathy (acute delirium) related to hospitalization.  * During his previous hospitalization patient had code 21 due to behavioral disturbance and was again noticed to have behavioral disturbance after angiogram. Required restraints during his last hospitalization due to increased restlessness and he also attempted to swing at bedside sitter. Last hospitalization, was seen by Psychiatry and started on divalproex and olanzapine and citalopram was decreased. Per pt's daughter, staff was not giving pt divalproex or olanzapine given soft BP's.  * Required IM olanzapine and restraints overnight 2/6-2/7.  - Continue rivastigmine patch.  - Continue citalopram 10 mg daily; divalproex 375 mg q12h; olanzapine 5 mg at bedtime; PRN olanzapine 2.5 mg BID; PRN quetiapine 12.5 mg q6h.  - Stop divalproex and olanzapine at discharge.  - Re-orient as needed.  - Maintain normal day/night, sleep/wake cycles.  - Minimize sedating medications as able.    Goals of care.  * MD d/w pt's daughter Danielle who is POA and is a physician. After further discussion with Danielle as well as pt's wife, it was decided to consult Palliative Care team.  * I d/w pt's dtrs Danielle (who is a GI MD and medical decision maker) and Uma (who is POA) 2/7; overall, agreed that  "it would be best to discharge pt back to [facility] sooner rather than later. They wanted to discuss with Palliative care regarding strategies to avoid re-hospitalization. I discussed that short of a \"do not re-hospitalize\" order, which they are NOT in favor of now for non-cardiac treatable issues (such as infection which they would want treated), it would be provider and family dependent whether pt is re-hospitalized for other issues.   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 ER providers or potential admitting Hospitalists would need to discuss with family the pros/cons of hospitalization (weighing the high risk for delirium) to determine if it really would be of benefit.    DM2.  [PTA: detemir 18U qam, 14U qHS; metformin 1000 mg BID; ASPART 7U TID with meals.]  * Hgb A1C 7.0 1/23/2022.  - Resume PTA regimen at discharge.    Recent COVID-19, recovered.  * During his last hospitalization, was positive for COVID-19; patient was noticed to have pneumonia on the x-ray although patient did not have any hypoxemia. Was started on dexamethasone and remdesivir; dexamethasone was later stopped later stopped due delirium.    History of suicidal ideation.  * During his last hospitalization there was also concern for patient verbalizing suicidal ideation on January 26; patient was evaluated by Psychiatry as above.    - Monitor clinically.    Allergic rhinitis.  - Resume fexofenadine and fluticasone after discharge.    GERD.  - Resume pantoprazole after discharge.    COVID-19 testing.  COVID-19 PCR Results    COVID-19 PCR Results 7/6/21 8/12/21 8/17/21 8/24/21 9/1/21 12/13/21 1/7/22 1/12/22 1/18/22 1/23/22   COVID-19 Virus PCR to [facility] - Result             COVID-19 Virus PCR to [facility] - Source             SARS-CoV-2 Virus Specimen Source Nasopharyngeal       "      Flu A/B & SARS-COV-2 PCR Source             SARS-CoV-2 PCR Result NEGATIVE            SARS CoV2 PCR  Negative Negative Negative Negative Negative Negative Negative Negative Positive (A)   (A) Abnormal value       Comments are available for some flowsheets but are not being displayed.         COVID-19 Antibody Results, Testing for Immunity    COVID-19 Antibody Results, Testing for Immunity   No data to display.                   Pending Results:        Unresulted Labs Ordered in the Past 30 Days of this Admission     No orders found from 1/5/2022 to 2/5/2022.                Discharge Disposition:      Discharged to [facility].        Discharge Time:      Approximately 45 minutes.          Condition and Physical on Discharge:    See progress note on the same date as this discharge summary.          Key Imaging Studies, Lab Findings and Procedures/Surgeries:        Results for orders placed or performed during the hospital encounter of 02/04/22   CT Head w/o Contrast    Narrative    CT SCAN OF THE HEAD WITHOUT CONTRAST   2/4/2022 3:10 PM     HISTORY: Trauma. Head injury. Syncope.    TECHNIQUE:  Axial images of the head and coronal reformations without  IV contrast material. Radiation dose for this scan was reduced using  automated exposure control, adjustment of the mA and/or kV according  to patient size, or iterative reconstruction technique.    COMPARISON: Head CT 6/28/2021.    FINDINGS: No evidence of acute intracranial hemorrhage. No mass effect  or midline shift. New well-defined area of hypodensity in the anterior  inferior right temporal lobe. Ventricular size is within normal limits  without evidence of hydrocephalus. Prominence of the temporal horn of  the right lateral ventricle likely due to ex vacuo dilatation. Mild  patchy periventricular white matter hypodensities which are  nonspecific, but most likely related to chronic microvascular ischemic  disease.    The visualized portions of the sinuses  and mastoids appear normal. The  bony calvarium and bones of the skull base appear intact.       Impression    IMPRESSION:     1. No evidence of acute intracranial hemorrhage, mass effect, or  herniation.  2. New focal hypodensity in the anterior inferior temporal lobe which  was not present on 6/28/2021. This may be due to age-indeterminate  infarct versus traumatic encephalomalacia. This could be better  assessed with brain MRI if clinically indicated.  3. Mild nonspecific white matter changes most likely due to chronic  microvascular ischemic disease.    RONI KRUEGER MD         SYSTEM ID:  [id number]   CT Cervical Spine w/o Contrast    Narrative    CT CERVICAL SPINE WITHOUT CONTRAST February 4, 2022 3:08 PM     HISTORY: Trauma to cervical spine injury. Syncope.     TECHNIQUE: Axial images of the cervical spine were obtained without  intravenous contrast. Multiplanar reformations were performed.  Radiation dose for this scan was reduced using automated exposure  control, adjustment of the mA and/or kV according to patient size, or  iterative reconstruction technique.    COMPARISON: None.    FINDINGS: Straightening of the normal cervical lordosis which may be  positional. No acute lucent fracture line visualized. No significant  loss of vertebral body height. No focal destructive bony lesions.  Marked degenerative endplate changes and loss of disc height at C5-C6  and C6-C7. Fusion of the posterior facets at C2-C3 with partial fusion  across the disc space at that level. Central disc protrusion at C4-C5.  Mild spinal canal narrowings at C4-C5, C5-C6, and C6-C7. Multiple  levels of neural foraminal narrowing, most pronounced on the right at  C3-C4 and bilaterally at C5-C6 and C6-C7.     Visualized paraspinous tissues: Unremarkable.      Impression    IMPRESSION:   1. No evidence of acute fracture or subluxation in the cervical spine.  2. Degenerative changes in the cervical spine as described above.      RONI CONDON  MD EVANS         SYSTEM ID:  [id number]   CT Chest Pulmonary Embolism w Contrast    Narrative    CT CHEST PULMONARY EMBOLISM WITH CONTRAST February 4, 2022 4:10 PM    CLINICAL HISTORY: Chest Pain. Pulmonary embolism suspected,  low/intermediate probability, positive D-dimer.    TECHNIQUE: CT angiogram chest during arterial phase injection IV  contrast. 2D and 3D MIP reconstructions were performed by the CT  technologist. Dose reduction techniques were used.  CONTRAST: 70 mL [id number].    COMPARISON: January 19, 2022.    FINDINGS:  ANGIOGRAM CHEST: Pulmonary arteries are normal caliber and negative  for pulmonary emboli. Thoracic aorta is negative for dissection. No CT  evidence of right heart strain.    LUNGS AND PLEURA: Stable 2 mm right middle lobe nodule in the  anterolateral right middle lobe image 175 series 6, this has long-term  stability. No new nodules. No infiltrates or effusions.    MEDIASTINUM/AXILLAE: No adenopathy or aneurysm. There are extensive  coronary vascular calcifications and/or stents consistent with  coronary artery disease.    UPPER ABDOMEN: No acute findings.    MUSCULOSKELETAL: No frankly destructive bony lesions.      Impression    IMPRESSION: No pulmonary embolism demonstrated.    CHELO WALDEN MD         SYSTEM ID:  [id number]

## 2022-02-07 NOTE — CONSULTS
[facility]  Palliative Care Consultation Note    Patient: Matt Way  Date of Admission:  2/4/2022    Requesting Clinician / Team: Dr. Norton/Hospitalist   Reason for consult: Goals of care    Recommendations:    Family interested in having Matt return to his memory care unit as soon as possible     Family interested in pursuing hospice once he returns to his facility, as they do not want Matt to be rehospitalized    SW consult for hospice planning. Left her VM today. Dtr Uma is local and will help with discharge planning/logistics      These recommendations have been discussed with Dr. Padilla.  left for unit TIBURCIO Juan.    PHILLIP Pickering CNP  [facility]  Contact information available via [facility] Paging/Directory      Thank you for the opportunity to participate in the care of this patient and family. Our team: does not plan on following further, however do not hesitate to call or re-consult if we can be of further assistance to the patient/family.     During regular M-F work hours ([id number]) -- if you are not sure who specifically to contact -- please contact us on [facility] Smart Web.     After regular work hours and on weekends/holidays, you can call our answering service at [phone].     Attestation:  Total time on the floor involved in the patient's care: 35 minutes  Total time spent in counseling/care coordination: >50% spent in counseling goals of care in setting of LBD, CAD, recurrent hospitalizations, falls, encephalopathy     Assessments:  Matt Way is a 71 year old male with PMH significant for Lewy body dementia, DM2, HTN, CAD with prior LAD and RCA stents and CHF; with recent hospitalization (1/23-1/31/2022) for COVID-19 and NSTEMI, with angiogram 1/27/2022 showing 99% ISR of the pLAD (with 2 layers of previous stenting) that was medically managed. He presented 2/4/2022 from Memory Care with an unwitnessed fall/possible syncope. Upon admission,  "he is found to have acute metabolic encephalopathy, which is distressing to patient and family. He is requiring restraints and intermittent antipsychotics.      Today, the patient was seen for:  Goals of care in setting of LBD, CAD, recurrent hospitalizations, falls, encephalopathy     Visited Matt this afternoon. He is seen sleeping in bed. Restraints have been loosened. Sitter at bedside. Did not attempt to wake Matt up today.     Spoke with both dtrs Uma and Danielle separately today via phone. Introduced myself and our services; assistance in pain and symptom management, emotional and spiritual support, and complex medical decision making.    Family members acknowledge that Matt has been living with LBD for 10+ years, which is progressing. He is now in memory care. He is sleeping more and more, eating less. He is a bit aware of his dementia, and it bothers him, and bothers family to see him in distress. He has underlying heart disease that cannot be fixed. Recurrent hospitalizations have been distressing as there are no easy fixes to his terminal illnesses, and his mentation/encephalopathy worsens when he comes to the hospital. They clearly find the hospital more burdensome than helpful for Matt at this point.    They are ready to not rehospitalize. The best way to achieve this will be with the support of hospice. Educated family regarding hospice philosophy and prognostic criteria. Dispelled common myths. Discussed what hospice is (and is not), what services are usually provided (and those that are not, ex \"assisted care\"), under what circumstances people tend to enroll, and the variety of places people can get hospice care. Discussed typical anticipated timing of discharge.    Based on this discussion, family on board with hospice planning once he returns to his facility, ideally in the next day or so.     Prognosis, Goals, & Planning:      Functional Status just prior to hospitalization: Not " assessed      Prognosis, Goals, and/or Advance Care Planning were addressed today: Yes      Patient's decision making preferences: unable to assess          Patient has decision-making capacity today for complex decisions: No            I have concerns about the patient/family's health literacy today: No           Patient has a completed Health Care Directive: No.       Code status: No CPR; pre-arrest intubation OK    Coping, Meaning, & Spirituality:   Mood, coping, and/or meaning in the context of serious illness were addressed today: Yes  Summary/Comments: Pt tearful as they acknowledge that they would prefer Matt to be at his facility where family/grandchildren can spend more time with him.     Social:     Living situation: memory care unit     Key family / caregivers: wife, 4 supportive children, 1 dtr, Danielle, is a GI MD in [place]    History of Present Illness:  History gathered today from: family/loved ones, medical chart, medical team members, unit team members    Matt Way is a 71 year old male with PMH significant for Lewy body dementia, DM2, HTN, CAD with prior LAD and RCA stents and CHF; with recent hospitalization (1/23-1/31/2022) for COVID-19 and NSTEMI, with angiogram 1/27/2022 showing 99% ISR of the pLAD (with 2 layers of previous stenting) that was medically managed. He presented 2/4/2022 from [facility] with an unwitnessed fall/possible syncope. Upon admission, he is found to have acute metabolic encephalopathy, which is distressing to patient and family. He is requiring restraints and intermittent antipsychotics.      Key Palliative Symptom Data:  We are not helping to manage these symptoms currently in this patient.    Patient is on opioids: bowels not assessed today.    ROS:  Comprehensive ROS is reviewed and is negative except as here & per HPI: N/A     Past Medical History:  Past Medical History:   Diagnosis Date     Coronary artery disease 06/2018    s/p CECILY to LAD      Diabetes mellitus       GERD (gastroesophageal reflux disease)      Hyperlipidemia      Obesity      FLAQUITA (obstructive sleep apnea)      Pulmonary embolism 11/15/2018        Past Surgical History:  Past Surgical History:   Procedure Laterality Date     COLONOSCOPY       Coronary artery angiogram with stent (CECILY) to LAD  06/2018     CV CORONARY ANGIOGRAM N/A 9/3/2020    Procedure: Coronary Angiogram;  Surgeon: Herb Tran MD;  Location:  HEART CARDIAC CATH LAB     CV CORONARY ANGIOGRAM N/A 9/23/2020    Procedure: Coronary Angiogram;  Surgeon: Ernie Urbina MD;  Location:  HEART CARDIAC CATH LAB     CV HEART CATHETERIZATION WITH POSSIBLE INTERVENTION N/A 1/27/2022    Procedure: Heart Catheterization with Possible Intervention;  Surgeon: Ronnie Mobley MD;  Location:  HEART CARDIAC CATH LAB     CV INSTANTANEOUS WAVE-FREE RATIO N/A 9/3/2020    Procedure: Instantaneous Wave-Free Ratio;  Surgeon: Herb Tran MD;  Location:  HEART CARDIAC CATH LAB     CV LEFT HEART CATH N/A 9/3/2020    Procedure: Left Heart Cath;  Surgeon: Herb Tran MD;  Location:  HEART CARDIAC CATH LAB     CV LEFT VENTRICULOGRAM N/A 9/3/2020    Procedure: Left Ventriculogram;  Surgeon: Herb Tran MD;  Location:  HEART CARDIAC CATH LAB     CV PCI STENT DRUG ELUTING N/A 9/3/2020    Procedure: Percutaneous Coronary Intervention Stent Drug Eluting;  Surgeon: Herb Tran MD;  Location:  HEART CARDIAC CATH LAB     CV PCI STENT DRUG ELUTING N/A 9/23/2020    Procedure: Percutaneous Coronary Intervention Stent Drug Eluting;  Surgeon: Ernie Urbina MD;  Location:  HEART CARDIAC CATH LAB     SHOULDER SURGERY       VASECTOMY           Family History:  Family History   Problem Relation Age of Onset     Lupus Mother          Allergies:  Allergies   Allergen Reactions     Brilinta [Ticagrelor]      Duloxetine      Other reaction(s): Confusion  Per daughter who is MD     Gabapentin      Other  reaction(s): Edema     Lisinopril Rash        Medications:  I have reviewed this patient's medication profile and medications from this hospitalization.   Noted scheduled meds are:  Celexa 10mg PO daily   Plavix   Depakote   Insulin   Zyprexa 5mg at bedtime   Rivastigmine patch     Noted PRN meds are:  Zyprexa ODT/IM PRN agitation   Seroquel 12.5mg PO Q6hrs PRN agitation     Physical Exam:  Vital Signs: Temp: 97.3  F (36.3  C) Temp src: Axillary BP: 122/80 Pulse: 68   Resp: 16 SpO2: 97 % O2 Device: None (Room air) Oxygen Delivery: 2 LPM  Weight: 192 lbs 1.6 oz  CONSTITUTIONAL: Chronically ill elderly man seen sleeping in bed in NAD, sitter at bedside, restraints loosened     Data reviewed:  Recent imaging reviewed, my comments on pertinents:   Results for orders placed or performed during the hospital encounter of 02/04/22   CT Head w/o Contrast    Impression    IMPRESSION:     1. No evidence of acute intracranial hemorrhage, mass effect, or  herniation.  2. New focal hypodensity in the anterior inferior temporal lobe which  was not present on 6/28/2021. This may be due to age-indeterminate  infarct versus traumatic encephalomalacia. This could be better  assessed with brain MRI if clinically indicated.  3. Mild nonspecific white matter changes most likely due to chronic  microvascular ischemic disease.    RONI KRUEGER MD         SYSTEM ID:  [id number]   CT Cervical Spine w/o Contrast    Impression    IMPRESSION:   1. No evidence of acute fracture or subluxation in the cervical spine.  2. Degenerative changes in the cervical spine as described above.      RONI KRUEGER MD         SYSTEM ID:  [id number]   CT Chest Pulmonary Embolism w Contrast    Impression    IMPRESSION: No pulmonary embolism demonstrated.    CHELO WALDEN MD         SYSTEM ID:  [id number]       Recent lab data reviewed, my comments on pertinents:   Na 138  K 4.1  Creat 0.79  Mg 1.7  Albumin 2.9  WBC 7.6  Hgb 13.5  Plt 196

## 2022-02-07 NOTE — CONSULTS
Care Management Discharge Note    Discharge Date: 02/07/2022       Discharge Disposition:  Returning to [facility] ([phone])    Discharge Services:  Home Care-Referral sent to [facility] (RN)    Discharge DME:  None    Discharge Transportation:  Daughter Kristen-She will be at door # 2 at 1600.    Private pay costs discussed: Not applicable    PAS Confirmation Code:  N/A  Patient/family educated on Medicare website which has current facility and service quality ratings:  yes    Education Provided on the Discharge Plan:  Yes  Persons Notified of Discharge Plans: Bedside RN Ekta, daughter Kristen, [facility], Spouse Salina  Patient/Family in Agreement with the Plan:  yes    Handoff Referral Completed: Yes, Outpatient Care Coordination Referral        Additional Information:   Writer informed by Dr Padilla that patient has been medically cleared for discharge today. Writer spoke with Li at [facility] ([phone]). She agreed to take patient back today as long as he was back by 1700. She was also made aware that patient has continued with a sitter and soft restraints throughout the day. She agreed to take him back (before 24 hrs) since he resides in Memory Care and these are needed for his safety while in the hospital. Orders were  Faxed at ([phone]).  Home Care RN has also been ordered. Referral sent to [facility] via e-mail. Palliative Care has completed the consult. (please see note)  No further discharge needs. Daughter will be here at 1600 at door # 2.            Cande Hernandez RN  Care Coordinator  [phone]

## 2022-02-07 NOTE — PROGRESS NOTES
Disorientated x4 can answer to his name. 2A. Reg diet refusing to eat. Spit out his AM meds. Had scattered wounds on BLE and left ankle. . Will be going home today with pallalitive. Will continue to monitor

## 2022-02-07 NOTE — PLAN OF CARE
Pt AO to self. Slept & calm most of the shift. One to one sitter at bedside. Refused to eat. PIV removed. Assisted with getting dressed. AVS packet sent to the [facility]. Daughter providing ride back to the [facility] care on palliative/hospice care. Palliative care consulted. Pt discharged at door #2 around 5pm.

## 2022-02-08 NOTE — DISCHARGE INSTRUCTIONS
Your kidney tests were elevated today likely due to dehydration.  We gave you IV fluids but kidney tests will need to be rechecked within 1 week.    Discharge Instructions  Head Injury    You have been seen today for a head injury. Your evaluation included a history and physical examination. You may have had a CT (CAT) scan performed, though most head injuries do not require a scan. Based on this evaluation, your provider today does not feel that your head injury is serious.    Generally, every Emergency Department visit should have a follow-up clinic visit with either a primary or a specialty clinic/provider. Please follow-up as instructed by your emergency provider today.  Return to the Emergency Department if:  You are confused or you are not acting right.  Your headache gets worse or you start to have a really bad headache even with your recommended treatment plan.  You vomit (throw up) more than once.  You have a seizure.  You have trouble walking.  You have weakness or paralysis (cannot move) in an arm or a leg.  You have blood or fluid coming from your ears or nose.  You have new symptoms or anything that worries you.    Sleeping:  It is okay for you to sleep, but someone should wake you up if instructed by your provider, and someone should check on you at your usual time to wake up.     Activity:  Do not drive for at least 24 hours.  Do not drive if you have dizzy spells or trouble concentrating, or remembering things.  Do not return to any contact sports until cleared by your regular provider.     MORE INFORMATION:    Concussion:  A concussion is a minor head injury that may cause temporary problems with the way the brain works. Although concussions are important, they are generally not an emergency or a reason that a person needs to be hospitalized. Some concussion symptoms include confusion, amnesia (forgetful), nausea (sick to your stomach) and vomiting (throwing up), dizziness, fatigue, memory or  concentration problems, irritability and sleep problems. For most people, concussions are mild and temporary but some will have more severe and persistent symptoms that require on-going care and treatment.  CT Scans: Your evaluation today may have included a CT scan (CAT scan) to look for things like bleeding or a skull fracture (broken bone).  CT scans involve radiation and too many CT scans can cause serious health problems like cancer, especially in children.  Because of this, your provider may not have ordered a CT scan today if they think you are at low risk for a serious or life threatening problem.    If you were given a prescription for medicine here today, be sure to read all of the information (including the package insert) that comes with your prescription.  This will include important information about the medicine, its side effects, and any warnings that you need to know about.  The pharmacist who fills the prescription can provide more information and answer questions you may have about the medicine.  If you have questions or concerns that the pharmacist cannot address, please call or return to the Emergency Department.     Remember that you can always come back to the Emergency Department if you are not able to see your regular provider in the amount of time listed above, if you get any new symptoms, or if there is anything that worries you.

## 2022-02-08 NOTE — PROGRESS NOTES
Clinic Care Coordination Contact    Background: Care Coordination referral placed from [facility] discharge report for reason of patient meeting criteria for a TCM outreach call by [facility] team.    Assessment: Upon chart review, [facility] Team member will cancel/close the referral for TCM outreach due to reason below:    Patient has discharged to a Group home, Memory Care or Nursing Home    Plan: Care Coordination referral for TCM outreach canceled.    Jaymie Gresham  Community Health Worker  [facility]  [phone]

## 2022-02-08 NOTE — ED NOTES
Bed: ED02  Expected date: 2/8/22  Expected time: 10:40 AM  Means of arrival: Ambulance  Comments:  Meena [address]  71M  Fall/Hypotension

## 2022-02-08 NOTE — ED PROVIDER NOTES
History   Chief Complaint:  Fall and Loss of Consciousness     The history is provided by the patient and the EMS personnel.      Matt Way is a 71 year old male with on Plavix history of Lewy body dementia, CAD, diabetes mellitus type 2, hyperlipidemia, and PE who presents with fall. Patient had a dizzy spell followed by a fall at his assisted living facility. While a staff member was going to get help he got himself up had an unwitnessed fall hitting his head. Staff found him unconscious for approximately 30 seconds. He does remember the fall. He complains of neck pain. He also complains of right knee and shoulder pain. He was hypotensive in the 80's en route for EMS. They gave IV fluids and his pressure went up to the mid 90's. No shortness of breath. No nausea. He was recently discharged from [facility]. He has known heart problems and is frequently admitted for this. Family requests no cardiac work up. Patient and family are discussing a transition to hospice but have not decided as they are still discussing.     Review of Systems   Respiratory: Negative for shortness of breath.    Cardiovascular:        +Hypotension   Gastrointestinal: Negative for nausea.   Musculoskeletal: Positive for arthralgias (right shoulder, right knee) and neck pain.   Skin: Positive for wound.   Neurological: Positive for dizziness.   All other systems reviewed and are negative.    Allergies:  Ticagrelor   Duloxetine  Gabapentin  Lisinopril     Medications:  Aspirin 81 mg  Lipitor   Celexa   Plavix   Allegra   Novolog   Levemir   Imdur   Imodium  Melatonin   Glucophage   Toprol  Nitrostat   Protonix   Seroquel   Senokot  Cholecalciferol   Cozaar   Zyprexa     Past Medical History:     CAD  Diabetes mellitus type 2  GERD  Hyperlipidemia  Obesity   FLAQUITA   PE   NSTEMI   Lewy body dementia       Past Surgical History:    Vasectomy   Shoulder surgery   Pci stent drug eluting x2  Left ventriculogram  Left heart cath    Instantaneous wave-free ratio  Heart catheterization with possible intervention  Coronary angiogram x2   Colonoscopy   Coronary artery angiogram with stent to lad      Family History:    Mother: lupus, diabetes type 2  Brother: diabetes, blindness, kidney transplant, stroke, heart disease    Social History:  Presents to ED alone  Lives in memory care     Physical Exam     Patient Vitals for the past 24 hrs:   BP Temp Temp src Pulse Resp SpO2   02/08/22 1200 95/54 -- -- 60 16 98 %   02/08/22 1100 -- -- -- 74 12 96 %   02/08/22 1050 100/57 -- -- 73 19 96 %   02/08/22 1048 100/57 97  F (36.1  C) Temporal 76 17 97 %       Physical Exam  HENT:      Head:             HEENT:   No scalp hematoma or defect to the bony calvarium.      Gates's and Racoon's sign negative.      No hemotympanum or septal hematoma.    Oropharynx is moist, without lesions or trismus.  EYES:  Conjunctiva normal, PERRL    EOMs intact  NECK:   C-spine tender    No bony step-off to cervical spine.   CV:    Regular rate and rhythm.     No murmurs, rubs or gallops.    PULM:  Clear to auscultation bilateral.      No respiratory distress.    ABD:   Soft, non-tender, non-distended.      No rebound or guarding.  MSK:    Right shoulder:     Mild tenderness     Full passive ROM     No deformity    Right knee:     Tenderness to anterior knee     Extensor mechanism and ligaments intact     No effusion     Full passive range of motion  LYMPH:  No cervical lymphadenopathy.  NEURO:  Alert and oriented to person and place.       CN II-XII intact, speech is clear with no aphasia.      Strength is 5/5 in all 4 extremities.  Sensation is intact.      Normal muscular tone, no tremor.  SKIN:   Warm, dry  PSYCH:   Mood is good and affect is appropriate.      Emergency Department Course   ECG  ECG obtained at 1041, ECG read at 1049  Normal sinus rhythm  Left axis deviation  Low voltage QRS  Inferior infarct, age undetermined  Cannot rule out Anterior infarct, age  undetermined   Abnormal ECG    No significant change as compared to prior, dated 2/4/22.  Rate 72 bpm. HI interval 172 ms. QRS duration 74 ms. QT/QTc 412/451 ms. P-R-T axes 57 -36 67.     Imaging:  XR Shoulder Right G/E 3 Views   Final Result   IMPRESSION:   1.  No fracture or joint malalignment.   2.  Previous rotator cuff repair with metallic anchor in the   superolateral aspect of the humeral head.   3.  Previous distal clavicle excision.      POLLY VARGAS MD            SYSTEM ID:  [id number]      XR Knee Right 3 Views   Final Result   IMPRESSION:   1.  No fracture or joint malalignment.   2.  Mild right knee tricompartmental degenerative arthrosis.   3.  No joint effusion. Probable small osteochondral body in the   posteromedial joint recess.   4.  Atherosclerotic calcification.      POLLY VARGAS MD            SYSTEM ID:  [id number]      Cervical spine CT w/o contrast   Preliminary Result   IMPRESSION: There is normal alignment of the cervical vertebrae;   however, there is straightening of normal cervical lordosis. Vertebral   body heights of the cervical spine are normal. Craniocervical   alignment is normal. There is no evidence for fracture of the cervical   spine. Loss of disc space height and degenerative endplate spurring at   C5-C6 and C6-C7 again noted. Mild-moderate facet arthropathy   throughout the cervical spine again noted. No significant degenerative   spinal canal stenosis. No prevertebral soft tissue swelling.         Radiation dose for this scan was reduced using automated exposure   control, adjustment of the mA and/or kV according to patient size, or   iterative reconstruction technique.      Head CT w/o contrast   Preliminary Result   IMPRESSION:  Diffuse cerebral volume loss and cerebral white matter   changes consistent with chronic small vessel ischemic disease. No   evidence for acute intracranial pathology.         Radiation dose for this scan was reduced using automated exposure    control, adjustment of the mA and/or kV according to patient size, or   iterative reconstruction technique.      Report per radiology    Laboratory:  Labs Ordered and Resulted from Time of ED Arrival to Time of ED Departure   BASIC METABOLIC PANEL - Abnormal       Result Value    Sodium 142      Potassium 4.1      Chloride 110 (*)     Carbon Dioxide (CO2) 25      Anion Gap 7      Urea Nitrogen 22      Creatinine 1.65 (*)     Calcium 8.9      Glucose 224 (*)     GFR Estimate 45 (*)    CBC WITH PLATELETS AND DIFFERENTIAL    WBC Count 6.9      RBC Count 4.95      Hemoglobin 14.0      Hematocrit 43.3      MCV 88      MCH 28.3      MCHC 32.3      RDW 14.4      Platelet Count 259      % Neutrophils 57      % Lymphocytes 30      % Monocytes 10      % Eosinophils 2      % Basophils 1      % Immature Granulocytes 0      NRBCs per 100 WBC 0      Absolute Neutrophils 3.9      Absolute Lymphocytes 2.1      Absolute Monocytes 0.7      Absolute Eosinophils 0.2      Absolute Basophils 0.1      Absolute Immature Granulocytes 0.0      Absolute NRBCs 0.0        Emergency Department Course:     Reviewed:  I reviewed nursing notes, vitals, past medical history and Care Everywhere    Assessments:  1039 I obtained history and examined the patient as noted above.   1216 I rechecked the patient and explained findings.     Interventions:  1100 Tylenol, 1000 mg, PO   1140 NS, 1L, IV     Disposition:  The patient was discharged to home.     Impression & Plan       Medical Decision Makin-year-old male with Lewy body dementia presented to the ED with fall following a dizzy episode.  From a trauma standpoint, CT scan of his head and cervical spine undertaken and unremarkable.  Patient also reported right shoulder and knee pain.  Plain films are without fracture or dislocation.  The remainder of his trauma work-up was unremarkable.  Patient is on Plavix and is at risk for delayed interval hemorrhage although low risk.  Patient will be  discharged back to nursing facility with monitoring capabilities.    Family had called and requested that no cardiac evaluation be undertaken as they are planning to transition him to hospice level care.  They only wanted evaluation for his trauma.  Prior to this, EKG was obtained and unremarkable.  Basic laboratory studies were sent with mild acute renal insufficiency.  This is most likely related to volume depletion.  Patient given IV fluids.  Given goals of care and tentative plans to transition to palliative care, no indication for further work-up or admission at this time.  Patient will be discharged back to care facility in accordance with family's wishes.    Diagnosis:    ICD-10-CM    1. Closed head injury, initial encounter  S09.90XA    2. Contusion of right knee, initial encounter  S80.01XA    3. Dizziness  R42    4. Renal insufficiency  N28.9        Scribe Disclosure:  I, Michael Rivera, am serving as a scribe at 10:39 AM on 2/8/2022 to document services personally performed by Jairon Steen MD based on my observations and the provider's statements to me.            Jairon Steen MD  02/08/22 2131

## 2022-02-08 NOTE — ED TRIAGE NOTES
Pt arrived via EMS from assisted living facility. Fell x2 this AM. Loss of consciousness after second fall. Pt alert and oriented. Complains of R knee pain. C collar placed by EMS. Recently discharged from [facility] family he started new BP meds an was dizzy after taking them this AM. Hx of type 2 DB- .

## 2022-02-23 NOTE — Clinical Note
Balloon inserted to right coronary artery and distal right coronary artery. [phone]/yes

## 2022-04-08 NOTE — ED TRIAGE NOTES
BIBA from [facility] care facility. Patient had un witnessed fall at around 1630. Pt stated he tripped and fell and hit the back of his head. Patient denies head or neck pain. Endorses L elbow pain. Currently on plavix.

## 2022-04-08 NOTE — ED NOTES
Bed: ED14  Expected date: 4/8/22  Expected time: 6:35 PM  Means of arrival: Ambulance  Comments:  A534 71M fall

## 2022-04-09 NOTE — ED PROVIDER NOTES
History     Chief Complaint:  Fall       HPI   Matt Way is a 71 year old male who presents from memory care after a fall after tripping on a . Patient is on plavix. Patient has dementia and can't give any further history. Patient has no complaints currently.    ROS:  Review of Systems  Please see HPI. All other systems reviewed and negative.      Allergies:  Brilinta [Ticagrelor]  Duloxetine  Gabapentin  Lisinopril     Medications:    acetaminophen (TYLENOL) 325 MG tablet  aspirin (ASA) 81 MG EC tablet  atorvastatin (LIPITOR) 80 MG tablet  citalopram (CELEXA) 10 MG tablet  clopidogrel (PLAVIX) 75 MG tablet  fexofenadine (ALLEGRA) 180 MG tablet  fluticasone (FLONASE) 50 MCG/ACT nasal spray  insulin aspart (NOVOLOG FLEXPEN) 100 UNIT/ML pen  insulin detemir (LEVEMIR PEN) 100 UNIT/ML pen  insulin detemir (LEVEMIR PEN) 100 UNIT/ML pen  isosorbide mononitrate (IMDUR) 30 MG 24 hr tablet  loperamide (IMODIUM) 2 MG capsule  loperamide (IMODIUM) 2 MG capsule  Melatonin 10 MG TABS tablet  metFORMIN (GLUCOPHAGE) 1000 MG tablet  metoprolol succinate ER (TOPROL-XL) 25 MG 24 hr tablet  nitroGLYcerin (NITROSTAT) 0.4 MG sublingual tablet  pantoprazole (PROTONIX) 20 MG EC tablet  QUEtiapine (SEROQUEL) 25 MG tablet  rivastigmine (EXELON) 4.6 MG/24HR 24 hr patch  sennosides (SENOKOT) 8.6 MG tablet  Vitamin D (Cholecalciferol) 25 MCG (1000 UT) TABS        Past Medical History:    Past Medical History:   Diagnosis Date     Coronary artery disease 06/2018     Diabetes mellitus      GERD (gastroesophageal reflux disease)      Hyperlipidemia      Obesity      FLAQUITA (obstructive sleep apnea)      Pulmonary embolism 11/15/2018     Patient Active Problem List   Diagnosis     Diabetes mellitus, type 2 (H)     Acute chest pain     NSTEMI (non-ST elevated myocardial infarction) (H)     Pulmonary embolism (H)     Type 2 diabetes mellitus with hyperglycemia, with long-term current use of insulin (H)     Coronary artery disease involving  native coronary artery of native heart without angina pectoris     Confusion     Hyperglycemia     Lewy body dementia with behavioral disturbance (H)     Chest pain, unspecified type     Syncope, unspecified syncope type        Past Surgical History:    Past Surgical History:   Procedure Laterality Date     COLONOSCOPY       Coronary artery angiogram with stent (CECILY) to LAD  06/2018     CV CORONARY ANGIOGRAM N/A 9/3/2020    Procedure: Coronary Angiogram;  Surgeon: Herb Tran MD;  Location:  HEART CARDIAC CATH LAB     CV CORONARY ANGIOGRAM N/A 9/23/2020    Procedure: Coronary Angiogram;  Surgeon: Ernie Urbina MD;  Location: [facility] CARDIAC CATH LAB     CV HEART CATHETERIZATION WITH POSSIBLE INTERVENTION N/A 1/27/2022    Procedure: Heart Catheterization with Possible Intervention;  Surgeon: Ronnie Mobley MD;  Location: [facility] CARDIAC CATH LAB     CV INSTANTANEOUS WAVE-FREE RATIO N/A 9/3/2020    Procedure: Instantaneous Wave-Free Ratio;  Surgeon: Herb Tran MD;  Location:  HEART CARDIAC CATH LAB     CV LEFT HEART CATH N/A 9/3/2020    Procedure: Left Heart Cath;  Surgeon: Herb Tran MD;  Location:  HEART CARDIAC CATH LAB     CV LEFT VENTRICULOGRAM N/A 9/3/2020    Procedure: Left Ventriculogram;  Surgeon: Herb Tran MD;  Location:  HEART CARDIAC CATH LAB     CV PCI STENT DRUG ELUTING N/A 9/3/2020    Procedure: Percutaneous Coronary Intervention Stent Drug Eluting;  Surgeon: Herb Tran MD;  Location:  HEART CARDIAC CATH LAB     CV PCI STENT DRUG ELUTING N/A 9/23/2020    Procedure: Percutaneous Coronary Intervention Stent Drug Eluting;  Surgeon: Ernie Urbina MD;  Location: [facility] CARDIAC CATH LAB     SHOULDER SURGERY       VASECTOMY          Family History:    family history includes Lupus in his mother.    Social History:   reports that he has never smoked. He has never used smokeless tobacco. He reports that he does not drink  alcohol and does not use drugs.  PCP: Kingston Leal     Physical Exam     Patient Vitals for the past 24 hrs:   BP Temp Temp src Pulse Resp SpO2   04/08/22 1900 119/83 -- -- 73 -- 100 %   04/08/22 1856 117/80 97.5  F (36.4  C) Oral 77 18 100 %        Physical Exam  GEN- alert, cooperative  HEENT- mild TTP posterior scalp, PERRL, EOMI, MMM, oral pharynx without abnormalities, no dental injuries, midface stable, TM's clear bilaterally  NECK- ROM, soft, supple, no midline C spine tenderness to palpation, no abrasions  RESP- CTAB, no w/r/r, chest wall nontender, no crepitus, symmetrical chest wall movement  CV- RRR, no m/r/g  ABD- soft, NT/ND, +BS,  MSK- normal ROM in all extremities, pelvis stable to AP and lateral compression, no T and L spinal tenderness in the midline, 5/5 strength in all extremities  NEURO- GCS 15, speech normal, alert  SKIN- no rash, no bruising, no ecchymosis, no abrasion, no cyanosis or edema  PSYCH- normal mood, normal behavior      Emergency Department Course       Imaging:  CT Head w/o Contrast   Final Result   IMPRESSION:   1.  No evidence of acute intracranial hemorrhage, mass, or herniation.   2.  Region of encephalomalacia in the inferior right temporal lobe is unchanged since at least 02/08/2022. This may be due to previous infarct.   3.  Mild diffuse parenchymal volume loss and white matter changes most likely due to chronic microvascular ischemic disease.         Report per radiology      Emergency Department Course:     Reviewed:  I reviewed nursing notes, vitals, past medical history and Care Everywhere    Assessments:  1905 I obtained history and examined the patient as noted above.   2121 I rechecked the patient and explained findings    Consults:   1910 , patient NH physician      Disposition:  The patient was discharged to home.       Medical Decision Making:  Patient presents today after a fall at nursing home.  Patient is unable to give significant history.  I did  speak with the nursing home physician.  Patient CT was not showing signs of acute bleed or injury.  Patient is stable for discharge back to nursing home.    Diagnosis:    ICD-10-CM    1. Fall, initial encounter  W19.XXXA    2. Contusion of scalp, initial encounter  S00.03XA           4/8/2022   Austin Liu MD Cheng, Wenlan, MD  04/08/22 2131

## 2022-05-04 NOTE — ED NOTES
Bed: ED05  Expected date: 5/4/22  Expected time: 1:13 PM  Means of arrival: Ambulance  Comments:  516- agitation.

## 2022-05-04 NOTE — DISCHARGE INSTRUCTIONS
Return for assessment of injuries or acute concerns.    Follow up with outpatient providers regarding ongoing medication adjustments.

## 2022-05-04 NOTE — ED PROVIDER NOTES
History   Chief Complaint:  Aggressive Behavior       The history is provided by the patient and the EMS personnel.      Matt Way is a 71 year old male with history of dementia, type II diabetes mellitus, pulmonary embolism, NSTEMI, GERD, hyperlipidemia who presents with aggressive behavior and agitation. His nursing facility, [facility], called EMS due to the patient refusing Seroquel after threatening and chasing other residents. Took a fire extinguisher off the wall.  He took his Seroquel 3 minutes after EMS arrival. Per nursing, EMS tried to discuss waiting for seroquel to take effect.  His care facility states he cannot return. The patient reports that sometimes he is not hungry and does not want to eat, and ultimately will not get his medications since they should be taken with food. He notes of occasional abdominal pain diarrhea, of unknown length, which is not unusual for him. Denies headache, vomiting, blood in stool or fever.  Denies any acute physical symptoms or concerns.    Review of Systems   Constitutional: Negative for fever.   Gastrointestinal: Negative for blood in stool and vomiting.   Neurological: Negative for headaches.   Psychiatric/Behavioral: Positive for agitation.   All other systems reviewed and are negative.  Other review of systems was completed accuracy may be limited secondary to patient's dementia    Allergies:  Brilinta [Ticagrelor]  Duloxetine  Gabapentin  Lisinopril    Medications:  Aspirin 81  Lipitor  Celexa  Plavix  Novolog pen  Levemir pen  Imdur  Imodium  Glucophage  Toprol  Nitrostat  Protonix  Seroquel  Elexon  Cholecalciferol    Past Medical History:     Coronary artery disease  Diabetes mellitus type II  GERD  Hyperlipidemia  Obesity  FLAQUITA  Pulmonary embolism  NSTEMI  Hyperglycemia  Syncope  Lewy body dementia      Past Surgical History:    Colonoscopy  Coronary artery angiogram with stent  Coronary angiogram x2  Heart catheterization with possible  intervention  Instantaneous wave-free ratio  Left heart cath  Left ventriculogram  PCI stent drug eluting x2  Shoulder surgery  Vasectomy     Family History:    Mother - Lupus, disbetes  Brother - diabetes, heart disease, stroke  Father - colon cancer    Social History:  The patient presents to the ED via EMS from [facility] alone.  He lives at [facility].    Physical Exam     Patient Vitals for the past 24 hrs:   BP Temp Temp src Pulse Resp SpO2   05/04/22 1326 101/68 98.7  F (37.1  C) Oral 82 18 96 %       Physical Exam  Eyes:               Sclera white; Pupils are equal and round  ENT:                External ears and nares normal  CV:                  Rate as above with regular rhythm   Resp:               Breath sounds clear and equal bilaterally  GI:                   Abdomen is soft, non-tender, non-distended  MS:                  Moves all extremities  Skin:                Warm and dry  Neuro:             Speech is clearly understandable.  Known lewy body dementia.    Emergency Department Course     Laboratory:  Labs Ordered and Resulted from Time of ED Arrival to Time of ED Departure   CBC WITH PLATELETS AND DIFFERENTIAL       Result Value    WBC Count 6.1      RBC Count 4.91      Hemoglobin 13.8      Hematocrit 42.9      MCV 87      MCH 28.1      MCHC 32.2      RDW 13.3      Platelet Count 190      % Neutrophils 58      % Lymphocytes 33      % Monocytes 6      % Eosinophils 2      % Basophils 1      % Immature Granulocytes 0      NRBCs per 100 WBC 0      Absolute Neutrophils 3.5      Absolute Lymphocytes 2.0      Absolute Monocytes 0.4      Absolute Eosinophils 0.1      Absolute Basophils 0.1      Absolute Immature Granulocytes 0.0      Absolute NRBCs 0.0     BASIC METABOLIC PANEL        Procedures  None    Emergency Department Course:       Reviewed:  I reviewed nursing notes, vitals, past medical history and Care Everywhere    Assessments:  1325 I obtained history and examined the patient as noted  above.    Initially refused blood work.  Nursing called patient's daughter who spoke with the patient and he was agreeable to blood work.   I rechecked the patient and explained findings.     Consults:  1400 I spoke to the patient's daughter, Danielle.    Interventions:  1426 Zyprexa 5 mg PO    Disposition:  The patient was discharged to home.     Impression & Plan     CMS Diagnoses: None    Medical Decision Making:  Basic blood work check to ensure that there is no evidence of sepsis, anemia, severe electrolyte abnormalities or uremia contributing to the patient's presentation.  These were not found.  On chart review he has a note from the nursing facility evaluation on April 26 that referred to the possibility of adding Zyprexa if needed since it has been used in the hospital setting before.  This was given in the emergency department.  I discussed this with his daughter who is a physician.  She states some medicines have been discontinued due to low blood pressures, dizziness, and falls.  She supports medication adjustment and return to the nursing facility.  He is on hospice status and gets delirium in the hospital and she would like to avoid hospitalization whenever possible.  On chart review he had both 2.5 and 5 mg doses of Zyprexa Zyprexa.  In order to limit the possibility of side effects 2.5 mg was prescribed.  Close follow-up with regular providers.    Diagnosis:    ICD-10-CM    1. Aggressive behavior due to dementia (H)  F03.91    2. Refusal of medication  Z53.20        Discharge Medications:  New Prescriptions    OLANZAPINE (ZYPREXA) 2.5 MG TABLET    Take 1 tablet (2.5 mg) by mouth 2 times daily as needed (increased agitation despite seroquel or as alternative if refusing seroquel)       Scribe Disclosure:  I, Bertha Iraheta, am serving as a scribe at 1:28 PM on 5/4/2022 to document services personally performed by Shefali Maria MD based on my observations and the provider's statements to me.               Shefali Maria MD  05/04/22 2021

## 2022-05-04 NOTE — ED TRIAGE NOTES
[facility] sent pt in because he was refusing his Seroquel PRN after threatening and chancing other resident. EMS reports that he recently took a fire extinguisher off the wall and was changing a resident with it. Care facility states he can't come back     Triage Assessment     Row Name 05/04/22 1318       Triage Assessment (Adult)    Airway WDL WDL       Respiratory WDL    Respiratory WDL WDL       Skin Circulation/Temperature WDL    Skin Circulation/Temperature WDL WDL       Cardiac WDL    Cardiac WDL WDL       Peripheral/Neurovascular WDL    Peripheral Neurovascular WDL WDL       Cognitive/Neuro/Behavioral WDL    Cognitive/Neuro/Behavioral WDL X    Level of Consciousness confused;alert    Arousal Level opens eyes spontaneously    Speech clear;spontaneous    Mood/Behavior agitated
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
## 2022-05-05 NOTE — ED NOTES
Report called to [facility] in [place] ([phone]). Spoke with Clau GRACIA who is ready to receive this patient back. Transport called. Courtesy meal provided.

## 2022-05-05 NOTE — ED NOTES
Bed: ED04  Expected date:   Expected time:   Means of arrival:   Comments:  513  64 M copd/chest pain  1615

## 2022-05-05 NOTE — ED NOTES
Bed: ED03  Expected date:   Expected time:   Means of arrival:   Comments:  511 71 M dementia/mh eval  2419
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
## 2022-05-05 NOTE — ED NOTES
Pt's care facility was aware patient was coming home by ambulance, spoke with nurse Li. Staff wanted an update when patient was on his way home by ambulance. There was no answer when attempting to reach staff at [facility] to alert them that patient was on his way back by ambulance. Patient transported home by ambulance with zyprexa prescription and discharge paperwork

## 2022-05-05 NOTE — DISCHARGE INSTRUCTIONS
Discharge Instructions  Mental Health Concerns    You were seen today for mental health concerns, such as depression, anxiety, or suicidal thinking. Your provider feels that you do not require hospitalization at this time. However, your symptoms may become worse, and you may need to return to the Emergency Department. Most treatments of depression and suicidal thoughts are a process rather than a single intervention.  Medications and counseling can take several weeks or more to help.    Generally, every Emergency Department visit should have a follow-up clinic visit with either a primary or a specialty clinic/provider. Please follow-up as instructed by your emergency provider today.    By accepting these discharge instructions:  You promise to not harm yourself or others.  You agree that if you feel you are becoming unable to keep that promise, you will do something to help yourself before you do anything to harm yourself or others.   You agree to keep any safety plan arranged on your visit here today.  You agree to take any medication prescribed or recommended by your provider.  If you are getting worse, you can contact a friend or a family member, contact your counselor or family provider, contact a crisis line, or other options discussed with the provider or therapist today.  At any time, you can call 911 and return to the Emergency Department for more help.  You understand that follow-up is essential to your treatment, and you will make and keep appointments recommended on your visit today.    How to improve your mental health and prevent suicide:  Involve others by letting family, friends, counselors know.  Do not isolate yourself.  Avoid alcohol or drugs. Remove weapons, poisons from your home.  Try to stick to routines for eating, sleeping and getting regular exercise.    Try to get into sunlight. Bright natural light not only treats seasonal affective disorder but also depression.  Increase safe activities  that you enjoy.    If you feel worse, contact 1-800-suicide (1-602.576.5540), or call 911, or your primary provider/counselor for additional assistance.    If you were given a prescription for medicine here today, be sure to read all of the information (including the package insert) that comes with your prescription.  This will include important information about the medicine, its side effects, and any warnings that you need to know about.  The pharmacist who fills the prescription can provide more information and answer questions you may have about the medicine.  If you have questions or concerns that the pharmacist cannot address, please call or return to the Emergency Department.   Remember that you can always come back to the Emergency Department if you are not able to see your regular provider in the amount of time listed above, if you get any new symptoms, or if there is anything that worries you.

## 2022-05-12 NOTE — ED NOTES
Soft-restraints removed. Pt placed into brief and bedding changed. Provided with glass of water. BG checked.

## 2022-05-12 NOTE — ED NOTES
Patient becoming more agitated, walking into nursing area. Patient assisted back to his room with staff and given IM zyprexa.

## 2022-05-12 NOTE — ED NOTES
Bed: ED18  Expected date:   Expected time:   Means of arrival:   Comments:  Meena - 514 - 71 M aggressive behavior eta 0888
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
## 2022-05-12 NOTE — ED NOTES
Patient went to sit on to the side of the bed, when he missed it by a few inches and landed on his bottom, on the way down patient struck his head on the cart in his room. Patient was assessed by the provider. Patient became agitated and swinging at staff when they tried to assist him back to bed. Patient placed into soft restraints.
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
## 2022-05-12 NOTE — ED NOTES
Patient becoming increasingly agitated. Attempted to give seroquel and patient refusing, rambling, and hallucinating. When writer entered room patient had removed his IV.

## 2022-05-12 NOTE — ED NOTES
Patient continues to pull against restraints when offered to toilet or water, patient refuses and yells at staff to leave.

## 2022-05-12 NOTE — ED NOTES
Assumed care of patient. Patient continues to be agitated in soft restraints, fighting against restraints. Paged hospitalist.
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
## 2022-05-12 NOTE — PHARMACY-ADMISSION MEDICATION HISTORY
Pharmacy Medication History  Admission medication history interview status for the 5/12/2022  admission is complete. See EPIC admission navigator for prior to admission medications     Medication history sources: MAR ([place])    Medication reconciliation completed by provider prior to medication history? No    Time spent in this activity: 40 minutes     Prior to Admission medications    Medication Sig Last Dose Taking? Auth Provider   acetaminophen (TYLENOL) 325 MG tablet Take 650 mg by mouth every 4 hours as needed for mild pain   Yes Unknown, Entered By History   acetaminophen (TYLENOL) 650 MG suppository Place 650 mg rectally every 4 hours as needed for fever  Yes Unknown, Entered By History   aspirin (ASA) 81 MG EC tablet Take 1 tablet (81 mg) by mouth daily 5/10/2022 at 0900 Yes Wong Phan MD   atorvastatin (LIPITOR) 80 MG tablet Take 1 tablet (80 mg) by mouth daily 5/9/2022 at 2130 Yes Wilber Strickland MD   atropine 1 % SOLN Place 2 drops under the tongue every 2 hours as needed for secretions  Yes Unknown, Entered By History   bisacodyl (DULCOLAX) 10 MG suppository Place 10 mg rectally daily as needed for constipation  Yes Unknown, Entered By History   citalopram (CELEXA) 20 MG tablet Take 20 mg by mouth daily 5/10/2022 at 0900 Yes Unknown, Entered By History   fexofenadine (ALLEGRA) 180 MG tablet Take 180 mg by mouth daily 5/10/2022 at 0900 Yes Unknown, Entered By History   fluticasone (FLONASE) 50 MCG/ACT nasal spray Spray 1 spray into both nostrils At Bedtime  5/9/2022 at 2130 Yes Unknown, Entered By History   HYDROmorphone (DILAUDID) 0.5 MG solutab Place 0.5 mg under the tongue every 2 hours as needed for pain or shortness of breath / dyspnea  Yes Unknown, Entered By History   insulin aspart (NOVOLOG FLEXPEN) 100 UNIT/ML pen Inject 7 Units Subcutaneous 3 times daily (with meals) Breakfast and Dinner 5/9/2022 Yes Malcolm Landaverde MD   loperamide (IMODIUM) 2 MG capsule Take  2 mg by mouth 3 times daily as needed for diarrhea  Yes Unknown, Entered By History   Melatonin 10 MG TABS tablet Take 10 mg by mouth At Bedtime  5/9/2022 at 2130 Yes Unknown, Entered By History   metFORMIN (GLUCOPHAGE) 1000 MG tablet Take 1,000 mg by mouth 2 times daily (with meals) 5/9/2022 at 1745 Yes Unknown, Entered By History   metoprolol succinate ER (TOPROL-XL) 25 MG 24 hr tablet Take 1 tablet (25 mg) by mouth At Bedtime ; Hold for SBP<100 or HR<55.  Patient taking differently: Take 12.5 mg by mouth At Bedtime ; Hold for SBP<100 or HR<55. 5/8/2022 at 2045 Yes Abimael Padilla MD   nitroGLYcerin (NITROSTAT) 0.4 MG sublingual tablet Place 1 tablet (0.4 mg) under the tongue every 5 minutes as needed for chest pain If symptoms persist 5 minutes after 2nd dose call 911.  Yes Chante Givens PA-C   OLANZapine (ZYPREXA) 2.5 MG tablet Take 2.5 mg by mouth every morning 5/10/2022 at 0900 Yes Unknown, Entered By History   OLANZapine (ZYPREXA) 2.5 MG tablet Take 5 mg by mouth At Bedtime 5/9/2022 at 2130 Yes Unknown, Entered By History   OLANZapine (ZYPREXA) 2.5 MG tablet Take 1 tablet (2.5 mg) by mouth 2 times daily as needed (increased agitation despite seroquel or as alternative if refusing seroquel)  Patient taking differently: Take 5 mg by mouth 2 times daily as needed (increased agitation despite seroquel or as alternative if refusing seroquel)  Yes Shefali Maria MD   pantoprazole (PROTONIX) 20 MG EC tablet Take 20 mg by mouth daily 5/10/2022 at 0900 Yes Unknown, Entered By History   polyethylene glycol (MIRALAX) 17 g packet Take 17 g by mouth daily 5/10/2022 at 0900 Yes Unknown, Entered By History   QUEtiapine (SEROQUEL) 25 MG tablet Take 25 mg by mouth daily 5/10/2022 at 0900 Yes Unknown, Entered By History   QUEtiapine (SEROQUEL) 25 MG tablet Take 37.5 mg by mouth every evening 5/9/2022 at 2130 Yes Unknown, Entered By History   QUEtiapine (SEROQUEL) 25 MG tablet Take 12.5 mg by mouth every 6  hours as needed  Yes Unknown, Entered By History   QUEtiapine (SEROQUEL) 25 MG tablet Take 0.5 tablets (12.5 mg) by mouth every 6 hours as needed (agitation)  Yes Drew Everett, DO   rivastigmine (EXELON) 4.6 MG/24HR 24 hr patch Place 1 patch onto the skin daily 5/10/2022 at 2100 Yes Drew Everett, DO   sennosides (SENOKOT) 8.6 MG tablet Take 2 tablets by mouth daily as needed for constipation  Yes Wong Phan MD   Vitamin D (Cholecalciferol) 25 MCG (1000 UT) TABS Take 1 tablet by mouth daily  5/10/2022 at 0900 Yes Unknown, Entered By History       The information provided in this note is only as accurate as the sources available at the time of update(s)     Petra Galindo, LaurenD, BCCCP

## 2022-05-12 NOTE — ED NOTES
Message sent to Dr. Stern;   patient was given two doses of 5mg IV zyprexa @ 1200 and 1300. 1mg Ativan and 2mg Haldol at 2pm. patient is still agitated pulling at soft restraints. Can we get anything else?    Response @ 1550  Thats a lot already. I wish psychiatry sees him soon.    Writer spoke to Dr. Stern who states continue to monitor and see how patient does.

## 2022-05-12 NOTE — ED NOTES
Pt given pair of slipper socks, disorganized thinking.  Ez LINARES PCS in room assisting to redirect until [facility] arrive.  Edson Rutledge supervisor notified to fix door STAT

## 2022-05-12 NOTE — ED NOTES
Pt becoming verbally aggressive with staff while he was walking into staff area.  Hands on by security and EDT to redirect into his room.  Attempted to place pt in seclusion however door has failed to secure.  Writer left message on main number of [facility].
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
## 2022-05-13 NOTE — PROGRESS NOTES
[facility]    House YO Note  5/12/2022   Time Called: 2340    House YO called for: Fall    Assessment & Plan     Witnessed, assisted fall to floor without obvious injury.  - Upon arrival, pt lying in bed, sleeping, in no apparent distress.  Of note, did not wake pt as pt has been frequently agitated, restless.  Nursing notes pt has been getting out of bed frequently since arrival from ED.  Nursing notes pt had quickly gotten out of bed (bedside assistant at pt's bedside), had removed his gown, pulled off his telemetry, had on his nonskid socks and began to head to the bathroom.  Nursing notes once pt arrived to the bathroom, it appears pt's BLE began to give out and nursing was able to lower pt to the floor near the toilet.  Nursing notes pt did not strike ground hard, no acute skin abnormality noted by nursing, and nursing confirmed pt did not strike head nor lose consciousness.      INTERVENTIONS:  - Maintain fall precautions at all times  - Bedside attendant remains at pt's bedside    Discussed with and defer further cares to nursing and hospitalist     Interval History     Matt Way is a 71 year old male who was admitted on 5/12/2022 for behavioral dyscontrol.    Medical history significant for: Lewy body dementia, HTN, HFrEF, CAD, DMII, GERD, HLP, obesity, FLAQUITA, PE    Code Status: No CPR- Pre-arrest intubation OK    Allergies   Allergies   Allergen Reactions     Brilinta [Ticagrelor]      Duloxetine      Other reaction(s): Confusion  Per daughter who is MD     Gabapentin      Other reaction(s): Edema     Lisinopril Rash       Physical Exam   Vital Signs with Ranges:  Temp:  [98.1  F (36.7  C)-98.5  F (36.9  C)] 98.5  F (36.9  C)  Pulse:  [] 97  Resp:  [16-18] 16  BP: (113-158)/(63-99) 158/94  SpO2:  [94 %-98 %] 95 %  No intake/output data recorded.    Constitutional: Pt lying in bed, sleeping, in on apparent distress  HEENT: No obvious head trauma noted   Neck:  Supple  Pulmonary: In no apparent respiratory distress  Cardiovascular: Appears well perfused  GI: Round  Skin/Integumen: Pink  Neuro: Sleeping  Psych:  Currently calm  Extremities: No obvious bony abnormality noted      Time Spent on this Encounter   I spent 5 minutes on the unit/floor managing the care of Matt Way. Over 50% of my time was spent counseling the patient and/or coordinating care regarding services listed in this note.    PHILLIP [facility] YO

## 2022-05-13 NOTE — PROGRESS NOTES
A&Ox1. Combatively refused VS, assessment. Received zyprexa IM x1. Fall during report. Overnight uneventful ex for refusal of VS, assessment, BGM. Sitter at bedside. Plan for psych consult today. Dispo geripsych vs previous arrangement.
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
## 2022-05-13 NOTE — ED NOTES
Pt flinging legs over side rails trying to get out of bed - soft restraints applied at this time.

## 2022-05-13 NOTE — PROVIDER NOTIFICATION
Paged lorie np-    fyi-We just had a fall in 260.  was witnessed.   was lowered to floor. did not hit head..       thanks, estiven charge rn

## 2022-05-13 NOTE — ED NOTES
Patient cleaned up from a bowel movement.  Urinal attempted.  He was boosted and repositioned in bed. Remains in soft restraints due to confusion and attempting to get out of bed.  No 1:1 sitter available at this time.

## 2022-05-13 NOTE — PROGRESS NOTES
Oncoming and Offgoing RN called to pt's room as sitter notified RNs that patient's legs gave out and was lowered to the floor in the bathroom. Patient got up and ambulated to bed. Combatively refusing cares, assessment. House YO notified.
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
## 2022-05-14 NOTE — PLAN OF CARE
Goal Outcome Evaluation:    Plan of Care Reviewed With: patient   Patient is AORX1, he refused medications today, was combative and verbally abusive to staff. Restraints re-applied this morning, Dr. Stern notified.   Seen by psych this evening, see note.

## 2022-05-14 NOTE — PLAN OF CARE
Problem: Restraint, Nonbehavioral (Nonviolent)  Goal: Absence of Harm or Injury  Outcome: Ongoing, Progressing  Intervention: Protect Skin and Joint Integrity  Recent Flowsheet Documentation  Taken 5/13/2022 2056 by Marilia Trejo RN  Body Position:   supine   supine, legs elevated   supine, head elevated  Taken 5/13/2022 2000 by Marilia Trejo RN  Body Position: weight shifting     Goal Outcome Evaluation:        Patient has been restless and agitated this shift, refused bedtime meds, given meds few hours late when he was more calm.Remains on 4-point soft restraints, able to move his extremities and perform active ROM, redness noted on skin under and around restrains at the start of my shift, not sure if its old or new, patient has scattered bruising abrasion on his extremities, mapilex was for preventative.
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
## 2022-05-15 NOTE — PLAN OF CARE
A&O to self. VSS. Up with SBA, not OOB overnight. Very poor appetite. LR infusing at 50 ml/hr.  Pt denies pain. Pt awaiting consuelo-psych placement. Continue to monitor.

## 2022-05-15 NOTE — PROGRESS NOTES
Cross cover    Paged by RN with request for IVF as patient is refusing any PO intake.  Note EF of 30-35%.  Will start LR 50 ml/hr with close monitoring of resp status.  Check BMP in AM.

## 2022-05-15 NOTE — PLAN OF CARE
Goal Outcome Evaluation:           Better this evening, he was in bed with restraints on x 4 most of the day except with toileting. Took some oral medications and some nutriton.  This afternoon, patient much better mentally and was asking questions about why here, wants to go home. Explained why he needs to be in the hospital, seemed to understand. IVF infusing, was able to take some oral intake.   ,
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
## 2022-05-16 NOTE — PROGRESS NOTES
Overall, patient is improving. Was calm for most of the night, had one brief episode of irritability and minor agitation. PRN zyprexia 2.5mg given with good outcome. Patient rested most of the night. Restraints remain in place. Schulte in place with adequate urine output. Vital Signs are stable.
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
## 2022-05-16 NOTE — PLAN OF CARE
Goal Outcome Evaluation:    Plan of Care Reviewed With: patient     Pt's mood is labile, currently cooperative/calm but continues pulling at lines, soft wrist restrains in place and removed per policy for skin assessment and also active ROM performed.  Refused caress this am, was very frustruated with staff, but agreeable this afternoon. Sat at edge of bed and ate, skin assessment done, blanchable redness on coccyx, pt refuses to reposition q2h.  Sitter at bedside for safety.  Plan is to transfer to medical floor when bed is available.  Cont to monitor.

## 2022-05-16 NOTE — PLAN OF CARE
Goal Outcome Evaluation:          Overall Patient Progress: improving         This RN was the patient 1:1 sitter from 3P to 7P and primary RN from 7P to 11P  Neuro: Alert to self only, woke up from a nap at 1700 very confused and agitated, this RN and another RN were able to redirect, reorient and calm the patient. The patient apologized to both Rns for his behavior. The patient's behavior remained positive throughout the evening. He was cooperative, pleasant, very talkative, and ranged from calm to slightly anxious/ restless but still pulling at lines and only alert to self.  Resp/02: Stable on RA  GI/Diet: Mod carb diet, appetite improved  : Schulte cath in place for retention, output is adequate   Skin/Incisions/Sites: Redness on bilateral Wrist and Ankles, restraints removed and repositioned/ ROM completed every 1-2 hours   Pulses/CMS: intact  Activity/Falls Risk: able to repo independently,  Ax2 w/ W and GB  Lines/Drains/IVs: PIV L FA, 50 ml/hr LR infusing  Labs/BGM: BS AC/ HS/ 0200, glucose levels in the 300s this shift, sticky note to physician   VS/Pain: VSS, c/o shoulder pain, initially declined pain meds but did take tylenol at bedtime for 5/10 pain  Other: Pt in bilateral soft wrist and ankle restraints at 1500, Pts behavior significantly improved over the last 8 hours, RN removed restraints at 2140  Restraints reapplied 2300

## 2022-05-16 NOTE — CONSULTS
[facility]  Palliative Care Consultation Note    Patient: Matt Way  Date of Admission:  5/12/2022    Requesting Clinician / Team: Dr Stern/Hospitalist  Reason for consult: Goals of care    Recommendations:    Goals of Care (see in depth discussion below):    Hospice was revoked to allow for consuelo-psych eval regarding medication adjustment as Matt has had difficulty with falls and worsening behaviors at his facility. The family understands that Matt likely needs a care facility with a higher level of care that is better able to manage behaviors.     The goal is for Matt to return to hospice focused care once his behaviors are better controlled at a new facility.    Matt may be able to qualify for GIP inpatient hospice due to his complex symptom management/behaviors.     Code status: No CPR; pre-arrest intubation OK; Reviewed with sandra Santos who affirms current code status.     Advanced Care Planning:    Patient has a completed Health Care Directive: Reportedly yes, but not available to us currently. Sandra Santos will have a copy of HCD brought to hospital by her or siblings.    Danielle Winn is reportedly Matt's primary HCA.  LBD with agitation.    -Appreciate psychology expertise and medication recommendations.   -Agree with sitter precautions for unsafe behavior.  -Agree with restraint for unsafe behavior; discontinue as soon as safe to do so.     Pain, chronic, related to neuropathy and general back pain (according to historic review from sandra Santos.) Has had back pain for many years treated with plain Tylenol- has not used opioids in past for chronic pain. Had tried gabapentin and Lyrica for his neuropathy but side effects of altered mental status- so did not continue use.   -Recommend Tylenol 650 mg TID and as needed for general pain relief/comfort.     Patient case was reviewed with patient's RN,     Olena Kelly, [facility]  Hospital  Contact information available via [facility] Paging/Directory        Thank you for the opportunity to participate in the care of this patient and family. Our team: will continue to follow.     During regular M-F work hours ([phone]) -- if you are not sure who specifically to contact -- please contact us on [facility] Smart Web.     After regular work hours and on weekends/holidays, you can call our answering service at [phone].     Attestation:  Total time on the floor involved in the patient's care: 60 minutes  Total time spent in counseling/care coordination: >50% discussing patient condition, assessment of symptoms, speaking with daughter on phone, reviewing current status, discussing case with MD and RN,     Assessments:  Matt Way is a 71 year old male with PMH significant for LBD, HTN, CAD s/p LAD and RCA stents, CHF, NSTEMI with angiogram 1/27/22 showing 99% ISR the pLAD (with 2 layers of previous stenting) that was medically managed. Reportedly patient was on hospice qualifying through his cardiac diagnosis but behaviors eventually worsened to the point where staff did not feel it was a safe for him to remain at the facility. He was sent to ED for evaluation and was admitted on 5/12/22. He has required antipsychotic medications and restraints; currently psych services are following and ad justing medications.     Today, the patient was seen for:   Goals of care    Spoke with sandra Santos via phone. Introduced myself and our services; assistance in pain and symptom management, emotional and spiritual support, and complex medical decision making. Reviewed past medical history and her understanding of the current medical situation; Danielle is a GI specialist and has a good understanding of the medical situation... Danielle states that Matt did have a 1 year stay at a [facility] hospital in the 1970s for depression as he had a very difficult family life as well as some sleepwalking issues.  Matt was  diagnosed officially with LBD in 2018. He was previously the caregiver for his wife who is very ill up until last year, when he went to live in a memory care facility.  Matt's wife has now been in the hospital herself for the last few months.  At baseline Matt walks with a cane. He started  Hospice earlier in the year at his memory care but has had increasing falls and behaviors since starting Zyprexa, according to Danielle. Hospice was revoked in order to allow Matt to be admitted for medication adjustment and possible admission to a new care facility as his current facility does not feel it is safe for him there.     The goal is for Matt to have his medications adjusted in order to decrease behaviors and discharge with hospice support when a new facility is identified.  We discussed CODE STATUS and current no CPR and prearrest intubation okay.  Discussed consideration of DO NOT INTUBATE but Danielle would like to maintain current CODE STATUS as is for now.  We discussed that it is possible for Matt to be on comfort care here at the hospital as his medications are adjusted as blood sugar checks may further agitate him.  Danielle prefers to keep current restorative care focus at this time with the plan for hospice at discharge.     Prognosis, Goals, & Planning:        Prognosis, Goals, and/or Advance Care Planning were addressed today: Yes      Functional Status just prior to hospitalization: 3 (Capable of only limited self-care; needs help with ADLs; in bed/chair >50% of waking hours)          Patient has decision-making capacity today for complex decisions: No      Patient's decision making preferences: unable to assess          I have concerns about the patient/family's health literacy today: No           Coping, Meaning, & Spirituality:   Mood, coping, and/or meaning in the context of serious illness were addressed today: No  Summary/Comments: Danielle explains that Matt has always found it difficult to be around  large groups of people-as this makes him very anxious.  This is true even among his own family.    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 Reportedly patient was on hospice qualifying through his cardiac diagnosis but behaviors eventually worsened to the point where staff did not feel it was a safe for him to remain at the facility. He was sent to ED for evaluation and was admitted on 5/12/22. He has required antipsychotic medications and restraints; currently psych services are following and ad justing medications.     ROS: Key Palliative Symptom Data:  Comprehensive ROS is unobtainable due to patient unable to cooperate.     Past Medical History:  Past Medical History:   Diagnosis Date     Coronary artery disease 06/2018    s/p CECILY to LAD      Diabetes mellitus      GERD (gastroesophageal reflux disease)      Hyperlipidemia      Obesity      FLAQUITA (obstructive sleep apnea)      Pulmonary embolism 11/15/2018        Past Surgical History:  Past Surgical History:   Procedure Laterality Date     COLONOSCOPY       Coronary artery angiogram with stent (CECILY) to LAD  06/2018     CV CORONARY ANGIOGRAM N/A 9/3/2020    Procedure: Coronary Angiogram;  Surgeon: Herb Tran MD;  Location: [facility] CARDIAC CATH LAB     CV CORONARY ANGIOGRAM N/A 9/23/2020    Procedure: Coronary Angiogram;  Surgeon: Ernie Urbina MD;  Location: [facility] CARDIAC CATH LAB     CV HEART CATHETERIZATION WITH POSSIBLE  INTERVENTION N/A 1/27/2022    Procedure: Heart Catheterization with Possible Intervention;  Surgeon: Ronnie Mobley MD;  Location:  HEART CARDIAC CATH LAB     CV INSTANTANEOUS WAVE-FREE RATIO N/A 9/3/2020    Procedure: Instantaneous Wave-Free Ratio;  Surgeon: Herb Tran MD;  Location:  HEART CARDIAC CATH LAB     CV LEFT HEART CATH N/A 9/3/2020    Procedure: Left Heart Cath;  Surgeon: Herb Tran MD;  Location:  HEART CARDIAC CATH LAB     CV LEFT VENTRICULOGRAM N/A 9/3/2020    Procedure: Left Ventriculogram;  Surgeon: Herb Tran MD;  Location:  HEART CARDIAC CATH LAB     CV PCI STENT DRUG ELUTING N/A 9/3/2020    Procedure: Percutaneous Coronary Intervention Stent Drug Eluting;  Surgeon: Herb Tran MD;  Location:  HEART CARDIAC CATH LAB     CV PCI STENT DRUG ELUTING N/A 9/23/2020    Procedure: Percutaneous Coronary Intervention Stent Drug Eluting;  Surgeon: Ernie Urbina MD;  Location:  HEART CARDIAC CATH LAB     SHOULDER SURGERY       VASECTOMY           Family History:  Family History   Problem Relation Age of Onset     Lupus Mother          Allergies:  Allergies   Allergen Reactions     Brilinta [Ticagrelor]      Duloxetine      Other reaction(s): Confusion  Per daughter who is MD     Gabapentin      Other reaction(s): Edema     Lisinopril Rash      Medications:  I have reviewed this patient's medication profile and medications from this hospitalization.     Noted scheduled meds are:    aspirin  81 mg Oral Daily     atorvastatin  80 mg Oral Daily     citalopram  20 mg Oral Daily     insulin aspart  1-7 Units Subcutaneous TID AC     insulin aspart  1-5 Units Subcutaneous At Bedtime     insulin glargine  8 Units Subcutaneous At Bedtime     metFORMIN  1,000 mg Oral BID w/meals     metoprolol succinate ER  12.5 mg Oral At Bedtime     OLANZapine  5 mg Oral BID     pantoprazole  20 mg Oral Daily     rivastigmine  1 patch Transdermal Daily      rivastigmine   Transdermal Q8H     valproate (DEPACON) in NS intermittent infusion  250 mg Intravenous Q8H     Noted PRN meds are:  acetaminophen **OR** acetaminophen, glucose **OR** dextrose **OR** glucagon, HYDROmorphone (STANDARD CONC), melatonin, naloxone **OR** naloxone **OR** naloxone **OR** naloxone, nitroGLYcerin, OLANZapine, OLANZapine, ondansetron **OR** ondansetron, senna-docusate **OR** senna-docusate    Physical Exam:  Vital Signs: Temp: 98.3  F (36.8  C) Temp src: Oral BP: (!) 152/77 Pulse: 62   Resp: 17 SpO2: 98 % O2 Device: None (Room air)    Weight: 171 lbs 8 oz    Physical Exam  GENERAL:  Alert, fatigued, mild distress, sitting up at edge of bed, guarded.  HEAD: Normocephalic atraumatic  SCLERA: Anicteric  EXTREMITIES: Warm; no edema  RESPIRATORY: Breathing relaxed and nonlabored.  ABDOMEN: No distention  NEUROLOGIC: Alert.  PSYCH: Calm, uncooperative, guarded.    Data reviewed:  Recent imaging reviewed.  No results found for this visit on 05/12/22.     Lab Results   Component Value Date    WBC 6.8 05/12/2022    WBC 6.1 05/04/2022    WBC 6.9 02/08/2022    HGB 13.9 05/12/2022    HGB 13.8 05/04/2022    HGB 14.0 02/08/2022    HCT 42.2 05/12/2022    HCT 42.9 05/04/2022    HCT 43.3 02/08/2022     05/12/2022     05/04/2022     02/08/2022     05/15/2022     05/12/2022     05/04/2022    POTASSIUM 3.8 05/15/2022    POTASSIUM 4.3 05/12/2022    POTASSIUM 4.3 05/04/2022    CHLORIDE 107 05/15/2022    CHLORIDE 106 05/12/2022    CHLORIDE 105 05/04/2022    CO2 23 05/15/2022    CO2 24 05/12/2022    CO2 24 05/04/2022    BUN 23 05/15/2022    BUN 36 (H) 05/12/2022    BUN 26 05/04/2022    CR 0.80 05/15/2022    CR 1.11 05/12/2022    CR 1.07 05/04/2022     (H) 05/16/2022     (H) 05/15/2022     (H) 05/15/2022    DD 1.28 (H) 02/04/2022    DD 1.07 (H) 01/24/2022    DD 1.17 (H) 01/18/2022    NTBNPI 84 11/15/2018    TROPONIN 0.00 11/15/2018    TROPONIN 0.02  06/07/2018    TROPI <0.015 06/28/2021    TROPI <0.015 02/06/2021    TROPI <0.015 02/06/2021    AST 23 05/12/2022    AST 15 02/05/2022    AST 15 02/03/2022    ALT 48 05/12/2022    ALT 31 02/05/2022    ALT 35 02/03/2022    ALKPHOS 128 05/12/2022    ALKPHOS 104 02/05/2022    ALKPHOS 98 02/03/2022    BILITOTAL 1.1 05/12/2022    BILITOTAL 0.6 02/05/2022    BILITOTAL 0.5 02/03/2022    INR 1.06 09/23/2020      Lab Results   Component Value Date    ALBUMIN 3.6 05/12/2022    ALBUMIN 3.7 06/28/2021

## 2022-05-17 NOTE — PROVIDER NOTIFICATION
Brief update:    Paged with request for soft restraints as patient is aggressive with sitter and pulling at Schulte catheter resulting in discomfort.      Had required restraints previously, though these were discontinued at midnight as patient was more calm and order is only active for 1 calendar day.    Bilateral wrist restraints ordered.    Patient will receive IM Zyprexa in an attempt to help with agitation and potential removal of restraints again.  Is being followed by palliative care and potentially could qualify for [facility] hospice per their note  Psychiatry also following for medication adjustments    Daniel May MD  1:23 AM
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
## 2022-05-17 NOTE — CONSULTS
[facility] Psychiatric Consult Progress Note    Interval History:   Pt seen, chart reviewed, case discussed with nursing staff.     Patient Lewy body dementia who presents with worsening behavioral problems. Psychiatry has seen and olanzapine was increased to 5 mg BID and Depakote started and titrated up to 500 mg BID (last dose increase this morning). Patient is awaiting transfer to medical bed once bed available. He has been intermittently agitated and pulling at lines. He has a hirsch and has pulled at that. He has been in and out of soft wrist restraints. Last night per nursing another difficult one where he was intermittently agitated and not sleeping well. Required restraints at times. He is out of restraints this morning. Still described as confused. He was sleeping deeply when writer tried to interview him. He was too sedated to obtain history. He was breathing steadily and pulse was regular and normal rate. Given he had not slept well he was allowed to continue to rest.     Notably palliative care has been following and hospice has been discussed. It was revoked for possible mental health admission to stabilize behavior. Plan would be to go back to hospice level of care.          Review of systems:   10 point Review of Systems unable to be completed due to confusion/sedation      Medications:       acetaminophen  650 mg Oral TID     aspirin  81 mg Oral Daily     atorvastatin  80 mg Oral Daily     citalopram  20 mg Oral Daily     insulin aspart  1-7 Units Subcutaneous TID AC     insulin aspart  1-5 Units Subcutaneous At Bedtime     insulin glargine  12 Units Subcutaneous At Bedtime     metFORMIN  1,000 mg Oral BID w/meals     metoprolol succinate ER  12.5 mg Oral At Bedtime     OLANZapine  5 mg Oral BID     pantoprazole  20 mg Oral Daily     rivastigmine  1 patch Transdermal Daily     rivastigmine   Transdermal Q8H     valproate (DEPACON) in NS intermittent infusion  500 mg Intravenous Q12H      acetaminophen **OR** acetaminophen, glucose **OR** dextrose **OR** glucagon, HYDROmorphone (STANDARD CONC), melatonin, naloxone **OR** naloxone **OR** naloxone **OR** naloxone, nitroGLYcerin, OLANZapine, OLANZapine, ondansetron **OR** ondansetron, senna-docusate **OR** senna-docusate    Mental Status Examination:     Appearance:  Dressed in hospital gown lying in bed sleeping very deeply  Eye Contact:   None, sleeping  Speech: unable to evaluated due to sedation   Language:Not able to evaluate  Psychomotor Behavior:  No abnormal movements   Mood:  :Not able to evaluate  Affect:   :Not able to evaluate  Thought Process: :Not able to evaluate  Thought Content: :Not able to evaluateOriented to: :Not able to evaluate  Attention Span and Concentration: :Not able to evaluate  Recent and Remote Memory:  poor based on history   Fund of Knowledge: :Not able to evaluate  Muscle Strength and Tone: Appears to be relaxed and at ease while sleeping, not evaluated as patient is being allowed to rest after difficult night  Gait and Station: :Not able to evaluate  Insight: :Not able to evaluate-per review poor  Judgment:  :Not able to evaluate--per review poor        Labs/Vitals:     Recent Results (from the past 24 hour(s))   Glucose by meter    Collection Time: 05/16/22 12:51 PM   Result Value Ref Range    GLUCOSE BY METER POCT 232 (H) 70 - 99 mg/dL   Glucose by meter    Collection Time: 05/16/22  5:49 PM   Result Value Ref Range    GLUCOSE BY METER POCT 341 (H) 70 - 99 mg/dL   Glucose by meter    Collection Time: 05/16/22  9:59 PM   Result Value Ref Range    GLUCOSE BY METER POCT 233 (H) 70 - 99 mg/dL   Glucose by meter    Collection Time: 05/17/22  1:31 AM   Result Value Ref Range    GLUCOSE BY METER POCT 250 (H) 70 - 99 mg/dL   Glucose by meter    Collection Time: 05/17/22  9:29 AM   Result Value Ref Range    GLUCOSE BY METER POCT 270 (H) 70 - 99 mg/dL     B/P: 153/96, T: 98.3, P: 72, R: 19    Impression:    71 year old  gentleman with Lewy  Body Dementia and behavioral disturbance as well as prior LAD and RCA stents, NSTEMI, angiogram on 1/27/22 showing 99% ISR the pLAD being medically managed. Admitted with worsening behavioral difficulties that facility was unable to manage any longer.       DIagnoses:   1.Lewy Body dementia with behavioral disturbance   2. Multiple medical co morbidities          Plan:   1. Continue Depakote 500 mg BID (just increased this AM). Check Depakote level (trough) before evening dosing of medications. May consider switch to Depakote sprinkles when able. This is often mixed in food if necessary.   2. Continue Olanzapine 5 mg BID and Citalopram 20 mg daily (consider lowering to 10 mg if agitation persists with recent med optimization given SSRIs can sometimes be over-activating) ; consider next step Mirtazapine 7.5 mg each evening but for now wait and see how he responds to recent Depakote and olanzapine increases.   Olanzapine available PRN   3. He has Rivastigmine patch  4. Ensure bowel and bladder function are optimized, keep room well lit during day and try to promote sleep overnight. Ambulation as clinically permissible is effective for reducing delirium. Consider low dose 1 mg melatonin each evening at 8 PM.     Attestation:  Patient has been seen and evaluated by me,  Satnam Sullivan MD

## 2022-05-17 NOTE — PROVIDER NOTIFICATION
"Hospitalist Addendum    RN paged asking if patient should remain on suicide precautions. Admission H & P lists \"suicidal ideation\" on the problem list and admitting MD ordered suicide precautions on 5/12, order still remains.    When I visited Matt this morning he was very agitated, making paranoid statements (\"I know what you're up to\") and did not provide any meaningful history.  He did not make any statements about wanting to harm himself, but he has Lewy body dementia and his demeanor has fluctuated during the course of most hospital days, calm at times and resistive at other times. He has also been evaluated by Psychiatry today.     I will not discontinue suicide precautions started on admission but await psychiatry follow up.    Remedios Hudson MD  Hospitalist  [facility]  Text Page (7am - 6pm, M-F)    "

## 2022-05-17 NOTE — PLAN OF CARE
"Pt cooperative and pleasant at beginning of shift, was calm and sleeping so at 0000 today wrist restraints were removed. Pt woke up agitated about 0100 and began aggressively pulling at hirsch catheter, causing him pain. The nursing assistant in the room attempted to stop the pt from pulling at the hirsch. The pt is confused and is blaming the assistant for his pain--\"You hurt me!\" PRN zyprexa given, order for wrist restraints renewed by Dr. May. Nursing will continue to monitor and reevaulate need for restraints as ordered.  "

## 2022-05-17 NOTE — PLAN OF CARE
Overall restless at night, with 1 episode of agitation, see previous note. PRN zyprexa given with mild improvement. No longer pulling at lines but remained restless although was redirectable and cooperative. Restraints removed, sitter at bedside. Schulte in place with good output. Vitally stable. Awaiting transfer to medical bed when available.

## 2022-05-17 NOTE — PROGRESS NOTES
"Pt confused, agitated, hallucinations (\"baby in room\") and uncooperative with staff.   Redirection not very effective. Sitter at bedside for safety.  Pt refused all scheduled meds this AM, refused breakfast tray.  Slept on/off most of morning, then became more awake and hostile again late morning. Required IM zyprexa (with security standing by) and four-point soft limb restraints.  Is now quiet, appears sleeping but does respond (mumbling) to verbal stimuli.    "

## 2022-05-17 NOTE — PROGRESS NOTES
[facility]  Palliative Care Daily Progress Note       Recommendations & Counseling       Goals of Care (see in depth discussion below):    Hospice was revoked to allow for consuelo-psych eval regarding medication adjustment as Matt has had difficulty with falls and agitated-aggressive behaviors at his facility. The family understands that Matt likely needs a care facility with a higher level of care that is better able to manage behaviors.     The goal is for Matt to return to hospice focused care once his behaviors are better controlled, at a new facility.     Matt may be able to qualify for GIP inpatient hospice due to his complex symptom management/behaviors.     Code status: No CPR; pre-arrest intubation OK; Reviewed with sandra Santos 5/16.  LBD with agitation.    -Appreciate psychology expertise and medication recommendations.   -Continues intermittent sitter precautions for unsafe behavior.  -Continues restraint for unsafe behavior; discontinue as soon as safe to do so.   -Medications being titrated for improvement in behaviors by hospitalist. Valproate IV increased today per psych recommendations. Will continue to monitor. Goal would be to transition to Depakote sprinkles when willing to take medications orally.     Pain, chronic, related to neuropathy and general back pain (according to historic review from sandra Santos.) Has had back pain for many years treated with plain Tylenol- has not used opioids in past for chronic pain. Had tried gabapentin and Lyrica for his neuropathy but side effects of altered mental status- so did not continue use.   -Recommend Tylenol 650 mg TID and as needed for general pain relief/comfort.         Case was reviewed with Dr Hudson.    Olena Kelly, CNS  [facility]  Contact information available via [facility] Paging/Directory      Thank you for the opportunity to participate in the care of this patient and family. Our team:  will continue to follow.     During regular M-F work hours ([id number]) -- if you are not sure who specifically to contact -- please contact us in [employer] Web.     After regular work hours and on weekends/holidays, you can call our answering service at [phone].     Attestation:  Total time on the floor involved in the patient's care: 15 minutes  Total time spent in counseling/care coordination: >50%     Assessments          Matt Way is a 71 year old male with PMH significant for LBD, HTN, CAD s/p LAD and RCA stents, CHF, NSTEMI with angiogram 1/27/22 showing 99% ISR the pLAD (with 2 layers of previous stenting) that was medically managed. Reportedly patient was on hospice qualifying through his cardiac diagnosis but behaviors eventually worsened to the point where staff did not feel it was a safe for him to remain at the facility. He was sent to ED for evaluation and was admitted on 5/12/22. He has required antipsychotic medications and restraints; currently psych services are following and ad justing medications.     Today, the patient was seen for:  Symptom assessment    Prognosis, Goals, or Advance Care Planning was addressed today with: Yes.  Mood, coping, and/or meaning in the context of serious illness were addressed today: No.  Summary/Comments:            Interval History:     Chart review/discussion with unit or clinical team members:   Discussed patient case with Dr Hudson in regards to ongoing agitation. Psych consult recommendations reviewed and discussed increasing valproate IV to recommended dosage by psych. Will be monitoring for improvement of behaviors and agitation. Patient is currently in 3 point restraint. When symptoms improve the goal would be to transition to Depakote sprinkles on his food.      Per patient or family/caregivers today:  No family in room during visit.    Key Palliative Symptoms:  Patient is unable to answer questions for ROS             Review of Systems:    Patient is unable to answer questions for ROS         Medications:     I have reviewed this patient's medication profile and medications during this hospitalization.    Noted meds:      acetaminophen  650 mg Oral TID     aspirin  81 mg Oral Daily     atorvastatin  80 mg Oral Daily     citalopram  20 mg Oral Daily     insulin aspart  1-7 Units Subcutaneous TID AC     insulin aspart  1-5 Units Subcutaneous At Bedtime     insulin glargine  12 Units Subcutaneous At Bedtime     metFORMIN  1,000 mg Oral BID w/meals     metoprolol succinate ER  12.5 mg Oral At Bedtime     OLANZapine  5 mg Oral BID     pantoprazole  20 mg Oral Daily     rivastigmine  1 patch Transdermal Daily     rivastigmine   Transdermal Q8H     valproate (DEPACON) in NS intermittent infusion  500 mg Intravenous Q12H     acetaminophen **OR** acetaminophen, glucose **OR** dextrose **OR** glucagon, HYDROmorphone (STANDARD CONC), melatonin, naloxone **OR** naloxone **OR** naloxone **OR** naloxone, nitroGLYcerin, OLANZapine, OLANZapine, ondansetron **OR** ondansetron, senna-docusate **OR** senna-docusate                   Data Reviewed:     Lab Results   Component Value Date    WBC 6.8 05/12/2022    HGB 13.9 05/12/2022    HCT 42.2 05/12/2022     05/12/2022     05/15/2022    POTASSIUM 3.8 05/15/2022    CHLORIDE 107 05/15/2022    CO2 23 05/15/2022    BUN 23 05/15/2022    CR 0.80 05/15/2022     (H) 05/17/2022    DD 1.28 (H) 02/04/2022    NTBNPI 84 11/15/2018    TROPONIN 0.00 11/15/2018    TROPI <0.015 06/28/2021    AST 23 05/12/2022    ALT 48 05/12/2022    ALKPHOS 128 05/12/2022    BILITOTAL 1.1 05/12/2022    INR 1.06 09/23/2020     No new imaging.

## 2022-05-18 NOTE — PLAN OF CARE
Goal Outcome Evaluation:    Plan of Care Reviewed With: patient     Overall Patient Progress: no change         DATE & TIME: 5/17/22 pm shift    Cognitive Concerns/ Orientation : Alert. CHRYSTAL orientation due to not following command. Confuse.   BEHAVIOR & AGGRESSION TOOL COLOR: yellow. Restless and agitated most of the time. Patient on .4 soft restraint. Gave bedtime schedule Zyprexa.  CIWA SCORE: NA   ABNL VS/O2: Hr tachy at times, in 100s. On RA  MOBILITY: bedrest due to restraint.  PAIN MANAGMENT: on schedule Tylenol  DIET: mode carb diet. Safe tray -no utensils. Total feed. Poor appetite.   BOWEL/BLADDER: hirsch in place. No bm  ABNL LAB/BG: /203  DRAIN/DEVICES: PIV infusing LR at 50ml/hr  TELEMETRY RHYTHM: NA  SKIN: Pale. Foam on bilateral wrist and bilateral ankles to prevent skin breakdown from restraint intact.  TESTS/PROCEDURES: none  D/C DATE: pending improvement.   Discharge Barriers: behavioral disturbance, suicidal precaution.   OTHER IMPORTANT INFO: , psych following. Palliative consulted. Meds crushed and mix with pudding. Patient refusing meds at times. Patient belongings locked up in locker #2.

## 2022-05-18 NOTE — CONSULTS
Psychiatry Consultation; Follow up              Reason for Consult, requesting source:    Dementia with behavioral disturbance, continue suicide precautions?  Requesting source: Raúl Stern    Labs and imaging reviewed. Discussed with nursing.               Interim history:    Matt Way is a 71 year old male with known history of Lewy body dementia who presents with worsening behavioral problems. He was seen by psychiatry on 5/13 and 5/17. He is receiving valproate 500mg IV BID and olanzapine 5mg PO BID. He has been intermittently agitated, requiring mechanical restraints at times. Psychiatry is consulted today to evaluate if suicide precautions can be discontinued.    Nursing reports that he has been sedated since receiving olanzapine IM early this morning for agitation. Reports that he has been intermittently agitated, pulling at lines and refusing PO medications. States that he has not made any suicidal statements or suicidal gestures since admission and is unclear why suicide precautions were ever initiated in the first place.    I attempted to speak with Matt, he was seen laying in bed sleeping soundly. He did not awaken to verbal prompts. Sitter is at bedside, reports that he has been sleeping all day, woke briefly while getting cleaned up earlier but has been sleeping since. I am unable to complete a full assessment given his level of sedation.          Current Medications:       acetaminophen  650 mg Oral TID     aspirin  81 mg Oral Daily     atorvastatin  80 mg Oral Daily     citalopram  20 mg Oral Daily     insulin aspart  1-7 Units Subcutaneous TID AC     insulin aspart  1-5 Units Subcutaneous At Bedtime     insulin glargine  14 Units Subcutaneous At Bedtime     metFORMIN  1,000 mg Oral BID w/meals     metoprolol succinate ER  12.5 mg Oral At Bedtime     OLANZapine  5 mg Oral BID     pantoprazole  20 mg Oral Daily     rivastigmine  1 patch Transdermal Daily     rivastigmine   Transdermal  "Q8H     valproate (DEPACON) in NS intermittent infusion  500 mg Intravenous Q12H              MSE:   Appearance: wearing hospital gown, sleeping soundly  Attitude:  unable to assess- sleeping  Eye Contact:  sleeping  Mood: unable to assess- sleeping   Affect:  unable to assess- sleeping  Speech: unable to assess- sleeping  Psychomotor Behavior: unable to assess- sleeping  Thought Process:  unable to assess- sleeping  Associations: unable to assess- sleeping  Thought Content: unable to assess- sleeping  Insight:  unable to assess- sleeping  Judgement: unable to assess- sleeping  Oriented to: unable to assess- sleeping  Attention Span and Concentration:  unable to assess- sleeping  Recent and Remote Memory: unable to assess- sleeping        Vital signs:  Temp: 97.6  F (36.4  C) Temp src: Axillary BP: (!) 140/85 Pulse: 84   Resp: 20 SpO2: 95 % O2 Device: None (Room air)   Height: 185.4 cm (6' 1\") Weight: 77.8 kg (171 lb 8 oz)  Estimated body mass index is 22.63 kg/m  as calculated from the following:    Height as of this encounter: 1.854 m (6' 1\").    Weight as of this encounter: 77.8 kg (171 lb 8 oz).              DSM-5 Diagnosis:   Lewy Body Dementia with behavioral disturbance           Assessment:   Due to his level of sedation, I am unable to complete a full assessment. I reviewed the chart and was unable to find any documentation regarding why suicide precautions were initiated or any documentation that reflects suicidal ideation or attempts to harm self. Based on nursing report, he has been agitated intermittently but not attempting to harm self. Suicide precautions can be discontinued. Would recommend continuing 1:1 sitter for safety due to his agitation.          Summary of Recommendations:   1. Discontinued suicide precautions    2. Recommend continuing 1:1 sitter for safety due to agitation    3. Continue valproate 500mg IV BID, this was increased on 5/17/22 and VPA (trough) level was 58.     4. Could switch " "scheduled olanzapine PO 5mg to ODT, this may be easier to administer since in dissolves on the tongue.    5. Please reconsult psychiatry as needed    PHILLIP Tamayo [facility]  Consult/Liaison Psychiatry   [facility]    Contact information available via [facility] Paging/Directory  If I am not available, then [facility] CL line ([phone]) should know who is covering our consult service.   \"Much or all of the text in this note was generated through the use of Dragon Dictate voice to text software. Errors in spelling or words which appear to be out of contact are unintentional, may be present due having escaped editing\"           "

## 2022-05-18 NOTE — PROGRESS NOTES
Goal Outcome Evaluation:  DATE & TIME: 5/17/22 1348-5060                       Cognitive Concerns/ Orientation :  Alert, confused, intermittent following of commands.  CHRYSTAL orientation due to confusion.  Hx of Lewy Body Dementia with behavioral disturbance   BEHAVIOR & AGGRESSION TOOL COLOR: Yellow. Restless and agitated most of the time. Patient on .4 soft restraints. Gave PRN Zyprexa IM 1X due to agitation.        ABNL VS/O2: VSS on R/A.  Has intermittent tachycardia hx while here.  MOBILITY: Bedrest due to restraint.  PAIN MANAGMENT:  On scheduled Tylenol.  DIET: Mod carb diet.   Safe tray:  no utensils. Total feed.  BOWEL/BLADDER: Schulte in place. No bm  ABNL LAB/BG:   DRAIN/DEVICES: PIV infusing LR at 50ml/hr  TELEMETRY RHYTHM: N/A  SKIN: Pale. Foam on bilateral wrists and bilateral ankles to prevent skin breakdown from restraints.  TESTS/PROCEDURES: None  D/C DATE: Pending improvement.   Discharge Barriers: Behavioral disturbance, suicidal precaution.   OTHER IMPORTANT INFO: SW, psych following. Palliative consult. Meds crushed and mix with pudding. Patient refusing meds at times. Patient belongings locked up in   Locker #2.

## 2022-05-18 NOTE — PROGRESS NOTES
Current condition (current mood & behavior): restless/agitation  Sitter present: yes  Every 15 minute documentation by NA/RN completed for Shift: yes  Room safety Suicide Checklist completed in Epic: yes  Patient's color of severity (suicide scale): YELLOW  Order for psych consult placed (if appropriate): yes  Suicide care plan added: yes      Almaz Albert RN
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
## 2022-05-18 NOTE — PLAN OF CARE
DATE & TIME: 5/18/22 Day shift   Cognitive Concerns/ Orientation : Alert. CHRYSTAL orientation due to not following command. Confuse.   BEHAVIOR & AGGRESSION TOOL COLOR: Yellow. Restless and agitated at times.   CIWA SCORE: NA   ABNL VS/O2: WDL on  RA  MOBILITY: SBAx2 with GB   PAIN MANAGMENT: Denies  DIET: Diabetic diet- poor appetite- Safe tray -no utensils. Total feed  BOWEL/BLADDER: Schulte patent- no BM  ABNL LAB/BG: - refused lunch sugar  DRAIN/DEVICES: PIV infusing LR at 50ml/hr  TELEMETRY RHYTHM: NA  SKIN: Pale; scabs scattered. Foam on bilateral wrist and bilateral ankles to prevent skin breakdown from restraints intact.  TESTS/PROCEDURES: None  D/C DATE: Waiting for patient to stabilize on his psych medications  Discharge Barriers: behavioral disturbance,   OTHER IMPORTANT INFO: , Psych following. Palliative consulted. Meds crushed and mix with pudding. Patient refusing meds at times- needs to be highly encouraged. Patient belongings locked up in locker #2.

## 2022-05-19 NOTE — PROGRESS NOTES
Reason for admission: worsening behavioral problems.  Mental Status: Disoriented x4  Activity: Ax2   Diet: Mod carb ar 60g  Pain: Sleeping throughout the shift  Urination: On indwelling FC to urine bag  Tele/Restraints/Iso: NA  LDA: PIV infusing w/ LR at 50ml/hr  Expected D/C Date: Pending clinical progress  Other Info: Pt received PRN IM zyprexa twice yesterday. Noted pt been sleeping all day but arousable w/ loud voice or touch. Noticed some combativeness during cares like BG, turning and repositioning. Writer attempted to feed pt during lunch but was too sleepy and could not swallow food.  Pt did not get any corrective dose of insulin in the morning and noon time d/t not eating.

## 2022-05-19 NOTE — PLAN OF CARE
DATE & TIME: 5/18/22 1048-3757   Cognitive Concerns/ Orientation : Alert.to self, CHRYSTAL orientation due to not following command. Confuse.   BEHAVIOR & AGGRESSION TOOL COLOR: Yellow. Restless and agitated at times.    ABNL VS/O2: WDL on RA  MOBILITY: Strong assist of 2 with GB/W   PAIN MANAGMENT: Denies  DIET: Diabetic diet- poor appetite- Safe tray -no utensils. Total feed  BOWEL/BLADDER: Schulte patent- no BM  ABNL LAB/BG:   DRAIN/DEVICES: PIV infusing LR at 50ml/hr  TELEMETRY RHYTHM: NA  SKIN: Pale; scabs scattered  TESTS/PROCEDURES: None  D/C DATE: Waiting for patient to stabilize on his psych medications  Discharge Barriers: behavioral disturbance,   OTHER IMPORTANT INFO: , Psych following. Palliative consulted. Meds crushed and mix with pudding. Patient refusing meds at times- needs to be highly encouraged. Patient belongings locked up in locker #2, attempted to place mitts on, but pt kept taking off, Potassium replacement started, recheck in AM.

## 2022-05-19 NOTE — CONSULTS
Care Management Initial Consult    General Information  Assessment completed with:  (CHRISTO Hernandez at [facility] at [phone]),    Type of CM/SW Visit: Initial Assessment  Writer contacted CHRISTO at [facility] to clarify the chart statement that their facility will not accept patient back and have they given family notice.  CHRISTO reports they are open to assessing patient to return however need his behavior more under control so that other residents are not in danger or afraid of patient.       Primary Care Provider verified and updated as needed:     Readmission within the last 30 days: no previous admission in last 30 days         Advance Care Planning:     No HCD in Epic.  DON shares patient has a HCD however the signing and notarization was not completed correctly so our [employer] [facility] did not validate the document.         Communication Assessment  Patient's communication style: spoken language (English or Bilingual)             Cognitive  Cognitive/Neuro/Behavioral: .WDL except  Level of Consciousness: confused  Arousal Level: arouses to voice  Orientation: disoriented to, person, place, time, situation  Mood/Behavior:  (sleeping)  Best Language: 0 - No aphasia  Speech: illogical    Living Environment:   People in home:       Current living Arrangements: assisted living  Name of Facility:  ([facility])   Able to return to prior arrangements:  (to be determined)       Family/Social Support:  Care provided by:  [facility] staff  Provides care for:                  Description of Support System:    Daughter Danielle is a physician in [place] and hannah VILLAFUERTE is the main family contact.  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  Per CHRISTO, she believes the family goal has been to have the couple in the same facility.       Current Resources:   Patient receiving home care services:  No, but open to [facility].     Community Resources:     Equipment currently used at home:    Supplies currently used at home:      Employment/Financial:  Employment Status:          Financial Concerns:             Lifestyle & Psychosocial Needs:  Social Determinants of Health     Tobacco Use: Low Risk      Smoking Tobacco Use: Never Smoker     Smokeless Tobacco Use: Never Used   Alcohol Use: Not on file   Financial Resource Strain: Not on file   Food Insecurity: Not on file   Transportation Needs: Not on file   Physical Activity: Not on file   Stress: Not on file   Social Connections: Not on file   Intimate Partner Violence: Not on file   Depression: Not on file   Housing Stability: Not on file       Functional Status:  Prior to admission patient needed assistance: Per DON, patient prior to hospitalization, patient was independent with ambulation.  His care plan indicated he was offered supervision for showers, medication administered and blood sugars checked by staff.  He at times will refuse his medications, insulin and meals. DON describes his behavioral disturbance included throwing furniture around in his room and common areas, banging on doors in the middle of the night, striking out, yelling at staff who would lock themselves in a room out of fear and he would then bang on the door telling them to open up the door making threatening comments.                Mental Health Status: no history          Chemical Dependency Status:no history                Values/Beliefs:  Spiritual, Cultural Beliefs, Bahai Practices, Values that affect care:                 Additional Information:  Patient was dis- enrolled from hospice because his hospitalization is not related to his hospice diagnosis.   Upon discharge he is eligible to re-enroll into hospice.        Ruth Perdomo, [facility] HospitalSW

## 2022-05-19 NOTE — PROGRESS NOTES
Writer placed call to daughterDanielle to discuss time for hospice consultation.  No call back received at this time.  Due to time of day, will anticipate follow up on 5/20.      Rosalinda Harvey RN  [facility] hospice
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
## 2022-05-19 NOTE — PLAN OF CARE
DATE & TIME: 5/18/22 2657-1410   Cognitive Concerns/ Orientation : Alert.to self, CHRYSTAL orientation due to not following command. Confuse.   BEHAVIOR & AGGRESSION TOOL COLOR: Yellow. Restless and agitated at times.    ABNL VS/O2: WDL on RA  MOBILITY: Strong assist of 2 with GB/W   PAIN MANAGMENT: Denies  DIET: Diabetic diet- poor appetite- Safe tray -no utensils. Total feed  BOWEL/BLADDER: Schulte patent- no BM  ABNL LAB/BG: , 279 DRAIN/DEVICES: PIV infusing LR at 50ml/hr  TELEMETRY RHYTHM: NA  SKIN: Pale; scabs scattered  TESTS/PROCEDURES: None  D/C DATE: Waiting for patient to stabilize on his psych medications  Discharge Barriers: behavioral disturbance,   OTHER IMPORTANT INFO: , Psych following. Palliative consulted. Meds crushed and mix with pudding. Patient refusing meds at times- needs to be highly encouraged. Patient belongings locked up in locker #2, attempted to place mitts on, but pt kept taking off, given IV KCl

## 2022-05-19 NOTE — PROGRESS NOTES
[facility]  Palliative Care Daily Progress Note       Recommendations & Counseling       Goals of Care (see in depth discussion below):    Matt may be able to qualify for [facility] inpatient hospice due to his complex symptom management/behaviors.   Danielle would like Matt to be evaluated. Order placed for [facility] inpatient hospice to assess patient for admission to program. At some point when behaviors are managed he may be able to return     Code status: No CPR; No intubation; Reviewed with daughter Danielle today who agreed to changes of No intubation. She would want him to have oxygen or BiPAP if necessary.    Hospice was revoked to allow for consuelo-psych eval as facility was unable to manage care is aggressive/dangerous behaviors regarding and needed medication adjustments patient.    LBD with agitation.    -Appreciate psychology expertise and medication recommendations.   -Continues intermittent sitter precautions for unsafe behavior.  -Continues restraint for unsafe behavior; discontinue as soon as safe to do so.   -Medications continue to be titrated for improvement in behaviors by hospitalist. Valproate IV 500mg BID per psych recommendations. Will continue to monitor. Goal would be to transition to Depakote sprinkles when willing to take medications orally.     Pain, chronic, related to neuropathy and general back pain (according to historic review from daughter Danielle.) Has had back pain for many years treated with plain Tylenol- has not used opioids in past for chronic pain. Had tried gabapentin and Lyrica for his neuropathy but side effects of altered mental status- so did not continue use.   -Recommend Tylenol 650 mg TID and as needed for general pain relief/comfort.         Case was reviewed with Dr Hudson.    Olena Kelly, CNS  [facility]  Contact information available via [facility] Paging/Directory      Thank you for the opportunity to participate in the care of  this patient and family. Our team: will continue to follow.     During regular M-F work hours ([id number]) -- if you are not sure who specifically to contact -- please contact us in [employer] Smart Web.     After regular work hours and on weekends/holidays, you can call our answering service at [phone].     Attestation:  Total time on the floor involved in the patient's care: 35 minutes  Total time spent in counseling/care coordination: >50%     Assessments          Matt Way is a 71 year old male with PMH significant for LBD, HTN, CAD s/p LAD and RCA stents, CHF, NSTEMI with angiogram 1/27/22 showing 99% ISR the pLAD (with 2 layers of previous stenting) that was medically managed. Reportedly patient was on hospice qualifying through his cardiac diagnosis but behaviors eventually worsened to the point where staff did not feel it was a safe for him to remain at the facility. He was sent to ED for evaluation and was admitted on 5/12/22. He has required antipsychotic medications and restraints; currently psych services are following and ad justing medications.     Today, the patient was seen for:  Symptom assessment    Prognosis, Goals, or Advance Care Planning was addressed today with: Yes.  Mood, coping, and/or meaning in the context of serious illness were addressed today: No.  Summary/Comments:            Interval History:     Chart review/discussion with unit or clinical team members:    Discussed case with Dr Hudson.  CODE STATUS changes, comfort care will start tomorrow- [facility] hosp[facility] to evaluate to see if he can qualify for inpt program.  Hospitalist would like psychology to evaluate and give further recommendations.    Per patient or family/caregivers today:  Spoke with daughter /HCA Danielle on the phone. We reviewed Matt's progress here at hospital and some of his continued behaviors as well as review of medications. Writer brought up the possibility that Matt could qualify for inpatient hospice due to  his complex medical needs regarding behavior and titration of medications.  As he was previously on hospice before admission [facility] could be a good fit for him.  Danielle is interested in this program and would like to him to be assessed so writer will place an order.  We also discussed how current restorative focus care may be difficult for him as so many staff members need to check blood sugars, labs, vital signs etc.  Danielle states that Matt has always been fearful of new people so it is likely true it could be worsening his behavior.  Danielle feels ready to focus more on comfort at this point.  She had just wanted hospice revoked to allow him the opportunity to have his medications adjusted to improve his behavior.  The improvement in behavior is turning out to be more difficult than hoped for and daughter wishes to transition more to a comfort focused care to make life easier for her father.  We discussed how Matt has had little interest in eating his meals-Danielle states that she does not want Matt forced to eat food which I assured her we would not do.     We also reviewed CODE STATUS as he is no CPR but is listed as intubation okay.  Danielle states she is ready to change CODE STATUS to no intubation but would like the possibility for him to have BiPAP or oxygen if he needs for respiratory failure.  Danielle will be coming in to the hospital tomorrow and will further discuss comfort care/inpatient hospice at that time.  Writer will make changes to CODE STATUS at this time as discussed with Danielle.    [facility] Palliative Symptoms:  Patient is unable to answer questions for ROS             Review of Systems:   Patient is unable to answer questions for ROS         Medications:     I have reviewed this patient's medication profile and medications during this hospitalization.    Noted meds:      acetaminophen  650 mg Oral TID     aspirin  81 mg Oral Daily     atorvastatin  80 mg Oral Daily     [START ON 5/20/2022]  citalopram  10 mg Oral Daily     insulin aspart  1-7 Units Subcutaneous TID AC     insulin aspart  1-5 Units Subcutaneous At Bedtime     insulin glargine  14 Units Subcutaneous At Bedtime     metFORMIN  1,000 mg Oral BID w/meals     metoprolol succinate ER  12.5 mg Oral At Bedtime     OLANZapine  5 mg Oral BID     pantoprazole  20 mg Oral Daily     rivastigmine  1 patch Transdermal Daily     rivastigmine   Transdermal Q8H     valproate (DEPACON) in NS intermittent infusion  500 mg Intravenous Q12H     acetaminophen **OR** acetaminophen, glucose **OR** dextrose **OR** glucagon, HYDROmorphone (STANDARD CONC), melatonin, naloxone **OR** naloxone **OR** naloxone **OR** naloxone, nitroGLYcerin, OLANZapine, OLANZapine, ondansetron **OR** ondansetron, senna-docusate **OR** senna-docusate                   Data Reviewed:     Lab Results   Component Value Date    WBC 6.8 05/12/2022    HGB 13.9 05/12/2022    HCT 42.2 05/12/2022     05/12/2022     05/18/2022    POTASSIUM 3.7 05/19/2022    CHLORIDE 106 05/18/2022    CO2 28 05/18/2022    BUN 10 05/18/2022    CR 0.75 05/18/2022     (H) 05/19/2022    DD 1.28 (H) 02/04/2022    NTBNPI 84 11/15/2018    TROPONIN 0.00 11/15/2018    TROPI <0.015 06/28/2021    AST 23 05/12/2022    ALT 48 05/12/2022    ALKPHOS 128 05/12/2022    BILITOTAL 1.1 05/12/2022    INR 1.06 09/23/2020     No new imaging.

## 2022-05-20 PROBLEM — Z51.5 HOSPICE CARE: Status: ACTIVE | Noted: 2022-01-01

## 2022-05-20 NOTE — PLAN OF CARE
Summary: lewy body dementia with behavioral concerns; now inpatient hospice at 1800 (however family wants to continue meds, vitals, blood sugars, etc- see MD note)  DATE & TIME: 5/20/2022 8638-3883           Cognitive Concerns/ Orientation : Confused, not answering orientation questions. Agitated, restless and combative at times. Mood labile. Occasionally calm/sedated but then big outbursts   BEHAVIOR & AGGRESSION TOOL COLOR: Yellow/red  CIWA SCORE: NA    ABNL VS/O2: VSS on RA  MOBILITY: Not out of bed, too agitated this shift. Per report assist x2 currently, unsure of patients baseline  PAIN MANAGMENT: Scheduled tylenol, denies pain and no nonverbal s/s of pain  DIET: Mod CHO- refusing to eat, spits out at staff or becomes combative when trying to feed  BOWEL/BLADDER: Schulte in place. No BM  ABNL LAB/BG: , 232, 195  DRAIN/DEVICES: PIV infusing at 50 ml/hr (new IV placed as patient pulled other out), Schulte (was for retention) removed at 1500 per family request for voiding trial- bladder scanned at 1830 for 74 ml.   TELEMETRY RHYTHM: NA  SKIN: Pale with scattered bruises and scabs, mepilex to right ankle to cover open scab, blanchable redness to elbows and coccyx- mepilex in place for protection however patient rolls around a lot in bed and the mepilex often come off  TESTS/PROCEDURES: NA  D/C DATE: Pending placement?   OTHER IMPORTANT INFO: Sitter bedside for behaviors, in 4 point soft restraints- unable to remove this shift as patient is still very agitated, hits at staff, tries to get out of bed, etc. Psych adjusted meds (valproate infusion), see note. Given PRN IM zyprexa x1 as patient was very agitated, hitting and biting staff in evening. Takes med crushed in pudding- did spit the last bite out this AM at staff. Exelon patch LA.

## 2022-05-20 NOTE — CONSULTS
Psychiatry Consultation; Follow up          Reason for Consult, requesting source:    Dementia with behavioral disturbance  Requesting source: Raúl Stern            Interim history:    Matt Way is a 71 year old male who was admitted from hospice with worsening behavioral issues in the context of Lewy Body Dementia. Hx significant for prior LAD and RCA stents, NSTEMI, angiogram on 1/27/22 demonstrating 99% ISR of the pLAD. Psychiatry has assessed patient several times previously. Medication changes during hospitalization include initiation of Depakote with titration to 500 mg BID. Quetiapine was discontinued. Olanzapine was initiated at 5 mg BID.     Mr. Way is seen for follow-up today. Chart reviewed prior to visit, discussed with RN. Patient demonstrating episodes of agitation overnight. He was restless, irritable, and combative at times - screaming, kicking, and hitting at staff. Trying to pull at hirsch. He was placed into 4 point restraints and given a 2.5 mg dose of oral Zyprexa. VPA level last checked on 5/17, found to be 58.     On interview, patient found to be asleep, appearing rather somnolent in bed. He was able to awaken briefly and open his eyes on command. Attempted to assess his muscle tone but he refused to allow writer to touch his arms. Interview quite limited due to his sedation. He was able to tell me that he has not been hungry but does feel quite tired this morning.          Medications:     Current Facility-Administered Medications   Medication     acetaminophen (TYLENOL) tablet 650 mg    Or     acetaminophen (TYLENOL) Suppository 650 mg     acetaminophen (TYLENOL) tablet 650 mg     aspirin EC tablet 81 mg     atorvastatin (LIPITOR) tablet 80 mg     citalopram (celeXA) tablet 10 mg     glucose gel 15-30 g    Or     dextrose 50 % injection 25-50 mL    Or     glucagon injection 1 mg     HYDROmorphone (STANDARD CONC) (DILAUDID) oral solution 0.5 mg     insulin aspart (NovoLOG)  "injection (RAPID ACTING)     insulin aspart (NovoLOG) injection (RAPID ACTING)     insulin glargine (LANTUS PEN) injection 14 Units     lactated ringers infusion     melatonin tablet 1 mg     melatonin tablet 1 mg     metFORMIN (GLUCOPHAGE) tablet 1,000 mg     metoprolol succinate ER (TOPROL-XL) 24 hr half-tab 12.5 mg     naloxone (NARCAN) injection 0.2 mg    Or     naloxone (NARCAN) injection 0.4 mg    Or     naloxone (NARCAN) injection 0.2 mg    Or     naloxone (NARCAN) injection 0.4 mg     nitroGLYcerin (NITROSTAT) sublingual tablet 0.4 mg     OLANZapine (zyPREXA) injection 5 mg     OLANZapine (zyPREXA) tablet 2.5 mg     OLANZapine (zyPREXA) tablet 5 mg     ondansetron (ZOFRAN ODT) ODT tab 4 mg    Or     ondansetron (ZOFRAN) injection 4 mg     pantoprazole (PROTONIX) EC tablet 20 mg     rivastigmine (EXELON) 4.6 MG/24HR 24 hr patch 1 patch     rivastigmine (EXELON) Patch in Place     senna-docusate (SENOKOT-S/PERICOLACE) 8.6-50 MG per tablet 1 tablet    Or     senna-docusate (SENOKOT-S/PERICOLACE) 8.6-50 MG per tablet 2 tablet     valproate (DEPACON) 1,000 mg in sodium chloride 0.9 % 50 mL intermittent infusion     [START ON 5/21/2022] valproate (DEPACON) 250 mg in sodium chloride 0.9 % 50 mL intermittent infusion              MSE:     Appearance: age-appearing, lying in hospital bed, wearing gown. Fair hygiene and grooming, in no acute distress.   Behavior: calm, passive, somnolent  Eye contact: limited  Orientation: oriented to self only at this time  Movements: did not observe any tics, tremors, or dystonia. Unable to assess for cogwheeling as pt refused  Mood: \"tired\"  Affect: blunted, restricted range, mood-congruent  Speech: slow, raspy, low volume  Language: fluent in English, limited speech  Memory: recent and remote recall are impaired  Intellectual capacity: Average for development based on word choice  Concentration: limited by sedation  Thought process and content: thoughts are brief, but appear " "linear at this time. Does not appear to be responding to internal stimuli.   Associations: no loosening noted  Insight: limited  Judgement: limited  Safety: at risk for agitation and aggression toward others. Has not demonstrated self-injurious behavior.     Vital signs:  Temp: 97.5  F (36.4  C) Temp src: Oral BP: 132/75 Pulse: 96   Resp: 18 SpO2: 98 % O2 Device: None (Room air)   Height: 185.4 cm (6' 1\") Weight: 77.8 kg (171 lb 8 oz)  Estimated body mass index is 22.63 kg/m  as calculated from the following:    Height as of this encounter: 1.854 m (6' 1\").    Weight as of this encounter: 77.8 kg (171 lb 8 oz).              DSM-5 Diagnosis:   #1 Lewy Body dementia with behavioral disturbance          Assessment:   Patient seen for follow-up today. It appears he has been fairly sedated during the day and then becomes more restless and agitated at night. Difficult to keep him awake during the day given that he is not sleeping well at night.     Will plan to adjust Depakote by increasing the bedtime dose and decreasing AM dose to try to correct his sleep pattern. Will increase total valproate dose by 250 mg. Will order Depakote level to be drawn prior to his evening dose tonight, as well as ammonia level. Will also initiate melatonin in the evening to help with sleep/wake cycle. Would consider adding mirtazapine which could further help with sleep at night.           Summary of Recommendations:   1) Will adjust IV valproate by decreasing AM dose to 250 mg to help with daytime sedation and increase bedtime dose to 1000 mg (increasing daily dose by total of 250 mg).     2) Will order valproic acid trough level this evening prior to HS valproate dose. Targeting level of around 100-110 mg/L. Will also order ammonia level to check for hyperammonemia. Would also recheck platelets soon as these have not been checked since valproate was initiated.     3) Start scheduled melatonin 3 mg at 2000 to help regulate circadian " rhythm.    4) Consider augmentation with mirtazapine 7.5 mg at bedtime over the weekend if sleep remains an issue.    5) Delirium precautions:    Up during the day with lights on    Lights off at night, avoid interruptions during the night as much as possible    Family visits    Encourage wearing glasses    Reorientation    Avoid opioids, benzodiazepines, anticholinergics.      Continue to ensure proper nutrition, fluid and electrolyte balance. Monitor for infections, hypoxia, metabolic derangements, or other causes of delirium.     6) Reconsult psychiatry on Monday to follow-up medication changes, thank you.        Julio De Jesus, JOSE-BC, APRN, CNP  Consult/Liaison Psychiatry  [facility]  Provider can be paged via [facility] Paging/Directory  If I am unavailable, please contact [facility] at [phone] to reach the on-call provider.

## 2022-05-20 NOTE — PROGRESS NOTES
[facility]  Palliative Care Daily Progress Note       Recommendations & Counseling       Goals of Care (see in depth discussion below):     Was on hospice prior to admission at a memory care facility but he was too violent to safely stay in place there.     Matt may be able to qualify for [facility] inpatient hospice due to his complex symptom management/behaviors and need for IV medications.  Order placed for [facility] inpatient hospice to assess patient for admission to program. If his behaviors become manageable by oral medications, he may be able to return to an outpatient setting for hospice.      Code status: No CPR; No intubation;   LBD with agitation.    -Appreciate psychology expertise and medication recommendations.   -Continues intermittent sitter precautions for unsafe behavior.  -Continues restraint for unsafe behavior; discontinue as soon as safe to do so.   -Medications continue to be titrated for improvement in behaviors by hospitalist and psych recommendations. Valproate IV 500mg BID per psych recommendations. Zyprexa 5 mg BID. Will continue to monitor. Goal would be to transition to Depakote sprinkles if able to take medications orally, however, this may not be possible.     Pain, chronic, related to neuropathy and general back pain (according to historic review from daughter Danielle.) Has had back pain for many years treated with plain Tylenol- has not used opioids in past for chronic pain. Had tried gabapentin and Lyrica for his neuropathy but side effects of altered mental status- so did not continue use.   -Recommend Tylenol 650 mg TID and as needed for general pain relief/comfort.         Olena Kelly, CNS  [facility]  Contact information available via [facility] Paging/Directory      Thank you for the opportunity to participate in the care of this patient and family. Our team: will continue to follow.     During regular M-F work hours (1897-1632) -- if you are  not sure who specifically to contact -- please contact us in [facility] Smart Web.     After regular work hours and on weekends/holidays, you can call our answering service at [phone].     Attestation:  Total time on the floor involved in the patient's care: 35 minutes  Total time spent in counseling/care coordination: >50%     Assessments          Matt Way is a 71 year old male with PMH significant for LBD, HTN, CAD s/p LAD and RCA stents, CHF, NSTEMI with angiogram 1/27/22 showing 99% ISR the pLAD (with 2 layers of previous stenting) that was medically managed. Reportedly patient was on hospice qualifying through his cardiac diagnosis but behaviors eventually worsened to the point where staff did not feel it was a safe for him to remain at the facility. He was sent to ED for evaluation and was admitted on 5/12/22. He has required antipsychotic medications and restraints; currently psych services are following and ad justing medications.     Today, the patient was seen for:  Symptom assessment    Prognosis, Goals, or Advance Care Planning was addressed today with: Yes.  Mood, coping, and/or meaning in the context of serious illness were addressed today: No.  Summary/Comments:            Interval History:     Chart review/discussion with unit or clinical team members:    Discussed case with Dr Hudson, RN, [facility] .      Per patient or family/caregivers today:  Daughter Danielle and her  came today to see Matt. He did recognize her and spoke a few words of greeting but then reverted to speaking nonsensical language. He has been pulling on his Schulte catheter throughout the day and Danielle would like it removed as it seems to bother him- we discussed it being present for urinary retention. Danielle requested bladder scanning after removal to see if he continues to retain urine. We discussed the possibility of [facility] inpatient hospice and how he may qualify under his complex  behaviors that cannot be managed outpatient and need for IV medications for behaviors. [facility] will be in shortly to discuss program.    Key Palliative Symptoms:  Patient is unable to answer questions for ROS due to altered mental status.              Review of Systems:   Patient is unable to answer questions for ROS         Medications:     I have reviewed this patient's medication profile and medications during this hospitalization.    Noted meds:      acetaminophen  650 mg Oral TID     aspirin  81 mg Oral Daily     atorvastatin  80 mg Oral Daily     citalopram  10 mg Oral Daily     insulin aspart  1-7 Units Subcutaneous TID AC     insulin aspart  1-5 Units Subcutaneous At Bedtime     insulin glargine  14 Units Subcutaneous At Bedtime     melatonin  1 mg Oral QPM     metFORMIN  1,000 mg Oral BID w/meals     metoprolol succinate ER  12.5 mg Oral At Bedtime     OLANZapine  5 mg Oral BID     pantoprazole  20 mg Oral Daily     rivastigmine  1 patch Transdermal Daily     rivastigmine   Transdermal Q8H     valproate (DEPACON) in NS intermittent infusion  1,000 mg Intravenous At Bedtime     [START ON 5/21/2022] valproate (DEPACON) in NS intermittent infusion  250 mg Intravenous Daily     acetaminophen **OR** acetaminophen, glucose **OR** dextrose **OR** glucagon, HYDROmorphone (STANDARD CONC), melatonin, naloxone **OR** naloxone **OR** naloxone **OR** naloxone, nitroGLYcerin, OLANZapine, OLANZapine, ondansetron **OR** ondansetron, senna-docusate **OR** senna-docusate                   Data Reviewed:     Lab Results   Component Value Date    WBC 6.8 05/12/2022    HGB 13.9 05/12/2022    HCT 42.2 05/12/2022     05/12/2022     05/18/2022    POTASSIUM 3.7 05/19/2022    CHLORIDE 106 05/18/2022    CO2 28 05/18/2022    BUN 10 05/18/2022    CR 0.75 05/18/2022     (H) 05/20/2022    DD 1.28 (H) 02/04/2022    NTBNPI 84 11/15/2018    TROPONIN 0.00 11/15/2018    TROPI <0.015 06/28/2021    AST 23 05/12/2022    ALT 48  05/12/2022    ALKPHOS 128 05/12/2022    BILITOTAL 1.1 05/12/2022    INR 1.06 09/23/2020     No new imaging.

## 2022-05-20 NOTE — PROGRESS NOTES
Paged for extreme agitation, screaming, kicking, striking at staff, trying to leap from the bed, pulling at his lines and Schulte. Doses of Zyprexa and soft restraints have been carefully ordered for his safety and relief of symptoms.
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
## 2022-05-20 NOTE — PLAN OF CARE
DATE & TIME: 5/19/22, 1150 - 4647   Cognitive Concerns/ Orientation : Confused, not answering orientation questions. Agitated, restless and combative at times. Mood labile   BEHAVIOR & AGGRESSION TOOL COLOR: Samantha  CIWA SCORE: NA   ABNL VS/O2: VSS on room air  MOBILITY: Strong assist of 2-3, with GB and walker. Patient turns self in bed independently at times  PAIN MANAGMENT: Patient holding on to penis/hirsch, restless and agitated. Prn Tylenol given for comfort.  DIET: Mod CHO  BOWEL/BLADDER: Hirsch in place. No BM  ABNL LAB/BG:   DRAIN/DEVICES: PIV infusing at 50 ml/hr, hirsch  TELEMETRY RHYTHM: NA  SKIN: Pale with scattered bruises and scabs  TESTS/PROCEDURES: NA  D/C DATE: Pending  Discharge Barriers: Behavioural disturbance  OTHER IMPORTANT INFO: Patient increasingly restless and agitated. Screaming, kicking and hitting at staff. Trying to pull at hirsch. Prn po Zyprexa given and 4 point soft restraints applied as ordered. Sitter also present at bedside. SW, Psych following. Palliative consulted. Takes med crushed in pudding    Goal Outcome Evaluation:    Plan of Care Reviewed With: patient     Overall Patient Progress: no change

## 2022-05-20 NOTE — PROVIDER NOTIFICATION
MD Notification    Notified Person: MD    Notified Person Name: Dr SHANTA Newby    Notification Date/Time: 5/20/22, 0234    Notification Interaction: Web based paged    Purpose of Notification:Patient extremely restless/agitated. Screaming, kicking, hitting and trying to jump out of bed. Also pulling at hirsch. Gave prn po Zyprexa before pt got so agitated. Can we have order for soft restraints?    Orders Received: Soft restraints ordered for safety    Comments:

## 2022-05-20 NOTE — PLAN OF CARE
DATE & TIME: 5/18/22 3542-5198   Cognitive Concerns/ Orientation : Alert.to self, CHRYSTAL orientation due to not following command. Confused   BEHAVIOR & AGGRESSION TOOL COLOR: Yellow. Restless and agitated at times.    ABNL VS/O2: WDL on RA  MOBILITY: Strong assist of 2 with GB/W   PAIN MANAGMENT: Denies  DIET: Diabetic diet- poor appetite- Safe tray -no utensils. Total feed  BOWEL/BLADDER: Schulte patent- no BM  ABNL LAB/BG: , 132, k-3.7  DRAIN/DEVICES: PIV infusing LR at 50ml/hr, q12  IV Depacon   TELEMETRY RHYTHM: NA  SKIN: Pale; scabs scattered  TESTS/PROCEDURES: None  D/C DATE: Waiting for patient to stabilize on his psych medications  Discharge Barriers: behavioral disturbance,   OTHER IMPORTANT INFO: , Psych following. Palliative consulted. Meds crushed and mix with pudding. Patient refusing meds at times- needs to be highly encouraged. Patient belongings locked up in locker #2, pt slept for the majority of the shift. Only awoke for meds, no insulin given today because patient didn't eat any meals.

## 2022-05-20 NOTE — PROGRESS NOTES
"CLINICAL NUTRITION SERVICES BRIEF NOTE  Nutrition Admission Risk Screen Received Today 5/20 -   Have you recently lost weight without trying in the last 6 months ? - \"unsure\"  Have you been eating poorly due to a decreased appetite ?- \"no\"    New Findings  - Reviewed chart. Appetite/intakes are variable given dementia, varying degrees of agitation and alertness with sedation. Noted plan for GIP Hospice consult, and Palliative note yesterday indicating daughters desire to transition back to a more comfort-based approach.     Intervention  - No aggressive nutrition intervention indicated a this time, will defer assessment for now.   - Please consult if needs arise.     Carolyn Baeza RD, LD  [facility] [facility], [place]   Pager: [phone]  Weekend Pager: [phone]   "
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
## 2022-05-21 NOTE — CONSULTS
"[facility]    Consult Note - [facility]    ______________________________________________________________________    [facility] 24/7 Contact Number: [phone]    - Providers: Please contact [facility] with changes in orders or clinical plan of care   - Nursing: Please contact [facility] with significant changes in patient condition    Hospice will notify the care team (including the hospitalist) to confirm date of inpatient hospice (GIP) admission.    New Epic encounter will not be created until hospice completes admission.   ______________________________________________________________________        Hospice Diagnosis: Coronary Artery Disease    Indication for Inpatient Hospice: Severe Agitation not managed in community setting prior to hospital admission. Admitted to hospice program inpatient.     Goals for Hospital Discharge: Once management of agitation with oral medications and  1:1 sitter discontinued patient will be placed in community setting.     Plan of Care Discussed with the Following:   - Nurse: Sarita Escobar RN     - Hospitalist/Rounding Provider:   Remedios Hudson MD      Hospitalist - Internal Medicine     Since 5/20/2022     [phone]         - Matt's Family/Preferred Contact:   - Hospice Provider: Ruby Harrington RN Hospice    Summary of Visit (includes assessment, medications and any new orders):   Patient was resting comfortably for the majority of the day shift. Suddenly patient escalated and woke up stating, \"I got to pee.\" He was in a 4 point restraint and staff are uncomfortable taking off 4 point restraints. Writer recommended administering PRN Zyprexa and discussed with staff RN Sarita  to determine if he is retaining urine. She reported he was retaining urine  which was 500 ml and she inquired if writer would call daughter to place a hirsch catheter. Writer call daughter Danielle and she agreed to have " hirsch placed again due to patient retaining urine. She was concerned that patient was not compliant to taking oral medications when she visited her father for hospice admission. Writer discussed with Hospitalist to implement oral medications and if patient does not take oral medications he could still get IV Valproate intermittent infusions to continue therapy per daughters request. Recommended only giving IV medications and to not administer continuous IV infusions.       Ruby Harrington RN

## 2022-05-21 NOTE — CONSULTS
[facility]    Consult Note - [facility]    ______________________________________________________________________    [facility] 24/7 Contact Number: [phone]    - Providers: Please contact [facility] with changes in orders or clinical plan of care   - Nursing: Please contact [facility] with significant changes in patient condition    Hospice will notify the care team (including the hospitalist) to confirm date of inpatient hospice (GIP) admission.    New Epic encounter will not be created until hospice completes admission.   ______________________________________________________________________        Hospice Diagnosis: Coronary Artery Disease     Indication for Inpatient Hospice: Severe agitation  Reviewed eligibility for hospital GIP with Dr. Ramon Smith. Pt is eligible for GIP with dx of severe agitation AEB yelling, refusing cares, hallucinations, restlessness     Goals for Hospital Discharge:  Patient is receiving IV Depacon at HS and once daily to manage behaviors. Goal is to titrated medications to effectively manage s/s of agitation with oral medications.       Plan of Care Discussed with the Following:   - Nurse: SAMIA Eaton  - Hospitalist/Rounding Provider:Dr. Hudson    - Matt's Family/Preferred Contact: Rebekah Santos  - Hospice Provider:Dr. Ramon Smith    Summary of Visit (includes assessment, medications and any new orders):   Patient currently utilizing scheduled IV Depacon to control behaviors and agitation. Goal is to transition to oral medications when agitation is effectively controlled and medication regiment is titrated for effectiveness. Daughter would like BS checks to continue. Dr. Smith is in agreement with plan to continue BS checks. Patients daughter Danielle was present for visit and signed hospice consent forms. Hospice will continue to visit patient daily and consult with hospital staff regarding medication recommendations for  optimal comfort.   Daughter Danielle would like to be updated after every visit.      Sylvia Barreto RN

## 2022-05-21 NOTE — PROGRESS NOTES
Pt bladder scanned per voiding trial 389. Straight cathed per order. Able to get 250ml out via straight cath, some blood and one clot noted in urine otherwise yellow and clear. Post cath scan was 125ml.

## 2022-05-21 NOTE — PHARMACY-ADMISSION MEDICATION HISTORY
Admission medication history completed at [facility]. Please see Pharmacy - Admission Medication History note from 05/12/2022.

## 2022-05-21 NOTE — PLAN OF CARE
Goal Outcome Evaluation:      Summary: lewy body dementia with behavioral concerns; now inpatient hospice (however family wants to continue meds, vitals, blood sugars, etc- see MD note)  DATE & TIME: 5/20/2022- 5/21/2022 [id number]          Cognitive Concerns/ Orientation : Confused, not answering orientation questions. Agitated, restless and combative at times. Mood labile. Occasionally calm/sedated but then big outbursts.   BEHAVIOR & AGGRESSION TOOL COLOR: Yellow/red   ABNL VS/O2: VSS on RA  MOBILITY: Not out of bed, too agitated this shift. Per report assist x2 currently, unsure of patients baseline  PAIN MANAGMENT: Scheduled tylenol, denies pain and no nonverbal s/s of pain  DIET: Mod CHO- ate two cups of pudding with meds and finished cup afterward.  BOWEL/BLADDER: Incontinent, urinal at bedside. No BM. Bladder scan 160, 284, 389. Straight cath 250 mL, bladder scan after 125. Pt had periodic scant amounts of urine.   ABNL LAB/BG: , 152  DRAIN/DEVICES: LPIV infusing at 50 ml/hr. Dressing changed, CDI.   SKIN: Pale with scattered bruises and scabs, mepilex to right ankle to cover open scab, blanchable redness to elbows and coccyx- mepilex in place for protection on coccyx, however patient rolls around a lot in bed and the mepilex often come off  D/C DATE: Pending placement?   OTHER IMPORTANT INFO: Sitter bedside for behaviors, in 4 point soft nonviolent restraints- unable to remove this shift as patient is still very agitated, tries to get out of bed, etc. Psych adjusted meds (valproate infusion), see note. Given PRN oral zyprexa x1 as patient was very agitated, hitting at staff. Takes med crushed in pudding- do important meds first, pt may spit out. Exelon patch [place].

## 2022-05-21 NOTE — PLAN OF CARE
"Summary: lewy body dementia with behavioral concerns; now inpatient hospice (however family wants to continue meds, vitals, blood sugars, etc- see MD note)  DATE & TIME: 5/21/2022 AM shift  Cognitive Concerns/ Orientation : Sleeping this morning; however around noon got very restless - yelling out \"I want to pee\". Tried to redirect but pt got very agitated and combative (pulling on restraints). Confused, not answering orientation questions. Zyprexa IM given x 1 12:25 PM.   BEHAVIOR & AGGRESSION TOOL COLOR: Yellow/red, continued with 4 point soft restraints.   ABNL VS/O2: VSS on RA  MOBILITY: Not out of bed, too agitated this shift.   PAIN MANAGMENT: Scheduled tylenol given crushed with pudding.  Appeared in discomfort with being unable to urinate.   DIET: Mod CHO- ate breakfast 50%.   BOWEL/BLADDER: Incontinent, BM x 2. BS for 500 ml at noon, hirsch placed. Bloody output, MD Aware.   ABNL LAB/BG: /195  DRAIN/DEVICES: LPIV SL.   SKIN: Pale with scattered bruises and scabs, mepilex to right ankle to cover open scab, blanchable redness to elbows and coccyx- mepilex applied to elbows, barrier to coccyx  D/C DATE: Pending placement, hospice but not comfort care.   OTHER IMPORTANT INFO: Sitter bedside for behaviors, in 4 point soft nonviolent restraints- unable to remove this shift as patient is still very agitated, tries to get out of bed, etc. Continues on valproate infusion.  Exelon patch left Arm   "

## 2022-05-22 NOTE — PLAN OF CARE
Summary: lewy body dementia with behavioral concerns; now inpatient hospice (however family wants to continue meds, vitals, blood sugars, etc- see MD note)  DATE & TIME: 5/22/2022 AM shift  Cognitive Concerns/ Orientation : Awake for a little bit this morning, even ate a little breakfast. Cooperative this morning with cares. Sleeping most of the shift. Unable to give meds this morning. Awake and restless around 2:30, zyprexa and celexa given. Restraints discontinued this morning at 9:45 am  BEHAVIOR & AGGRESSION TOOL COLOR: green  ABNL VS/O2: VSS on RA  MOBILITY: not out of bed this shift; t/r Q2hr.   PAIN MANAGMENT: appears comfortable, sleeping most of the shift.   DIET: Mod CHO- ate a little bit of breakfast  BOWEL/BLADDER: Schulte in place, draining autumn output, not as bloody colored as yesterday.   ABNL LAB/BG: /230  DRAIN/DEVICES: L PIV SL.    SKIN: Pale with scattered bruises and scabs, mepilex to right ankle to cover open scab, blanchable redness to elbows and coccyx- mepilex applied to elbows, barrier to coccyx  D/C DATE: Pending, psych re consulted to evaluated IV Valproate. Hospice RN is following.   ANOTHER IMPORTANT INFO: Sitter bedside for behaviors, Exelon patch left Arm

## 2022-05-22 NOTE — PLAN OF CARE
Summary: lewy body dementia with behavioral concerns; now inpatient hospice (however family wants to continue meds, vitals, blood sugars, etc- see MD note)  DATE & TIME: 5/21/2022 [id number]   Cognitive Concerns/ Orientation : Confused, not answering orientation questions. Agitated, restless and combative at times. Mood labile. Occasionally calm/sedated but then big outbursts.   BEHAVIOR & AGGRESSION TOOL COLOR: Yellow/red   ABNL VS/O2: VSS on RA  MOBILITY: bedrest, pt had restraints on left and right wrists, q2 assess  PAIN MANAGMENT: Scheduled tylenol, denies pain and no nonverbal s/s of pain  DIET: Mod CHO- had dinner and pudding  BOWEL/BLADDER: Incontinent, hirsch in place,  ABNL LAB/BG: , 235  DRAIN/DEVICES: Hirsch   SKIN: Pale with scattered bruises and scabs, mepilex to right and left ankles to cover open scab, blanchable redness to elbows and coccyx- mepilex in place for protection on coccyx, however patient rolls around a lot in bed and the mepilex often come off  D/C DATE: Pending placement?   OTHER IMPORTANT INFO: Sitter bedside for behaviors, in 2 point soft nonviolent restraints-  patient is agitated at times  Takes med crushed in pudding- do important meds first, pt may spit out. Exelon patch [place].

## 2022-05-22 NOTE — CONSULTS
[facility]    Progress Note - [facility]    ______________________________________________________________________    [facility] 24/7 Contact Number: [phone]    - Providers: Please contact [facility] with changes in orders or clinical plan of care   - Nursing: Please contact [facility] with significant changes in patient condition  ______________________________________________________________________        Plan of Care Discussed    - Nurse: Sarita Escobar RN     - Hospitalist/Rounding Provider:  Remedios Hudson MD      Hospitalist - Internal Medicine     Since 5/20/2022     [phone]         - Matt's Family/Preferred Contact: Danielle daughter  - Hospice Provider: Ruby Harrington RN    Summary of Visit (includes assessment, medications and any new orders):   Patient was in bed resting comfortably with 1:1 sitter at bedside. It was reported he ate 25% of his breakfast and did take oral medications. No PRN doses of Zyprexa administered since yesterday. Reported having a BM yesterday and Immodium was ordered for loose stools. Per 1:1 sitter  he was more cooperative today and did receive a sponge bath in bed. Restraints were off patient today as he was less agitated. Spoke with daughter and updated her of status of her father. Writer indicated patient was more solmnulent, and less agitated during this encounter. Noted start of Heparin subcutaneous for DVT prophylaxus with daughter not in favor of this medication.   Educated on goals for discharge to include stopping 1:1 sitter at bedside, manage behaviors with medications either oral or IM route. Daughter inquired if he would go back to same facility. Writer noted that facility was not in favor of having patient returning back to facility. She stated she will call facility to confirm this on Monday. Writer indicated that  can facilitate with community placement.      Discharge planning:  Will have a psych consult on Monday to assess medication regimen with potential of using IM injections instead of IV or oral medications administion for behaviors. Continue to assess need for 1:1 sitter as he will not be discharged until 1:1 sitter is discontinued. Will collaboratively work with hospital staff on these goals.         Ruby Harrington RN

## 2022-05-22 NOTE — PLAN OF CARE
Summary: lewy body dementia with behavioral concerns; now inpatient hospice (however family wants to continue meds, vitals, blood sugars, etc- see MD note)  DATE & TIME: 5/21/2022 6010-3759       Cognitive Concerns/ Orientation : Confused, not answering orientation questions. Agitated, restless and combative at times. Mood labile. Occasionally calm/sedated but then big outbursts.   BEHAVIOR & AGGRESSION TOOL COLOR: Yellow/red   ABNL VS/O2: VSS on RA  MOBILITY: bedrest, pt had restraints on left and right wrists, q2 assess  PAIN MANAGMENT: Scheduled tylenol, denies pain and no nonverbal s/s of pain  DIET: Mod CHO- had dinner and pudding  BOWEL/BLADDER: Incontinent, hirsch in place,  ABNL LAB/BG: , 240  DRAIN/DEVICES: q24h depacon.   SKIN: Pale with scattered bruises and scabs, mepilex to right and left ankles to cover open scab, blanchable redness to elbows and coccyx- mepilex in place for protection on coccyx, however patient rolls around a lot in bed and the mepilex often come off  D/C DATE: Pending placement?   OTHER IMPORTANT INFO: Sitter bedside for behaviors, in 2 point soft nonviolent restraints-  patient is agitated at times  Takes med crushed in pudding- do important meds first, pt may spit out. Exelon patch LA.
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
## 2022-05-23 NOTE — PLAN OF CARE
Goal Outcome Evaluation:      Summary: lewy body dementia with behavioral concerns; now inpatient hospice (however family wants to continue meds, vitals, blood sugars, etc- see MD note)  DATE & TIME: 5/23/2022 1117-1611  Cognitive Concerns/ Orientation : Confused, not answering orientation questions. Agitated, restless and combative at times. Mood labile. Occasionally calm/sedated but then big outbursts.   BEHAVIOR & AGGRESSION TOOL COLOR: Yellow/red   ABNL VS/O2: VSS on RA  MOBILITY: bedrest, constant wt shifting.  Soft wrist restraints on.  PAIN MANAGMENT: Scheduled tylenol, denies pain and no nonverbal s/s of pain  DIET: Mod CHO- had poor intake for breakfast and lunch even with assistance.  BOWEL/BLADDER: Incontinent, hirsch in place for retention.  ABNL LAB/BG:  and 267  DRAIN/DEVICES: Hirsch, piv  SKIN: Pale with scattered bruises and scabs, mepilex to right and left ankles to cover open scab, blanchable redness to elbows and coccyx- mepilex in place for protection on coccyx, however patient rolls around a lot in bed and the mepilex often come off  D/C DATE: Pending placement?   OTHER IMPORTANT INFO: Sitter bedside for behaviors, pt agrressive and pulling at hirsch, 2 point soft nonviolent restraints in place.  Takes med crushed in pudding- do important meds first, pt may spit out. Exelon patch RA

## 2022-05-23 NOTE — CONSULTS
Psychiatry Consultation; Follow up              Reason for Consult, requesting source:    Dementia with behavioral disturbance  Requesting source: Raúl Stern    Labs and imaging reviewed. Discussed with nursing staff.               Interim history:    Matt Way is a 71 year old male who was admitted from hospice with worsening behavioral issues in the context of Lewy Body Dementia. Hx significant for prior LAD and RCA stents, NSTEMI, angiogram on 1/27/22 demonstrating 99% ISR of the pLAD. Psychiatry has assessed patient several times previously. In previous hospitalization, he responded well to Depakote and Zyprexa for management of agitation, these have been restarted.    I attempted to speak with Matt today, he is seen laying in bed sleeping. Briefly awakens to verbal prompts but quickly falls back asleep and is unable to provide any coherent information. Sitter is at bedside, reports that he has been sleeping off and on today.    Collateral from nursing: Pt has continued to be agitated at night, was in restraints last night. Tends to get PRN medication for agitation and then sleeps all day. He is able to take all other medications PO, crushed in pudding.    Call placed to hospice RN, awaiting call back to discuss case. Per note on 5/22/22, hospice RN was inquiring about IM medications rather than IV or oral meds.        Current Medications:       acetaminophen  650 mg Oral Q8H     aspirin  81 mg Oral Daily     atorvastatin  80 mg Oral QPM     citalopram  10 mg Oral Daily     fluticasone  1 spray Both Nostrils At Bedtime     insulin aspart  1-7 Units Subcutaneous TID AC     insulin aspart  1-5 Units Subcutaneous At Bedtime     insulin glargine  14 Units Subcutaneous At Bedtime     melatonin  3 mg Oral At Bedtime     metoprolol succinate ER  12.5 mg Oral At Bedtime     OLANZapine zydis  5 mg Oral BID     pantoprazole  20 mg Oral QAM AC     polyethylene glycol  17 g Oral Daily     rivastigmine  1  "patch Transdermal Daily     rivastigmine   Transdermal Q8H     valproate (DEPACON) in NS intermittent infusion  1,000 mg Intravenous At Bedtime     valproate (DEPACON) in NS intermittent infusion  250 mg Intravenous Daily              MSE:   Appearance: sleeping  Attitude:  sleeping  Eye Contact:  sleeping  Mood:  sleeping  Affect:  sleeping  Speech: sleeping  Psychomotor Behavior: sleeping  Thought Process: CHRYSTAL, sleeping  Associations:  CHRYSTAL, sleeping  Thought Content: CHRYSTAL, sleeping  Insight: CHRYSTAL, sleeping  Judgement:  CHRYSTAL, sleeping   Oriented to: CHRYSTAL, sleeping  Attention Span and Concentration: CHRYSTAL, sleeping  Recent and Remote Memory: CHRYSTAL, sleeping        Vital signs:  Temp: 97.8  F (36.6  C) Temp src: Axillary BP: 135/84 Pulse: 86   Resp: 18 SpO2: 97 % O2 Device: None (Room air)        Estimated body mass index is 22.63 kg/m  as calculated from the following:    Height as of 5/12/22: 1.854 m (6' 1\").    Weight as of 5/14/22: 77.8 kg (171 lb 8 oz).              DSM-5 Diagnosis:   Lewy Body Dementia, with behavioral disturbance          Assessment:   I was unable to assess Matt due to his level of sedation, he awakened briefly to verbal prompts but not enough to engage meaningfully. Nursing reports he has been frequently agitated at night and has been in and out of restraints for agitation, pulling at lines.    Most recent VPA level was 51 on 5/20/22 so there is room to increase dose given ongoing agitation. He could be switched from IV depakote to oral solution as he is able to take meds PO. The oral solution may be easier to dose and administer rather than Depakote sprinkles, as those are formulated only as 125mg capsules and his current TDD is 1250mg. Based on the MAR, it looks like he frequently gets PRN olanzapine for agitation in the afternoon so may be helpful to add another small dose of Depakote in the afternoon.          Summary of Recommendations:   1. Switch from IV depakote to oral solution depakene as " "he is able to take all other meds orally. Would recommend increasing dose to 250mg BID (morning and afternoon) and 1000mg at bedtime for TDD of 1500mg.     2. Repeat VPA level (trough) on 5/25/22 to monitor serum level after dose increase    3. Consider adding low dose mirtazapine 7.5mg at bedtime for sleep at night as he does not have a consistent day/night cycle    4. Please reconsult psychiatry as needed.    PHILLIP Tamayo [facility]  Consult/Liaison Psychiatry   [facility]    Contact information available via [facility] Paging/Directory  If I am not available, then [facility] CL line ([phone]) should know who is covering our consult service.   \"Much or all of the text in this note was generated through the use of Dragon Dictate voice to text software. Errors in spelling or words which appear to be out of contact are unintentional, may be present due having escaped editing\"           "

## 2022-05-23 NOTE — PROVIDER NOTIFICATION
MD Notification    Notified Person: MD    Notified Person Name: Jay Newby    Notification Date/Time: 5/23/2022 0018    Notification Interaction: Web paged    Purpose of Notification:  Obtain order for soft wrist restraints.    Orders Received: Soft restraints ordered    Comments:

## 2022-05-23 NOTE — CONSULTS
[facility]    Progress Note - [facility]    ______________________________________________________________________    [facility]/7 Contact Number: [phone]    - Providers: Please contact [facility] with changes in orders or clinical plan of care   - Nursing: Please contact [facility] with significant changes in patient condition  ______________________________________________________________________        Plan of Care Discussed with the Following:   - Nurse: SAMIA Samano  - Hospitalist/Rounding Provider: Dr. Hudson    - Matt's Family/Preferred Contact: sandra Santos  - Hospice Provider: Dr. Ramon Unger    Summary of Visit (includes assessment, medications and any new orders):   Daily GIP visit. At time of visit, patient was somnolent, resting in bed, no restraints. Report from SAMIA Samano, patient ate <25 percent of breakfast. HR 74, last BM 5/21, Patient has been receiving scheduled Miralax daily. Patient has hirsch catheter, patent draining autumn urine. Hirsch catheter inserted d/t urinary retention. Writer spoke with hospice medical director Dr. Ramon Smith who recommends ordering a medication for urinary retention. Writer paged hospitalist, Dr. Hudson who agrees with the plan and will initiate new orders at this time., Patient has been receiving scheduled IV Depacon at bedtime and once daily. Spoke with [facility] Psychiatry. Goal is to transition patient to oral medications, monitor effectiveness for 48-72 hours, then plan for discharge. Hospice will continue to follow and work towards discharge plan. Updated daughter Danielle with POC.     Sylvia Barreto RN

## 2022-05-23 NOTE — PROGRESS NOTES
Paged for increased agitation and pulling at his Schulte catheter. Soft wrist restraints have carefully been ordered for his safety.
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
## 2022-05-23 NOTE — PLAN OF CARE
Summary: lewy body dementia with behavioral concerns; now inpatient hospice (however family wants to continue meds, vitals, blood sugars, etc- see MD note)  DATE & TIME: 5/22/2022 3691-7631   Cognitive Concerns/ Orientation : Confused, not answering orientation questions. Agitated, restless and combative at times. Mood labile. Occasionally calm/sedated but then big outbursts.   BEHAVIOR & AGGRESSION TOOL COLOR: Yellow/red   ABNL VS/O2: VSS on RA  MOBILITY: bedrest, pt not in restraints  PAIN MANAGMENT: Scheduled tylenol, denies pain and no nonverbal s/s of pain  DIET: Mod CHO- had dinner and pudding  BOWEL/BLADDER: Incontinent, hirsch in place,  ABNL LAB/BG: , 252  DRAIN/DEVICES: Hirsch   SKIN: Pale with scattered bruises and scabs, mepilex to right and left ankles to cover open scab, blanchable redness to elbows and coccyx- mepilex in place for protection on coccyx, however patient rolls around a lot in bed and the mepilex often come off  D/C DATE: Pending placement?   OTHER IMPORTANT INFO: Sitter bedside for behaviors, has 2 point soft nonviolent restraints avaliable 1 time use till 0930  Takes med crushed in pudding- do important meds first, pt may spit out. Exelon patch RA

## 2022-05-23 NOTE — PLAN OF CARE
Summary: lewy body dementia with behavioral concerns; now inpatient hospice (however family wants to continue meds, vitals, blood sugars, etc- see MD note)  DATE & TIME: 5/22/2022 8069-8261  Cognitive Concerns/ Orientation : Confused, not answering orientation questions. Agitated, restless and combative at times. Mood labile. Occasionally calm/sedated but then big outbursts.   BEHAVIOR & AGGRESSION TOOL COLOR: Yellow/red   ABNL VS/O2: VSS on RA  MOBILITY: bedrest, constant wt shifting.  Soft wrist restraints on.  PAIN MANAGMENT: Scheduled tylenol, denies pain and no nonverbal s/s of pain  DIET: Mod CHO- had dinner and pudding  BOWEL/BLADDER: Incontinent, hirsch in place  ABNL LAB/BG:   DRAIN/DEVICES: Hirsch   SKIN: Pale with scattered bruises and scabs, mepilex to right and left ankles to cover open scab, blanchable redness to elbows and coccyx- mepilex in place for protection on coccyx, however patient rolls around a lot in bed and the mepilex often come off  D/C DATE: Pending placement?   OTHER IMPORTANT INFO: Sitter bedside for behaviors, pt agrressive and pulling at hirsch, 2 point soft nonviolent restraints ordered at 0030, PRN seroquel given.  Takes med crushed in pudding- do important meds first, pt may spit out. Exelon patch RA

## 2022-05-24 NOTE — PLAN OF CARE
DATE & TIME: 5/23/22, 2300 - 3292    Cognitive Concerns/ Orientation : CHRYSTAL, confused, speech illogical at times. Intermittently restless and agitated. Can be combative at times  BEHAVIOR & AGGRESSION TOOL COLOR: Yellow   ABNL VS/O2: VSS on room air  MOBILITY: Bedrest. Turned and reposition in bed. 2 point soft restraints in place to both wrist  PAIN MANAGMENT: Denied pain. Scheduled Tylenol given for comfort  DIET: Regular  BOWEL/BLADDER: Hirsch in place and patent. No BM this shift  ABNL LAB/BG:   DRAIN/DEVICES: PIV SL, hirsch  TELEMETRY RHYTHM: NA  SKIN:  Pale with scattered bruises and scabs. Mepilex to right inner ankle as well as to both elbows and coccyx for protection  TESTS/PROCEDURES: NA  D/C DATE: Pending placement  OTHER IMPORTANT INFO: Patient was increasingly restless and agitated. Trying to pull at hirsch. Prn Seroquel given and 2 point soft restraints to both wrist continued. To end at 0029 on 5/25/22.   Sitter at bedside for safety. Takes medications that can be crushed in pudding. Some medications cannot be crushed so ensure patient is given more important medications first. Exelon patch inplace to right upper arm    Goal Outcome Evaluation:    Plan of Care Reviewed With: patient     Overall Patient Progress: no change

## 2022-05-24 NOTE — PROVIDER NOTIFICATION
MD Notification    Notified Person: MD    Notified Person Name: Dr. DONYA Mccormick    Notification Date/Time: 4    Notification Interaction: Web based paged  Purpose of Notification: Patient still restless and agitated. Trying to pull at hirsch. Restraints order  at 0029. Can we have a new order for restraints.    Orders Received: Orders given to continue soft restraints to right and left wrist  for 1 day  Comments:

## 2022-05-24 NOTE — SIGNIFICANT EVENT
Significant Event Note    Time of event: 5:49 PM May 24, 2022    Description of event:    Right wrist and Left wrist restraints continued 5/24/2022    Clinical Justification: Pulling lines, pulling tubes, and pulling equipment (Restless and agitated)      ,     New orders placed Yes  Length of Order: 1 Day      Kai Robb MD

## 2022-05-24 NOTE — PLAN OF CARE
Goal Outcome Evaluation:        Summary: lewy body dementia with behavioral concerns; now inpatient hospice (however family wants to continue meds, vitals, blood sugars, etc- see MD note)  DATE & TIME: 5/23/2022 3-11pm shift  Cognitive Concerns/ Orientation : Confused, not answering orientation questions. Agitated, restless and combative at times. Occasionally calm/sedated but then big outbursts mostly with cares and bathroom urges.  BEHAVIOR & AGGRESSION TOOL COLOR: Yellow/red   ABNL VS/O2: VSS on RA  MOBILITY: bedrest, constant wt shifting.  Soft wrist restraints on. Expires at 12:30pm tonight  PAIN MANAGMENT: Scheduled tylenol denies pain but sometimes says ow and grabs toward his penis and leg.   DIET: Mod CHO- had poor intake for dinner even with assistance.  BOWEL/BLADDER: Incontinent, hirsch in place for retention.  ABNL LAB/BG: , 218  DRAIN/DEVICES: Hirsch,  new PIV in hand- only place able to get with agitation today.   SKIN: Pale with scattered bruises and scabs, mepilex to right and left ankles to cover open scab, blanchable redness to elbows and coccyx- mepilex in place for protection on coccyx, however patient rolls around a lot in bed and the mepilex often come off  D/C DATE: Pending placement?   OTHER IMPORTANT INFO: Sitter bedside for behaviors, pt agrressive and pulling at hirsch, 2 point soft nonviolent restraints in place.  Takes med crushed in pudding- (some meds can't be crushed like tanya,ax and metoprolol) do important meds first, pt may spit out. Exelon patch RA

## 2022-05-24 NOTE — PLAN OF CARE
Goal Outcome Evaluation:      Summary: lewy body dementia with behavioral concerns; now inpatient hospice (however family wants to continue meds, vitals, blood sugars, etc- see MD note)    DATE & TIME: 5/24/22, 0700 - 1530                        Cognitive Concerns/ Orientation : CHRYSTAL, confused, speech illogical at times. Intermittently restless and agitated. Can be combative at times with cares.  BEHAVIOR & AGGRESSION TOOL COLOR: Yellow   ABNL VS/O2: VSS on room air  MOBILITY: Bedrest. Turned and reposition in bed. Restraints taken off at noon.  PAIN MANAGMENT: patient becomes uncomfortable with repo, unable to give acetaminophen prn.  DIET:  Regualr  BOWEL/BLADDER: Hirsch in place and patent. No BM this shift  ABNL LAB/BG:   DRAIN/DEVICES: PIV SL, hirsch  TELEMETRY RHYTHM: NA  SKIN:  Pale with scattered bruises on his shin . Mepilex to right inner ankle as well as to both elbows and coccyx for protection  TESTS/PROCEDURES: NA  D/C DATE: Pending placement  OTHER IMPORTANT INFO:  patient slept through out the day. Restraints taken off.

## 2022-05-24 NOTE — PROGRESS NOTES
[facility]    Progress Note - [facility]    ______________________________________________________________________    [facility] 24/7 Contact Number: [phone]    - Providers: Please contact [facility] with changes in orders or clinical plan of care   - Nursing: Please contact [facility] with significant changes in patient condition  ______________________________________________________________________        Plan of Care Discussed with the Following:   - Nurse: SAMIA Samano  - Hospitalist/Rounding Provider: Dr Hudson    - Matt's Family/Preferred Contact: sandra Santos  - Hospice Provider: Dr. Ramon Smith    Summary of Visit (includes assessment, medications and any new orders):   Patient was started on new medication Flomax 0.4 mg yesterday: 1st dose at 1800 hrs. 5/23/2022.  Today, patient is very somnolent and did not awake during assessment. He was given PRN Seroquel 12.5 mg 5/24/2022 at 0021 hours.  1:1 sitter by bedside. 2 pt restraints were removed, during my visit as he is very sleepy. Patient was repositioned and mouth swabbed for comfort. He did not awake, just briefly moaned, however appears comfortable.  RR 22, irregular, O2 sats 96 percent on RA, HR 82, shallow mouth breathing with snoring, Patient is having intermittent periods of apnea of 20 to 30 seconds, cool feet. Patient did not eat breakfast this morning d/t sleepiness. Patient did receive scheduled IV Depacon 250 mg today at 1059 hours today.  Hirsch in place, patent draining autumn urine, overnight 250 ml output.  Report from SAMIA Samano, patient will become more agitation at night, and he will pull at his hirsch. SW will continue to look for placement, if discharge back to [facility] is not an option. Left VM with Daughter Danielle to give update.         Sylvia Barreto RN
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
## 2022-05-24 NOTE — PROGRESS NOTES
"Paged on-call- \"[phone] Matt ALMENDAREZ- requesting another order for soft restraints as the current order will  at 12:30am tonight. please call or order if able, thanks!- Bairon Richardson RN *[phone]\"    May need to re-page since MD is off shift at 11pm and a new MD will take over on-call services.     Response:  "

## 2022-05-24 NOTE — PLAN OF CARE
Goal Outcome Evaluation:           Summary: lewy body dementia with behavioral concerns; now inpatient hospice (however family wants to continue meds, vitals, blood sugars, etc- see MD note)    DATE & TIME: 5/24/22, [id number]                    Cognitive Concerns/ Orientation : CHRYSTAL, confused, speech illogical at times. Intermittently restless and agitated. Can be combative at times with cares.  BEHAVIOR & AGGRESSION TOOL COLOR: Yellow   ABNL VS/O2: VSS on room air  MOBILITY: Bedrest. Turned and reposition in bed. Restraints restarted 1800  PAIN MANAGMENT: patient becomes uncomfortable with repo, unable to give acetaminophen prn.  DIET:  Regular, needs assistance with eating d/t confusion.   BOWEL/BLADDER: Hirsch in place and patent. No BM this shift(just a smear)  ABNL LAB/BG:   DRAIN/DEVICES: PIV SL, hirsch  TELEMETRY RHYTHM: NA  SKIN:  Pale with scattered bruises on his shin . Mepilex to right inner ankle as well as to both elbows and coccyx for protection  TESTS/PROCEDURES: NA  D/C DATE: Pending placement  OTHER IMPORTANT INFO:  patient slept through out the day- woke up at 1800 and was severely agitated and restless, yelling and swinging at staff. Prn Seroquel given

## 2022-05-25 NOTE — PLAN OF CARE
Goal Outcome Evaluation:      Summary: lewy body dementia with behavioral concerns; now inpatient hospice (however family wants to continue meds, vitals, blood sugars, etc- see MD note)    DATE & TIME: 5/24/22- 5/25/22, [id number]                    Cognitive Concerns/ Orientation : confused, speech illogical at times. Intermittently restless and agitated. Can be combative at times with cares. A&Ox1 (self.) Zyprexa PRN given x1,Seroquel PRN given x1.  BEHAVIOR & AGGRESSION TOOL COLOR: Yellow   ABNL VS/O2: VSS on room air  MOBILITY: Bedrest. Turned and reposition in bed. Upper extremity restraints restarted at 1800.   PAIN MANAGMENT: patient becomes uncomfortable with repo, gave scheduled acetaminophen x2 for hirsch associated pain.  DIET:  Regular, needs assistance with eating d/t confusion. Starts IVF 75mL/hr d/t lack of fluid intake.  BOWEL/BLADDER: Hirsch in place and patent. No BM this shift  ABNL LAB/BG: , 193  DRAIN/DEVICES: LPIV infusing IVF 75 mL/hr, hirsch  SKIN:  Pale with scattered bruises on his shin. Mepilex to right inner ankle as well as to both elbows and coccyx for protection.  D/C DATE: Pending placement  OTHER IMPORTANT INFO:  patient slept for short periods of the night, waking to complain of penis pain and trying to pull out hirsch.

## 2022-05-25 NOTE — PLAN OF CARE
Goal Outcome Evaluation:        DATE & TIME: 5/25/22 1328-3332                    Cognitive Concerns/ Orientation : confused, speech illogical at times. Intermittently restless and agitated. Can be combative at times with cares. A&Ox1 (self.). lethargic slept most of the shift.  BEHAVIOR & AGGRESSION TOOL COLOR: Yellow   ABNL VS/O2: VSS on room air  MOBILITY: Bedrest. Turned and reposition in bed.   PAIN MANAGMENT: patient becomes uncomfortable with cares, gave scheduled acetaminophen.   DIET:  Regular, needs assistance with eating d/t confusion. Started IVF 75mL/hr d/t lack of fluid intake yesterday. Patient didn't eat because he was lethargic, was able to take a couple morning pills this afternoon. He tends to chew them so we are unable to give meds that cant be crushed ( ec Asprin, Protonix and Flomax).  BOWEL/BLADDER: Hirsch in place and patent. No BM this shift, miralax in juice and took very little.  ABNL LAB/BG: ,146.  DRAIN/DEVICES: LPIV infusing IVF 75 mL/hr, hirsch  SKIN:  Pale with scattered bruises on his shin. Mepilex to right inner ankle and coccyx for protection.  D/C DATE: Pending placement  OTHER IMPORTANT INFO:  patient continues with bilateral wrist restraints since 1800 on 5/24. He grabs on hirsch catheter at times when restrains are off during cares. Mitten on right hand was removed, sitter remains in room and difficult to redirect.
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
## 2022-05-26 NOTE — PROGRESS NOTES
[facility]    Progress Note - [facility]    ______________________________________________________________________    [facility] Contact Number: [phone]    - Providers: Please contact [facility] with changes in orders or clinical plan of care   - Nursing: Please contact [facility] with significant changes in patient condition  ______________________________________________________________________        Plan of Care Discussed with               - Matt's Family/Preferred Contact: daughter Danielle  - Hospice Provider: Dr. Ramon Smith     Summary of Visit (includes assessment, medications and any new orders):   Patient was started on new medication Flomax 0.4 mg on 5/23.   Patient is somnolent throughout assessment. 2 point restraints present  due to patient pulling on lines / catheter. Patient unable to take flomax today due to lethargy.    RR 20 and irregular, O2 sats 97% percent on RA, HR 80.  Patient is having intermittent periods of apnea of 20 to 30 seconds.  Patient did not eat breakfast this morning d/t sleepiness. Patient did receive scheduled IV Depacon.   Hirsch in place, patent draining autumn urine, overnight 250 ml output. Goal to remove hirsch if flomax is tolerated so allow for transfer back to facility.         Rosalinda Harvey RN

## 2022-05-26 NOTE — CONSULTS
Psychiatry Consultation; Follow up          Reason for Consult, requesting source:    Agitation  Requesting source: Raúl Stern            Interim history:    Matt Way is a 71 year old male who was admitted from hospice with worsening behavioral issues in the context of Lewy Body Dementia. Hx significant for prior LAD and RCA stents, NSTEMI, angiogram on 1/27/22 demonstrating 99% ISR of the pLAD. Psychiatry has assessed patient several times previously.    Per chart review, patient with ongoing agitation. At around 11:30, patient apparently became increasingly agitated and combative. RN staff attempted redirection, quetiapine was administered. Despite these measures, patient continued to escalate. He was placed in 4 point restraints.     At the time of my visit, patient was sedated. He did briefly open his eyes to acknowledge writer, but did not respond to any questions. ROM performed on lower extremities and there was no rigidity noted. No dystonia or other signs of EPS noted.          Medications:     Current Facility-Administered Medications   Medication     acetaminophen (TYLENOL) Suppository 650 mg     acetaminophen (TYLENOL) tablet 650 mg     acetaminophen (TYLENOL) tablet 650 mg     aspirin EC tablet 81 mg     atorvastatin (LIPITOR) tablet 80 mg     atropine 1 % ophthalmic solution 2 drop     bisacodyl (DULCOLAX) Suppository 10 mg     citalopram (celeXA) tablet 10 mg     glucose gel 15-30 g    Or     dextrose 50 % injection 25-50 mL    Or     glucagon injection 1 mg     fluticasone (FLONASE) 50 MCG/ACT spray 1 spray     HYDROmorphone (STANDARD CONC) (DILAUDID) oral solution 0.5 mg     insulin aspart (NovoLOG) injection (RAPID ACTING)     insulin aspart (NovoLOG) injection (RAPID ACTING)     insulin glargine (LANTUS PEN) injection 14 Units     loperamide (IMODIUM) capsule 2 mg     melatonin tablet 3 mg     metoprolol succinate ER (TOPROL-XL) 24 hr half-tab 12.5 mg     naloxone (NARCAN) injection  "0.1 mg     naloxone (NARCAN) injection 0.2 mg     nitroGLYcerin (NITROSTAT) sublingual tablet 0.4 mg     OLANZapine (zyPREXA) injection 5 mg     pantoprazole (PROTONIX) EC tablet 20 mg     polyethylene glycol (MIRALAX) Packet 17 g     QUEtiapine (SEROquel) half-tab 12.5 mg     QUEtiapine (SEROquel) tablet 25 mg     risperiDONE (risperDAL M-TABS) ODT tab 0.5 mg     risperiDONE (risperDAL M-TABS) ODT tab 0.5 mg     rivastigmine (EXELON) 4.6 MG/24HR 24 hr patch 1 patch     rivastigmine (EXELON) Patch in Place     senna-docusate (SENOKOT-S/PERICOLACE) 8.6-50 MG per tablet 2 tablet     sennosides (SENOKOT) tablet 2 tablet     tamsulosin (FLOMAX) capsule 0.4 mg     valproate (DEPACON) 1,000 mg in sodium chloride 0.9 % 50 mL intermittent infusion     valproate (DEPACON) 250 mg in sodium chloride 0.9 % 50 mL intermittent infusion              MSE:   Patient is a 71 year old  male observed to be asleep and in 4-point restraints. He was lying in bed, wearing a hospital gown. Hygiene and grooming appear fair. Patient briefly opened his eyes to acknowledge writer and subsequently closed them. Did not respond to any questions, unable to complete remainder of MSE.     Vital signs:  Temp: 97.2  F (36.2  C) Temp src: Oral BP: 137/78 Pulse: 89   Resp: 18 SpO2: 96 % O2 Device: None (Room air)        Estimated body mass index is 22.63 kg/m  as calculated from the following:    Height as of 5/12/22: 1.854 m (6' 1\").    Weight as of 5/14/22: 77.8 kg (171 lb 8 oz).              DSM-5 Diagnosis:   #1 Lewy Body Dementia, with behavioral disturbance          Assessment:   Pt seen for follow-up today. Had an episode of agitation earlier today, ended up in 4-point restraints. It appears his current regimen, which included BID scheduled olanzapine 5 mg remains inadequate in treating his ongoing agitation. Thus far, has trialed scheduled quetiapine and olanzapine with minimal efficacy. D2 blockade is a bit more potent with risperidone " and thus utilizing this agent does put patient at greater risk for EPS, but his ongoing agitation and behavioral issues also pose safety concerns. Will start low dose risperidone and plan to carefully monitor for the development of any EPS while monitoring for improvement in behaviors. He fortunately has not demonstrated any signs of EPS on olanzapine so hopefully we will have the same experience with risperidone.           Summary of Recommendations:   1) Will stop scheduled olanzapine. Start risperidone M-tab 0.5 mg TID (8 AM, noon, 8 PM)    2) Replace PRN PO olanzapine with risperidone M-tab 0.5 mg TID PRN. Offer quetiapine 12.5 mg first, which can be administered every 6 hours PRN.     3) Please monitor for any extrapyramidal symptoms (involuntary muscle movements, facial movements, eye movements). Stop risperidone if any of these symptoms are noted and transition back to olanzapine. Administer benztropine 1 mg IV/IM.     4) Will continue olanzapine 5 mg IM q8h PRN if refusing or unable to take PO medications in the setting of severe agitation    5) Reschedule melatonin to 8 PM to help maintain circadian rhythm    6) Continue 1:1 sitter    7) Will obtain VPA trough level tonight prior to evening Depakote dose    8) Will follow-up tomorrow      Julio De Jesus, JOSE-BC, APRN, CNP  Consult/Liaison Psychiatry  [facility]  Provider can be paged via [facility] Paging/Directory  If I am unavailable, please contact [facility] at [phone] to reach the on-call provider.

## 2022-05-26 NOTE — CONSULTS
[facility]    Progress Note - [facility]    ______________________________________________________________________    [facility] Contact Number: [phone]    - Providers: Please contact [facility] with changes in orders or clinical plan of care   - Nursing: Please contact [facility] with significant changes in patient condition  ______________________________________________________________________        Plan of Care Discussed with the Following:   - Nurse: Sylvia Gutierrez's Family/Preferred Contact: Rebekah Winn  - Hospice Provider: Dr. Ramon Smith    Summary of Visit (includes assessment, medications and any new orders):     Visited with patient this afternoon. Patient is in 4 point restraints r/t agitation and combative behaviors. Patient demeanor was calm and appropriate at the time of assessment. Staff reported that patient was asleep and had recently woken up. Staff report sundowning syndrome is present and patient's behaviors begin in the afternoon/evening. Did eat breakfast this AM but refused lunch. Lung sounds cl bilaterally. Heart rate RRR.  Mepilex in place to R inner ankle. Pedal pulses palpable. BS have been elevated throughout the day--possibly side effect from antipsychotic use. Patient is free of s/sx of hypo/hyperglycemia. Speech is mumbled and nonsensical, a&ox1. Unable to follow simple commands, but is alert and responsive. Multiple medication changes were made today and staff to continue to monitor for effectiveness of psychotropic medications. Medical goals for discharge are to minimize behaviors to ensure a safe discharge back to current facility. Rebekah Santos was called and updated on patient's status and medication changes. Danielle did request that the patients Melatonin be increased to 12-15mg as this was the recommended dose of his PCP d/t sleep disturbances secondary to Lewy-body dementia. This may also help  combat some of the behavioral disturbances of the patient by improving quality of sleep.     Rosalee Ramirez RN

## 2022-05-26 NOTE — PROVIDER NOTIFICATION
PATIENT BECOME COMBATIVE AND AGITATED, RESTRAINTS APPLIED 11:30 ON ALL FOUR EXTREMITIES , ALSO SEROQUEL GIVEN. ATTEMPTED TO CALM PATIENT WITH REDIRECTION DIDN'T WORK ON PATIENT. MD notified to update and for restrain order.

## 2022-05-26 NOTE — PLAN OF CARE
Summary: lewy body dementia with behavioral concerns; now inpatient hospice (however family wants to continue meds, vitals, blood sugars, etc- see MD note)    DATE & TIME: 5/25/22 3028-0075                    Cognitive Concerns/ Orientation : confused, speech illogical at times. Intermittently restless and agitated. Can be combative at times with cares. A&Ox1 (self.). lethargic slept most of the shift. Was more awake and orientated at bedtime, was able to hold conversations  BEHAVIOR & AGGRESSION TOOL COLOR: Yellow   ABNL VS/O2: VSS on room air  MOBILITY: Bedrest. Turned and reposition in bed.   PAIN MANAGMENT: patient becomes uncomfortable with cares, gave scheduled acetaminophen.   DIET:  Regular, needs assistance with eating d/t confusion. Started IVF 75mL/hr d/t lack of fluid intake yesterday. Patient didn't eat because he was lethargic, .  BOWEL/BLADDER: Hirsch in place and patent. 1 BM this shift,   ABNL LAB/BG: , 212.  DRAIN/DEVICES: LPIV infusing IVF 75 mL/hr, hirsch  SKIN:  Pale with scattered bruises on his shin. Mepilex to right inner ankle and coccyx for protection.  D/C DATE: Pending placement  OTHER IMPORTANT INFO:  patient continues with bilateral wrist restraints since 1800 on 5/24. He grabs on hirsch catheter at times when restrains are off during cares, sitter remains in room and difficult to redirect.

## 2022-05-26 NOTE — PROGRESS NOTES
Summary: lewy body dementia with behavioral concerns; now inpatient hospice (however family wants to continue meds, vitals, blood sugars, etc- see MD note)     DATE & TIME: 5/25/22 3051-8751                 Cognitive Concerns/ Orientation : confused, speech illogical at times. Intermittently restless and agitated. Can be combative at times with cares. A&Oxself  BEHAVIOR & AGGRESSION TOOL COLOR: Yellow   ABNL VS/O2: VSS on room air  MOBILITY: Bedrest. Turned and reposition in bed.   PAIN MANAGMENT: patient becomes uncomfortable with cares, gave scheduled acetaminophen.   DIET:  Regular, needs assistance with eating d/t confusion. Started IVF 75mL/hr d/t lack of fluid intake yesterday - order discontinued, saline locked upon arrival to shift.   BOWEL/BLADDER: Hirsch in place and patent. - MD note states to remove per family request can we get this clarified  ABNL LAB/BG:   DRAIN/DEVICES: L PIV SL  SKIN:  Pale with scattered bruises on his shin. Mepilex to right inner ankle and coccyx for protection.  D/C DATE: Pending placement  OTHER IMPORTANT INFO:  patient continues with bilateral wrist restraints since 1800 on 5/24. He grabs on hirsch catheter at times when restrains are off during cares, sitter remains in room and difficult to redirect. PRN seroquel and zyprexa given as pt is restless even with sitter, and soft wrist restraints

## 2022-05-26 NOTE — PLAN OF CARE
Goal Outcome Evaluation:      Summary: lewy body dementia with behavioral concerns; now inpatient hospice (however family wants to continue meds, vitals, blood sugars, etc- see MD note)    DATE & TIME: 5/26/22 9998-5932                 Cognitive Concerns/ Orientation : confused, speech illogical at times. Intermittently restless and agitated. Can be combative at times with cares. A&Ox1 (self.). lethargic slept and the end of the shift. Was more awake and orientated early in the morning, was able to hold conversations.  BEHAVIOR & AGGRESSION TOOL COLOR: Yellow   ABNL VS/O2: VSS on room air  MOBILITY: Bedrest. Turned and reposition in bed.   PAIN MANAGMENT: patient becomes uncomfortable with cares, gave scheduled acetaminophen and prn Seroquel.  DIET:  Regular, needs assistance with eating d/t confusion. Patient took all med's and intake was good.  BOWEL/BLADDER: Hirsch in place and patent. No BM.  ABNL LAB/BG: , 370.  DRAIN/DEVICES, hirsch  SKIN:  Pale with scattered bruises on his shin. Mepilex to right inner ankle and coccyx for protection.  D/C DATE: Pending placement  OTHER IMPORTANT INFO:  patient continues with al extremities restraints since  on 5/26 at 1245. He grabs on hirsch catheter at times when restrains are off during cares, sitter remains in room and difficult to redirect.

## 2022-05-27 NOTE — PLAN OF CARE
Summary: lewy body dementia with behavioral concerns; now inpatient hospice (however family wants to continue meds, vitals, blood sugars, etc- see MD note)    DATE & TIME: 5/26/22 5494-8999                 Cognitive Concerns/ Orientation : confused, speech illogical at times. Intermittently restless and agitated. Can be combative at times with cares. A&Ox1 (self.). lethargic slept for most of shift.  BEHAVIOR & AGGRESSION TOOL COLOR: Yellow   ABNL VS/O2: VSS on room air  MOBILITY: Bedrest. Turned and reposition in bed.   PAIN MANAGMENT: patient becomes uncomfortable with cares, gave scheduled acetaminophen and scheduled .risperidone  DIET:  Regular, needs assistance with eating d/t confusion. Patient took all med's and intake was good.  BOWEL/BLADDER: Hirsch in place and patent. No BM.  ABNL LAB/BG: , 296.  DRAIN/DEVICES, hirsch  SKIN:  Pale with scattered bruises on his shin. Mepilex to right inner ankle and coccyx for protection.  D/C DATE: Pending placement  OTHER IMPORTANT INFO:  patient continues with soft L&R wrist restraints since  on 5/26 at 1245. He grabs on hirsch catheter at times when restrains are off during cares, sitter remains in room and difficult to redirect.

## 2022-05-27 NOTE — PLAN OF CARE
Goal Outcome Evaluation:    Plan of Care Reviewed With: patient     Summary: lewy body dementia with behavioral concerns; now inpatient hospice (however family wants to continue meds, vitals, blood sugars, etc- see MD note)    DATE & TIME: 5/27/22 5513-9112               Cognitive Concerns/ Orientation : confused, speech illogical at times. restless and agitated and can be combative at times with cares. A&Ox1 (self.). followed simple commands.  BEHAVIOR & AGGRESSION TOOL COLOR: Yellow   ABNL VS/O2: VSS on room air  MOBILITY: Bedrest. Turned and reposition in bed.   PAIN MANAGMENT: denies  DIET:  Regular, needs assistance with eating d/t confusion. Took small sips of water when awake. Didn't eat much for breakfast but had a better appetite for lunch  BOWEL/BLADDER: Hirsch in place and patent. No BM.  ABNL LAB/BG: , 249.  DRAIN/DEVICES, hirsch  SKIN:  Pale with scattered bruises on his shin. Mepilex to right inner ankle and coccyx for protection.  D/C DATE: Pending placement  OTHER IMPORTANT INFO:  patient continues with soft L&R wrist restraints since  on 5/25 at 1245. He grabs on lines at times when restrains are off during cares, sitter remains in room and difficult to redirect.

## 2022-05-27 NOTE — CONSULTS
"    Psychiatry Consultation; Follow up          Reason for Consult, requesting source:    Follow-up medication changes  Requesting source: Raúl Stern            Interim history:    Matt Way is a 71 year old male who was admitted from hospice with worsening behavioral issues in the context of Lewy Body Dementia. Hx significant for prior LAD and RCA stents, NSTEMI, angiogram on 1/27/22 demonstrating 99% ISR of the pLAD. Psychiatry has assessed patient several times previously.    Patient seen for follow-up today. He appears somnolent and eyes remained closed during my visit. He was able to answer a few brief questions. He states \"I remember you,\" after introducing myself. He reports having slept well overnight. He has not yet eaten anything for breakfast. Is not currently agitated. No evidence of extra-pyramidal symptoms at this time. Moving limbs freely, although remains in upper extremity restraints at this time due to grabbing at his hirsch catheter.          Medications:     Current Facility-Administered Medications   Medication     acetaminophen (TYLENOL) Suppository 650 mg     acetaminophen (TYLENOL) tablet 650 mg     acetaminophen (TYLENOL) tablet 650 mg     aspirin EC tablet 81 mg     atorvastatin (LIPITOR) tablet 80 mg     atropine 1 % ophthalmic solution 2 drop     benztropine mesylate (COGENTIN) injection 1 mg     bisacodyl (DULCOLAX) Suppository 10 mg     citalopram (celeXA) tablet 10 mg     glucose gel 15-30 g    Or     dextrose 50 % injection 25-50 mL    Or     glucagon injection 1 mg     fluticasone (FLONASE) 50 MCG/ACT spray 1 spray     HYDROmorphone (STANDARD CONC) (DILAUDID) oral solution 0.5 mg     insulin aspart (NovoLOG) injection (RAPID ACTING)     insulin aspart (NovoLOG) injection (RAPID ACTING)     insulin glargine (LANTUS PEN) injection 14 Units     loperamide (IMODIUM) capsule 2 mg     melatonin tablet 10 mg     metoprolol succinate ER (TOPROL-XL) 24 hr half-tab 12.5 mg     " "naloxone (NARCAN) injection 0.1 mg     naloxone (NARCAN) injection 0.2 mg     nitroGLYcerin (NITROSTAT) sublingual tablet 0.4 mg     OLANZapine (zyPREXA) injection 5 mg     pantoprazole (PROTONIX) EC tablet 20 mg     polyethylene glycol (MIRALAX) Packet 17 g     QUEtiapine (SEROquel) half-tab 12.5 mg     QUEtiapine (SEROquel) tablet 25 mg     risperiDONE (risperDAL M-TABS) ODT tab 0.5 mg     risperiDONE (risperDAL M-TABS) ODT tab 0.5 mg     rivastigmine (EXELON) 4.6 MG/24HR 24 hr patch 1 patch     rivastigmine (EXELON) Patch in Place     senna-docusate (SENOKOT-S/PERICOLACE) 8.6-50 MG per tablet 2 tablet     sennosides (SENOKOT) tablet 2 tablet     tamsulosin (FLOMAX) capsule 0.4 mg     valproate (DEPACON) 1,000 mg in sodium chloride 0.9 % 50 mL intermittent infusion     valproate (DEPACON) 250 mg in sodium chloride 0.9 % 50 mL intermittent infusion     valproate (DEPACON) 250 mg in sodium chloride 0.9 % 50 mL intermittent infusion              MSE:     Appearance: age-appearing, dressed in hospital gown, unkempt hair, fair hygiene, in no acute distress  Behavior: calm  Eye contact: eyes remained closed  Orientation: lethargic and oriented to self only  Movements: no evidence of tics, tremors, or dystonia. No evidence of tardive dyskinesia. No evidence of akathisia.   Mood: \"good\"  Affect: blunted, restricted in range, minimal reactivity  Speech: mumbles, dysarthric, difficult to comprehend. Normal volume and tone.   Language: minimal responses, speaks English with appropriate comprehension.   Memory: recent and remote memory appear impaired  Intellectual capacity: Average for development based on word choice  Concentration: attentive  Thought process and content: disorganized, illogical at times. Does not appear to respond to internal stimuli. No evidence of delusions or paranoia at this time.   Associations: no loosening noted  Insight: limited  Judgement: impaired  Safety: risk for aggression toward others. Not " "demonstrating self-injurious behaviors.     Vital signs:  Temp: 97.8  F (36.6  C) Temp src: Axillary BP: 133/86 Pulse: 85   Resp: 17 SpO2: 98 % O2 Device: None (Room air)        Estimated body mass index is 22.63 kg/m  as calculated from the following:    Height as of 5/12/22: 1.854 m (6' 1\").    Weight as of 5/14/22: 77.8 kg (171 lb 8 oz).              DSM-5 Diagnosis:   #1 Lewy Body Dementia, with behavioral disturbance          Assessment:   Pt seen for psychiatric follow-up today. Appears to be tolerating the initiation of risperidone well without evidence of extra-pyramidal symptoms. Will need to continue to monitor for any emergence of these symptoms (involuntary muscle movements, torticollis, facial movements, ocular movements). At the recommendation of his PCP, will go ahead and increase melatonin to 10 mg at 2000. VPA level resulted at 50, which is stable from the previous draw. Will order an additional 250 mg of valproate in the afternoon. He is still receiving this via IV route. At some point, we will need to switch back to PO route.           Summary of Recommendations:   1) Patient appears to be tolerating initiation of risperidone without evidence of EPS. Will continue risperidone M-tab 0.5 mg TID for now. Continue to monitor for EPS (involuntary muscle movements, profound restlessness).     2) Continue quetiapine 25 mg at bedtime. Also available at 12.5 mg q6h PRN for acute agitation. Utilize prn risperidone as a backup option if prn quetiapine is ineffective.    3) Will increase melatonin to 10 mg nightly at 2000.     4) VPA level resulted at 50, which has remained stable. Will add an additional dose of valproate  mg at 1400 daily. Total daily dose is now 1500 mg (250 mg AM, 250 mg 1400, and 1000 mg HS).     5) Reconsult psychiatry as needed, thank you      Julio De Jesus, JOSE-BC, APRN, CNP  Consult/Liaison Psychiatry  [facility]  Provider can be paged via [facility] Paging/Directory  If I " am unavailable, please contact [facility] at [phone] to reach the on-call provider.
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
## 2022-05-27 NOTE — PLAN OF CARE
Goal Outcome Evaluation:      Summary: lewy body dementia with behavioral concerns; now inpatient hospice (however family wants to continue meds, vitals, blood sugars, etc- see MD note)    DATE & TIME: 5/27/22 8397-1114                Cognitive Concerns/ Orientation : confused, speech illogical at times. restless and agitated for several hours PRN given.. Can be combative at times with cares. A&Ox1 (self.). followed simple commands.  BEHAVIOR & AGGRESSION TOOL COLOR: Yellow   ABNL VS/O2: VSS on room air  MOBILITY: Bedrest. Turned and reposition in bed.   PAIN MANAGMENT: client refused scheduled Tylenol, became combative.   DIET:  Regular, needs assistance with eating d/t confusion. Took small sips of water when awake.  BOWEL/BLADDER: Hirsch in place and patent. No BM.  ABNL LAB/BG: , 296.  DRAIN/DEVICES, hirsch  SKIN:  Pale with scattered bruises on his shin. Mepilex to right inner ankle and coccyx for protection.  D/C DATE: Pending placement  OTHER IMPORTANT INFO:  patient continues with soft L&R wrist restraints since  on 5/25 at 1245. He grabs on hirsch catheter at times when restrains are off during cares, sitter remains in room and difficult to redirect.     Client calmed after PRN, eyes open, alert, answering simple questions, ate half a pice of pound cake and strawberries and 120 of water while watching the news. Client then fell back asleep. This removed right wrist restraint for one hour then left wrist restraint for one hour with sitter sitting at bedside.Client then became agitated again needing second PRN, effective results. Called down, ate second half of pound cake with strawberries, drank some water, answering simple questions again, denies pain. Resting comfortably in bed.

## 2022-05-28 NOTE — PROGRESS NOTES
"[facility] brief note:    Was contacted by nursing via [employer] noting pt \"getting more out of control, trying to calm him down\", not due for another IM zyprexa, inquiring about additional medications.  Pt refusing PO medications at this time.  Pt received 1 dose 5 mg IM zyprexa at 1500 today.  Will order an additional 5 mg IM zyprexa now.  Nursing notes pt has also been placed in 4-point soft restraints; ordered.     Addendum: 1930: Followed up with pt at bedside.  Upon arrival to pt's bedside, pt lying in bed, supine, eyes closed, in 4-point restraints, attempting to sit up frequently.  Performed face to face evaluation.  Nursing notes pt recently received additional IM dose of zyprexa.        PHILLIP Walter, CNP  [facility]    I spent 5 min at pt's bedside managing and coordinating pt's overall plan of care.  "

## 2022-05-28 NOTE — PLAN OF CARE
Goal Outcome Evaluation:  Plan of Care Reviewed With: patient   Overall Patient Progress: improving  Cognitive Concerns/ Orientation: confused, restless, combative/agitated during cares. reaching for the hirsch at times. Increased agitation this morning, difficult to redirect; PRN Seroquel given  BEHAVIOR & AGGRESSION TOOL COLOR: Yellow   ABNL VS/O2: VSS on room air  MOBILITY: Bedrest.   PAIN MANAGMENT: denies  DIET: Regular  BOWEL/BLADDER: Hirsch in place; patent with dark autumn urine, small amount of blood noted in tubing. Incontinent of BM x2.  ABNL LAB/BG:   DRAIN/DEVICES: PIV, Hirsch    SKIN:  Pale with scattered bruises on his shin. Scattered Scabs. Mepilex to right inner ankle and coccyx for protection.  D/C DATE: Pending placement  OTHER IMPORTANT INFO:  patient continues with soft L&R wrist restraints.

## 2022-05-28 NOTE — PLAN OF CARE
Goal Outcome Evaluation:    Plan of Care Reviewed With: patient     Summary: lewy body dementia with behavioral concerns; now inpatient hospice (however family wants to continue meds, vitals, blood sugars, etc- see MD note)  DATE & TIME: 5/28/22 4526-2483           Cognitive Concerns/ Orientation: confused, and lethargic this AM, became restless, combative/agitated later in shift. reaching for the hirsch at times. Difficult to redirect; Attempted to give PRN oral Seroquel, not successful, PRN Zyprexa IM given  BEHAVIOR & AGGRESSION TOOL COLOR: Yellow   ABNL VS/O2: VSS on room air  MOBILITY: Bedrest.   PAIN MANAGMENT: denies  DIET: Regular  BOWEL/BLADDER: Hirsch in place; patent with dark red/autumn urine, blood noted in tubing. No BMs this shift  ABNL LAB/BG: , 292  DRAIN/DEVICES: L-PIV SL, Hirsch    SKIN:  Pale with scattered bruises on his shin. Scattered Scabs. Mepilex to coccyx for protection.  D/C DATE: Pending placement  OTHER IMPORTANT INFO:  patient continues with soft L&R wrist restraints. ROM provided every 2 hrs

## 2022-05-28 NOTE — PLAN OF CARE
Summary: lewy body dementia with behavioral concerns; now inpatient hospice (however family wants to continue meds, vitals, blood sugars, etc- see MD note)    DATE & TIME: 5/27/22 5304-1856               Cognitive Concerns/ Orientation : confused, speech illogical at times. restless and agitated and can be combative at times with cares. A&Ox1 (self.). followed simple commands.  BEHAVIOR & AGGRESSION TOOL COLOR: Yellow   ABNL VS/O2: VSS on room air  MOBILITY: Bedrest. Turned and reposition in bed.   PAIN MANAGMENT: denies  DIET:  Regular, needs assistance with eating d/t confusion. Took small sips of water when awake. Had 50% of dinner  BOWEL/BLADDER: Hirsch in place and patent. No BM.  ABNL LAB/BG: , 303.  DRAIN/DEVICES, hirsch  SKIN:  Pale with scattered bruises on his shin. Mepilex to right inner ankle and coccyx for protection.  D/C DATE: Pending placement  OTHER IMPORTANT INFO:  patient continues with soft L&R wrist restraints since  on 5/25 at 1245. He grabs on lines at times when restrains are off during cares, sitter remains in room and difficult to redirect. Has soft L&R ankle restraints as needed, IV Depacon now 3x daily
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
## 2022-05-29 NOTE — PLAN OF CARE
Goal Outcome Evaluation:      Summary: lewy body dementia with behavioral concerns; now inpatient hospice (however family wants to continue meds, vitals, blood sugars, etc- see MD note)  DATE TIME:5/28/22-5/29/[id number]       Cognitive Concerns/ Orientation: confused, agitated while awake, Difficult to redirect;  BEHAVIOR & AGGRESSION TOOL COLOR: Yellow. PRN Zyprexa IM x1 and Seroquel x1 for agitation.  ABNL VS/O2: Pt was sleeping in the early night, no Vitals taken not to trigger him.  MOBILITY: Bedrest.   PAIN MANAGMENT: CHRYSTAL, PRN Dilaudid given for non-verbal signs of pain x1.  DIET: Regular  BOWEL/BLADDER: Schulte in place; patent with dark red/autumn urine, some dark blood noted in tubing. No BMs this shift  ABNL LAB/BG: non this shift  DRAIN/DEVICES: L-PIV SL, Schulte    SKIN:  Pale with scattered bruises on his shin. Scattered Scabs. Mepilex to coccyx and elbows for protection.  D/C DATE: Pending placement  OTHER IMPORTANT INFO: on 4 point soft restraint with mittens. House officer to be called for evening and overnight for early PRN meds.

## 2022-05-29 NOTE — PROGRESS NOTES
Writer visited patient to find him lying in bed with soft restraints on wrists and sitter at bedside.  Staff report patient had become increasingly agitated approximately 30 min prior to writers arrival, requiring prn medication which appears  effective as evidenced by patient resting throughout visit.  Bedside RN reports patient increasingly agitated around 12, 3 and 10 and calming presence, dim lighting and less stimulation proves to be helpful. Appreciate nursing report.  Medications are being titrated at this time for ongoing behaviors. No signs and symptoms noted of EPS.      Rosalinda FARAH RN  [facility]

## 2022-05-29 NOTE — PLAN OF CARE
Summary: lewy body dementia with behavioral concerns; now inpatient hospice (however family wants to continue meds, vitals, blood sugars, etc- see MD note)  DATE & TIME: 5/28/22 [id number]          Cognitive Concerns/ Orientation: confused, and lethargic at times, became restless, combative/agitated for half of shift. reaching for the hirsch at times. Difficult to redirect; Attempted to give PRN oral risperidone not successful, PRN Zyprexa IM given  BEHAVIOR & AGGRESSION TOOL COLOR: Yellow   ABNL VS/O2: VSS on room air  MOBILITY: Bedrest.   PAIN MANAGMENT: denies  DIET: Regular  BOWEL/BLADDER: Hirsch in place; patent with dark red/autumn urine, blood noted in tubing. No BMs this shift  ABNL LAB/BG: , 258  DRAIN/DEVICES: L-PIV SL, Hirsch    SKIN:  Pale with scattered bruises on his shin. Scattered Scabs. Mepilex to coccyx for protection.  D/C DATE: Pending placement  OTHER IMPORTANT INFO:  patient was very agitated this shift. Restraints upgraded to a 4 point soft restraint with gloves. House officer given order for early PRN IM Zyprexa for pt and came up to assess pt. House officer to be called for evening and overnight for early PRN meds.
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
## 2022-05-29 NOTE — PROGRESS NOTES
"[facility]    Progress Note - [facility]    ______________________________________________________________________    [facility] 24/7 Contact Number: [phone]    - Providers: Please contact [facility] with changes in orders or clinical plan of care   - Nursing: Please contact [facility] with significant changes in patient condition  ______________________________________________________________________        Plan of Care Discussed with the Following:   - Nurse: Salvador   - Hospitalist/Rounding Provider: - Dr. Almodovar   - Hospice Provider: Dr. Almodovar     Summary of Visit (includes assessment, medications and any new orders):     GIP for unmanaged agitation.   Hospice dx CAD.   Patient has had a difficult weekend with frequent bouts of agitation.  Patient has been requiring 2 point or 4 point restraints over the last 3 days.  1:1 sitter present at bedside.  4 PRN s x 24 hours for agitation.  Patient is not consistent with oral meds therefor difficult to manage agitation despite IV regimen.  Writer discussed with Dr. Smith the possibility of scheduling IM Zyprexa temporarily but this has not been implemented at this time. Monitoring closely for signs of EPS, none noted during visit.  Melatonin has been increased at .  Medical goals for discharge are Patient behaviors will be managed AEB < 3 PRN's for agitation/anxiety in 24 hours for 24 hours. Patient will be free of physical restraints x24 hours prior to discharge\"     Rosalinda Harvey RN  [facility]        Gracie Harvey RN  "

## 2022-05-30 NOTE — PLAN OF CARE
Summary: lewy body dementia with behavioral concerns; now inpatient hospice (however family wants to continue meds, vitals, blood sugars, etc- see MD note)  DATE TIME: 5/30/22 Day shift  Cognitive Concerns/ Orientation: confused, agitated while awake, Difficult to redirect, pt gets aggravated when doing hirsch cares; has slept all shift  BEHAVIOR & AGGRESSION TOOL COLOR: Green and Yellow at times  ABNL VS/O2: VSS on RA  MOBILITY: Bedrest- can get up with SBAx2  PAIN MANAGMENT: CHRYSTAL, Patient managed to pull hirsch out and was screaming in pain while doing it- after hirsch was out, patient was calm and fell back asleep  DIET: Regular- poor appetite- ate one pudding  BOWEL/BLADDER: Hirsch pulled out- scan of urine since (hardly any intake)- bladder scanned for 125 ml.  No BMs this shift  ABNL LAB/BG: non this shift  DRAIN/DEVICES: L-PIV SL  SKIN:  Pale with scattered bruises on his shin. Scattered Scabs. Mepilex to coccyx and elbows for protection.  D/C DATE: Pending placement  OTHER IMPORTANT INFO: on 4 point soft restraints; Pt may be experiencing pain in the alen area due to pulling out hirsch catheter. Did not have to give any PRNs this shift; did not see hospice RN this shift. MD aware of no hirsch- bladder scan for now while doing voiding trial.      Goal Outcome Evaluation:

## 2022-05-30 NOTE — PLAN OF CARE
Summary: lewy body dementia with behavioral concerns; now inpatient hospice (however family wants to continue meds, vitals, blood sugars, etc- see MD note)  DATE TIME:5/29/22 [id number]   Cognitive Concerns/ Orientation: confused, agitated while awake, Difficult to redirect, pt gets aggravated when doing hirsch cares  BEHAVIOR & AGGRESSION TOOL COLOR: Green and Yellow at times  ABNL VS/O2: VSS on RA  MOBILITY: Bedrest.   PAIN MANAGMENT: CHRYSTAL, Patient shows no signs or symptoms of pain.    DIET: Regular  BOWEL/BLADDER: Hirsch in place; patent with dark red/autumn urine, some dark blood noted in tubing. No BMs this shift  ABNL LAB/BG: non this shift  DRAIN/DEVICES: L-PIV SL, Hirsch    SKIN:  Pale with scattered bruises on his shin. Scattered Scabs. Mepilex to coccyx and elbows for protection.  D/C DATE: Pending placement  OTHER IMPORTANT INFO: on 4 point soft restraint with mittens. Pt may be experiencing pain in the alen area due to previously pulling on hirsch cath.  Patient awake half the night, when awake was agitated and yelling out, trying to pull on catheter.  Patient refusing to take oral medications.

## 2022-05-30 NOTE — PROVIDER NOTIFICATION
Notified Dr. Almodovar that patient pulled out hirsch (2nd one this stay). Asking for a voiding trial since patient is always trying to pull out hirsch. Waiting for response.

## 2022-05-30 NOTE — PLAN OF CARE
Goal Outcome Evaluation:    Plan of Care Reviewed With: patient     Summary: lewy body dementia with behavioral concerns; now inpatient hospice (however family wants to continue meds, vitals, blood sugars, etc- see MD note)  DATE TIME:5/29/22 [id number]       Cognitive Concerns/ Orientation: confused, agitated while awake, Difficult to redirect, pt gets aggravated when doing hirsch cares  BEHAVIOR & AGGRESSION TOOL COLOR: green and Yellow at times  ABNL VS/O2: VSS on RA  MOBILITY: Bedrest.   PAIN MANAGMENT: CHRYSTAL, pt asleep most of the shift, seems like when he is awake he is in pain, pain meds   DIET: Regular  BOWEL/BLADDER: Hirsch in place; patent with dark red/autumn urine, some dark blood noted in tubing. No BMs this shift  ABNL LAB/BG: non this shift  DRAIN/DEVICES: L-PIV SL, Hirsch    SKIN:  Pale with scattered bruises on his shin. Scattered Scabs. Mepilex to coccyx and elbows for protection.  D/C DATE: Pending placement  OTHER IMPORTANT INFO: on 4 point soft restraint with mittens. House officer to be called for evening and overnight for early PRN meds. Pt may be experiencing pain in the alen area due to previously pulling on hirsch cath

## 2022-05-30 NOTE — CONSULTS
Went to visit pt at appr 1pm and again around 5:30pm. Patient was asleep when writer visited. Per staff report patient has been asleep all shift. Required PRNs x2 today, Risperidone at 0256 and Seroquel at 0616. Patient pulled out his Schulte catheter early this morning and a trial void period has been started. Patient has had no episodes of agitation since Schulte was pulled out. BG remain elevated. Patient is no longer on restraints at this time. Pt ate x2 pudding cups today and sips of fluids. 175cc urine output noted today prior to removal of Schulte. No output noted since Schulte was pulled out. Gave update to pt daughter Danielle on patient status the evening.     --Continue to monitor patient somnolence and make medication adjustments as indicated  --Continue to monitor urine output and replace Schulte as needed    Thank you,    Rosalee Ramirez RN  [phone]
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
## 2022-05-31 NOTE — PROGRESS NOTES
This morning patient was sleeping well, awake by [id number]. Patient got a bath, was able to state he needed to urinate and used a urinal. Patient was able to take all his morning medications crushed in pudding well. However, by the time breakfast came, patient was asleep again.    Had a talk with hospice RN to see what the plan is- patient must be off restraints, off a sitter and a place must be found for patient to discharge. Patient has been in 4 point restraints since admission. Every chance we get to take them off, patient starts hitting us. Not redirectional at all. It seems patient is either sedated or trying to get out of restraints and yelling. Rn stated she would update daughter.     By afternoon, patient was yelling for not reason. Had to give him IM Zyprexa due to spitting out the Risperdal sublingual.     At this time, patient has had 2 of his scheduled doses of risperdal and one IM zyprexa. I tried to take off restraints to see how he would act, but patient tried to get out of bed, started kicking at us, spitting, grabbing our hands and not letting us. Had to put restraints back on- all 4.
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
## 2022-05-31 NOTE — PLAN OF CARE
Goal Outcome Evaluation:    Plan of Care Reviewed With: patient     Overall Patient Progress: no change         Summary: lewy body dementia with behavioral concerns; now inpatient hospice (however family wants to continue meds, vitals, blood sugars, etc- see MD note)  DATE TIME: 5/30/22 PM shift  Cognitive Concerns/ Orientation: confused. Patient sedated/sleeping most of the shift until around 10pm patient started to be agitated kicking trying to get out of bed, 4 points restraint applied.  BEHAVIOR & AGGRESSION TOOL COLOR: yellow  ABNL VS/O2: VSS on RA  MOBILITY: Bedrest.  PAIN MANAGMENT: CHRYSTAL, gave scheduled Tylenol.  DIET: Regular- poor appetite. Likes pudding. Total feed.  BOWEL/BLADDER: incontinent of urine.  No BMs this shift  ABNL LAB/BG: /249  DRAIN/DEVICES: L-PIV SL  SKIN:  Pale with scattered bruises on his shin. Scattered Scabs. Mepilex to coccyx and elbows for protection.  D/C DATE: Pending placement  OTHER IMPORTANT INFO: Seen by hospice nurse this shift.

## 2022-05-31 NOTE — PLAN OF CARE
Summary: lewy body dementia with behavioral concerns; now inpatient hospice (however family wants to continue meds, vitals, blood sugars, etc- see MD note)  DATE TIME: 5/31/22 Day shift  Cognitive Concerns/ Orientation: Confused, disoriented x4. Restless at times. Responding to internal stimuli; hallucinations. Inconsistent command following  BEHAVIOR & AGGRESSION TOOL COLOR: Yellow  ABNL VS/O2: VSS on RA  MOBILITY: Bedrest  PAIN MANAGMENT: Denies  DIET: Regular- no breakfast, but 100% of lunch. Likes pudding. Total feed.  BOWEL/BLADDER: Incontinent at times- uses urinal   ABNL LAB/BG: BGMs 104/265  DRAIN/DEVICES: L-PIV SL  SKIN: Scattered bruising and scabbing- kendrick BLE  D/C DATE: Depending on use of restraints, sitter and behavioral issues  OTHER IMPORTANT INFO: 4 point restraints use maintained. Hospice following

## 2022-05-31 NOTE — PLAN OF CARE
Goal Outcome Evaluation:    Plan of Care Reviewed With: patient     Overall Patient Progress: no change    DATE TIME: 5/30/22 [id number]  Cognitive Concerns/ Orientation: Confused, disoriented x4. Restless at times. PRN Risperidone given. Inconsistent command following  BEHAVIOR & AGGRESSION TOOL COLOR: Yellow  ABNL VS/O2: VSS on RA  MOBILITY: Bedrest  PAIN MANAGMENT: Denies  DIET: Regular- poor appetite. Likes pudding. Total feed.  BOWEL/BLADDER: Incontinent at times  ABNL LAB/BG:   DRAIN/DEVICES: L-PIV SL  SKIN: Scattered bruising and scabbing  D/C DATE: Pending placement  OTHER IMPORTANT INFO: 4 point restraints use maintained. Hospice following

## 2022-06-01 NOTE — PLAN OF CARE
Summary: lewy body dementia with behavioral concerns; now inpatient hospice (however family wants to continue meds, vitals, blood sugars)  DATE TIME: 06/01/22 AM shift  Cognitive Concerns/ Orientation: mostly sleeping; briefly opens his eyes when asked or called by name. Unable to assess orientation completely; sitter reports some hallucinations noted. Restless at time.   BEHAVIOR & AGGRESSION TOOL COLOR: Yellow  ABNL VS/O2: VSS on RA; hypertensive this morning.   MOBILITY: Bedrest; T/R q2hr. Pt is in 4 point restraints.   PAIN MANAGMENT: appears comfortable when at rest; shows signs of discomfort during incontinence cares.   DIET: Regular- poor appetite. Likes pudding, pills crushed with pudding as well. Total feed.  BOWEL/BLADDER: Incontinent of urine this shift. Pt does use urinal at times.   ABNL LAB/BG: BGMs 197/220  DRAIN/DEVICES: L-PIV SL, pt continues on IV Valproate.   SKIN: Scattered bruising and scabbing- kendrick BLE. Mepilex to coccyx, c/d/i  D/C DATE: Depending on use of restraints, sitter and behavioral issues. Psych is following. Last psych consult pt was started on Risperidone and scheduled olanzapine was discontinued. PRN IM Zyprexa still available and should be only used when pt cant/refuse to take oral medications. PRN Seroquel available q6hr as well.   OTHER IMPORTANT INFO: 4 point restraints use maintained. Hospice is following
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
## 2022-06-01 NOTE — PROGRESS NOTES
MD Notification    Notified Person: MD    Notified Person Name: Valerie    Notification Date/Time: 5/31/22 2004    Notification Interaction: [employer] Webpaging    Purpose of Notification: Pt on 4-pt restraint, order needs renewal tonight. Could you come assess pt? RN tried taking off restraints earlier in the day, pt became increasingly agitated and tried to get out of bed.     Orders Received: Pending    Comments:

## 2022-06-01 NOTE — CARE PLAN
2244-8230: Alert to self only. VSS. Pain only noted when repositioning. Incontinent of bladder. Restraints in place to wrists and ankles. Frequently yells out and struggles against restraints. Total feed. When out of restraints for incontinence care, pt grabs and yells. Plan to continue to monitor tonight.

## 2022-06-01 NOTE — PLAN OF CARE
Goal Outcome Evaluation:                    Summary: lewy body dementia with behavioral concerns; now inpatient hospice (however family wants to continue meds, vitals, blood sugars, etc- see MD note)  DATE TIME: 5/31/22-6/1/22 2061-5151  Cognitive Concerns/ Orientation: Confused, disoriented x4. Restless at times. Responding to internal stimuli; hallucinations. Inconsistent command following  BEHAVIOR & AGGRESSION TOOL COLOR: Yellow  ABNL VS/O2: VSS on RA  MOBILITY: Bedrest  PAIN MANAGMENT: Denies  DIET: Regular. Likes pudding. Total feed.  BOWEL/BLADDER: Incontinent at times- uses urinal   ABNL LAB/BG: BGMs 272/263  DRAIN/DEVICES: L-PIV SL  SKIN: Scattered bruising and scabbing- kendrick BLE  D/C DATE: Depending on use of restraints, sitter and behavioral issues  OTHER IMPORTANT INFO: 4 point restraints use maintained. Hospice following. Takes meds crushed in pudding
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
## 2022-06-02 NOTE — PROGRESS NOTES
This writer visited Matt for daily GIP assessment.  Patient is in bed with 4 point restraints and 1:1 sitter for ongoing agitation.  Patient pulled his hirsch on 5/30 and has continued to void independently.  Hirsch remains discontinued at this time.  Patient sleeping during assessment.  Staff report that patient shows signs of discomfort during alen care.  Please embrace that patient could have underlying pain due to trauma from pulling hirsch and signs of increased agitation could be related.  Please administer PRN pain medication prior to alen care x 12 hours for optimal comfort.  Patient required 6 PRN medications for agitation <24 hours.  Please continue to provide calm environment with minimal stimulation for patient.  Goals for discharge require patient to be free from sitter and restraints with use of less than 3 PRN's in 24 hours for 24 hours.  Hospice consulting psych for further recommendations / interventions to facilitate appropriate transfer to SNF as soon as patient is medically ready.       Rosalinda Harvey RN  [facility]

## 2022-06-02 NOTE — PLAN OF CARE
Goal Outcome Evaluation:   Summary: lewy body dementia with behavioral concerns; now inpatient hospice (however family wants to continue meds, vitals, blood sugars)  DATE TIME: 06/02/22 0817-5813  Cognitive Concerns/ Orientation: Confused. Slept for part of morning, Restless when awake.  BEHAVIOR & AGGRESSION TOOL COLOR: Yellow  ABNL VS/O2: VSS on RA   MOBILITY: Bedrest; T/R q2hr. Bilateral wrist restraints in use.   PAIN MANAGMENT: Absence of nonverbal pain indicators, but appears uncomfortable with turning with cares  DIET: Regular- poor appetite. Sleepy this morning and only ate a few small bites of breakfast, awaiting lunch. Total feed.  BOWEL/BLADDER: Incontinent of urine this shift. Pt does use urinal at times.   ABNL LAB/BG:  and 176  DRAIN/DEVICES: L-PIV SL with TID IV Depacon  SKIN: Scattered bruising and scabbing- kendrick BLE. Mepilex to coccyx changed-nonblanchable redness noted  D/C DATE: Pending   Discharge barriers: Restraints, behaviors, bedside attendant   OTHER IMPORTANT INFO: Hospice following.  Psych to re-consult regarding medications.  Bilateral wrist restraints on, CMS intact.

## 2022-06-02 NOTE — PLAN OF CARE
Goal Outcome Evaluation:    Plan of Care Reviewed With: patient     Overall Patient Progress: no change    DATE TIME: 06/01/22 6994-8411  Cognitive Concerns/ Orientation: Confused. Restless when awake. PRN Risperidone given x1  BEHAVIOR & AGGRESSION TOOL COLOR: Yellow  ABNL VS/O2: VSS on RA   MOBILITY: Bedrest; T/R q2hr. Bilateral wrist restraints in use.   PAIN MANAGMENT: Absence of nonverbal pain indicators, denies   DIET: Regular- poor appetite. Likes pudding, pills crushed with pudding as well. Total feed.  BOWEL/BLADDER: Incontinent of urine this shift. Pt does use urinal at times.   ABNL LAB/BG:  & 210  DRAIN/DEVICES: L-PIV SL with TID IV Depacon  SKIN: Scattered bruising and scabbing- kendrick BLE. Mepilex to coccyx, c/d/i  D/C DATE: Pending   Discharge barriers: Restraints, behaviors, bedside attendant   OTHER IMPORTANT INFO: Hospice following

## 2022-06-03 NOTE — PLAN OF CARE
Summary: lewy body dementia with behavioral concerns; now inpatient hospice (however family wants to continue meds, vitals, blood sugars)  DATE TIME: 06/02/22 8805-7080  Cognitive Concerns/ Orientation: Confused. Slept for part of shift, Restless when awake.  BEHAVIOR & AGGRESSION TOOL COLOR: Yellow  ABNL VS/O2: VSS on RA   MOBILITY: Bedrest; T/R q2hr. Bilateral wrist restraints in use.   PAIN MANAGMENT: Absence of nonverbal pain indicators, but appears uncomfortable with turning with cares  DIET: Regular- decent appetite.Total feed.  BOWEL/BLADDER: Incontinent of urine this shift. Pt does use urinal at times.   ABNL LAB/BG: , 296  DRAIN/DEVICES: L-PIV SL with TID IV Depacon  SKIN: Scattered bruising and scabbing- kendrick BLE. Mepilex to coccyx changed-nonblanchable redness noted  D/C DATE: Pending   Discharge barriers: Restraints, behaviors, bedside attendant   OTHER IMPORTANT INFO: Hospice following.  Psych to re-consult regarding medications.  Bilateral wrist restraints on, CMS intact. Family visited this afternoon. PRN IV zyprexa given for agitation, Facial swelling seen on right side of face, charge nurse notified.

## 2022-06-03 NOTE — PLAN OF CARE
DATE & TIME: 6/2/22, 2300 - 9205   Cognitive Concerns/ Orientation : Disoriented x 4, confused. Restless/agitated at time   BEHAVIOR & AGGRESSION TOOL COLOR: Yellow  CIWA SCORE: NA   ABNL VS/O2: VSS on room air  MOBILITY: Bedrest, turned and repositioned when sedated/somnolent  PAIN MANAGMENT: Grimaces and sometimes ridgid with turns. Scheduled Tylenol given  DIET: Regular  BOWEL/BLADDER: Incontinent of urine. No BM  ABNL LAB/BG:   DRAIN/DEVICES: PIV SL  TELEMETRY RHYTHM: NA  SKIN: Scattered bruises and scabs, Scratch marks to thighs  TESTS/PROCEDURES: NA  D/C DATE: Pending  Discharge Barriers: Restraints, behaviors, bedside attendant  OTHER IMPORTANT INFO: Swelling to right face still present. Prn Seroquel given for increasing restlessness/agitated. Helpful. Soft restraints in place to both wrist. Sitter at bedside    Goal Outcome Evaluation:    Plan of Care Reviewed With: patient     Overall Patient Progress: no change

## 2022-06-03 NOTE — CONSULTS
Psychiatry Consultation; Follow up          Reason for Consult, requesting source:    F/U meds for agitation  Requesting source: Reagan Almodovar            Interim history:    Matt Way is a 71 year old male who was admitted from hospice with worsening behavioral issues in the context of Lewy Body Dementia. Hx significant for prior LAD and RCA stents, NSTEMI, angiogram on 1/27/22 demonstrating 99% ISR of the pLAD. Psychiatry has assessed patient several times previously.    Patient seen for psychiatric follow-up today. He was somnolent this morning, with eyes closed, unable to participate in interview. Not answering questions. No evidence of EPS. No rigidity in extremities. Does not appear to be experiencing akathisia. Patient required a prn overnight last night, just before 0400. He also received a dose of IM olanzapine yesterday evening at 1800. Continues to struggle with agitation and aggression toward others.          Medications:     Current Facility-Administered Medications   Medication     acetaminophen (TYLENOL) Suppository 650 mg     acetaminophen (TYLENOL) tablet 650 mg     acetaminophen (TYLENOL) tablet 650 mg     aspirin EC tablet 81 mg     atorvastatin (LIPITOR) tablet 80 mg     atropine 1 % ophthalmic solution 2 drop     benztropine mesylate (COGENTIN) injection 1 mg     bisacodyl (DULCOLAX) Suppository 10 mg     citalopram (celeXA) tablet 10 mg     glucose gel 15-30 g    Or     dextrose 50 % injection 25-50 mL    Or     glucagon injection 1 mg     fluticasone (FLONASE) 50 MCG/ACT spray 1 spray     HYDROmorphone (STANDARD CONC) (DILAUDID) oral solution 0.5 mg     insulin aspart (NovoLOG) injection (RAPID ACTING)     insulin aspart (NovoLOG) injection (RAPID ACTING)     insulin glargine (LANTUS PEN) injection 22 Units     loperamide (IMODIUM) capsule 2 mg     melatonin tablet 10 mg     metoprolol succinate ER (TOPROL-XL) 24 hr half-tab 12.5 mg     naloxone (NARCAN) injection 0.1 mg      "naloxone (NARCAN) injection 0.2 mg     nitroGLYcerin (NITROSTAT) sublingual tablet 0.4 mg     OLANZapine (zyPREXA) injection 5 mg     pantoprazole (PROTONIX) EC tablet 20 mg     polyethylene glycol (MIRALAX) Packet 17 g     QUEtiapine (SEROquel) half-tab 12.5 mg     QUEtiapine (SEROquel) tablet 25 mg     risperiDONE (risperDAL M-TABS) ODT tab 0.5 mg     risperiDONE (risperDAL M-TABS) ODT tab 0.5 mg     rivastigmine (EXELON) 4.6 MG/24HR 24 hr patch 1 patch     rivastigmine (EXELON) Patch in Place     senna-docusate (SENOKOT-S/PERICOLACE) 8.6-50 MG per tablet 2 tablet     sennosides (SENOKOT) tablet 2 tablet     tamsulosin (FLOMAX) capsule 0.4 mg     valproate (DEPACON) 1,250 mg in sodium chloride 0.9 % 50 mL intermittent infusion     [START ON 6/4/2022] valproate (DEPACON) 250 mg in sodium chloride 0.9 % 50 mL intermittent infusion     valproate (DEPACON) 500 mg in sodium chloride 0.9 % 50 mL intermittent infusion              MSE:     Appearance: lying in bed, wearing hospital gown, adequate hygiene and grooming, in no apparent distress  Behavior: calm  Eye contact: eyes remained closed throughout interview  Orientation: lethargic and unable to assess orientation  Movements: did not observe any tics, tremors, or dystonia. No cogwheeling  Mood: unable to assess  Affect: blunted, restricted range, stable  Speech: did not respond to any questions  Language: did not speak  Memory: unable to assess  Concentration: unable to assess  Thought process and content: unable to assess  Associations: unable to assess  Insight: impaired  Judgement: impaired  Safety: risk for aggression toward others; no self-injurious behaviors    Vital signs:  Temp: 98.4  F (36.9  C) Temp src: Axillary BP: 133/80 Pulse: 87   Resp: 18 SpO2: 97 % O2 Device: None (Room air)     Weight: 79.7 kg (175 lb 12.8 oz)  Estimated body mass index is 23.19 kg/m  as calculated from the following:    Height as of 5/12/22: 1.854 m (6' 1\").    Weight as of this " encounter: 79.7 kg (175 lb 12.8 oz).              DSM-5 Diagnosis:   #1 Lewy Body Dementia, with behavioral disturbance          Assessment:   Pt seen for psychiatric follow-up today. During last visit on 5/27, switched from scheduled olanzapine to risperidone. Patient has not demonstrated much improvement and continues to require multiple PRNs throughout the day. It seems as though his behaviors have not improved with quetiapine, olanzapine, or risperidone. No current evidence of EPS. VPA level on 5/26 was 50. Will go ahead and increase VPA to a total of 2000 mg total daily dose -  from 250 mg BID and 1000 mg at bedtime to 500 mg AM, 500 mg at 1400, and 750 mg at bedtime. Will obtain a repeat VPA level early next week.           Summary of Recommendations:   1) Most recent VPA level reviewed - 50 on 5/26/22. Will go ahead and increase by another 500 mg total. Scheduled as 500 mg AM, 500 mg at 1400, and 750 mg at night (total daily dose 2000 mg).     2) Will order a repeat VPA level on Monday, prior to his evening dose    3) Continue risperidone 0.5 mg TID - no current evidence of EPS. If demonstrating EPS (involuntary muscle movements, profound restlessness), administer IM benztropine and switch back to olanzapine. Continue quetiapine 25 mg at bedtime. Continue PRNs as written for agitation.    4) Continue reduced dose of citalopram 10 mg daily    5) Reconsult psychiatry as needed, thank you      Julio De Jesus, JOSE-BC, APRN, CNP  Consult/Liaison Psychiatry  [facility]  Provider can be paged via [facility] Paging/Directory  If I am unavailable, please contact [facility] at [phone] to reach the on-call provider.

## 2022-06-03 NOTE — PROGRESS NOTES
Hospice consulted with hospice medical director regarding current plan of care.  Patient required 6 PRN's for ongoing agitation over the last 24 hours.  Psych has been asked to re-visit patient on Friday for further medication recommendations keeping in mind the goal for discharge once behaviors are able to be safely managed.      Rosalinda FARAH RN  [facility]

## 2022-06-03 NOTE — PLAN OF CARE
Summary: lewy body dementia with behavioral concerns; now inpatient hospice (however family wants to continue meds, vitals, blood sugars)  DATE TIME: 06/03/22 AM shift  Cognitive Concerns/ Orientation: Sleeping most of the shift; gets restless when needs to urinate or when wet. Briefly opens his eyes when asked or called by name. Unable to assess orientation completely  BEHAVIOR & AGGRESSION TOOL COLOR: Green, yellow at times. Two point restraints continued.   ABNL VS/O2: VSS on RA; LS clear.   MOBILITY: Bedrest; T/R q2hr.   PAIN MANAGMENT: appears comfortable when at rest; shows signs of discomfort during incontinence cares.   DIET: Regular- poor appetite. Likes pudding, pills crushed with pudding as well. Total feed.  BOWEL/BLADDER: Incontinent of urine this shift. Pt does use urinal at times. No BM this shift.    ABNL LAB/BG: BGMs 228/278  DRAIN/DEVICES: L-PIV SL, pt continues on IV Valproate (dose increased by psych).   SKIN: Scattered bruising and scabbing- kendrick BLE. Mepilex to coccyx, changed this shift.  D/C DATE: Depending on use of restraints, sitter and behavioral issues. Psych seen pt today; IV Valproate dose changed; VPA levels will be checked on Monday. Continue risperidone 0.5 mg TID; PRN PO Seroquel and IM Zyprexa available for agitation.   OTHER IMPORTANT INFO: Hospice is following
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
## 2022-06-04 NOTE — PLAN OF CARE
Summary: lewy body dementia with behavioral concerns; now inpatient hospice (however family wants to continue meds, vitals, blood sugars)  DATE TIME: 06/03/22 4229-7217  Cognitive Concerns/ Orientation: Sleeping most of the shift; gets restless when needs to urinate or when wet. Briefly opens his eyes when asked or called by name. Unable to assess orientation completely  BEHAVIOR & AGGRESSION TOOL COLOR: Green, yellow at times. Two point restraints continued.   ABNL VS/O2: VSS on RA;   MOBILITY: Bedrest; T/R q2hr.   PAIN MANAGMENT: appears comfortable when at rest; shows signs of discomfort during incontinence cares.   DIET: Regular- poor appetite. Likes pudding, pills crushed with pudding as well. Total feed. Unable to awaken for dinner  BOWEL/BLADDER: Incontinent of urine this shift. Offererd Pt urinal, was not responsive, No BM this shift.    ABNL LAB/BG: BGMs 255/263  DRAIN/DEVICES: L-PIV SL, pt continues on IV Valproate (dose increased by psych).   SKIN: Scattered bruising and scabbing- kendrick BLE. Mepilex to coccyx, changed this shift.  D/C DATE: Depending on use of restraints, sitter and behavioral issues. Psych seen pt today; IV Valproate dose changed; VPA levels will be checked on Monday. Continue risperidone 0.5 mg TID; PRN PO Seroquel and IM Zyprexa available for agitation.   OTHER IMPORTANT INFO: Hospice is following, given PRN Seroquel for agitation

## 2022-06-04 NOTE — PLAN OF CARE
Summary: lewy body dementia with behavioral concerns; now inpatient hospice (however family wants to continue meds, vitals, blood sugars)  DATE TIME: 06/03/22 6403-1835  Cognitive Concerns/ Orientation: Slept well overnight, didn't wake him up, let patient sleep.     BEHAVIOR & AGGRESSION TOOL COLOR: Green, Two point restraints continued.   ABNL VS/O2: VSS on RA;   MOBILITY: Bedrest; self positioning.    PAIN MANAGMENT: appears comfortable when at rest  DIET: Regular- poor appetite. Likes pudding, pills crushed with pudding as well. Total feed.   BOWEL/BLADDER: Incontinent of urine this shift.   ABNL LAB/BG: BGMs 195  DRAIN/DEVICES: L-PIV SL, pt continues on IV Valproate (dose increased by psych).   SKIN: Scattered bruising and scabbing- kendrick BLE. Mepilex to coccyx, changed this shift.  D/C DATE: Depending on use of restraints, sitter and behavioral issues. Psych seen pt today; IV Valproate dose changed; VPA levels will be checked on Monday. Continue risperidone 0.5 mg TID; PRN PO Seroquel and IM Zyprexa available for agitation.   OTHER IMPORTANT INFO: Hospice is following, No PRN's needed overnight.
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
## 2022-06-04 NOTE — PROGRESS NOTES
[facility]    Progress Note - [facility]    ______________________________________________________________________    [facility]7 Contact Number: [phone]    - Providers: Please contact [facility] with changes in orders or clinical plan of care   - Nursing: Please contact [facility] with significant changes in patient condition  ______________________________________________________________________        Plan of Care Discussed with the Following:   - Nurse: Sushma Sapp RN   - Hospitalist/Rounding Provider:     Poppy Lloyd MD    Hospitalist - Internal Medicine    Since 6/2/2022    [phone]       - Matt's Family/Preferred Contact: Danielle daughter [phone]  - Hospice Provider: Ruby Harrington RN    Summary of Visit (includes assessment, medications and any new orders):        Hospice patient with Lewy-body dementia and CAD as an inpatient. He remains eligible for hospice due to symptoms of ongoing symptom management for behaviors- agitation. Patient was awake during encounter and was able to answer simple questions at times. He was non-directable when staff and writer assisted with personal cares during encounter. He needed 2 point soft restraints as he was trying to reposition to get out of bed. He continues to have 1:1 sitter 24/7.         Psychiatric consult from 6/03/2022 made recommendations to increase Depacon 2000 mg IV administration over 24 hours. He will have  a repeat VPA level on Monday 6/06, prior to his evening dose.  Patient reportedly slept thru the night and morning since increasing the dosage. Staff reported to writer that he was not agitated and received 1 PRN dose for agitation over the past 24 hours.        Call placed to daughter to update her on patients new orders and medications administered. She reportedly visited patient in the AM and he did not respond to her when she was present for one hour.  Writer requested if several medications could be discontinued. She agreed to most medications except novolog, lantus given subQ and depacon which is given by IV. Daughter, Danielle stated she wanted to discuss with her family about discontinuing insulin and returning back to metformin which was administered in the community setting. Writer to call daughter tomorrow and to recommend switching over to oral depacon and oral metformin. She also requested if the grandchildren could visit and range in ages from 5-11. Writer educated her on hospital restrictions that visits must be 12 and older.        Writer called hospitalist and stated daughter wanted to discontinue unnecessary oral medications on the hospice program. Flomax will remain as this is used for symptoms of urinary retention. Otherwise discontinuation of nitroglycerin, protonix, lipitor, EC aspirin, and flonase were recommended to discontinue.     Discharge instructions:       Goals for hospital discharge:   1. Agitation Managed: Three or <PRN medications administered for 2 consecutive days in a row     2.  Free of restraints for > 24 hours   3.  Free of 1:1 sitter > 24 hours   4.  All medications are orally administered.        Currently goals are not being met. No formal discharge plans for facility placement in place until fore-mentioned goals are achieved.     Ruby Harrington RN

## 2022-06-05 NOTE — PLAN OF CARE
Summary: lewy body dementia with behavioral concerns; now inpatient hospice (however family wants to continue meds, vitals, blood sugars)  DATE TIME: 06/05/22 5501-4988  Cognitive Concerns/ Orientation: Restless most of shift, unable to redirect.   BEHAVIOR & AGGRESSION TOOL COLOR: Green, Two point restraints continued.   ABNL VS/O2: VSS on RA  MOBILITY: Bedrest; self positioning.    PAIN MANAGMENT: appears comfortable when at rest  DIET: Regular- fair-poor appetite. Likes pudding, pills crushed with pudding as well. Total feed.   BOWEL/BLADDER: Incontinent of urine this shift; no BM  ABNL LAB/BG: BGMs 238/239/330  DRAIN/DEVICES: IV replaced by IV team yesterday- WDL   SKIN: Scattered bruising and scabbing- kendrick BLE; mepilex to coccyx, taken off my patient; L elbow blanchable and mepilex applied  D/C DATE: Depending on use of restraints, sitter and behavioral issues. Psych seen and  IV Valproate dose changed; VPA levels will be checked on Monday. Continue risperidone 0.5 mg TID; PRN PO Seroquel and IM Zyprexa available for agitation- IM given this shift as patient unable to calm down and pulling at wrist restraints so hard.   OTHER IMPORTANT INFO: Hospice is following and updating daughter daily

## 2022-06-05 NOTE — CONSULTS
[facility]    Progress Note - [facility]    ______________________________________________________________________    [facility] 24/7 Contact Number: [phone]    - Providers: Please contact [facility] with changes in orders or clinical plan of care   - Nursing: Please contact [facility] with significant changes in patient condition  ______________________________________________________________________        Plan of Care Discussed with the Following:   - Nurse: Sushma Sapp  - Hospitalist/Rounding Provider:      Poppy Lloyd MD    Hospitalist - Internal Medicine    Since 6/2/2022    [phone]     - Matt's Family/Preferred Contact: Danielle daughter  - Hospice Provider: Ruby Harrington [phone]    Summary of Visit (includes assessment, medications and any new orders):   Patient received 2 PRN doses for agitation over the past 24 hours. His IV access was replaced and missed a scheduled dose of Depacon. Writer and sitter were at bedside and past was awake to eat 10% of his lunch and take his oral medications. He was not able to follow commands and is not redirectable while awake. Continues to require a 1:1 sitter due to his unpredictable behavior. He requested to get up and go to the bathroom to urinate but is very deconditioned and staff are uncomfortable to get him out of bed. Due to continued symptom management he is still eligible for inpatient hospice.        Writer discussed with daughter patients current condition. She did not speak to her family about Neto status. She also reported her mother, Neto spouse, is at [facility] currently. She wants to discuss with family plan of care with siblings to move forward to consider discontinuation of insulin, blood sugar checks and possibly switching over to oral medications verses IV infusions. Also suggested a Ethics committee meeting to discuss best plan of care for Matt. She  reports she wanted to talk to her siblings before anything changes.     Discharge plans:     Goals for hospital discharge:   1. Agitation Managed: Three or <PRN medications administered for 2 consecutive days in a row     2.  Free of restraints for > 24 hours   3.  Free of 1:1 sitter > 24 hours   4.  All medications are orally administered.        Matt has not required  < 3 PRN medications for 2 consecutive days. Other goals are not being met. No formal discharge plans for facility placement in place until all goals are achieved.       Ruby Harrington RN

## 2022-06-05 NOTE — PLAN OF CARE
Summary: lewy body dementia with behavioral concerns; now inpatient hospice (however family wants to continue meds, vitals, blood sugars)  DATE TIME: 06/04/22 2301-1587   Cognitive Concerns/ Orientation: Slept well most shift; but when awake combative    BEHAVIOR & AGGRESSION TOOL COLOR: Green, Two point restraints continued.   ABNL VS/O2: VSS on RA;   MOBILITY: Bedrest; self positioning.    PAIN MANAGMENT: appears comfortable when at rest  DIET: Regular- fair appetite. Likes pudding, pills crushed with pudding as well. Total feed.   BOWEL/BLADDER: Incontinent of urine this shift; no BM  ABNL LAB/BG: BGMs 177/230/265  DRAIN/DEVICES: No IV- tried x1, will try again  SKIN: Scattered bruising and scabbing- kendrick BLE. Mepilex to coccyx, changed this shift.  D/C DATE: Depending on use of restraints, sitter and behavioral issues. Psych seen  IV Valproate dose changed; VPA levels will be checked on Monday. Continue risperidone 0.5 mg TID; PRN PO Seroquel and IM Zyprexa available for agitation.   OTHER IMPORTANT INFO: Hospice is following, No PRN's needed overnight. Daughter visited for an hour but did not check in with nursing staff
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
## 2022-06-05 NOTE — PLAN OF CARE
Summary: lewy body dementia with behavioral concerns; now inpatient hospice (however family wants to continue meds, vitals, blood sugars)  DATE TIME: 06/04/22 6816-4128   Cognitive Concerns/ Orientation: Slept well off and on; but when awake combative   BEHAVIOR & AGGRESSION TOOL COLOR: Green, Two point restraints continued.   ABNL VS/O2: VSS on RA; unable to get vitals due to restlessness.    MOBILITY: Bedrest; self positioning.    PAIN MANAGMENT: appears comfortable when at rest  DIET: Regular- fair appetite. Likes pudding, pills crushed with pudding as well. Total feed.   BOWEL/BLADDER: Incontinent of urine this shift; no BM  ABNL LAB/BG: BGMs 215, 220, bedtime Lantus not given as it wasn't available.    DRAIN/DEVICES: IV replaced by IV team on evening shift.    SKIN: Scattered bruising and scabbing- kendrick BLE. Mepilex to coccyx, changed on days.  D/C DATE: Depending on use of restraints, sitter and behavioral issues. Psych seen  IV Valproate dose changed; VPA levels will be checked on Monday. Continue risperidone 0.5 mg TID; PRN PO Seroquel and IM Zyprexa available for agitation.   OTHER IMPORTANT INFO: Hospice is following, patient didn't need PRN's overnight.

## 2022-06-06 NOTE — PLAN OF CARE
Summary: lewy body dementia with behavioral concerns; now inpatient hospice (however family wants to continue meds, vitals, blood sugars)  DATE TIME: 06/05/22 8368-7542  Cognitive Concerns/ Orientation: Restless at start of shift.  Slept throughout night waking at times and being restless but calming again.    BEHAVIOR & AGGRESSION TOOL COLOR: Green, Two point restraints continued.   ABNL VS/O2: VSS on RA  MOBILITY: Bedrest; self positioning.    PAIN MANAGMENT: appears comfortable when at rest  DIET: Regular- fair-poor appetite. Likes pudding, pills crushed with pudding as well. Total feed.   BOWEL/BLADDER: Incontinent of urine this shift; no BM, offered bedpan but patient refuses.    ABNL LAB/BG:  and 211.   DRAIN/DEVICES: IV SL   SKIN: Scattered bruising and scabbing- kendrick BLE; excoriated area coccyx, patient not keeping dressing in place.  D/C DATE: Depending on use of restraints, sitter and behavioral issues. Psych seen and  IV Valproate dose changed; VPA levels will be checked on today. Continue risperidone 0.5 mg TID; PRN PO Seroquel and IM Zyprexa available for agitation- IM given this shift as patient unable to calm down and pulling at wrist restraints so hard.   OTHER IMPORTANT INFO: Hospice is following and updating daughter daily

## 2022-06-06 NOTE — PLAN OF CARE
Goal Outcome Evaluation:    Plan of Care Reviewed With: patient, daughter     Overall Patient Progress: no change     In pt hospice, see order. Baseline confused, sitter at bedside. On 2 point soft restraint.  Incontinent.
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
## 2022-06-07 NOTE — PROGRESS NOTES
[facility]    Consult Note - [facility]    ______________________________________________________________________    [facility] 24/7 Contact Number: [phone]    - Providers: Please contact [facility] with changes in orders or clinical plan of care   - Nursing: Please contact [facility] with significant changes in patient condition    Hospice will notify the care team (including the hospitalist) to confirm date of inpatient hospice (GIP) admission.    New Epic encounter will not be created until hospice completes admission.   ______________________________________________________________________        Hospice Diagnosis:lewy body dementia     Indication for Inpatient Hospice: agitation     Goals for Hospital Discharge: behaivior to be controlled AEB cooperation with cares and prn medications to 3 or less in 24 hours. For 2 consecutive days.   Discharge planning will continue by .     Free of restrainst > than 24 hours  Free of a 1:1 sitter > than 24 hours   All medications to be orally adminstered     Plan of Care Discussed with the Following:     Summary of Visit (includes assessment, medications and any new orders):  S: SNV today - pt was asleep in his bed with a 1-1 sitter.  He had his lunch tray on his bedside table and hadn't touched it  B:72y/o on GIP hospice program for agitation due to his lewy body dementia diagnosis. Matt is totally dependent for all his ADL's.  He has conitnued to need 2 point restraints , a 1-1 sitter and prn medications to assist with his agitation.    A: in reviewing his medications. No changes were made since 6/3 at which time psych did evaluate and increased hi Valproate to 500mg in am and 750mg at HS IV .  When reviewing his prn medicaitons in the last 24 hours he has received 06/5- at 0923 - 5mg of IM olanzapine, fmtviocz29.5 mg on 06 06 at 429pm. And reperidone 0.5mg on 6/6 at 612pm.  He also continous with  need for straight caths.  His daughter has agreed to discontinuing some of his medications . He still is on insulin  and blood sugar checks  With a range from 170-220.  HIs intake is less than 50% and needs to be fed.    R: Resident continues with behaviors that require medication intervention and also 1-1 sitter with 2 point restraints.  He is still requiring GIP for medication adjustments, and evaluation of treatment , with the need for 3 prns' in the last 24 hours.   His goal is still not met AEB the need for prn's and 1-1 with restraints.         Geena Galo RN

## 2022-06-07 NOTE — CONSULTS
Returned call to TIBURCIO r/paul[facility] consuelo psych placement.     Provided contact numbers to [facility], [facility] and [facility].     NATE GARCIA, ROMESW

## 2022-06-07 NOTE — PROGRESS NOTES
TIBURCIO called the psychiatric consult line in order to speak with the behavioral health  regarding discharge planning for pt. There was no answer and TIBURCIO left voicemail requesting call back.    NANCY Yarbrough  [phone]

## 2022-06-07 NOTE — PLAN OF CARE
Goal Outcome Evaluation:  Summary: lewy body dementia with behavioral concerns; now inpatient hospice.  DATE/TIME TIME: 06/07/22 0700- 1930  Cognitive Concerns/ Orientation: Confused. Agitated and restless.    BEHAVIOR & AGGRESSION TOOL COLOR: Yellow  ABNL VS/O2: VSS on RA  MOBILITY: Bedrest; self positioning.    PAIN MANAGMENT: Denies  DIET: Regular- fair-poor appetite. pills crushed with pudding . Total feed.   BOWEL/BLADDER: Incontinent of urine, BMx 1  ABNL LAB/BG: BG 75/ 157/177  DRAIN/DEVICES: IV SL   SKIN: Scattered bruising and scabbing- kendrick BLE; excoriated area coccyx,  Meplix c/d/i.  D/C DATE: Pending needs for PRN meds and sitter  OTHER IMPORTANT INFO: Hospice is following and updating daughter daily, Psych re consulted to address Psych meds.IV valproate  changed to Po, 3 point restraints  in place, sitter at bed side.

## 2022-06-07 NOTE — PLAN OF CARE
Goal Outcome Evaluation:    Plan of Care Reviewed With: patient     Overall Patient Progress: no change    DATE TIME: 06/06/22 2721-2934  Cognitive Concerns/ Orientation: Confused. Agitated and restless. PRN Risperdal given with some relief. Did not sleep much overnight   BEHAVIOR & AGGRESSION TOOL COLOR: Yellow  ABNL VS/O2: VSS on RA  MOBILITY: Bedrest; self positioning.    PAIN MANAGMENT: Denies  DIET: Regular- fair-poor appetite. Likes pudding, pills crushed with pudding as well. Total feed. At times was blowing at pudding not letting meds be administered- eventually able to get pt to take.   BOWEL/BLADDER: Incontinent of urine this shift; no BM  ABNL LAB/BG:   DRAIN/DEVICES: IV SL   SKIN: Scattered bruising and scabbing- kendrick BLE; excoriated area coccyx, patient not keeping dressing in place.  D/C DATE: Pending needs for PRN meds and sitter  OTHER IMPORTANT INFO: Hospice is following and updating daughter daily

## 2022-06-07 NOTE — PROGRESS NOTES
[facility]    Progress Note - [facility]    ______________________________________________________________________    [facility] 24/7 Contact Number: [phone]    - Providers: Please contact [facility] with changes in orders or clinical plan of care   - Nursing: Please contact [facility] with significant changes in patient condition  ______________________________________________________________________        Plan of Care Discussed with the Following:   - Nurse: Nurses:  He Vallejo RN (Tel. [phone])  - Hospitalist/Rounding Provider:     Poppy Lloyd MD    Hospitalist - Internal Medicine    Since 6/2/2022    [phone]     - Matt's Family/Preferred Contact: Danielle [phone]  - Hospice Provider: Ruby Harrington -[phone]    Summary of Visit (includes assessment, medications and any new orders):        Patient resting with 1:1 sitter at bedside and 2 point soft restraints on legs. Matt did not respond to writer during this encounter. Staff RN reported patient ate 50% of breakfast and had a BM today. He voided spontaneously with a couple of incontinent episodes this shift. Son of patient came in to visit and brought a CD player and CD's. Writer discussed with Alicia Ramirez, hospice social worker who contacted mental health  on potential consuelo- psychiatric placement to manage behaviors. She gave placement recommendations to Alicia as noted in chart.  and writer are awaiting call back from Danielle to discuss:  1-potential placement in geriatric psych unit  2-transition Matt over to oral medications such as metformin and valproate.        He received 3 PRN doses for agitation over the past 2 days of oral medications. He continues to have blood sugar checks and insulin coverage, novolog sliding scale and long acting lantus.     Discharge Plans:        awaiting call back from Danielle  regarding potential discharge to geriatric psych unit and will contact facilities if she agrees to plan. Awaiting call back from Danielle.        Ruby Harrington RN

## 2022-06-07 NOTE — PLAN OF CARE
"Goal Outcome Evaluation:        Summary: lewy body dementia with behavioral concerns; now inpatient hospice (however family wants to continue meds, vitals, blood sugars)  DATE TIME: 06/06/22 3-11pm shift  Cognitive Concerns/ Orientation: very agitated tonight after restlessness at beginning of shift and after prn seroquel and risperidone-kicking- throwing legs over side rails-disturbed thinking \"if you poop in my mouth I'm going to poop in yours\" four point restraints resumed at 9pm -sleeping at 10pm  BEHAVIOR & AGGRESSION TOOL COLOR: yellow, four point restraints on.  ABNL VS/O2: VSS on RA  MOBILITY: Bedrest; self positioning.    PAIN MANAGMENT: appears comfortable when at rest continues aon tylenol scheduled   DIET: Regular- fair-poor appetite. Likes pudding, pills crushed with pudding as well. Total feed. At times was blowing at pudding not letting meds be administered- eventually able to get pt to take.   BOWEL/BLADDER: Incontinent of urine this shift; no BM, offered bedpan but patient refuses.    ABNL LAB/BG: , 189   DRAIN/DEVICES: IV SL   SKIN: Scattered bruising and scabbing- kendrick BLE; excoriated area coccyx, patient not keeping dressing in place.  D/C DATE: depending on meds and removal of prns and sitter.  OTHER IMPORTANT INFO: Hospice is following and updating daughter daily                "
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
## 2022-06-08 NOTE — PLAN OF CARE
Goal Outcome Evaluation:    Plan of Care Reviewed With: patient     Overall Patient Progress: no change    DATE TIME: 06/07/22 3779-5466  Cognitive Concerns/ Orientation: Confused. Agitated and restless. Slept throughout half of the night. No PRN's needed  BEHAVIOR & AGGRESSION TOOL COLOR: Yellow  ABNL VS/O2: VSS on RA  MOBILITY: Bedrest; self positioning.    PAIN MANAGMENT: Denies  DIET: Regular- fair-poor appetite. Likes pudding, pills crushed with pudding as well. Total feed.   BOWEL/BLADDER: Incontinent   ABNL LAB/BG:  & 123  DRAIN/DEVICES: Pt removed PIV  SKIN: Scattered bruising and scabbing. Coccyx blanchable, mepilex CDI  D/C DATE: Pending needs for PRN meds and sitter  OTHER IMPORTANT INFO: Hospice is following and updating daughter daily, Psych re consulted to address Psych meds. Pt is in 4 point restraints.

## 2022-06-08 NOTE — PROGRESS NOTES
[facility]    Progress Note - [facility]    ______________________________________________________________________    [facility] 24/7 Contact Number: [phone]    - Providers: Please contact [facility] with changes in orders or clinical plan of care   - Nursing: Please contact [facility] with significant changes in patient condition  ______________________________________________________________________        Plan of Care Discussed with the Following:   - Nurse: Nurses:  He Vallejo RN (Tel. [phone]) Lynette Krishna RN    - Matt's Family/Preferred Contact: Danielle daughter  Provider consulted:  Poppy Lloyd MD    Hospitalist - Internal Medicine    Since 6/2/2022    [phone]     - Hospice Provider: Ruby Harrington [phone]    Summary of Visit (includes assessment, medications and any new orders):        Writer received call from Danielle and updated her on  fathers status. Reported to her he received 3 PRN doses of medications for agitation over the past 2 days. When medications were administered it was by oral route. Writer indicated that there is an order for an IM injection if behaviors are uncontrolled. However, he has been taking oral medications over past 2 days. As previously noted, writer suggested after consulting with Hospitalist today to switch medications over to oral route. She stated she talked to her family and all are in agreement to have patient receive oral medications. The two medications that were recommended to be switched to oral route are his diabetic medications and Depacon. Stop insulin and start metformin as previously administered in the community setting. Stop IV administration and start oral  DEPAKOTE SPRINKLE). Writer stated that it would be wise to trial the oral route and to keep IV in place. She agreed to plan of care.  Danielle requested to get daily updates from hospice RN visiting  patient.     Discharge Plans:       Writer further discussed plan of care regarding geriatric psychiatric unit. She reported feeling confused with potential placement out of hosptial. She was not in agreement with potential facilities recommended by the mental health   due to distance from family. Currently family has a hard time seeing Matt at hospital.  She would like to see how the patient responds to not receiving IV fluids as currently he is taking in marginal amount of liquids. Will continue to monitor patients response to oral medications.   Not meeting goals and is still eligible for GIP hospice AEB  -Still requires 1:1 sitter  - soft restraints    Hospice will continue to monitor his response to plan of care daily with updates to Danielle.          Ruby Harrington RN

## 2022-06-08 NOTE — PROVIDER NOTIFICATION
MD Notification    Notified Person: MD    Notified Person Name: Jen     Notification Date/Time: 6/7/22 2760    Notification Interaction: Paged    Purpose of Notification: Can you renew 4 point restraint order? Pt currently has BLE on and is more restless and agitated.     Orders Received: Ordered.    Comments:

## 2022-06-08 NOTE — PROGRESS NOTES
Palliative care signing off at this time as goals of care have been determined and we are not actively managing symptoms. We can be recalled if further needs are identified. During the hours of 8AM-4:30PM Monday-Friday, please call the team pager ([phone]) with questions, concerns. For urgent overnight or weekend issues, please call the answering service to reach the on call physician at [phone]. Thanks.

## 2022-06-08 NOTE — PLAN OF CARE
Goal Outcome Evaluation:           Summary: lewy body dementia with behavioral concerns; now inpatient hospice (however family wants to continue meds, vitals, blood sugars)  DATE TIME: 06/08/22 0700- 1530  Cognitive Concerns/ Orientation: Confused. Agitated and restless at times.   BEHAVIOR & AGGRESSION TOOL COLOR: Yellow  ABNL VS/O2: VSS on RA  MOBILITY: Bedrest; self positioning.    PAIN MANAGMENT: given MS x 1 for facial grimacing.   Diet.: Likes pudding, pills crushed with puddingl. Total feed.   BOWEL/BLADDER: Incontinent   ABNL LAB/BG: BG 69, 89, 148  DRAIN/DEVICES:none  SKIN: Scattered bruising and scabbing. Coccyx blanchable redness, mepilex CDI  D/C DATE: Pending needs for PRN meds and sitter  OTHER IMPORTANT INFO: Hospice is following and updating daughter daily, Psych re consulted to address Psych meds. Pt is in 4 point restraints. Sitter at bed side.

## 2022-06-08 NOTE — CONSULTS
Psychiatry Consultation; Follow up              Reason for Consult, requesting source:    Follow up  Requesting source: Poppy Lloyd    Labs and imaging reviewed, discussed with nursing               Interim history:    Matt Way is a 71 year old male who was admitted from hospice with worsening behavioral issues in the context of Lewy Body Dementia. Hx significant for prior LAD and RCA stents, NSTEMI, angiogram on 1/27/22 demonstrating 99% ISR of the pLAD. Psychiatry has assessed patient several times previously.    Today, Matt is seen laying in bed, sleeping soundly. Does not awaken to verbal prompts. Respirations even and easy. He is unable to participate in interview. Sitter at bedside reports that he has been sleeping since receiving scheduled medications. He is currently in 4 point restraints as he was aggressive with staff earlier today. He has not required any PRNs today as scheduled medication was due at time of aggressive episode. Nursing reports that he was alert and calm earlier this morning for a couple of hours, ate breakfast without issue.        Current Medications:       acetaminophen  650 mg Oral Q8H     citalopram  10 mg Oral Daily     divalproex sodium delayed-release  500 mg Oral QAM AC     divalproex sodium delayed-release  750 mg Oral BID     melatonin  10 mg Oral At Bedtime     metFORMIN  1,000 mg Oral BID w/meals     metoprolol succinate ER  12.5 mg Oral At Bedtime     polyethylene glycol  17 g Oral Daily     QUEtiapine  25 mg Oral At Bedtime     risperiDONE  0.5 mg Sublingual TID     rivastigmine  1 patch Transdermal Daily     rivastigmine   Transdermal Q8H     tamsulosin  0.4 mg Oral Daily              MSE:   Appearance: Sleeping deeply, wearing hospital gown  Attitude:  Sleeping  Eye Contact:  Sleeping  Mood:  CHRYSTAL, Sleeping  Affect: Sleeping  Speech: None, Sleeping  Psychomotor Behavior:  Sleeping  Thought Process: CHRYSTAL, Sleeping  Associations: CHRYSTAL, Sleeping  Thought Content:   "CHRYSTAL, Sleeping  Insight:  CHRYSTAL, Sleeping  Judgement: CHRYSTAL, Sleeping  Oriented to: CHRYSTAL, Sleeping  Attention Span and Concentration: CHRYSTAL, Sleeping  Recent and Remote Memory:  CHRYSTAL, Sleeping        Vital signs:  Temp: 97.5  F (36.4  C) Temp src: Axillary BP: 131/75 Pulse: 82   Resp: 18 SpO2: 99 % O2 Device: None (Room air)     Weight: 79.7 kg (175 lb 12.8 oz)  Estimated body mass index is 23.19 kg/m  as calculated from the following:    Height as of 5/12/22: 1.854 m (6' 1\").    Weight as of this encounter: 79.7 kg (175 lb 12.8 oz).              DSM-5 Diagnosis:   Lewy Body Dementia, with behavioral disturbance          Assessment:   Matt Way is a 71 year old male with Lewy Body Dementia with behavioral disturbance, seen today for follow up. He remains intermittently agitated, requiring restraints. PRN medication has not been needed today as his scheduled medications were due to be given when he was agitated. Nursing does report some periods of time where he is alert and calm. Current TDD of VPA is 2000mg, this was switched from IV formulation to oral Depakote sprinkles yesterday on 6/7/22. Most recent VPA level on 6/6 was 91 which is within therapeutic window. There is still room to increase dose if needed, however I am hesitant to continue increasing due to concern that he will be too sedated. He is currently in general inpatient hospice, who has suggested possible inpatient geripsych admission for management of behavior. This is an option, however I wonder how much of an improvement in his behavior can be expected as well have exhausted multiple medication trials and he remains either agitated and aggressive or sedated and sleeping with short periods of time where he is alert and calm.    I spoke with daughter Danielle, who expressed confusion about the expectation of how long her father might have. She stated that she was previously told by hospice that they were expecting him to survive \"days\", but then was told " "that they recommended inpatient geriatric psychiatry which would imply he is expected to live longer. I relayed to her that nursing reported that he was alert earlier today and did eat breakfast, and that he is no longer on IV Depakote but PO instead. She states that she would not want him to go to geripsych unit if the only options are several hours away, especially if the expectation is that he won't live much longer.          Summary of Recommendations:   1. Continue Depakote sprinkles 750mg BID and 500mg daily. He was just switched from IV to PO formulation on 6/7/22.     2. Inpatient geripsych is an option, but it is questionable how much of an improvement in his behavior can be expected. Daughter Danielle would like more information on how long he is expected to live, and what other options are available aside from inpatient psychiatry.    3. Placed a new psych consult for 6/9/22 for second opinion in determining appropriateness of geripsych admission, expectations and goals of treatment.    PHILLIP Tamayo CNP  Consult/Liaison Psychiatry   [facility]    Contact information available via [facility] Paging/Directory  If I am not available, then [facility] CL line ([phone]) should know who is covering our consult service.   \"Much or all of the text in this note was generated through the use of Dragon Dictate voice to text software. Errors in spelling or words which appear to be out of contact are unintentional, may be present due having escaped editing\"           "
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
## 2022-06-09 NOTE — PROGRESS NOTES
As per conversation noted by Ruby Harrington RN, pt's family is not interested in Geripsych placement at this time. Pt was switched to oral depakote sprinkles on 6/7 to see if pt can manage more po medications which will assist in discharge planning.    SW and hospice RN will speak with hospital team on 6/9 to see if pt has been tolerating his depakote sprinkles. Based of this, SW will address discharge on this date with family.    NANCY Yarbrough

## 2022-06-09 NOTE — PROGRESS NOTES
[facility]    Social Work Progress Note - [facility]    ______________________________________________________________________    [facility] 24/7 Contact Number: [phone]    - Providers: Please contact [facility] with changes in orders or clinical plan of care   - Nursing: Please contact [facility] with significant changes in patient condition  - Social Work: Please contact [facility] for discharge phanning/updates  ______________________________________________________________________      Summary of Visit (includes psychosocial assessment/updates, acceptance of palliative care/hospice philosophy, discharge plans):   TIBURCIO visited Matt on this date for routine visit, SAMIA Cutler was present as well as Matt's nursing assistant. RN noted that Matt was agitated earlier and was provided with dilaudid with good effect AEB El resting soundly and comfortably during SW's visit. RN stated that Matt's depakote dose was increase and that he is taking his sprinkles with pudding. Psych consult was also made today and Matt had a medication change - morning risperidone dose was increased from 0.5mg to 1mg po.    At this time Matt is not safe for discharge due to recent medication changes, requiring one to one sitter and continue soft restraints. SW will do daily checks with team to see how Matt is responding to medication changes. Dtr continues to request daily nursing calls where discharge planning will also be addressed.    Plan of Care Discussed with the Following:   - Nurse: eH  - Hospitalist/Rounding Provider: Dr. Berkowitz  - Matt's Family/Preferred Contact: Dtr Danielle  - Hospice Provider:Ramon Ramirez, [facility]   [phone]

## 2022-06-09 NOTE — CONSULTS
Psychiatry Consultation; Follow up          Reason for Consult, requesting source:    Review medications  Requesting source: Eloy Berkowitz            Interim history:    Matt Way is a 71 year old male who was admitted from hospice with worsening behavioral issues in the context of Lewy Body Dementia. Hx significant for prior LAD and RCA stents, NSTEMI, angiogram on 1/27/22 demonstrating 99% ISR of the pLAD. Psychiatry has assessed patient several times previously.    Attempted to see patient for follow-up today. He was asleep when I entered his room. Per bedside attendant, patient had an episode of agitation earlier today, was pulling on sheets, attempting to get out of bed. No clear trigger for his agitation. He appears to vacillate between sedation and agitation.          Medications:     Current Facility-Administered Medications   Medication     acetaminophen (TYLENOL) Suppository 650 mg     acetaminophen (TYLENOL) tablet 650 mg     acetaminophen (TYLENOL) tablet 650 mg     atropine 1 % ophthalmic solution 2 drop     benztropine mesylate (COGENTIN) injection 1 mg     bisacodyl (DULCOLAX) Suppository 10 mg     citalopram (celeXA) tablet 10 mg     glucose gel 15-30 g    Or     dextrose 50 % injection 25-50 mL    Or     glucagon injection 1 mg     divalproex sodium delayed-release (DEPAKOTE SPRINKLE) DR capsule 500 mg     divalproex sodium delayed-release (DEPAKOTE SPRINKLE) DR capsule 750 mg     HYDROmorphone (STANDARD CONC) (DILAUDID) oral solution 0.5 mg     loperamide (IMODIUM) capsule 2 mg     melatonin tablet 10 mg     metFORMIN (GLUCOPHAGE) tablet 1,000 mg     metoprolol succinate ER (TOPROL-XL) 24 hr half-tab 12.5 mg     naloxone (NARCAN) injection 0.1 mg     naloxone (NARCAN) injection 0.2 mg     OLANZapine (zyPREXA) injection 5 mg     polyethylene glycol (MIRALAX) Packet 17 g     QUEtiapine (SEROquel) half-tab 12.5 mg     QUEtiapine (SEROquel) tablet 25 mg     risperiDONE (risperDAL M-TABS)  "ODT tab 0.5 mg     risperiDONE (risperDAL M-TABS) ODT tab 0.5 mg     [START ON 6/10/2022] risperiDONE (risperDAL M-TABS) ODT tab 1 mg     rivastigmine (EXELON) 4.6 MG/24HR 24 hr patch 1 patch     rivastigmine (EXELON) Patch in Place     senna-docusate (SENOKOT-S/PERICOLACE) 8.6-50 MG per tablet 2 tablet     sennosides (SENOKOT) tablet 2 tablet     tamsulosin (FLOMAX) capsule 0.4 mg              MSE:     Patient is a 71 year old male observed to be lying in bed. He is asleep, does not awaken for interview. Remains at risk for agitation and aggression. Unable to complete remainder of MSE.     Vital signs:  Temp: 98  F (36.7  C) Temp src: Axillary BP: 124/81 Pulse: 87   Resp: 18 SpO2: 98 % O2 Device: None (Room air)     Weight: 79.7 kg (175 lb 12.8 oz)  Estimated body mass index is 23.19 kg/m  as calculated from the following:    Height as of 5/12/22: 1.854 m (6' 1\").    Weight as of this encounter: 79.7 kg (175 lb 12.8 oz).              DSM-5 Diagnosis:   #1 Lewy Body Dementia, with behavioral disturbance          Assessment:   Patient seen for follow-up today. He was asleep at the time of my visit. Had one episode of agitation earlier today. Did not receive any prns. Remains in restraints for most of the day. VPA level reviewed from 6/6 - was 91. Will not make further adjustments to his Depakote at this point. Appears to be tolerating risperidone without evidence of EPS - will increase the AM dose to 1 mg. Continue 0.5 mg afternoon and evening. Family has declined to pursue inpatient psychiatry.           Summary of Recommendations:   1) Increase risperidone ODT to 1 mg in the morning and continue 0.5 mg afternoon and 0.5 mg in the evening. Continue quetiapine 25 mg at bedtime.    2) Continue Depakote sprinkles 500 mg AM, 750 mg afternoon and 750 mg evening. VPA level from 6/6 reviewed - 91.     3) Continue melatonin 10 mg at bedtime    4) Delirium precautions:    Up during the day with lights on    Lights off at " night, avoid interruptions during the night as much as possible    Family visits    Encourage wearing glasses    Reorientation    Avoid opioids, benzodiazepines, anticholinergics.      Continue to ensure proper nutrition, fluid and electrolyte balance. Monitor for infections, hypoxia, metabolic derangements, or other causes of delirium.     5) Re-consult psychiatry as needed, thank you       Julio De Jesus, JOSE-BC, APRN, CNP  Consult/Liaison Psychiatry  [facility]  Provider can be paged via [facility] Paging/Directory  If I am unavailable, please contact [facility] at [phone] to reach the on-call provider.
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
## 2022-06-09 NOTE — PLAN OF CARE
Goal Outcome Evaluation:    Summary: lewy body dementia with behavioral concerns; now inpatient hospice (however family wants to continue meds, vitals, blood sugars)  DATE TIME: 06/08/22 8519-0380  Cognitive Concerns/ Orientation: Confused. Agitated and restless.   BEHAVIOR & AGGRESSION TOOL COLOR: Yellow  ABNL VS/O2: VSS on RA  MOBILITY: Bedrest; self positioning.    PAIN MANAGMENT: Denies  DIET: Regular- fair appetite. Likes pudding, pills crushed with pudding as well. Total feed.   BOWEL/BLADDER: Incontinent   ABNL LAB/BG: ,187  DRAIN/DEVICES: none  SKIN: Scattered bruising and scabbing. Coccyx blanchable, mepilex CDI  D/C DATE: Pending needs for PRN meds and sitter  OTHER IMPORTANT INFO: Hospice is following and updating daughter daily, Psych re consulted to address Psych meds. Pt is in 4 point restraints. Sitter at bed side.

## 2022-06-09 NOTE — PLAN OF CARE
Goal Outcome Evaluation:    Plan of Care Reviewed With: patient     Overall Patient Progress: no change    DATE TIME: 06/08/22 5466-3246  Cognitive Concerns/ Orientation: Confused. Restless at times. Slept well  BEHAVIOR & AGGRESSION TOOL COLOR: Yellow  ABNL VS/O2: VSS on RA  MOBILITY: Bedrest; self positioning.    PAIN MANAGMENT: PRN PO Dilaudid given x1 with relief  DIET: Regular- fair appetite. Likes pudding, pills crushed with pudding as well. Total feed.   BOWEL/BLADDER: Incontinent   ABNL LAB/BG:   DRAIN/DEVICES: None  SKIN: Scattered bruising and scabbing. Coccyx blanchable, mepilex CDI  D/C DATE: Pending needs for PRN meds and sitter  OTHER IMPORTANT INFO: Hospice and psych following. Pt is in 4 point restraints. Sitter at bed side.

## 2022-06-09 NOTE — PROGRESS NOTES
[facility]    Progress Note - [facility]    ______________________________________________________________________    [facility] 24/7 Contact Number: [phone]    - Providers: Please contact [facility] with changes in orders or clinical plan of care   - Nursing: Please contact [facility] with significant changes in patient condition  ______________________________________________________________________        Plan of Care Discussed with the Following:   - Nurse: He   - Hospitalist/Rounding Provider: Dr. Prema Gutierrez's Family/Preferred Contact: Rebekah Santos   - Hospice Provider:     Summary of Visit (includes assessment, medications and any new orders):   Writer visited patient for daily assessment.  Patient asleep throughout assessment.  Bedside RN present during visit who reports patient has not received any PRN medications during her shift.  Patient did become aggressive approximately the time  scheduled medications were due to be  given which managed symptoms adequately.  Current TDD of VPA is 2000mg, this was switched from IV formulation to oral Depakote sprinkles yesterday on 6/7/22. Patient continues to remain awake and primarily agitated or sleeping following medication administration.  Patient continues to require 4 point restraints due to aggression towards staff.  Medical discharge goals have not been met at this time.  Patient continues to require 1:1 sitter and soft restraints for agitation and aggression.  Psychiatry is involved and will be re-evaluating patient on 6/9 for further recommendations. No new medication recommendations at this time.  Will continue to assess progress of oral depakote sprinkles in management of aggressive behaviors.      Rosalinda Harvey RN  [facility]     Gracie Harvey RN

## 2022-06-09 NOTE — PLAN OF CARE
Goal Outcome Evaluation:        Summary: lewy body dementia with behavioral concerns; now inpatient hospice (however family wants to continue meds, vitals, blood sugars)  DATE TIME: 06/09/22 8426-9740  Cognitive Concerns/ Orientation: Confused. Restless at times. Slept well  BEHAVIOR & AGGRESSION TOOL COLOR: Yellow  ABNL VS/O2: VSS on RA  MOBILITY: Bedrest; self positioning.    PAIN MANAGMENT: PRN PO Dilaudid given x2 with relief  DIET: Regular-poor appetite. Likes pudding, pills crushed with pudding as well. Total feed.   BOWEL/BLADDER: Incontinent   ABNL LAB/BG:   DRAIN/DEVICES: None  SKIN: Scattered bruising and scabbing. Coccyx blanchable, mepilex CDI  D/C DATE: Pending needs for PRN meds and sitter  OTHER IMPORTANT INFO: Hospice and psych following. Pt is in 4 point restraints. Sitter at bed side.

## 2022-06-10 NOTE — PROGRESS NOTES
[facility]    Social Work Progress Note - [facility]    ______________________________________________________________________    [facility]/7 Contact Number: [phone]    - Providers: Please contact [facility] with changes in orders or clinical plan of care   - Nursing: Please contact [facility] with significant changes in patient condition  - Social Work: Please contact [facility] for discharge phanning/updates  ______________________________________________________________________      Summary of Visit (includes psychosocial assessment/updates, acceptance of palliative care/hospice philosophy, discharge plans):   Tiburcio visited Matt for routine visit. Matt was resting comfortably. As per nursing assistant Matt declined lunch and was agitated earlier, required prn. As Matt continues to have one to one sitter and is in soft restraints, he is not appropriate for discharge at this time. TIBURCIO will follow up daily on this date and adjust discharge plan as symptoms improve.    Plan of Care Discussed with the Following:   - Nurse: He   - Hospitalist/Rounding Provider: Dr Berkowitz  - Matt's Family/Preferred Contact: nehemias Santos  - Hospice Provider: Dr. Ramon Ramirez, [facility]   [phone]

## 2022-06-10 NOTE — PROGRESS NOTES
[facility]    Progress Note - [facility]    ______________________________________________________________________    [facility] Contact Number: [phone]    - Providers: Please contact [facility] with changes in orders or clinical plan of care   - Nursing: Please contact [facility] with significant changes in patient condition  ______________________________________________________________________        Plan of Care Discussed with the Following:   - Nurse: Nurses:  He Vallejo RN (Tel. [phone])  - Hospitalist/Rounding Provider:   Eloy Berkowitz DO    Hospitalist - Internal Medicine    Since 6/8/2022    [phone]     - Matt's Family/Preferred Contact: [facility]  - Hospice Provider: Ruby Harrington -[phone]    Summary of Visit (includes assessment, medications and any new orders):   Patient sleeping in 4 point soft restraints. Has a 1:1 sitter at bedside. He received no PRN medications for agitation. He did receive 3 PRN doses of Hydromorphone over the past 24 hours. He had been incontinent of urine and wears a depends.     Discharge plans  To discharge from hospital patient needs to be free of restraints and 1:1sitter. Is still eligible for hospice AEB agitation needs to be managed.        Ruby Harrington RN

## 2022-06-10 NOTE — PROGRESS NOTES
[facility]    Progress Note - [facility]    ______________________________________________________________________    [facility] 24/7 Contact Number: [phone]    - Providers: Please contact [facility] with changes in orders or clinical plan of care   - Nursing: Please contact [facility] with significant changes in patient condition  ______________________________________________________________________        Plan of Care Discussed with the Following:     - Hospitalist/Rounding Provider: Dr. Eloy Laray's Family/Preferred Contact: Daughter Danielle   - Hospice Provider: Ramon Smith     Summary of Visit (includes assessment, medications and any new orders):     Patient reevaluated by psychiatry today. Morning dose of  risperidone increased from 0.5-1 mg.  Patient received two doses of dilaudid due to nonverbal indications of pain with good effect.  Patient did not receive any PRN medications for agitation over the last 24 hours. Per Bedside RN  patient taking depakote sprinkles in pudding which he is tolerating well.  Patient will be medically ready for discharge once 1:1 sitter and restraints have been discontinued for 12-24 hours.  Hospice SW continues to look for facility placement in preparation for discharge.       Gracie Harvey, RN

## 2022-06-10 NOTE — PLAN OF CARE
Goal Outcome Evaluation:    Summary: lewy body dementia with behavioral concerns; now inpatient hospice   DATE TIME: 06/10/22 0700- 1530  Cognitive Concerns/ Orientation: Confused. Restless at times.   BEHAVIOR & AGGRESSION TOOL COLOR: Yellow  ABNL VS/O2: VSS on RA  MOBILITY: Bedrest; self positioning.    PAIN MANAGMENT: PRN  Dilaudid x1 this shift  DIET: Regular-poor appetite. Likes pudding, pills crushed with pudding as well. Total feed.   BOWEL/BLADDER: Incontinent   SKIN: Scattered bruising and scabbing. Coccyx blanchable, mepilex CDI  D/C DATE: Pending needs for PRN meds and sitter  OTHER IMPORTANT INFO: Hospice and psych following. Pt is in 4 point restraints. Sitter at bed side.
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
## 2022-06-10 NOTE — PLAN OF CARE
Goal Outcome Evaluation:    Plan of Care Reviewed With: patient     Overall Patient Progress: no change    DATE TIME: 06/09/22 9977-6946  Cognitive Concerns/ Orientation: Confused. Restless at times.   BEHAVIOR & AGGRESSION TOOL COLOR: Yellow  ABNL VS/O2: VSS on RA  MOBILITY: Bedrest; self positioning.    PAIN MANAGMENT: PRN sublingual Dilaudid given x1 with some relief  DIET: Regular-poor appetite. Likes pudding, pills crushed with pudding as well. Total feed.   BOWEL/BLADDER: Incontinent   SKIN: Scattered bruising and scabbing. Coccyx blanchable, mepilex CDI  D/C DATE: Pending needs for PRN meds and sitter  OTHER IMPORTANT INFO: Hospice and psych following. Pt is in 4 point restraints. Sitter at bed side.

## 2022-06-11 NOTE — PLAN OF CARE
Goal Outcome Evaluation:           Summary: Lewy body dementia with behavioral concerns; IP hospice.  DATE TIME: 06/10/22 8519-6644  Cognitive Concerns/Orientation: A&O x0. Confused and restless at times.  BEHAVIOR & AGGRESSION TOOL COLOR: Yellow.  ABNL VS/O2: VSS on RA.  MOBILITY: Bedrest; self positioning.    PAIN MANAGMENT: Scheduled Tylenol. PRN sublingual Dilaudid available.   DIET: Regular, poor appetite. Total feed. Enjoys pudding; give crushed pills with pudding.  BOWEL/BLADDER: Incontinent, one urine occurrence.  SKIN: Scattered bruising and scabbing.  D/C DATE: Pending progress with PRN med use and sitter (see hospice note 6/9).  OTHER IMPORTANT INFO: Hospice and psych following. Pt is in  point soft restraints w/sitter at bedside.

## 2022-06-11 NOTE — PROGRESS NOTES
Patient is technically on inpatient hospice, but he is not on comfort cares. Patient is in 4-point restraints for being unsafe with staff and self. No orientation abilities with him. Pills are taken crushed in applesauce. Incontinent in brief. Does not follow commands or answer questions. Skin looks ok.
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
## 2022-06-11 NOTE — PLAN OF CARE
Goal Outcome Evaluation:    Plan of Care Reviewed With: patient     Overall Patient Progress: no change    Summary: Lewy body dementia with behavioral concerns; IP hospice.  DATE TIME: 06/10/22 0045-5695  Cognitive Concerns/Orientation: A&O x0. Confused and restless at times.  BEHAVIOR & AGGRESSION TOOL COLOR: Yellow.  ABNL VS/O2: VSS on RA.  MOBILITY: Bedrest; self positioning.    PAIN MANAGMENT: Scheduled Tylenol. PRN sublingual Dilaudid available.   DIET: Regular, poor appetite. Total feed. Enjoys pudding; give crushed pills with pudding.  BOWEL/BLADDER: Incontinent, one urine occurrence.  SKIN: Scattered bruising and scabbing.  D/C DATE: Pending progress with PRN med use and sitter (see hospice note 6/9).  OTHER IMPORTANT INFO: Hospice and psych following. Pt is in 4 point soft restraints w/sitter at bedside. Pt slept from 1157-7306, difficult to rouse but respirations OK and . Agitated upon waking, pt brief changed and scheduled meds given. Bedtime metoprolol held, pt too agitated to obtain BP/pulse.

## 2022-06-11 NOTE — CONSULTS
[facility]    Progress Note - [facility]    ______________________________________________________________________    [facility] Contact Number: [phone]    - Providers: Please contact [facility] with changes in orders or clinical plan of care   - Nursing: Please contact [facility] with significant changes in patient condition  ______________________________________________________________________        Plan of Care Discussed with the Following:   - Nurse:  Jeffery Antunez RN Hofer, Hannah, RN  - Hospitalist/Rounding Provider:       Eloy Berkowitz DO    Hospitalist - Internal Medicine    Since 6/8/2022    [phone]     - Matt's Family/Preferred Contact: Danielle flores  - Hospice Provider: Ruby Harrington -[phone]    Summary of Visit (includes assessment, medications and any new orders):   Patient in bed with 4 point restraints. He has a 1:1 sitter at all times due to unpredictable agitation. No PRN medications given over past 24 hours for agitation. Patient has marginal intake of pudding with medications and a banana.  Notable decline in appetite. No new recommendations from hospice.    Discharge plans  Patient remains eligible for hospice AEB agitation not managed by medications alone. Still adjusting medications. Due to 1:1 sitter and 4 point restraints patient can't be placed in community setting. No formal plans for discharge until goals of discontinuation of 1:1 sitter and soft restraints.        Ruby Harrington RN

## 2022-06-12 NOTE — PLAN OF CARE
Goal Outcome Evaluation:    Summary: Lewy body dementia with behavioral concerns; IP hospice.  DATE & TIME: 6/12/22 5971-4542    Cognitive Concerns/ Orientation : Confused, disoriented x4. Agitated and restless at times.   BEHAVIOR & AGGRESSION TOOL COLOR: Yellow   ABNL VS/O2: VSS on RA  MOBILITY: Bedrest. Changes position independently d/t restlessness at times, T/R when somnolent   PAIN MANAGMENT: scheduled Tylenol  DIET: Regular, very poor appetite today.   BOWEL/BLADDER: Incontinent B/B, multiple loose/soft BM today  ABNL LAB/BG: NA  DRAIN/DEVICES: None  TELEMETRY RHYTHM: NA  SKIN: Scattered bruises and scabs  TESTS/PROCEDURES: NA  D/C DATE: Pending progress with PRN med use and sitter (see hospice note 6/9/22)  Discharge Barriers: Still restless and agitated at times. 4 point restraints in place  OTHER IMPORTANT INFO: Long arm for day shift, sitter for evening shift. PRN Seroquel and zyprexa available. Exelon patch to left shoulder.

## 2022-06-12 NOTE — PLAN OF CARE
DATE & TIME: 6/11/22, 2300 - 3840    Cognitive Concerns/ Orientation : Disoriented x 4. Confused and restless intermittently   BEHAVIOR & AGGRESSION TOOL COLOR: Yellow   ABNL VS/O2: VSS on room air  MOBILITY: Bedrest. Changes position in bed independently but turned once somnolent  PAIN MANAGMENT: Was too sleepy to take scheduled Tylenol. Awoke and complained of pain to buttock. Prn Tylenol given with much encouragement  DIET: Regular  BOWEL/BLADDER: Incontinent  ABNL LAB/BG: NA  DRAIN/DEVICES: None  TELEMETRY RHYTHM: NA  SKIN: Scattered bruises and scabs  TESTS/PROCEDURES: NA  D/C DATE: Pending progress with prn med use and sitter (see hospice note 6/9/22)  Discharge Barriers: Still restless and agitated at times. 4 point soft restraints still in place  OTHER IMPORTANT INFO: Prn Seroquel given for restlessness/agitation. Slept intermittently. Sitter at bedside.    Goal Outcome Evaluation:    Plan of Care Reviewed With: patient     Overall Patient Progress: no change
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
## 2022-06-12 NOTE — PLAN OF CARE
Goal Outcome Evaluation:    Plan of Care Reviewed With: patient     Overall Patient Progress: no change    Summary: Lewy body dementia with behavioral concerns; IP hospice.  DATE TIME: 06/12/22 7366-1262  Cognitive Concerns/Orientation: A&Ox0. Confused and agitated at times.  BEHAVIOR & AGGRESSION TOOL COLOR: Yellow.  ABNL VS/O2: VSS on RA.  MOBILITY: Bedrest; self positioning.    PAIN MANAGMENT: Scheduled Tylenol. PRN morphine available.   DIET: Regular, poor appetite. Total feed. Enjoys pudding; give crushed pills with pudding.  BOWEL/BLADDER: Incontinent  SKIN: Scattered bruising and scabbing.  D/C DATE: Pending progress with PRN med use and sitter (see hospice note 6/9).  OTHER IMPORTANT INFO: Hospice and psych following. Pt is in 4 point soft restraints w/sitter at bedside.
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
## 2022-06-13 NOTE — PROGRESS NOTES
[facility]    Social Work Progress Note - [facility]    ______________________________________________________________________    [facility] Contact Number: [phone]    - Providers: Please contact [facility] with changes in orders or clinical plan of care   - Nursing: Please contact [facility] with significant changes in patient condition  - Social Work: Please contact [facility] for discharge phanning/updates  ______________________________________________________________________      Summary of Visit (includes psychosocial assessment/updates, acceptance of palliative care/hospice philosophy, discharge plans):   TIBURCIO visited Matt on this date for routine visit. Matt required prn seroquel overnight for agitation/restlessness but was somnolent when SW visited. His gown was being changed by NA. He did not respond to verbal stimuli.    TIBURCIO left  for mUa, admission coordinator at [facility] . This facility has a [facility] behavioral health unit. SW requesting further information    Plan of Care Discussed with the Following:   - Nurse: Michelle  - Hospitalist/Rounding Provider: Dr. Kennedy  - Matt's Family/Preferred Contact: nehemias Santos   - Hospice Provider: Dr Ute Ramirez, [facility]   [phone]

## 2022-06-13 NOTE — PROGRESS NOTES
[facility]    Progress Note - [facility]    ______________________________________________________________________    [facility] Contact Number: [phone]    - Providers: Please contact [facility] with changes in orders or clinical plan of care   - Nursing: Please contact [facility] with significant changes in patient condition  ______________________________________________________________________        Plan of Care Discussed with the Following:     - Hospitalist/Rounding Provider: Dr. Sho Gutierrez's Family/Preferred Contact: Daughter Danielle   - Hospice Provider: Ramon Smith     Summary of Visit (includes assessment, medications and any new orders):   Primary hospice Dx CAD - GIP Agitation requiring frequent medication titration and assessment  Patient continues to require 1:1 sitter for intermittent agitation and 4 point restraints.  Patients appetite has significantly decreased over the last few days. He is disinterested in food and required extensive prompting to take medications in pudding which he typically does without issue.  Discharge continues to be a challenge due to medical goals not being met.  Hospice will continue to discuss discharge planning with family and explore the option of hospice homes who might have more attainable discharge goals.      Gracie Harvey, RN

## 2022-06-13 NOTE — PLAN OF CARE
Goal Outcome Evaluation:    Summary: Lewy body dementia with behavioral concerns; IP hospice.  DATE & TIME: 6/13/2022  7135-3406  Cognitive Concerns/ Orientation : Confused, disoriented x4. Calm, can be restless sometimes.  BEHAVIOR & AGGRESSION TOOL COLOR: Yellow    ABNL VS/O2: VSS on RA  MOBILITY: Bedrest. Changes position independently d/t restlessness at times  PAIN MANAGMENT: None this shift.  DIET: Regular,  BOWEL/BLADDER: Incontinent B/B, no BM during night.  ABNL LAB/BG: NA  DRAIN/DEVICES: None  TELEMETRY RHYTHM: NA  SKIN: Scattered bruises and scabs  TESTS/PROCEDURES: NA  D/C DATE: Pending.  Discharge Barriers: Still restless and agitated at times. 4 point restraints in place  OTHER IMPORTANT INFO: Sitter at bedside. Takes meds crush with pudding.

## 2022-06-13 NOTE — PLAN OF CARE
Goal Outcome Evaluation:    Summary: Lewy body dementia with behavioral concerns; IP hospice.  DATE & TIME: 6/12/22-6/13/22 5458-9947  Cognitive Concerns/ Orientation : Confused, disoriented x4. Agitated and restless at times.   BEHAVIOR & AGGRESSION TOOL COLOR: Yellow   ABNL VS/O2: VSS on RA  MOBILITY: Bedrest. Changes position independently d/t restlessness at times  PAIN MANAGMENT: scheduled Tylenol  DIET: Regular,  BOWEL/BLADDER: Incontinent B/B, no BM during night.  ABNL LAB/BG: NA  DRAIN/DEVICES: None  TELEMETRY RHYTHM: NA  SKIN: Redness blanchable to elbows & heels - skin sleeves applied. Scattered bruises and scabs  TESTS/PROCEDURES: NA  D/C DATE: Pending progress with PRN med use and sitter (see hospice note 6/9/22)  Discharge Barriers: Still restless and agitated at times. 4 point restraints in place  OTHER IMPORTANT INFO: Long arm for day shift, sitter for evening shift. PRN Seroquel and zyprexa available. Zyprexa given x1. Exelon patch to left shoulder.

## 2022-06-14 NOTE — PROGRESS NOTES
[facility]    Progress Note - [facility]    ______________________________________________________________________    [facility] Contact Number: [phone]    - Providers: Please contact [facility] with changes in orders or clinical plan of care   - Nursing: Please contact [facility] with significant changes in patient condition  ______________________________________________________________________        Plan of Care Discussed with the Following:   - Hospitalist/Rounding Provider:Eloy Oreilly DO    Attending    Since 6/8/2022    [phone]       - Nurses:  Phani Sheth RN Zimmerman, Kyle R, RN       - Matt's Family/Preferred Contact: Danielle daughter  - Hospice Provider: Ruby Harrington [phone]    Summary of Visit (includes assessment, medications and any new orders):   Visit made and Matt on 4-point restraint with 1:1 sitter. He has been eating very little. He does take his medications in pudding but in not taking in adequate calories or fluids at this point. His older brother and sister-in-law were visiting. They seem to be grieving appropriately and writer discussed events of hospitalization. They were very pleasant and are very thankful for hospice services. MD notified and suggested placement in a geriatric setting but realizes the barriers of unpredictable agitation requiring 1:1 sitter as well as restraints. He also requested another psych consult.      Discharge plans   No formal plans implemented due to not meeting goals of being free of restraint and use of 1:1 sitter.        Ruby Harrington RN

## 2022-06-14 NOTE — PLAN OF CARE
Goal Outcome Evaluation:    Summary: Lewy body dementia with behavioral concerns; IP hospice.  DATE & TIME: 6/13/2022  [id number]  Cognitive Concerns/ Orientation : Confused & disoriented x4 restless the majority of the shift.    BEHAVIOR & AGGRESSION TOOL COLOR: Yellow    ABNL VS/O2: VSS on RA  MOBILITY: Bedrest. Changes position upon restlessness   PAIN MANAGMENT: Did not report pain when assessed   DIET: Regular - very minimal intake  BOWEL/BLADDER: Incontinent B/B, 2 BM this shift  ABNL LAB/BG: NA  DRAIN/DEVICES: None  TELEMETRY RHYTHM: NA  SKIN: Scattered bruises/scabs - new mepilex applied to sacrum  TESTS/PROCEDURES: NA  D/C DATE: Pending.  Discharge Barriers: 4 point restraints stayed in place d/t to consistent restlessness and moments of agitation  OTHER IMPORTANT INFO: Sitter at bedside. Takes meds crush with pudding.

## 2022-06-14 NOTE — PLAN OF CARE
Goal Outcome Evaluation:      Summary: Lewy body dementia with behavioral concerns; IP hospice.  DATE & TIME: 6/13/2022-6/14/22  0806-0040  Cognitive Concerns/ Orientation : Confused & disoriented x4. Slept the majority of the shift, restless when dirty brief.   BEHAVIOR & AGGRESSION TOOL COLOR: Yellow    ABNL VS/O2: VSS on RA  MOBILITY: Bedrest. Changes position upon restlessness.   PAIN MANAGMENT: Did not report or show signs of pain when assessed. Refused scheduled tylenol.  DIET: Regular - very minimal intake.  BOWEL/BLADDER: Incontinent B/B, no BM this shift.  SKIN: Scattered bruises/scabs - new mepilex applied to sacrum 6/13 evening.  D/C DATE: Pending.  Discharge Barriers: 4 point restraints stayed in place d/t to consistent restlessness and moments of agitation.  OTHER IMPORTANT INFO: Sitter at bedside. Takes meds crushed with pudding.

## 2022-06-14 NOTE — PROGRESS NOTES
As per psychiatry consult, pt was in four point restraints earlier and still requires a one to one sitter. Pt is not appropriate for discharge planning at this time. SW will check in with team daily regarding appropriateness for discharge.    NANCY Yarbrough  [phone]

## 2022-06-14 NOTE — PLAN OF CARE
Goal Outcome Evaluation:      Summary: Lewy body dementia with behavioral concerns; IP hospice.  DATE & TIME: -6/14/22  8255-8052  Cognitive Concerns/ Orientation : Confused & disoriented x4. Slept the majority of the shift, restless at times.   BEHAVIOR & AGGRESSION TOOL COLOR: Yellow    ABNL VS/O2: VSS on RA  MOBILITY: Bedrest. Changes position per self  PAIN MANAGMENT:  on scheduled tylenol,given MS x 1 for restlessness.  DIET: Regular - very minimal intake.  BOWEL/BLADDER: Incontinent B/B, BMx1 this shift.  SKIN: Scattered bruises/scabs - sacral   mepilex c/d/i  D/C DATE: Pending.  Discharge Barriers: placement.  OTHER IMPORTANT INFO: Sitter at bedside. Takes meds crushed with pudding. Hospice following. Discontinued restraints at 1300.

## 2022-06-14 NOTE — CONSULTS
Psychiatry Consultation; Follow up              Reason for Consult, requesting source:    Terminal delirium  Requesting source: Eloy Berkowitz    Labs and imaging reviewed, discussed with nursing               Interim history:    Matt Way is a 71 year old male who was admitted from hospice with worsening behavioral issues in the context of Lewy Body Dementia. Hx significant for prior LAD and RCA stents, NSTEMI, angiogram on 1/27/22 demonstrating 99% ISR of the pLAD. Psychiatry has assessed patient several times previously. He is currently in [facility] hospice.    I attempted to see Matt today, he is seen sleeping in bed, very somnolent and unable to be awakened. He is not currently in restraints but the bedside sitter reports that he was in 4 point restraints earlier today. Seems that he is either awake and agitated or sedated and sleeping. He is still taking PO meds in pudding, otherwise has little nutritional intake.        Current Medications:       acetaminophen  650 mg Oral Q8H     citalopram  10 mg Oral Daily     divalproex sodium delayed-release  500 mg Oral QAM AC     divalproex sodium delayed-release  750 mg Oral BID     melatonin  10 mg Oral At Bedtime     metFORMIN  1,000 mg Oral BID w/meals     metoprolol succinate ER  12.5 mg Oral At Bedtime     polyethylene glycol  17 g Oral Daily     QUEtiapine  25 mg Oral At Bedtime     risperiDONE  0.5 mg Sublingual BID     risperiDONE  1 mg Sublingual QAM     rivastigmine  1 patch Transdermal Daily     rivastigmine   Transdermal Q8H     tamsulosin  0.4 mg Oral Daily              MSE:   Appearance: laying in bed, wearing hospital gown, sleeping  Attitude:  CHRYSTAL, sleeping  Eye Contact:  sleeping  Mood:  CHRYSTAL, sleeping  Affect:  sleeping  Speech:  CHRYSTAL, sleeping  Psychomotor Behavior:  Sleeping calmly  Thought Process: CHRYSTAL, sleeping  Associations: CHRYSTAL, sleeping  Thought Content: CHRYSTAL, sleeping  Insight:CHRYSTAL,  sleeping  Judgement: CHRYSTAL, sleeping  Oriented to: CHRYSTAL,  "sleeping  Attention Span and Concentration: CHRYSTAL, sleeping  Recent and Remote Memory: CHRYSTAL, sleeping        Vital signs:  Temp: 97.8  F (36.6  C) Temp src: Axillary BP: 137/83 Pulse: 71   Resp: 18 SpO2: 97 % O2 Device: None (Room air)     Weight: 79.7 kg (175 lb 12.8 oz)  Estimated body mass index is 23.19 kg/m  as calculated from the following:    Height as of 5/12/22: 1.854 m (6' 1\").    Weight as of this encounter: 79.7 kg (175 lb 12.8 oz).              DSM-5 Diagnosis:   Lewy Body Dementia, with behavioral disturbance          Assessment:   I attempted to see Matt for a follow up today, he is quite somnolent and unable to be awakened for assessment. He has not needed any PRNs today for agitation. Most recent VPA level was 91, drawn on 6/6/22. No dose adjustments to Depakote since 6/3/22. He is also on quetiapine and risperidone, which he seems to be tolerating.    I spoke with the unit charge nurse, who reported that the plan is for him to go to a hospice facility but will need to be out of restraints in order to be accepted. Based on everything we have tried so far, it seems that the only way for him to be out of restraints would be if he was completely sedated throughout the day which is outside of the scope of psychiatry. So far, this is the best we've been able to manage his behavior while still balancing for periods of alertness. It seems that this may be terminal agitation. I would recommend discussing further options for management of agitation with hospice team, if this is indeed the goal of his care.     There is also concern that he may stop taking taking PO medications, as his nutritional intake has decreased. If this does occur, Depakote can be switched back to IV formulation at the same dose he is currently receiving.           Summary of Recommendations:   1. Continue Depakote, quetiapine, and risperidone at current dosages.    2. Recommend clarifying goals of care with hospice, family.     3. He is " "currently taking meds PO, if he becomes unable to take PO, recommend switching back to Depakote IV at same dose he takes PO.     4. Please reconsult psychiatry as needed.      PHILLIP Tamayo [facility]  Consult/Liaison Psychiatry   [facility]    Contact information available via [facility] Paging/Directory  If I am not available, then [facility] NATALIE line ([phone]) should know who is covering our consult service.   \"Much or all of the text in this note was generated through the use of Dragon Dictate voice to text software. Errors in spelling or words which appear to be out of contact are unintentional, may be present due having escaped editing\"           "
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
## 2022-06-15 NOTE — PROGRESS NOTES
[facility]    Progress Note - [facility]    ______________________________________________________________________    [facility] 24/7 Contact Number: [phone]    - Providers: Please contact [facility] with changes in orders or clinical plan of care   - Nursing: Please contact [facility] with significant changes in patient condition  ______________________________________________________________________        Plan of Care Discussed with the Following:   - Nurse: He  - Hospitalist/Rounding Provider: Dr. Tristna Gutierrez's Family/Preferred Contact: sandra Santos   - Hospice Provider: Dr. Ramon Smith    Summary of Visit (includes assessment, medications and any new orders):   Hospice Dx CAD - GIP DX Agitation requiring frequent medication titration and assessment  Patient continues on 1:1 sitter, and 4 point restraints.  Patient was sleeping peacefully at time of visit. Patient is taking medications PO. Medications are crushed and taken with pudding, however is getting more difficult, as patient is needing lots of encouragement.  In the past few days, patient has had minimal nutritional intake, no appetite. Patient is incontinent of bladder and bowels. Last BM 6/14.  O2 sats 96 percent on RA, HR 71, RR 18. Hospice will continue with discharge planning and visit patient daily.     Sylvia Barreto, RN

## 2022-06-15 NOTE — PLAN OF CARE
Goal Outcome Evaluation:      Summary: Lewy body dementia with behavioral concerns; IP hospice.  DATE & TIME: 6--6/15/2022 4901-1573  Cognitive Concerns/ Orientation : Confused & disoriented x4. Restless/agitated    BEHAVIOR & AGGRESSION TOOL COLOR: Yellow    ABNL VS/O2: VSS on RA  MOBILITY: Bedrest. Changes position upon restlessness.   PAIN MANAGMENT: On scheduled Tylenol   DIET: Regular - very minimal intake.  BOWEL/BLADDER: Incontinent B/B, no BM this shift.  SKIN: Scattered bruises/scabs - sacral   mepilex c/d/i  D/C DATE: Pending.  Discharge Barriers: placement.  OTHER IMPORTANT INFO: Sitter at bedside. Takes meds crushed with pudding. Hospice following. 4 point restraints are continued.

## 2022-06-15 NOTE — PLAN OF CARE
Goal Outcome Evaluation:  Summary: Lewy body dementia with behavioral concerns; IP hospice.  DATE & TIME: -6/15/2022 4000- 3180  Cognitive Concerns/ Orientation : Confused & disoriented x4. Restless/agitated    BEHAVIOR & AGGRESSION TOOL COLOR: Yellow    ABNL VS/O2: VSS on RA  MOBILITY: Bedrest. Changes position upon restlessness.   PAIN MANAGMENT: On scheduled Tylenol   DIET: Regular - very minimal intake.  BOWEL/BLADDER: Incontinent B/B,  SKIN: Scattered bruises/scabs - sacral   mepilex c/d/i  D/C DATE: Pending.  Discharge Barriers: placement.  OTHER IMPORTANT INFO: Sitter at bedside. Takes meds crushed with pudding. Hospice following. restraints are continued.
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
## 2022-06-15 NOTE — PLAN OF CARE
Goal Outcome Evaluation:    Plan of Care Reviewed With: patient     Summary: Lewy body dementia with behavioral concerns; IP hospice.  DATE & TIME: 6/15/2022 4313-7745  Cognitive Concerns/ Orientation : Confused & disoriented x4. Drowsy, restless at times    BEHAVIOR & AGGRESSION TOOL COLOR: Yellow    ABNL VS/O2: VSS on RA  MOBILITY: Bedrest. Changes position upon restlessness.   PAIN MANAGMENT: On scheduled Tylenol   DIET: Regular - very minimal intake.  BOWEL/BLADDER: Incontinent B/B,  SKIN: Scattered bruises/scabs - sacral   mepilex c/d/i  D/C DATE: Pending.  Discharge Barriers: placement.  OTHER IMPORTANT INFO: Sitter at bedside. Takes meds crushed with pudding. Hospice following. restraints are continued.

## 2022-06-16 NOTE — PROGRESS NOTES
At this time Matt is not stable for discharge as per hospice nurse. Pt still requires one to one sitter and bilateral soft restraints were reintroduced this afternoon d/t pt hitting staff.    SW will visit Matt daily and collaborate with hospice team on discharge planning,    Alicia Ramirez, [facility]  [phone]

## 2022-06-16 NOTE — PROVIDER NOTIFICATION
Pageterence MOYA to place order for bilateral upper restraints. Patient is agitated, trying to get out of bed, not redirectable, unsafe, hitting staff. Given his 2pm scheduled gabapentin and risperdal.
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
## 2022-06-16 NOTE — PLAN OF CARE
Summary: Lewy body dementia with behavioral concerns; IP hospice.  DATE & TIME: 6/15/2022 9373-2361  Cognitive Concerns/ Orientation : Confused & disoriented x4. Drowsy, restless at times    BEHAVIOR & AGGRESSION TOOL COLOR: Yellow    ABNL VS/O2: VSS on RA  MOBILITY: Bedrest. Changes position upon restlessness.   PAIN MANAGMENT: On scheduled Tylenol   DIET: Regular - very minimal intake.  BOWEL/BLADDER: Incontinent B/B,  SKIN: Scattered bruises/scabs - sacral   mepilex c/d/i  D/C DATE: Pending.  Discharge Barriers: placement.  OTHER IMPORTANT INFO: Sitter at bedside. Takes meds crushed with pudding. Hospice following. restraints are continued.
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
## 2022-06-16 NOTE — CONSULTS
[facility]    Progress Note - [facility]    ______________________________________________________________________    [facility] 24[facility]7 Contact Number: [phone]    - Providers: Please contact [facility] with changes in orders or clinical plan of care   - Nursing: Please contact [facility] with significant changes in patient condition  ______________________________________________________________________        Plan of Care Discussed with the Following:   - Nurse: Uma   - Hospitalist/Rounding Provider:   Dr. Tristan Gutierrez's Family/Preferred Contact: Rebekah Santos   - Hospice Provider: Ramon Smith     Summary of Visit (includes assessment, medications and any new orders):     Writer visited patient for daily GIP assessment.  Patient agitated and restless AEB continuously flailing extremities and sitting up in bed.  Patient does grab staff aggressively during cares.  Hospice consulted with hospice medical director, Dr. Smith regarding medication management and barriers to discharge.  Due to patient's ongoing aggressive behavior despite frequent medication changes / adjustments, the following medication changes are recommended by hospice MDRamon:    Discontinue scheduled and PRN Seroquel     Begin scheduled thorazine 50 mg TID for agitation and  mg BID PRN     Hospice will update patients Danielle flores regarding medication changes.               Gracie Harvey RN  [facility]

## 2022-06-16 NOTE — PROGRESS NOTES
[facility]    Progress Note - [facility]    ______________________________________________________________________    [facility] Contact Number: [phone]    - Providers: Please contact [facility] with changes in orders or clinical plan of care   - Nursing: Please contact [facility] with significant changes in patient condition  ______________________________________________________________________        Plan of Care Discussed with the Following:   - Nurse: He  - Hospitalist/Rounding Provider:Dr. Eloy Berkowitz - Matt's Family/Preferred Contact: Rebekah Santos   - Hospice Provider: Dr. Ramon Smith    Summary of Visit (includes assessment, medications and any new orders):   Hospice RN and Sw visited Matt for daily GIP assessment. Matt receiving afternoon  meds with pudding.  Matt did take medication with encouragement and was pleasant during during the beginning of our visit.  Matt did receive  Morphine x 1  this shift due to nonverbal indicators of discomfort AEB wincing and furrowed brow. During repositioning,  patient became aggressive requiring consistent redirection and eventual 2 point soft restraints.  Staff report appetite has been very minimal and overall Matt sleeps more throughout the day. Unfortunately, when awake, Matt continues to be agitated and combative.  Hospice continues to work towards discharge to appropriate facility when medical goals are met.  Matt will need to be free from 1:1 sitter and restraints x 24 hours prior to discharge.          Gracie Harvey, RN  [facility]

## 2022-06-16 NOTE — PLAN OF CARE
Goal Outcome Evaluation:      Summary: Lewy body dementia with behavioral concerns; IP hospice.  DATE & TIME: 6/15/2022-6/16/22 2737-0432  Cognitive Concerns/ Orientation : Confused & disoriented x4. Drowsy, restless at times    BEHAVIOR & AGGRESSION TOOL COLOR: Yellow    ABNL VS/O2: VSS on RA  MOBILITY: Bedrest. Changes position upon restlessness.   PAIN MANAGMENT: On scheduled Tylenol   DIET: Regular - very minimal intake.  BOWEL/BLADDER: Incontinent B/B  SKIN: Scattered bruises/scabs - sacral   mepilex c/d/i  D/C DATE: Pending.  Discharge Barriers: placement.  OTHER IMPORTANT INFO: Sitter at bedside. Takes meds crushed with pudding. Hospice following. restraints are discontinued due to pt being calm and resting.

## 2022-06-16 NOTE — PLAN OF CARE
Summary: Lewy body dementia with behavioral concerns; IP hospice.  DATE & TIME: 6/16/2022 8145-3234  Cognitive Concerns/ Orientation : Confused & disoriented x4, will not answer orientation questions. Slept most of the day but any stimulation causes patient to be agitated.    BEHAVIOR & AGGRESSION TOOL COLOR: Yellow/red, BUE restraints on at 1:50 again as patient was trying to get out of bed, unsafe, not redirectable and sitter could not manage, as well as hitting at staff.   ABNL VS/O2: VSS on RA  MOBILITY: Bedrest. Changes as he wants, gets agitated when staff put pillows under him or try to turn  PAIN MANAGMENT: On scheduled Tylenol   DIET: Regular - very minimal intake  BOWEL/BLADDER: Incontinent of urine several times, no BM today  SKIN: Scattered bruises/scabs. Coccyx with small opening, red, mepilex changed and WOC consulted and notified Dr. Hudson  D/C DATE: Pending hospice placement when behaviors have improved.  Discharge Barriers: placement to hospice, pt needs to be sitter/restraint free per  note  OTHER IMPORTANT INFO: Sitter at bedside. Takes meds crushed or whole with pudding. Hospice following. IM zyprexa given x1 this afternoon.

## 2022-06-16 NOTE — PROVIDER NOTIFICATION
Patient sleeping since evening meds, restraints have .    Discussed w/ nursing.  Has been calm and/or sleeping since approximately 7 PM.  They feel there is no need for additional meds for agitation, though patient has a history of escalating rapidly.  As no medications are required for agitation and patient is calm and sleeping, seems appropriate to trial off of restraints, and if patient escalates, I can order again.  If/when restraints are again required, I can add medications for agitation if needed.  Patient does have a bedside sitter.      Daniel May MD  12:45 AM

## 2022-06-17 NOTE — PROGRESS NOTES
[id number]    Pt A&O to self only. Off BUE soft restraints @2140. VSS on RA, ex HTN. Bed rest. T&R q2h. Wound on coccyx, WOC following. Heels blanchable, elevated with pillows. Z-tanya pad used to T&R. Meds taken crushed in pudding. Reg diet, tolerated well. Incont B/B, low UOP. Discharge pending.
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
## 2022-06-17 NOTE — PLAN OF CARE
Goal Outcome Evaluation:      Summary: Lewy body dementia with behavioral concerns; IP hospice.  DATE & TIME:6/16/22-6/17 6890-7664  Cognitive Concerns/ Orientation : Confused & disoriented x4.   BEHAVIOR & AGGRESSION TOOL COLOR: Yellow/red, restraints off at this time. Intermittent agitation/restlessness>PRN Zyprexa x1  ABNL VS/O2: VSS on RA  MOBILITY: Bedrest. Moves around in bed, PAIN MANAGMENT: On scheduled Tylenol.  DIET: Regular -  BOWEL/BLADDER: Incontinent, no BM this shift  SKIN: Scattered bruises/scabs - sacral   mepilex c/d/i  D/C DATE: Pending hospice placement when behaviors have improved.  Discharge Barriers: placement to hospice, pt needs to be sitter/restraint free per  note  OTHER IMPORTANT INFO: Sitter at bedside. Takes meds crushed or whole with pudding. Hospice following.

## 2022-06-17 NOTE — PROGRESS NOTES
[facility]    Progress Note - [facility]    ______________________________________________________________________    [facility] Contact Number: [phone]    - Providers: Please contact [facility] with changes in orders or clinical plan of care   - Nursing: Please contact [facility] with significant changes in patient condition  ______________________________________________________________________        Plan of Care Discussed with the Following:   - Nurse: SAMIA Acuna  - Hospitalist/Rounding Provider: Dr. Hudson  - Matt's Family/Preferred Contact: Daughter Danielle  - Hospice Provider: Dr. Ramon Smith    Summary of Visit (includes assessment, medications and any new orders):   Daily GIP assessment. Report from SAMIA Acuna, patient was very somnolent early this morning.  After being repositioned, patient became agitated, however was able to be easily redirected, . Medications were crushed and given with pudding. Patient was able to take his medications with pudding, followed by teaspoons of water. Patient was able to be consoled and briefly answer simple questions. Mepilex in place on coccyx area CDI. After given his medications patient fell asleep. Patient has 1:1 sitter at bedside. No restraints present. Patient received PRN IM Zyprexa at 6/17/2022 at 0115 am for agitation with effective results. Appetite has been very poor. O2 sats 95 percent on RA, HR 91. Lungs sounds diminished throughout. Hospice continues to work toward discharge to appropriate facility when medical goals are met. Patient will need to be free from 1:1 sitter x 24 hours prior to discharge.  Daughter updated.       Sylvia Barreto RN  [facility]

## 2022-06-17 NOTE — PROGRESS NOTES
Notified Hospitalist and [facility] regarding pt being unable to be aroused to administered oral medications safely.     Later in the morning medications were able to be given

## 2022-06-17 NOTE — PLAN OF CARE
Summary: Lewy body dementia with behavioral concerns; IP hospice.  DATE & TIME: 06/17/22 7-3 pm  Cognitive Concerns/ Orientation : CHRYSTAL. Knows self. Garbled illogical speech. Lethargic this am, intermittent episodes of restlessness/agitation.   BEHAVIOR & AGGRESSION TOOL COLOR: Yellow/Red, restraints have been off.  ABNL VS/O2: VSS on RA  MOBILITY: Bedrest. Moves around in bed and turning and repositioning.   PAIN MANAGMENT: On scheduled Tylenol. Appears in pain mainly with movement.   DIET: Regular - poor oral intake.   BOWEL/BLADDER: Incontinent or urine x1, no BM this shift.   SKIN: Pale. Scattered bruises/scabs - sacral  mepilex c/d/i  D/C DATE: Pending hospice placement when behaviors have improved.  Discharge Barriers: placement to hospice, pt needs to be sitter/restraint free per  note  OTHER IMPORTANT INFO: Sitter at bedside. Takes meds crushed or whole with pudding. Hospice following. More lethargic/just sleeping this whole shift. Responds well to quiet voice redirection. Soft music playing.

## 2022-06-17 NOTE — PROGRESS NOTES
SW visited Matt on this date for routine visit. Matt was laying in bed and began to become restless and move around. Matt attempted to get out of bed and NA had to physically redirect Matt. RN was alerted and came in shortly after to provide prn medication.    Matt is not stable for discharge at this time d/t continuing to require one to one sitter and prns for agitation/restlessnes. Hospice SW will continue to follow up.    Alicia Ramirez, [facility]  [phone]

## 2022-06-18 NOTE — PLAN OF CARE
Goal Outcome Evaluation:    Plan of Care Reviewed With: patient     Overall Patient Progress: no change    DATE & TIME: 6/17/22 1500- 6/18/22 0730  Cognitive Concerns/ Orientation : Confused. Garbled, illogical speech. Sleeping between cares. Music playing for relaxation  BEHAVIOR & AGGRESSION TOOL COLOR: Green this shift, hx yellow and red  ABNL VS/O2: VSS on RA  MOBILITY: Bedrest. Moves around in bed and turning and repositioning.   PAIN MANAGMENT: PRN Morphine given x1 for generalized pain indicators with some relief   DIET: Regular - poor oral intake.   BOWEL/BLADDER: Incontinent    SKIN: Pale, bruised, coccyx stage 2 pressure injury (mepilex CDI)  D/C DATE: Pending hospice placement when behaviors have improved.  Discharge Barriers: placement to hospice, pt needs to be sitter/restraint free per  note  OTHER IMPORTANT INFO: Pt has been off of restraints since 6/16 at 2140. Sitter at bedside for redirection. Takes meds crushed or whole with pudding. Hospice following.
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
## 2022-06-18 NOTE — PLAN OF CARE
Summary: Lewy body dementia with behavioral concerns; IP hospice.  DATE & TIME: 06/18/22 7-3 pm   Cognitive Concerns/ Orientation: Confused. Garbled, illogical speech. Intermittently responsive. Sedated/obtunded.   BEHAVIOR & AGGRESSION TOOL COLOR: Yellow  ABNL VS/O2: VSS on RA  MOBILITY: Bedrest. Moves around in bed and turning and repositioning.   PAIN MANAGMENT: Administred scheduled Tylenol.   DIET: Regular - not tolerating oral intake. MD notified. Speaking with hospice MD.   BOWEL/BLADDER: Incontinent. Small BM.   SKIN: Pale, bruised, coccyx stage 2 pressure injury (mepilex changed. Heels red blanchable. Scabbing to bilateral feet.   D/C DATE: Pending hospice placement when behaviors have improved.  Discharge Barriers: Placement to hospice, pt needs to be sitter/restraint free per  note  OTHER IMPORTANT INFO: Pt has been off of restraints since 6/16 at 2140. Sitter at bedside for redirection. Music playing for relaxation. Not tolerating taking meds crushed in pudding or teaspoon amounts of water, Hospice RN and MD aware. Coughing with oral intake. Very unresponsive compared to yesterday.  Hospice following.
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
## 2022-06-19 NOTE — PLAN OF CARE
Goal Outcome Evaluation:    Plan of Care Reviewed With: patient     Overall Patient Progress: no change         Summary: Lewy body dementia with behavioral concerns; IP hospice.  DATE & TIME: 06/18/22 pm shift  Cognitive Concerns/ Orientation : Patient lethargic not following command at the beginning of shift. Started to be restless towards the night taking off his clothes and brief, at times with garble/illogical speech.  BEHAVIOR & AGGRESSION TOOL COLOR: Yellow  ABNL VS/O2: VSS on RA  MOBILITY: Bedrest. Moves around in bed and turning and repositioning.   PAIN MANAGMENT: no indication of pain noted.    DIET: Regular - not tolerating oral intake. MD notified by AM nurse. All schedule meds held by MD.  BOWEL/BLADDER: Incontinent. No bm  SKIN: Pale, bruised, coccyx stage 2 pressure injury (mepilex changed in the morning). Heels red blanchable. Scattered scabs.  D/C DATE: Pending hospice placement when behaviors have improved.  Discharge Barriers: Placement to hospice, pt needs to be sitter/restraint free per  note  OTHER IMPORTANT INFO: Pt has been off of restraints since 6/16 at 2140. Sitter at bedside for redirection. Patient more restless when brief wet. Music playing for relaxation.  Hospice following.

## 2022-06-19 NOTE — CONSULTS
[facility]    Progress Note - [facility]    ______________________________________________________________________    [facility] Contact Number: [phone]    - Providers: Please contact [facility] with changes in orders or clinical plan of care   - Nursing: Please contact [facility] with significant changes in patient condition  ______________________________________________________________________        Plan of Care Discussed with the Following:   - Nurse: Kalpana  - Hospitalist/Rounding Provider: Dr. Hudson    - Matt's Family/Preferred Contact: Danielle flores- updated via phone- no response at this time  - Hospice Provider: Dr. Wyatt    Summary of Visit (includes assessment, medications and any new orders):   Upon visit pt is resting comfortably in bed with hands on his chest. Breathing appear shallow but not labored. Bedside RN able to give medication this morning in pudding. Educated bedside team about goals of comfort while being IP hospice. Clarified with bedside staff about trying to find the best medication regime to manage his terminal agitation with the least amount of disruption to patient and staff. Currently the Thorazine seems to be keeping the patients behaviors more manageable appearing to be comfortable at rest.  Per bedside RN pt can still be restless and air picking during repositioning in bed and during wound cares. Requiring redirection from staff. Restraint free since 6/16/22. Informed to utilized his wakeness to give his scheduled or PRN medications at that time. Discharge plan pending  involvement of potential placement. Continue pt current plan of care at [facility] level to manage behaviors and increased patients comfort while hospitalized.   Medications that were restarted today Thorazine three times daily 50mg and PRN thorazine twos times daily 50mg-100mg. Discussed with Dr. Hudson and Hospice MD Dr. Wyatt. Dr. Hudson reordered  medications.         Lilly Briceno RN  Contact # [phone]  Clinical Hospice Nurse Liaison  [facility]

## 2022-06-19 NOTE — PROGRESS NOTES
[facility]    Progress Note - [facility]    ______________________________________________________________________    [facility] Contact Number: [phone]    - Providers: Please contact [facility] with changes in orders or clinical plan of care   - Nursing: Please contact [facility] with significant changes in patient condition  ______________________________________________________________________        Plan of Care Discussed with the Following:   - Nurse: SAMIA Acuna  - Hospitalist/Rounding Provider: Dr. Hudson    - Matt's Family/Preferred Contact: Danielle, daughter  - Hospice Provider: Dr. Ramon Unger    Summary of Visit (includes assessment, medications and any new orders):   GIP daily Assessment. 1:1 sitter present, no restraints. Patient was resting comfortable, very somnolent, unable to fully arouse. SAMIA Acuna reported patient is unable to take his medications orally with pudding as she feels patient could aspirate. Hospitalist Dr. Hudson spoke to Dr. Wyatt. Per Dr. Wyatt, as of now medications will be PRN.  Patient has been sleeping more and has a poor appetite. Mepilex in place on coccyx area d/t blanchable pressure ulcer. O2 sats 91 percent on RA, HR 68. Hospice RN will reassess patient and medications tomorrow. Hospice SW continues to work on discharge plan. .         Sylvia Barreto, RN

## 2022-06-19 NOTE — PLAN OF CARE
Goal Outcome Evaluation:    Plan of Care Reviewed With: patient     Overall Patient Progress: no change    DATE & TIME: 06/18/22 2790-8876  Cognitive Concerns/ Orientation : Restless, confused. Garbled speech. Not as redirectable. PRN Thorazine given with relief, slept after  BEHAVIOR & AGGRESSION TOOL COLOR: Yellow  ABNL VS/O2: VSS on RA  MOBILITY: Bedrest. Moves around in bed and turning and repositioning.   PAIN MANAGMENT: Absence of nonverbal pain indicators   DIET: Regular  BOWEL/BLADDER: Incontinent. No bm  SKIN: Pale, bruised, coccyx stage 2 pressure injury (mepilex CDI). Heels red blanchable. Scattered scabs.  D/C DATE: Pending hospice placement when behaviors have improved.  Discharge Barriers: Placement to hospice, pt needs to be sitter/restraint free per  note  OTHER IMPORTANT INFO: Pt has been off of restraints since 6/16 at 2140. Sitter at bedside for redirection. Hospice following    Scheduled medications were held on 6/18 due to concern of aspiration during the daytime as pt was not swallowing the pudding with his pills. Overnight pt was able to eat pudding appropriately with PRN medication.

## 2022-06-19 NOTE — PLAN OF CARE
Summary: Lewy body dementia with behavioral concerns; IP hospice.  DATE & TIME: 06/19/22 7-3 pm  Cognitive Concerns/ Orientation : More alert. Restless, confused. Garbled speech.   BEHAVIOR & AGGRESSION TOOL COLOR: Yellow  ABNL VS/O2: VSS on RA, discontinued vitals this shift per hospice RN.   MOBILITY: Bedrest. Moves around in bed and turning and repositioning.   PAIN MANAGMENT: Switched tylenol to liquid and administered PRN x2.   DIET: Regular, poor appetite. But able to tolerate minimal oral intake. Improved compared to yesterday.   BOWEL/BLADDER: Incontinent of urine. No BM.   SKIN: Pale, bruised, coccyx stage 2 pressure injury (mepilex CDI). Heels red blanchable. Scattered scabs.  D/C DATE: Pending hospice placement when behaviors have improved.  Discharge Barriers: Placement to hospice, pt needs to be sitter/restraint free per  note  OTHER IMPORTANT INFO: Pt has been off of restraints since 6/16. Sitter at bedside for redirection. Hospice following. Pt able to tolerate minimal oral intake, and was able to administer Thorazine. See Hospice RN note for medication changes for today. Not as sedated compared to yesterday.

## 2022-06-20 NOTE — PROGRESS NOTES
SW visited Matt on this date for routine visit. Matt was sleeping comfortably after a bed bath. As per nursing staff, Matt was restless at times but was able to be redirected. Continues to require a one to one sitter, and therefore not stable for discharge at this time.    TIBURCIO will follow up with pt and care team daily for discharge planning,    NANCY Yarbrough  [phone]

## 2022-06-20 NOTE — PLAN OF CARE
Goal Outcome Evaluation: unable to review with pt  DATE & TIME: 6/20/2022 days     Cognitive Concerns/ Orientation :  unable to assess pt is not responding to questions    BEHAVIOR & AGGRESSION TOOL COLOR:  restless at times and irritable with cares   CIWA SCORE:  na    ABNL VS/O2:  none pt is hospice   MOBILITY:  total care   PAIN MANAGMENT:  appears comfortable   DIET:  Regular needs to be fed. Did eat some breakfast refused to eat lunch  BOWEL/BLADDER: incontinent of bowel and bladder   ABNL LAB/BG:  none   DRAIN/DEVICES:  none   TELEMETRY RHYTHM:  na   SKIN:  scattered bruises and scabs   TESTS/PROCEDURES:  none   D/C DATE:  uncertain  Discharge Barriers:  needs to be without a sitter to go to hospice   OTHER IMPORTANT INFO:  Pt was more relaxed this morning and cooperative with cares. More restless this afternoon. Pt did eat some breakfast.

## 2022-06-20 NOTE — PLAN OF CARE
Goal Outcome Evaluation:    Plan of Care Reviewed With: patient     Overall Patient Progress: no change    DATE & TIME: 06/19/22 7019-0469  Cognitive Concerns/ Orientation: Confused. Restless at times. Awake off and on overnight. Air picking and sounds like he is singing songs.  BEHAVIOR & AGGRESSION TOOL COLOR: Yellow at times   ABNL VS/O2: Discontinued   MOBILITY: Bedrest. Moves around in bed and turning and repositioning.   PAIN MANAGMENT: Absence of nonverbal pain indicators   DIET: Regular. Total assist.  BOWEL/BLADDER: Incontinent of urine. No BM.   SKIN: Pale, bruised, coccyx stage 2 pressure injury (mepilex CDI). Heels red blanchable. Scattered scabs.  D/C DATE: Pending hospice placement when behaviors have improved.  Discharge Barriers: Placement to hospice, pt needs to be sitter/restraint free per  note  OTHER IMPORTANT INFO: Pt has been off of restraints since 6/16. Sitter at bedside for redirection. Hospice following.

## 2022-06-20 NOTE — PROGRESS NOTES
[facility]    Progress Note - [facility]    ______________________________________________________________________    [facility] Contact Number: [phone]    - Providers: Please contact [facility] with changes in orders or clinical plan of care   - Nursing: Please contact [facility] with significant changes in patient condition  ______________________________________________________________________        Plan of Care Discussed with the Following:   - Nurse: SAMIA Quintero  - Hospitalist/Rounding Provider: Dr. Siddiqui    - Matt's Family/Preferred Contact: Danielle Welch  - Hospice Provider: Dr. Ramon Unger    Summary of Visit (includes assessment, medications and any new orders):   GIP daily assessment.  Upon arrival, patient was resting comfortable. Patient had just received a bed bath this morning, tolerated well per report from nursing aide, no agitation noted. Patient was cooperative and allowed for cares. Patient ate 1/2 bowl of oatmeal and chocolate pudding for breakfast, feed by aide. Mepilex Sacral was replaced today, CDI. Small BM, incontinent of bladder/bowels. Patient has been restraint free since 6/16/2022. Continues with 1:1 sitter for safety. Last PRN Morphine used was on 6/17/2022 and PRN Zyprexa was 6/17/2022. Patient is receiving scheduled Thorazine 50 mg TID with effective results. Patient has been able to take medications orally w/o difficulty. O2 sats 96 percent on RA, HR 94. Patient is repositioned q2h for comfort. Discharge goal is for patient to be without a sitter for 48 hours. Hospice will continue to visit patient daily.     Sylvia Barreto RN
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
## 2022-06-20 NOTE — PLAN OF CARE
Patient on hospice. Aox4 lethargic during shift. Patient does not appears to be in pain or uncomfortable during shift. Patient incontinent or urine. Miconazole applied to scrotum. Patient has bed sore on coccyx

## 2022-06-21 NOTE — PLAN OF CARE
Goal Outcome Evaluation:  Summary: Lewy body dementia with behavioral concerns; IP hospice.    DATE & TIME: 6/21/2022 2308-9907           Cognitive Concerns/ Orientation : CHRYSTAL, confused. Mood labile with intermittent periods of restless  BEHAVIOR & AGGRESSION TOOL COLOR: Yellow at times             ABNL VS/O2: VSS  MOBILITY: Bedrest. Moved around in bed when awake. Turned and repositioned  PAIN MANAGMENT: Absence of nonverbal indicators of pain  DIET: Regular.  Refused to eat  BOWEL/BLADDER: Incontinent of  Bowel and bladder  ABNL LAB/BG: NA  DRAIN/DEVICES: NA  TELEMETRY RHYTHM: NA  SKIN: Scattered bruises and scabs.  Mepilex on coccyx wound.  Drsg changed every other day.  Due on 6/22  TESTS/PROCEDURES: NA  D/C DATE: Pending hospice placement when behaviors have improved  Discharge Barriers:   OTHER IMPORTANT INFO: Patient noted to be intermittently restless with incontinence but would settle and go back to sleep after incontinence care. Slept most of shift.  No 1:1 sitter needed.  Only took one med this AM.  Refused all others    Plan of Care Reviewed With: patient
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
## 2022-06-21 NOTE — PLAN OF CARE
DATE & TIME: 6/20/22, 4120 - 5868   Cognitive Concerns/ Orientation : CHRYSTAL, confused. Mood labile with intermittent periods of restless  BEHAVIOR & AGGRESSION TOOL COLOR: Yellow at times   ABNL VS/O2: Discontinued  MOBILITY: Bedrest. Moved around in bed when awake. Turned and repositioned  PAIN MANAGMENT: Absence of nonverbal indicators of pain  DIET: Regular  BOWEL/BLADDER: Incontinent of  Bowel and bladder  ABNL LAB/BG: NA  DRAIN/DEVICES: NA  TELEMETRY RHYTHM: NA  SKIN: Scattered bruises and scabs  TESTS/PROCEDURES: NA  D/C DATE: Pending hospice placement when behaviors have improved  Discharge Barriers: 1:1 sitter still needed for safety.   OTHER IMPORTANT INFO: Patient noted to be intermittently restless with incontinence but would settle and go back to sleep after incontinence care. Patient getting increasingly more restless this AM. Throwing legs over rails. Difficult to redirect. Prn Thorazine 50 mg given. Sitter present at bedside    Goal Outcome Evaluation:    Plan of Care Reviewed With: patient     Overall Patient Progress: no change

## 2022-06-21 NOTE — CONSULTS
[facility]    Progress Note - [facility]    ______________________________________________________________________    [facility] Contact Number: [phone]    - Providers: Please contact [facility] with changes in orders or clinical plan of care   - Nursing: Please contact [facility] with significant changes in patient condition  ______________________________________________________________________        Plan of Care Discussed with the Following:   - Nurse: SAMIA Samano  - Hospitalist/Rounding Provider: Dr. Siddiqui    - Matt's Family/Preferred Contact: Danielle Welch  - Hospice Provider: Dr. Ramon Smith    Summary of Visit (includes assessment, medications and any new orders):   [facility] daily visit assessment. Patient is resting comfortably. Patient has been off restraints since 6/16/2022, currently on long arm sitter and bed alarm. Patient was been cooperative with cares, has been able to be redirected and consoled..Patient has been taking medications with pudding. Per report from SAMIA Samano, patient has not been eating except for pudding, getting sips of water through a spoon. Patient is receiving scheduled Thorazine 50 mg TID, with effective results, Patient was given PRN Thorazine 50 mg earlier this morning at 0537 am d/t restlessness. Restlessness/agitation seem to wax and wane. O2 sats 94 percent on RA, HR 98. Patient is incontinent of bladder/bowels. Discharge goal is for patient to be without a sitter for 48 hours. Hospice SW has been updated and will continue on discharge planning.  Hospice will continue to visit patient daily.        Sylvia Barreto RN

## 2022-06-21 NOTE — PLAN OF CARE
Summary: Lewy body dementia with behavioral concerns; IP hospice.  DATE & TIME: 06/20/2022 7573-9868  Cognitive Concerns/ Orientation: Confused. Restless at times.   BEHAVIOR & AGGRESSION TOOL COLOR: Yellow at times   ABNL VS/O2: Discontinued   MOBILITY: Bedrest. Moves around in bed and turning and repositioning.   PAIN MANAGMENT: Absence of nonverbal pain indicators   DIET: Regular. Total assist.  BOWEL/BLADDER: Incontinent of urine. No BM.   SKIN: Pale, bruised, coccyx stage 2 pressure injury (mepilex CDI). Heels red blanchable. Scattered scabs.  D/C DATE: Pending hospice placement when behaviors have improved.  Discharge Barriers: Placement to hospice, pt needs to be sitter free per  note  OTHER IMPORTANT INFO: Pt has been off of restraints since 6/16. Sitter at bedside for redirection. Hospice following.

## 2022-06-22 NOTE — PLAN OF CARE
Goal Outcome Evaluation:                    Summary: Lewy body dementia with behavioral concerns; IP hospice.  DATE & TIME: 6/22/22 1500           Cognitive Concerns/ Orientation : CHRYSTAL, confused, mostly nonverbal. Mood labile, intermittently restless   BEHAVIOR & AGGRESSION TOOL COLOR: Yellow at times             ABNL VS/O2: None done today  MOBILITY: Bedrest. Turned and repositioned  PAIN MANAGMENT: Restless and groaning. Scheduled Tylenol given  DIET: Regular, would not eat today, did take water and pills with applesauce.  BOWEL/BLADDER: Incontinent of B/B. No BM this shift  ABNL LAB/BG: NA  DRAIN/DEVICES: None  TELEMETRY RHYTHM: NA  SKIN: Scattered bruises and scabs. Mepilex in place to coccyx wound, mepilex changed today.  TESTS/PROCEDURES: NA  D/C DATE: Pending hospice placement when behaviors have improved  Discharge Barriers: Intermittently restless/agitated  OTHER IMPORTANT INFO: Patient noted to be restless when incontinent. Less restless after incontinence care. On scheduled thorazine.

## 2022-06-22 NOTE — PLAN OF CARE
Summary: Lewy body dementia with behavioral concerns; IP hospice.  DATE & TIME: 6/21/2022 8913-8771           Cognitive Concerns/ Orientation : CHRYSTAL, confused. Mood labile with intermittent periods of restless  BEHAVIOR & AGGRESSION TOOL COLOR: Yellow at times             ABNL VS/O2: VSS  MOBILITY: Bedrest. Moved around in bed when awake. Turned and repositioned  PAIN MANAGMENT: Absence of nonverbal indicators of pain  DIET: Regular.  Refused to eat to dinner  BOWEL/BLADDER: Incontinent of  Bowel and bladder  ABNL LAB/BG: NA  DRAIN/DEVICES: NA  TELEMETRY RHYTHM: NA  SKIN: Scattered bruises and scabs  TESTS/PROCEDURES: NA  D/C DATE: Pending hospice placement when behaviors have improved  Discharge Barriers:   OTHER IMPORTANT INFO: Patient noted to be intermittently restless with incontinence but would settle and go back to sleep after incontinence care. Not able to given bedtime thorazine due to pt sleeping

## 2022-06-22 NOTE — PLAN OF CARE
DATE & TIME: 6/21/22, 2300 - 8289   Cognitive Concerns/ Orientation : CHRYSTAL, confused. Mood labile, intermittently restless   BEHAVIOR & AGGRESSION TOOL COLOR: Yellow at times   ABNL VS/O2: Deferred at nights  MOBILITY: Bedrest. Turned and repositioned  PAIN MANAGMENT: Restless and groaning. Scheduled Tylenol given  DIET: Regular  BOWEL/BLADDER: Incontinent of B/B. No BM this shift  ABNL LAB/BG: NA  DRAIN/DEVICES: None  TELEMETRY RHYTHM: NA  SKIN: Scattered bruises and scabs. Mepilex in place to coccyx wound, cdi  TESTS/PROCEDURES: NA  D/C DATE: Pending hospice placement when behaviors have improved  Discharge Barriers: Intermittently restless/agitated  OTHER IMPORTANT INFO: Patient noted to be restless when incontinent. Less restless after incontinence care.Increasingly restless at 0127. Was not given bedtime Thorazine because he was sleeping. Prn Thorazine was given. Same was helpful.       Goal Outcome Evaluation:    Plan of Care Reviewed With: patient     Overall Patient Progress: no change

## 2022-06-22 NOTE — PROGRESS NOTES
Pt needs to be without sitter for 48 hours before pt can discharge. If pt is still without a sitter tomorrow morning, hospice sw will reach out to family regarding discharge planning.    Alicia Ramirez, [facility]  [phone]

## 2022-06-22 NOTE — PROGRESS NOTES
[facility]    Progress Note - [facility]    ______________________________________________________________________    [facility] Contact Number: [phone]    - Providers: Please contact [facility] with changes in orders or clinical plan of care   - Nursing: Please contact [facility] with significant changes in patient condition  ______________________________________________________________________        Plan of Care Discussed with the Following:   - Nurse: Brittney   - Hospitalist/Rounding Provider: Dr.Iqbal Jay Gutierrez's Family/Preferred Contact: Daughter Danielle   - Hospice Provider: aRmon Smith     Summary of Visit (includes assessment, medications and any new orders):   Writer visited Matt for daily GIP assessment.  Patient is lying in bed asleep, RR 16 and regular.  Patient frail appearing with bony prominences noted.  Appetite diminished, disinterested in food. Agitation has improved with thorazine.  Patient appears more calm with less frequent episodes of agitation.  Hospice SW will work with family on discharge planning to appropriate facility       Gracie Harvey RN
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
## 2022-06-23 NOTE — DEATH PRONOUNCEMENT
NP DEATH PRONOUNCEMENT    Called to pronounce Matt Way dead.    Physical Exam: Spontaneous respirations absent, Breath sounds absent, Carotid pulse absent and Heart sounds absent    Patient was pronounced dead at 10:35 AM, 2022.    Preliminary Cause of Death: Cardiopulmonary arrest secondary to longstanding joselito body dementia, on comfort cares    Active Problems:    Hospice care       Infectious disease present?: NO    Communicable disease present? (examples: HIV, chicken pox, TB, Ebola, CJD) :  NO    Multi-drug resistant organism present? (example: MRSA): NO    Please consider an autopsy if any of the following exist:  NO Unexpected or unexplained death during or following any dental, medical, or surgical diagnostic treatment procedures.   NO Death of mother at or up to seven days after delivery.     NO All  and pediatric deaths.     NO Death where the cause is sufficiently obscure to delay completion of the death certificate.   NO Deaths in which autopsy would confirm a suspected illness/condition that would affect surviving family members or recipients of transplanted organs.     The following deaths must be reported to the 's Office:  NO A death that may be due entirely or in part to any factors other than natural disease (recent surgery, recent trauma, suspected abuse/neglect).   NO A death that may be an accident, suicide, or homicide.     NO Any sudden, unexpected death in which there is no prior history of significant heart disease or any other condition associated with sudden death.   NO A death under suspicious, unusual, or unexpected circumstances.    NO Any death which is apparently due to natural causes but in which the  does not have a personal physician familiar with the patient s medical history, social, or environmental situation or the circumstances of the terminal event.   NO Any death apparently due to Sudden Infant Death Syndrome.     NO Deaths that occur  during, in association with, or as consequences of a diagnostic, therapeutic, or anesthetic procedure.   NO Any death in which a fracture of a major bone has occurred within the past (6) six months.   NO A death of persons note seen by their physician within 120 days of demise.     NO Any death in which the  was an inmate of a public institution or was in the custody of Law Enforcement personnel.   NO  All unexpected deaths of children   NO Solid organ donors   NO Unidentified bodies   YES Deaths of persons whose bodies are to be cremated or otherwise disposed of so that the bodies will later be unavailable for examination;   NO Deaths unattended by a physician outside of a licensed healthcare facility or licensed residential hospice program   NO Deaths occurring within 24 hours of arrival to a health care facility if death is unexpected.    NO Deaths associated with the decedent s employment.   NO Deaths attributed to acts of terrorism.   NO Any death in which there is uncertainty as to whether it is a medical examiner s care should be discussed with the medical investigator.        Body disposition: Autopsy was discussed with family member:  Daughter Danielel by phone.  Permission for autopsy was declined.    Darinel Bryan NP on 2022 at 11:07 AM  Hospitalist and House [facility]  Pager: [phone]

## 2022-06-23 NOTE — PROGRESS NOTES
Notified dtr, Danielle Winn as listed on Facesheet as first  regarding pt's death. Per Danielle she will notify the others listed on pt's facesheet. Record of Death initiated.   Dr. Siddiqui notified via vocera messaging.

## 2022-06-23 NOTE — PROGRESS NOTES
Pt was found not breathing and without cardiac sounds via auscultation. Notified House Officer via web page to pronounce death.
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
## 2022-06-23 NOTE — PROGRESS NOTES
Received a phone call from pt's dtrDanielle stating family will not be coming in. Belongings will be sent with pt. Received  home info.   Ok to have pt removed from [address]. Security to transport.

## 2022-08-25 NOTE — PROVIDER NOTIFICATION
MD Notification    Notified Person: MD    Notified Person Name:Dr. Dye    Notification Date/Time:1/24/22 0106    Notification Interaction:AMCOM    Purpose of Notification:Pt requesting cough medication, d/t not being able to sleep from coughing. Please advice.     Orders Received:Robitussin and Tessalon prn ordered.    Comments:     Patient with one or more new problems requiring additional work-up/treatment.

## 2022-10-28 NOTE — Clinical Note
Removed intact   Follow-up Pulm Progress Note  Edmund Quiroga MD  [phone]    Remains afebrile on Zosyn  CTguided drainage RUQ abcess planned  Sleeping comfortably supine on nasal cannula      Vital Signs Last 24 Hrs  T(C): 36.4 (27 Oct 2022 05:54), Max: 36.9 (26 Oct 2022 16:30)  T(F): 97.5 (27 Oct 2022 05:54), Max: 98.4 (26 Oct 2022 16:30)  HR: 98 (27 Oct 2022 05:54) (87 - 100)  BP: 113/76 (27 Oct 2022 05:54) (101/73 - 120/78)  BP(mean): --  RR: 18 (27 Oct 2022 05:54) (18 - 18)  SpO2: 94% (27 Oct 2022 05:54) (93% - 96%)    Parameters below as of 27 Oct 2022 05:54  Patient On (Oxygen Delivery Method): nasal cannula  O2 Flow (L/min): 3                          11.4   12.21 )-----------( 245      ( 27 Oct 2022 10:12 )             35.0     10-27    131<L>  |  97  |  26<H>  ----------------------------<  154<H>  3.5   |  23  |  1.06    Ca    9.0      27 Oct 2022 10:11    TPro  6.1  /  Alb  2.4<L>  /  TBili  0.6  /  DBili  0.2  /  AST  10  /  ALT  8<L>  /  AlkPhos  150<H>  10-27    Physical Examination:  PULM: Few basilar rhonchi  CVS: Regular rate and rhythm, no murmurs, rubs, or gallops  ABD: Soft, non-tender  EXT:  No clubbing, cyanosis, or edema    RADIOLOGY REVIEWED  CXR:    CT chest:    TTE:

## 2022-12-05 NOTE — Clinical Note
Procedure scheduled as Urgent.   Information: Selecting Yes will display possible errors in your note based on the variables you have selected. This validation is only offered as a suggestion for you. PLEASE NOTE THAT THE VALIDATION TEXT WILL BE REMOVED WHEN YOU FINALIZE YOUR NOTE. IF YOU WANT TO FAX A PRELIMINARY NOTE YOU WILL NEED TO TOGGLE THIS TO 'NO' IF YOU DO NOT WANT IT IN YOUR FAXED NOTE.

## 2023-01-27 NOTE — PLAN OF CARE
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 Bilateral. Condition: improving. Vision: vision affected. s/p AV OU #1  12/16/22 Plan: Patient in a wheel chair, unable to use the slit lamp for examination. Discussed diagnosis in detail with patient. Discussed risks of progression. Based on today's diagnostic studies and review of records, recommend to continue with Intravitreal Injection Treatment RIGHT EYE with AVASTIN in order to help reduce the swelling and bleeding and slowdown new blood vessel growth to prevent a further reduction in vision. Discussed the risks and benefits of tx. All questions answered. Patient elects to proceed with recommendation. OCT shows mild edema OD and Optos OD shows fresh blood decreasing. OCT OS shows mild edema decreasing and Optos OS shows the retina appears stable. Recommend observation.  Will reassess condition in 4 - 6 wks, Summary: Lewy body dementia with behavioral concerns; IP hospice.  DATE & TIME: 6/22/22 9393-9152         Cognitive Concerns/ Orientation : CHRYSTAL, confused, mostly nonverbal. Mood labile, intermittently restless   BEHAVIOR & AGGRESSION TOOL COLOR: Yellow at times             ABNL VS/O2: None done today  MOBILITY: Bedrest. Turned and repositioned  PAIN MANAGMENT: Restless and groaning. Scheduled Tylenol   DIET: Regular, would not eat today.  BOWEL/BLADDER: Incontinent of B/B. No BM this shift  ABNL LAB/BG: NA  DRAIN/DEVICES: None  TELEMETRY RHYTHM: NA  SKIN: Scattered bruises and scabs. Mepilex in place to coccyx wound, mepilex changed today.  TESTS/PROCEDURES: NA  D/C DATE: Pending hospice placement when behaviors have improved  Discharge Barriers: Intermittently restless/agitated  OTHER IMPORTANT INFO: Patient noted to be restless when incontinent. Less restless after incontinence care. On scheduled thorazine. Pt would not take bedtime meds, could not wake up enough to even just eat pudding. Bedtime meds not given, no prn meds needed overnight.
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
## 2024-01-24 NOTE — Clinical Note
FFR wire advanced to circumflex and proximal circumflex. Detail Level: Detailed Depth Of Biopsy: dermis Was A Bandage Applied: Yes Size Of Lesion In Cm: 0.4 X Size Of Lesion In Cm: 0 Biopsy Type: H and E Biopsy Method: Dermablade Anesthesia Type: 1% lidocaine with epinephrine Anesthesia Volume In Cc: 0.5 Hemostasis: Drysol Wound Care: Petrolatum Dressing: bandage Destruction After The Procedure: No Type Of Destruction Used: Curettage Curettage Text: The wound bed was treated with curettage after the biopsy was performed. Cryotherapy Text: The wound bed was treated with cryotherapy after the biopsy was performed. Electrodesiccation Text: The wound bed was treated with electrodesiccation after the biopsy was performed. Electrodesiccation And Curettage Text: The wound bed was treated with electrodesiccation and curettage after the biopsy was performed. Silver Nitrate Text: The wound bed was treated with silver nitrate after the biopsy was performed. Lab: 6 Lab Facility: 3 Consent: Verbal consent was obtained and risks were reviewed including but not limited to scarring, infection, bleeding, scabbing, incomplete removal, nerve damage and allergy to anesthesia. Post-Care Instructions: I reviewed with the patient in detail post-care instructions. Patient is to keep the biopsy site dry overnight, and then apply bacitracin twice daily until healed. Patient may apply hydrogen peroxide soaks to remove any crusting. Notification Instructions: Patient will be notified of biopsy results. However, patient instructed to call the office if not contacted within 2 weeks. Billing Type: Third-Party Bill Information: Selecting Yes will display possible errors in your note based on the variables you have selected. This validation is only offered as a suggestion for you. PLEASE NOTE THAT THE VALIDATION TEXT WILL BE REMOVED WHEN YOU FINALIZE YOUR NOTE. IF YOU WANT TO FAX A PRELIMINARY NOTE YOU WILL NEED TO TOGGLE THIS TO 'NO' IF YOU DO NOT WANT IT IN YOUR FAXED NOTE.

## (undated) DEVICE — SLEEVE TR BAND RADIAL COMPRESSION DEVICE 24CM TRB24-REG

## (undated) DEVICE — CATH BALLOON NC EMERGE 3.25X20MM H7493926720320

## (undated) DEVICE — CATH ANGIO JUDKINS JL4 6FRX100CM INFINITI 534620T

## (undated) DEVICE — DEFIB PRO-PADZ LVP LQD GEL ADULT 8900-2105-01

## (undated) DEVICE — GUIDEWIRE VASC 0.014INX180CM RUNTHROUGH 25-1011

## (undated) DEVICE — VALVE HEMOSTASIS .096" COPILOT MECH 1003331

## (undated) DEVICE — RAD INFLATOR BASIC COMPAK  IN4130

## (undated) DEVICE — SYR ANGIO NAMIC CNTRSTINJ ROT ADPT RSVR PALM PD THB GRP FNGR

## (undated) DEVICE — MANIFOLD KIT ANGIO AUTOMATED 014613

## (undated) DEVICE — CATH LAUNCHER 6FR JR 4.0 LA6JR40

## (undated) DEVICE — CATH BALLOON EMERGE 2.5X15MM H7493918915250

## (undated) DEVICE — INTRO GLIDESHEATH SLENDER 6FR 10X45CM 60-1060

## (undated) DEVICE — INTRODUCER CATH VASC 5FRX10CM  MPIS-501-NT-U-SST

## (undated) DEVICE — KIT HAND CONTROL ANGIOTOUCH ACIST 65CM AT-P65

## (undated) DEVICE — CATH ANGIO INFINITI PIGTAIL 145 6 SH 6FRX110CM  534-652S

## (undated) DEVICE — TOTE ANGIO CORP PC15AT SAN32CC83O

## (undated) DEVICE — LINE MONITOR NASAL SMART CAPNOLINE ADULT LONG 12463

## (undated) DEVICE — WIRE GUIDE 0.035"X260CM SAFE-T-J EXCHANGE G00517

## (undated) DEVICE — CATH BALLOON EMERGE 3.0X12MM H7493918912300

## (undated) DEVICE — GUIDEWIRE VASC 0.014INX190CM J TIP CGRXT190HJ

## (undated) DEVICE — MEDITRACE MULTIFUNTION ADULT RADIOTRANSPARENT ELECTRODE FOR ZOLL

## (undated) DEVICE — INTRODUCER SHEATH GREEN 6.5FRX11CM .038IN PSI-6F-11-038ACT

## (undated) DEVICE — KIT LG BORE TOUHY ACCESS PLUS MAP152

## (undated) DEVICE — CATH ANGIO JUDKINS R4 6FRX100CM INFINITI 534621T

## (undated) DEVICE — CATH ANGIO INFINITI 3DRC 6FRX100CM 534676T

## (undated) DEVICE — INFL DVC KIT W/10CC NITRO IN4530

## (undated) DEVICE — CATH LAUNCHER 6FR LA6EBU375

## (undated) DEVICE — GW VASC 190CM .014IN HI-TRQ 1009660J

## (undated) DEVICE — CATH DIAGNOSTIC RADIAL 5FR TIG 4.0

## (undated) DEVICE — GUIDEWIRE VERRATA PLUS PRESSURE 185CM JTIP 10185JP

## (undated) RX ORDER — FENTANYL CITRATE 50 UG/ML
INJECTION, SOLUTION INTRAMUSCULAR; INTRAVENOUS
Status: DISPENSED
Start: 2020-09-23

## (undated) RX ORDER — FENTANYL CITRATE 50 UG/ML
INJECTION, SOLUTION INTRAMUSCULAR; INTRAVENOUS
Status: DISPENSED
Start: 2022-01-01

## (undated) RX ORDER — HEPARIN SODIUM 1000 [USP'U]/ML
INJECTION, SOLUTION INTRAVENOUS; SUBCUTANEOUS
Status: DISPENSED
Start: 2022-01-01

## (undated) RX ORDER — LIDOCAINE HYDROCHLORIDE 10 MG/ML
INJECTION, SOLUTION EPIDURAL; INFILTRATION; INTRACAUDAL; PERINEURAL
Status: DISPENSED
Start: 2020-09-23

## (undated) RX ORDER — POTASSIUM CHLORIDE 1500 MG/1
TABLET, EXTENDED RELEASE ORAL
Status: DISPENSED
Start: 2020-09-23

## (undated) RX ORDER — VERAPAMIL HYDROCHLORIDE 2.5 MG/ML
INJECTION, SOLUTION INTRAVENOUS
Status: DISPENSED
Start: 2022-01-01

## (undated) RX ORDER — HEPARIN SODIUM 1000 [USP'U]/ML
INJECTION, SOLUTION INTRAVENOUS; SUBCUTANEOUS
Status: DISPENSED
Start: 2020-09-23

## (undated) RX ORDER — VERAPAMIL HYDROCHLORIDE 2.5 MG/ML
INJECTION, SOLUTION INTRAVENOUS
Status: DISPENSED
Start: 2020-09-23

## (undated) RX ORDER — LIDOCAINE HYDROCHLORIDE 10 MG/ML
INJECTION, SOLUTION EPIDURAL; INFILTRATION; INTRACAUDAL; PERINEURAL
Status: DISPENSED
Start: 2022-01-01

## (undated) RX ORDER — HEPARIN SODIUM 200 [USP'U]/100ML
INJECTION, SOLUTION INTRAVENOUS
Status: DISPENSED
Start: 2022-01-01

## (undated) RX ORDER — HEPARIN SODIUM 200 [USP'U]/100ML
INJECTION, SOLUTION INTRAVENOUS
Status: DISPENSED
Start: 2020-09-23

## (undated) RX ORDER — REGADENOSON 0.08 MG/ML
INJECTION, SOLUTION INTRAVENOUS
Status: DISPENSED
Start: 2022-01-01

## (undated) RX ORDER — NITROGLYCERIN 5 MG/ML
VIAL (ML) INTRAVENOUS
Status: DISPENSED
Start: 2022-01-01

## (undated) RX ORDER — NITROGLYCERIN 5 MG/ML
VIAL (ML) INTRAVENOUS
Status: DISPENSED
Start: 2020-09-23